# Patient Record
Sex: MALE | Race: WHITE | NOT HISPANIC OR LATINO | URBAN - METROPOLITAN AREA
[De-identification: names, ages, dates, MRNs, and addresses within clinical notes are randomized per-mention and may not be internally consistent; named-entity substitution may affect disease eponyms.]

---

## 2017-02-08 ENCOUNTER — OUTPATIENT (OUTPATIENT)
Dept: OUTPATIENT SERVICES | Facility: HOSPITAL | Age: 67
LOS: 1 days | Discharge: HOME | End: 2017-02-08
Payer: COMMERCIAL

## 2017-02-08 VITALS
DIASTOLIC BLOOD PRESSURE: 88 MMHG | OXYGEN SATURATION: 97 % | HEART RATE: 58 BPM | SYSTOLIC BLOOD PRESSURE: 140 MMHG | HEIGHT: 68 IN | TEMPERATURE: 97 F | RESPIRATION RATE: 18 BRPM | WEIGHT: 225.97 LBS

## 2017-02-08 DIAGNOSIS — Z98.890 OTHER SPECIFIED POSTPROCEDURAL STATES: Chronic | ICD-10-CM

## 2017-02-08 DIAGNOSIS — M17.12 UNILATERAL PRIMARY OSTEOARTHRITIS, LEFT KNEE: ICD-10-CM

## 2017-02-08 LAB
ABO RH CONFIRMATION: SIGNIFICANT CHANGE UP
ANION GAP SERPL CALC-SCNC: 6 MMOL/L — SIGNIFICANT CHANGE UP (ref 5–17)
APPEARANCE UR: CLEAR — SIGNIFICANT CHANGE UP
APTT BLD: 30.5 SEC — SIGNIFICANT CHANGE UP (ref 27.5–37.4)
BACTERIA # UR AUTO: (no result)
BASOPHILS # BLD AUTO: 0.2 K/UL — SIGNIFICANT CHANGE UP (ref 0–0.2)
BASOPHILS NFR BLD AUTO: 2.3 % — HIGH (ref 0–2)
BILIRUB UR-MCNC: NEGATIVE — SIGNIFICANT CHANGE UP
BLD GP AB SCN SERPL QL: SIGNIFICANT CHANGE UP
BUN SERPL-MCNC: 16 MG/DL — SIGNIFICANT CHANGE UP (ref 7–23)
CALCIUM SERPL-MCNC: 9.3 MG/DL — SIGNIFICANT CHANGE UP (ref 8.5–10.1)
CHLORIDE SERPL-SCNC: 105 MMOL/L — SIGNIFICANT CHANGE UP (ref 96–108)
CO2 SERPL-SCNC: 28 MMOL/L — SIGNIFICANT CHANGE UP (ref 22–31)
COLOR SPEC: YELLOW — SIGNIFICANT CHANGE UP
CREAT SERPL-MCNC: 0.9 MG/DL — SIGNIFICANT CHANGE UP (ref 0.5–1.3)
DIFF PNL FLD: NEGATIVE — SIGNIFICANT CHANGE UP
EOSINOPHIL # BLD AUTO: 0.1 K/UL — SIGNIFICANT CHANGE UP (ref 0–0.5)
EOSINOPHIL NFR BLD AUTO: 2 % — SIGNIFICANT CHANGE UP (ref 0–6)
EPI CELLS # UR: NEGATIVE — SIGNIFICANT CHANGE UP
GLUCOSE SERPL-MCNC: 87 MG/DL — SIGNIFICANT CHANGE UP (ref 70–99)
GLUCOSE UR QL: NEGATIVE MG/DL — SIGNIFICANT CHANGE UP
HCT VFR BLD CALC: 44.7 % — SIGNIFICANT CHANGE UP (ref 39–50)
HGB BLD-MCNC: 15 G/DL — SIGNIFICANT CHANGE UP (ref 13–17)
INR BLD: 1.04 RATIO — SIGNIFICANT CHANGE UP (ref 0.88–1.16)
KETONES UR-MCNC: NEGATIVE — SIGNIFICANT CHANGE UP
LEUKOCYTE ESTERASE UR-ACNC: (no result)
LYMPHOCYTES # BLD AUTO: 1.6 K/UL — SIGNIFICANT CHANGE UP (ref 1–3.3)
LYMPHOCYTES # BLD AUTO: 22.8 % — SIGNIFICANT CHANGE UP (ref 13–44)
MCHC RBC-ENTMCNC: 29.5 PG — SIGNIFICANT CHANGE UP (ref 27–34)
MCHC RBC-ENTMCNC: 33.7 GM/DL — SIGNIFICANT CHANGE UP (ref 32–36)
MCV RBC AUTO: 87.7 FL — SIGNIFICANT CHANGE UP (ref 80–100)
MONOCYTES # BLD AUTO: 0.7 K/UL — SIGNIFICANT CHANGE UP (ref 0–0.9)
MONOCYTES NFR BLD AUTO: 10.2 % — SIGNIFICANT CHANGE UP (ref 2–14)
MRSA PCR RESULT.: SIGNIFICANT CHANGE UP
NEUTROPHILS # BLD AUTO: 4.4 K/UL — SIGNIFICANT CHANGE UP (ref 1.8–7.4)
NEUTROPHILS NFR BLD AUTO: 62.7 % — SIGNIFICANT CHANGE UP (ref 43–77)
NITRITE UR-MCNC: NEGATIVE — SIGNIFICANT CHANGE UP
PH UR: 5 — SIGNIFICANT CHANGE UP (ref 4.8–8)
PLATELET # BLD AUTO: 186 K/UL — SIGNIFICANT CHANGE UP (ref 150–400)
POTASSIUM SERPL-MCNC: 4.6 MMOL/L — SIGNIFICANT CHANGE UP (ref 3.5–5.3)
POTASSIUM SERPL-SCNC: 4.6 MMOL/L — SIGNIFICANT CHANGE UP (ref 3.5–5.3)
PROT UR-MCNC: NEGATIVE MG/DL — SIGNIFICANT CHANGE UP
PROTHROM AB SERPL-ACNC: 11.5 SEC — SIGNIFICANT CHANGE UP (ref 10–13.1)
RBC # BLD: 5.09 M/UL — SIGNIFICANT CHANGE UP (ref 4.2–5.8)
RBC # FLD: 12.8 % — SIGNIFICANT CHANGE UP (ref 10.3–14.5)
RBC CASTS # UR COMP ASSIST: NEGATIVE /HPF — SIGNIFICANT CHANGE UP (ref 0–4)
S AUREUS DNA NOSE QL NAA+PROBE: DETECTED
SODIUM SERPL-SCNC: 139 MMOL/L — SIGNIFICANT CHANGE UP (ref 135–145)
SP GR SPEC: 1.01 — SIGNIFICANT CHANGE UP (ref 1.01–1.02)
TYPE + AB SCN PNL BLD: SIGNIFICANT CHANGE UP
UROBILINOGEN FLD QL: NEGATIVE MG/DL — SIGNIFICANT CHANGE UP
WBC # BLD: 7 K/UL — SIGNIFICANT CHANGE UP (ref 3.8–10.5)
WBC # FLD AUTO: 7 K/UL — SIGNIFICANT CHANGE UP (ref 3.8–10.5)
WBC UR QL: SIGNIFICANT CHANGE UP

## 2017-02-08 PROCEDURE — 71020: CPT | Mod: 26

## 2017-02-08 PROCEDURE — 73564 X-RAY EXAM KNEE 4 OR MORE: CPT | Mod: 26,LT

## 2017-02-08 PROCEDURE — 93010 ELECTROCARDIOGRAM REPORT: CPT

## 2017-02-08 RX ORDER — TAMSULOSIN HYDROCHLORIDE 0.4 MG/1
1 CAPSULE ORAL
Qty: 0 | Refills: 0 | COMMUNITY

## 2017-02-08 NOTE — H&P PST ADULT - HISTORY OF PRESENT ILLNESS
66 years old male with intermittent pain to left knee for "a couple years". Pain with weight bearing and stair climbing. Occasional buckling of left knee. Swelling to left knee. Diagnosed with osteoarthritis of left knee.  Patient with weakness to right side since spinal cord injury.

## 2017-02-08 NOTE — H&P PST ADULT - ASSESSMENT
66 years old male present to PST prior to left total knee replacement with Dr. Gopi Coronel.   Plan   1. NPO after midnight  2. Take the following medications with sips of water on the day of procedure:Cialis  3. Use E-Z sponge as directed  4. Use Mupirocin as directed.  5. Drink a quart of extra  fluids the day before your surgery.  6 Medical clearance with Dr. Wiggins  7. CBC, BMP, CMP, PT/PTT/INR, Urinalysis, Type and Screen, MRSA sent to lab  8. EKG and CXR done 66 years old male present to PST prior to left total knee replacement with Dr. Gopi Coronel.   Plan   1. NPO after midnight  2. Take the following medications with sips of water on the day of procedure: Cialis, Flomax and Levothyroxine  3. Use E-Z sponge as directed  4. Use Mupirocin as directed.  5. Drink a quart of extra  fluids the day before your surgery.  6 Medical clearance with Dr. Wiggins  7. CBC, BMP, CMP, PT/PTT/INR, Urinalysis, Type and Screen, MRSA sent to lab  8. EKG and CXR done

## 2017-02-08 NOTE — H&P PST ADULT - PMH
Benign prostatic hyperplasia without lower urinary tract symptoms, unspecified morphology    Diverticulosis of large intestine without hemorrhage    Graves disease    Hemorrhoids, unspecified hemorrhoid type    History of colon polyps    Hyperlipidemia, unspecified hyperlipidemia type    Hypothyroidism, unspecified type    Myelopathy    Osteoarthritis of knee, unilateral  left  Spastic    Spinal cord injury at C5-C7 level without injury of spinal bone, subsequent encounter    Spinal stenosis, unspecified spinal region    Weakness  to right side

## 2017-02-08 NOTE — H&P PST ADULT - FAMILY HISTORY
Mother  Still living? No  Family history of colon cancer in mother, Age at diagnosis: Age Unknown     Father  Still living? No  Family history of liver disease, Age at diagnosis: Age Unknown     Sibling  Still living? No  Family history of cancer, Age at diagnosis: Age Unknown

## 2017-02-08 NOTE — H&P PST ADULT - PSH
H/O arthroscopy of knee  Left x 2. Unsure of years  H/O colonoscopy with polypectomy  2014  H/O lumbar discectomy  with laminectomy  History of cervical discectomy    S/P laminectomy  Cervical

## 2017-02-14 ENCOUNTER — APPOINTMENT (OUTPATIENT)
Dept: FAMILY MEDICINE | Facility: CLINIC | Age: 67
End: 2017-02-14

## 2017-02-14 ENCOUNTER — RESULT REVIEW (OUTPATIENT)
Age: 67
End: 2017-02-14

## 2017-02-14 VITALS
HEIGHT: 69 IN | WEIGHT: 227 LBS | OXYGEN SATURATION: 96 % | BODY MASS INDEX: 33.62 KG/M2 | DIASTOLIC BLOOD PRESSURE: 78 MMHG | HEART RATE: 52 BPM | SYSTOLIC BLOOD PRESSURE: 132 MMHG

## 2017-02-14 DIAGNOSIS — Z01.818 ENCOUNTER FOR OTHER PREPROCEDURAL EXAMINATION: ICD-10-CM

## 2017-02-14 DIAGNOSIS — M19.90 UNSPECIFIED OSTEOARTHRITIS, UNSPECIFIED SITE: ICD-10-CM

## 2017-02-14 RX ORDER — OXYCODONE HYDROCHLORIDE 5 MG/1
20 TABLET ORAL ONCE
Qty: 0 | Refills: 0 | Status: DISCONTINUED | OUTPATIENT
Start: 2017-02-15 | End: 2017-02-15

## 2017-02-14 RX ORDER — CELECOXIB 200 MG/1
200 CAPSULE ORAL ONCE
Qty: 0 | Refills: 0 | Status: COMPLETED | OUTPATIENT
Start: 2017-02-15 | End: 2017-02-15

## 2017-02-14 RX ORDER — ACETAMINOPHEN 500 MG
650 TABLET ORAL ONCE
Qty: 0 | Refills: 0 | Status: COMPLETED | OUTPATIENT
Start: 2017-02-15 | End: 2017-02-15

## 2017-02-15 ENCOUNTER — INPATIENT (INPATIENT)
Facility: HOSPITAL | Age: 67
LOS: 2 days | Discharge: HOME HEALTH | End: 2017-02-18
Attending: ORTHOPAEDIC SURGERY | Admitting: ORTHOPAEDIC SURGERY
Payer: COMMERCIAL

## 2017-02-15 ENCOUNTER — TRANSCRIPTION ENCOUNTER (OUTPATIENT)
Age: 67
End: 2017-02-15

## 2017-02-15 VITALS
RESPIRATION RATE: 16 BRPM | DIASTOLIC BLOOD PRESSURE: 80 MMHG | TEMPERATURE: 97 F | SYSTOLIC BLOOD PRESSURE: 119 MMHG | OXYGEN SATURATION: 96 % | WEIGHT: 225.97 LBS | HEART RATE: 64 BPM

## 2017-02-15 DIAGNOSIS — Z98.890 OTHER SPECIFIED POSTPROCEDURAL STATES: Chronic | ICD-10-CM

## 2017-02-15 DIAGNOSIS — M17.9 OSTEOARTHRITIS OF KNEE, UNSPECIFIED: ICD-10-CM

## 2017-02-15 DIAGNOSIS — E03.9 HYPOTHYROIDISM, UNSPECIFIED: ICD-10-CM

## 2017-02-15 DIAGNOSIS — R25.2 CRAMP AND SPASM: ICD-10-CM

## 2017-02-15 LAB
ANION GAP SERPL CALC-SCNC: 7 MMOL/L — SIGNIFICANT CHANGE UP (ref 5–17)
BUN SERPL-MCNC: 15 MG/DL — SIGNIFICANT CHANGE UP (ref 7–23)
CALCIUM SERPL-MCNC: 8.7 MG/DL — SIGNIFICANT CHANGE UP (ref 8.5–10.1)
CHLORIDE SERPL-SCNC: 107 MMOL/L — SIGNIFICANT CHANGE UP (ref 96–108)
CO2 SERPL-SCNC: 28 MMOL/L — SIGNIFICANT CHANGE UP (ref 22–31)
CREAT SERPL-MCNC: 1.14 MG/DL — SIGNIFICANT CHANGE UP (ref 0.5–1.3)
GLUCOSE SERPL-MCNC: 86 MG/DL — SIGNIFICANT CHANGE UP (ref 70–99)
HCT VFR BLD CALC: 40.5 % — SIGNIFICANT CHANGE UP (ref 39–50)
HGB BLD-MCNC: 13.5 G/DL — SIGNIFICANT CHANGE UP (ref 13–17)
MCHC RBC-ENTMCNC: 29.5 PG — SIGNIFICANT CHANGE UP (ref 27–34)
MCHC RBC-ENTMCNC: 33.4 GM/DL — SIGNIFICANT CHANGE UP (ref 32–36)
MCV RBC AUTO: 88.2 FL — SIGNIFICANT CHANGE UP (ref 80–100)
PLATELET # BLD AUTO: 158 K/UL — SIGNIFICANT CHANGE UP (ref 150–400)
POTASSIUM SERPL-MCNC: 4.8 MMOL/L — SIGNIFICANT CHANGE UP (ref 3.5–5.3)
POTASSIUM SERPL-SCNC: 4.8 MMOL/L — SIGNIFICANT CHANGE UP (ref 3.5–5.3)
RBC # BLD: 4.59 M/UL — SIGNIFICANT CHANGE UP (ref 4.2–5.8)
RBC # FLD: 12.5 % — SIGNIFICANT CHANGE UP (ref 10.3–14.5)
SODIUM SERPL-SCNC: 142 MMOL/L — SIGNIFICANT CHANGE UP (ref 135–145)
WBC # BLD: 7.7 K/UL — SIGNIFICANT CHANGE UP (ref 3.8–10.5)
WBC # FLD AUTO: 7.7 K/UL — SIGNIFICANT CHANGE UP (ref 3.8–10.5)

## 2017-02-15 PROCEDURE — 88305 TISSUE EXAM BY PATHOLOGIST: CPT | Mod: 26

## 2017-02-15 PROCEDURE — 73560 X-RAY EXAM OF KNEE 1 OR 2: CPT | Mod: 26,LT

## 2017-02-15 RX ORDER — ACETAMINOPHEN 500 MG
650 TABLET ORAL EVERY 6 HOURS
Qty: 0 | Refills: 0 | Status: DISCONTINUED | OUTPATIENT
Start: 2017-02-15 | End: 2017-02-18

## 2017-02-15 RX ORDER — OXYCODONE HYDROCHLORIDE 5 MG/1
5 TABLET ORAL EVERY 4 HOURS
Qty: 0 | Refills: 0 | Status: DISCONTINUED | OUTPATIENT
Start: 2017-02-15 | End: 2017-02-18

## 2017-02-15 RX ORDER — DOCUSATE SODIUM 100 MG
100 CAPSULE ORAL THREE TIMES A DAY
Qty: 0 | Refills: 0 | Status: DISCONTINUED | OUTPATIENT
Start: 2017-02-15 | End: 2017-02-18

## 2017-02-15 RX ORDER — OXYCODONE HYDROCHLORIDE 5 MG/1
10 TABLET ORAL EVERY 4 HOURS
Qty: 0 | Refills: 0 | Status: DISCONTINUED | OUTPATIENT
Start: 2017-02-15 | End: 2017-02-18

## 2017-02-15 RX ORDER — LEVOTHYROXINE SODIUM 125 MCG
200 TABLET ORAL DAILY
Qty: 0 | Refills: 0 | Status: DISCONTINUED | OUTPATIENT
Start: 2017-02-15 | End: 2017-02-18

## 2017-02-15 RX ORDER — ATORVASTATIN CALCIUM 80 MG/1
20 TABLET, FILM COATED ORAL AT BEDTIME
Qty: 0 | Refills: 0 | Status: DISCONTINUED | OUTPATIENT
Start: 2017-02-15 | End: 2017-02-18

## 2017-02-15 RX ORDER — ONDANSETRON 8 MG/1
4 TABLET, FILM COATED ORAL EVERY 6 HOURS
Qty: 0 | Refills: 0 | Status: DISCONTINUED | OUTPATIENT
Start: 2017-02-15 | End: 2017-02-18

## 2017-02-15 RX ORDER — ROSUVASTATIN CALCIUM 5 MG/1
1 TABLET ORAL
Qty: 0 | Refills: 0 | COMMUNITY

## 2017-02-15 RX ORDER — FAMOTIDINE 10 MG/ML
20 INJECTION INTRAVENOUS EVERY 12 HOURS
Qty: 0 | Refills: 0 | Status: DISCONTINUED | OUTPATIENT
Start: 2017-02-15 | End: 2017-02-18

## 2017-02-15 RX ORDER — CHOLECALCIFEROL (VITAMIN D3) 125 MCG
1000 CAPSULE ORAL DAILY
Qty: 0 | Refills: 0 | Status: DISCONTINUED | OUTPATIENT
Start: 2017-02-15 | End: 2017-02-18

## 2017-02-15 RX ORDER — ASPIRIN/CALCIUM CARB/MAGNESIUM 324 MG
325 TABLET ORAL
Qty: 0 | Refills: 0 | Status: DISCONTINUED | OUTPATIENT
Start: 2017-02-15 | End: 2017-02-16

## 2017-02-15 RX ORDER — CYCLOBENZAPRINE HYDROCHLORIDE 10 MG/1
10 TABLET, FILM COATED ORAL THREE TIMES A DAY
Qty: 0 | Refills: 0 | Status: DISCONTINUED | OUTPATIENT
Start: 2017-02-15 | End: 2017-02-18

## 2017-02-15 RX ORDER — MAGNESIUM HYDROXIDE 400 MG/1
30 TABLET, CHEWABLE ORAL DAILY
Qty: 0 | Refills: 0 | Status: DISCONTINUED | OUTPATIENT
Start: 2017-02-15 | End: 2017-02-18

## 2017-02-15 RX ORDER — OXYCODONE HYDROCHLORIDE 5 MG/1
10 TABLET ORAL EVERY 12 HOURS
Qty: 0 | Refills: 0 | Status: DISCONTINUED | OUTPATIENT
Start: 2017-02-15 | End: 2017-02-18

## 2017-02-15 RX ORDER — ONDANSETRON 8 MG/1
4 TABLET, FILM COATED ORAL EVERY 4 HOURS
Qty: 0 | Refills: 0 | Status: DISCONTINUED | OUTPATIENT
Start: 2017-02-15 | End: 2017-02-15

## 2017-02-15 RX ORDER — FERROUS SULFATE 325(65) MG
325 TABLET ORAL
Qty: 0 | Refills: 0 | Status: DISCONTINUED | OUTPATIENT
Start: 2017-02-15 | End: 2017-02-18

## 2017-02-15 RX ORDER — DOCUSATE SODIUM 100 MG
100 CAPSULE ORAL EVERY 12 HOURS
Qty: 0 | Refills: 0 | Status: DISCONTINUED | OUTPATIENT
Start: 2017-02-15 | End: 2017-02-18

## 2017-02-15 RX ORDER — SODIUM CHLORIDE 9 MG/ML
1000 INJECTION, SOLUTION INTRAVENOUS
Qty: 0 | Refills: 0 | Status: DISCONTINUED | OUTPATIENT
Start: 2017-02-15 | End: 2017-02-15

## 2017-02-15 RX ORDER — CHOLECALCIFEROL (VITAMIN D3) 125 MCG
1 CAPSULE ORAL
Qty: 0 | Refills: 0 | COMMUNITY

## 2017-02-15 RX ORDER — CEFAZOLIN SODIUM 1 G
2000 VIAL (EA) INJECTION EVERY 6 HOURS
Qty: 0 | Refills: 0 | Status: COMPLETED | OUTPATIENT
Start: 2017-02-15 | End: 2017-02-16

## 2017-02-15 RX ORDER — OMEGA-3 ACID ETHYL ESTERS 1 G
0 CAPSULE ORAL
Qty: 0 | Refills: 0 | COMMUNITY

## 2017-02-15 RX ORDER — ASPIRIN/CALCIUM CARB/MAGNESIUM 324 MG
1 TABLET ORAL
Qty: 0 | Refills: 0 | COMMUNITY

## 2017-02-15 RX ORDER — SENNA PLUS 8.6 MG/1
2 TABLET ORAL AT BEDTIME
Qty: 0 | Refills: 0 | Status: DISCONTINUED | OUTPATIENT
Start: 2017-02-15 | End: 2017-02-18

## 2017-02-15 RX ORDER — TAMSULOSIN HYDROCHLORIDE 0.4 MG/1
1 CAPSULE ORAL
Qty: 0 | Refills: 0 | COMMUNITY

## 2017-02-15 RX ORDER — TRANEXAMIC ACID 100 MG/ML
680 INJECTION, SOLUTION INTRAVENOUS ONCE
Qty: 0 | Refills: 0 | Status: DISCONTINUED | OUTPATIENT
Start: 2017-02-15 | End: 2017-02-15

## 2017-02-15 RX ORDER — HYDROMORPHONE HYDROCHLORIDE 2 MG/ML
0.5 INJECTION INTRAMUSCULAR; INTRAVENOUS; SUBCUTANEOUS
Qty: 0 | Refills: 0 | Status: DISCONTINUED | OUTPATIENT
Start: 2017-02-15 | End: 2017-02-18

## 2017-02-15 RX ORDER — POLYETHYLENE GLYCOL 3350 17 G/17G
17 POWDER, FOR SOLUTION ORAL DAILY
Qty: 0 | Refills: 0 | Status: DISCONTINUED | OUTPATIENT
Start: 2017-02-15 | End: 2017-02-18

## 2017-02-15 RX ORDER — MULTIVIT-MIN/FERROUS GLUCONATE 9 MG/15 ML
1 LIQUID (ML) ORAL
Qty: 0 | Refills: 0 | COMMUNITY

## 2017-02-15 RX ORDER — PANTOPRAZOLE SODIUM 20 MG/1
40 TABLET, DELAYED RELEASE ORAL DAILY
Qty: 0 | Refills: 0 | Status: DISCONTINUED | OUTPATIENT
Start: 2017-02-15 | End: 2017-02-18

## 2017-02-15 RX ORDER — TRANEXAMIC ACID 100 MG/ML
1025 INJECTION, SOLUTION INTRAVENOUS ONCE
Qty: 0 | Refills: 0 | Status: DISCONTINUED | OUTPATIENT
Start: 2017-02-15 | End: 2017-02-18

## 2017-02-15 RX ORDER — FOLIC ACID 0.8 MG
1 TABLET ORAL DAILY
Qty: 0 | Refills: 0 | Status: DISCONTINUED | OUTPATIENT
Start: 2017-02-15 | End: 2017-02-18

## 2017-02-15 RX ORDER — TIZANIDINE 4 MG/1
2 TABLET ORAL
Qty: 0 | Refills: 0 | COMMUNITY

## 2017-02-15 RX ORDER — ZOLPIDEM TARTRATE 10 MG/1
5 TABLET ORAL AT BEDTIME
Qty: 0 | Refills: 0 | Status: DISCONTINUED | OUTPATIENT
Start: 2017-02-15 | End: 2017-02-18

## 2017-02-15 RX ORDER — HYDROMORPHONE HYDROCHLORIDE 2 MG/ML
0.5 INJECTION INTRAMUSCULAR; INTRAVENOUS; SUBCUTANEOUS
Qty: 0 | Refills: 0 | Status: DISCONTINUED | OUTPATIENT
Start: 2017-02-15 | End: 2017-02-15

## 2017-02-15 RX ORDER — TADALAFIL 10 MG/1
1 TABLET, FILM COATED ORAL
Qty: 0 | Refills: 0 | COMMUNITY

## 2017-02-15 RX ORDER — INFLUENZA VIRUS VACCINE 15; 15; 15; 15 UG/.5ML; UG/.5ML; UG/.5ML; UG/.5ML
0.5 SUSPENSION INTRAMUSCULAR ONCE
Qty: 0 | Refills: 0 | Status: COMPLETED | OUTPATIENT
Start: 2017-02-15 | End: 2017-02-18

## 2017-02-15 RX ORDER — CELECOXIB 200 MG/1
200 CAPSULE ORAL
Qty: 0 | Refills: 0 | Status: DISCONTINUED | OUTPATIENT
Start: 2017-02-15 | End: 2017-02-18

## 2017-02-15 RX ORDER — LEVOTHYROXINE SODIUM 125 MCG
1 TABLET ORAL
Qty: 0 | Refills: 0 | COMMUNITY

## 2017-02-15 RX ORDER — ASCORBIC ACID 60 MG
500 TABLET,CHEWABLE ORAL
Qty: 0 | Refills: 0 | Status: DISCONTINUED | OUTPATIENT
Start: 2017-02-15 | End: 2017-02-18

## 2017-02-15 RX ORDER — SODIUM CHLORIDE 9 MG/ML
1000 INJECTION, SOLUTION INTRAVENOUS
Qty: 0 | Refills: 0 | Status: DISCONTINUED | OUTPATIENT
Start: 2017-02-15 | End: 2017-02-18

## 2017-02-15 RX ORDER — TAMSULOSIN HYDROCHLORIDE 0.4 MG/1
0.4 CAPSULE ORAL
Qty: 0 | Refills: 0 | Status: DISCONTINUED | OUTPATIENT
Start: 2017-02-15 | End: 2017-02-18

## 2017-02-15 RX ADMIN — Medication 650 MILLIGRAM(S): at 22:08

## 2017-02-15 RX ADMIN — HYDROMORPHONE HYDROCHLORIDE 0.5 MILLIGRAM(S): 2 INJECTION INTRAMUSCULAR; INTRAVENOUS; SUBCUTANEOUS at 18:55

## 2017-02-15 RX ADMIN — Medication 100 MILLIGRAM(S): at 22:09

## 2017-02-15 RX ADMIN — ATORVASTATIN CALCIUM 20 MILLIGRAM(S): 80 TABLET, FILM COATED ORAL at 22:09

## 2017-02-15 RX ADMIN — SENNA PLUS 2 TABLET(S): 8.6 TABLET ORAL at 22:09

## 2017-02-15 RX ADMIN — OXYCODONE HYDROCHLORIDE 5 MILLIGRAM(S): 5 TABLET ORAL at 18:57

## 2017-02-15 RX ADMIN — CELECOXIB 200 MILLIGRAM(S): 200 CAPSULE ORAL at 13:58

## 2017-02-15 RX ADMIN — Medication 100 MILLIGRAM(S): at 22:08

## 2017-02-15 RX ADMIN — Medication 650 MILLIGRAM(S): at 13:57

## 2017-02-15 RX ADMIN — OXYCODONE HYDROCHLORIDE 20 MILLIGRAM(S): 5 TABLET ORAL at 13:57

## 2017-02-15 RX ADMIN — SODIUM CHLORIDE 75 MILLILITER(S): 9 INJECTION, SOLUTION INTRAVENOUS at 18:44

## 2017-02-15 RX ADMIN — Medication 1 TABLET(S): at 22:09

## 2017-02-15 NOTE — PRE-ANESTHESIA EVALUATION ADULT - NSANTHOSAYNRD_GEN_A_CORE
No. PAULA screening performed.  STOP BANG Legend: 0-2 = LOW Risk; 3-4 = INTERMEDIATE Risk; 5-8 = HIGH Risk

## 2017-02-15 NOTE — PATIENT PROFILE ADULT. - PMH
Alcoholic  recovering alcoholic x 40years  Benign prostatic hyperplasia without lower urinary tract symptoms, unspecified morphology    Diverticulosis of large intestine without hemorrhage    Graves disease    Hemorrhoids, unspecified hemorrhoid type    History of colon polyps    Hyperlipidemia, unspecified hyperlipidemia type    Hypothyroidism, unspecified type    Myelopathy    Osteoarthritis of knee, unilateral  left  Spastic    Spinal cord injury at C5-C7 level without injury of spinal bone, subsequent encounter    Spinal stenosis, unspecified spinal region    Weakness  to right side

## 2017-02-15 NOTE — PROGRESS NOTE ADULT - SUBJECTIVE AND OBJECTIVE BOX
Patient seen and examined. Pain controlled. Tolerated procedure well.    Allergies    No Known Allergies      Vital Signs Last 24 Hrs  T(C): 37, Max: 37 (02-15 @ 18:33)  T(F): 98.6, Max: 98.6 (02-15 @ 18:33)  HR: 55 (55 - 70)  BP: 107/64 (66/47 - 119/80)  BP(mean): --  RR: 14 (11 - 16)  SpO2: 97% (96% - 100%)    Physical Exam  Gen: NAD  LLE:   Dressing c/d/i  +ehl/fhl/ta/gs function  L3-S1 silt  Dp pulse intact  No calf ttp  Compartments soft    A/P: 66y Male sp L TKA POD 0  Pain control  DVT ppx  PT/WBAT/OOB  FU labs  Dispo planning  D/w attending

## 2017-02-15 NOTE — CONSULT NOTE ADULT - SUBJECTIVE AND OBJECTIVE BOX
Patient is a 66y old  Male who presents with a chief complaint of left tkr (15 Feb 2017 13:44)      HPI: Pt is a 67 y/o male with h/o obesity, recovering alcoholic, graves disease now hypothyroidism spinal stenosis s/p multiple surgeries and now with mild Rt sided weakness with spasticity myelopath who has been having increasing Lt knee pain.  He was admitted for elective Lt total knee replacement.  Post-medicine consult called for medical comanagement by Dr Nguyễn.  Pt c/o mild Lt knee soarness, no CP or SOB.      PAST MEDICAL & SURGICAL HISTORY:  Alcoholic: recovering alcoholic x 40years  Graves disease  Hypothyroidism, unspecified type  Spinal stenosis, unspecified spinal region  Myelopathy  Weakness: to right side  Spastic  Spinal cord injury at C5-C7 level without injury of spinal bone, subsequent encounter  Osteoarthritis of knee, unilateral: left  Benign prostatic hyperplasia without lower urinary tract symptoms, unspecified morphology  Hemorrhoids, unspecified hemorrhoid type  History of colon polyps  Diverticulosis of large intestine without hemorrhage  Hyperlipidemia, unspecified hyperlipidemia type  H/O colonoscopy with polypectomy: 2014  H/O arthroscopy of knee: Left x 2. Unsure of years  H/O lumbar discectomy: with laminectomy  S/P laminectomy: Cervical  History of cervical discectomy      Allergies    No Known Allergies    Intolerances        MEDICATIONS  (STANDING):  influenza   Vaccine 0.5milliLiter(s) IntraMuscular once  lactated ringers. 1000milliLiter(s) IV Continuous <Continuous>  acetaminophen   Tablet 650milliGRAM(s) Oral every 6 hours  oxyCODONE ER Tablet 10milliGRAM(s) Oral every 12 hours  tranexamic acid IVPB 1025milliGRAM(s) IV Intermittent once  celecoxib 200milliGRAM(s) Oral with breakfast  docusate sodium 100milliGRAM(s) Oral every 12 hours  senna 2Tablet(s) Oral at bedtime    MEDICATIONS  (PRN):  HYDROmorphone  Injectable 0.5milliGRAM(s) IV Push every 10 minutes PRN Moderate Pain  ondansetron Injectable 4milliGRAM(s) IV Push every 4 hours PRN Nausea and/or Vomiting  oxyCODONE IR 5milliGRAM(s) Oral every 4 hours PRN Mild Pain (1 - 3)  oxyCODONE IR 10milliGRAM(s) Oral every 4 hours PRN Moderate Pain (4 - 6)  HYDROmorphone  Injectable 0.5milliGRAM(s) IV Push every 3 hours PRN Severe Pain (7 - 10)      FAMILY HISTORY:  Family history of cancer (Sibling): SIster - bile duct  Family history of liver disease (Father): father  Family history of colon cancer in mother (Mother)      Social History: , recovering alcoholic, former smoker (quit 22 years ago)      Vital Signs Last 24 Hrs  T(C): 37, Max: 37 (02-15 @ 18:33)  T(F): 98.6, Max: 98.6 (02-15 @ 18:33)  HR: 55 (55 - 70)  BP: 107/64 (66/47 - 119/80)  BP(mean): --  RR: 14 (11 - 16)  SpO2: 97% (96% - 100%)    Daily     Daily     I&O's Summary    I & Os for current day (as of 15 Feb 2017 19:43)  =============================================  IN: 1300 ml / OUT: 0 ml / NET: 1300 ml        Data

## 2017-02-15 NOTE — PRE-ANESTHESIA EVALUATION ADULT - NSANTHPMHFT_GEN_ALL_CORE
no hx of cp, palp, sob  neg stress test as per pt 7/2016  no PAULA  + right-sided lower extremity weakness

## 2017-02-15 NOTE — PROCEDURE NOTE - ADDITIONAL PROCEDURE DETAILS
__Left__ Adductor Canal Block Note:  Time out performed, sterile prep with chlorhexidine and drape, ultrasound-guided, 21G 4" stimuplex needle, good visualization of needle and nerve at all times, 20cc of 0.5% Ropivacaine injected easily, no heme after aspirating every 5cc, no intraneural injection, no paresthesia.  Procedure well tolerated without complications.

## 2017-02-15 NOTE — CONSULT NOTE ADULT - PROBLEM SELECTOR RECOMMENDATION 9
s/p Lt total knee replacement, cont pain control, PT, encourage use of incentive spirometry, DVT proph, monitor post-op labs

## 2017-02-16 DIAGNOSIS — D50.0 IRON DEFICIENCY ANEMIA SECONDARY TO BLOOD LOSS (CHRONIC): ICD-10-CM

## 2017-02-16 DIAGNOSIS — D69.6 THROMBOCYTOPENIA, UNSPECIFIED: ICD-10-CM

## 2017-02-16 LAB
ANION GAP SERPL CALC-SCNC: 6 MMOL/L — SIGNIFICANT CHANGE UP (ref 5–17)
BUN SERPL-MCNC: 15 MG/DL — SIGNIFICANT CHANGE UP (ref 7–23)
CALCIUM SERPL-MCNC: 8.5 MG/DL — SIGNIFICANT CHANGE UP (ref 8.5–10.1)
CHLORIDE SERPL-SCNC: 104 MMOL/L — SIGNIFICANT CHANGE UP (ref 96–108)
CO2 SERPL-SCNC: 28 MMOL/L — SIGNIFICANT CHANGE UP (ref 22–31)
CREAT SERPL-MCNC: 1.15 MG/DL — SIGNIFICANT CHANGE UP (ref 0.5–1.3)
GLUCOSE SERPL-MCNC: 95 MG/DL — SIGNIFICANT CHANGE UP (ref 70–99)
HCT VFR BLD CALC: 37.2 % — LOW (ref 39–50)
HGB BLD-MCNC: 12.7 G/DL — LOW (ref 13–17)
INR BLD: 1.11 RATIO — SIGNIFICANT CHANGE UP (ref 0.88–1.16)
MCHC RBC-ENTMCNC: 30.2 PG — SIGNIFICANT CHANGE UP (ref 27–34)
MCHC RBC-ENTMCNC: 34.3 GM/DL — SIGNIFICANT CHANGE UP (ref 32–36)
MCV RBC AUTO: 88.1 FL — SIGNIFICANT CHANGE UP (ref 80–100)
PLATELET # BLD AUTO: 140 K/UL — LOW (ref 150–400)
POTASSIUM SERPL-MCNC: 4.4 MMOL/L — SIGNIFICANT CHANGE UP (ref 3.5–5.3)
POTASSIUM SERPL-SCNC: 4.4 MMOL/L — SIGNIFICANT CHANGE UP (ref 3.5–5.3)
PROTHROM AB SERPL-ACNC: 12.2 SEC — SIGNIFICANT CHANGE UP (ref 10–13.1)
RBC # BLD: 4.22 M/UL — SIGNIFICANT CHANGE UP (ref 4.2–5.8)
RBC # FLD: 12.3 % — SIGNIFICANT CHANGE UP (ref 10.3–14.5)
SODIUM SERPL-SCNC: 138 MMOL/L — SIGNIFICANT CHANGE UP (ref 135–145)
WBC # BLD: 9.5 K/UL — SIGNIFICANT CHANGE UP (ref 3.8–10.5)
WBC # FLD AUTO: 9.5 K/UL — SIGNIFICANT CHANGE UP (ref 3.8–10.5)

## 2017-02-16 RX ORDER — WARFARIN SODIUM 2.5 MG/1
5 TABLET ORAL ONCE
Qty: 0 | Refills: 0 | Status: DISCONTINUED | OUTPATIENT
Start: 2017-02-16 | End: 2017-02-16

## 2017-02-16 RX ORDER — HEPARIN SODIUM 5000 [USP'U]/ML
5000 INJECTION INTRAVENOUS; SUBCUTANEOUS EVERY 8 HOURS
Qty: 0 | Refills: 0 | Status: DISCONTINUED | OUTPATIENT
Start: 2017-02-16 | End: 2017-02-18

## 2017-02-16 RX ORDER — WARFARIN SODIUM 2.5 MG/1
5 TABLET ORAL ONCE
Qty: 0 | Refills: 0 | Status: COMPLETED | OUTPATIENT
Start: 2017-02-16 | End: 2017-02-16

## 2017-02-16 RX ORDER — WARFARIN SODIUM 2.5 MG/1
5 TABLET ORAL DAILY
Qty: 0 | Refills: 0 | Status: DISCONTINUED | OUTPATIENT
Start: 2017-02-17 | End: 2017-02-18

## 2017-02-16 RX ORDER — PANTOPRAZOLE SODIUM 20 MG/1
1 TABLET, DELAYED RELEASE ORAL
Qty: 14 | Refills: 0 | OUTPATIENT
Start: 2017-02-16 | End: 2017-03-02

## 2017-02-16 RX ORDER — DOCUSATE SODIUM 100 MG
1 CAPSULE ORAL
Qty: 60 | Refills: 0 | OUTPATIENT
Start: 2017-02-16 | End: 2017-03-18

## 2017-02-16 RX ORDER — WARFARIN SODIUM 2.5 MG/1
1 TABLET ORAL
Qty: 0 | Refills: 0 | COMMUNITY
Start: 2017-02-16

## 2017-02-16 RX ADMIN — ONDANSETRON 4 MILLIGRAM(S): 8 TABLET, FILM COATED ORAL at 10:02

## 2017-02-16 RX ADMIN — HEPARIN SODIUM 5000 UNIT(S): 5000 INJECTION INTRAVENOUS; SUBCUTANEOUS at 22:41

## 2017-02-16 RX ADMIN — ATORVASTATIN CALCIUM 20 MILLIGRAM(S): 80 TABLET, FILM COATED ORAL at 22:41

## 2017-02-16 RX ADMIN — TAMSULOSIN HYDROCHLORIDE 0.4 MILLIGRAM(S): 0.4 CAPSULE ORAL at 17:15

## 2017-02-16 RX ADMIN — Medication 500 MILLIGRAM(S): at 06:12

## 2017-02-16 RX ADMIN — Medication 650 MILLIGRAM(S): at 06:17

## 2017-02-16 RX ADMIN — PANTOPRAZOLE SODIUM 40 MILLIGRAM(S): 20 TABLET, DELAYED RELEASE ORAL at 11:11

## 2017-02-16 RX ADMIN — OXYCODONE HYDROCHLORIDE 10 MILLIGRAM(S): 5 TABLET ORAL at 22:46

## 2017-02-16 RX ADMIN — OXYCODONE HYDROCHLORIDE 10 MILLIGRAM(S): 5 TABLET ORAL at 17:15

## 2017-02-16 RX ADMIN — Medication 325 MILLIGRAM(S): at 06:14

## 2017-02-16 RX ADMIN — Medication 100 MILLIGRAM(S): at 06:12

## 2017-02-16 RX ADMIN — WARFARIN SODIUM 5 MILLIGRAM(S): 2.5 TABLET ORAL at 11:33

## 2017-02-16 RX ADMIN — FAMOTIDINE 20 MILLIGRAM(S): 10 INJECTION INTRAVENOUS at 06:16

## 2017-02-16 RX ADMIN — Medication 100 MILLIGRAM(S): at 22:41

## 2017-02-16 RX ADMIN — CELECOXIB 200 MILLIGRAM(S): 200 CAPSULE ORAL at 13:14

## 2017-02-16 RX ADMIN — Medication 1 TABLET(S): at 22:41

## 2017-02-16 RX ADMIN — Medication 500 MILLIGRAM(S): at 17:14

## 2017-02-16 RX ADMIN — Medication 650 MILLIGRAM(S): at 11:10

## 2017-02-16 RX ADMIN — Medication 100 MILLIGRAM(S): at 06:16

## 2017-02-16 RX ADMIN — Medication 200 MICROGRAM(S): at 06:13

## 2017-02-16 RX ADMIN — Medication 100 MILLIGRAM(S): at 13:10

## 2017-02-16 RX ADMIN — OXYCODONE HYDROCHLORIDE 10 MILLIGRAM(S): 5 TABLET ORAL at 06:16

## 2017-02-16 RX ADMIN — CELECOXIB 200 MILLIGRAM(S): 200 CAPSULE ORAL at 11:10

## 2017-02-16 RX ADMIN — HEPARIN SODIUM 5000 UNIT(S): 5000 INJECTION INTRAVENOUS; SUBCUTANEOUS at 13:11

## 2017-02-16 RX ADMIN — Medication 1 TABLET(S): at 13:11

## 2017-02-16 RX ADMIN — Medication 100 MILLIGRAM(S): at 17:14

## 2017-02-16 RX ADMIN — Medication 650 MILLIGRAM(S): at 17:14

## 2017-02-16 RX ADMIN — FAMOTIDINE 20 MILLIGRAM(S): 10 INJECTION INTRAVENOUS at 17:14

## 2017-02-16 RX ADMIN — Medication 1 TABLET(S): at 06:13

## 2017-02-16 RX ADMIN — TAMSULOSIN HYDROCHLORIDE 0.4 MILLIGRAM(S): 0.4 CAPSULE ORAL at 06:12

## 2017-02-16 NOTE — CONSULT NOTE ADULT - SUBJECTIVE AND OBJECTIVE BOX
HPI  HPI:      Patient is a 66y old  Male who presents with a chief complaint of left knee pain now s/p  left tkr (16 Feb 2017 00:56)      Consulted by Dr. Nguyễn for VTE prophylaxis, risk stratification, and anticoagulation management.    PAST MEDICAL & SURGICAL HISTORY:  Alcoholic: recovering alcoholic x 40years  Graves disease  Hypothyroidism, unspecified type  Spinal stenosis, unspecified spinal region  Myelopathy  Weakness: to right side  Spastic  Spinal cord injury at C5-C7 level without injury of spinal bone, subsequent encounter  Osteoarthritis of knee, unilateral: left  Benign prostatic hyperplasia without lower urinary tract symptoms, unspecified morphology  Hemorrhoids, unspecified hemorrhoid type  History of colon polyps  Diverticulosis of large intestine without hemorrhage  Hyperlipidemia, unspecified hyperlipidemia type  H/O colonoscopy with polypectomy: 2014  H/O arthroscopy of knee: Left x 2. Unsure of years  H/O lumbar discectomy: with laminectomy  S/P laminectomy: Cervical  History of cervical discectomy    216: Patient seen at bedside with wife- spoke at length regarding coumadin and necessity of anticoagulation post-op. Discussed diet/consistency/adherence. Patient and wife verbalized understanding.      BMI: 34.3    CrCl: 31.0    Caprini VTE Risk Score: 8 (high)     IMPROVE Bleeding Risk Score: 2.5 (low)    Falls Risk:   High ( x )  Mod (  )  Low (  )      FAMILY HISTORY:  Family history of cancer (Sibling): SIster - bile duct  Family history of liver disease (Father): father  Family history of colon cancer in mother (Mother)    Denies any personal or familial history of clotting or bleeding disorders.    Allergies    No Known Allergies    Intolerances        REVIEW OF SYSTEMS    (  )Fever	     (  )Constipation	(  )SOB				(  )Headache	(  )Dysuria  (  )Chills	     (  )Melena	(  )Dyspnea present on exertion	                    (  )Dizziness                    (  )Polyuria  (  )Nausea	     (  )Hematochezia	(  )Cough			                    (  )Syncope   	(  )Hematuria  (  )Vomiting    (  )Chest Pain	(  )Wheezing			(  )Weakness  (  )Diarrhea     (  )Palpitations	(  )Anorexia			(  )Myalgia    All other review of systems negative: Yes          PHYSICAL EXAM:    Constitutional: Appears Well    Neurological: A& O x 3    Skin: Warm    Respiratory and Thorax: normal effort; Breath sounds: normal; No rales/wheezing/rhonchi  	  Cardiovascular: S1, S2, regular, NMBR	    Gastrointestinal: BS + x 4Q, nontender	    Genitourinary:  Bladder nondistended, nontender    Musculoskeletal:     General Right:   no muscle/joint tenderness,   normal tone, no joint swelling,   ROM: full	    General Left:   no muscle/joint tenderness,   normal tone, no joint swelling,   ROM: limited    Knee:  Left: Incision: ; Dressing CDI      Lower extrems:   Right: no calf tenderness              negative desmond's sign               + pedal pulses    Left:   no calf tenderness              negative desmond's sign               + pedal pulses                          12.7   9.5   )-----------( 140      ( 16 Feb 2017 07:17 )             37.2       16 Feb 2017 07:17    138    |  104    |  15     ----------------------------<  95     4.4     |  28     |  1.15     Ca    8.5        16 Feb 2017 07:17        PT/INR - ( 16 Feb 2017 07:17 )   PT: 12.2 sec;   INR: 1.11 ratio         				    MEDICATIONS  (STANDING):  influenza   Vaccine 0.5milliLiter(s) IntraMuscular once  acetaminophen   Tablet 650milliGRAM(s) Oral every 6 hours  oxyCODONE ER Tablet 10milliGRAM(s) Oral every 12 hours  tranexamic acid IVPB 1025milliGRAM(s) IV Intermittent once  celecoxib 200milliGRAM(s) Oral with breakfast  docusate sodium 100milliGRAM(s) Oral every 12 hours  senna 2Tablet(s) Oral at bedtime  tamsulosin 0.4milliGRAM(s) Oral two times a day  atorvastatin 20milliGRAM(s) Oral at bedtime  levothyroxine 200MICROGram(s) Oral daily  cholecalciferol Oral Tab/Cap - Peds 1000Unit(s) Oral daily  lactated ringers. 1000milliLiter(s) IV Continuous <Continuous>  calcium carbonate 1250 mG + Vitamin D (OsCal 500 + D) 1Tablet(s) Oral three times a day  famotidine    Tablet 20milliGRAM(s) Oral every 12 hours  pantoprazole    Tablet 40milliGRAM(s) Oral daily  polyethylene glycol 3350 17Gram(s) Oral daily  docusate sodium 100milliGRAM(s) Oral three times a day  ferrous    sulfate 325milliGRAM(s) Oral three times a day with meals  folic acid 1milliGRAM(s) Oral daily  multivitamin 1Tablet(s) Oral daily  ascorbic acid 500milliGRAM(s) Oral two times a day  heparin  Injectable 5000Unit(s) SubCutaneous every 8 hours          DVT Prophylaxis:  LMWH                   (  )  Heparin SQ           ( x )  Coumadin             (x  )  Xarelto                  (  )  Eliquis                   (  )  Venodynes           (x  )  Ambulation          (x  )  UFH                       (  )  Contraindicated  (  )

## 2017-02-16 NOTE — DISCHARGE NOTE ADULT - CARE PLAN
Principal Discharge DX:	Osteoarthritis of knee, unilateral  Goal:	return to ADL  Instructions for follow-up, activity and diet:	1.	Pain Control  2.	Walking with full weight bearing as tolerated, with assistive devices (walker/Cane as Needed)  3.	DVT Prophylaxis per anticoagulation team recommendations  4.	PT as needed  5.	Follow up with Dr. Nguyễn as Outpatient in 10-14 Days after Discharge from the Hospital or Rehab. Call Office For Appointment.   6.	Remove Staples Post-Op Day 14, and Remove Dressing Post-Op Day 10, with Daily Dressing Changes as Need.  7.	Ice affected area as Needed  8.	Keep Dressing Clean and dry. Principal Discharge DX:	Osteoarthritis of knee, unilateral  Goal:	return to ADL  Instructions for follow-up, activity and diet:	Discharge Instructions for Total Knee Arthroplasty    Diet: Resume previous diet    Activity: WBAT, Rolling walker, Daily PT. Gentle ROM 0-full as tolerated.  For Dr. Mayberry, Wear immobilizer only while asleep x 3 weeks. Walk plenty.    Call with: fever over 101, wound redness, drainage or open area, calf pain/calf swelling    Wound Care: Remove old and place new Aquacel bandage to Knee every 7 days. Remove Sutures/Staples 2/26. OK to Shower with Aquacel.  Avoid direct water beating on bandage.  Continue ICE packs to knee.      DVT PE Prophylaxis:  Daily Coumadin per INR goal 2-3. Stop Lovenox or Heparin when INR>2  as per Anticoagulation Instructions.    Follow Up: Orthopedics, 10-14 days in office. Call to schedule. If going home, eRX sent to your pharmacy for .

## 2017-02-16 NOTE — DISCHARGE NOTE ADULT - CARE PROVIDER_API CALL
Homer Nguyễn (DO), Orthopaedic Surgery  [address] [place] [address]  Phone: [phone]  Fax: [phone]

## 2017-02-16 NOTE — DISCHARGE NOTE ADULT - PATIENT PORTAL LINK FT
“You can access the [facility] Patient Portal, offered by [facility], by registering with the following website: [email or web address]”

## 2017-02-16 NOTE — PROGRESS NOTE ADULT - SUBJECTIVE AND OBJECTIVE BOX
Patient seen and examined. Pain controlled. No acute events overnight    Allergies    No Known Allergies      Vital Signs Last 24 Hrs  T(C): 36.8, Max: 37 (02-15 @ 18:33)  T(F): 98.2, Max: 98.6 (02-15 @ 18:33)  HR: 61 (55 - 71)  BP: 122/62 (66/47 - 122/62)  BP(mean): --  RR: 16 (11 - 18)  SpO2: 97% (94% - 100%)    Physical Exam  Gen: NAD  LLE:   Dressing c/d/i  +ehl/fhl/ta/gs function  L3-S1 silt  Dp pulse intact  No calf ttp  Compartments soft    A/P: 66y Male sp L TKA POD 1  Pain control  DVT ppx per AC team  PT/WBAT/OOB  FU labs  Dispo planning  D/w attending

## 2017-02-16 NOTE — DISCHARGE NOTE ADULT - COMMUNITY RESOURCES
[facility]    [facility] [phone]

## 2017-02-16 NOTE — DISCHARGE NOTE ADULT - PLAN OF CARE
return to ADL 1.	Pain Control  2.	Walking with full weight bearing as tolerated, with assistive devices (walker/Cane as Needed)  3.	DVT Prophylaxis per anticoagulation team recommendations  4.	PT as needed  5.	Follow up with Dr. Nguyễn as Outpatient in 10-14 Days after Discharge from the Hospital or Rehab. Call Office For Appointment.   6.	Remove Staples Post-Op Day 14, and Remove Dressing Post-Op Day 10, with Daily Dressing Changes as Need.  7.	Ice affected area as Needed  8.	Keep Dressing Clean and dry. Discharge Instructions for Total Knee Arthroplasty    Diet: Resume previous diet    Activity: WBAT, Rolling walker, Daily PT. Gentle ROM 0-full as tolerated.  For Dr. Mayberry, Wear immobilizer only while asleep x 3 weeks. Walk plenty.    Call with: fever over 101, wound redness, drainage or open area, calf pain/calf swelling    Wound Care: Remove old and place new Aquacel bandage to Knee every 7 days. Remove Sutures/Staples 2/26. OK to Shower with Aquacel.  Avoid direct water beating on bandage.  Continue ICE packs to knee.      DVT PE Prophylaxis:  Daily Coumadin per INR goal 2-3. Stop Lovenox or Heparin when INR>2  as per Anticoagulation Instructions.    Follow Up: Orthopedics, 10-14 days in office. Call to schedule. If going home, eRX sent to your pharmacy for .

## 2017-02-16 NOTE — PROGRESS NOTE ADULT - SUBJECTIVE AND OBJECTIVE BOX
Pt c/o Lt knee pain. No CP, SOB or uneventful night.    Vital Signs Last 24 Hrs  T(C): 36.8, Max: 37 (02-15 @ 18:33)  T(F): 98.2, Max: 98.6 (02-15 @ 18:33)  HR: 61 (55 - 71)  BP: 109/59 (66/47 - 122/62)  BP(mean): --  RR: 16 (11 - 18)  SpO2: 97% (94% - 100%)    Daily     Daily     I&O's Detail  I & Os for 24h ending 16 Feb 2017 07:00  =============================================  IN:    Other: 1300 ml    Total IN: 1300 ml  ---------------------------------------------  OUT:    Voided: 500 ml    Total OUT: 500 ml  ---------------------------------------------  Total NET: 800 ml    I & Os for current day (as of 16 Feb 2017 16:06)  =============================================  IN:    Oral Fluid: 240 ml    Total IN: 240 ml  ---------------------------------------------  OUT:    Voided: 400 ml    Total OUT: 400 ml  ---------------------------------------------  Total NET: -160 ml      CAPILLARY BLOOD GLUCOSE                                      12.7   9.5   )-----------( 140      ( 16 Feb 2017 07:17 )             37.2       16 Feb 2017 07:17    138    |  104    |  15     ----------------------------<  95     4.4     |  28     |  1.15     Ca    8.5        16 Feb 2017 07:17        PT/INR - ( 16 Feb 2017 07:17 )   PT: 12.2 sec;   INR: 1.11 ratio                         MEDICATIONS  (STANDING):  influenza   Vaccine 0.5milliLiter(s) IntraMuscular once  acetaminophen   Tablet 650milliGRAM(s) Oral every 6 hours  oxyCODONE ER Tablet 10milliGRAM(s) Oral every 12 hours  tranexamic acid IVPB 1025milliGRAM(s) IV Intermittent once  celecoxib 200milliGRAM(s) Oral with breakfast  docusate sodium 100milliGRAM(s) Oral every 12 hours  senna 2Tablet(s) Oral at bedtime  tamsulosin 0.4milliGRAM(s) Oral two times a day  atorvastatin 20milliGRAM(s) Oral at bedtime  levothyroxine 200MICROGram(s) Oral daily  cholecalciferol Oral Tab/Cap - Peds 1000Unit(s) Oral daily  lactated ringers. 1000milliLiter(s) IV Continuous <Continuous>  calcium carbonate 1250 mG + Vitamin D (OsCal 500 + D) 1Tablet(s) Oral three times a day  famotidine    Tablet 20milliGRAM(s) Oral every 12 hours  pantoprazole    Tablet 40milliGRAM(s) Oral daily  polyethylene glycol 3350 17Gram(s) Oral daily  docusate sodium 100milliGRAM(s) Oral three times a day  ferrous    sulfate 325milliGRAM(s) Oral three times a day with meals  folic acid 1milliGRAM(s) Oral daily  multivitamin 1Tablet(s) Oral daily  ascorbic acid 500milliGRAM(s) Oral two times a day  heparin  Injectable 5000Unit(s) SubCutaneous every 8 hours    MEDICATIONS  (PRN):  oxyCODONE IR 5milliGRAM(s) Oral every 4 hours PRN Mild Pain (1 - 3)  oxyCODONE IR 10milliGRAM(s) Oral every 4 hours PRN Moderate Pain (4 - 6)  HYDROmorphone  Injectable 0.5milliGRAM(s) IV Push every 3 hours PRN Severe Pain (7 - 10)  cyclobenzaprine 10milliGRAM(s) Oral three times a day PRN Muscle Spasm  acetaminophen   Tablet 650milliGRAM(s) Oral every 6 hours PRN For Temp over 38.3 C (100.94 F)  oxyCODONE IR 5milliGRAM(s) Oral every 4 hours PRN Mild Pain  oxyCODONE IR 10milliGRAM(s) Oral every 4 hours PRN Moderate Pain  HYDROmorphone  Injectable 0.5milliGRAM(s) IV Push every 3 hours PRN Severe Pain  aluminum hydroxide/magnesium hydroxide/simethicone Suspension 30milliLiter(s) Oral four times a day PRN Indigestion  ondansetron Injectable 4milliGRAM(s) IV Push every 6 hours PRN Nausea and/or Vomiting  zolpidem 5milliGRAM(s) Oral at bedtime PRN Insomnia  magnesium hydroxide Suspension 30milliLiter(s) Oral daily PRN Constipation  senna 2Tablet(s) Oral at bedtime PRN Constipation

## 2017-02-16 NOTE — DISCHARGE NOTE ADULT - HOSPITAL COURSE
The patient is a 66y year old Male status post elective total knee Arthroplasty to the left knee after failing outpatient nonoperative conservative management.  Patient presented to the hospital after being medically cleared for an elective surgical procedure. The patient was taken to the operating room on date mentioned above. Prophylactic antibiotics were started before the procedure and continued for 24 hours.  There were no complications during the procedure and patient tolerated the procedure well.  The patient was transferred to recovery room in stable condition and subsequently to surgical floor.  Patient was placed on aspirin for anticoagulation.  All home medications were continued.  The patient received physical therapy daily and daily labs were followed.  The dressing was kept clean, dry, intact.  The rest of the hospital stay was unremarkable. The patient is a 66y year old Male status post elective total knee Arthroplasty to the left knee after failing outpatient nonoperative conservative management.  Patient presented to the hospital after being medically cleared for an elective surgical procedure. The patient was taken to the operating room on date mentioned above. Prophylactic antibiotics were started before the procedure and continued for 24 hours.  There were no complications during the procedure and patient tolerated the procedure well.  The patient was transferred to recovery room in stable condition and subsequently to surgical floor.  Patient was placed on heparin/Coumadin for anticoagulation.  All home medications were continued.  The patient received physical therapy daily and daily labs were followed.  The dressing was kept clean, dry, intact.  The rest of the hospital stay was unremarkable.

## 2017-02-16 NOTE — DISCHARGE NOTE ADULT - MEDICATION SUMMARY - MEDICATIONS TO TAKE
I will START or STAY ON the medications listed below when I get home from the hospital:    Cialis 5 mg oral tablet  -- 1 tab(s) by mouth once a day  -- Indication: For Home med    Aspirin Low Dose 81 mg oral delayed release tablet  -- 1 tab(s) by mouth once a day  -- Indication: For Home med    oxyCODONE-acetaminophen 5mg-325mg oral tablet  -- 1 tab(s) by mouth every 4 hours, As Needed -for moderate pain 2 tabs PO q6h prn severe pain MDD:6  -- Caution federal law prohibits the transfer of this drug to any person other  than the person for whom it was prescribed.  May cause drowsiness.  Alcohol may intensify this effect.  Use care when operating dangerous machinery.  This prescription cannot be refilled.  This product contains acetaminophen.  Do not use  with any other product containing acetaminophen to prevent possible liver damage.  Using more of this medication than prescribed may cause serious breathing problems.    -- Indication: For pain    tamsulosin 0.4 mg oral capsule  -- 1 cap(s) by mouth 2 times a day  -- Indication: For Home med    warfarin 5 mg oral tablet  -- 1 tab(s) by mouth once a day. Daily dose to goal INR 2-3; Check INR daily  -- Indication: For blood clot prevention    Crestor 5 mg oral tablet  -- 1 tab(s) by mouth 2 times a week  -- Indication: For Home med    Colace 100 mg oral capsule  -- 1 cap(s) by mouth 2 times a day -for constipation  -- Medication should be taken with plenty of water.    -- Indication: For Stool softener    tiZANidine 2 mg oral capsule  -- 2 cap(s) by mouth every 8 hours  -- Indication: For Home med    Fish Oil 1200 mg oral capsule  --  by mouth once a day  -- Indication: For Home med    pantoprazole 40 mg oral delayed release tablet  -- 1 tab(s) by mouth once a day, stomach protection  -- It is very important that you take or use this exactly as directed.  Do not skip doses or discontinue unless directed by your doctor.  Obtain medical advice before taking any non-prescription drugs as some may affect the action of this medication.  Swallow whole.  Do not crush.    -- Indication: For Stomach protection    levothyroxine 200 mcg (0.2 mg) oral tablet  -- 1 tab(s) by mouth once a day  -- Indication: For Home med    multivitamin with minerals  -- 1 tab(s) by mouth once a day  -- Indication: For Home med    Vitamin D3 2000 intl units oral capsule  -- 1 cap(s) by mouth once a day  -- Indication: For Home med

## 2017-02-16 NOTE — CONSULT NOTE ADULT - ASSESSMENT
This is a 66 year old man s/p left total knee replacement with high thrombosis risk due to age, immobility, BMI and low bleeding risk.    Discussed the necessity of VTE prophylaxis with coumadin. Educated patient on INR, value, meaning, and effects medications and foods may have on it. Will further reinforce coumadin education and follow up on outpatient basis.     Plan:  Coumadin 5 mg PO daily x 4 weeks total adjust dose per INR  Heparin 5,000 units SQ Q8hour  Daily PT/INR  Daily CBC/BMP  Enc ambulation  Venodynes    Thank you for this consult, will continue to follow.
Pt is a 65 y/o male with h/o obesity, recovering alcoholic, graves disease now hypothyroidism spinal stenosis s/p multiple surgeries and now with mild Rt sided weakness with spasticity myelopath who has been having increasing Lt knee pain.  He was admitted for elective Lt total knee replacement.  Post-medicine consult called for medical comanagement by Dr Nguyễn.

## 2017-02-16 NOTE — PHYSICAL THERAPY INITIAL EVALUATION ADULT - ADDITIONAL COMMENTS
The pt reports that he tends to trip over his right foot which sometimes lacks full DF and has been the case for many years now.

## 2017-02-16 NOTE — PHYSICAL THERAPY INITIAL EVALUATION ADULT - GENERAL OBSERVATIONS, REHAB EVAL
The pt was pleasant and cooperative, received in supine on 2N. The pt was eager to attempt getting OOB and ambulating.

## 2017-02-16 NOTE — PHYSICAL THERAPY INITIAL EVALUATION ADULT - MANUAL MUSCLE TESTING RESULTS, REHAB EVAL
left LE knee extension grossly 3-/5, and right ankle df 3-/5 which the pt describes as being a chronic issue.

## 2017-02-16 NOTE — DISCHARGE NOTE ADULT - NS AS DC VTE INSTRUCTION
Coumadin/Warfarin - Follow-up monitoring.../Coumadin/Warfarin - Compliance.../Coumadin/Warfarin - Dietary Advice.../Coumadin/Warfarin - Potential for adverse drug reactions and interactions

## 2017-02-17 DIAGNOSIS — R42 DIZZINESS AND GIDDINESS: ICD-10-CM

## 2017-02-17 LAB
ANION GAP SERPL CALC-SCNC: 5 MMOL/L — SIGNIFICANT CHANGE UP (ref 5–17)
BUN SERPL-MCNC: 14 MG/DL — SIGNIFICANT CHANGE UP (ref 7–23)
CALCIUM SERPL-MCNC: 8.8 MG/DL — SIGNIFICANT CHANGE UP (ref 8.5–10.1)
CHLORIDE SERPL-SCNC: 104 MMOL/L — SIGNIFICANT CHANGE UP (ref 96–108)
CO2 SERPL-SCNC: 32 MMOL/L — HIGH (ref 22–31)
CREAT SERPL-MCNC: 1.01 MG/DL — SIGNIFICANT CHANGE UP (ref 0.5–1.3)
GLUCOSE SERPL-MCNC: 113 MG/DL — HIGH (ref 70–99)
HCT VFR BLD CALC: 37.4 % — LOW (ref 39–50)
HGB BLD-MCNC: 12.4 G/DL — LOW (ref 13–17)
INR BLD: 1.28 RATIO — HIGH (ref 0.88–1.16)
MCHC RBC-ENTMCNC: 29.5 PG — SIGNIFICANT CHANGE UP (ref 27–34)
MCHC RBC-ENTMCNC: 33.2 GM/DL — SIGNIFICANT CHANGE UP (ref 32–36)
MCV RBC AUTO: 88.8 FL — SIGNIFICANT CHANGE UP (ref 80–100)
PLATELET # BLD AUTO: 148 K/UL — LOW (ref 150–400)
POTASSIUM SERPL-MCNC: 4.8 MMOL/L — SIGNIFICANT CHANGE UP (ref 3.5–5.3)
POTASSIUM SERPL-SCNC: 4.8 MMOL/L — SIGNIFICANT CHANGE UP (ref 3.5–5.3)
PROTHROM AB SERPL-ACNC: 14.1 SEC — HIGH (ref 10–13.1)
RBC # BLD: 4.22 M/UL — SIGNIFICANT CHANGE UP (ref 4.2–5.8)
RBC # FLD: 12.4 % — SIGNIFICANT CHANGE UP (ref 10.3–14.5)
SODIUM SERPL-SCNC: 141 MMOL/L — SIGNIFICANT CHANGE UP (ref 135–145)
WBC # BLD: 10.4 K/UL — SIGNIFICANT CHANGE UP (ref 3.8–10.5)
WBC # FLD AUTO: 10.4 K/UL — SIGNIFICANT CHANGE UP (ref 3.8–10.5)

## 2017-02-17 RX ORDER — TIZANIDINE 4 MG/1
4 TABLET ORAL EVERY 8 HOURS
Qty: 0 | Refills: 0 | Status: DISCONTINUED | OUTPATIENT
Start: 2017-02-17 | End: 2017-02-18

## 2017-02-17 RX ORDER — WARFARIN SODIUM 2.5 MG/1
1 TABLET ORAL
Qty: 30 | Refills: 0 | OUTPATIENT
Start: 2017-02-17 | End: 2017-03-19

## 2017-02-17 RX ORDER — SODIUM CHLORIDE 9 MG/ML
1000 INJECTION INTRAMUSCULAR; INTRAVENOUS; SUBCUTANEOUS ONCE
Qty: 0 | Refills: 0 | Status: COMPLETED | OUTPATIENT
Start: 2017-02-17 | End: 2017-02-17

## 2017-02-17 RX ORDER — ENOXAPARIN SODIUM 100 MG/ML
30 INJECTION SUBCUTANEOUS
Qty: 8 | Refills: 0 | OUTPATIENT
Start: 2017-02-17 | End: 2017-02-21

## 2017-02-17 RX ADMIN — Medication 200 MICROGRAM(S): at 05:31

## 2017-02-17 RX ADMIN — Medication 325 MILLIGRAM(S): at 10:07

## 2017-02-17 RX ADMIN — TAMSULOSIN HYDROCHLORIDE 0.4 MILLIGRAM(S): 0.4 CAPSULE ORAL at 05:31

## 2017-02-17 RX ADMIN — TIZANIDINE 4 MILLIGRAM(S): 4 TABLET ORAL at 23:07

## 2017-02-17 RX ADMIN — Medication 650 MILLIGRAM(S): at 23:06

## 2017-02-17 RX ADMIN — Medication 500 MILLIGRAM(S): at 05:30

## 2017-02-17 RX ADMIN — ATORVASTATIN CALCIUM 20 MILLIGRAM(S): 80 TABLET, FILM COATED ORAL at 23:07

## 2017-02-17 RX ADMIN — Medication 1 TABLET(S): at 05:30

## 2017-02-17 RX ADMIN — Medication 1 TABLET(S): at 13:27

## 2017-02-17 RX ADMIN — Medication 325 MILLIGRAM(S): at 17:48

## 2017-02-17 RX ADMIN — Medication 1 TABLET(S): at 11:45

## 2017-02-17 RX ADMIN — Medication 100 MILLIGRAM(S): at 17:47

## 2017-02-17 RX ADMIN — FAMOTIDINE 20 MILLIGRAM(S): 10 INJECTION INTRAVENOUS at 05:30

## 2017-02-17 RX ADMIN — TAMSULOSIN HYDROCHLORIDE 0.4 MILLIGRAM(S): 0.4 CAPSULE ORAL at 17:48

## 2017-02-17 RX ADMIN — Medication 650 MILLIGRAM(S): at 17:47

## 2017-02-17 RX ADMIN — FAMOTIDINE 20 MILLIGRAM(S): 10 INJECTION INTRAVENOUS at 17:48

## 2017-02-17 RX ADMIN — HEPARIN SODIUM 5000 UNIT(S): 5000 INJECTION INTRAVENOUS; SUBCUTANEOUS at 13:27

## 2017-02-17 RX ADMIN — TIZANIDINE 4 MILLIGRAM(S): 4 TABLET ORAL at 11:57

## 2017-02-17 RX ADMIN — Medication 325 MILLIGRAM(S): at 13:27

## 2017-02-17 RX ADMIN — Medication 500 MILLIGRAM(S): at 17:47

## 2017-02-17 RX ADMIN — HEPARIN SODIUM 5000 UNIT(S): 5000 INJECTION INTRAVENOUS; SUBCUTANEOUS at 23:05

## 2017-02-17 RX ADMIN — WARFARIN SODIUM 5 MILLIGRAM(S): 2.5 TABLET ORAL at 23:07

## 2017-02-17 RX ADMIN — Medication 1000 UNIT(S): at 11:44

## 2017-02-17 RX ADMIN — SENNA PLUS 2 TABLET(S): 8.6 TABLET ORAL at 23:15

## 2017-02-17 RX ADMIN — Medication 650 MILLIGRAM(S): at 11:57

## 2017-02-17 RX ADMIN — PANTOPRAZOLE SODIUM 40 MILLIGRAM(S): 20 TABLET, DELAYED RELEASE ORAL at 11:44

## 2017-02-17 RX ADMIN — Medication 100 MILLIGRAM(S): at 23:07

## 2017-02-17 RX ADMIN — Medication 100 MILLIGRAM(S): at 13:27

## 2017-02-17 RX ADMIN — POLYETHYLENE GLYCOL 3350 17 GRAM(S): 17 POWDER, FOR SOLUTION ORAL at 11:45

## 2017-02-17 RX ADMIN — CELECOXIB 200 MILLIGRAM(S): 200 CAPSULE ORAL at 11:30

## 2017-02-17 RX ADMIN — HEPARIN SODIUM 5000 UNIT(S): 5000 INJECTION INTRAVENOUS; SUBCUTANEOUS at 05:29

## 2017-02-17 RX ADMIN — Medication 650 MILLIGRAM(S): at 05:30

## 2017-02-17 RX ADMIN — CELECOXIB 200 MILLIGRAM(S): 200 CAPSULE ORAL at 10:07

## 2017-02-17 RX ADMIN — Medication 100 MILLIGRAM(S): at 05:30

## 2017-02-17 RX ADMIN — SODIUM CHLORIDE 2000 MILLILITER(S): 9 INJECTION INTRAMUSCULAR; INTRAVENOUS; SUBCUTANEOUS at 10:07

## 2017-02-17 RX ADMIN — Medication 1 MILLIGRAM(S): at 11:45

## 2017-02-17 RX ADMIN — Medication 1 TABLET(S): at 23:07

## 2017-02-17 NOTE — PROGRESS NOTE ADULT - SUBJECTIVE AND OBJECTIVE BOX
HPI  Patient is a 66y old  Male who presents with a chief complaint of left knee pain now s/p  left tkr (16 Feb 2017 00:56)    Consulted by Dr. Nguyễn for VTE prophylaxis, risk stratification, and anticoagulation management.    PAST MEDICAL & SURGICAL HISTORY:  Alcoholic: recovering alcoholic x 40years  Graves disease  Hypothyroidism, unspecified type  Spinal stenosis, unspecified spinal region  Myelopathy  Weakness: to right side  Spastic  Spinal cord injury at C5-C7 level without injury of spinal bone, subsequent encounter  Osteoarthritis of knee, unilateral: left  Benign prostatic hyperplasia without lower urinary tract symptoms, unspecified morphology  Hemorrhoids, unspecified hemorrhoid type  History of colon polyps  Diverticulosis of large intestine without hemorrhage  Hyperlipidemia, unspecified hyperlipidemia type  H/O colonoscopy with polypectomy: 2014  H/O arthroscopy of knee: Left x 2. Unsure of years  H/O lumbar discectomy: with laminectomy  S/P laminectomy: Cervical  History of cervical discectomy    216: Patient seen at bedside with wife- spoke at length regarding coumadin and necessity of anticoagulation post-op. Discussed diet/consistency/adherence. Patient and wife verbalized understanding.  2-17-17  Pt seen at bedside oob in chair.  Discussed his anticoagulation with coumadin over lapping with heparin.  then switching to lovenox if INR is not therapeutic on discharge.  Pt v/u  and is willing to learn self inj on discharge.    BMI: 34.3    CrCl: 31.0    Caprini VTE Risk Score: 8 (high)     IMPROVE Bleeding Risk Score: 2.5 (low)    Falls Risk:   High ( x )  Mod (  )  Low (  )      FAMILY HISTORY:  Family history of cancer (Sibling): SIster - bile duct  Family history of liver disease (Father): father  Family history of colon cancer in mother (Mother)    Denies any personal or familial history of clotting or bleeding disorders.    Allergies    No Known Allergies    Intolerances        REVIEW OF SYSTEMS    (  )Fever	     (  )Constipation	(  )SOB				(  )Headache	(  )Dysuria  (  )Chills	     (  )Melena	(  )Dyspnea present on exertion	                    (  )Dizziness                    (  )Polyuria  (  )Nausea	     (  )Hematochezia	(  )Cough			                    (  )Syncope   	(  )Hematuria  (  )Vomiting    (  )Chest Pain	(  )Wheezing			(  )Weakness  (  )Diarrhea     (  )Palpitations	(  )Anorexia			(  )Myalgia    All other review of systems negative: Yes    PHYSICAL EXAM:    Constitutional: Appears Well    Neurological: A& O x 3    Skin: Warm    Respiratory and Thorax: normal effort; Breath sounds: normal; No rales/wheezing/rhonchi  	  Cardiovascular: S1, S2, regular, NMBR	    Gastrointestinal: BS + x 4Q, nontender	    Genitourinary:  Bladder nondistended, nontender    Musculoskeletal:     General Right:   no muscle/joint tenderness,   normal tone, no joint swelling,   ROM: full	    General Left:   no muscle/joint tenderness,   normal tone, no joint swelling,   ROM: limited    Knee:  Left: Incision: ; Dressing CDI      Lower extrems:   Right: no calf tenderness              negative desmond's sign               + pedal pulses    Left:   no calf tenderness              negative desmond's sign               + pedal pulses                          12.4   10.4  )-----------( 148      ( 17 Feb 2017 06:46 )             37.4       17 Feb 2017 06:46    141    |  104    |  14     ----------------------------<  113    4.8     |  32     |  1.01     Ca    8.8        17 Feb 2017 06:46                                 12.7   9.5   )-----------( 140      ( 16 Feb 2017 07:17 )             37.2       16 Feb 2017 07:17    138    |  104    |  15     ----------------------------<  95     4.4     |  28     |  1.15     Ca    8.5        16 Feb 2017 07:17    PT/INR - ( 17 Feb 2017 06:46 )   PT: 14.1 sec;   INR: 1.28 ratio      PT/INR - ( 16 Feb 2017 07:17 )   PT: 12.2 sec;   INR: 1.11 ratio       MEDICATIONS  (STANDING):  influenza   Vaccine 0.5milliLiter(s) IntraMuscular once  acetaminophen   Tablet 650milliGRAM(s) Oral every 6 hours  oxyCODONE ER Tablet 10milliGRAM(s) Oral every 12 hours  tranexamic acid IVPB 1025milliGRAM(s) IV Intermittent once  celecoxib 200milliGRAM(s) Oral with breakfast  docusate sodium 100milliGRAM(s) Oral every 12 hours  senna 2Tablet(s) Oral at bedtime  tamsulosin 0.4milliGRAM(s) Oral two times a day  atorvastatin 20milliGRAM(s) Oral at bedtime  levothyroxine 200MICROGram(s) Oral daily  cholecalciferol Oral Tab/Cap - Peds 1000Unit(s) Oral daily  lactated ringers. 1000milliLiter(s) IV Continuous <Continuous>  calcium carbonate 1250 mG + Vitamin D (OsCal 500 + D) 1Tablet(s) Oral three times a day  famotidine    Tablet 20milliGRAM(s) Oral every 12 hours  pantoprazole    Tablet 40milliGRAM(s) Oral daily  polyethylene glycol 3350 17Gram(s) Oral daily  docusate sodium 100milliGRAM(s) Oral three times a day  ferrous    sulfate 325milliGRAM(s) Oral three times a day with meals  folic acid 1milliGRAM(s) Oral daily  multivitamin 1Tablet(s) Oral daily  ascorbic acid 500milliGRAM(s) Oral two times a day  heparin  Injectable 5000Unit(s) SubCutaneous every 8 hours  warfarin 5milliGRAM(s) Oral daily  tiZANidine 4milliGRAM(s) Oral every 8 hours  	      DVT Prophylaxis:  LMWH                   (  )  Heparin SQ           ( x )  Coumadin             (x  )  Xarelto                  (  )  Eliquis                   (  )  Venodynes           (x  )  Ambulation          (x  )  UFH                       (  )  Contraindicated  (  )

## 2017-02-17 NOTE — PROGRESS NOTE ADULT - SUBJECTIVE AND OBJECTIVE BOX
Patient seen and examined. Pain controlled. No acute events overnight    Allergies    No Known Allergies    Vital Signs Last 24 Hrs  T(C): 37.2, Max: 37.2 (02-16 @ 15:13)  T(F): 99, Max: 99 (02-16 @ 15:13)  HR: 69 (69 - 69)  BP: 115/58 (115/58 - 115/58)  BP(mean): --  RR: 16 (16 - 16)  SpO2: 96% (96% - 96%)    Physical Exam  Gen: NAD  LLE:   Dressing c/d/i  +ehl/fhl/ta/gs function  L3-S1 silt  Dp pulse intact  No calf ttp  Compartments soft    A/P: 66y Male sp L TKA POD 2  Pain control  DVT ppx per AC team  PT/WBAT/OOB  FU labs  Dispo planning  D/w attending

## 2017-02-17 NOTE — PROGRESS NOTE ADULT - SUBJECTIVE AND OBJECTIVE BOX
Pt c/o occasional dizziness which he is attributing to narcotics.  No CP, SOB, negative orthostatics.    Vital Signs Last 24 Hrs  T(C): 36.8, Max: 37.2 (02-16 @ 15:13)  T(F): 98.2, Max: 99 (02-16 @ 15:13)  HR: 70 (69 - 83)  BP: 127/66 (115/58 - 135/79)  BP(mean): --  RR: 17 (16 - 17)  SpO2: 96% (95% - 96%)    Daily     Daily     I&O's Detail    I & Os for current day (as of 17 Feb 2017 09:46)  =============================================  IN:    Oral Fluid: 240 ml    Total IN: 240 ml  ---------------------------------------------  OUT:    Voided: 400 ml    Total OUT: 400 ml  ---------------------------------------------  Total NET: -160 ml      CAPILLARY BLOOD GLUCOSE                                      12.4   10.4  )-----------( 148      ( 17 Feb 2017 06:46 )             37.4       17 Feb 2017 06:46    141    |  104    |  14     ----------------------------<  113    4.8     |  32     |  1.01     Ca    8.8        17 Feb 2017 06:46        PT/INR - ( 17 Feb 2017 06:46 )   PT: 14.1 sec;   INR: 1.28 ratio                         MEDICATIONS  (STANDING):  influenza   Vaccine 0.5milliLiter(s) IntraMuscular once  acetaminophen   Tablet 650milliGRAM(s) Oral every 6 hours  oxyCODONE ER Tablet 10milliGRAM(s) Oral every 12 hours  tranexamic acid IVPB 1025milliGRAM(s) IV Intermittent once  celecoxib 200milliGRAM(s) Oral with breakfast  docusate sodium 100milliGRAM(s) Oral every 12 hours  senna 2Tablet(s) Oral at bedtime  tamsulosin 0.4milliGRAM(s) Oral two times a day  atorvastatin 20milliGRAM(s) Oral at bedtime  levothyroxine 200MICROGram(s) Oral daily  cholecalciferol Oral Tab/Cap - Peds 1000Unit(s) Oral daily  lactated ringers. 1000milliLiter(s) IV Continuous <Continuous>  calcium carbonate 1250 mG + Vitamin D (OsCal 500 + D) 1Tablet(s) Oral three times a day  famotidine    Tablet 20milliGRAM(s) Oral every 12 hours  pantoprazole    Tablet 40milliGRAM(s) Oral daily  polyethylene glycol 3350 17Gram(s) Oral daily  docusate sodium 100milliGRAM(s) Oral three times a day  ferrous    sulfate 325milliGRAM(s) Oral three times a day with meals  folic acid 1milliGRAM(s) Oral daily  multivitamin 1Tablet(s) Oral daily  ascorbic acid 500milliGRAM(s) Oral two times a day  heparin  Injectable 5000Unit(s) SubCutaneous every 8 hours  warfarin 5milliGRAM(s) Oral daily    MEDICATIONS  (PRN):  oxyCODONE IR 5milliGRAM(s) Oral every 4 hours PRN Mild Pain (1 - 3)  oxyCODONE IR 10milliGRAM(s) Oral every 4 hours PRN Moderate Pain (4 - 6)  HYDROmorphone  Injectable 0.5milliGRAM(s) IV Push every 3 hours PRN Severe Pain (7 - 10)  cyclobenzaprine 10milliGRAM(s) Oral three times a day PRN Muscle Spasm  acetaminophen   Tablet 650milliGRAM(s) Oral every 6 hours PRN For Temp over 38.3 C (100.94 F)  oxyCODONE IR 5milliGRAM(s) Oral every 4 hours PRN Mild Pain  oxyCODONE IR 10milliGRAM(s) Oral every 4 hours PRN Moderate Pain  HYDROmorphone  Injectable 0.5milliGRAM(s) IV Push every 3 hours PRN Severe Pain  aluminum hydroxide/magnesium hydroxide/simethicone Suspension 30milliLiter(s) Oral four times a day PRN Indigestion  ondansetron Injectable 4milliGRAM(s) IV Push every 6 hours PRN Nausea and/or Vomiting  zolpidem 5milliGRAM(s) Oral at bedtime PRN Insomnia  magnesium hydroxide Suspension 30milliLiter(s) Oral daily PRN Constipation  bisacodyl Suppository 10milliGRAM(s) Rectal daily PRN If no bowel movement by postoperative day #2  senna 2Tablet(s) Oral at bedtime PRN Constipation

## 2017-02-18 VITALS
DIASTOLIC BLOOD PRESSURE: 62 MMHG | OXYGEN SATURATION: 97 % | TEMPERATURE: 98 F | RESPIRATION RATE: 18 BRPM | HEART RATE: 69 BPM | SYSTOLIC BLOOD PRESSURE: 108 MMHG

## 2017-02-18 LAB
ANION GAP SERPL CALC-SCNC: 6 MMOL/L — SIGNIFICANT CHANGE UP (ref 5–17)
BUN SERPL-MCNC: 10 MG/DL — SIGNIFICANT CHANGE UP (ref 7–23)
CALCIUM SERPL-MCNC: 8.8 MG/DL — SIGNIFICANT CHANGE UP (ref 8.5–10.1)
CHLORIDE SERPL-SCNC: 104 MMOL/L — SIGNIFICANT CHANGE UP (ref 96–108)
CO2 SERPL-SCNC: 30 MMOL/L — SIGNIFICANT CHANGE UP (ref 22–31)
CREAT SERPL-MCNC: 0.8 MG/DL — SIGNIFICANT CHANGE UP (ref 0.5–1.3)
GLUCOSE SERPL-MCNC: 100 MG/DL — HIGH (ref 70–99)
HCT VFR BLD CALC: 35.7 % — LOW (ref 39–50)
HGB BLD-MCNC: 12 G/DL — LOW (ref 13–17)
INR BLD: 1.2 RATIO — HIGH (ref 0.88–1.16)
MCHC RBC-ENTMCNC: 29.5 PG — SIGNIFICANT CHANGE UP (ref 27–34)
MCHC RBC-ENTMCNC: 33.6 GM/DL — SIGNIFICANT CHANGE UP (ref 32–36)
MCV RBC AUTO: 87.8 FL — SIGNIFICANT CHANGE UP (ref 80–100)
PLATELET # BLD AUTO: 147 K/UL — LOW (ref 150–400)
POTASSIUM SERPL-MCNC: 4.3 MMOL/L — SIGNIFICANT CHANGE UP (ref 3.5–5.3)
POTASSIUM SERPL-SCNC: 4.3 MMOL/L — SIGNIFICANT CHANGE UP (ref 3.5–5.3)
PROTHROM AB SERPL-ACNC: 13.2 SEC — HIGH (ref 10–13.1)
RBC # BLD: 4.07 M/UL — LOW (ref 4.2–5.8)
RBC # FLD: 12.5 % — SIGNIFICANT CHANGE UP (ref 10.3–14.5)
SODIUM SERPL-SCNC: 140 MMOL/L — SIGNIFICANT CHANGE UP (ref 135–145)
WBC # BLD: 10.5 K/UL — SIGNIFICANT CHANGE UP (ref 3.8–10.5)
WBC # FLD AUTO: 10.5 K/UL — SIGNIFICANT CHANGE UP (ref 3.8–10.5)

## 2017-02-18 RX ORDER — ENOXAPARIN SODIUM 100 MG/ML
30 INJECTION SUBCUTANEOUS EVERY 12 HOURS
Qty: 0 | Refills: 0 | Status: DISCONTINUED | OUTPATIENT
Start: 2017-02-18 | End: 2017-02-18

## 2017-02-18 RX ORDER — ENOXAPARIN SODIUM 100 MG/ML
30 INJECTION SUBCUTANEOUS
Qty: 0 | Refills: 0 | COMMUNITY
Start: 2017-02-18

## 2017-02-18 RX ADMIN — Medication 100 MILLIGRAM(S): at 06:39

## 2017-02-18 RX ADMIN — Medication 1000 UNIT(S): at 12:15

## 2017-02-18 RX ADMIN — FAMOTIDINE 20 MILLIGRAM(S): 10 INJECTION INTRAVENOUS at 06:38

## 2017-02-18 RX ADMIN — Medication 200 MICROGRAM(S): at 06:33

## 2017-02-18 RX ADMIN — Medication 1 MILLIGRAM(S): at 12:14

## 2017-02-18 RX ADMIN — CELECOXIB 200 MILLIGRAM(S): 200 CAPSULE ORAL at 09:16

## 2017-02-18 RX ADMIN — POLYETHYLENE GLYCOL 3350 17 GRAM(S): 17 POWDER, FOR SOLUTION ORAL at 12:09

## 2017-02-18 RX ADMIN — Medication 325 MILLIGRAM(S): at 12:13

## 2017-02-18 RX ADMIN — TIZANIDINE 4 MILLIGRAM(S): 4 TABLET ORAL at 06:38

## 2017-02-18 RX ADMIN — Medication 650 MILLIGRAM(S): at 12:13

## 2017-02-18 RX ADMIN — Medication 1 TABLET(S): at 12:11

## 2017-02-18 RX ADMIN — Medication 650 MILLIGRAM(S): at 06:35

## 2017-02-18 RX ADMIN — TAMSULOSIN HYDROCHLORIDE 0.4 MILLIGRAM(S): 0.4 CAPSULE ORAL at 06:34

## 2017-02-18 RX ADMIN — INFLUENZA VIRUS VACCINE 0.5 MILLILITER(S): 15; 15; 15; 15 SUSPENSION INTRAMUSCULAR at 13:03

## 2017-02-18 RX ADMIN — PANTOPRAZOLE SODIUM 40 MILLIGRAM(S): 20 TABLET, DELAYED RELEASE ORAL at 12:17

## 2017-02-18 RX ADMIN — HEPARIN SODIUM 5000 UNIT(S): 5000 INJECTION INTRAVENOUS; SUBCUTANEOUS at 06:33

## 2017-02-18 RX ADMIN — Medication 500 MILLIGRAM(S): at 06:34

## 2017-02-18 RX ADMIN — Medication 325 MILLIGRAM(S): at 09:16

## 2017-02-18 RX ADMIN — Medication 1 TABLET(S): at 06:34

## 2017-02-18 RX ADMIN — Medication 100 MILLIGRAM(S): at 06:34

## 2017-02-18 RX ADMIN — ENOXAPARIN SODIUM 30 MILLIGRAM(S): 100 INJECTION SUBCUTANEOUS at 12:20

## 2017-02-18 NOTE — PROGRESS NOTE ADULT - ASSESSMENT
This is a 66 year old man s/p left total knee replacement with high thrombosis risk due to age, immobility, BMI and low bleeding risk.    Discussed the necessity of VTE prophylaxis with coumadin. Educated patient on INR, value, meaning, and effects medications and foods may have on it. Will further reinforce coumadin education and follow up on outpatient basis.   2-17-17 rx sent electronically to pharmacy for lovenox and coumadin.  [facility] [phone]  Plan:  Coumadin 5 mg PO daily x 4 weeks total adjust dose per INR  Heparin 5,000 units SQ Q8hour  Daily PT/INR  Daily CBC/BMP  Enc ambulation  Venodynes    , will continue to follow.
This is a 66 year old man s/p left total knee replacement with high thrombosis risk due to age, immobility, BMI and low bleeding risk.    Discussed the necessity of VTE prophylaxis with coumadin. Educated patient on INR, value, meaning, and effects medications and foods may have on it. Will further reinforce coumadin education and follow up on outpatient basis.   2-17-17 rx sent electronically to pharmacy for lovenox and coumadin.  [facility] [phone]  Plan:  Coumadin 7.5 mg today and tomorrow 5mg on monday repeat inr as home draw on monday  dc Heparin   lovenox 30mg sq q12h til inr is 2.0 or greater.     Enc ambulation  Venodynes    , will continue to follow as out pt
Pt is a 65 y/o male with h/o obesity, recovering alcoholic, graves disease now hypothyroidism spinal stenosis s/p multiple surgeries and now with mild Rt sided weakness with spasticity myelopath who has been having increasing Lt knee pain.  He was admitted for elective Lt total knee replacement.  Post-medicine consult called for medical comanagement by Dr Nguyễn.
Pt is a 65 y/o male with h/o obesity, recovering alcoholic, graves disease now hypothyroidism spinal stenosis s/p multiple surgeries and now with mild Rt sided weakness with spasticity myelopath who has been having increasing Lt knee pain.  He was admitted for elective Lt total knee replacement.  Post-medicine consult called for medical comanagement by Dr Nguyễn.
Pt is a 67 y/o male with h/o obesity, recovering alcoholic, graves disease now hypothyroidism spinal stenosis s/p multiple surgeries and now with mild Rt sided weakness with spasticity myelopath who has been having increasing Lt knee pain.  He was admitted for elective Lt total knee replacement.  Post-medicine consult called for medical comanagement by Dr Nguyễn.

## 2017-02-18 NOTE — PROGRESS NOTE ADULT - PROBLEM SELECTOR PLAN 5
due to consumption, stable, monitor for now
due to consumption, stable, monitor for now
due to consumption, monitor for now

## 2017-02-18 NOTE — PROGRESS NOTE ADULT - PROBLEM SELECTOR PLAN 4
surgical loss, monitor for now as its stable after the initial drop
surgical loss, monitor for now as its stable after the initial drop
surgical loss, monitor for now

## 2017-02-18 NOTE — PROGRESS NOTE ADULT - SUBJECTIVE AND OBJECTIVE BOX
HPI  Patient is a 66y old  Male who presents with a chief complaint of left knee pain now s/p  left tkr (16 Feb 2017 00:56)    Consulted by Dr. Nguyễn for VTE prophylaxis, risk stratification, and anticoagulation management.    PAST MEDICAL & SURGICAL HISTORY:  Alcoholic: recovering alcoholic x 40years  Graves disease  Hypothyroidism, unspecified type  Spinal stenosis, unspecified spinal region  Myelopathy  Weakness: to right side  Spastic  Spinal cord injury at C5-C7 level without injury of spinal bone, subsequent encounter  Osteoarthritis of knee, unilateral: left  Benign prostatic hyperplasia without lower urinary tract symptoms, unspecified morphology  Hemorrhoids, unspecified hemorrhoid type  History of colon polyps  Diverticulosis of large intestine without hemorrhage  Hyperlipidemia, unspecified hyperlipidemia type  H/O colonoscopy with polypectomy: 2014  H/O arthroscopy of knee: Left x 2. Unsure of years  H/O lumbar discectomy: with laminectomy  S/P laminectomy: Cervical  History of cervical discectomy    216: Patient seen at bedside with wife- spoke at length regarding coumadin and necessity of anticoagulation post-op. Discussed diet/consistency/adherence. Patient and wife verbalized understanding.  2-17-17  Pt seen at bedside oob in chair.  Discussed his anticoagulation with coumadin over lapping with heparin.  then switching to lovenox if INR is not therapeutic on discharge.  Pt v/u  and is willing to learn self inj on discharge.  2-18-17 Pt seen at bedside on 2n states he is going home today.  Discussed his anticoagulation with Lovenox over lapping with coumadin.  Pt states he was able to self inject heparin this am.  will give next dose of Lovenox before he goes home today.  BMI: 34.3    CrCl: 31.0    Caprini VTE Risk Score: 8 (high)     IMPROVE Bleeding Risk Score: 2.5 (low)    Falls Risk:   High ( x )  Mod (  )  Low (  )      FAMILY HISTORY:  Family history of cancer (Sibling): SIster - bile duct  Family history of liver disease (Father): father  Family history of colon cancer in mother (Mother)    Denies any personal or familial history of clotting or bleeding disorders.    Allergies    No Known Allergies    Intolerances  Vital Signs Last 24 Hrs  T(C): 36.8, Max: 36.8 (02-18 @ 07:43)  T(F): 98.3, Max: 98.3 (02-18 @ 07:43)  HR: 69 (67 - 73)  BP: 108/62 (108/62 - 140/69)  BP(mean): --  RR: 18 (18 - 18)  SpO2: 97% (96% - 97%)      REVIEW OF SYSTEMS    (  )Fever	     (  )Constipation	(  )SOB				(  )Headache	(  )Dysuria  (  )Chills	     (  )Melena	(  )Dyspnea present on exertion	                    (  )Dizziness                    (  )Polyuria  (  )Nausea	     (  )Hematochezia	(  )Cough			                    (  )Syncope   	(  )Hematuria  (  )Vomiting    (  )Chest Pain	(  )Wheezing			(  )Weakness  (  )Diarrhea     (  )Palpitations	(  )Anorexia			(  )Myalgia    All other review of systems negative: Yes    PHYSICAL EXAM:    Constitutional: Appears Well    Neurological: A& O x 3    Skin: Warm    Respiratory and Thorax: normal effort; Breath sounds: normal; No rales/wheezing/rhonchi  	  Cardiovascular: S1, S2, regular, NMBR	    Gastrointestinal: BS + x 4Q, nontender	    Genitourinary:  Bladder nondistended, nontender    Musculoskeletal:     General Right:   no muscle/joint tenderness,   normal tone, no joint swelling,   ROM: full	    General Left:   no muscle/joint tenderness,   normal tone, no joint swelling,   ROM: limited    Knee:  Left: Incision: ; Dressing CDI      Lower extrems:   Right: no calf tenderness              negative desmond's sign               + pedal pulses    Left:   no calf tenderness              negative desmond's sign               + pedal pulses                        12.0   10.5  )-----------( 147      ( 18 Feb 2017 07:01 )             35.7       18 Feb 2017 07:01    140    |  104    |  10     ----------------------------<  100    4.3     |  30     |  0.80     Ca    8.8        18 Feb 2017 07:01                          12.4   10.4  )-----------( 148      ( 17 Feb 2017 06:46 )             37.4       17 Feb 2017 06:46    141    |  104    |  14     ----------------------------<  113    4.8     |  32     |  1.01     Ca    8.8        17 Feb 2017 06:46      PT/INR - ( 18 Feb 2017 07:01 )   PT: 13.2 sec;   INR: 1.20 ratio              PT/INR - ( 17 Feb 2017 06:46 )   PT: 14.1 sec;   INR: 1.28 ratio      PT/INR - ( 16 Feb 2017 07:17 )   PT: 12.2 sec;   INR: 1.11 ratio    	  MEDICATIONS  (STANDING):  influenza   Vaccine 0.5milliLiter(s) IntraMuscular once  acetaminophen   Tablet 650milliGRAM(s) Oral every 6 hours  oxyCODONE ER Tablet 10milliGRAM(s) Oral every 12 hours  tranexamic acid IVPB 1025milliGRAM(s) IV Intermittent once  celecoxib 200milliGRAM(s) Oral with breakfast  docusate sodium 100milliGRAM(s) Oral every 12 hours  senna 2Tablet(s) Oral at bedtime  tamsulosin 0.4milliGRAM(s) Oral two times a day  atorvastatin 20milliGRAM(s) Oral at bedtime  levothyroxine 200MICROGram(s) Oral daily  cholecalciferol Oral Tab/Cap - Peds 1000Unit(s) Oral daily  lactated ringers. 1000milliLiter(s) IV Continuous <Continuous>  calcium carbonate 1250 mG + Vitamin D (OsCal 500 + D) 1Tablet(s) Oral three times a day  famotidine    Tablet 20milliGRAM(s) Oral every 12 hours  pantoprazole    Tablet 40milliGRAM(s) Oral daily  polyethylene glycol 3350 17Gram(s) Oral daily  docusate sodium 100milliGRAM(s) Oral three times a day  ferrous    sulfate 325milliGRAM(s) Oral three times a day with meals  folic acid 1milliGRAM(s) Oral daily  multivitamin 1Tablet(s) Oral daily  ascorbic acid 500milliGRAM(s) Oral two times a day  warfarin 5milliGRAM(s) Oral daily  tiZANidine 4milliGRAM(s) Oral every 8 hours  enoxaparin Injectable 30milliGRAM(s) SubCutaneous every 12 hours      DVT Prophylaxis:  LMWH                   (  )  Heparin SQ           ( x )  Coumadin             (x  )  Xarelto                  (  )  Eliquis                   (  )  Venodynes           (x  )  Ambulation          (x  )  UFH                       (  )  Contraindicated  (  )

## 2017-02-18 NOTE — PROGRESS NOTE ADULT - SUBJECTIVE AND OBJECTIVE BOX
Patient seen and examined. Pain controlled. No acute events overnight    Allergies    No Known Allergies    Vital Signs Last 24 Hrs  T(C): 36.7, Max: 36.8 (02-17 @ 07:57)  T(F): 98, Max: 98.2 (02-17 @ 07:57)  HR: 73 (67 - 83)  BP: 140/69 (123/63 - 140/69)  BP(mean): --  RR: 18 (16 - 18)  SpO2: 96% (95% - 97%)    Physical Exam  Gen: NAD  LLE:   Dressing c/d/i  +ehl/fhl/ta/gs function  L3-S1 silt  Dp pulse intact  No calf ttp  Compartments soft    A/P: 66y Male sp L TKA POD 3  Pain control  DVT ppx  PT/WBAT/OOB  FU labs  Dispo planning- home today  D/w attending

## 2017-02-18 NOTE — PROGRESS NOTE ADULT - RS GEN PE MLT RESP DETAILS PC
diminished breath sounds, R/diminished breath sounds, L
diminished breath sounds, L/diminished breath sounds, R
diminished breath sounds, R/diminished breath sounds, L

## 2017-02-18 NOTE — PROGRESS NOTE ADULT - NEUROLOGICAL DETAILS
responds to pain/alert and oriented x 3
alert and oriented x 3
responds to pain/alert and oriented x 3

## 2017-02-18 NOTE — PROGRESS NOTE ADULT - PROBLEM SELECTOR PLAN 1
s/p Lt total knee replacement, cont pain control, PT, encourage use of incentive spirometry, DVT proph with heparin to coumadin, d/c planning per ortho, he will f/u with his pcp as outpt.
s/p Lt total knee replacement, cont pain control, PT, encourage use of incentive spirometry, DVT proph with heparin to coumadin.
s/p Lt total knee replacement, cont pain control, PT, encourage use of incentive spirometry, DVT proph with heparin to couamdin, monitor post-op labs.

## 2017-02-18 NOTE — PROGRESS NOTE ADULT - GASTROINTESTINAL DETAILS
bowel sounds normal/soft/no distention/nontender
soft/bowel sounds normal/no distention/nontender
soft/nontender/no distention/bowel sounds normal

## 2017-02-18 NOTE — PROGRESS NOTE ADULT - PROBLEM SELECTOR PLAN 6
? due to narcotic ? due to lack of Zanaflex, resolved.
? due to narcotic ? due to lack of Zanaflex which I will restart.  He is not orthostatic per RN, monitor for now..

## 2017-02-18 NOTE — PROGRESS NOTE ADULT - SUBJECTIVE AND OBJECTIVE BOX
Pt feels much better, no further dizziness, ambulating and wants to go home today.    Vital Signs Last 24 Hrs  T(C): 36.8, Max: 36.8 (02-18 @ 07:43)  T(F): 98.3, Max: 98.3 (02-18 @ 07:43)  HR: 69 (67 - 83)  BP: 108/62 (108/62 - 140/69)  BP(mean): --  RR: 18 (16 - 18)  SpO2: 97% (96% - 97%)    Daily     Daily     I&O's Detail    I & Os for current day (as of 18 Feb 2017 08:21)  =============================================  IN:    Sodium Chloride 0.9% IV Bolus: 1000 ml    Total IN: 1000 ml  ---------------------------------------------  OUT:    Total OUT: 0 ml  ---------------------------------------------  Total NET: 1000 ml      CAPILLARY BLOOD GLUCOSE                                      12.0   10.5  )-----------( 147      ( 18 Feb 2017 07:01 )             35.7       18 Feb 2017 07:01    140    |  104    |  10     ----------------------------<  100    4.3     |  30     |  0.80     Ca    8.8        18 Feb 2017 07:01        PT/INR - ( 18 Feb 2017 07:01 )   PT: 13.2 sec;   INR: 1.20 ratio                         MEDICATIONS  (STANDING):  influenza   Vaccine 0.5milliLiter(s) IntraMuscular once  acetaminophen   Tablet 650milliGRAM(s) Oral every 6 hours  oxyCODONE ER Tablet 10milliGRAM(s) Oral every 12 hours  tranexamic acid IVPB 1025milliGRAM(s) IV Intermittent once  celecoxib 200milliGRAM(s) Oral with breakfast  docusate sodium 100milliGRAM(s) Oral every 12 hours  senna 2Tablet(s) Oral at bedtime  tamsulosin 0.4milliGRAM(s) Oral two times a day  atorvastatin 20milliGRAM(s) Oral at bedtime  levothyroxine 200MICROGram(s) Oral daily  cholecalciferol Oral Tab/Cap - Peds 1000Unit(s) Oral daily  lactated ringers. 1000milliLiter(s) IV Continuous <Continuous>  calcium carbonate 1250 mG + Vitamin D (OsCal 500 + D) 1Tablet(s) Oral three times a day  famotidine    Tablet 20milliGRAM(s) Oral every 12 hours  pantoprazole    Tablet 40milliGRAM(s) Oral daily  polyethylene glycol 3350 17Gram(s) Oral daily  docusate sodium 100milliGRAM(s) Oral three times a day  ferrous    sulfate 325milliGRAM(s) Oral three times a day with meals  folic acid 1milliGRAM(s) Oral daily  multivitamin 1Tablet(s) Oral daily  ascorbic acid 500milliGRAM(s) Oral two times a day  heparin  Injectable 5000Unit(s) SubCutaneous every 8 hours  warfarin 5milliGRAM(s) Oral daily  tiZANidine 4milliGRAM(s) Oral every 8 hours    MEDICATIONS  (PRN):  oxyCODONE IR 5milliGRAM(s) Oral every 4 hours PRN Mild Pain (1 - 3)  oxyCODONE IR 10milliGRAM(s) Oral every 4 hours PRN Moderate Pain (4 - 6)  HYDROmorphone  Injectable 0.5milliGRAM(s) IV Push every 3 hours PRN Severe Pain (7 - 10)  cyclobenzaprine 10milliGRAM(s) Oral three times a day PRN Muscle Spasm  acetaminophen   Tablet 650milliGRAM(s) Oral every 6 hours PRN For Temp over 38.3 C (100.94 F)  oxyCODONE IR 5milliGRAM(s) Oral every 4 hours PRN Mild Pain  oxyCODONE IR 10milliGRAM(s) Oral every 4 hours PRN Moderate Pain  HYDROmorphone  Injectable 0.5milliGRAM(s) IV Push every 3 hours PRN Severe Pain  aluminum hydroxide/magnesium hydroxide/simethicone Suspension 30milliLiter(s) Oral four times a day PRN Indigestion  ondansetron Injectable 4milliGRAM(s) IV Push every 6 hours PRN Nausea and/or Vomiting  zolpidem 5milliGRAM(s) Oral at bedtime PRN Insomnia  magnesium hydroxide Suspension 30milliLiter(s) Oral daily PRN Constipation  bisacodyl Suppository 10milliGRAM(s) Rectal daily PRN If no bowel movement by postoperative day #2  senna 2Tablet(s) Oral at bedtime PRN Constipation

## 2017-02-21 LAB — SURGICAL PATHOLOGY FINAL REPORT - CH: SIGNIFICANT CHANGE UP

## 2017-02-22 DIAGNOSIS — E03.9 HYPOTHYROIDISM, UNSPECIFIED: ICD-10-CM

## 2017-02-22 DIAGNOSIS — D69.6 THROMBOCYTOPENIA, UNSPECIFIED: ICD-10-CM

## 2017-02-22 DIAGNOSIS — G95.9 DISEASE OF SPINAL CORD, UNSPECIFIED: ICD-10-CM

## 2017-02-22 DIAGNOSIS — E05.00 THYROTOXICOSIS WITH DIFFUSE GOITER WITHOUT THYROTOXIC CRISIS OR STORM: ICD-10-CM

## 2017-02-22 DIAGNOSIS — N40.0 BENIGN PROSTATIC HYPERPLASIA WITHOUT LOWER URINARY TRACT SYMPTOMS: ICD-10-CM

## 2017-02-22 DIAGNOSIS — D50.0 IRON DEFICIENCY ANEMIA SECONDARY TO BLOOD LOSS (CHRONIC): ICD-10-CM

## 2017-02-22 DIAGNOSIS — E66.9 OBESITY, UNSPECIFIED: ICD-10-CM

## 2017-02-22 DIAGNOSIS — E78.5 HYPERLIPIDEMIA, UNSPECIFIED: ICD-10-CM

## 2017-02-22 DIAGNOSIS — M17.12 UNILATERAL PRIMARY OSTEOARTHRITIS, LEFT KNEE: ICD-10-CM

## 2017-03-07 ENCOUNTER — APPOINTMENT (OUTPATIENT)
Dept: CARDIOLOGY | Facility: CLINIC | Age: 67
End: 2017-03-07

## 2017-03-09 ENCOUNTER — LABORATORY RESULT (OUTPATIENT)
Age: 67
End: 2017-03-09

## 2017-04-03 ENCOUNTER — RX RENEWAL (OUTPATIENT)
Age: 67
End: 2017-04-03

## 2017-06-28 ENCOUNTER — RX RENEWAL (OUTPATIENT)
Age: 67
End: 2017-06-28

## 2017-10-03 ENCOUNTER — RX RENEWAL (OUTPATIENT)
Age: 67
End: 2017-10-03

## 2017-10-15 ENCOUNTER — RX RENEWAL (OUTPATIENT)
Age: 67
End: 2017-10-15

## 2017-10-21 ENCOUNTER — RX RENEWAL (OUTPATIENT)
Age: 67
End: 2017-10-21

## 2017-12-29 ENCOUNTER — RX RENEWAL (OUTPATIENT)
Age: 67
End: 2017-12-29

## 2018-04-13 ENCOUNTER — RX RENEWAL (OUTPATIENT)
Age: 68
End: 2018-04-13

## 2018-04-17 ENCOUNTER — RX RENEWAL (OUTPATIENT)
Age: 68
End: 2018-04-17

## 2018-04-23 ENCOUNTER — OTHER (OUTPATIENT)
Age: 68
End: 2018-04-23

## 2018-04-30 ENCOUNTER — APPOINTMENT (OUTPATIENT)
Dept: ORTHOPEDIC SURGERY | Facility: CLINIC | Age: 68
End: 2018-04-30
Payer: MEDICARE

## 2018-04-30 VITALS
HEIGHT: 68 IN | DIASTOLIC BLOOD PRESSURE: 88 MMHG | BODY MASS INDEX: 32.58 KG/M2 | SYSTOLIC BLOOD PRESSURE: 157 MMHG | HEART RATE: 61 BPM | WEIGHT: 215 LBS

## 2018-04-30 DIAGNOSIS — Z87.39 PERSONAL HISTORY OF OTHER DISEASES OF THE MUSCULOSKELETAL SYSTEM AND CONNECTIVE TISSUE: ICD-10-CM

## 2018-04-30 DIAGNOSIS — Z96.652 PRESENCE OF LEFT ARTIFICIAL KNEE JOINT: ICD-10-CM

## 2018-04-30 DIAGNOSIS — Z80.9 FAMILY HISTORY OF MALIGNANT NEOPLASM, UNSPECIFIED: ICD-10-CM

## 2018-04-30 DIAGNOSIS — Z86.39 PERSONAL HISTORY OF OTHER ENDOCRINE, NUTRITIONAL AND METABOLIC DISEASE: ICD-10-CM

## 2018-04-30 DIAGNOSIS — N40.0 BENIGN PROSTATIC HYPERPLASIA WITHOUT LOWER URINARY TRACT SYMPMS: ICD-10-CM

## 2018-04-30 PROCEDURE — 73562 X-RAY EXAM OF KNEE 3: CPT | Mod: LT

## 2018-04-30 PROCEDURE — 99203 OFFICE O/P NEW LOW 30 MIN: CPT

## 2018-04-30 RX ORDER — BACLOFEN 10 MG/1
10 TABLET ORAL
Qty: 90 | Refills: 0 | Status: DISCONTINUED | COMMUNITY
Start: 2018-01-08 | End: 2018-04-30

## 2018-06-29 ENCOUNTER — LABORATORY RESULT (OUTPATIENT)
Age: 68
End: 2018-06-29

## 2018-06-29 ENCOUNTER — NON-APPOINTMENT (OUTPATIENT)
Age: 68
End: 2018-06-29

## 2018-06-29 ENCOUNTER — APPOINTMENT (OUTPATIENT)
Dept: FAMILY MEDICINE | Facility: CLINIC | Age: 68
End: 2018-06-29
Payer: MEDICARE

## 2018-06-29 VITALS
OXYGEN SATURATION: 96 % | HEIGHT: 68 IN | WEIGHT: 215 LBS | HEART RATE: 59 BPM | DIASTOLIC BLOOD PRESSURE: 84 MMHG | SYSTOLIC BLOOD PRESSURE: 142 MMHG | BODY MASS INDEX: 32.58 KG/M2

## 2018-06-29 DIAGNOSIS — G62.9 POLYNEUROPATHY, UNSPECIFIED: ICD-10-CM

## 2018-06-29 PROCEDURE — 93000 ELECTROCARDIOGRAM COMPLETE: CPT

## 2018-06-29 PROCEDURE — G0439: CPT

## 2018-06-29 PROCEDURE — 36415 COLL VENOUS BLD VENIPUNCTURE: CPT

## 2018-06-29 PROCEDURE — 90670 PCV13 VACCINE IM: CPT

## 2018-06-29 PROCEDURE — G0009: CPT

## 2018-07-11 ENCOUNTER — RX RENEWAL (OUTPATIENT)
Age: 68
End: 2018-07-11

## 2018-07-11 LAB
25(OH)D3 SERPL-MCNC: 44.5 NG/ML
ALBUMIN SERPL ELPH-MCNC: 4.6 G/DL
ALP BLD-CCNC: 67 U/L
ALT SERPL-CCNC: 15 U/L
ANION GAP SERPL CALC-SCNC: 18 MMOL/L
APPEARANCE: CLEAR
AST SERPL-CCNC: 20 U/L
BACTERIA: NEGATIVE
BASOPHILS # BLD AUTO: 0.06 K/UL
BASOPHILS NFR BLD AUTO: 1.1 %
BILIRUB SERPL-MCNC: 0.9 MG/DL
BILIRUBIN URINE: NEGATIVE
BLOOD URINE: NEGATIVE
BUN SERPL-MCNC: 14 MG/DL
CALCIUM SERPL-MCNC: 10 MG/DL
CHLORIDE SERPL-SCNC: 102 MMOL/L
CHOLEST SERPL-MCNC: 213 MG/DL
CHOLEST/HDLC SERPL: 4.1 RATIO
CO2 SERPL-SCNC: 21 MMOL/L
COLOR: YELLOW
CREAT SERPL-MCNC: 0.98 MG/DL
EOSINOPHIL # BLD AUTO: 0.1 K/UL
EOSINOPHIL NFR BLD AUTO: 1.8 %
GLUCOSE QUALITATIVE U: NEGATIVE MG/DL
GLUCOSE SERPL-MCNC: 99 MG/DL
HBA1C MFR BLD HPLC: 5.6 %
HCT VFR BLD CALC: 45.2 %
HCV AB SER QL: NONREACTIVE
HCV S/CO RATIO: 0.15 S/CO
HDLC SERPL-MCNC: 52 MG/DL
HGB BLD-MCNC: 14.3 G/DL
HYALINE CASTS: 1 /LPF
IMM GRANULOCYTES NFR BLD AUTO: 0.2 %
KETONES URINE: NEGATIVE
LDLC SERPL CALC-MCNC: 146 MG/DL
LEUKOCYTE ESTERASE URINE: NEGATIVE
LYMPHOCYTES # BLD AUTO: 1.52 K/UL
LYMPHOCYTES NFR BLD AUTO: 27.3 %
MAN DIFF?: NORMAL
MCHC RBC-ENTMCNC: 27.7 PG
MCHC RBC-ENTMCNC: 31.6 GM/DL
MCV RBC AUTO: 87.4 FL
MICROSCOPIC-UA: NORMAL
MONOCYTES # BLD AUTO: 0.51 K/UL
MONOCYTES NFR BLD AUTO: 9.2 %
NEUTROPHILS # BLD AUTO: 3.36 K/UL
NEUTROPHILS NFR BLD AUTO: 60.4 %
NITRITE URINE: NEGATIVE
PH URINE: 5
PLATELET # BLD AUTO: 197 K/UL
POTASSIUM SERPL-SCNC: 5.2 MMOL/L
PROT SERPL-MCNC: 7.3 G/DL
PROTEIN URINE: NEGATIVE MG/DL
PSA FREE FLD-MCNC: 31.2
PSA FREE SERPL-MCNC: 2.16 NG/ML
PSA SERPL-MCNC: 6.92 NG/ML
RBC # BLD: 5.17 M/UL
RBC # FLD: 14.2 %
RED BLOOD CELLS URINE: 1 /HPF
SODIUM SERPL-SCNC: 141 MMOL/L
SPECIFIC GRAVITY URINE: 1.02
SQUAMOUS EPITHELIAL CELLS: 0 /HPF
TRIGL SERPL-MCNC: 75 MG/DL
TSH SERPL-ACNC: 0.15 UIU/ML
URATE SERPL-MCNC: 5 MG/DL
UROBILINOGEN URINE: NEGATIVE MG/DL
WBC # FLD AUTO: 5.56 K/UL
WHITE BLOOD CELLS URINE: 1 /HPF

## 2018-07-13 ENCOUNTER — TRANSCRIPTION ENCOUNTER (OUTPATIENT)
Age: 68
End: 2018-07-13

## 2018-07-16 PROBLEM — F10.20 ALCOHOL DEPENDENCE, UNCOMPLICATED: Chronic | Status: ACTIVE | Noted: 2017-02-15

## 2018-08-26 ENCOUNTER — FORM ENCOUNTER (OUTPATIENT)
Age: 68
End: 2018-08-26

## 2018-08-26 NOTE — HISTORY OF PRESENT ILLNESS
[FreeTextEntry1] : Patient here for Annual Medicare Exam  [de-identified] : saw pain management last week\par has had MRI of back disc fragment pushing up against L4 nerve\par going for physical therapy\par

## 2018-08-27 ENCOUNTER — OUTPATIENT (OUTPATIENT)
Dept: OUTPATIENT SERVICES | Facility: HOSPITAL | Age: 68
LOS: 1 days | End: 2018-08-27
Payer: MEDICARE

## 2018-08-27 ENCOUNTER — NON-APPOINTMENT (OUTPATIENT)
Age: 68
End: 2018-08-27

## 2018-08-27 ENCOUNTER — APPOINTMENT (OUTPATIENT)
Dept: RADIOLOGY | Facility: CLINIC | Age: 68
End: 2018-08-27

## 2018-08-27 ENCOUNTER — APPOINTMENT (OUTPATIENT)
Dept: FAMILY MEDICINE | Facility: CLINIC | Age: 68
End: 2018-08-27
Payer: MEDICARE

## 2018-08-27 VITALS
SYSTOLIC BLOOD PRESSURE: 142 MMHG | DIASTOLIC BLOOD PRESSURE: 84 MMHG | HEIGHT: 68 IN | HEART RATE: 53 BPM | OXYGEN SATURATION: 95 % | TEMPERATURE: 97.8 F | BODY MASS INDEX: 34.25 KG/M2 | WEIGHT: 226 LBS

## 2018-08-27 DIAGNOSIS — Z98.890 OTHER SPECIFIED POSTPROCEDURAL STATES: Chronic | ICD-10-CM

## 2018-08-27 DIAGNOSIS — R06.02 SHORTNESS OF BREATH: ICD-10-CM

## 2018-08-27 DIAGNOSIS — M25.569 PAIN IN UNSPECIFIED KNEE: ICD-10-CM

## 2018-08-27 DIAGNOSIS — B35.1 TINEA UNGUIUM: ICD-10-CM

## 2018-08-27 DIAGNOSIS — Z87.09 PERSONAL HISTORY OF OTHER DISEASES OF THE RESPIRATORY SYSTEM: ICD-10-CM

## 2018-08-27 DIAGNOSIS — Z00.8 ENCOUNTER FOR OTHER GENERAL EXAMINATION: ICD-10-CM

## 2018-08-27 DIAGNOSIS — E03.2 HYPOTHYROIDISM DUE TO MEDICAMENTS AND OTHER EXOGENOUS SUBSTANCES: ICD-10-CM

## 2018-08-27 DIAGNOSIS — L02.429 FURUNCLE OF LIMB, UNSPECIFIED: ICD-10-CM

## 2018-08-27 PROBLEM — R25.2 CRAMP AND SPASM: Chronic | Status: ACTIVE | Noted: 2017-02-08

## 2018-08-27 PROBLEM — K57.30 DIVERTICULOSIS OF LARGE INTESTINE WITHOUT PERFORATION OR ABSCESS WITHOUT BLEEDING: Chronic | Status: ACTIVE | Noted: 2017-02-08

## 2018-08-27 PROBLEM — S14.109D: Chronic | Status: ACTIVE | Noted: 2017-02-08

## 2018-08-27 PROBLEM — Z86.010 PERSONAL HISTORY OF COLON POLYPS: Chronic | Status: ACTIVE | Noted: 2017-02-08

## 2018-08-27 PROBLEM — E78.5 HYPERLIPIDEMIA, UNSPECIFIED: Chronic | Status: ACTIVE | Noted: 2017-02-08

## 2018-08-27 PROBLEM — E05.00 THYROTOXICOSIS WITH DIFFUSE GOITER WITHOUT THYROTOXIC CRISIS OR STORM: Chronic | Status: ACTIVE | Noted: 2017-02-08

## 2018-08-27 PROBLEM — M48.00 SPINAL STENOSIS, SITE UNSPECIFIED: Chronic | Status: ACTIVE | Noted: 2017-02-08

## 2018-08-27 PROBLEM — M17.9 OSTEOARTHRITIS OF KNEE, UNSPECIFIED: Chronic | Status: ACTIVE | Noted: 2017-02-08

## 2018-08-27 PROBLEM — G95.9 DISEASE OF SPINAL CORD, UNSPECIFIED: Chronic | Status: ACTIVE | Noted: 2017-02-08

## 2018-08-27 PROBLEM — E03.9 HYPOTHYROIDISM, UNSPECIFIED: Chronic | Status: ACTIVE | Noted: 2017-02-08

## 2018-08-27 PROBLEM — N40.0 BENIGN PROSTATIC HYPERPLASIA WITHOUT LOWER URINARY TRACT SYMPTOMS: Chronic | Status: ACTIVE | Noted: 2017-02-08

## 2018-08-27 PROBLEM — R53.1 WEAKNESS: Chronic | Status: ACTIVE | Noted: 2017-02-08

## 2018-08-27 PROBLEM — K64.9 UNSPECIFIED HEMORRHOIDS: Chronic | Status: ACTIVE | Noted: 2017-02-08

## 2018-08-27 PROBLEM — Z86.010 PERSONAL HISTORY OF COLONIC POLYPS: Chronic | Status: ACTIVE | Noted: 2017-02-08

## 2018-08-27 LAB — S PYO AG SPEC QL IA: NEGATIVE

## 2018-08-27 PROCEDURE — 87880 STREP A ASSAY W/OPTIC: CPT | Mod: QW

## 2018-08-27 PROCEDURE — 71046 X-RAY EXAM CHEST 2 VIEWS: CPT | Mod: 26

## 2018-08-27 PROCEDURE — 70360 X-RAY EXAM OF NECK: CPT

## 2018-08-27 PROCEDURE — 71046 X-RAY EXAM CHEST 2 VIEWS: CPT

## 2018-08-27 PROCEDURE — 99214 OFFICE O/P EST MOD 30 MIN: CPT | Mod: 25

## 2018-08-27 PROCEDURE — 70360 X-RAY EXAM OF NECK: CPT | Mod: 26

## 2018-08-27 NOTE — ASSESSMENT
[FreeTextEntry1] : rapid strep negative \par stat neck xray to r/o epiglotittis\par 80 mg prednisone IM given\par patient advised to er if any trouble swallowing or respiratory compromise

## 2018-08-27 NOTE — HISTORY OF PRESENT ILLNESS
[FreeTextEntry8] : chest tightness/SOB x1wk \par \par patient here today with complaints of "not being able to clear his throat" and feeling like he sometimes can't take a deep breath in.\par He denies sore throat, no problem eating or drinking, no garbled speech, no drooling, no fever or chills.

## 2018-08-27 NOTE — PHYSICAL EXAM

## 2018-09-25 ENCOUNTER — MEDICATION RENEWAL (OUTPATIENT)
Age: 68
End: 2018-09-25

## 2018-10-12 ENCOUNTER — RX RENEWAL (OUTPATIENT)
Age: 68
End: 2018-10-12

## 2018-10-19 ENCOUNTER — RX RENEWAL (OUTPATIENT)
Age: 68
End: 2018-10-19

## 2018-10-19 ENCOUNTER — APPOINTMENT (OUTPATIENT)
Dept: FAMILY MEDICINE | Facility: CLINIC | Age: 68
End: 2018-10-19
Payer: MEDICARE

## 2018-10-19 ENCOUNTER — NON-APPOINTMENT (OUTPATIENT)
Age: 68
End: 2018-10-19

## 2018-10-19 VITALS
HEIGHT: 68 IN | OXYGEN SATURATION: 95 % | SYSTOLIC BLOOD PRESSURE: 146 MMHG | DIASTOLIC BLOOD PRESSURE: 80 MMHG | TEMPERATURE: 97.9 F | HEART RATE: 62 BPM | WEIGHT: 226 LBS | BODY MASS INDEX: 34.25 KG/M2

## 2018-10-19 DIAGNOSIS — R05 COUGH: ICD-10-CM

## 2018-10-19 PROCEDURE — 36415 COLL VENOUS BLD VENIPUNCTURE: CPT

## 2018-10-19 PROCEDURE — 93000 ELECTROCARDIOGRAM COMPLETE: CPT

## 2018-10-19 PROCEDURE — 99214 OFFICE O/P EST MOD 30 MIN: CPT | Mod: 25

## 2018-10-19 RX ORDER — METHYLPREDNISOLONE 4 MG/1
4 TABLET ORAL
Qty: 21 | Refills: 1 | Status: DISCONTINUED | COMMUNITY
Start: 2018-08-27 | End: 2018-10-19

## 2018-10-19 RX ORDER — AMOXICILLIN AND CLAVULANATE POTASSIUM 875; 125 MG/1; MG/1
875-125 TABLET, COATED ORAL
Qty: 20 | Refills: 0 | Status: DISCONTINUED | COMMUNITY
Start: 2018-08-27 | End: 2018-10-19

## 2018-10-28 LAB
ALBUMIN SERPL ELPH-MCNC: 4.6 G/DL
ALP BLD-CCNC: 68 U/L
ALT SERPL-CCNC: 16 U/L
ANION GAP SERPL CALC-SCNC: 13 MMOL/L
AST SERPL-CCNC: 20 U/L
BASOPHILS # BLD AUTO: 0.02 K/UL
BASOPHILS NFR BLD AUTO: 0.2 %
BILIRUB SERPL-MCNC: 0.7 MG/DL
BUN SERPL-MCNC: 12 MG/DL
CALCIUM SERPL-MCNC: 9.5 MG/DL
CHLORIDE SERPL-SCNC: 101 MMOL/L
CO2 SERPL-SCNC: 24 MMOL/L
CREAT SERPL-MCNC: 0.91 MG/DL
EOSINOPHIL # BLD AUTO: 0.14 K/UL
EOSINOPHIL NFR BLD AUTO: 1.4 %
GLUCOSE SERPL-MCNC: 109 MG/DL
HCT VFR BLD CALC: 43.7 %
HGB BLD-MCNC: 14.6 G/DL
IMM GRANULOCYTES NFR BLD AUTO: 0.2 %
LYMPHOCYTES # BLD AUTO: 1.2 K/UL
LYMPHOCYTES NFR BLD AUTO: 11.7 %
MAN DIFF?: NORMAL
MCHC RBC-ENTMCNC: 28.3 PG
MCHC RBC-ENTMCNC: 33.4 GM/DL
MCV RBC AUTO: 84.7 FL
MONOCYTES # BLD AUTO: 1.14 K/UL
MONOCYTES NFR BLD AUTO: 11.2 %
NEUTROPHILS # BLD AUTO: 7.7 K/UL
NEUTROPHILS NFR BLD AUTO: 75.3 %
PLATELET # BLD AUTO: 168 K/UL
POTASSIUM SERPL-SCNC: 4.9 MMOL/L
PROT SERPL-MCNC: 6.8 G/DL
RBC # BLD: 5.16 M/UL
RBC # FLD: 14.5 %
SODIUM SERPL-SCNC: 138 MMOL/L
WBC # FLD AUTO: 10.22 K/UL

## 2018-11-13 ENCOUNTER — APPOINTMENT (OUTPATIENT)
Dept: FAMILY MEDICINE | Facility: CLINIC | Age: 68
End: 2018-11-13
Payer: MEDICARE

## 2018-11-13 VITALS
OXYGEN SATURATION: 94 % | HEIGHT: 68 IN | HEART RATE: 61 BPM | BODY MASS INDEX: 34.25 KG/M2 | WEIGHT: 226 LBS | SYSTOLIC BLOOD PRESSURE: 144 MMHG | DIASTOLIC BLOOD PRESSURE: 80 MMHG

## 2018-11-13 DIAGNOSIS — Z87.09 PERSONAL HISTORY OF OTHER DISEASES OF THE RESPIRATORY SYSTEM: ICD-10-CM

## 2018-11-13 DIAGNOSIS — Z92.89 PERSONAL HISTORY OF OTHER MEDICAL TREATMENT: ICD-10-CM

## 2018-11-13 DIAGNOSIS — R42 DIZZINESS AND GIDDINESS: ICD-10-CM

## 2018-11-13 DIAGNOSIS — R13.12 DYSPHAGIA, OROPHARYNGEAL PHASE: ICD-10-CM

## 2018-11-13 PROCEDURE — 90662 IIV NO PRSV INCREASED AG IM: CPT

## 2018-11-13 PROCEDURE — G0008: CPT

## 2018-11-13 PROCEDURE — 36415 COLL VENOUS BLD VENIPUNCTURE: CPT

## 2018-11-13 PROCEDURE — 99214 OFFICE O/P EST MOD 30 MIN: CPT | Mod: 25

## 2018-11-13 RX ORDER — BENZONATATE 100 MG/1
100 CAPSULE ORAL 3 TIMES DAILY
Qty: 30 | Refills: 0 | Status: COMPLETED | COMMUNITY
Start: 2018-10-19 | End: 2018-11-13

## 2018-11-13 RX ORDER — METHYLPREDNISOLONE 4 MG/1
4 TABLET ORAL
Qty: 1 | Refills: 0 | Status: DISCONTINUED | COMMUNITY
Start: 2018-10-19 | End: 2018-11-13

## 2018-11-13 RX ORDER — AZITHROMYCIN 250 MG/1
250 TABLET, FILM COATED ORAL
Qty: 1 | Refills: 0 | Status: DISCONTINUED | COMMUNITY
Start: 2018-10-19 | End: 2018-11-13

## 2018-11-13 NOTE — HISTORY OF PRESENT ILLNESS
[FreeTextEntry1] : follow up on HLD/hypothyroidism/vit d def  [de-identified] : patient presents today for follow up on HLD/hypothyroidism/nessa d def\par last seem in office 10/18 for dry cough and nasal congestion, treated with medrol dose pack(completed), tessalon suraj as needed\par reports that symptoms have resolved\par still reports light headedness a few times a month during activity, lasts approximately 1 minute, denies chest pain, shortness of breath, palpitations.\par sees Dr Green cardiologist unsure of last appointment\par describes diet as moderate salt intake, high fat\par denies drinking and smoking\par exercises 2-3x a week bike, and weight lifting\par

## 2018-11-14 RX ORDER — LEVOTHYROXINE SODIUM 0.2 MG/1
200 TABLET ORAL
Qty: 90 | Refills: 0 | Status: DISCONTINUED | COMMUNITY
Start: 2018-10-19 | End: 2018-11-14

## 2018-11-21 LAB
ALBUMIN SERPL ELPH-MCNC: 4.2 G/DL
ALP BLD-CCNC: 61 U/L
ALT SERPL-CCNC: 23 U/L
ANION GAP SERPL CALC-SCNC: 13 MMOL/L
AST SERPL-CCNC: 22 U/L
BASOPHILS # BLD AUTO: 0.04 K/UL
BASOPHILS NFR BLD AUTO: 0.7 %
BILIRUB SERPL-MCNC: 1 MG/DL
BUN SERPL-MCNC: 13 MG/DL
CALCIUM SERPL-MCNC: 9.5 MG/DL
CHLORIDE SERPL-SCNC: 104 MMOL/L
CHOLEST SERPL-MCNC: 229 MG/DL
CHOLEST/HDLC SERPL: 4.4 RATIO
CO2 SERPL-SCNC: 22 MMOL/L
CREAT SERPL-MCNC: 0.9 MG/DL
EOSINOPHIL # BLD AUTO: 0.13 K/UL
EOSINOPHIL NFR BLD AUTO: 2.3 %
GLUCOSE SERPL-MCNC: 100 MG/DL
HCT VFR BLD CALC: 43.1 %
HDLC SERPL-MCNC: 52 MG/DL
HGB BLD-MCNC: 14.6 G/DL
IMM GRANULOCYTES NFR BLD AUTO: 0.2 %
LDLC SERPL CALC-MCNC: 163 MG/DL
LYMPHOCYTES # BLD AUTO: 1.47 K/UL
LYMPHOCYTES NFR BLD AUTO: 25.9 %
MAN DIFF?: NORMAL
MCHC RBC-ENTMCNC: 29.2 PG
MCHC RBC-ENTMCNC: 33.9 GM/DL
MCV RBC AUTO: 86.2 FL
MONOCYTES # BLD AUTO: 0.62 K/UL
MONOCYTES NFR BLD AUTO: 10.9 %
NEUTROPHILS # BLD AUTO: 3.4 K/UL
NEUTROPHILS NFR BLD AUTO: 60 %
PLATELET # BLD AUTO: 158 K/UL
POTASSIUM SERPL-SCNC: 4.9 MMOL/L
PROT SERPL-MCNC: 6.7 G/DL
PSA FREE FLD-MCNC: 27.5
PSA FREE SERPL-MCNC: 2.05 NG/ML
PSA SERPL-MCNC: 7.45 NG/ML
RBC # BLD: 5 M/UL
RBC # FLD: 15.1 %
SODIUM SERPL-SCNC: 139 MMOL/L
TRIGL SERPL-MCNC: 70 MG/DL
TSH SERPL-ACNC: 0.59 UIU/ML
WBC # FLD AUTO: 5.67 K/UL

## 2018-11-26 ENCOUNTER — RX RENEWAL (OUTPATIENT)
Age: 68
End: 2018-11-26

## 2018-11-28 ENCOUNTER — RX RENEWAL (OUTPATIENT)
Age: 68
End: 2018-11-28

## 2019-01-18 ENCOUNTER — RX RENEWAL (OUTPATIENT)
Age: 69
End: 2019-01-18

## 2019-02-14 ENCOUNTER — TRANSCRIPTION ENCOUNTER (OUTPATIENT)
Age: 69
End: 2019-02-14

## 2019-03-22 ENCOUNTER — TRANSCRIPTION ENCOUNTER (OUTPATIENT)
Age: 69
End: 2019-03-22

## 2019-03-22 ENCOUNTER — RX RENEWAL (OUTPATIENT)
Age: 69
End: 2019-03-22

## 2019-03-28 RX ORDER — TAMSULOSIN HYDROCHLORIDE 0.4 MG/1
0.4 CAPSULE ORAL
Qty: 180 | Refills: 0 | Status: COMPLETED | COMMUNITY
Start: 2017-01-21 | End: 2019-03-28

## 2019-03-29 ENCOUNTER — OTHER (OUTPATIENT)
Age: 69
End: 2019-03-29

## 2019-04-17 ENCOUNTER — RX RENEWAL (OUTPATIENT)
Age: 69
End: 2019-04-17

## 2019-05-28 ENCOUNTER — RX RENEWAL (OUTPATIENT)
Age: 69
End: 2019-05-28

## 2019-07-26 ENCOUNTER — RX RENEWAL (OUTPATIENT)
Age: 69
End: 2019-07-26

## 2019-07-28 ENCOUNTER — RX RENEWAL (OUTPATIENT)
Age: 69
End: 2019-07-28

## 2019-11-19 ENCOUNTER — RX RENEWAL (OUTPATIENT)
Age: 69
End: 2019-11-19

## 2019-12-02 ENCOUNTER — RX RENEWAL (OUTPATIENT)
Age: 69
End: 2019-12-02

## 2019-12-05 ENCOUNTER — RX RENEWAL (OUTPATIENT)
Age: 69
End: 2019-12-05

## 2020-01-20 ENCOUNTER — RX RENEWAL (OUTPATIENT)
Age: 70
End: 2020-01-20

## 2020-02-11 ENCOUNTER — APPOINTMENT (OUTPATIENT)
Dept: FAMILY MEDICINE | Facility: CLINIC | Age: 70
End: 2020-02-11

## 2020-03-20 ENCOUNTER — RX RENEWAL (OUTPATIENT)
Age: 70
End: 2020-03-20

## 2020-03-26 NOTE — PATIENT PROFILE ADULT. - LONGEST PERIOD TOBACCO-FREE
24 years Cyclophosphamide Pregnancy And Lactation Text: This medication is Pregnancy Category D and it isn't considered safe during pregnancy. This medication is excreted in breast milk.

## 2020-04-20 ENCOUNTER — RX RENEWAL (OUTPATIENT)
Age: 70
End: 2020-04-20

## 2020-06-22 ENCOUNTER — RX RENEWAL (OUTPATIENT)
Age: 70
End: 2020-06-22

## 2020-07-21 ENCOUNTER — RX RENEWAL (OUTPATIENT)
Age: 70
End: 2020-07-21

## 2020-09-28 ENCOUNTER — RX RENEWAL (OUTPATIENT)
Age: 70
End: 2020-09-28

## 2020-10-29 ENCOUNTER — NON-APPOINTMENT (OUTPATIENT)
Age: 70
End: 2020-10-29

## 2020-10-29 ENCOUNTER — APPOINTMENT (OUTPATIENT)
Dept: FAMILY MEDICINE | Facility: CLINIC | Age: 70
End: 2020-10-29
Payer: MEDICARE

## 2020-10-29 VITALS
DIASTOLIC BLOOD PRESSURE: 80 MMHG | SYSTOLIC BLOOD PRESSURE: 130 MMHG | HEART RATE: 61 BPM | BODY MASS INDEX: 34.25 KG/M2 | OXYGEN SATURATION: 97 % | TEMPERATURE: 98.1 F | WEIGHT: 226 LBS | HEIGHT: 68 IN

## 2020-10-29 VITALS — DIASTOLIC BLOOD PRESSURE: 80 MMHG | SYSTOLIC BLOOD PRESSURE: 136 MMHG

## 2020-10-29 DIAGNOSIS — Z23 ENCOUNTER FOR IMMUNIZATION: ICD-10-CM

## 2020-10-29 PROCEDURE — G0009: CPT

## 2020-10-29 PROCEDURE — G0444 DEPRESSION SCREEN ANNUAL: CPT | Mod: 59

## 2020-10-29 PROCEDURE — 93000 ELECTROCARDIOGRAM COMPLETE: CPT | Mod: 59

## 2020-10-29 PROCEDURE — G0439: CPT

## 2020-10-29 PROCEDURE — 90662 IIV NO PRSV INCREASED AG IM: CPT

## 2020-10-29 PROCEDURE — 90732 PPSV23 VACC 2 YRS+ SUBQ/IM: CPT

## 2020-10-29 PROCEDURE — G0008: CPT

## 2020-10-29 NOTE — HISTORY OF PRESENT ILLNESS
[FreeTextEntry1] : ANDRÉS  [de-identified] : CLAUDIA is a 70 year male here for AWV. Mood is good. Overall feels well. Follows up with dermatology annually. Pt has colonoscopy scheduled 12/12020 with Dr. Adhikari. Notes he never made appt with urologist. Last PSA 7.45. Reports he has fallen a few times with no injuries. Admits to never wearing brace which has contributed to his falls. \par

## 2020-10-29 NOTE — PLAN
[FreeTextEntry1] : \par - Advised urology follow up again for elevated PSA/ referral provided\par - Encouraged following up with cardiology by next Summer \par - Advised wearing brace \par - Can use immune support supplements \par - Pneumovax 23 today \par - flu shot today

## 2020-10-29 NOTE — ADDENDUM
[FreeTextEntry1] : I, Shari Cerda acting as a scribe for Dr. Yue Wiggins on Oct 29, 2020  at 2:54 PM\par

## 2020-10-29 NOTE — END OF VISIT
[FreeTextEntry3] : Medical record entries made by the scribe today today, were at my direction and personally dictated to them by me, Dr. Yue Wiggins on Oct 29, 2020. I have reviewed the chart and agree that the record accurately reflects my personal performance of the history, physical exam, assessment, and plan.\par

## 2020-10-29 NOTE — HEALTH RISK ASSESSMENT
[Patient reported colonoscopy was normal] : Patient reported colonoscopy was normal [Good] : ~his/her~  mood as  good [No] : No [0] : 2) Feeling down, depressed, or hopeless: Not at all (0) [None] : None [With Family] : lives with family [] :  [Feels Safe at Home] : Feels safe at home [Fully functional (bathing, dressing, toileting, transferring, walking, feeding)] : Fully functional (bathing, dressing, toileting, transferring, walking, feeding) [Fully functional (using the telephone, shopping, preparing meals, housekeeping, doing laundry, using] : Fully functional and needs no help or supervision to perform IADLs (using the telephone, shopping, preparing meals, housekeeping, doing laundry, using transportation, managing medications and managing finances) [Reports normal functional visual acuity (ie: able to read med bottle)] : Reports normal functional visual acuity [Two or more falls in past year] : Patient reported two or more falls in the past year [] : No [[id number]] : 0 [Reports changes in hearing] : Reports no changes in hearing [Reports changes in vision] : Reports no changes in vision [Reports changes in dental health] : Reports no changes in dental health [ColonoscopyDate] : 06/14

## 2020-10-30 LAB
25(OH)D3 SERPL-MCNC: 48.9 NG/ML
ALBUMIN SERPL ELPH-MCNC: 4.8 G/DL
ALP BLD-CCNC: 60 U/L
ALT SERPL-CCNC: 19 U/L
ANION GAP SERPL CALC-SCNC: 14 MMOL/L
APPEARANCE: CLEAR
AST SERPL-CCNC: 20 U/L
BACTERIA: NEGATIVE
BASOPHILS # BLD AUTO: 0.07 K/UL
BASOPHILS NFR BLD AUTO: 1 %
BILIRUB SERPL-MCNC: 1 MG/DL
BILIRUBIN URINE: NEGATIVE
BLOOD URINE: NEGATIVE
BUN SERPL-MCNC: 12 MG/DL
CALCIUM SERPL-MCNC: 9.9 MG/DL
CHLORIDE SERPL-SCNC: 103 MMOL/L
CHOLEST SERPL-MCNC: 231 MG/DL
CO2 SERPL-SCNC: 20 MMOL/L
COLOR: YELLOW
CREAT SERPL-MCNC: 0.9 MG/DL
EOSINOPHIL # BLD AUTO: 0.14 K/UL
EOSINOPHIL NFR BLD AUTO: 2.1 %
ESTIMATED AVERAGE GLUCOSE: 123 MG/DL
GLUCOSE QUALITATIVE U: NEGATIVE
GLUCOSE SERPL-MCNC: 88 MG/DL
HBA1C MFR BLD HPLC: 5.9 %
HCT VFR BLD CALC: 46.6 %
HDLC SERPL-MCNC: 51 MG/DL
HGB BLD-MCNC: 14.8 G/DL
HYALINE CASTS: 0 /LPF
IMM GRANULOCYTES NFR BLD AUTO: 0.4 %
KETONES URINE: NEGATIVE
LDLC SERPL CALC-MCNC: 165 MG/DL
LEUKOCYTE ESTERASE URINE: NEGATIVE
LYMPHOCYTES # BLD AUTO: 2.05 K/UL
LYMPHOCYTES NFR BLD AUTO: 30.1 %
MAN DIFF?: NORMAL
MCHC RBC-ENTMCNC: 27.9 PG
MCHC RBC-ENTMCNC: 31.8 GM/DL
MCV RBC AUTO: 87.8 FL
MICROSCOPIC-UA: NORMAL
MONOCYTES # BLD AUTO: 0.61 K/UL
MONOCYTES NFR BLD AUTO: 8.9 %
NEUTROPHILS # BLD AUTO: 3.92 K/UL
NEUTROPHILS NFR BLD AUTO: 57.5 %
NITRITE URINE: NEGATIVE
NONHDLC SERPL-MCNC: 180 MG/DL
PH URINE: 5.5
PLATELET # BLD AUTO: 196 K/UL
POTASSIUM SERPL-SCNC: 4.7 MMOL/L
PROT SERPL-MCNC: 7.1 G/DL
PROTEIN URINE: NORMAL
PSA FREE FLD-MCNC: 30 %
PSA FREE SERPL-MCNC: 2.52 NG/ML
PSA SERPL-MCNC: 8.51 NG/ML
RBC # BLD: 5.31 M/UL
RBC # FLD: 13.5 %
RED BLOOD CELLS URINE: 1 /HPF
SODIUM SERPL-SCNC: 137 MMOL/L
SPECIFIC GRAVITY URINE: 1.03
SQUAMOUS EPITHELIAL CELLS: 0 /HPF
TRIGL SERPL-MCNC: 70 MG/DL
TSH SERPL-ACNC: 0.83 UIU/ML
URATE SERPL-MCNC: 5.2 MG/DL
UROBILINOGEN URINE: NORMAL
WBC # FLD AUTO: 6.82 K/UL
WHITE BLOOD CELLS URINE: 1 /HPF

## 2020-11-02 LAB
SARS-COV-2 IGG SERPL IA-ACNC: <0.1 INDEX
SARS-COV-2 IGG SERPL QL IA: NEGATIVE

## 2020-11-09 ENCOUNTER — APPOINTMENT (OUTPATIENT)
Dept: UROLOGY | Facility: CLINIC | Age: 70
End: 2020-11-09
Payer: MEDICARE

## 2020-11-09 PROCEDURE — 99204 OFFICE O/P NEW MOD 45 MIN: CPT

## 2020-11-09 NOTE — ASSESSMENT
[FreeTextEntry1] : 70 year old Male with exam and history consistent with BPH with LUTS. Still with bother despite alpha bockers and tadalafil. Discussed treatment options include adding finasteride or possibly DAVIS reducing procedures. Pt interested in possible PUL, but would like to work up elevated PSA first.\par \par For elevated PSA, we discussed with patient that PSA is imprecise test. PSA can be elevated due to benign prostate enlargement, prostate stimulation, sexual activity, urinary tract infection, prostatitis, as well as prostate cancer. There is no value for PSA which is diagnostic for prostate cancer, nor is there value which conclusively excludes prostate cancer. PSA simply stratifies risk for prostate cancer and diagnosis of prostate cancer requires prostate biopsy. \par \par Pt had negative prostate MRI 5 years ago. Will order another. If not approved, will repeat PSA and add 4K score.

## 2020-11-09 NOTE — HISTORY OF PRESENT ILLNESS
[FreeTextEntry1] : 70 year old man seen 11/09/2020 with complaint of elevated PSA. See trend below. He reports LUTS, moderate frequency, urgency, and weak stream. Symptoms are bothersome. He is currently on tamsulosin 0.8 mg and tadalafil 5 mg daily for BPH. Still has bother. This began years ago.  Nothing makes the symptoms better, nothing makes sx worse. \par It is associated with nothing.\par No hematuria, no dysuria, no hesitancy, no straining. No incontinence. \par No fevers, no chills, no nausea, no vomiting, no flank pain. \par \par No family history contributory to BPH wth LUTS or elevated PSA. \par \par PSA (%FREE) TREND \par 8.51  (30%)   10/2020\par 7.45   (27.5)   11/2018\par 6.92   (31.2)   06/2018

## 2020-11-12 ENCOUNTER — RX RENEWAL (OUTPATIENT)
Age: 70
End: 2020-11-12

## 2020-12-08 NOTE — H&P PST ADULT - SOURCE OF INFORMATION, PROFILE
patient Detail Level: Detailed Depth Of Biopsy: dermis Was A Bandage Applied: Yes Size Of Lesion In Cm: 0.6 X Size Of Lesion In Cm: 0.7 Biopsy Type: H and E Biopsy Method: curette Anesthesia Type: 1% lidocaine with epinephrine Anesthesia Volume In Cc (Will Not Render If 0): 0.5 Additional Anesthesia Volume In Cc (Will Not Render If 0): 0 Hemostasis: Drysol Wound Care: Bacitracin Dressing: bandage Destruction After The Procedure: No Type Of Destruction Used: Curettage Curettage Text: The wound bed was treated with curettage after the biopsy was performed. Cryotherapy Text: The wound bed was treated with cryotherapy after the biopsy was performed. Electrodesiccation Text: The wound bed was treated with electrodesiccation after the biopsy was performed. Electrodesiccation And Curettage Text: The wound bed was treated with electrodesiccation and curettage after the biopsy was performed. Silver Nitrate Text: The wound bed was treated with silver nitrate after the biopsy was performed. Lab: [id number] Lab Facility: [id number] Consent: Written consent was obtained and risks were reviewed including but not limited to scarring, infection, bleeding, scabbing, incomplete removal, nerve damage and allergy to anesthesia. Post-Care Instructions: I reviewed with the patient in detail post-care instructions. Patient is to keep the biopsy site dry overnight, and then apply bacitracin twice daily until healed. Patient may apply hydrogen peroxide soaks to remove any crusting. Notification Instructions: Patient will be notified of biopsy results. However, patient instructed to call the office if not contacted within 2 weeks. Billing Type: Third-Party Bill Information: Selecting Yes will display possible errors in your note based on the variables you have selected. This validation is only offered as a suggestion for you. PLEASE NOTE THAT THE VALIDATION TEXT WILL BE REMOVED WHEN YOU FINALIZE YOUR NOTE. IF YOU WANT TO FAX A PRELIMINARY NOTE YOU WILL NEED TO TOGGLE THIS TO 'NO' IF YOU DO NOT WANT IT IN YOUR FAXED NOTE.

## 2020-12-14 ENCOUNTER — APPOINTMENT (OUTPATIENT)
Dept: UROLOGY | Facility: CLINIC | Age: 70
End: 2020-12-14
Payer: MEDICARE

## 2020-12-14 VITALS — TEMPERATURE: 97.4 F

## 2020-12-14 PROCEDURE — 99213 OFFICE O/P EST LOW 20 MIN: CPT

## 2020-12-14 NOTE — ASSESSMENT
[FreeTextEntry1] : 70 year old Male with exam and history consistent with BPH with LUTS. Still with bother despite alpha bockers and tadalafil. Discussed treatment options include adding finasteride or possibly DAVIS reducing procedures. Pt interested in possible PUL, but would like to work up elevated PSA first.\par \par For elevated PSA, we discussed with patient that PSA is imprecise test. PSA can be elevated due to benign prostate enlargement, prostate stimulation, sexual activity, urinary tract infection, prostatitis, as well as prostate cancer. There is no value for PSA which is diagnostic for prostate cancer, nor is there value which conclusively excludes prostate cancer. PSA simply stratifies risk for prostate cancer and diagnosis of prostate cancer requires prostate biopsy. \par \par Pt had negative prostate MRI 5 years ago. Awaiting report of Prostate MRI. Discussed that Fusion Biopsy is recommended if PIRADS 3-5.  If not, will repeat PSA and add 4K score in 6 months.

## 2020-12-14 NOTE — HISTORY OF PRESENT ILLNESS
[FreeTextEntry1] : 70 year old man seen 11/09/2020 with complaint of elevated PSA. See trend below. He reports LUTS, moderate frequency, urgency, and weak stream. Symptoms are bothersome. He is currently on tamsulosin 0.8 mg and tadalafil 5 mg daily for BPH. Still has bother. This began years ago.  Nothing makes the symptoms better, nothing makes sx worse. \par It is associated with nothing.\par No hematuria, no dysuria, no hesitancy, no straining. No incontinence. \par No fevers, no chills, no nausea, no vomiting, no flank pain. \par No family history contributory to BPH wth LUTS or elevated PSA. \par \par 12/14/2020: Patient presents for follow up. He denies new  symptoms and other medical  issues remain unchanged from above. His LUTS are not terribly bothersome, but sometimes they become acutely worse and that is bothersome. Dribbling, frequency, urgency. No hematuria, no dysuria,no hesitancy, no straining. No incontinence. No fevers, no chills, no nausea, no vomiting, no flank pain. He obtained prostate MRI, but no report is available on [facility] or on his disk. \par \par PSA (%FREE) TREND \par 8.51  (30%)   10/2020\par 7.45   (27.5)   11/2018\par 6.92   (31.2)   06/2018

## 2020-12-16 ENCOUNTER — NON-APPOINTMENT (OUTPATIENT)
Age: 70
End: 2020-12-16

## 2020-12-18 ENCOUNTER — NON-APPOINTMENT (OUTPATIENT)
Age: 70
End: 2020-12-18

## 2020-12-28 ENCOUNTER — APPOINTMENT (OUTPATIENT)
Dept: UROLOGY | Facility: CLINIC | Age: 70
End: 2020-12-28
Payer: MEDICARE

## 2020-12-28 VITALS — TEMPERATURE: 97.7 F

## 2020-12-28 PROCEDURE — 99213 OFFICE O/P EST LOW 20 MIN: CPT

## 2020-12-28 NOTE — HISTORY OF PRESENT ILLNESS
[FreeTextEntry1] : 70 year old man seen 11/09/2020 with complaint of elevated PSA. See trend below. He reports LUTS, moderate frequency, urgency, and weak stream. Symptoms are bothersome. He is currently on tamsulosin 0.8 mg and tadalafil 5 mg daily for BPH. Still has bother. This began years ago.  Nothing makes the symptoms better, nothing makes sx worse. \par It is associated with nothing.\par No hematuria, no dysuria, no hesitancy, no straining. No incontinence. \par No fevers, no chills, no nausea, no vomiting, no flank pain. \par No family history contributory to BPH wth LUTS or elevated PSA. \par \par 12/14/2020: Patient presents for follow up. He denies new  symptoms and other medical  issues remain unchanged from above. His LUTS are not terribly bothersome, but sometimes they become acutely worse and that is bothersome. Dribbling, frequency, urgency. No hematuria, no dysuria,no hesitancy, no straining. No incontinence. No fevers, no chills, no nausea, no vomiting, no flank pain. He obtained prostate MRI, but no report is available on [facility] or on his disk. \par \par PSA (%FREE) TREND \par 8.51  (30%)   10/2020\par 7.45   (27.5)   11/2018\par 6.92   (31.2)   06/2018\par \par 12/28/2020: Patient presents for follow up. He denies new  symptoms and other medical  issues remain unchanged from above. Dribbling, frequency, urgency. No hematuria, no dysuria,no hesitancy, no straining. No incontinence. No fevers, no chills, no nausea, no vomiting, no flank pain. MRI showed PIRADS 2. He also is interested in possible DAVIS reducing procedures, specifically the urolift.

## 2020-12-28 NOTE — ASSESSMENT
[FreeTextEntry1] : 70 year old Male with BPH with LUTS and elevated PSA. MRI showed no lesions, 96 cm3 prostate. We discussed transurethral prostatic urethral lift, aka UroLift procedure. A cystoscope is advanced through the urethra in to prostatic urethra. Multiple transprostatic implants are deployed to relieve bladder outlet obstruction. Advantages include reduced likelihood of need for catheterization, reduced risk of ED, and less invasive procedure. Data shows shorter durability, with some patients reporting recurrence of symptoms 5 years later. Discussed that given the large size of his prostate, he may have better results with TURP. Also discussed that cystoscopy would be indicated to rule out large median lobe or high riding bladder neck prior to PUL. Pt wishes to think about it. \par \par For elevated PSA, we discussed with patient that PSA is imprecise test. PSA can be elevated due to benign prostate enlargement, prostate stimulation, sexual activity, urinary tract infection, prostatitis, as well as prostate cancer. There is no value for PSA which is diagnostic for prostate cancer, nor is there value which conclusively excludes prostate cancer. PSA simply stratifies risk for prostate cancer and diagnosis of prostate cancer requires prostate biopsy. Given negative MRI and significant prostatomegaly, will continue to trend PSA for now will repeat PSA and add 4K score in 6 months.

## 2020-12-29 ENCOUNTER — RX RENEWAL (OUTPATIENT)
Age: 70
End: 2020-12-29

## 2021-02-09 ENCOUNTER — RX RENEWAL (OUTPATIENT)
Age: 71
End: 2021-02-09

## 2021-03-15 ENCOUNTER — TRANSCRIPTION ENCOUNTER (OUTPATIENT)
Age: 71
End: 2021-03-15

## 2021-03-26 ENCOUNTER — RX RENEWAL (OUTPATIENT)
Age: 71
End: 2021-03-26

## 2021-05-13 ENCOUNTER — RX RENEWAL (OUTPATIENT)
Age: 71
End: 2021-05-13

## 2021-06-28 ENCOUNTER — APPOINTMENT (OUTPATIENT)
Dept: UROLOGY | Facility: CLINIC | Age: 71
End: 2021-06-28
Payer: MEDICARE

## 2021-06-28 VITALS — HEART RATE: 61 BPM | SYSTOLIC BLOOD PRESSURE: 161 MMHG | DIASTOLIC BLOOD PRESSURE: 73 MMHG | TEMPERATURE: 98.1 F

## 2021-06-28 PROCEDURE — 99213 OFFICE O/P EST LOW 20 MIN: CPT

## 2021-06-28 NOTE — ASSESSMENT
[FreeTextEntry1] : 70 year old Male with BPH with LUTS and elevated PSA. MRI showed no lesions, 96 cm3 prostate. PSA density is 0.08, so reassuring. Will continue to treand PSA for now. \par \par Pt interested in DAVIS reducing procedures. WIll schedule cysto to visualize bladder outlet for possible urolift procedure planning.

## 2021-06-28 NOTE — HISTORY OF PRESENT ILLNESS
[FreeTextEntry1] : 70 year old man seen 11/09/2020 with complaint of elevated PSA. See trend below. He reports LUTS, moderate frequency, urgency, and weak stream. Symptoms are bothersome. He is currently on tamsulosin 0.8 mg and tadalafil 5 mg daily for BPH. Still has bother. This began years ago.  Nothing makes the symptoms better, nothing makes sx worse. \par It is associated with nothing.\par No hematuria, no dysuria, no hesitancy, no straining. No incontinence. \par No fevers, no chills, no nausea, no vomiting, no flank pain. \par No family history contributory to BPH wth LUTS or elevated PSA. \par \par 12/14/2020: Patient presents for follow up. He denies new  symptoms and other medical  issues remain unchanged from above. His LUTS are not terribly bothersome, but sometimes they become acutely worse and that is bothersome. Dribbling, frequency, urgency. No hematuria, no dysuria,no hesitancy, no straining. No incontinence. No fevers, no chills, no nausea, no vomiting, no flank pain. He obtained prostate MRI, but no report is available on [facility] or on his disk. \par \par 12/28/2020: Patient presents for follow up. He denies new  symptoms and other medical  issues remain unchanged from above. Dribbling, frequency, urgency. No hematuria, no dysuria,no hesitancy, no straining. No incontinence. No fevers, no chills, no nausea, no vomiting, no flank pain. MRI showed PIRADS 2. He also is interested in possible DAVIS reducing procedures, specifically the urolift. \par \par 06/28/2021: Patient presents for follow up. He reports  symptoms and other medical  issues remain unchanged from above. No hematuria, no dysuria, no frequency, no urgency, no hesitancy, no straining. No incontinence. No fevers, no chills, no nausea, no vomiting, no flank pain. \par \par 06/28/2021: Patient presents for follow up. He reports  symptoms continue. No new complaints. \par \par PSA (%FREE) TREND \par 8.51  (30%)   10/2020\par 7.45   (27.5)   11/2018\par 6.92   (31.2)   06/2018

## 2021-07-02 ENCOUNTER — RX RENEWAL (OUTPATIENT)
Age: 71
End: 2021-07-02

## 2021-07-05 ENCOUNTER — RX RENEWAL (OUTPATIENT)
Age: 71
End: 2021-07-05

## 2021-07-16 ENCOUNTER — APPOINTMENT (OUTPATIENT)
Dept: UROLOGY | Facility: CLINIC | Age: 71
End: 2021-07-16
Payer: MEDICARE

## 2021-07-16 VITALS — HEART RATE: 64 BPM | DIASTOLIC BLOOD PRESSURE: 79 MMHG | SYSTOLIC BLOOD PRESSURE: 164 MMHG | TEMPERATURE: 98.2 F

## 2021-07-16 PROCEDURE — 52000 CYSTOURETHROSCOPY: CPT

## 2021-08-18 ENCOUNTER — RX RENEWAL (OUTPATIENT)
Age: 71
End: 2021-08-18

## 2021-09-21 ENCOUNTER — TRANSCRIPTION ENCOUNTER (OUTPATIENT)
Age: 71
End: 2021-09-21

## 2021-10-05 ENCOUNTER — RX RENEWAL (OUTPATIENT)
Age: 71
End: 2021-10-05

## 2021-10-06 ENCOUNTER — RX RENEWAL (OUTPATIENT)
Age: 71
End: 2021-10-06

## 2021-11-15 ENCOUNTER — RX RENEWAL (OUTPATIENT)
Age: 71
End: 2021-11-15

## 2021-12-02 ENCOUNTER — RX RENEWAL (OUTPATIENT)
Age: 71
End: 2021-12-02

## 2021-12-06 ENCOUNTER — TRANSCRIPTION ENCOUNTER (OUTPATIENT)
Age: 71
End: 2021-12-06

## 2022-01-03 ENCOUNTER — RX RENEWAL (OUTPATIENT)
Age: 72
End: 2022-01-03

## 2022-01-31 ENCOUNTER — RX RENEWAL (OUTPATIENT)
Age: 72
End: 2022-01-31

## 2022-02-15 ENCOUNTER — RX RENEWAL (OUTPATIENT)
Age: 72
End: 2022-02-15

## 2022-03-03 ENCOUNTER — NON-APPOINTMENT (OUTPATIENT)
Age: 72
End: 2022-03-03

## 2022-03-03 ENCOUNTER — APPOINTMENT (OUTPATIENT)
Dept: FAMILY MEDICINE | Facility: CLINIC | Age: 72
End: 2022-03-03
Payer: MEDICARE

## 2022-03-03 VITALS
DIASTOLIC BLOOD PRESSURE: 70 MMHG | HEIGHT: 68 IN | OXYGEN SATURATION: 95 % | TEMPERATURE: 98.6 F | HEART RATE: 77 BPM | WEIGHT: 223 LBS | SYSTOLIC BLOOD PRESSURE: 140 MMHG | BODY MASS INDEX: 33.8 KG/M2

## 2022-03-03 DIAGNOSIS — E78.5 HYPERLIPIDEMIA, UNSPECIFIED: ICD-10-CM

## 2022-03-03 DIAGNOSIS — R53.83 OTHER FATIGUE: ICD-10-CM

## 2022-03-03 PROCEDURE — G0439: CPT

## 2022-03-03 PROCEDURE — G0442 ANNUAL ALCOHOL SCREEN 15 MIN: CPT | Mod: 59

## 2022-03-03 PROCEDURE — G0444 DEPRESSION SCREEN ANNUAL: CPT

## 2022-03-03 PROCEDURE — 93000 ELECTROCARDIOGRAM COMPLETE: CPT | Mod: 59

## 2022-03-03 NOTE — PHYSICAL EXAM
[No Acute Distress] : no acute distress [Well Nourished] : well nourished [Well Developed] : well developed [Well-Appearing] : well-appearing [Normal Sclera/Conjunctiva] : normal sclera/conjunctiva [PERRL] : pupils equal round and reactive to light [EOMI] : extraocular movements intact [Normal Outer Ear/Nose] : the outer ears and nose were normal in appearance [Normal Oropharynx] : the oropharynx was normal [No JVD] : no jugular venous distention [No Lymphadenopathy] : no lymphadenopathy [Supple] : supple [Thyroid Normal, No Nodules] : the thyroid was normal and there were no nodules present [No Respiratory Distress] : no respiratory distress  [No Accessory Muscle Use] : no accessory muscle use [Clear to Auscultation] : lungs were clear to auscultation bilaterally [Normal Rate] : normal rate  [Regular Rhythm] : with a regular rhythm [Normal S1, S2] : normal S1 and S2 [No Murmur] : no murmur heard [No Carotid Bruits] : no carotid bruits [No Abdominal Bruit] : a ~M bruit was not heard ~T in the abdomen [No Varicosities] : no varicosities [Pedal Pulses Present] : the pedal pulses are present [No Edema] : there was no peripheral edema [No Palpable Aorta] : no palpable aorta [No Extremity Clubbing/Cyanosis] : no extremity clubbing/cyanosis [Soft] : abdomen soft [Non Tender] : non-tender [Non-distended] : non-distended [No Masses] : no abdominal mass palpated [No HSM] : no HSM [Normal Bowel Sounds] : normal bowel sounds [Normal Posterior Cervical Nodes] : no posterior cervical lymphadenopathy [Normal Anterior Cervical Nodes] : no anterior cervical lymphadenopathy [No CVA Tenderness] : no CVA  tenderness [No Spinal Tenderness] : no spinal tenderness [No Joint Swelling] : no joint swelling [Grossly Normal Strength/Tone] : grossly normal strength/tone [No Rash] : no rash [Coordination Grossly Intact] : coordination grossly intact [No Focal Deficits] : no focal deficits [Normal Gait] : normal gait [Deep Tendon Reflexes (DTR)] : deep tendon reflexes were 2+ and symmetric [Normal Affect] : the affect was normal [Normal Insight/Judgement] : insight and judgment were intact [de-identified] : polyps in RT ear canal

## 2022-03-03 NOTE — HISTORY OF PRESENT ILLNESS
[FreeTextEntry1] : Annual Well Check  [de-identified] :  Rishabh is a 71 year male here presenting for FARZAD. Patient's mood is good and overall health seems to be well. Pt denies any acute complaints. Patient reported he has not been eating a well-balanced diet. Pt reported he saw hand ortho - arthritis in left thumb. Patient declined surgery and reported cortisone injection improved symptoms. Last cortisone injection was in January 2022. Pt reports saw ENT for water in LT ear. Pt underwent ear lovage with ENT and no longer feels clogged. Pt reports more muscle spasms. Pt stated he does not experience nocturia or frequency. However he reported he experiences mild incontinence. Pt reported he never had botox injections or baclofen pump placed. Pt declined wanting to be put on cholesterol medication. 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 Pt reported it was not due to balance but due to trouble lifting his RT foot up. Pt stated he trips over rugs frequently. Patient compliantly takes baby aspirin 81 MG, Tadalafil 5 MG, Levothyroxine Sodium 175 MCG, and Flomax 0.4 MG. Patient inquired about acupuncture treatment.\par \par Patient confirmed he had a colonoscopy in 2020. Patient was prediabetic. Patient last saw urologist Dr. Barajas in June 2021. Pt discussed UroLift procedure with urology and is still thinking about it. Pt does not f/u with cardio, having last seen in 2016. Patient does not f/u with dermatology regularly. \par \par Patient reported he has received the COVID-19 vaccination as well as the booster. Patient has not contracted COVID-19.

## 2022-03-03 NOTE — HEALTH RISK ASSESSMENT
[Patient reported colonoscopy was normal] : Patient reported colonoscopy was normal [Good] : ~his/her~  mood as  good [Never (0 pts)] : Never (0 points) [No] : In the past 12 months have you used drugs other than those required for medical reasons? No [Any fall with injury in past year] : Patient reported fall with injury in the past year [0] : 2) Feeling down, depressed, or hopeless: Not at all (0) [PHQ-2 Negative - No further assessment needed] : PHQ-2 Negative - No further assessment needed [With Family] : lives with family [] :  [Feels Safe at Home] : Feels safe at home [Fully functional (bathing, dressing, toileting, transferring, walking, feeding)] : Fully functional (bathing, dressing, toileting, transferring, walking, feeding) [Fully functional (using the telephone, shopping, preparing meals, housekeeping, doing laundry, using] : Fully functional and needs no help or supervision to perform IADLs (using the telephone, shopping, preparing meals, housekeeping, doing laundry, using transportation, managing medications and managing finances) [Audit-CScore] : 0 [de-identified] :  periorbital haematoma [[id number]] : 0 [ColonoscopyDate] : 12/20 [ColonoscopyComments] : Dr. Adhikari

## 2022-03-03 NOTE — PLAN
[FreeTextEntry1] : - Labs drawn in office today\par - Emphasis on maintaining balance \par - Adv seeing cardio for workup Summer 2022\par - Adv seeing dermatology for yearly skin screening \par - Adv f/u urology \par

## 2022-03-03 NOTE — END OF VISIT
[FreeTextEntry3] : Medical record entries made by the scribe today today, were at my direction and personally dictated to them by me, Dr. Yue Wiggins on March 3, 2022. I have reviewed the chart and agree that the record accurately reflects my personal performance of the history, physical exam, assessment, and plan.\par

## 2022-03-04 LAB
25(OH)D3 SERPL-MCNC: 39.6 NG/ML
ALBUMIN SERPL ELPH-MCNC: 4.5 G/DL
ALP BLD-CCNC: 71 U/L
ALT SERPL-CCNC: 18 U/L
ANION GAP SERPL CALC-SCNC: 14 MMOL/L
APPEARANCE: CLEAR
AST SERPL-CCNC: 18 U/L
BACTERIA: NEGATIVE
BASOPHILS # BLD AUTO: 0.09 K/UL
BASOPHILS NFR BLD AUTO: 1.1 %
BILIRUB SERPL-MCNC: 0.8 MG/DL
BILIRUBIN URINE: NEGATIVE
BLOOD URINE: NEGATIVE
BUN SERPL-MCNC: 13 MG/DL
CALCIUM SERPL-MCNC: 9.8 MG/DL
CHLORIDE SERPL-SCNC: 105 MMOL/L
CHOLEST SERPL-MCNC: 234 MG/DL
CO2 SERPL-SCNC: 22 MMOL/L
COLOR: YELLOW
CREAT SERPL-MCNC: 0.91 MG/DL
EGFR: 91 ML/MIN/1.73M2
EOSINOPHIL # BLD AUTO: 0.17 K/UL
EOSINOPHIL NFR BLD AUTO: 2.1 %
ESTIMATED AVERAGE GLUCOSE: 126 MG/DL
GLUCOSE QUALITATIVE U: NEGATIVE
GLUCOSE SERPL-MCNC: 85 MG/DL
HBA1C MFR BLD HPLC: 6 %
HCT VFR BLD CALC: 44.9 %
HDLC SERPL-MCNC: 51 MG/DL
HGB BLD-MCNC: 14.6 G/DL
HYALINE CASTS: 0 /LPF
IMM GRANULOCYTES NFR BLD AUTO: 0.3 %
KETONES URINE: NEGATIVE
LDLC SERPL CALC-MCNC: 167 MG/DL
LEUKOCYTE ESTERASE URINE: NEGATIVE
LYMPHOCYTES # BLD AUTO: 1.94 K/UL
LYMPHOCYTES NFR BLD AUTO: 24.3 %
MAN DIFF?: NORMAL
MCHC RBC-ENTMCNC: 27.8 PG
MCHC RBC-ENTMCNC: 32.5 GM/DL
MCV RBC AUTO: 85.5 FL
MICROSCOPIC-UA: NORMAL
MONOCYTES # BLD AUTO: 0.91 K/UL
MONOCYTES NFR BLD AUTO: 11.4 %
NEUTROPHILS # BLD AUTO: 4.86 K/UL
NEUTROPHILS NFR BLD AUTO: 60.8 %
NITRITE URINE: NEGATIVE
NONHDLC SERPL-MCNC: 182 MG/DL
PH URINE: 5.5
PLATELET # BLD AUTO: 223 K/UL
POTASSIUM SERPL-SCNC: 4.5 MMOL/L
PROT SERPL-MCNC: 7 G/DL
PROTEIN URINE: NEGATIVE
PSA SERPL-MCNC: 8.97 NG/ML
RBC # BLD: 5.25 M/UL
RBC # FLD: 13.9 %
RED BLOOD CELLS URINE: 1 /HPF
SODIUM SERPL-SCNC: 140 MMOL/L
SPECIFIC GRAVITY URINE: >=1.03
SQUAMOUS EPITHELIAL CELLS: 0 /HPF
TRIGL SERPL-MCNC: 79 MG/DL
TSH SERPL-ACNC: 0.32 UIU/ML
URATE SERPL-MCNC: 5 MG/DL
UROBILINOGEN URINE: NORMAL
WBC # FLD AUTO: 7.99 K/UL
WHITE BLOOD CELLS URINE: 3 /HPF

## 2022-03-22 ENCOUNTER — RX RENEWAL (OUTPATIENT)
Age: 72
End: 2022-03-22

## 2022-04-06 ENCOUNTER — RX RENEWAL (OUTPATIENT)
Age: 72
End: 2022-04-06

## 2022-05-16 ENCOUNTER — RX RENEWAL (OUTPATIENT)
Age: 72
End: 2022-05-16

## 2022-05-24 NOTE — H&P PST ADULT - CARDIOVASCULAR
Patient made aware of 24/7 emergency services. negative Regular rate & rhythm, normal S1, S2; no murmurs, gallops or rubs; no S3, S4

## 2022-05-31 ENCOUNTER — RX RENEWAL (OUTPATIENT)
Age: 72
End: 2022-05-31

## 2022-06-08 ENCOUNTER — APPOINTMENT (OUTPATIENT)
Dept: FAMILY MEDICINE | Facility: CLINIC | Age: 72
End: 2022-06-08

## 2022-06-28 ENCOUNTER — NON-APPOINTMENT (OUTPATIENT)
Age: 72
End: 2022-06-28

## 2022-06-29 ENCOUNTER — EMERGENCY (EMERGENCY)
Facility: HOSPITAL | Age: 72
LOS: 0 days | Discharge: HOME | End: 2022-06-29
Attending: EMERGENCY MEDICINE
Payer: MEDICARE

## 2022-06-29 VITALS
DIASTOLIC BLOOD PRESSURE: 73 MMHG | HEART RATE: 55 BPM | SYSTOLIC BLOOD PRESSURE: 148 MMHG | TEMPERATURE: 98 F | RESPIRATION RATE: 20 BRPM | OXYGEN SATURATION: 100 % | HEIGHT: 78 IN

## 2022-06-29 DIAGNOSIS — M17.12 UNILATERAL PRIMARY OSTEOARTHRITIS, LEFT KNEE: ICD-10-CM

## 2022-06-29 DIAGNOSIS — Z20.822 CONTACT WITH AND (SUSPECTED) EXPOSURE TO COVID-19: ICD-10-CM

## 2022-06-29 DIAGNOSIS — H53.8 OTHER VISUAL DISTURBANCES: ICD-10-CM

## 2022-06-29 DIAGNOSIS — E03.9 HYPOTHYROIDISM, UNSPECIFIED: ICD-10-CM

## 2022-06-29 DIAGNOSIS — E78.5 HYPERLIPIDEMIA, UNSPECIFIED: ICD-10-CM

## 2022-06-29 DIAGNOSIS — Z98.890 OTHER SPECIFIED POSTPROCEDURAL STATES: Chronic | ICD-10-CM

## 2022-06-29 DIAGNOSIS — Z79.82 LONG TERM (CURRENT) USE OF ASPIRIN: ICD-10-CM

## 2022-06-29 PROCEDURE — 99284 EMERGENCY DEPT VISIT MOD MDM: CPT

## 2022-06-29 PROCEDURE — U0005: CPT

## 2022-06-29 PROCEDURE — 93010 ELECTROCARDIOGRAM REPORT: CPT

## 2022-06-29 PROCEDURE — 93005 ELECTROCARDIOGRAM TRACING: CPT

## 2022-06-29 PROCEDURE — 99285 EMERGENCY DEPT VISIT HI MDM: CPT | Mod: 25

## 2022-06-29 PROCEDURE — 82962 GLUCOSE BLOOD TEST: CPT

## 2022-06-29 PROCEDURE — U0003: CPT

## 2022-06-29 PROCEDURE — 70450 CT HEAD/BRAIN W/O DYE: CPT | Mod: 26,MA

## 2022-06-29 PROCEDURE — 70450 CT HEAD/BRAIN W/O DYE: CPT | Mod: MA

## 2022-06-29 RX ORDER — FLUORESCEIN SODIUM 9 MG
1 STRIP OPHTHALMIC (EYE) ONCE
Refills: 0 | Status: DISCONTINUED | OUTPATIENT
Start: 2022-06-29 | End: 2022-06-29

## 2022-06-29 NOTE — ED PROVIDER NOTE - EYES, MLM
Clear bilaterally, pupils equal, round and reactive to light. Clear bilaterally, pupils equal, round and reactive to light. OD 20/50 uncorrected, 20/20 corrected; OS 20/50 uncorrected, 20/20 corrected. Full EOM bilaterally. IOP 16.3 OD, and 16.7 OS.  Visual fields full.  No fluorescein uptake.

## 2022-06-29 NOTE — ED PROVIDER NOTE - CLINICAL SUMMARY MEDICAL DECISION MAKING FREE TEXT BOX
CT head negative.  Fundoscopic exam limited, no signs of vitreous hemorrhage or retinal detachment.  No stain uptake.  IOP normal bilaterally.  No signs of trauma.  Vision corrected 20/20 bilaterally.  Okay for d/c home, f/u with eye doctor, return precautions given.

## 2022-06-29 NOTE — ED PROVIDER NOTE - NSICDXPASTSURGICALHX_GEN_ALL_CORE_FT
PAST SURGICAL HISTORY:  H/O arthroscopy of knee Left x 2. Unsure of years    H/O colonoscopy with polypectomy 2014    H/O lumbar discectomy with laminectomy    History of cervical discectomy     S/P laminectomy Cervical

## 2022-06-29 NOTE — ED PROVIDER NOTE - PATIENT PORTAL LINK FT
You can access the FollowMyHealth Patient Portal offered by [facility] by registering at the following website: [email or web address]. By joining [employer]’s FollowMyHealth portal, you will also be able to view your health information using other applications (apps) compatible with our system.

## 2022-06-29 NOTE — ED ADULT NURSE NOTE - NSIMPLEMENTINTERV_GEN_ALL_ED
Implemented All Universal Safety Interventions:  [place] to call system. Call bell, personal items and telephone within reach. Instruct patient to call for assistance. Room bathroom lighting operational. Non-slip footwear when patient is off stretcher. Physically safe environment: no spills, clutter or unnecessary equipment. Stretcher in lowest position, wheels locked, appropriate side rails in place.

## 2022-06-29 NOTE — ED PROVIDER NOTE - NSICDXFAMILYHX_GEN_ALL_CORE_FT
FAMILY HISTORY:  Father  Still living? No  Family history of liver disease, Age at diagnosis: Age Unknown    Mother  Still living? No  Family history of colon cancer in mother, Age at diagnosis: Age Unknown    Sibling  Still living? No  Family history of cancer, Age at diagnosis: Age Unknown

## 2022-06-29 NOTE — ED ADULT NURSE NOTE - OBJECTIVE STATEMENT
pt presents to ed via ems for evaluation of blurred vision from right eye while driving to work at 830 am- pt reports baseline weakness from spinal surgery years ago. no new weakness. no slurred speech, vitals stable, be fast negative, gcs 15, ekg completed

## 2022-06-29 NOTE — ED PROVIDER NOTE - NSFOLLOWUPINSTRUCTIONS_ED_ALL_ED_FT
Please follow up with your eye doctor tomorrow.     If you can't get into your personal eye doctor, you can follow up with this clinic.    [facility]  Address: [address] [place] [address]  Phone: [phone]    If any worsening before seeing your doctor, return to ED for further evaluation.

## 2022-06-29 NOTE — ED PROVIDER NOTE - NSICDXPASTMEDICALHX_GEN_ALL_CORE_FT
PAST MEDICAL HISTORY:  Alcoholic recovering alcoholic x 40years    Benign prostatic hyperplasia without lower urinary tract symptoms, unspecified morphology     Diverticulosis of large intestine without hemorrhage     Graves disease     Hemorrhoids, unspecified hemorrhoid type     History of colon polyps     Hyperlipidemia, unspecified hyperlipidemia type     Hypothyroidism, unspecified type     Myelopathy     Osteoarthritis of knee, unilateral left    Spastic     Spinal cord injury at C5-C7 level without injury of spinal bone, subsequent encounter     Spinal stenosis, unspecified spinal region     Weakness to right side

## 2022-06-29 NOTE — ED PROVIDER NOTE - OBJECTIVE STATEMENT
73 y/o male wPMHx of hypothyroidism, hyperlipidemia, spinal surgery presents to the ED BIBEMS from urgent care for evaluation of right eye blurry vision. Pt started driving to work at 8:30 this morning. Pt states weakness from surgery but at baseline. Pt denies new onset weakness, numbness, flashing lights, ha, trauma, double vision. Pt wears reading glasses.

## 2022-06-29 NOTE — ED ADULT NURSE NOTE - CHPI ED NUR SYMPTOMS NEG
no change in level of consciousness/no confusion/no dizziness/no fever/no loss of consciousness/no nausea/no numbness/no vomiting/no weakness

## 2022-06-29 NOTE — ED ADULT TRIAGE NOTE - CHIEF COMPLAINT QUOTE
pt BIBEMS from urgent care for evaluation of R eye blurry vision starting while driving to work at 0830 this morning. hx spinal surgery. states weakness from surgery but at baseline. denies new onset weakness, numbness. no facial droop or slurred speech. pt A&O x4. GCS 15. stroke eval by MD Leija, code stroke not activated.

## 2022-07-07 ENCOUNTER — RX RENEWAL (OUTPATIENT)
Age: 72
End: 2022-07-07

## 2022-08-02 ENCOUNTER — RX RENEWAL (OUTPATIENT)
Age: 72
End: 2022-08-02

## 2022-10-03 ENCOUNTER — RX RENEWAL (OUTPATIENT)
Age: 72
End: 2022-10-03

## 2022-12-05 ENCOUNTER — RX RENEWAL (OUTPATIENT)
Age: 72
End: 2022-12-05

## 2022-12-13 ENCOUNTER — NON-APPOINTMENT (OUTPATIENT)
Age: 72
End: 2022-12-13

## 2022-12-27 ENCOUNTER — RX RENEWAL (OUTPATIENT)
Age: 72
End: 2022-12-27

## 2023-01-29 ENCOUNTER — RX RENEWAL (OUTPATIENT)
Age: 73
End: 2023-01-29

## 2023-03-22 ENCOUNTER — RX RENEWAL (OUTPATIENT)
Age: 73
End: 2023-03-22

## 2023-04-04 ENCOUNTER — RX RENEWAL (OUTPATIENT)
Age: 73
End: 2023-04-04

## 2023-04-10 ENCOUNTER — RX RENEWAL (OUTPATIENT)
Age: 73
End: 2023-04-10

## 2023-06-26 ENCOUNTER — RX RENEWAL (OUTPATIENT)
Age: 73
End: 2023-06-26

## 2023-07-03 ENCOUNTER — RX RENEWAL (OUTPATIENT)
Age: 73
End: 2023-07-03

## 2023-08-10 ENCOUNTER — APPOINTMENT (OUTPATIENT)
Dept: FAMILY MEDICINE | Facility: CLINIC | Age: 73
End: 2023-08-10
Payer: MEDICARE

## 2023-08-10 VITALS
DIASTOLIC BLOOD PRESSURE: 90 MMHG | TEMPERATURE: 98.1 F | BODY MASS INDEX: 35.61 KG/M2 | HEIGHT: 68 IN | WEIGHT: 235 LBS | SYSTOLIC BLOOD PRESSURE: 162 MMHG | HEART RATE: 76 BPM | OXYGEN SATURATION: 98 %

## 2023-08-10 DIAGNOSIS — K63.5 POLYP OF COLON: ICD-10-CM

## 2023-08-10 DIAGNOSIS — Z00.00 ENCOUNTER FOR GENERAL ADULT MEDICAL EXAMINATION W/OUT ABNORMAL FINDINGS: ICD-10-CM

## 2023-08-10 DIAGNOSIS — E03.9 HYPOTHYROIDISM, UNSPECIFIED: ICD-10-CM

## 2023-08-10 PROCEDURE — 93000 ELECTROCARDIOGRAM COMPLETE: CPT

## 2023-08-10 PROCEDURE — G0439: CPT

## 2023-08-11 LAB
25(OH)D3 SERPL-MCNC: 50.5 NG/ML
ALBUMIN SERPL ELPH-MCNC: 4.4 G/DL
ALP BLD-CCNC: 64 U/L
ALT SERPL-CCNC: 16 U/L
ANION GAP SERPL CALC-SCNC: 13 MMOL/L
APPEARANCE: CLEAR
AST SERPL-CCNC: 17 U/L
BACTERIA: NEGATIVE /HPF
BILIRUB SERPL-MCNC: 0.9 MG/DL
BILIRUBIN URINE: NEGATIVE
BLOOD URINE: NEGATIVE
BUN SERPL-MCNC: 14 MG/DL
CALCIUM SERPL-MCNC: 9.6 MG/DL
CAST: 0 /LPF
CHLORIDE SERPL-SCNC: 103 MMOL/L
CHOLEST SERPL-MCNC: 225 MG/DL
CO2 SERPL-SCNC: 23 MMOL/L
COLOR: YELLOW
CREAT SERPL-MCNC: 0.99 MG/DL
EGFR: 81 ML/MIN/1.73M2
EPITHELIAL CELLS: 0 /HPF
ESTIMATED AVERAGE GLUCOSE: 126 MG/DL
GLUCOSE QUALITATIVE U: NEGATIVE MG/DL
GLUCOSE SERPL-MCNC: 83 MG/DL
HBA1C MFR BLD HPLC: 6 %
HDLC SERPL-MCNC: 45 MG/DL
KETONES URINE: NEGATIVE MG/DL
LDLC SERPL CALC-MCNC: 163 MG/DL
LEUKOCYTE ESTERASE URINE: ABNORMAL
MICROSCOPIC-UA: NORMAL
NITRITE URINE: NEGATIVE
NONHDLC SERPL-MCNC: 180 MG/DL
PH URINE: 6
POTASSIUM SERPL-SCNC: 4.7 MMOL/L
PROT SERPL-MCNC: 7.1 G/DL
PROTEIN URINE: NORMAL MG/DL
RED BLOOD CELLS URINE: 1 /HPF
SODIUM SERPL-SCNC: 139 MMOL/L
SPECIFIC GRAVITY URINE: 1.02
TRIGL SERPL-MCNC: 94 MG/DL
TSH SERPL-ACNC: 1.3 UIU/ML
URATE SERPL-MCNC: 5.8 MG/DL
UROBILINOGEN URINE: 0.2 MG/DL
WHITE BLOOD CELLS URINE: 1 /HPF

## 2023-08-11 NOTE — PLAN
[FreeTextEntry1] : - Previous labs discussed and reviewed. - Labs drawn in office today. - Referral: Urology. - Pt is due for a colonoscopy in December 2023.  - Adv seeing cardio for workup Fall 2023. - Advised intermittent fasting, a low-carb diet, and eating more protein. - Follow up for annual visit, sooner if needed.

## 2023-08-11 NOTE — END OF VISIT
[FreeTextEntry3] : Medical record entries made by the scribe today 08/10/2023, were at my direction and personally dictated to them by me, Dr. Yue Wiggins on 08/10/2023. I have reviewed the chart and agree that the record accurately reflects my personal performance of the history, physical exam, assessment, and plan.

## 2023-08-11 NOTE — HISTORY OF PRESENT ILLNESS
[FreeTextEntry1] : CLAUDIA is a 73 year old male here for an FARZAD Visit.  [de-identified] : CLAUDIA is a 73 year old male being seen for an annual wellness visit. Patient's mood is good and overall health seems to be well. Pt denies any acute complaints. Pt reported that she has not fallen Pt reported he has not been eating a well-balanced diet and not exercising daily. Pt denies s/b cardiologist, but he is willing to see him soon. Pt denies fhx of heart disease. He denies any chest pain or SOB. Pt confirms normal bowel movement and urination. Pt confirmed seeing a dermatologist with all normal results. Pt compliantly takes Aspirin 81 MG, Tadalafil 5 MG, Levothyroxine Sodium 175 MCG, and Flomax 0.4 MG. Pt reported gaining weight.

## 2023-08-16 ENCOUNTER — RX RENEWAL (OUTPATIENT)
Age: 73
End: 2023-08-16

## 2023-08-29 NOTE — H&P PST ADULT - HEIGHT IN CM
172.72 Imiquimod Counseling:  I discussed with the patient the risks of imiquimod including but not limited to erythema, scaling, itching, weeping, crusting, and pain.  Patient understands that the inflammatory response to imiquimod is variable from person to person and was educated regarded proper titration schedule.  If flu-like symptoms develop, patient knows to discontinue the medication and contact us.

## 2023-08-30 NOTE — ED ADULT TRIAGE NOTE - BP NONINVASIVE DIASTOLIC (MM HG)
73 [Feeling Fatigued] : not feeling fatigued [SOB] : no shortness of breath [Dyspnea on exertion] : not dyspnea during exertion [Chest Discomfort] : no chest discomfort [Lower Ext Edema] : no extremity edema [Palpitations] : no palpitations [Orthopnea] : no orthopnea [Syncope] : no syncope [Dizziness] : no dizziness

## 2023-09-11 ENCOUNTER — APPOINTMENT (OUTPATIENT)
Dept: UROLOGY | Facility: CLINIC | Age: 73
End: 2023-09-11
Payer: MEDICARE

## 2023-09-11 VITALS
BODY MASS INDEX: 34.56 KG/M2 | HEIGHT: 68 IN | HEART RATE: 62 BPM | SYSTOLIC BLOOD PRESSURE: 151 MMHG | DIASTOLIC BLOOD PRESSURE: 70 MMHG | WEIGHT: 228 LBS

## 2023-09-11 PROCEDURE — 99213 OFFICE O/P EST LOW 20 MIN: CPT

## 2023-09-11 PROCEDURE — 51798 US URINE CAPACITY MEASURE: CPT

## 2023-09-12 LAB
APPEARANCE: CLEAR
BACTERIA UR CULT: NORMAL
BACTERIA: NEGATIVE /HPF
BILIRUBIN URINE: NEGATIVE
BLOOD URINE: NEGATIVE
CAST: 0 /LPF
COLOR: YELLOW
EPITHELIAL CELLS: 0 /HPF
GLUCOSE QUALITATIVE U: NEGATIVE MG/DL
KETONES URINE: NEGATIVE MG/DL
LEUKOCYTE ESTERASE URINE: ABNORMAL
MICROSCOPIC-UA: NORMAL
NITRITE URINE: NEGATIVE
PH URINE: 5.5
PROTEIN URINE: NEGATIVE MG/DL
RED BLOOD CELLS URINE: 0 /HPF
SPECIFIC GRAVITY URINE: 1.01
UROBILINOGEN URINE: 0.2 MG/DL
WHITE BLOOD CELLS URINE: 0 /HPF

## 2023-10-02 ENCOUNTER — RX RENEWAL (OUTPATIENT)
Age: 73
End: 2023-10-02

## 2023-10-09 NOTE — REASON FOR VISIT
Pt assessed by Clinical Coordinator (CC) assess pt via phone. Pt was cooperative and engaged throughout assessment. Pt reported severe depression and suicidal ideation with plan to step in front of a car. Pt reported losing girlfriend as the catalyst as well as over issues with interpersonal relationships. Pt reported be homeless as well. Pt reported being unemployed. 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 Pt reported no access to firearms. Pt reported no medical issues. Pt reported no current mental health treatment provider. Pt denies legal issues. Pt reported substance use of cocaine ($40-$50 a day), THC (dime bad a night), and alcohol (6 pack a day). Pt reported half a pack of nicotine a day. Pt reported attempting suicide 1.5 years ago by jumping off a dez. Pt reported homicidal ideation with no intended target. PT reported command auditory hallucinations to harm others. Pt willing to sign 201. [Follow-up Visit ___] : a follow-up visit  for [unfilled]

## 2023-10-11 ENCOUNTER — APPOINTMENT (OUTPATIENT)
Dept: FAMILY MEDICINE | Facility: CLINIC | Age: 73
End: 2023-10-11
Payer: MEDICARE

## 2023-10-11 VITALS
TEMPERATURE: 98.4 F | OXYGEN SATURATION: 95 % | WEIGHT: 228 LBS | DIASTOLIC BLOOD PRESSURE: 74 MMHG | HEART RATE: 73 BPM | HEIGHT: 68 IN | SYSTOLIC BLOOD PRESSURE: 132 MMHG | BODY MASS INDEX: 34.56 KG/M2

## 2023-10-11 DIAGNOSIS — K11.9 DISEASE OF SALIVARY GLAND, UNSPECIFIED: ICD-10-CM

## 2023-10-11 DIAGNOSIS — R59.0 LOCALIZED ENLARGED LYMPH NODES: ICD-10-CM

## 2023-10-11 PROCEDURE — 99213 OFFICE O/P EST LOW 20 MIN: CPT

## 2023-10-16 ENCOUNTER — NON-APPOINTMENT (OUTPATIENT)
Age: 73
End: 2023-10-16

## 2023-10-19 ENCOUNTER — RX RENEWAL (OUTPATIENT)
Age: 73
End: 2023-10-19

## 2023-10-22 ENCOUNTER — NON-APPOINTMENT (OUTPATIENT)
Age: 73
End: 2023-10-22

## 2023-10-31 ENCOUNTER — APPOINTMENT (OUTPATIENT)
Dept: FAMILY MEDICINE | Facility: CLINIC | Age: 73
End: 2023-10-31
Payer: MEDICARE

## 2023-10-31 VITALS
OXYGEN SATURATION: 98 % | DIASTOLIC BLOOD PRESSURE: 80 MMHG | HEART RATE: 82 BPM | BODY MASS INDEX: 34.56 KG/M2 | HEIGHT: 68 IN | WEIGHT: 228 LBS | SYSTOLIC BLOOD PRESSURE: 142 MMHG

## 2023-10-31 PROCEDURE — 99215 OFFICE O/P EST HI 40 MIN: CPT

## 2023-11-01 LAB
ALBUMIN SERPL ELPH-MCNC: 4.3 G/DL
ALP BLD-CCNC: 66 U/L
ALT SERPL-CCNC: 18 U/L
AMYLASE/CREAT SERPL: 170 U/L
ANION GAP SERPL CALC-SCNC: 11 MMOL/L
AST SERPL-CCNC: 18 U/L
BASOPHILS # BLD AUTO: 0.14 K/UL
BASOPHILS NFR BLD AUTO: 1.8 %
BILIRUB SERPL-MCNC: 0.4 MG/DL
BUN SERPL-MCNC: 17 MG/DL
CALCIUM SERPL-MCNC: 9.6 MG/DL
CHLORIDE SERPL-SCNC: 104 MMOL/L
CHOLEST SERPL-MCNC: 217 MG/DL
CO2 SERPL-SCNC: 26 MMOL/L
CREAT SERPL-MCNC: 1.02 MG/DL
EGFR: 79 ML/MIN/1.73M2
EOSINOPHIL # BLD AUTO: 0.34 K/UL
EOSINOPHIL NFR BLD AUTO: 4.4 %
GLUCOSE SERPL-MCNC: 97 MG/DL
HCT VFR BLD CALC: 42.8 %
HDLC SERPL-MCNC: 41 MG/DL
HGB BLD-MCNC: 13.9 G/DL
IMM GRANULOCYTES NFR BLD AUTO: 0.4 %
LDLC SERPL CALC-MCNC: 158 MG/DL
LPL SERPL-CCNC: 268 U/L
LYMPHOCYTES # BLD AUTO: 2.32 K/UL
LYMPHOCYTES NFR BLD AUTO: 30.1 %
MAN DIFF?: NORMAL
MCHC RBC-ENTMCNC: 27.6 PG
MCHC RBC-ENTMCNC: 32.5 GM/DL
MCV RBC AUTO: 84.9 FL
MONOCYTES # BLD AUTO: 0.8 K/UL
MONOCYTES NFR BLD AUTO: 10.4 %
NEUTROPHILS # BLD AUTO: 4.08 K/UL
NEUTROPHILS NFR BLD AUTO: 52.9 %
NONHDLC SERPL-MCNC: 176 MG/DL
PLATELET # BLD AUTO: 299 K/UL
POTASSIUM SERPL-SCNC: 4.6 MMOL/L
PROT SERPL-MCNC: 7.4 G/DL
PSA FREE FLD-MCNC: 22 %
PSA FREE SERPL-MCNC: 3.64 NG/ML
PSA SERPL-MCNC: 16.9 NG/ML
RBC # BLD: 5.04 M/UL
RBC # FLD: 13.3 %
SODIUM SERPL-SCNC: 141 MMOL/L
TRIGL SERPL-MCNC: 98 MG/DL
WBC # FLD AUTO: 7.71 K/UL

## 2023-11-02 ENCOUNTER — APPOINTMENT (OUTPATIENT)
Dept: UROLOGY | Facility: CLINIC | Age: 73
End: 2023-11-02

## 2023-11-09 ENCOUNTER — APPOINTMENT (OUTPATIENT)
Dept: FAMILY MEDICINE | Facility: CLINIC | Age: 73
End: 2023-11-09
Payer: MEDICARE

## 2023-11-09 VITALS
HEIGHT: 68 IN | DIASTOLIC BLOOD PRESSURE: 84 MMHG | OXYGEN SATURATION: 95 % | WEIGHT: 226 LBS | HEART RATE: 68 BPM | SYSTOLIC BLOOD PRESSURE: 138 MMHG | BODY MASS INDEX: 34.25 KG/M2

## 2023-11-09 DIAGNOSIS — Z23 ENCOUNTER FOR IMMUNIZATION: ICD-10-CM

## 2023-11-09 DIAGNOSIS — K85.90 ACUTE PANCREATITIS WITHOUT NECROSIS OR INFECTION, UNSPECIFIED: ICD-10-CM

## 2023-11-09 PROCEDURE — 99214 OFFICE O/P EST MOD 30 MIN: CPT

## 2023-11-10 LAB
ALBUMIN SERPL ELPH-MCNC: 4.4 G/DL
ALP BLD-CCNC: 67 U/L
ALT SERPL-CCNC: 21 U/L
AMYLASE/CREAT SERPL: 120 U/L
ANION GAP SERPL CALC-SCNC: 13 MMOL/L
AST SERPL-CCNC: 20 U/L
BASOPHILS # BLD AUTO: 0.12 K/UL
BASOPHILS NFR BLD AUTO: 1.6 %
BILIRUB SERPL-MCNC: 0.8 MG/DL
BUN SERPL-MCNC: 14 MG/DL
CALCIUM SERPL-MCNC: 9.6 MG/DL
CHLORIDE SERPL-SCNC: 100 MMOL/L
CO2 SERPL-SCNC: 23 MMOL/L
CREAT SERPL-MCNC: 1 MG/DL
EGFR: 80 ML/MIN/1.73M2
EOSINOPHIL # BLD AUTO: 0.23 K/UL
EOSINOPHIL NFR BLD AUTO: 3.1 %
GLUCOSE SERPL-MCNC: 83 MG/DL
HCT VFR BLD CALC: 44.8 %
HGB BLD-MCNC: 14.3 G/DL
IMM GRANULOCYTES NFR BLD AUTO: 0.3 %
LPL SERPL-CCNC: 157 U/L
LYMPHOCYTES # BLD AUTO: 2.15 K/UL
LYMPHOCYTES NFR BLD AUTO: 29.3 %
MAN DIFF?: NORMAL
MCHC RBC-ENTMCNC: 27.4 PG
MCHC RBC-ENTMCNC: 31.9 GM/DL
MCV RBC AUTO: 86 FL
MONOCYTES # BLD AUTO: 0.71 K/UL
MONOCYTES NFR BLD AUTO: 9.7 %
NEUTROPHILS # BLD AUTO: 4.11 K/UL
NEUTROPHILS NFR BLD AUTO: 56 %
PLATELET # BLD AUTO: 244 K/UL
POTASSIUM SERPL-SCNC: 4.7 MMOL/L
PROT SERPL-MCNC: 7.4 G/DL
RBC # BLD: 5.21 M/UL
RBC # FLD: 13.7 %
SODIUM SERPL-SCNC: 136 MMOL/L
WBC # FLD AUTO: 7.34 K/UL

## 2023-12-19 ENCOUNTER — RX RENEWAL (OUTPATIENT)
Age: 73
End: 2023-12-19

## 2023-12-21 ENCOUNTER — APPOINTMENT (OUTPATIENT)
Dept: UROLOGY | Facility: CLINIC | Age: 73
End: 2023-12-21
Payer: MEDICARE

## 2023-12-21 PROCEDURE — 99213 OFFICE O/P EST LOW 20 MIN: CPT

## 2023-12-21 NOTE — ASSESSMENT
[FreeTextEntry1] : 74 yo M with prostatomegaly and elevated PSA. Will repeat. If PSAD remains above 0.15, will repeat MRI. If trends back down will repeat in few months.

## 2023-12-21 NOTE — PHYSICAL EXAM
[General Appearance - Well Developed] : well developed [General Appearance - Well Nourished] : well nourished [Normal Appearance] : normal appearance [Well Groomed] : well groomed [General Appearance - In No Acute Distress] : no acute distress [Abdomen Soft] : soft [Abdomen Tenderness] : non-tender [Costovertebral Angle Tenderness] : no ~M costovertebral angle tenderness [Urethral Meatus] : meatus normal [Penis Abnormality] : normal circumcised penis [Urinary Bladder Findings] : the bladder was normal on palpation [Scrotum] : the scrotum was normal [Testes Tenderness] : no tenderness of the testes [Testes Mass (___cm)] : there were no testicular masses [No Prostate Nodules] : no prostate nodules [Prostate Size ___ (0-4)] : prostate size [unfilled] (scale: 0-4) [Edema] : no peripheral edema [] : no respiratory distress [Respiration, Rhythm And Depth] : normal respiratory rhythm and effort [Exaggerated Use Of Accessory Muscles For Inspiration] : no accessory muscle use [Oriented To Time, Place, And Person] : oriented to person, place, and time [Affect] : the affect was normal [Mood] : the mood was normal [Not Anxious] : not anxious [Normal Station and Gait] : the gait and station were normal for the patient's age [No Focal Deficits] : no focal deficits [No Palpable Adenopathy] : no palpable adenopathy

## 2023-12-21 NOTE — HISTORY OF PRESENT ILLNESS
[FreeTextEntry1] : 70 year old man seen 11/09/2020 with complaint of elevated PSA. See trend below. He reports LUTS, moderate frequency, urgency, and weak stream. Symptoms are bothersome. He is currently on tamsulosin 0.8 mg and tadalafil 5 mg daily for BPH. Still has bother. This began years ago.  Nothing makes the symptoms better, nothing makes sx worse.  It is associated with nothing. No hematuria, no dysuria, no hesitancy, no straining. No incontinence.  No fevers, no chills, no nausea, no vomiting, no flank pain.  No family history contributory to BPH wth LUTS or elevated PSA.   12/14/2020: Patient presents for follow up. He denies new  symptoms and other medical  issues remain unchanged from above. His LUTS are not terribly bothersome, but sometimes they become acutely worse and that is bothersome. Dribbling, frequency, urgency. No hematuria, no dysuria,no hesitancy, no straining. No incontinence. No fevers, no chills, no nausea, no vomiting, no flank pain. He obtained prostate MRI, but no report is available on [facility] or on his disk.   12/28/2020: Patient presents for follow up. He denies new  symptoms and other medical  issues remain unchanged from above. Dribbling, frequency, urgency. No hematuria, no dysuria,no hesitancy, no straining. No incontinence. No fevers, no chills, no nausea, no vomiting, no flank pain. MRI showed PIRADS 2. He also is interested in possible DAVIS reducing procedures, specifically the urolift.   06/28/2021: Patient presents for follow up. He reports  symptoms and other medical  issues remain unchanged from above. No hematuria, no dysuria, no frequency, no urgency, no hesitancy, no straining. No incontinence. No fevers, no chills, no nausea, no vomiting, no flank pain.   06/28/2021: Patient presents for follow up. He reports  symptoms continue. No new complaints.   09/11/2023: Patient presents for follow up. He reports  symptoms continue, now c/o leakage. He reports post void dribbling but also dribbling at other times. It has become bothersome. He continues to take tadalafil and tamsulosin 0.4 mg. PVR 23 mL  12/21/2023: Patient presents for follow up. PSA tamar so he was referred back to [facility]. Still with dribbling as above.   PSA (%FREE) TREND  16.9   (22%)   11/2023 8.97   03/2023 8.51  (30%)   10/2020 7.45   (27.5)   11/2018 6.92   (31.2)   06/2018

## 2023-12-22 LAB
PSA FREE FLD-MCNC: 31 %
PSA FREE SERPL-MCNC: 3.22 NG/ML
PSA SERPL-MCNC: 10.5 NG/ML

## 2023-12-28 ENCOUNTER — RX RENEWAL (OUTPATIENT)
Age: 73
End: 2023-12-28

## 2024-01-10 ENCOUNTER — APPOINTMENT (OUTPATIENT)
Dept: UROLOGY | Facility: CLINIC | Age: 74
End: 2024-01-10
Payer: MEDICARE

## 2024-01-10 DIAGNOSIS — N40.0 BENIGN PROSTATIC HYPERPLASIA WITHOUT LOWER URINARY TRACT SYMPMS: ICD-10-CM

## 2024-01-10 DIAGNOSIS — N13.8 BENIGN PROSTATIC HYPERPLASIA WITH LOWER URINARY TRACT SYMPMS: ICD-10-CM

## 2024-01-10 DIAGNOSIS — N40.1 BENIGN PROSTATIC HYPERPLASIA WITH LOWER URINARY TRACT SYMPMS: ICD-10-CM

## 2024-01-10 PROCEDURE — 99214 OFFICE O/P EST MOD 30 MIN: CPT

## 2024-01-12 LAB
APPEARANCE: CLEAR
BACTERIA UR CULT: NORMAL
BACTERIA: NEGATIVE /HPF
BILIRUBIN URINE: NEGATIVE
BLOOD URINE: NEGATIVE
CAST: 0 /LPF
COLOR: YELLOW
EPITHELIAL CELLS: 0 /HPF
GLUCOSE QUALITATIVE U: NEGATIVE MG/DL
KETONES URINE: NEGATIVE MG/DL
LEUKOCYTE ESTERASE URINE: NEGATIVE
MICROSCOPIC-UA: NORMAL
NITRITE URINE: NEGATIVE
PH URINE: 5.5
PROTEIN URINE: NEGATIVE MG/DL
RED BLOOD CELLS URINE: 0 /HPF
SPECIFIC GRAVITY URINE: 1.03
UROBILINOGEN URINE: 0.2 MG/DL
WHITE BLOOD CELLS URINE: 1 /HPF

## 2024-01-16 PROBLEM — N40.1 BPH WITH URINARY OBSTRUCTION: Noted: 2020-11-09

## 2024-01-16 PROBLEM — N40.0 BPH (BENIGN PROSTATIC HYPERPLASIA): Noted: 2019-02-14

## 2024-01-16 NOTE — ASSESSMENT
[FreeTextEntry1] : Reviewed records. Discussed labs and imaging.   PSA: 8.51/30% (10/29/2020). MRI Prostate (12/10/2020): Prostate volume: 96 cc.  No focal findings concerning for clinically significant prostate cancer. PIRADS 2.   CT scan Abdomen and Pelvis (11/1/2023): Prostate volume: 109 ml.   Benign Prostatic Hyperplasia:  Discussed treatment options mainly: continued medical management with alpha blocker and or 5 alpha reductase inhibitor Vs Clean intermittent catheterization/Indwelling Ortega catheter Vs Surgery: Transurethral resection/vaporization/ablation including aquablation of Prostate and Simple prostatectomy: open Vs robotic after appropriate work up. Also discussed emerging minimally invasive surgical therapies: Prostatic urethral lift (Urolift) and Water vapor thermal therapy (Rezum).  Discussed risks and benefits of each.  Patient will consider his options.  Will get Urinalysis and Urine culture.  Elevated PSA: Will get MRI Prostate.   Return to clinic for next available Cystoscopy: will do uroflow and check post void residual.

## 2024-01-16 NOTE — HISTORY OF PRESENT ILLNESS
[FreeTextEntry1] : 73 -year-old male presents for follow up.  Taking Flomax and Tadalafil 5 mg.  Reports variable stream, urinates every 1 to 4 hours or so during the day and nocturia of 1-5 x.  Endorses hesitancy, straining, intermittency, urgency, incontinence, sense of incomplete emptying or post void dribbling.  Denies dysuria, hematuria, lower abdominal or flank pain, fever, chills or rigors. Rates erections 4/5. Has on and off difficulty to attain, maintain and penetrate.  Normal libido and on and off ejaculation.  No family history of Prostate cancer.  Past smoker.  .

## 2024-02-12 ENCOUNTER — RX RENEWAL (OUTPATIENT)
Age: 74
End: 2024-02-12

## 2024-02-21 ENCOUNTER — APPOINTMENT (OUTPATIENT)
Dept: UROLOGY | Facility: CLINIC | Age: 74
End: 2024-02-21
Payer: MEDICARE

## 2024-02-21 VITALS
OXYGEN SATURATION: 94 % | DIASTOLIC BLOOD PRESSURE: 80 MMHG | WEIGHT: 226 LBS | RESPIRATION RATE: 14 BRPM | SYSTOLIC BLOOD PRESSURE: 147 MMHG | HEIGHT: 68 IN | BODY MASS INDEX: 34.25 KG/M2 | HEART RATE: 74 BPM

## 2024-02-21 DIAGNOSIS — R32 UNSPECIFIED URINARY INCONTINENCE: ICD-10-CM

## 2024-02-21 DIAGNOSIS — R39.15 URGENCY OF URINATION: ICD-10-CM

## 2024-02-21 DIAGNOSIS — N13.8 BENIGN PROSTATIC HYPERPLASIA WITH LOWER URINARY TRACT SYMPMS: ICD-10-CM

## 2024-02-21 DIAGNOSIS — N40.1 BENIGN PROSTATIC HYPERPLASIA WITH LOWER URINARY TRACT SYMPMS: ICD-10-CM

## 2024-02-21 DIAGNOSIS — R97.20 ELEVATED PROSTATE, SPECIFIC ANTIGEN [PSA]: ICD-10-CM

## 2024-02-21 PROCEDURE — 51741 ELECTRO-UROFLOWMETRY FIRST: CPT | Mod: 59

## 2024-02-21 PROCEDURE — 99214 OFFICE O/P EST MOD 30 MIN: CPT | Mod: 25

## 2024-02-21 PROCEDURE — 52000 CYSTOURETHROSCOPY: CPT

## 2024-02-21 NOTE — PHYSICAL EXAM
[Normal Appearance] : normal appearance [General Appearance - In No Acute Distress] : no acute distress [] : no respiratory distress [Abdomen Soft] : soft [Abdomen Tenderness] : non-tender [Normal Station and Gait] : the gait and station were normal for the patient's age [Oriented To Time, Place, And Person] : oriented to person, place, and time [de-identified] : normal peripheral circulation

## 2024-02-21 NOTE — ASSESSMENT
[FreeTextEntry1] : Reviewed records. Discussed labs and imaging.   PSA: 10.5/31% (12/21/2023). MRI Prostate (2/14/2024): Prostate volume: 96 cc.  No focal findings concerning for clinically significant prostate cancer. PIRADS 2.   Benign Prostatic Hyperplasia:  See uroflow and post void residual.  Discussed cystoscopy findings: No bladder tumor visualized.  The bladder wall demonstrates a grade 3 trabeculation. diverticula were present in the bladder wall and were located on the Along floor of bladder and posterior bladder wall.   Bi lobar prostatic enlargement: severely enlarged lateral lobes, with coaptation. Again, discussed treatment options. Patient interested in Aquablation.  Would like to proceed in April 2024.  Elevated PSA: Will continue PSA monitoring.  Urinary urgency:  Urinary incontinence: Discussed treatment options. Gave samples of Gemtesa.  Explained possible side effects.  Asked to update us in a few weeks. If helps will do prescription.

## 2024-02-21 NOTE — HISTORY OF PRESENT ILLNESS
[FreeTextEntry1] : 73 -year-old male presents for cystoscopy. Had MRI prostate, had inform results. Mentions main bother with urination is urgency, urinary incontinence and sense of incomplete emptying. Would like to try higher dose of Tadalafil.  Seen on 1/10/2024 for history of Benign Prostatic Hyperplasia. On Flomax and Tadalafil 5 mg.  Reported variable stream, urinates every 1 to 4 hours or so during the day and nocturia of 1-5 x.  Endorsed hesitancy, straining, intermittency, urgency, incontinence, sense of incomplete emptying or post void dribbling.  Denied dysuria, hematuria, lower abdominal or flank pain, fever, chills or rigors. Rated erections 4/5. Has on and off difficulty to attain, maintain and penetrate.  Normal libido and on and off ejaculation.  No family history of Prostate cancer.  Past smoker.  .

## 2024-03-06 RX ORDER — AMOXICILLIN AND CLAVULANATE POTASSIUM 875; 125 MG/1; MG/1
875-125 TABLET, COATED ORAL
Qty: 14 | Refills: 0 | Status: COMPLETED | COMMUNITY
Start: 2023-10-11 | End: 2024-03-06

## 2024-03-06 RX ORDER — VIBEGRON 75 MG/1
75 TABLET, FILM COATED ORAL
Qty: 90 | Refills: 1 | Status: ACTIVE | COMMUNITY
Start: 2024-03-06 | End: 1900-01-01

## 2024-03-14 ENCOUNTER — NON-APPOINTMENT (OUTPATIENT)
Age: 74
End: 2024-03-14

## 2024-03-25 ENCOUNTER — RX RENEWAL (OUTPATIENT)
Age: 74
End: 2024-03-25

## 2024-03-28 ENCOUNTER — NON-APPOINTMENT (OUTPATIENT)
Age: 74
End: 2024-03-28

## 2024-03-31 ENCOUNTER — RX RENEWAL (OUTPATIENT)
Age: 74
End: 2024-03-31

## 2024-03-31 RX ORDER — TADALAFIL 5 MG/1
5 TABLET ORAL
Qty: 60 | Refills: 1 | Status: ACTIVE | COMMUNITY
Start: 2018-11-26 | End: 1900-01-01

## 2024-06-25 ENCOUNTER — RX RENEWAL (OUTPATIENT)
Age: 74
End: 2024-06-25

## 2024-06-25 RX ORDER — TAMSULOSIN HYDROCHLORIDE 0.4 MG/1
0.4 CAPSULE ORAL
Qty: 180 | Refills: 0 | Status: ACTIVE | COMMUNITY
Start: 2019-02-14 | End: 1900-01-01

## 2024-07-10 ENCOUNTER — NON-APPOINTMENT (OUTPATIENT)
Age: 74
End: 2024-07-10

## 2024-08-06 ENCOUNTER — RX RENEWAL (OUTPATIENT)
Age: 74
End: 2024-08-06

## 2024-08-12 ENCOUNTER — RX RENEWAL (OUTPATIENT)
Age: 74
End: 2024-08-12

## 2024-09-12 ENCOUNTER — RX RENEWAL (OUTPATIENT)
Age: 74
End: 2024-09-12

## 2024-09-23 ENCOUNTER — RX RENEWAL (OUTPATIENT)
Age: 74
End: 2024-09-23

## 2024-10-14 ENCOUNTER — RX RENEWAL (OUTPATIENT)
Age: 74
End: 2024-10-14

## 2024-10-30 ENCOUNTER — NON-APPOINTMENT (OUTPATIENT)
Age: 74
End: 2024-10-30

## 2024-12-20 ENCOUNTER — RX RENEWAL (OUTPATIENT)
Age: 74
End: 2024-12-20

## 2025-02-05 ENCOUNTER — EMERGENCY (EMERGENCY)
Facility: HOSPITAL | Age: 75
LOS: 0 days | Discharge: HOME | End: 2025-02-05
Attending: EMERGENCY MEDICINE
Payer: MEDICARE

## 2025-02-05 VITALS
OXYGEN SATURATION: 99 % | DIASTOLIC BLOOD PRESSURE: 80 MMHG | RESPIRATION RATE: 18 BRPM | HEART RATE: 70 BPM | SYSTOLIC BLOOD PRESSURE: 141 MMHG

## 2025-02-05 VITALS
RESPIRATION RATE: 18 BRPM | OXYGEN SATURATION: 95 % | WEIGHT: 222.67 LBS | SYSTOLIC BLOOD PRESSURE: 132 MMHG | DIASTOLIC BLOOD PRESSURE: 65 MMHG | HEART RATE: 68 BPM | TEMPERATURE: 98 F

## 2025-02-05 DIAGNOSIS — E78.5 HYPERLIPIDEMIA, UNSPECIFIED: ICD-10-CM

## 2025-02-05 DIAGNOSIS — Z98.890 OTHER SPECIFIED POSTPROCEDURAL STATES: Chronic | ICD-10-CM

## 2025-02-05 DIAGNOSIS — M19.90 UNSPECIFIED OSTEOARTHRITIS, UNSPECIFIED SITE: ICD-10-CM

## 2025-02-05 DIAGNOSIS — M48.00 SPINAL STENOSIS, SITE UNSPECIFIED: ICD-10-CM

## 2025-02-05 DIAGNOSIS — E03.9 HYPOTHYROIDISM, UNSPECIFIED: ICD-10-CM

## 2025-02-05 DIAGNOSIS — N40.0 BENIGN PROSTATIC HYPERPLASIA WITHOUT LOWER URINARY TRACT SYMPTOMS: ICD-10-CM

## 2025-02-05 DIAGNOSIS — K64.9 UNSPECIFIED HEMORRHOIDS: ICD-10-CM

## 2025-02-05 DIAGNOSIS — M25.551 PAIN IN RIGHT HIP: ICD-10-CM

## 2025-02-05 DIAGNOSIS — K57.90 DIVERTICULOSIS OF INTESTINE, PART UNSPECIFIED, WITHOUT PERFORATION OR ABSCESS WITHOUT BLEEDING: ICD-10-CM

## 2025-02-05 DIAGNOSIS — E05.00 THYROTOXICOSIS WITH DIFFUSE GOITER WITHOUT THYROTOXIC CRISIS OR STORM: ICD-10-CM

## 2025-02-05 PROCEDURE — 76376 3D RENDER W/INTRP POSTPROCES: CPT | Mod: 26

## 2025-02-05 PROCEDURE — 99284 EMERGENCY DEPT VISIT MOD MDM: CPT | Mod: 25

## 2025-02-05 PROCEDURE — 72192 CT PELVIS W/O DYE: CPT

## 2025-02-05 PROCEDURE — 96372 THER/PROPH/DIAG INJ SC/IM: CPT

## 2025-02-05 PROCEDURE — 72192 CT PELVIS W/O DYE: CPT | Mod: 26

## 2025-02-05 PROCEDURE — 99284 EMERGENCY DEPT VISIT MOD MDM: CPT | Mod: FS

## 2025-02-05 PROCEDURE — 76376 3D RENDER W/INTRP POSTPROCES: CPT

## 2025-02-05 RX ORDER — ACETAMINOPHEN 160 MG/5ML
1000 SUSPENSION ORAL ONCE
Refills: 0 | Status: COMPLETED | OUTPATIENT
Start: 2025-02-05 | End: 2025-02-05

## 2025-02-05 RX ORDER — KETOROLAC TROMETHAMINE 10 MG
30 TABLET ORAL ONCE
Refills: 0 | Status: DISCONTINUED | OUTPATIENT
Start: 2025-02-05 | End: 2025-02-05

## 2025-02-05 RX ADMIN — ACETAMINOPHEN 1000 MILLIGRAM(S): 160 SUSPENSION ORAL at 18:26

## 2025-02-05 RX ADMIN — Medication 30 MILLIGRAM(S): at 16:37

## 2025-02-05 NOTE — ED STATDOCS - CLINICAL SUMMARY MEDICAL DECISION MAKING FREE TEXT BOX
Pt with R hip pain x1 week. Now has difficulty walking. No signs of infection no trauma. Possible arthritis. Possible bursitis. Give pain meds and CT hip.

## 2025-02-05 NOTE — ED STATDOCS - ATTENDING APP SHARED VISIT CONTRIBUTION OF CARE
I,Avila Cardona MD,  performed the initial face to face bedside interview with this patient regarding history of present illness, review of symptoms and relevant past medical, social and family history.  I completed an independent physical examination.  I was the initial provider who evaluated this patient. I have signed out the follow up of any pending tests (i.e. labs, radiological studies) to the ACP.  I have communicated the patient’s plan of care and disposition with the ACP.  The history, relevant review of systems, past medical and surgical history, medical decision making, and physical examination was documented by the scribe in my presence and I attest to the accuracy of the documentation.

## 2025-02-05 NOTE — ED STATDOCS - OBJECTIVE STATEMENT
73 y/o M with PMHx of  BPH, diverticulosis, graves disease, hemorrhoids, HLD, hypothyroidism, OA, spinal stenosis, spinal cord injury 2004, spinal surgeries  presents to the ED c/o difficulty walking which is a change from baseline due to worsening R hip pain radiating down R thigh. no

## 2025-02-05 NOTE — ED ADULT TRIAGE NOTE - CHIEF COMPLAINT QUOTE
pt bib wheelchair to triage c/o R hip pain radiating down R thigh x1 week. -fall -trauma -allergies. pmh hypothyroid

## 2025-02-05 NOTE — ED ADULT NURSE NOTE - OBJECTIVE STATEMENT
pt presents to er c/o R. hip pain radiating down R. thigh x 1 week. denies recent falls/injury to area. no meds pta. pt states pain was worse today, was unable to bear weight on leg.

## 2025-02-05 NOTE — ED STATDOCS - PATIENT PORTAL LINK FT
You can access the FollowMyHealth Patient Portal offered by [facility] by registering at the following website: [email or web address]. By joining [employer]’s FollowMyHealth portal, you will also be able to view your health information using other applications (apps) compatible with our system.

## 2025-02-05 NOTE — ED STATDOCS - PHYSICAL EXAMINATION
Physical Exam:  Gen: NAD, non-toxic appearing  Head: NCAT  HEENT: EOMI, PEERLA, normal conjunctiva, tongue midline, oral mucosa moist  Lung: CTAB, no respiratory distress, no wheezes/rhonchi/rales B/L, speaking in full sentences  CV: RRR, no murmurs, rubs or gallops, distal pulses 2+ b/l  Abd: soft, nontender, no distention, no guarding, no rigidity, no rebound tenderness  MSK:  R hip no focal tenderness ,able to extend R leg, Able to plantarflex and dorsiflex, no lower spine tenderness ,pt able to ambulate with antalgic gait due to pain in R hip  Skin: Warm, well perfused, no rash  Psych: normal affect, calm

## 2025-02-05 NOTE — ED STATDOCS - NSFOLLOWUPINSTRUCTIONS_ED_ALL_ED_FT
Hip Pain  The hip is the joint between the upper legs and the lower pelvis. The bones, cartilage, tendons, and muscles of your hip joint support your body and allow you to move around.    Hip pain can range from a minor ache to severe pain in one or both of your hips. The pain may be felt on the inside of the hip joint near the groin, or on the outside near the buttocks and upper thigh. You may also have swelling or stiffness in your hip area.    Follow these instructions at home:  Managing pain, stiffness, and swelling    A bag of ice on a towel on the skin.  A heating pad for use on the affected area.  If told, put ice on the painful area.  Put ice in a plastic bag.  Place a towel between your skin and the bag.  Leave the ice on for 20 minutes, 2–3 times a day.  If told, apply heat to the affected area as often as told by your health care provider. Use the heat source that your provider recommends, such as a moist heat pack or a heating pad.  Place a towel between your skin and the heat source.  Leave the heat on for 20–30 minutes.  If your skin turns bright red, remove the ice or heat right away to prevent skin damage. The risk of damage is higher if you cannot feel pain, heat, or cold.  Activity    Do exercises as told by your provider.  Avoid activities that cause pain.  General instructions    A person writing in a journal.  Take over-the-counter and prescription medicines only as told by your provider.  Keep a journal of your symptoms. Write down:  How often you have hip pain.  The location of your pain.  What the pain feels like.  What makes the pain worse.  Sleep with a pillow between your legs on your most comfortable side.  Keep all follow-up visits. Your provider will monitor your pain and activity.  Contact a health care provider if:  You cannot put weight on your leg.  Your pain or swelling gets worse after a week.  It gets harder to walk.  You have a fever.  Get help right away if:  You fall.  You have a sudden increase in pain and swelling in your hip.  Your hip is red or swollen or very tender to touch.  This information is not intended to replace advice given to you by your health care provider. Make sure you discuss any questions you have with your health care provider.    Document Revised: 08/22/2023 Document Reviewed: 08/22/2023  Elsevier Patient Education © 2024 Elsevier Inc.  Elsevier logo  Terms and Conditions  Privacy Policy  Editorial Policy  All content on this site: Copyright © 2025 Elsevier, its licensors, and contributors. All rights are reserved, including those for text and data mining, AI training, and similar technologies. For all open access content, the Creative Commons licensing terms apply.  Cookies are used by this site. To decline or learn more, visit our Cookies page.

## 2025-02-05 NOTE — ED ADULT NURSE NOTE - NSFALLHARMRISKINTERV_ED_ALL_ED
Assistance OOB with selected safe patient handling equipment if applicable/Assistance with ambulation/Communicate risk of Fall with Harm to all staff, patient, and family/Monitor gait and stability/Provide visual cue: red socks, yellow wristband, yellow gown, etc/Reinforce activity limits and safety measures with patient and family/Bed in lowest position, wheels locked, appropriate side rails in place/Call bell, personal items and telephone in reach/Instruct patient to call for assistance before getting out of bed/chair/stretcher/Non-slip footwear applied when patient is off stretcher/[place] to call system/Physically safe environment - no spills, clutter or unnecessary equipment/Purposeful Proactive Rounding/Room/bathroom lighting operational, light cord in reach

## 2025-02-05 NOTE — ED STATDOCS - PROGRESS NOTE DETAILS
74-year-old male with a past medical history of BPH diverticulosis Graves' disease hemorrhoids hypothyroidism spinal stenosis and spinal cord injury presents to the ED complaining of right hip pain for 1 week.  Patient denies having any trauma or injury.  Patient reports today when getting out of his truck he had difficulty walking due to the pain in the right hip.  Pain does radiate into his right thigh.  No falls patient denies new back pain.  Patient denies any numbness or tingling in lower extremities.  Patient was given Toradol in the ED with improvement in pain CT imaging of hips and pelvis shows no acute fracture or dislocation there is evidence of arthritic changes.  Patient will be discharged home to continue NSAIDs and patient has appointment with Dr. Nguyễn from Ortho on Tuesday for further evaluation.  Lucy Anguiano PA-C On reeval patient reports improvement in pain status post Toradol in the ED.  Patient reports he was able to get up and stand while using the bathroom.  Patient will be given p.o. Tylenol for additional pain relief.  CT imaging of pelvis shows no obvious fracture or dislocation.  There is mild narrowing of hip spaces bilaterally with presence of mild productive changes.  On reexam patient with mild tenderness to palpation of lateral aspect of hip.  Full active range of motion of lower extremities bilaterally neurovascularly intact lower extremities bilaterally patient able to stand and bear weight on right leg.  Patient reports that he does have a walker at home he can use for weightbearing if needed.  Patient encouraged to continue ibuprofen every 6 hours with food patient informed he can add in p.o. Tylenol for additional pain relief.  Can apply ice and rest patient instructed to keep his follow-up appointment with Dr. Nguyễn on Tuesday.  Lucy Anguiano PA-C

## 2025-02-17 ENCOUNTER — INPATIENT (INPATIENT)
Facility: HOSPITAL | Age: 75
LOS: 7 days | Discharge: HOME | DRG: 871 | End: 2025-02-25
Attending: INTERNAL MEDICINE | Admitting: INTERNAL MEDICINE
Payer: MEDICARE

## 2025-02-17 VITALS — HEIGHT: 68 IN

## 2025-02-17 DIAGNOSIS — Z98.890 OTHER SPECIFIED POSTPROCEDURAL STATES: Chronic | ICD-10-CM

## 2025-02-17 LAB
ALBUMIN SERPL ELPH-MCNC: 3.6 G/DL — SIGNIFICANT CHANGE UP (ref 3.3–5)
ALP SERPL-CCNC: 64 U/L — SIGNIFICANT CHANGE UP (ref 40–120)
ALT FLD-CCNC: 30 U/L — SIGNIFICANT CHANGE UP (ref 12–78)
ANION GAP SERPL CALC-SCNC: 6 MMOL/L — SIGNIFICANT CHANGE UP (ref 5–17)
APTT BLD: 29.1 SEC — SIGNIFICANT CHANGE UP (ref 24.5–35.6)
AST SERPL-CCNC: 17 U/L — SIGNIFICANT CHANGE UP (ref 15–37)
BASOPHILS # BLD AUTO: 0.04 K/UL — SIGNIFICANT CHANGE UP (ref 0–0.2)
BASOPHILS # BLD MANUAL: 0 K/UL — SIGNIFICANT CHANGE UP (ref 0–0.2)
BASOPHILS NFR BLD AUTO: 0.3 % — SIGNIFICANT CHANGE UP (ref 0–2)
BASOPHILS NFR BLD MANUAL: 0 % — SIGNIFICANT CHANGE UP (ref 0–2)
BILIRUB SERPL-MCNC: 1.3 MG/DL — HIGH (ref 0.2–1.2)
BUN SERPL-MCNC: 22 MG/DL — SIGNIFICANT CHANGE UP (ref 7–23)
CALCIUM SERPL-MCNC: 9 MG/DL — SIGNIFICANT CHANGE UP (ref 8.5–10.1)
CHLORIDE SERPL-SCNC: 104 MMOL/L — SIGNIFICANT CHANGE UP (ref 96–108)
CO2 SERPL-SCNC: 25 MMOL/L — SIGNIFICANT CHANGE UP (ref 22–31)
CREAT SERPL-MCNC: 1.5 MG/DL — HIGH (ref 0.5–1.3)
EGFR: 49 ML/MIN/1.73M2 — LOW
EOSINOPHIL # BLD AUTO: 0.01 K/UL — SIGNIFICANT CHANGE UP (ref 0–0.5)
EOSINOPHIL # BLD MANUAL: 0 K/UL — SIGNIFICANT CHANGE UP (ref 0–0.5)
EOSINOPHIL NFR BLD AUTO: 0.1 % — SIGNIFICANT CHANGE UP (ref 0–6)
EOSINOPHIL NFR BLD MANUAL: 0 % — SIGNIFICANT CHANGE UP (ref 0–6)
FLUAV AG NPH QL: SIGNIFICANT CHANGE UP
FLUBV AG NPH QL: SIGNIFICANT CHANGE UP
GLUCOSE SERPL-MCNC: 162 MG/DL — HIGH (ref 70–99)
HCT VFR BLD CALC: 44.2 % — SIGNIFICANT CHANGE UP (ref 39–50)
HGB BLD-MCNC: 14.7 G/DL — SIGNIFICANT CHANGE UP (ref 13–17)
IMM GRANULOCYTES # BLD AUTO: 0.1 K/UL — HIGH (ref 0–0.07)
IMM GRANULOCYTES NFR BLD AUTO: 0.8 % — SIGNIFICANT CHANGE UP (ref 0–0.9)
INR BLD: 1.16 RATIO — SIGNIFICANT CHANGE UP (ref 0.85–1.16)
LACTATE SERPL-SCNC: 2.1 MMOL/L — HIGH (ref 0.7–2)
LYMPHOCYTES # BLD AUTO: 0.45 K/UL — LOW (ref 1–3.3)
LYMPHOCYTES # BLD MANUAL: 0.53 K/UL — LOW (ref 1–3.3)
LYMPHOCYTES NFR BLD AUTO: 3.4 % — LOW (ref 13–44)
LYMPHOCYTES NFR BLD MANUAL: 4 % — LOW (ref 13–44)
MANUAL NEUTROPHIL BANDS #: 1.06 K/UL — HIGH (ref 0–0.84)
MANUAL SMEAR VERIFICATION: SIGNIFICANT CHANGE UP
MCHC RBC-ENTMCNC: 28.4 PG — SIGNIFICANT CHANGE UP (ref 27–34)
MCHC RBC-ENTMCNC: 33.3 G/DL — SIGNIFICANT CHANGE UP (ref 32–36)
MCV RBC AUTO: 85.5 FL — SIGNIFICANT CHANGE UP (ref 80–100)
MONOCYTES # BLD AUTO: 0.31 K/UL — SIGNIFICANT CHANGE UP (ref 0–0.9)
MONOCYTES # BLD MANUAL: 0.26 K/UL — SIGNIFICANT CHANGE UP (ref 0–0.9)
MONOCYTES NFR BLD AUTO: 2.3 % — SIGNIFICANT CHANGE UP (ref 2–14)
MONOCYTES NFR BLD MANUAL: 2 % — SIGNIFICANT CHANGE UP (ref 2–14)
NEUTROPHILS # BLD AUTO: 12.32 K/UL — HIGH (ref 1.8–7.4)
NEUTROPHILS # BLD MANUAL: 11.38 K/UL — HIGH (ref 1.8–7.4)
NEUTROPHILS NFR BLD AUTO: 93.1 % — HIGH (ref 43–77)
NEUTROPHILS NFR BLD MANUAL: 86 % — HIGH (ref 43–77)
NEUTS BAND # BLD: 8 % — SIGNIFICANT CHANGE UP (ref 0–8)
NEUTS BAND NFR BLD: 8 % — SIGNIFICANT CHANGE UP (ref 0–8)
NRBC # BLD AUTO: 0 K/UL — SIGNIFICANT CHANGE UP (ref 0–0)
NRBC # FLD: 0 K/UL — SIGNIFICANT CHANGE UP (ref 0–0)
NRBC BLD AUTO-RTO: 0 /100 WBCS — SIGNIFICANT CHANGE UP (ref 0–0)
PLAT MORPH BLD: NORMAL — SIGNIFICANT CHANGE UP
PLATELET # BLD AUTO: 143 K/UL — LOW (ref 150–400)
PMV BLD: 10.2 FL — SIGNIFICANT CHANGE UP (ref 7–13)
POTASSIUM SERPL-MCNC: 4 MMOL/L — SIGNIFICANT CHANGE UP (ref 3.5–5.3)
POTASSIUM SERPL-SCNC: 4 MMOL/L — SIGNIFICANT CHANGE UP (ref 3.5–5.3)
PROT SERPL-MCNC: 7.1 GM/DL — SIGNIFICANT CHANGE UP (ref 6–8.3)
PROTHROM AB SERPL-ACNC: 13.3 SEC — SIGNIFICANT CHANGE UP (ref 9.9–13.4)
RBC # BLD: 5.17 M/UL — SIGNIFICANT CHANGE UP (ref 4.2–5.8)
RBC # FLD: 13.6 % — SIGNIFICANT CHANGE UP (ref 10.3–14.5)
RBC BLD AUTO: NORMAL — SIGNIFICANT CHANGE UP
RSV RNA NPH QL NAA+NON-PROBE: SIGNIFICANT CHANGE UP
SARS-COV-2 RNA SPEC QL NAA+PROBE: SIGNIFICANT CHANGE UP
SODIUM SERPL-SCNC: 135 MMOL/L — SIGNIFICANT CHANGE UP (ref 135–145)
TOXIC GRANULES BLD QL SMEAR: PRESENT
TROPONIN I, HIGH SENSITIVITY RESULT: 32.13 NG/L — SIGNIFICANT CHANGE UP
WBC # BLD: 13.23 K/UL — HIGH (ref 3.8–10.5)
WBC # FLD AUTO: 13.23 K/UL — HIGH (ref 3.8–10.5)
WBC MORPHOLOGY: ABNORMAL

## 2025-02-17 PROCEDURE — 93010 ELECTROCARDIOGRAM REPORT: CPT

## 2025-02-17 PROCEDURE — 71260 CT THORAX DX C+: CPT | Mod: 26

## 2025-02-17 PROCEDURE — 74177 CT ABD & PELVIS W/CONTRAST: CPT | Mod: 26

## 2025-02-17 PROCEDURE — 99291 CRITICAL CARE FIRST HOUR: CPT

## 2025-02-17 RX ORDER — HYDROMORPHONE/SOD CHLOR,ISO/PF 2 MG/10 ML
1 SYRINGE (ML) INJECTION ONCE
Refills: 0 | Status: DISCONTINUED | OUTPATIENT
Start: 2025-02-17 | End: 2025-02-17

## 2025-02-17 RX ORDER — VANCOMYCIN HCL IN 5 % DEXTROSE 1.5G/250ML
1500 PLASTIC BAG, INJECTION (ML) INTRAVENOUS ONCE
Refills: 0 | Status: COMPLETED | OUTPATIENT
Start: 2025-02-17 | End: 2025-02-17

## 2025-02-17 RX ORDER — VANCOMYCIN HCL IN 5 % DEXTROSE 1.5G/250ML
1500 PLASTIC BAG, INJECTION (ML) INTRAVENOUS EVERY 12 HOURS
Refills: 0 | Status: COMPLETED | OUTPATIENT
Start: 2025-02-18 | End: 2025-02-18

## 2025-02-17 RX ORDER — CEFEPIME 2 G/20ML
INJECTION, POWDER, FOR SOLUTION INTRAVENOUS
Refills: 0 | Status: DISCONTINUED | OUTPATIENT
Start: 2025-02-17 | End: 2025-02-17

## 2025-02-17 RX ORDER — CEFEPIME 2 G/20ML
2000 INJECTION, POWDER, FOR SOLUTION INTRAVENOUS ONCE
Refills: 0 | Status: COMPLETED | OUTPATIENT
Start: 2025-02-17 | End: 2025-02-17

## 2025-02-17 RX ORDER — ONDANSETRON HCL/PF 4 MG/2 ML
4 VIAL (ML) INJECTION ONCE
Refills: 0 | Status: COMPLETED | OUTPATIENT
Start: 2025-02-17 | End: 2025-02-17

## 2025-02-17 RX ORDER — CEFEPIME 2 G/20ML
2000 INJECTION, POWDER, FOR SOLUTION INTRAVENOUS EVERY 8 HOURS
Refills: 0 | Status: COMPLETED | OUTPATIENT
Start: 2025-02-18 | End: 2025-02-18

## 2025-02-17 RX ORDER — CEFEPIME 2 G/20ML
INJECTION, POWDER, FOR SOLUTION INTRAVENOUS
Refills: 0 | Status: COMPLETED | OUTPATIENT
Start: 2025-02-17 | End: 2025-02-18

## 2025-02-17 RX ORDER — ACETAMINOPHEN 500 MG/5ML
1000 LIQUID (ML) ORAL ONCE
Refills: 0 | Status: COMPLETED | OUTPATIENT
Start: 2025-02-17 | End: 2025-02-17

## 2025-02-17 RX ORDER — VANCOMYCIN HCL IN 5 % DEXTROSE 1.5G/250ML
PLASTIC BAG, INJECTION (ML) INTRAVENOUS
Refills: 0 | Status: COMPLETED | OUTPATIENT
Start: 2025-02-17 | End: 2025-02-18

## 2025-02-17 RX ADMIN — Medication 1 MILLIGRAM(S): at 22:33

## 2025-02-17 RX ADMIN — Medication 2100 MILLILITER(S): at 22:40

## 2025-02-17 RX ADMIN — Medication 250 MILLIGRAM(S): at 22:02

## 2025-02-17 RX ADMIN — Medication 4 MILLIGRAM(S): at 21:46

## 2025-02-17 RX ADMIN — Medication 4 MILLIGRAM(S): at 21:41

## 2025-02-17 RX ADMIN — Medication 1000 MILLIGRAM(S): at 22:00

## 2025-02-17 RX ADMIN — Medication 4 MILLIGRAM(S): at 22:11

## 2025-02-17 RX ADMIN — Medication 1 MILLIGRAM(S): at 23:03

## 2025-02-17 RX ADMIN — Medication 1000 MILLIGRAM(S): at 22:09

## 2025-02-17 RX ADMIN — Medication 2100 MILLILITER(S): at 21:40

## 2025-02-17 RX ADMIN — CEFEPIME 2000 MILLIGRAM(S): 2 INJECTION, POWDER, FOR SOLUTION INTRAVENOUS at 22:01

## 2025-02-17 RX ADMIN — Medication 400 MILLIGRAM(S): at 21:39

## 2025-02-17 NOTE — ED PROVIDER NOTE - CRITICAL CARE ATTENDING CONTRIBUTION TO CARE
direct patient care (not related to procedure), additional history taking, interpretation of diagnostic studies, documentation, consultation with other physicians, consult w/ pt's family directly relating to pts condition  B Valentino CUNHA

## 2025-02-17 NOTE — ED ADULT NURSE NOTE - NSFALLUNIVINTERV_ED_ALL_ED
Bed/Stretcher in lowest position, wheels locked, appropriate side rails in place/Call bell, personal items and telephone in reach/Instruct patient to call for assistance before getting out of bed/chair/stretcher/Non-slip footwear applied when patient is off stretcher/[place] to call system/Physically safe environment - no spills, clutter or unnecessary equipment/Purposeful proactive rounding/Room/bathroom lighting operational, light cord in reach

## 2025-02-17 NOTE — ED PROVIDER NOTE - PROGRESS NOTE DETAILS
case dw rad techs and teams chat to radiologist about mri of spine vs ct to r/o dissection vs epidural abscess. decision to get ct made as it can be done now. so to Dr. Paula ct, labs and disposition MERI Avelar DO

## 2025-02-17 NOTE — ED PROVIDER NOTE - PHYSICAL EXAMINATION
Gen:  Well appearing in NAD. afebrile.   Head:  NC/AT  HEENT: pupils perrl,no pharyngeal erythema, uvula midline  Cardiac: S1S2, RRR. rate of 133.   Abd: Soft, non tender  Resp: No distress, CTA   musculoskeletal:: no deformities, no swelling, strength +5/+5  Skin: warm and dry as visualized, no rashes  Neuro: RAMIREZ, aao x 4  Psych:alert, cooperative, appropriate mood and affect for situation Gen:  Well appearing in NAD. afebrile.   Head:  NC/AT  HEENT: pupils perrl,no pharyngeal erythema, uvula midline  Cardiac: S1S2, RRR. rate of 133.   Abd: Soft, non tender  Resp: No distress, CTA   musculoskeletal:: no deformities, no swelling, strength +5/+5. + T3-T4 tenderness.   Skin: warm and dry as visualized, no rashes  Neuro: RAMIREZ, aao x 4. sensation intact throughout arms, upper lower hands and fingers, and above nipple line. sensation intact throughout both legs. no saddle anesthesia. no bladder or bowel incontinence.   Psych:alert, cooperative, appropriate mood and affect for situation

## 2025-02-17 NOTE — ED ADULT NURSE NOTE - OBJECTIVE STATEMENT
74 year old male awake alert and oriented x4 presents to ED c/o shoulder pain x 3 days. Pt c/o numbness on bl legs and legs. + fever since yesterday. pt took motrin and tylenol with mild relief. denies cp,sob,nausea, vomiting or urinary sx. hx spine surgeries.

## 2025-02-17 NOTE — ED ADULT TRIAGE NOTE - CHIEF COMPLAINT QUOTE
Patient ambulatory to the ER c/o muscle pains since Saturday, fever starting Sunday, and b/l arm numbness starting at 6pm. Denies chest pain, SOB, numbness in legs or face. Patient has impair gate at baseline from a spinal cord injury. Patient states that the muscle pain is all over but primarily between his shoulder blades

## 2025-02-17 NOTE — ED PROVIDER NOTE - CLINICAL SUMMARY MEDICAL DECISION MAKING FREE TEXT BOX
if patient febrile could be spinal abscess vs epidural abscess. doubt dissection. will do viral swabs, get CT. Dissection protocol with recons of cervical thoracic and lumbar spine. if patient febrile could be spinal abscess vs epidural abscess. doubt dissection. will do viral swabs, get CT. Dissection protocol with recons of cervical thoracic and lumbar spine. Morphine for pain, UA to rule out UTI. if patient febrile could be spinal abscess vs epidural abscess. doubt dissection. will do viral swabs, get CT. Dissection protocol with recons of cervical thoracic and lumbar spine. Morphine for pain, UA to rule out UTI.    0101: Nisa DANIELSON: s/o received from Dr. Avelar, pending CT read. CT read noted. will admit for MRI spine for fever and back pain. s/o given to Hospitalist for admission, Dr. Smart.

## 2025-02-17 NOTE — ED PROVIDER NOTE - OBJECTIVE STATEMENT
74 year old male with PMHx of  BPH, diverticulosis, graves disease, hemorrhoids, HLD, hypothyroidism, OA, spinal stenosis, spinal cord injury 2004, spinal surgeries 74 year old male with PMHx of  BPH, diverticulosis, graves disease, hemorrhoids, HLD, hypothyroidism, OA, spinal stenosis, spinal cord injury 2004, spinal surgeries , 3x cervical spine and 1x lumbar presents to ED c/o shoulder pain onset on Saturday, followed by bilateral leg pain. He states since yesterday the pain is radiating down the bilateral arms, worse In the left than right. He notes that his  in his right hand is decreased. no saddle anesthesia. no bladder or bowel incontinence.

## 2025-02-18 ENCOUNTER — RESULT REVIEW (OUTPATIENT)
Age: 75
End: 2025-02-18

## 2025-02-18 DIAGNOSIS — A41.9 SEPSIS, UNSPECIFIED ORGANISM: ICD-10-CM

## 2025-02-18 DIAGNOSIS — Z29.9 ENCOUNTER FOR PROPHYLACTIC MEASURES, UNSPECIFIED: ICD-10-CM

## 2025-02-18 DIAGNOSIS — E03.9 HYPOTHYROIDISM, UNSPECIFIED: ICD-10-CM

## 2025-02-18 DIAGNOSIS — R29.898 OTHER SYMPTOMS AND SIGNS INVOLVING THE MUSCULOSKELETAL SYSTEM: ICD-10-CM

## 2025-02-18 DIAGNOSIS — R50.9 FEVER, UNSPECIFIED: ICD-10-CM

## 2025-02-18 LAB
-  STREPTOCOCCUS SP. (NOT GRP A, B OR S PNEUMONIAE): SIGNIFICANT CHANGE UP
ADD ON TEST-SPECIMEN IN LAB: SIGNIFICANT CHANGE UP
ADD ON TEST-SPECIMEN IN LAB: SIGNIFICANT CHANGE UP
ALBUMIN SERPL ELPH-MCNC: 3.2 G/DL — LOW (ref 3.3–5)
ALP SERPL-CCNC: 61 U/L — SIGNIFICANT CHANGE UP (ref 40–120)
ALT FLD-CCNC: 29 U/L — SIGNIFICANT CHANGE UP (ref 12–78)
ANION GAP SERPL CALC-SCNC: 5 MMOL/L — SIGNIFICANT CHANGE UP (ref 5–17)
APPEARANCE UR: CLEAR — SIGNIFICANT CHANGE UP
AST SERPL-CCNC: 25 U/L — SIGNIFICANT CHANGE UP (ref 15–37)
BACTERIA # UR AUTO: NEGATIVE /HPF — SIGNIFICANT CHANGE UP
BILIRUB SERPL-MCNC: 1.7 MG/DL — HIGH (ref 0.2–1.2)
BILIRUB UR-MCNC: NEGATIVE — SIGNIFICANT CHANGE UP
BUN SERPL-MCNC: 27 MG/DL — HIGH (ref 7–23)
CALCIUM SERPL-MCNC: 8.7 MG/DL — SIGNIFICANT CHANGE UP (ref 8.5–10.1)
CAST: 0 /LPF — SIGNIFICANT CHANGE UP (ref 0–4)
CHLORIDE SERPL-SCNC: 103 MMOL/L — SIGNIFICANT CHANGE UP (ref 96–108)
CK SERPL-CCNC: 295 U/L — SIGNIFICANT CHANGE UP (ref 26–308)
CO2 SERPL-SCNC: 23 MMOL/L — SIGNIFICANT CHANGE UP (ref 22–31)
COLOR SPEC: SIGNIFICANT CHANGE UP
CREAT SERPL-MCNC: 1.57 MG/DL — HIGH (ref 0.5–1.3)
CRP SERPL-MCNC: 169 MG/ML — HIGH (ref 0–5)
D DIMER BLD IA.RAPID-MCNC: 6541 NG/ML DDU — HIGH
D DIMER BLD IA.RAPID-MCNC: 6758 NG/ML DDU — HIGH
DIFF PNL FLD: NEGATIVE — SIGNIFICANT CHANGE UP
EGFR: 47 ML/MIN/1.73M2 — LOW
ERYTHROCYTE [SEDIMENTATION RATE] IN BLOOD: 14 MM/HR — SIGNIFICANT CHANGE UP (ref 0–20)
GLUCOSE SERPL-MCNC: 119 MG/DL — HIGH (ref 70–99)
GLUCOSE UR QL: NEGATIVE MG/DL — SIGNIFICANT CHANGE UP
GRAM STN FLD: ABNORMAL
HCT VFR BLD CALC: 40.5 % — SIGNIFICANT CHANGE UP (ref 39–50)
HGB BLD-MCNC: 13.1 G/DL — SIGNIFICANT CHANGE UP (ref 13–17)
KETONES UR-MCNC: ABNORMAL MG/DL
LACTATE SERPL-SCNC: 1.5 MMOL/L — SIGNIFICANT CHANGE UP (ref 0.7–2)
LEUKOCYTE ESTERASE UR-ACNC: NEGATIVE — SIGNIFICANT CHANGE UP
MAGNESIUM SERPL-MCNC: 1.5 MG/DL — LOW (ref 1.6–2.6)
MCHC RBC-ENTMCNC: 28.4 PG — SIGNIFICANT CHANGE UP (ref 27–34)
MCHC RBC-ENTMCNC: 32.3 G/DL — SIGNIFICANT CHANGE UP (ref 32–36)
MCV RBC AUTO: 87.9 FL — SIGNIFICANT CHANGE UP (ref 80–100)
METHOD TYPE: SIGNIFICANT CHANGE UP
NITRITE UR-MCNC: NEGATIVE — SIGNIFICANT CHANGE UP
NRBC # BLD AUTO: 0 K/UL — SIGNIFICANT CHANGE UP (ref 0–0)
NRBC # FLD: 0 K/UL — SIGNIFICANT CHANGE UP (ref 0–0)
NRBC BLD AUTO-RTO: 0 /100 WBCS — SIGNIFICANT CHANGE UP (ref 0–0)
PH UR: 5.5 — SIGNIFICANT CHANGE UP (ref 5–8)
PHOSPHATE SERPL-MCNC: 2.8 MG/DL — SIGNIFICANT CHANGE UP (ref 2.5–4.5)
PLATELET # BLD AUTO: 129 K/UL — LOW (ref 150–400)
PMV BLD: 10.3 FL — SIGNIFICANT CHANGE UP (ref 7–13)
POTASSIUM SERPL-MCNC: 4.4 MMOL/L — SIGNIFICANT CHANGE UP (ref 3.5–5.3)
POTASSIUM SERPL-SCNC: 4.4 MMOL/L — SIGNIFICANT CHANGE UP (ref 3.5–5.3)
PROT SERPL-MCNC: 6.5 GM/DL — SIGNIFICANT CHANGE UP (ref 6–8.3)
PROT UR-MCNC: 30 MG/DL
RAPID RVP RESULT: SIGNIFICANT CHANGE UP
RBC # BLD: 4.61 M/UL — SIGNIFICANT CHANGE UP (ref 4.2–5.8)
RBC # FLD: 14 % — SIGNIFICANT CHANGE UP (ref 10.3–14.5)
RBC CASTS # UR COMP ASSIST: 1 /HPF — SIGNIFICANT CHANGE UP (ref 0–4)
SARS-COV-2 RNA SPEC QL NAA+PROBE: SIGNIFICANT CHANGE UP
SODIUM SERPL-SCNC: 131 MMOL/L — LOW (ref 135–145)
SP GR SPEC: >1.03 — HIGH (ref 1–1.03)
SPECIMEN SOURCE: SIGNIFICANT CHANGE UP
SPECIMEN SOURCE: SIGNIFICANT CHANGE UP
SQUAMOUS # UR AUTO: 1 /HPF — SIGNIFICANT CHANGE UP (ref 0–5)
T4 FREE SERPL-MCNC: 1.39 NG/DL — SIGNIFICANT CHANGE UP (ref 0.93–1.7)
TROPONIN I, HIGH SENSITIVITY RESULT: 70.14 NG/L — SIGNIFICANT CHANGE UP
TROPONIN I, HIGH SENSITIVITY RESULT: 79.44 NG/L — HIGH
TSH SERPL-MCNC: 0.31 UU/ML — LOW (ref 0.34–4.82)
TSH SERPL-MCNC: 0.41 UU/ML — SIGNIFICANT CHANGE UP (ref 0.34–4.82)
UROBILINOGEN FLD QL: 0.2 MG/DL — SIGNIFICANT CHANGE UP (ref 0.2–1)
WBC # BLD: 20.35 K/UL — HIGH (ref 3.8–10.5)
WBC # FLD AUTO: 20.35 K/UL — HIGH (ref 3.8–10.5)
WBC UR QL: 9 /HPF — HIGH (ref 0–5)

## 2025-02-18 PROCEDURE — 85025 COMPLETE CBC W/AUTO DIFF WBC: CPT

## 2025-02-18 PROCEDURE — 93306 TTE W/DOPPLER COMPLETE: CPT | Mod: 26

## 2025-02-18 PROCEDURE — 80048 BASIC METABOLIC PNL TOTAL CA: CPT

## 2025-02-18 PROCEDURE — 83735 ASSAY OF MAGNESIUM: CPT

## 2025-02-18 PROCEDURE — 84484 ASSAY OF TROPONIN QUANT: CPT

## 2025-02-18 PROCEDURE — 74018 RADEX ABDOMEN 1 VIEW: CPT

## 2025-02-18 PROCEDURE — 97163 PT EVAL HIGH COMPLEX 45 MIN: CPT | Mod: GP

## 2025-02-18 PROCEDURE — 82550 ASSAY OF CK (CPK): CPT

## 2025-02-18 PROCEDURE — 87640 STAPH A DNA AMP PROBE: CPT

## 2025-02-18 PROCEDURE — 85652 RBC SED RATE AUTOMATED: CPT

## 2025-02-18 PROCEDURE — 36569 INSJ PICC 5 YR+ W/O IMAGING: CPT

## 2025-02-18 PROCEDURE — 97530 THERAPEUTIC ACTIVITIES: CPT | Mod: GP

## 2025-02-18 PROCEDURE — 97116 GAIT TRAINING THERAPY: CPT | Mod: GP

## 2025-02-18 PROCEDURE — 72131 CT LUMBAR SPINE W/O DYE: CPT | Mod: MC

## 2025-02-18 PROCEDURE — 93970 EXTREMITY STUDY: CPT

## 2025-02-18 PROCEDURE — 72158 MRI LUMBAR SPINE W/O & W/DYE: CPT | Mod: MC

## 2025-02-18 PROCEDURE — 72125 CT NECK SPINE W/O DYE: CPT | Mod: MC

## 2025-02-18 PROCEDURE — 72158 MRI LUMBAR SPINE W/O & W/DYE: CPT | Mod: 26

## 2025-02-18 PROCEDURE — 87040 BLOOD CULTURE FOR BACTERIA: CPT

## 2025-02-18 PROCEDURE — 80053 COMPREHEN METABOLIC PANEL: CPT

## 2025-02-18 PROCEDURE — 84100 ASSAY OF PHOSPHORUS: CPT

## 2025-02-18 PROCEDURE — 99222 1ST HOSP IP/OBS MODERATE 55: CPT

## 2025-02-18 PROCEDURE — 84443 ASSAY THYROID STIM HORMONE: CPT

## 2025-02-18 PROCEDURE — C1751: CPT

## 2025-02-18 PROCEDURE — 72156 MRI NECK SPINE W/O & W/DYE: CPT | Mod: 26

## 2025-02-18 PROCEDURE — 99221 1ST HOSP IP/OBS SF/LOW 40: CPT | Mod: FS

## 2025-02-18 PROCEDURE — 84439 ASSAY OF FREE THYROXINE: CPT

## 2025-02-18 PROCEDURE — 81001 URINALYSIS AUTO W/SCOPE: CPT

## 2025-02-18 PROCEDURE — 93970 EXTREMITY STUDY: CPT | Mod: 26

## 2025-02-18 PROCEDURE — 72157 MRI CHEST SPINE W/O & W/DYE: CPT | Mod: MC

## 2025-02-18 PROCEDURE — 71045 X-RAY EXAM CHEST 1 VIEW: CPT

## 2025-02-18 PROCEDURE — 36415 COLL VENOUS BLD VENIPUNCTURE: CPT

## 2025-02-18 PROCEDURE — 85027 COMPLETE CBC AUTOMATED: CPT

## 2025-02-18 PROCEDURE — 74177 CT ABD & PELVIS W/CONTRAST: CPT | Mod: MC

## 2025-02-18 PROCEDURE — 72157 MRI CHEST SPINE W/O & W/DYE: CPT | Mod: 26

## 2025-02-18 PROCEDURE — 71045 X-RAY EXAM CHEST 1 VIEW: CPT | Mod: 26

## 2025-02-18 PROCEDURE — 72125 CT NECK SPINE W/O DYE: CPT | Mod: 26

## 2025-02-18 PROCEDURE — 86140 C-REACTIVE PROTEIN: CPT

## 2025-02-18 PROCEDURE — A9579: CPT

## 2025-02-18 PROCEDURE — 73221 MRI JOINT UPR EXTREM W/O DYE: CPT | Mod: MC,RT

## 2025-02-18 PROCEDURE — 72131 CT LUMBAR SPINE W/O DYE: CPT | Mod: 26

## 2025-02-18 PROCEDURE — 85379 FIBRIN DEGRADATION QUANT: CPT

## 2025-02-18 PROCEDURE — 70553 MRI BRAIN STEM W/O & W/DYE: CPT | Mod: MC

## 2025-02-18 PROCEDURE — 72156 MRI NECK SPINE W/O & W/DYE: CPT | Mod: MC

## 2025-02-18 PROCEDURE — 87641 MR-STAPH DNA AMP PROBE: CPT

## 2025-02-18 RX ORDER — CEFTRIAXONE 500 MG/1
2000 INJECTION, POWDER, FOR SOLUTION INTRAMUSCULAR; INTRAVENOUS EVERY 24 HOURS
Refills: 0 | Status: DISCONTINUED | OUTPATIENT
Start: 2025-02-18 | End: 2025-02-18

## 2025-02-18 RX ORDER — HYDROMORPHONE/SOD CHLOR,ISO/PF 2 MG/10 ML
1 SYRINGE (ML) INJECTION ONCE
Refills: 0 | Status: DISCONTINUED | OUTPATIENT
Start: 2025-02-18 | End: 2025-02-18

## 2025-02-18 RX ORDER — ACETAMINOPHEN 500 MG/5ML
1000 LIQUID (ML) ORAL ONCE
Refills: 0 | Status: COMPLETED | OUTPATIENT
Start: 2025-02-18 | End: 2025-02-18

## 2025-02-18 RX ORDER — ROSUVASTATIN CALCIUM 20 MG/1
5 TABLET, FILM COATED ORAL AT BEDTIME
Refills: 0 | Status: DISCONTINUED | OUTPATIENT
Start: 2025-02-18 | End: 2025-02-25

## 2025-02-18 RX ORDER — SODIUM CHLORIDE 9 G/1000ML
1000 INJECTION, SOLUTION INTRAVENOUS
Refills: 0 | Status: DISCONTINUED | OUTPATIENT
Start: 2025-02-18 | End: 2025-02-20

## 2025-02-18 RX ORDER — ASPIRIN 325 MG
81 TABLET ORAL DAILY
Refills: 0 | Status: DISCONTINUED | OUTPATIENT
Start: 2025-02-18 | End: 2025-02-20

## 2025-02-18 RX ORDER — LEVOTHYROXINE SODIUM 300 MCG
150 TABLET ORAL DAILY
Refills: 0 | Status: DISCONTINUED | OUTPATIENT
Start: 2025-02-18 | End: 2025-02-25

## 2025-02-18 RX ORDER — ALBUTEROL SULFATE 2.5 MG/3ML
2.5 VIAL, NEBULIZER (ML) INHALATION ONCE
Refills: 0 | Status: DISCONTINUED | OUTPATIENT
Start: 2025-02-18 | End: 2025-02-19

## 2025-02-18 RX ORDER — HYDROMORPHONE/SOD CHLOR,ISO/PF 2 MG/10 ML
1 SYRINGE (ML) INJECTION EVERY 6 HOURS
Refills: 0 | Status: DISCONTINUED | OUTPATIENT
Start: 2025-02-18 | End: 2025-02-21

## 2025-02-18 RX ORDER — MAGNESIUM, ALUMINUM HYDROXIDE 200-200 MG
30 TABLET,CHEWABLE ORAL EVERY 4 HOURS
Refills: 0 | Status: DISCONTINUED | OUTPATIENT
Start: 2025-02-18 | End: 2025-02-25

## 2025-02-18 RX ORDER — METRONIDAZOLE 250 MG
500 TABLET ORAL EVERY 12 HOURS
Refills: 0 | Status: DISCONTINUED | OUTPATIENT
Start: 2025-02-18 | End: 2025-02-18

## 2025-02-18 RX ORDER — MAGNESIUM SULFATE 500 MG/ML
2 SYRINGE (ML) INJECTION ONCE
Refills: 0 | Status: COMPLETED | OUTPATIENT
Start: 2025-02-18 | End: 2025-02-18

## 2025-02-18 RX ORDER — MELATONIN 5 MG
3 TABLET ORAL AT BEDTIME
Refills: 0 | Status: DISCONTINUED | OUTPATIENT
Start: 2025-02-18 | End: 2025-02-25

## 2025-02-18 RX ORDER — CYST/ALA/Q10/PHOS.SER/DHA/BROC 100-20-50
1 POWDER (GRAM) ORAL DAILY
Refills: 0 | Status: DISCONTINUED | OUTPATIENT
Start: 2025-02-18 | End: 2025-02-25

## 2025-02-18 RX ORDER — CEFEPIME 2 G/20ML
2000 INJECTION, POWDER, FOR SOLUTION INTRAVENOUS EVERY 12 HOURS
Refills: 0 | Status: DISCONTINUED | OUTPATIENT
Start: 2025-02-18 | End: 2025-02-18

## 2025-02-18 RX ORDER — METRONIDAZOLE 250 MG
TABLET ORAL
Refills: 0 | Status: DISCONTINUED | OUTPATIENT
Start: 2025-02-18 | End: 2025-02-18

## 2025-02-18 RX ORDER — TIZANIDINE 4 MG/1
2 TABLET ORAL
Refills: 0 | Status: DISCONTINUED | OUTPATIENT
Start: 2025-02-18 | End: 2025-02-25

## 2025-02-18 RX ORDER — TAMSULOSIN HYDROCHLORIDE 0.4 MG/1
0.4 CAPSULE ORAL AT BEDTIME
Refills: 0 | Status: DISCONTINUED | OUTPATIENT
Start: 2025-02-18 | End: 2025-02-19

## 2025-02-18 RX ORDER — METRONIDAZOLE 250 MG
500 TABLET ORAL ONCE
Refills: 0 | Status: COMPLETED | OUTPATIENT
Start: 2025-02-18 | End: 2025-02-18

## 2025-02-18 RX ORDER — CEFTRIAXONE 500 MG/1
2000 INJECTION, POWDER, FOR SOLUTION INTRAMUSCULAR; INTRAVENOUS EVERY 24 HOURS
Refills: 0 | Status: COMPLETED | OUTPATIENT
Start: 2025-02-18 | End: 2025-02-25

## 2025-02-18 RX ORDER — HEPARIN SODIUM 1000 [USP'U]/ML
5000 INJECTION INTRAVENOUS; SUBCUTANEOUS EVERY 8 HOURS
Refills: 0 | Status: DISCONTINUED | OUTPATIENT
Start: 2025-02-18 | End: 2025-02-23

## 2025-02-18 RX ORDER — ACETAMINOPHEN 500 MG/5ML
650 LIQUID (ML) ORAL EVERY 6 HOURS
Refills: 0 | Status: DISCONTINUED | OUTPATIENT
Start: 2025-02-18 | End: 2025-02-25

## 2025-02-18 RX ORDER — VANCOMYCIN HCL IN 5 % DEXTROSE 1.5G/250ML
1500 PLASTIC BAG, INJECTION (ML) INTRAVENOUS EVERY 24 HOURS
Refills: 0 | Status: DISCONTINUED | OUTPATIENT
Start: 2025-02-18 | End: 2025-02-18

## 2025-02-18 RX ORDER — ONDANSETRON HCL/PF 4 MG/2 ML
4 VIAL (ML) INJECTION EVERY 8 HOURS
Refills: 0 | Status: DISCONTINUED | OUTPATIENT
Start: 2025-02-18 | End: 2025-02-25

## 2025-02-18 RX ADMIN — Medication 650 MILLIGRAM(S): at 23:14

## 2025-02-18 RX ADMIN — Medication 1 TABLET(S): at 09:52

## 2025-02-18 RX ADMIN — HEPARIN SODIUM 5000 UNIT(S): 1000 INJECTION INTRAVENOUS; SUBCUTANEOUS at 14:50

## 2025-02-18 RX ADMIN — Medication 1 MILLIGRAM(S): at 10:20

## 2025-02-18 RX ADMIN — Medication 250 MILLIGRAM(S): at 06:53

## 2025-02-18 RX ADMIN — HEPARIN SODIUM 5000 UNIT(S): 1000 INJECTION INTRAVENOUS; SUBCUTANEOUS at 23:14

## 2025-02-18 RX ADMIN — Medication 1 MILLIGRAM(S): at 13:01

## 2025-02-18 RX ADMIN — Medication 400 MILLIGRAM(S): at 09:52

## 2025-02-18 RX ADMIN — Medication 1000 MILLILITER(S): at 14:50

## 2025-02-18 RX ADMIN — CEFTRIAXONE 2000 MILLIGRAM(S): 500 INJECTION, POWDER, FOR SOLUTION INTRAMUSCULAR; INTRAVENOUS at 14:50

## 2025-02-18 RX ADMIN — HEPARIN SODIUM 5000 UNIT(S): 1000 INJECTION INTRAVENOUS; SUBCUTANEOUS at 06:54

## 2025-02-18 RX ADMIN — Medication 150 MICROGRAM(S): at 09:52

## 2025-02-18 RX ADMIN — Medication 1 MILLIGRAM(S): at 15:01

## 2025-02-18 RX ADMIN — Medication 650 MILLIGRAM(S): at 23:45

## 2025-02-18 RX ADMIN — Medication 1 MILLIGRAM(S): at 09:52

## 2025-02-18 RX ADMIN — Medication 25 GRAM(S): at 15:18

## 2025-02-18 RX ADMIN — Medication 1500 MILLIGRAM(S): at 00:02

## 2025-02-18 RX ADMIN — ROSUVASTATIN CALCIUM 5 MILLIGRAM(S): 20 TABLET, FILM COATED ORAL at 23:13

## 2025-02-18 RX ADMIN — TAMSULOSIN HYDROCHLORIDE 0.4 MILLIGRAM(S): 0.4 CAPSULE ORAL at 23:14

## 2025-02-18 RX ADMIN — Medication 1 MILLIGRAM(S): at 01:10

## 2025-02-18 RX ADMIN — Medication 100 MILLIGRAM(S): at 03:28

## 2025-02-18 RX ADMIN — CEFEPIME 2000 MILLIGRAM(S): 2 INJECTION, POWDER, FOR SOLUTION INTRAVENOUS at 06:54

## 2025-02-18 RX ADMIN — SODIUM CHLORIDE 100 MILLILITER(S): 9 INJECTION, SOLUTION INTRAVENOUS at 05:57

## 2025-02-18 RX ADMIN — Medication 1 MILLIGRAM(S): at 04:39

## 2025-02-18 RX ADMIN — Medication 1000 MILLIGRAM(S): at 10:19

## 2025-02-18 RX ADMIN — Medication 1 MILLIGRAM(S): at 18:03

## 2025-02-18 RX ADMIN — Medication 81 MILLIGRAM(S): at 09:52

## 2025-02-18 NOTE — CONSULT NOTE ADULT - SUBJECTIVE AND OBJECTIVE BOX
ICU Progress Note    HPI:    S:    Pt seen and examined  HD #  74y  Male  Pt here for       Allergies    No Known Allergies    Intolerances        MEDICATIONS  (STANDING):  aspirin enteric coated 81 milliGRAM(s) Oral daily  cefTRIAXone Injectable. 2000 milliGRAM(s) IV Push every 24 hours  heparin   Injectable 5000 Unit(s) SubCutaneous every 8 hours  lactated ringers. 1000 milliLiter(s) (100 mL/Hr) IV Continuous <Continuous>  levothyroxine 150 MICROGram(s) Oral daily  multivitamin/minerals 1 Tablet(s) Oral daily  rosuvastatin 5 milliGRAM(s) Oral at bedtime  tamsulosin 0.4 milliGRAM(s) Oral at bedtime    MEDICATIONS  (PRN):  acetaminophen     Tablet .. 650 milliGRAM(s) Oral every 6 hours PRN Temp greater or equal to 38C (100.4F), Mild Pain (1 - 3)  aluminum hydroxide/magnesium hydroxide/simethicone Suspension 30 milliLiter(s) Oral every 4 hours PRN Dyspepsia  HYDROmorphone  Injectable 1 milliGRAM(s) IV Push every 6 hours PRN Severe Pain (7 - 10)  melatonin 3 milliGRAM(s) Oral at bedtime PRN Insomnia  ondansetron Injectable 4 milliGRAM(s) IV Push every 8 hours PRN Nausea and/or Vomiting  tiZANidine 2 milliGRAM(s) Oral four times a day PRN Muscle Spasm      Drug Dosing Weight  Height (cm): 172.7 (17 Feb 2025 20:26)  Weight (kg): 105.7 (17 Feb 2025 20:32)  BMI (kg/m2): 35.4 (17 Feb 2025 20:32)  BSA (m2): 2.18 (17 Feb 2025 20:32)    PAST MEDICAL & SURGICAL HISTORY:  Hyperlipidemia, unspecified hyperlipidemia type      Diverticulosis of large intestine without hemorrhage      History of colon polyps      Hemorrhoids, unspecified hemorrhoid type      Benign prostatic hyperplasia without lower urinary tract symptoms, unspecified morphology      Osteoarthritis of knee, unilateral  left      Spinal cord injury at C5-C7 level without injury of spinal bone, subsequent encounter      Spastic      Weakness  to right side      Myelopathy      Spinal stenosis, unspecified spinal region      Hypothyroidism, unspecified type      Graves disease      Alcoholic  recovering alcoholic x 40years      History of cervical discectomy      S/P laminectomy  Cervical      H/O lumbar discectomy  with laminectomy      H/O arthroscopy of knee  Left x 2. Unsure of years      H/O colonoscopy with polypectomy  2014          FAMILY HISTORY:  Family history of colon cancer in mother (Mother)    Family history of liver disease (Father)  father    Family history of cancer (Sibling)  SIster - bile duct        UNLESS OTHERWISE NOTED IN HPI above:    Constitutional:  No Weight Change, No Fever, No Chills, No Night Sweats, No Fatigue, No Malaise  ENT/Mouth:  No Hearing Changes, No Ear Pain, No Nasal Congestion, No  Sinus Pain, No Hoarseness, No sore throat, No Rhinorrhea, No Swallowing  Difficulty  Eyes:  No Eye Pain, No Swelling, No Redness, No Foreign Body, No Discharge, No Vision Changes  Cardiovascular:  No Chest Pain, No SOB, No PND, No Dyspnea on Exertion,  No Orthopnea, No Claudication, No Edema, No Palpitations  Respiratory:  No Cough, No Sputum, No Wheezing, No Smoke Exposure, No Dyspnea  Gastrointestinal:  No Nausea, No Vomiting, No Diarrhea, No  Constipation, No Pain, No Heartburn, No Anorexia, No Dysphagia, No  Hematochezia, No Melena, No Flatulence, No Jaundice  Genitourinary:  No Dysmenorrhea, No DUB, No Dyspareunia, No Dysuria, No  Urinary Frequency, No Hematuria, No Urinary Incontinence, No Urgency,  No Flank Pain, No Urinary Flow Changes, No Hesitancy  Musculoskeletal:  No Arthralgias, No Myalgias, No Joint Swelling, No  Joint Stiffness, No Back Pain, No Neck Pain, No Injury History  Skin:  No Skin Lesions, No Pruritis, No Hair Changes, No Breast/Skin Changes, No Nipple Discharge  Neuro:  No Weakness, No Numbness, No Paresthesias, No Loss of  Consciousness, No Syncope, No Dizziness, No Headache, No Coordination  Changes, No Recent Falls  Psych:  No Anxiety/Panic, No Depression, No Insomnia, No Personality  Changes, No Delusions, No Rumination, No SI/HI/AH/VH, No Social Issues,  No Memory Changes, No Violence/Abuse Hx., No Eating Concerns  Heme/Lymph:  No Bruising, No Bleeding, No Transfusions History, No Lymphadenopathy  Endocrine:  No Polyuria, No Polydipsia, No Temperature Intolerance    O:    ICU Vital Signs Last 24 Hrs  T(C): 38.1 (18 Feb 2025 17:46), Max: 40.6 (17 Feb 2025 20:50)  T(F): 100.6 (18 Feb 2025 17:46), Max: 105 (17 Feb 2025 20:50)  HR: 102 (18 Feb 2025 17:46) (102 - 140)  BP: 109/82 (18 Feb 2025 17:46) (98/69 - 175/75)  BP(mean): 91 (18 Feb 2025 17:46) (70 - 106)  ABP: --  ABP(mean): --  RR: 21 (18 Feb 2025 17:46) (16 - 21)  SpO2: 96% (18 Feb 2025 17:46) (92% - 96%)    O2 Parameters below as of 18 Feb 2025 17:46  Patient On (Oxygen Delivery Method): room air                I&O's Detail          PE:    Adult lying in bed  No JVD trachea midline  Normocephalic, atraumatic  S1S2+  CTA B/L  Abd soft NTND  No leg swelling/edema noted  Awake and alert  Skin pink, warm    LABS:    CBC Full  -  ( 18 Feb 2025 07:10 )  WBC Count : 20.35 K/uL  RBC Count : 4.61 M/uL  Hemoglobin : 13.1 g/dL  Hematocrit : 40.5 %  Platelet Count - Automated : 129 K/uL  Mean Cell Volume : 87.9 fl  Mean Cell Hemoglobin : 28.4 pg  Mean Cell Hemoglobin Concentration : 32.3 g/dL  Auto Neutrophil # : x  Auto Lymphocyte # : x  Auto Monocyte # : x  Auto Eosinophil # : x  Auto Basophil # : x  Auto Neutrophil % : x  Auto Lymphocyte % : x  Auto Monocyte % : x  Auto Eosinophil % : x  Auto Basophil % : x    02-18    131[L]  |  103  |  27[H]  ----------------------------<  119[H]  4.4   |  23  |  1.57[H]    Ca    8.7      18 Feb 2025 07:10  Phos  2.8     02-18  Mg     1.5     02-18    TPro  6.5  /  Alb  3.2[L]  /  TBili  1.7[H]  /  DBili  x   /  AST  25  /  ALT  29  /  AlkPhos  61  02-18    PT/INR - ( 17 Feb 2025 21:11 )   PT: 13.3 sec;   INR: 1.16 ratio         PTT - ( 17 Feb 2025 21:11 )  PTT:29.1 sec  Urinalysis Basic - ( 18 Feb 2025 07:10 )    Color: x / Appearance: x / SG: x / pH: x  Gluc: 119 mg/dL / Ketone: x  / Bili: x / Urobili: x   Blood: x / Protein: x / Nitrite: x   Leuk Esterase: x / RBC: x / WBC x   Sq Epi: x / Non Sq Epi: x / Bacteria: x      CAPILLARY BLOOD GLUCOSE            LIVER FUNCTIONS - ( 18 Feb 2025 07:10 )  Alb: 3.2 g/dL / Pro: 6.5 gm/dL / ALK PHOS: 61 U/L / ALT: 29 U/L / AST: 25 U/L / GGT: x                  ICU Progress Note    HPI:    S:    Pt seen and examined  74-year-old male with a past medical history of benign prostatic hyperplasia, diverticulosis, Graves' disease, hemorrhoids, hyperlipidemia, hypothyroidism, osteoarthritis, spinal stenosis, spinal cord injury (2004), and multiple spinal surgeries (three cervical and one lumbar). He presents to the emergency department with complaints of shoulder pain radiating down the arms associated with fever and LE weakness. Patient reports symptoms started as mild on saturday, he had mild shoulder pain, but it progressively got worse. Currently reports pain 8/10, sharp, burning, starts at shoulder blades and radiates down to hands, associated with feeling of numbness in arms. He has a similar sensation in his thighs, and yesterday reports Tmax at home of 102. He also notes a decreased  strength in his right hand. He denies experiencing any saddle anesthesia, or bladder or bowel incontinence. He denies headaches, dizziness, sore throat, rhinitis, SOB, Chest pain, diarrhea, dysuria.     2/18: XF to SDU for neuro checks, boarding in CCU. Change neurologically today from yesterday, no subjective change since this AM. Neuro exam now is same as documented previously.     ROS: + weakness, + Lt thigh numbness  Remainder of systems reviewed, negative        Allergies    No Known Allergies    Intolerances        MEDICATIONS  (STANDING):  aspirin enteric coated 81 milliGRAM(s) Oral daily  cefTRIAXone Injectable. 2000 milliGRAM(s) IV Push every 24 hours  heparin   Injectable 5000 Unit(s) SubCutaneous every 8 hours  lactated ringers. 1000 milliLiter(s) (100 mL/Hr) IV Continuous <Continuous>  levothyroxine 150 MICROGram(s) Oral daily  multivitamin/minerals 1 Tablet(s) Oral daily  rosuvastatin 5 milliGRAM(s) Oral at bedtime  tamsulosin 0.4 milliGRAM(s) Oral at bedtime    MEDICATIONS  (PRN):  acetaminophen     Tablet .. 650 milliGRAM(s) Oral every 6 hours PRN Temp greater or equal to 38C (100.4F), Mild Pain (1 - 3)  aluminum hydroxide/magnesium hydroxide/simethicone Suspension 30 milliLiter(s) Oral every 4 hours PRN Dyspepsia  HYDROmorphone  Injectable 1 milliGRAM(s) IV Push every 6 hours PRN Severe Pain (7 - 10)  melatonin 3 milliGRAM(s) Oral at bedtime PRN Insomnia  ondansetron Injectable 4 milliGRAM(s) IV Push every 8 hours PRN Nausea and/or Vomiting  tiZANidine 2 milliGRAM(s) Oral four times a day PRN Muscle Spasm      Drug Dosing Weight  Height (cm): 172.7 (17 Feb 2025 20:26)  Weight (kg): 105.7 (17 Feb 2025 20:32)  BMI (kg/m2): 35.4 (17 Feb 2025 20:32)  BSA (m2): 2.18 (17 Feb 2025 20:32)    PAST MEDICAL & SURGICAL HISTORY:  Hyperlipidemia, unspecified hyperlipidemia type      Diverticulosis of large intestine without hemorrhage      History of colon polyps      Hemorrhoids, unspecified hemorrhoid type      Benign prostatic hyperplasia without lower urinary tract symptoms, unspecified morphology      Osteoarthritis of knee, unilateral  left      Spinal cord injury at C5-C7 level without injury of spinal bone, subsequent encounter      Spastic      Weakness  to right side      Myelopathy      Spinal stenosis, unspecified spinal region      Hypothyroidism, unspecified type      Graves disease      Alcoholic  recovering alcoholic x 40years      History of cervical discectomy      S/P laminectomy  Cervical      H/O lumbar discectomy  with laminectomy      H/O arthroscopy of knee  Left x 2. Unsure of years      H/O colonoscopy with polypectomy  2014          FAMILY HISTORY:  Family history of colon cancer in mother (Mother)    Family history of liver disease (Father)  father    Family history of cancer (Sibling)  SIster - bile duct        UNLESS OTHERWISE NOTED IN HPI above:    Constitutional:  No Weight Change, No Fever, No Chills, No Night Sweats, No Fatigue, No Malaise  ENT/Mouth:  No Hearing Changes, No Ear Pain, No Nasal Congestion, No  Sinus Pain, No Hoarseness, No sore throat, No Rhinorrhea, No Swallowing  Difficulty  Eyes:  No Eye Pain, No Swelling, No Redness, No Foreign Body, No Discharge, No Vision Changes  Cardiovascular:  No Chest Pain, No SOB, No PND, No Dyspnea on Exertion,  No Orthopnea, No Claudication, No Edema, No Palpitations  Respiratory:  No Cough, No Sputum, No Wheezing, No Smoke Exposure, No Dyspnea  Gastrointestinal:  No Nausea, No Vomiting, No Diarrhea, No  Constipation, No Pain, No Heartburn, No Anorexia, No Dysphagia, No  Hematochezia, No Melena, No Flatulence, No Jaundice  Genitourinary:  No Dysmenorrhea, No DUB, No Dyspareunia, No Dysuria, No  Urinary Frequency, No Hematuria, No Urinary Incontinence, No Urgency,  No Flank Pain, No Urinary Flow Changes, No Hesitancy  Musculoskeletal:  No Arthralgias, No Myalgias, No Joint Swelling, No  Joint Stiffness, No Back Pain, No Neck Pain, No Injury History  Skin:  No Skin Lesions, No Pruritis, No Hair Changes, No Breast/Skin Changes, No Nipple Discharge  Neuro:  No Weakness, No Numbness, No Paresthesias, No Loss of  Consciousness, No Syncope, No Dizziness, No Headache, No Coordination  Changes, No Recent Falls  Psych:  No Anxiety/Panic, No Depression, No Insomnia, No Personality  Changes, No Delusions, No Rumination, No SI/HI/AH/VH, No Social Issues,  No Memory Changes, No Violence/Abuse Hx., No Eating Concerns  Heme/Lymph:  No Bruising, No Bleeding, No Transfusions History, No Lymphadenopathy  Endocrine:  No Polyuria, No Polydipsia, No Temperature Intolerance    O:    ICU Vital Signs Last 24 Hrs  T(C): 38.1 (18 Feb 2025 17:46), Max: 40.6 (17 Feb 2025 20:50)  T(F): 100.6 (18 Feb 2025 17:46), Max: 105 (17 Feb 2025 20:50)  HR: 102 (18 Feb 2025 17:46) (102 - 140)  BP: 109/82 (18 Feb 2025 17:46) (98/69 - 175/75)  BP(mean): 91 (18 Feb 2025 17:46) (70 - 106)  ABP: --  ABP(mean): --  RR: 21 (18 Feb 2025 17:46) (16 - 21)  SpO2: 96% (18 Feb 2025 17:46) (92% - 96%)    O2 Parameters below as of 18 Feb 2025 17:46  Patient On (Oxygen Delivery Method): room air                I&O's Detail          PE:    Adult lying in bed  No JVD trachea midline  Normocephalic, atraumatic  S1S2+  CTA B/L  Abd soft NTND  No leg swelling/edema noted  Awake and alert; + weakness LUE 2/5, RUE 4/5; + numbness B/L thighs  Skin pink, warm    LABS:    CBC Full  -  ( 18 Feb 2025 07:10 )  WBC Count : 20.35 K/uL  RBC Count : 4.61 M/uL  Hemoglobin : 13.1 g/dL  Hematocrit : 40.5 %  Platelet Count - Automated : 129 K/uL  Mean Cell Volume : 87.9 fl  Mean Cell Hemoglobin : 28.4 pg  Mean Cell Hemoglobin Concentration : 32.3 g/dL  Auto Neutrophil # : x  Auto Lymphocyte # : x  Auto Monocyte # : x  Auto Eosinophil # : x  Auto Basophil # : x  Auto Neutrophil % : x  Auto Lymphocyte % : x  Auto Monocyte % : x  Auto Eosinophil % : x  Auto Basophil % : x    02-18    131[L]  |  103  |  27[H]  ----------------------------<  119[H]  4.4   |  23  |  1.57[H]    Ca    8.7      18 Feb 2025 07:10  Phos  2.8     02-18  Mg     1.5     02-18    TPro  6.5  /  Alb  3.2[L]  /  TBili  1.7[H]  /  DBili  x   /  AST  25  /  ALT  29  /  AlkPhos  61  02-18    PT/INR - ( 17 Feb 2025 21:11 )   PT: 13.3 sec;   INR: 1.16 ratio         PTT - ( 17 Feb 2025 21:11 )  PTT:29.1 sec  Urinalysis Basic - ( 18 Feb 2025 07:10 )    Color: x / Appearance: x / SG: x / pH: x  Gluc: 119 mg/dL / Ketone: x  / Bili: x / Urobili: x   Blood: x / Protein: x / Nitrite: x   Leuk Esterase: x / RBC: x / WBC x   Sq Epi: x / Non Sq Epi: x / Bacteria: x      CAPILLARY BLOOD GLUCOSE            LIVER FUNCTIONS - ( 18 Feb 2025 07:10 )  Alb: 3.2 g/dL / Pro: 6.5 gm/dL / ALK PHOS: 61 U/L / ALT: 29 U/L / AST: 25 U/L / GGT: x

## 2025-02-18 NOTE — CONSULT NOTE ADULT - ASSESSMENT
A:    74yMale  HD #    Here for:    1.    This patient requires a higher level of care due to the complexity and severity of their condition which increases the amount and complexity of data to be reviewed and analyzed in addition to the risk of complications, morbidity and mortality of patient management    P:    Neuro: GCS 15. Monitor for delirium.  Continue to optimize pain control. Serial Neurologic assessments.    HEENT: No issues.    CV: Continue hemodynamic monitoring    Pulm: Pulmonary toilet.  Continue incentive spirometer.  Chest PT.  Encourage OOB to chair and ambulation. Nebs. f/u ABG, CXR.    GI/Nutrition: Cont diet, bowel regimen.    /Renal: Monitor UOP. Monitor BMP.  Replete Lytes as needed.    HEME- Chemical and mechanical DVT ppx. f/u CBC. f/u coags as needed.    ID:  Cont abx. f/u Cx's.    Lines/Tubes:     Endo: Maintain euglycemia.    Skin:  Cont skin care, pressure ulcer prevention.    Dispo: Cont care.    Time spent on this patient encounter: 75+ minutes    Date of entry of this note is equal to the date of services rendered.    This includes assessment of the presenting problems with associated risks, reviewing the medical record to prepare for the encounter and meeting face to face with the patient to obtain additional history and conduct a focused exmaination. I have also performed an appropiate physical exam, made interventions listed and ordered and interpreted appropiate diagnostic studies as documented. This also included communication and coordination of care with the multidisciplinary team including the bedside nurse, appropriate attending of record and consultants as needed.         A:    74M    Here for:    1. Diskitis with neurologic weakness  2. Sepsis 2/2 above  3. 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 Cont care, abx.     Time spent on this patient encounter: 75+ minutes    Date of entry of this note is equal to the date of services rendered.    This includes assessment of the presenting problems with associated risks, reviewing the medical record to prepare for the encounter and meeting face to face with the patient to obtain additional history and conduct a focused exmaination. I have also performed an appropiate physical exam, made interventions listed and ordered and interpreted appropiate diagnostic studies as documented. This also included communication and coordination of care with the multidisciplinary team including the bedside nurse, appropriate attending of record and consultants as needed.

## 2025-02-18 NOTE — ED ADULT NURSE REASSESSMENT NOTE - NS ED NURSE REASSESS COMMENT FT1
Pt noted to be tachycardic since taking over the patients care at 2:30 AM. Denies chest pain. MD Sosa made aware. Pt to be upgraded with telemetry.

## 2025-02-18 NOTE — H&P ADULT - ASSESSMENT
74M admitted for severe sepsis.  74M admitted for severe sepsis.     Reviewed home meds with patient.

## 2025-02-18 NOTE — H&P ADULT - NSHPPHYSICALEXAM_GEN_ALL_CORE
T(C): 37.5 (02-17-25 @ 23:00), Max: 40.6 (02-17-25 @ 20:50)  HR: 115 (02-18-25 @ 01:00) (111 - 140)  BP: 115/72 (02-18-25 @ 01:00) (98/69 - 175/75)  RR: 16 (02-18-25 @ 01:00) (16 - 19)  SpO2: 95% (02-18-25 @ 01:00) (95% - 95%)    General: Chronically ill appearing.   HEENT: non-traumatic, perrla.   Cardio: s1s2, fast HR  Lungs: comfortable breathing, clear to auscultation  Abdomen: Soft, non-tender, non-distended  Neuro: AOx4, sensation normal, has 3/5 strength in b/l LE, 4/5 strength in b/l upper extremities. Spine non-tender.   Ext: Pulses +2

## 2025-02-18 NOTE — H&P ADULT - PROBLEM SELECTOR PLAN 1
C/w broad spectrum abx - vanc/cefepime/flagyl  CT chest/abd/pelvis noted above.  Obtain MRI C/T/L-spine wwo con to eval for source.   F/u sepsis work up  UA pending.   ID consult.

## 2025-02-18 NOTE — CONSULT NOTE ADULT - ASSESSMENT
74-year-old male with a past medical history of benign prostatic hyperplasia, diverticulosis, Graves' disease, hemorrhoids, hyperlipidemia, hypothyroidism, osteoarthritis, spinal stenosis, spinal cord injury (2004), and multiple spinal surgeries (three cervical and one lumbar). He presents to the emergency department with complaints of shoulder pain radiating down the arms associated with fever and LE weakness.  Pending MRI C/T/L Spine +/- 74-year-old male with a past medical history of benign prostatic hyperplasia, diverticulosis, Graves' disease, hemorrhoids, hyperlipidemia, hypothyroidism, osteoarthritis, spinal stenosis, spinal cord injury (2004), and multiple spinal surgeries (three cervical and one lumbar). He presents to the emergency department with complaints of shoulder pain radiating down the arms associated with fever and LE weakness.    Plan:  - No acute neurosurgical intervention  - Continue IV antibiotics per ID 74-year-old male with a past medical history of benign prostatic hyperplasia, diverticulosis, Graves' disease, hemorrhoids, hyperlipidemia, hypothyroidism, osteoarthritis, spinal stenosis, spinal cord injury (2004), and multiple spinal surgeries (three cervical and one lumbar). He presents to the emergency department with complaints of shoulder pain radiating down the arms associated with fever and LE weakness.    Plan:  - No acute neurosurgical intervention  - Continue IV antibiotics per ID    Will d/w Dr. Hackett accordingly 74-year-old male with a past medical history of benign prostatic hyperplasia, diverticulosis, Graves' disease, hemorrhoids, hyperlipidemia, hypothyroidism, osteoarthritis, spinal stenosis, spinal cord injury (2004), and multiple spinal surgeries (three cervical and one lumbar). He presents to the emergency department with complaints of shoulder pain radiating down the arms associated with fever and LE weakness.    Plan:  - No acute neurosurgical intervention  - In setting of acute neurologic deficit, would recommend upgrade to SICU for neuro checks Q2hrs  - CT C/L Spine assess hardware  - Bladder scan Q4hrs  - Continue IV antibiotics per ID  - Will follow    D/w Dr. Hackett 74-year-old male with a past medical history of benign prostatic hyperplasia, diverticulosis, Graves' disease, hemorrhoids, hyperlipidemia, hypothyroidism, osteoarthritis, spinal stenosis, spinal cord injury (2004), and multiple spinal surgeries (three cervical and one lumbar). He presents to the emergency department with complaints of shoulder pain radiating down the arms associated with fever and LE weakness.    Plan:  - No acute neurosurgical intervention  - In setting of acute neurologic deficit, would recommend upgrade to SICU for neuro checks Q2hrs  - CT C/L Spine assess hardware  - Bladder scan Q4hrs  - Continue IV antibiotics per ID  - D/w JASBIR Ramos  - Will follow    D/w Dr. Hackett

## 2025-02-18 NOTE — H&P ADULT - PROBLEM SELECTOR PLAN 3
DVT ppx- hepsubq  Fall and aspiration precautions. TSH 0.31  Repeat levels in am with free t4  C/w with his levothyroxine.

## 2025-02-18 NOTE — CONSULT NOTE ADULT - ASSESSMENT
74-year-old male with a past medical history of benign prostatic hyperplasia, diverticulosis, Graves' disease, hemorrhoids, hyperlipidemia, hypothyroidism, osteoarthritis, spinal stenosis, spinal cord injury (2004), and multiple spinal surgeries (three cervical and one lumbar). He presents to the emergency department with complaints of shoulder pain radiating down the arms associated with fever and LE weakness.    #neck and shoulder pain with associated LE weakness in the setting of multiple spinal fusions, sepsis, strep bacteremia            Recommendations  -F/U MRI C/T/L spine w/wo 74-year-old male with a past medical history of benign prostatic hyperplasia, diverticulosis, Graves' disease, hemorrhoids, hyperlipidemia, hypothyroidism, osteoarthritis, spinal stenosis, spinal cord injury (2004), and multiple spinal surgeries (three cervical and one lumbar). He presents to the emergency department with complaints of shoulder pain radiating down the arms associated with fever and LE weakness.    #neck and shoulder pain with associated LE weakness in the setting of multiple spinal fusions, sepsis, strep bacteremia            Recommendations  -F/U Neurosurgery recommendations  -MRI brain to r/o septic emboli 74-year-old male with a past medical history of benign prostatic hyperplasia, diverticulosis, Graves' disease, hemorrhoids, hyperlipidemia, hypothyroidism, osteoarthritis, spinal stenosis, spinal cord injury (2004), and multiple spinal surgeries (three cervical and one lumbar). He presents to the emergency department with complaints of shoulder pain radiating down the arms associated with fever and LE weakness.    # new L hand weakness, multiple subjective areas of paresthesias in the setting of sepsis, strep bacteremia-will need to r/o stroke 2/2 septic emboli    #myelomalacia at the C3-C4 levels    #T1-T2 and T2-T3 intervertebral discs possible discitis   -Additionally, possible phlegmon from   the C7-T3 levels, as well as epidural enhancement at C7-T4 inclusive,   effacing the subarachnoid space          Recommendations  -Monitor on tele  -Neurochecks/VS q 4  -continue ASA, statin  -Check A1c, LDL  -Check TTE, may need LOU  -DVT prophylaxis per neurosurgery  -F/U Neurosurgery recommendations  -F/U ID recommendations for Abx's, f/u cultures  -MRI brain w/wo to r/o septic emboli  -PT/OT eval  -Neurology to follow.  Please page or call for any questions    D/W Dr. Webb, patient   74-year-old male with a past medical history of benign prostatic hyperplasia, diverticulosis, Graves' disease, hemorrhoids, hyperlipidemia, hypothyroidism, osteoarthritis, spinal stenosis, spinal cord injury (2004), and multiple spinal surgeries (three cervical and one lumbar). He presents to the emergency department with complaints of shoulder pain radiating down the arms associated with fever and LE weakness.    # new L hand weakness, multiple subjective areas of paresthesias in the setting of sepsis, strep bacteremia-will need to r/o stroke 2/2 septic emboli    #myelomalacia at the C3-C4 levels    #T1-T2 and T2-T3 intervertebral discs possible discitis   -Additionally, possible phlegmon from   the C7-T3 levels, as well as epidural enhancement at C7-T4 inclusive,   effacing the subarachnoid space          Recommendations  -Monitor on tele  -Neurochecks/VS q 4  -continue ASA, statin  -Check A1c, LDL  -Check TTE, may need OLU  -DVT prophylaxis per neurosurgery  -F/U Neurosurgery recommendations  -F/U ID recommendations for Abx's, f/u cultures  -MRI brain w/wo to r/o septic emboli  -PT/OT eval  -Neurology to follow.  Please page or call for any questions    D/W Dr. Webb, Neurosurgery, patient.  Neurosurgery to update the patient on MRI findings, awaiting Neurosurgery attending recs.

## 2025-02-18 NOTE — H&P ADULT - NSHPLABSRESULTS_GEN_ALL_CORE
LABS:  cret                        14.7   13.23 )-----------( 143      ( 17 Feb 2025 21:11 )             44.2     02-17    135  |  104  |  22  ----------------------------<  162[H]  4.0   |  25  |  1.50[H]    Ca    9.0      17 Feb 2025 21:11    TPro  7.1  /  Alb  3.6  /  TBili  1.3[H]  /  DBili  x   /  AST  17  /  ALT  30  /  AlkPhos  64  02-17    PT/INR - ( 17 Feb 2025 21:11 )   PT: 13.3 sec;   INR: 1.16 ratio         PTT - ( 17 Feb 2025 21:11 )  PTT:29.1 sec    < from: CT Chest w/ IV Cont (02.17.25 @ 22:58) >    IMPRESSION:  No acute findings in the chest, abdomen or pelvis.    Partially visualized sequela from laminectomy, ACDF and posterior spinal   fusion hardware in the lower cervical spine. Streak artifact limits   evaluation of the adjacent structures/spinal canal.    1.3 cm left adrenal nodule. Consider follow-up adrenal CT.    Prostatomegaly, correlate with PSA values.    --- End of Report ---    < end of copied text >

## 2025-02-18 NOTE — H&P ADULT - HISTORY OF PRESENT ILLNESS
74-year-old male with a past medical history of benign prostatic hyperplasia, diverticulosis, Graves' disease, hemorrhoids, hyperlipidemia, hypothyroidism, osteoarthritis, spinal stenosis, spinal cord injury (2004), and multiple spinal surgeries (three cervical and one lumbar). He presents to the emergency department with complaints of shoulder pain radiating down the arms associated with fever and LE weakness. Patient reports symptoms started as mild on saturday, he had mild shoulder pain, but it progressively got worse. Currently reports pain 8/10, sharp, burning, starts at shoulder blades and radiates down to hands, associated with feeling of numbness in arms. He has a similar sensation in his thighs, and yesterday reports Tmax at home of 102. He also notes a decreased  strength in his right hand. He denies experiencing any saddle anesthesia, or bladder or bowel incontinence. He denies headaches, dizziness, sore throat, rhinitis, SOB, Chest pain, diarrhea, dysuria.     In ED pt febrile to 105, HR 110s, Hypotensive initially, SaO2 95 on 2L NC  CBC: WBC 13, H/H 14/44, Plt 143  CMP: BUN/Cr 22/1.5, Glu 162  Lactate: 2.1 > 1.5  TSH: 0.31  Flu/COVID: negative  CT chest/abdomen/pelvis: IMPRESSION: "No acute findings in the chest, abdomen or pelvis. Partially visualized sequela from laminectomy, ACDF and posterior spinal fusion hardware in the lower cervical spine. Streak artifact limits evaluation of the adjacent structures/spinal canal. 1.3 cm left adrenal nodule. Consider follow-up adrenal CT. Prostatomegaly, correlate with PSA values."    Sepsis work up initiated, pt recieved vanc/cefepime in ED and admitted to  for further care.

## 2025-02-18 NOTE — H&P ADULT - PROBLEM SELECTOR PLAN 2
TSH 0.31  Repeat levels in am with free t4  C/w with his levothyroxine. Pt with h/o multiple level spine radiculopathy s/p multiple surgeries  p/w worsening pain in arms and legs. also reports numbness in left hand 1st digit.  Strength equal b/l. Sensation normal in upper and lower extremities.   Obtain MRI c/t/l spine wwo con  Neurology consult.

## 2025-02-18 NOTE — CONSULT NOTE ADULT - SUBJECTIVE AND OBJECTIVE BOX
HPI:  74-year-old male with a past medical history of benign prostatic hyperplasia, diverticulosis, Graves' disease, hemorrhoids, hyperlipidemia, hypothyroidism, osteoarthritis, spinal stenosis, spinal cord injury (2004), and multiple spinal surgeries (three cervical and one lumbar). He presents to the emergency department with complaints of shoulder pain radiating down the arms associated with fever and LE weakness. Patient reports symptoms started as mild on saturday, he had mild shoulder pain, but it progressively got worse. Currently reports pain 8/10, sharp, burning, starts at shoulder blades and radiates down to hands, associated with feeling of numbness in arms. He has a similar sensation in his thighs, and yesterday reports Tmax at home of 102. He also notes a decreased  strength in his right hand. He denies experiencing any saddle anesthesia, or bladder or bowel incontinence. He denies headaches, dizziness, sore throat, rhinitis, SOB, Chest pain, diarrhea, dysuria.     Neurosurgery consulted for neck pain the setting of multiple spinal fusions, +fever, + strep bacteremia.       PAST MEDICAL & SURGICAL HISTORY:  Hyperlipidemia, unspecified hyperlipidemia type      Diverticulosis of large intestine without hemorrhage      History of colon polyps      Hemorrhoids, unspecified hemorrhoid type      Benign prostatic hyperplasia without lower urinary tract symptoms, unspecified morphology      Osteoarthritis of knee, unilateral  left      Spinal cord injury at C5-C7 level without injury of spinal bone, subsequent encounter      Spastic      Weakness  to right side      Myelopathy      Spinal stenosis, unspecified spinal region      Hypothyroidism, unspecified type      Graves disease      Alcoholic  recovering alcoholic x 40years      History of cervical discectomy      S/P laminectomy  Cervical      H/O lumbar discectomy  with laminectomy      H/O arthroscopy of knee  Left x 2. Unsure of years      H/O colonoscopy with polypectomy  2014          FAMILY HISTORY:  Family history of colon cancer in mother (Mother)    Family history of liver disease (Father)  father    Family history of cancer (Sibling)  SIster - bile duct          Social Hx:  Nonsmoker, no drug or alcohol use    MEDICATIONS  (STANDING):  aspirin enteric coated 81 milliGRAM(s) Oral daily  cefTRIAXone Injectable. 2000 milliGRAM(s) IV Push every 24 hours  heparin   Injectable 5000 Unit(s) SubCutaneous every 8 hours  HYDROmorphone  Injectable 1 milliGRAM(s) IV Push once  lactated ringers. 1000 milliLiter(s) (100 mL/Hr) IV Continuous <Continuous>  levothyroxine 150 MICROGram(s) Oral daily  multivitamin/minerals 1 Tablet(s) Oral daily  rosuvastatin 5 milliGRAM(s) Oral at bedtime  tamsulosin 0.4 milliGRAM(s) Oral at bedtime       Allergies  No Known Allergies  Intolerances        ROS: Pertinent positives in HPI, all other ROS were reviewed and are negative.      Vital Signs Last 24 Hrs  T(C): 37.7 (18 Feb 2025 08:16), Max: 40.6 (17 Feb 2025 20:50)  T(F): 99.8 (18 Feb 2025 08:16), Max: 105 (17 Feb 2025 20:50)  HR: 112 (18 Feb 2025 10:13) (111 - 140)  BP: 101/64 (18 Feb 2025 10:13) (98/69 - 175/75)  BP(mean): 85 (18 Feb 2025 05:02) (70 - 106)  RR: 17 (18 Feb 2025 10:13) (16 - 20)  SpO2: 92% (18 Feb 2025 10:13) (92% - 96%)    Parameters below as of 18 Feb 2025 10:13  Patient On (Oxygen Delivery Method): nasal cannula            Physical Exam:  Constitutional: Awake / alert  HEENT: PERRLA, EOMI  Neck: Supple  Respiratory: Breath sounds are clear bilaterally  Cardiovascular: S1 and S2, regular rhythm  Gastrointestinal: Soft, NT/ND  Extremities:  no edema  Musculoskeletal: no abnormal movements  Skin: No rashes    Neurological Exam:  HF: A x O x 3, follows commands, normal affect, speech fluent  CN: PERRL, EOMI, no NLFD, tongue midline  Motor: Strength 5/5 in all 4 ext, normal bulk and tone  Sens: Intact to light touch  Reflexes: Symmetric and normal, no clonus, no Ayers's   Coord:  No FNFA, dysmetria, ALLAN intact   Gait/Balance: Cannot test          Labs:                        13.1   20.35 )-----------( 129      ( 18 Feb 2025 07:10 )             40.5     02-18    131[L]  |  103  |  27[H]  ----------------------------<  119[H]  4.4   |  23  |  1.57[H]    Ca    8.7      18 Feb 2025 07:10  Phos  2.8     02-18  Mg     1.5     02-18    TPro  6.5  /  Alb  3.2[L]  /  TBili  1.7[H]  /  DBili  x   /  AST  25  /  ALT  29  /  AlkPhos  61  02-18        PT/INR - ( 17 Feb 2025 21:11 )   PT: 13.3 sec;   INR: 1.16 ratio         PTT - ( 17 Feb 2025 21:11 )  PTT:29.1 sec    Radiology report:   HPI:  74-year-old male with a past medical history of benign prostatic hyperplasia, diverticulosis, Graves' disease, hemorrhoids, hyperlipidemia, hypothyroidism, osteoarthritis, spinal stenosis, spinal cord injury (2004), and multiple spinal surgeries (three cervical and one lumbar). He presents to the emergency department with complaints of shoulder pain radiating down the arms associated with fever and LE weakness. Patient reports symptoms started as mild on saturday, he had mild shoulder pain, but it progressively got worse. Currently reports pain 8/10, sharp, burning, starts at shoulder blades and radiates down to hands, associated with feeling of numbness in arms. He has a similar sensation in his thighs, and yesterday reports Tmax at home of 102. He also notes a decreased  strength in his right hand. He denies experiencing any saddle anesthesia, or bladder or bowel incontinence. He denies headaches, dizziness, sore throat, rhinitis, SOB, Chest pain, diarrhea, dysuria.     Neurosurgery consulted for neck pain the setting of multiple spinal fusions, +fever, + strep bacteremia. Patient seen and examined in ER. Awake/alert, sitting up in stretcher. Complains of upper back pain and fever since Saturday, thought it was the flu. He then developed left arm and bl thigh numbness/tinging on Sunday. He denies urinary/bowel incontinence, saddle parathesias. He denies recent procedure, sick contacts or travel.      PAST MEDICAL & SURGICAL HISTORY:  Hyperlipidemia, unspecified hyperlipidemia type      Diverticulosis of large intestine without hemorrhage      History of colon polyps      Hemorrhoids, unspecified hemorrhoid type      Benign prostatic hyperplasia without lower urinary tract symptoms, unspecified morphology      Osteoarthritis of knee, unilateral  left      Spinal cord injury at C5-C7 level without injury of spinal bone, subsequent encounter      Spastic      Weakness  to right side      Myelopathy      Spinal stenosis, unspecified spinal region      Hypothyroidism, unspecified type      Graves disease      Alcoholic  recovering alcoholic x 40years      History of cervical discectomy      S/P laminectomy  Cervical      H/O lumbar discectomy  with laminectomy      H/O arthroscopy of knee  Left x 2. Unsure of years      H/O colonoscopy with polypectomy  2014          FAMILY HISTORY:  Family history of colon cancer in mother (Mother)    Family history of liver disease (Father)  father    Family history of cancer (Sibling)  SIster - bile duct          Social Hx:  Nonsmoker, no drug or alcohol use    MEDICATIONS  (STANDING):  aspirin enteric coated 81 milliGRAM(s) Oral daily  cefTRIAXone Injectable. 2000 milliGRAM(s) IV Push every 24 hours  heparin   Injectable 5000 Unit(s) SubCutaneous every 8 hours  HYDROmorphone  Injectable 1 milliGRAM(s) IV Push once  lactated ringers. 1000 milliLiter(s) (100 mL/Hr) IV Continuous <Continuous>  levothyroxine 150 MICROGram(s) Oral daily  multivitamin/minerals 1 Tablet(s) Oral daily  rosuvastatin 5 milliGRAM(s) Oral at bedtime  tamsulosin 0.4 milliGRAM(s) Oral at bedtime       Allergies  No Known Allergies  Intolerances        ROS: Pertinent positives in HPI, all other ROS were reviewed and are negative.      Vital Signs Last 24 Hrs  T(C): 37.7 (18 Feb 2025 08:16), Max: 40.6 (17 Feb 2025 20:50)  T(F): 99.8 (18 Feb 2025 08:16), Max: 105 (17 Feb 2025 20:50)  HR: 112 (18 Feb 2025 10:13) (111 - 140)  BP: 101/64 (18 Feb 2025 10:13) (98/69 - 175/75)  BP(mean): 85 (18 Feb 2025 05:02) (70 - 106)  RR: 17 (18 Feb 2025 10:13) (16 - 20)  SpO2: 92% (18 Feb 2025 10:13) (92% - 96%)    Parameters below as of 18 Feb 2025 10:13  Patient On (Oxygen Delivery Method): nasal cannula            Physical Exam:  Constitutional: Awake / alert  HEENT: PERRLA, EOMI  Neck: Supple  Respiratory: Breath sounds are clear bilaterally  Cardiovascular: S1 and S2, regular rhythm  Gastrointestinal: Soft, NT/ND  Extremities:  no edema  Musculoskeletal: no abnormal movements  Skin: No rashes    Neurological Exam:  HF: A x O x 3, follows commands, normal affect, speech fluent  CN: PERRL, EOMI, no NLFD, tongue midline  Motor: Strength 5/5 except R  4-/5, L  2/5  Sens: Decreased sensation b/l forearm, b/l thigh, L calf to LT  Reflexes: no clonus, no Ayers's   Coord:  No FNFA, dysmetria, ALLAN intact   Gait/Balance: Cannot test          Labs:                        13.1   20.35 )-----------( 129      ( 18 Feb 2025 07:10 )             40.5     02-18    131[L]  |  103  |  27[H]  ----------------------------<  119[H]  4.4   |  23  |  1.57[H]    Ca    8.7      18 Feb 2025 07:10  Phos  2.8     02-18  Mg     1.5     02-18    TPro  6.5  /  Alb  3.2[L]  /  TBili  1.7[H]  /  DBili  x   /  AST  25  /  ALT  29  /  AlkPhos  61  02-18        PT/INR - ( 17 Feb 2025 21:11 )   PT: 13.3 sec;   INR: 1.16 ratio         PTT - ( 17 Feb 2025 21:11 )  PTT:29.1 sec    Radiology report:  MR Cervical/Thoracic/Lumbar Spine w/wo IV Cont (02.18.25 @ 12:19)  IMPRESSION:  1. Postsurgical changes cervical and lumbar spine, as described in detail   above.  2. Diffuse, extensive degenerative changes throughout the spine,   contributing to multilevel central canal and neural foramen stenosis, as   described in detail above. Findings concordant with myelomalacia at the   C3-C4 levels.  3. Findings involving the T1-T2 and T2-T3 intervertebral discs which,   while not pathognomonic, may reflect discitis in the appropriate clinical   setting. Additionally, there is prevertebral enhancement extending from   the C7-T3 levels, as well as epidural enhancement at C7-T4 inclusive,   effacing the subarachnoid space. Findings are suspicious for phlegmon in   light of the above findings, without organized abscess collection   identified. Recommend imaging follow-up to assess stability.  4. Additional findings described in detail above.   HPI:  74-year-old male with a past medical history of benign prostatic hyperplasia, diverticulosis, Graves' disease, hemorrhoids, hyperlipidemia, hypothyroidism, osteoarthritis, spinal stenosis, spinal cord injury (2004), and multiple spinal surgeries (three cervical and one lumbar). He presents to the emergency department with complaints of shoulder pain radiating down the arms associated with fever and LE weakness. Patient reports symptoms started as mild on Saturday he had mild shoulder pain, but it progressively got worse. Currently reports pain 8/10, sharp, burning, starts at shoulder blades and radiates down to hands, associated with feeling of numbness in arms. He has a similar sensation in his thighs, and yesterday reports Tmax at home of 102. He also notes a decreased  strength in his right hand. He denies experiencing any saddle anesthesia, or bladder or bowel incontinence. He denies headaches, dizziness, sore throat, rhinitis, SOB, Chest pain, diarrhea, dysuria.     Neurosurgery consulted for neck pain the setting of multiple spinal fusions, +fever, + strep bacteremia. Hx ACDF for spinal cord injuryPatient seen and examined in ER. Awake/alert, sitting up in stretcher. Complains of upper back pain and fever since Saturday, thought it was the flu. He then developed left arm and bl thigh numbness/tinging on Sunday. He denies urinary/bowel incontinence, saddle parathesias. He denies recent procedure, sick contacts or travel.      PAST MEDICAL & SURGICAL HISTORY:  Hyperlipidemia, unspecified hyperlipidemia type      Diverticulosis of large intestine without hemorrhage      History of colon polyps      Hemorrhoids, unspecified hemorrhoid type      Benign prostatic hyperplasia without lower urinary tract symptoms, unspecified morphology      Osteoarthritis of knee, unilateral  left      Spinal cord injury at C5-C7 level without injury of spinal bone, subsequent encounter      Spastic      Weakness  to right side      Myelopathy      Spinal stenosis, unspecified spinal region      Hypothyroidism, unspecified type      Graves disease      Alcoholic  recovering alcoholic x 40years      History of cervical discectomy      S/P laminectomy  Cervical      H/O lumbar discectomy  with laminectomy      H/O arthroscopy of knee  Left x 2. Unsure of years      H/O colonoscopy with polypectomy  2014          FAMILY HISTORY:  Family history of colon cancer in mother (Mother)    Family history of liver disease (Father)  father    Family history of cancer (Sibling)  SIster - bile duct          Social Hx:  Nonsmoker, no drug or alcohol use    MEDICATIONS  (STANDING):  aspirin enteric coated 81 milliGRAM(s) Oral daily  cefTRIAXone Injectable. 2000 milliGRAM(s) IV Push every 24 hours  heparin   Injectable 5000 Unit(s) SubCutaneous every 8 hours  HYDROmorphone  Injectable 1 milliGRAM(s) IV Push once  lactated ringers. 1000 milliLiter(s) (100 mL/Hr) IV Continuous <Continuous>  levothyroxine 150 MICROGram(s) Oral daily  multivitamin/minerals 1 Tablet(s) Oral daily  rosuvastatin 5 milliGRAM(s) Oral at bedtime  tamsulosin 0.4 milliGRAM(s) Oral at bedtime       Allergies  No Known Allergies  Intolerances        ROS: Pertinent positives in HPI, all other ROS were reviewed and are negative.      Vital Signs Last 24 Hrs  T(C): 37.7 (18 Feb 2025 08:16), Max: 40.6 (17 Feb 2025 20:50)  T(F): 99.8 (18 Feb 2025 08:16), Max: 105 (17 Feb 2025 20:50)  HR: 112 (18 Feb 2025 10:13) (111 - 140)  BP: 101/64 (18 Feb 2025 10:13) (98/69 - 175/75)  BP(mean): 85 (18 Feb 2025 05:02) (70 - 106)  RR: 17 (18 Feb 2025 10:13) (16 - 20)  SpO2: 92% (18 Feb 2025 10:13) (92% - 96%)    Parameters below as of 18 Feb 2025 10:13  Patient On (Oxygen Delivery Method): nasal cannula            Physical Exam:  Constitutional: Awake / alert  HEENT: PERRLA, EOMI  Neck: Supple  Respiratory: Breath sounds are clear bilaterally  Cardiovascular: S1 and S2, regular rhythm  Gastrointestinal: Soft, NT/ND  Extremities:  no edema  Musculoskeletal: no abnormal movements  Skin: No rashes    Neurological Exam:  HF: A x O x 3, follows commands, normal affect, speech fluent  CN: PERRL, EOMI, no NLFD, tongue midline  Motor: Strength 5/5 except R  4-/5, L  2/5  Sens: Decreased sensation b/l forearm, b/l thigh, L calf to LT  Reflexes: no clonus, no Ayers's   Coord:  No FNFA, dysmetria, ALLAN intact   Gait/Balance: Cannot test          Labs:                        13.1   20.35 )-----------( 129      ( 18 Feb 2025 07:10 )             40.5     02-18    131[L]  |  103  |  27[H]  ----------------------------<  119[H]  4.4   |  23  |  1.57[H]    Ca    8.7      18 Feb 2025 07:10  Phos  2.8     02-18  Mg     1.5     02-18    TPro  6.5  /  Alb  3.2[L]  /  TBili  1.7[H]  /  DBili  x   /  AST  25  /  ALT  29  /  AlkPhos  61  02-18        PT/INR - ( 17 Feb 2025 21:11 )   PT: 13.3 sec;   INR: 1.16 ratio         PTT - ( 17 Feb 2025 21:11 )  PTT:29.1 sec    Radiology report:  MR Cervical/Thoracic/Lumbar Spine w/wo IV Cont (02.18.25 @ 12:19)  IMPRESSION:  1. Postsurgical changes cervical and lumbar spine, as described in detail   above.  2. Diffuse, extensive degenerative changes throughout the spine,   contributing to multilevel central canal and neural foramen stenosis, as   described in detail above. Findings concordant with myelomalacia at the   C3-C4 levels.  3. Findings involving the T1-T2 and T2-T3 intervertebral discs which,   while not pathognomonic, may reflect discitis in the appropriate clinical   setting. Additionally, there is prevertebral enhancement extending from   the C7-T3 levels, as well as epidural enhancement at C7-T4 inclusive,   effacing the subarachnoid space. Findings are suspicious for phlegmon in   light of the above findings, without organized abscess collection   identified. Recommend imaging follow-up to assess stability.  4. Additional findings described in detail above.   HPI:  74-year-old male with a past medical history of benign prostatic hyperplasia, diverticulosis, Graves' disease, hemorrhoids, hyperlipidemia, hypothyroidism, osteoarthritis, spinal stenosis, spinal cord injury (2004), and multiple spinal surgeries (three cervical and one lumbar). He presents to the emergency department with complaints of shoulder pain radiating down the arms associated with fever and LE weakness. Patient reports symptoms started as mild on Saturday he had mild shoulder pain, but it progressively got worse. Currently reports pain 8/10, sharp, burning, starts at shoulder blades and radiates down to hands, associated with feeling of numbness in arms. He has a similar sensation in his thighs, and yesterday reports Tmax at home of 102. He also notes a decreased  strength in his right hand. He denies experiencing any saddle anesthesia, or bladder or bowel incontinence. He denies headaches, dizziness, sore throat, rhinitis, SOB, Chest pain, diarrhea, dysuria.     Neurosurgery consulted for neck pain the setting of multiple spinal fusions, +fever, + strep bacteremia. Hx ACDF for spinal cord injury. Patient seen and examined in ER. Awake/alert, sitting up in stretcher. Complains of upper back pain and fever since Saturday, thought it was the flu. He then developed left arm and bl thigh numbness/tinging on Sunday. He denies urinary/bowel incontinence, saddle parathesias. He denies recent procedure, sick contacts or travel.      PAST MEDICAL & SURGICAL HISTORY:  Hyperlipidemia, unspecified hyperlipidemia type      Diverticulosis of large intestine without hemorrhage      History of colon polyps      Hemorrhoids, unspecified hemorrhoid type      Benign prostatic hyperplasia without lower urinary tract symptoms, unspecified morphology      Osteoarthritis of knee, unilateral  left      Spinal cord injury at C5-C7 level without injury of spinal bone, subsequent encounter      Spastic      Weakness  to right side      Myelopathy      Spinal stenosis, unspecified spinal region      Hypothyroidism, unspecified type      Graves disease      Alcoholic  recovering alcoholic x 40years      History of cervical discectomy      S/P laminectomy  Cervical      H/O lumbar discectomy  with laminectomy      H/O arthroscopy of knee  Left x 2. Unsure of years      H/O colonoscopy with polypectomy  2014          FAMILY HISTORY:  Family history of colon cancer in mother (Mother)    Family history of liver disease (Father)  father    Family history of cancer (Sibling)  SIster - bile duct          Social Hx:  Nonsmoker, no drug or alcohol use    MEDICATIONS  (STANDING):  aspirin enteric coated 81 milliGRAM(s) Oral daily  cefTRIAXone Injectable. 2000 milliGRAM(s) IV Push every 24 hours  heparin   Injectable 5000 Unit(s) SubCutaneous every 8 hours  HYDROmorphone  Injectable 1 milliGRAM(s) IV Push once  lactated ringers. 1000 milliLiter(s) (100 mL/Hr) IV Continuous <Continuous>  levothyroxine 150 MICROGram(s) Oral daily  multivitamin/minerals 1 Tablet(s) Oral daily  rosuvastatin 5 milliGRAM(s) Oral at bedtime  tamsulosin 0.4 milliGRAM(s) Oral at bedtime       Allergies  No Known Allergies  Intolerances        ROS: Pertinent positives in HPI, all other ROS were reviewed and are negative.      Vital Signs Last 24 Hrs  T(C): 37.7 (18 Feb 2025 08:16), Max: 40.6 (17 Feb 2025 20:50)  T(F): 99.8 (18 Feb 2025 08:16), Max: 105 (17 Feb 2025 20:50)  HR: 112 (18 Feb 2025 10:13) (111 - 140)  BP: 101/64 (18 Feb 2025 10:13) (98/69 - 175/75)  BP(mean): 85 (18 Feb 2025 05:02) (70 - 106)  RR: 17 (18 Feb 2025 10:13) (16 - 20)  SpO2: 92% (18 Feb 2025 10:13) (92% - 96%)    Parameters below as of 18 Feb 2025 10:13  Patient On (Oxygen Delivery Method): nasal cannula            Physical Exam:  Constitutional: Awake / alert  HEENT: PERRLA, EOMI  Neck: Supple  Respiratory: Breath sounds are clear bilaterally  Cardiovascular: S1 and S2, regular rhythm  Gastrointestinal: Soft, NT/ND  Extremities:  no edema  Musculoskeletal: no abnormal movements  Skin: No rashes    Neurological Exam:  HF: A x O x 3, follows commands, normal affect, speech fluent  CN: PERRL, EOMI, no NLFD, tongue midline  Motor: Strength 5/5 except R  4-/5, L  2/5  Sens: Decreased sensation b/l forearm, b/l thigh, L calf to LT  Reflexes: no clonus, no Ayers's   Coord:  No FNFA, dysmetria, ALLAN intact   Gait/Balance: Cannot test          Labs:                        13.1   20.35 )-----------( 129      ( 18 Feb 2025 07:10 )             40.5     02-18    131[L]  |  103  |  27[H]  ----------------------------<  119[H]  4.4   |  23  |  1.57[H]    Ca    8.7      18 Feb 2025 07:10  Phos  2.8     02-18  Mg     1.5     02-18    TPro  6.5  /  Alb  3.2[L]  /  TBili  1.7[H]  /  DBili  x   /  AST  25  /  ALT  29  /  AlkPhos  61  02-18        PT/INR - ( 17 Feb 2025 21:11 )   PT: 13.3 sec;   INR: 1.16 ratio         PTT - ( 17 Feb 2025 21:11 )  PTT:29.1 sec    Radiology report:  MR Cervical/Thoracic/Lumbar Spine w/wo IV Cont (02.18.25 @ 12:19)  IMPRESSION:  1. Postsurgical changes cervical and lumbar spine, as described in detail   above.  2. Diffuse, extensive degenerative changes throughout the spine,   contributing to multilevel central canal and neural foramen stenosis, as   described in detail above. Findings concordant with myelomalacia at the   C3-C4 levels.  3. Findings involving the T1-T2 and T2-T3 intervertebral discs which,   while not pathognomonic, may reflect discitis in the appropriate clinical   setting. Additionally, there is prevertebral enhancement extending from   the C7-T3 levels, as well as epidural enhancement at C7-T4 inclusive,   effacing the subarachnoid space. Findings are suspicious for phlegmon in   light of the above findings, without organized abscess collection   identified. Recommend imaging follow-up to assess stability.  4. Additional findings described in detail above.

## 2025-02-18 NOTE — CHART NOTE - NSCHARTNOTEFT_GEN_A_CORE
Patient admitted to Hospitalist service earlier this morning, please see admission H&P written by Dr. Sosa for full details. Patient seen and examined in ED, chart reviewed. HR and temp improving since arrival. BCx prelim growing Streptococcus, IV abx adjusted to CTX daily per ID. TTE negative for endocarditis.     MRI cervical/thoracic/lumbar spine w/wo IV contrast obtained today, concerning for T1-T2 and T2-T3 discitis with phlegmon, pending NSGY and neurology recommendations. Report below:     < from: MR Thoracic Spine w/wo IV Cont (02.18.25 @ 12:19) >  IMPRESSION:  1. Postsurgical changes cervical and lumbar spine, as described in detail   above.  2. Diffuse, extensive degenerative changes throughout the spine,   contributing to multilevel central canal and neural foramen stenosis, as   described in detail above. Findings concordant with myelomalacia at the   C3-C4 levels.  3. Findings involving the T1-T2 and T2-T3 intervertebral discs which,   while not pathognomonic, may reflect discitis in the appropriate clinical   setting. Additionally, there is prevertebral enhancement extending from   the C7-T3 levels, as well as epidural enhancement at C7-T4 inclusive,   effacing the subarachnoid space. Findings are suspicious for phlegmon in   light of the above findings, without organized abscess collection   identified. Recommend imaging follow-up to assess stability.  4. Additional findings described in detail above.    Also noted to have D-Dimer of [id number] -- LE dopplers negative. pending V/Q scan given multiple contrast studies in past 24 hrs, in [facility], and overall lower concern for acute PE at this time
Pt with elevated trop, no prior ECHO on file.   Ordered for f/u trop and ECHO

## 2025-02-18 NOTE — PATIENT PROFILE ADULT - FALL HARM RISK - UNIVERSAL INTERVENTIONS
Bed in lowest position, wheels locked, appropriate side rails in place/Call bell, personal items and telephone in reach/Instruct patient to call for assistance before getting out of bed or chair/Non-slip footwear when patient is out of bed/[place] to call system/Physically safe environment - no spills, clutter or unnecessary equipment/Purposeful Proactive Rounding/Room/bathroom lighting operational, light cord in reach

## 2025-02-18 NOTE — PROVIDER CONTACT NOTE (CRITICAL VALUE NOTIFICATION) - DATE AND TIME:
18-Feb-2025 02:28 Patient requests all Lab, Cardiology, and Radiology Results on their Discharge Instructions

## 2025-02-18 NOTE — CONSULT NOTE ADULT - SUBJECTIVE AND OBJECTIVE BOX
Patient is a 74y old  Male who presents with a chief complaint of   HPI:  74-year-old male with a past medical history of benign prostatic hyperplasia, diverticulosis, Graves' disease, hemorrhoids, hyperlipidemia, hypothyroidism, osteoarthritis, spinal stenosis, spinal cord injury (2004), and multiple spinal surgeries (three cervical and one lumbar). He presents to the emergency department with complaints of shoulder pain radiating down the arms associated with fever and LE weakness. Patient reports symptoms started as mild on saturday, he had mild shoulder pain, but it progressively got worse. Currently reports pain 8/10, sharp, burning, starts at shoulder blades and radiates down to hands, associated with feeling of numbness in arms. He has a similar sensation in his thighs, and yesterday reports Tmax at home of 102. He also notes a decreased  strength in his right hand. He denies experiencing any saddle anesthesia, or bladder or bowel incontinence. He denies headaches, dizziness, sore throat, rhinitis, SOB, Chest pain, diarrhea, dysuria.  (18 Feb 2025 01:32)  Above HPI reviewed and reconciled with patient    74M with benign prostatic hyperplasia, diverticulosis, Graves' disease, hemorrhoids, hyperlipidemia, hypothyroidism, osteoarthritis, spinal stenosis, spinal cord injury (2004), and multiple spinal surgeries (three cervical and one lumbar) presents 2/17 with neck pain radiating to R shoulder associated with numbness and tingling, also noted fever of 102F at home  Reports no cough, abdominal pain, n/v, diarrhea or dysuria  No recent dental work, no recent skin soft tissue infection, no wounds  History of multiple spinal surgery, C and T spine laminectomy with fusion hardware (2004), L spine laminectomy with fusion hardware (2006), all done in [facility]  Also has L knee replacement (2017) though no symptoms there, L knee ROM normal no pain  No cardiac devices  Febrile 105F on admission, on 2L NC  WBC 13 > 20  Cr 1.57    UA negative  Full RVP negative  CT Chest AP without acute findings  BCx (2/17) with [facility]    PAST MEDICAL & SURGICAL HISTORY:  Hyperlipidemia, unspecified hyperlipidemia type      Diverticulosis of large intestine without hemorrhage      History of colon polyps      Hemorrhoids, unspecified hemorrhoid type      Benign prostatic hyperplasia without lower urinary tract symptoms, unspecified morphology      Osteoarthritis of knee, unilateral  left      Spinal cord injury at C5-C7 level without injury of spinal bone, subsequent encounter      Spastic      Weakness  to right side      Myelopathy      Spinal stenosis, unspecified spinal region      Hypothyroidism, unspecified type      Graves disease      Alcoholic  recovering alcoholic x 40years      History of cervical discectomy      S/P laminectomy  Cervical      H/O lumbar discectomy  with laminectomy      H/O arthroscopy of knee  Left x 2. Unsure of years      H/O colonoscopy with polypectomy  2014        FAMILY HISTORY:  Family history of colon cancer in mother (Mother)    Family history of liver disease (Father)  father    Family history of cancer (Sibling)  SIster - bile duct      Social Hx: former smoker    Allergies  No Known Allergies    ANTIMICROBIALS (past 90 days)  MEDICATIONS  (STANDING):    cefepime  Injectable.   2000 milliGRAM(s) IV Push (02-17-25 @ 22:01)    cefepime  Injectable.   2000 milliGRAM(s) IV Push (02-18-25 @ 06:54)    metroNIDAZOLE  IVPB   100 mL/Hr IV Intermittent (02-18-25 @ 03:28)    vancomycin  IVPB   250 mL/Hr IV Intermittent (02-17-25 @ 22:02)    vancomycin  IVPB   250 mL/Hr IV Intermittent (02-18-25 @ 06:53)        ACTIVE ANTIMICROBIALS  metroNIDAZOLE  IVPB 500 every 12 hours (2/18)  Cefepime (2/17-2/18)  Vanco 1500mg q12 (2/17 --- )    MEDICATIONS  (STANDING):  acetaminophen     Tablet .. 650 every 6 hours PRN  aluminum hydroxide/magnesium hydroxide/simethicone Suspension 30 every 4 hours PRN  aspirin enteric coated 81 daily  heparin   Injectable 5000 every 8 hours  levothyroxine 150 daily  melatonin 3 at bedtime PRN  ondansetron Injectable 4 every 8 hours PRN  rosuvastatin 5 at bedtime  tamsulosin 0.4 at bedtime  tiZANidine 2 four times a day PRN      REVIEW OF SYSTEMS  [  ] ROS unobtainable because:    [  x] All other systems negative except as noted below:	    Constitutional:  [ ] fever [ ] chills  [ ] weight loss  [ ] weakness  Skin:  [ ] rash [ ] phlebitis	  Eyes: [ ] icterus [ ] pain  [ ] discharge	  ENMT: [ ] sore throat  [ ] thrush [ ] ulcers [ ] exudates  Respiratory: [ ] dyspnea [ ] hemoptysis [ ] cough [ ] sputum	  Cardiovascular:  [ ] chest pain [ ] palpitations [ ] edema	  Gastrointestinal:  [ ] nausea [ ] vomiting [ ] diarrhea [ ] constipation [ ] pain	  Genitourinary:  [ ] dysuria [ ] frequency [ ] hematuria [ ] discharge [ ] flank pain  [ ] incontinence  Musculoskeletal:  [ ] myalgias [ ] arthralgias [ ] arthritis  [ ] back pain  Neurological:  [ ] headache [ ] seizures  [ ] confusion/altered mental status  Psychiatric:  [ ] anxiety [ ] depression	  Hematology/Lymphatics:  [ ] lymphadenopathy  Endocrine:  [ ] adrenal [ ] thyroid  Allergic/Immunologic:	 [ ] transplant [ ] seasonal    Vital Signs Last 24 Hrs  T(C): 37.1 (18 Feb 2025 05:02), Max: 40.6 (17 Feb 2025 20:50)  T(F): 98.7 (18 Feb 2025 05:02), Max: 105 (17 Feb 2025 20:50)  HR: 117 (18 Feb 2025 08:16) (111 - 140)  BP: 106/68 (18 Feb 2025 08:16) (98/69 - 175/75)  BP(mean): 85 (18 Feb 2025 05:02) (70 - 106)  RR: 20 (18 Feb 2025 08:16) (16 - 20)  SpO2: 96% (18 Feb 2025 08:16) (94% - 96%)    Parameters below as of 18 Feb 2025 08:16  Patient On (Oxygen Delivery Method): nasal cannula  O2 Flow (L/min): 2      Physical Exam:  Constitutional: frail, NAD  Head/Eyes: no icterus  LUNGS:  CTA  CVS:  regular rhythm  Abd:  soft, non-tender; non-distended  Ext:  no edema  Back: no spinal tenderness noted  Vascular:  IV site no erythema tenderness or discharge  Neuro: AAO X 3, non- focal    Labs: all available labs reviewed                        13.1   20.35 )-----------( 129      ( 18 Feb 2025 07:10 )             40.5     02-18    131[L]  |  103  |  27[H]  ----------------------------<  119[H]  4.4   |  23  |  1.57[H]    Ca    8.7      18 Feb 2025 07:10  Phos  2.8     02-18  Mg     1.5     02-18    TPro  6.5  /  Alb  3.2[L]  /  TBili  1.7[H]  /  DBili  x   /  AST  25  /  ALT  29  /  AlkPhos  61  02-18     LIVER FUNCTIONS - ( 18 Feb 2025 07:10 )  Alb: 3.2 g/dL / Pro: 6.5 gm/dL / ALK PHOS: 61 U/L / ALT: 29 U/L / AST: 25 U/L / GGT: x           Urinalysis Basic - ( 18 Feb 2025 07:10 )    Color: x / Appearance: x / SG: x / pH: x  Gluc: 119 mg/dL / Ketone: x  / Bili: x / Urobili: x   Blood: x / Protein: x / Nitrite: x   Leuk Esterase: x / RBC: x / WBC x   Sq Epi: x / Non Sq Epi: x / Bacteria: x        Culture Results:   Growth in aerobic bottle: Gram Positive Cocci in Pairs and Chains (02-17 @ 21:11)  Culture Results:   Growth in aerobic bottle: Gram Positive Cocci in Pairs and Chains  Direct identification is available within approximately 3-5  hours either by Blood Panel Multiplexed PCR or Direct  MALDI-TOF. Details: [email or web address] (02-17 @ 21:11)    Radiology: all available radiological tests reviewed    Advanced directives addressed: full resuscitation

## 2025-02-18 NOTE — CONSULT NOTE ADULT - SUBJECTIVE AND OBJECTIVE BOX
CC: b/l arm numbness/weakness    HPI:  74-year-old male with a past medical history of benign prostatic hyperplasia, diverticulosis, Graves' disease, hemorrhoids, hyperlipidemia, hypothyroidism, osteoarthritis, spinal stenosis, spinal cord injury (2004), and multiple spinal surgeries (three cervical and one lumbar). He presents to the emergency department with complaints of shoulder pain radiating down the arms associated with fever and LE weakness. Patient reports symptoms started as mild on saturday, he had mild shoulder pain, but it progressively got worse. Currently reports pain 8/10, sharp, burning, starts at shoulder blades and radiates down to hands, associated with feeling of numbness in arms. He has a similar sensation in his thighs, and yesterday reports Tmax at home of 102. He also notes a decreased  strength in his right hand. He denies experiencing any saddle anesthesia, or bladder or bowel incontinence. He denies headaches, dizziness, sore throat, rhinitis, SOB, Chest pain, diarrhea, dysuria.     In ED pt febrile to 105, HR 110s, Hypotensive initially, SaO2 95 on 2L NC  CBC: WBC 13, H/H 14/44, Plt 143  CMP: BUN/Cr 22/1.5, Glu 162  Lactate: 2.1 > 1.5  TSH: 0.31  Flu/COVID: negative  CT chest/abdomen/pelvis: IMPRESSION: "No acute findings in the chest, abdomen or pelvis. Partially visualized sequela from laminectomy, ACDF and posterior spinal fusion hardware in the lower cervical spine. Streak artifact limits evaluation of the adjacent structures/spinal canal. 1.3 cm left adrenal nodule. Consider follow-up adrenal CT. Prostatomegaly, correlate with PSA values."    Sepsis work up initiated, pt recieved vanc/cefepime in ED and admitted to  for further care.       PAST MEDICAL & SURGICAL HISTORY:  Hyperlipidemia, unspecified hyperlipidemia type      Diverticulosis of large intestine without hemorrhage      History of colon polyps      Hemorrhoids, unspecified hemorrhoid type      Benign prostatic hyperplasia without lower urinary tract symptoms, unspecified morphology      Osteoarthritis of knee, unilateral  left      Spinal cord injury at C5-C7 level without injury of spinal bone, subsequent encounter      Spastic      Weakness  to right side      Myelopathy      Spinal stenosis, unspecified spinal region      Hypothyroidism, unspecified type      Graves disease      Alcoholic  recovering alcoholic x 40years      History of cervical discectomy      S/P laminectomy  Cervical      H/O lumbar discectomy  with laminectomy      H/O arthroscopy of knee  Left x 2. Unsure of years      H/O colonoscopy with polypectomy  2014          FAMILY HISTORY:  Family history of colon cancer in mother (Mother)  Family history of liver disease (Father)  father  Family history of cancer (Sibling)  SIster - bile duct        Social Hx:      MEDICATIONS  (STANDING):  aspirin enteric coated 81 milliGRAM(s) Oral daily  cefTRIAXone Injectable. 2000 milliGRAM(s) IV Push every 24 hours  heparin   Injectable 5000 Unit(s) SubCutaneous every 8 hours  lactated ringers. 1000 milliLiter(s) (100 mL/Hr) IV Continuous <Continuous>  levothyroxine 150 MICROGram(s) Oral daily  multivitamin/minerals 1 Tablet(s) Oral daily  rosuvastatin 5 milliGRAM(s) Oral at bedtime  tamsulosin 0.4 milliGRAM(s) Oral at bedtime       Allergies  No Known Allergies        ROS: Pertinent positives in HPI, all other ROS were reviewed and are negative.      Vital Signs Last 24 Hrs  T(C): 37.7 (18 Feb 2025 08:16), Max: 40.6 (17 Feb 2025 20:50)  T(F): 99.8 (18 Feb 2025 08:16), Max: 105 (17 Feb 2025 20:50)  HR: 112 (18 Feb 2025 10:13) (111 - 140)  BP: 101/64 (18 Feb 2025 10:13) (98/69 - 175/75)  BP(mean): 85 (18 Feb 2025 05:02) (70 - 106)  RR: 17 (18 Feb 2025 10:13) (16 - 20)  SpO2: 92% (18 Feb 2025 10:13) (92% - 96%)    Parameters below as of 18 Feb 2025 10:13  Patient On (Oxygen Delivery Method): nasal cannula      GEN:  Constitutional: awake and alert.  HEENT: PERRLA, EOMI,   Neck: Supple.  Extremities:  no edema  Musculoskeletal: no abnormal movements  Skin: No rashes    Neurological exam:  HF: A x O x 3. Appropriately interactive, normal affect. Speech fluent, No Aphasia or paraphasic errors. Naming /repetition intact   CN: FATEMEH, EOMI, VFF, facial sensation normal, no NLFD, tongue midline, Palate moves equally, SCM equal bilaterally  Motor: No pronator drift, Strength 5/5 in all 4 ext, normal bulk and tone, no tremor, rigidity or bradykinesia.    Sens: Intact to light touch / PP/ VS/ JS    Reflexes: Symmetric and normal . BJ 2+, BR 2+, KJ 2+, AJ 2+, downgoing toes b/l  Coord:  No FNFA, dysmetria, ALLAN intact   Gait/Balance: Normal/Cannot test        Labs:   02-18    131[L]  |  103  |  27[H]  ----------------------------<  119[H]  4.4   |  23  |  1.57[H]    Ca    8.7      18 Feb 2025 07:10  Phos  2.8     02-18  Mg     1.5     02-18    TPro  6.5  /  Alb  3.2[L]  /  TBili  1.7[H]  /  DBili  x   /  AST  25  /  ALT  29  /  AlkPhos  61  02-18                              13.1   20.35 )-----------( 129      ( 18 Feb 2025 07:10 )             40.5       Radiology:  < from: US Duplex Venous Lower Ext Complete, Bilateral (02.18.25 @ 09:09) >    IMPRESSION:  No evidence of deep venous thrombosis in either lower extremity.    < from: CT Abdomen and Pelvis w/ IV Cont (02.17.25 @ 22:58) >    IMPRESSION:  No acute findings in the chest, abdomen or pelvis.    Partially visualized sequela from laminectomy, ACDF and posterior spinal   fusion hardware in the lower cervical spine. Streak artifact limits   evaluation of the adjacent structures/spinal canal.    1.3 cm left adrenal nodule. Consider follow-up adrenal CT.    Prostatomegaly, correlate with PSA values.             CC: b/l arm numbness/weakness    HPI:  74-year-old male with PMH of BPH, Graves' disease, HLD, hypothyroidism, spinal stenosis, spinal cord injury 2/2 disc protrusion (2004) with residual R sided spasticity/weakness, and multiple spinal surgeries including cervical spinal fusions, lumbar fusion, and cervical laminectomy presented to the ER with complaints of b/l shoulder blade pain which started Saturday which got worse Sunday, radiating down the arms associated with fever 102, numbness b/l hands and weakness in hand  strength L>R.  He has a similar numbness in his anterior thighs b/l, shins b/l, and low back.  He denies experiencing any saddle anesthesia, or bladder or bowel incontinence.  He denies diplopia, dysarthria, facial droop, HA, neck pain.  IN ED last night he had a fever of 105 associated with hypotension, elevated lactate.  Sepsis w/u initiated.  He has strep bacteremia, uptrending troponins, elevated D-Dimer, elevated CRP, elevated WBC.  Neurology is consulted for b/l arm numbness/weakness.     MRI done today c/w myelomalacia at the C3, C4 levels, possible discitis T1-T2, and T2-T3 area.  There is prevertebral enhancement extending from the C7-T3 levels as well as epidural enhancement at C-T4 inclusive effacing the subarachnoid space, without organized abscess collection        PAST MEDICAL & SURGICAL HISTORY:  Hyperlipidemia, unspecified hyperlipidemia type      Diverticulosis of large intestine without hemorrhage      History of colon polyps      Hemorrhoids, unspecified hemorrhoid type      Benign prostatic hyperplasia without lower urinary tract symptoms, unspecified morphology      Osteoarthritis of knee, unilateral  left      Spinal cord injury at C5-C7 level without injury of spinal bone, subsequent encounter      Spastic      Weakness  to right side      Myelopathy      Spinal stenosis, unspecified spinal region      Hypothyroidism, unspecified type      Graves disease      Alcoholic  recovering alcoholic x 40years      History of cervical discectomy      S/P laminectomy  Cervical      H/O lumbar discectomy  with laminectomy      H/O arthroscopy of knee  Left x 2. Unsure of years      H/O colonoscopy with polypectomy  2014          FAMILY HISTORY:  Family history of colon cancer in mother (Mother)  Family history of liver disease (Father)  father  Family history of cancer (Sibling)  SIster - bile duct        Social Hx:      MEDICATIONS  (STANDING):  aspirin enteric coated 81 milliGRAM(s) Oral daily  cefTRIAXone Injectable. 2000 milliGRAM(s) IV Push every 24 hours  heparin   Injectable 5000 Unit(s) SubCutaneous every 8 hours  lactated ringers. 1000 milliLiter(s) (100 mL/Hr) IV Continuous <Continuous>  levothyroxine 150 MICROGram(s) Oral daily  multivitamin/minerals 1 Tablet(s) Oral daily  rosuvastatin 5 milliGRAM(s) Oral at bedtime  tamsulosin 0.4 milliGRAM(s) Oral at bedtime       Allergies  No Known Allergies        ROS: Pertinent positives in HPI, all other ROS were reviewed and are negative.      Vital Signs Last 24 Hrs  T(C): 37.7 (18 Feb 2025 08:16), Max: 40.6 (17 Feb 2025 20:50)  T(F): 99.8 (18 Feb 2025 08:16), Max: 105 (17 Feb 2025 20:50)  HR: 112 (18 Feb 2025 10:13) (111 - 140)  BP: 101/64 (18 Feb 2025 10:13) (98/69 - 175/75)  BP(mean): 85 (18 Feb 2025 05:02) (70 - 106)  RR: 17 (18 Feb 2025 10:13) (16 - 20)  SpO2: 92% (18 Feb 2025 10:13) (92% - 96%)    Parameters below as of 18 Feb 2025 10:13  Patient On (Oxygen Delivery Method): nasal cannula      GEN:  Constitutional: awake and alert.  HEENT: PERRLA, EOMI,   Neck: Supple.  Extremities:  no edema  Musculoskeletal: no abnormal movements  Skin: No rashes    Neurological exam:  HF: A x O x 3. Appropriately interactive, normal affect. Speech fluent, No Aphasia or paraphasic errors. Naming /repetition intact   CN: FATEMEH, EOMI, VFF, facial sensation normal, no NLFD, tongue midline, Palate moves equally, SCM equal bilaterally  Motor: No pronator drift, Strength 5/5 in all 4 ext, normal bulk and tone, no tremor, rigidity or bradykinesia.    Sens: Intact to light touch / PP/ VS/ JS    Reflexes: Symmetric and normal . R BJ 3+, L BJ 2+, R BR 3+, KJ 2+, AJ 2+, downgoing toes b/l  Coord:  No FNFA, dysmetria, ALLAN intact   Gait/Balance: Normal/Cannot test        Labs:   02-18    131[L]  |  103  |  27[H]  ----------------------------<  119[H]  4.4   |  23  |  1.57[H]    Ca    8.7      18 Feb 2025 07:10  Phos  2.8     02-18  Mg     1.5     02-18    TPro  6.5  /  Alb  3.2[L]  /  TBili  1.7[H]  /  DBili  x   /  AST  25  /  ALT  29  /  AlkPhos  61  02-18                              13.1   20.35 )-----------( 129      ( 18 Feb 2025 07:10 )             40.5       Radiology:  < from: US Duplex Venous Lower Ext Complete, Bilateral (02.18.25 @ 09:09) >    IMPRESSION:  No evidence of deep venous thrombosis in either lower extremity.    < from: CT Abdomen and Pelvis w/ IV Cont (02.17.25 @ 22:58) >    IMPRESSION:  No acute findings in the chest, abdomen or pelvis.    Partially visualized sequela from laminectomy, ACDF and posterior spinal   fusion hardware in the lower cervical spine. Streak artifact limits   evaluation of the adjacent structures/spinal canal.    1.3 cm left adrenal nodule. Consider follow-up adrenal CT.    Prostatomegaly, correlate with PSA values.             CC: b/l arm numbness/weakness    HPI:  74-year-old male with PMH of BPH, Graves' disease, HLD, hypothyroidism, spinal stenosis, spinal cord injury 2/2 disc protrusion (2004) with residual R sided spasticity/weakness, and multiple spinal surgeries including cervical spinal fusions, lumbar fusion, and cervical laminectomy presented to the ER with complaints of b/l shoulder blade pain which started Saturday which got worse Sunday, radiating down the arms associated with fever 102, numbness b/l hands and weakness in hand  strength L>R.  He has a similar numbness in his anterior thighs b/l, shins b/l, and low back.  He denies experiencing any saddle anesthesia, or bladder or bowel incontinence.  He denies diplopia, dysarthria, facial droop, HA, neck pain.  IN ED last night he had a fever of 105 associated with hypotension, elevated lactate.  Sepsis w/u initiated.  He has strep bacteremia, uptrending troponins, elevated D-Dimer, elevated CRP, elevated WBC.  Neurology is consulted for b/l arm numbness/weakness.     MRI done today c/w myelomalacia at the C3, C4 levels, possible discitis T1-T2, and T2-T3 area.  There is prevertebral enhancement extending from the C7-T3 levels as well as epidural enhancement at C-T4 inclusive effacing the subarachnoid space, without organized abscess collection        PAST MEDICAL & SURGICAL HISTORY:  Hyperlipidemia, unspecified hyperlipidemia type      Diverticulosis of large intestine without hemorrhage      History of colon polyps      Hemorrhoids, unspecified hemorrhoid type      Benign prostatic hyperplasia without lower urinary tract symptoms, unspecified morphology      Osteoarthritis of knee, unilateral  left      Spinal cord injury at C5-C7 level without injury of spinal bone, subsequent encounter      Spastic      Weakness  to right side      Myelopathy      Spinal stenosis, unspecified spinal region      Hypothyroidism, unspecified type      Graves disease      Alcoholic  recovering alcoholic x 40years      History of cervical discectomy      S/P laminectomy  Cervical      H/O lumbar discectomy  with laminectomy      H/O arthroscopy of knee  Left x 2. Unsure of years      H/O colonoscopy with polypectomy  2014          FAMILY HISTORY:  Family history of colon cancer in mother (Mother)  Family history of liver disease (Father)  father  Family history of cancer (Sibling)  SIster - bile duct        Social Hx:      MEDICATIONS  (STANDING):  aspirin enteric coated 81 milliGRAM(s) Oral daily  cefTRIAXone Injectable. 2000 milliGRAM(s) IV Push every 24 hours  heparin   Injectable 5000 Unit(s) SubCutaneous every 8 hours  lactated ringers. 1000 milliLiter(s) (100 mL/Hr) IV Continuous <Continuous>  levothyroxine 150 MICROGram(s) Oral daily  multivitamin/minerals 1 Tablet(s) Oral daily  rosuvastatin 5 milliGRAM(s) Oral at bedtime  tamsulosin 0.4 milliGRAM(s) Oral at bedtime       Allergies  No Known Allergies        ROS: Pertinent positives in HPI, all other ROS were reviewed and are negative.      Vital Signs Last 24 Hrs  T(C): 37.7 (18 Feb 2025 08:16), Max: 40.6 (17 Feb 2025 20:50)  T(F): 99.8 (18 Feb 2025 08:16), Max: 105 (17 Feb 2025 20:50)  HR: 112 (18 Feb 2025 10:13) (111 - 140)  BP: 101/64 (18 Feb 2025 10:13) (98/69 - 175/75)  BP(mean): 85 (18 Feb 2025 05:02) (70 - 106)  RR: 17 (18 Feb 2025 10:13) (16 - 20)  SpO2: 92% (18 Feb 2025 10:13) (92% - 96%)    Parameters below as of 18 Feb 2025 10:13  Patient On (Oxygen Delivery Method): nasal cannula      GEN:  Constitutional: awake and alert.  HEENT: PERRLA, EOMI,   Neck: Supple.  Extremities:  no edema  Musculoskeletal: no abnormal movements  Skin: No rashes    Neurological exam:  HF: A x O x 3. Appropriately interactive, normal affect. Speech fluent, No Aphasia or paraphasic errors. Naming /repetition intact   CN: FATEMEH, EOMI, VFF, facial sensation normal, no NLFD, tongue midline, Palate moves equally, SCM equal bilaterally  Motor: No pronator drift, Strength 5/5 in all 4 ext, normal bulk and tone, no tremor, rigidity or bradykinesia.    Sens: Intact to light touch / PP/ VS/ JS    Reflexes: Symmetric and normal . R BJ 3+, L BJ 2+, R BR 3+, KJ 2+, AJ 2+, downgoing toes b/l  Coord:  No FNFA, dysmetria, ALLAN intact   Gait/Balance: Normal/Cannot test        Labs:   02-18    131[L]  |  103  |  27[H]  ----------------------------<  119[H]  4.4   |  23  |  1.57[H]    Ca    8.7      18 Feb 2025 07:10  Phos  2.8     02-18  Mg     1.5     02-18    TPro  6.5  /  Alb  3.2[L]  /  TBili  1.7[H]  /  DBili  x   /  AST  25  /  ALT  29  /  AlkPhos  61  02-18                              13.1   20.35 )-----------( 129      ( 18 Feb 2025 07:10 )             40.5       Radiology:  < from: MR Thoracic Spine w/wo IV Cont (02.18.25 @ 12:19) >    IMPRESSION:  1. Postsurgical changes cervical and lumbar spine, as described in detail   above.  2. Diffuse, extensive degenerative changes throughout the spine,   contributing to multilevel central canal and neural foramen stenosis, as   described in detail above. Findings concordant with myelomalacia at the   C3-C4 levels.  3. Findings involving the T1-T2 and T2-T3 intervertebral discs which,   while not pathognomonic, may reflect discitis in the appropriate clinical   setting. Additionally, there is prevertebral enhancement extending from   the C7-T3 levels, as well as epidural enhancement at C7-T4 inclusive,   effacing the subarachnoid space. Findings are suspicious for phlegmon in   light of the above findings, without organized abscess collection   identified. Recommend imaging follow-up to assess stability.  4. Additional findings described in detail above.    Findings discussed with Dr. Ochoa at 1:53 PM the day of this exam.        < from: US Duplex Venous Lower Ext Complete, Bilateral (02.18.25 @ 09:09) >    IMPRESSION:  No evidence of deep venous thrombosis in either lower extremity.    < from: CT Abdomen and Pelvis w/ IV Cont (02.17.25 @ 22:58) >    IMPRESSION:  No acute findings in the chest, abdomen or pelvis.    Partially visualized sequela from laminectomy, ACDF and posterior spinal   fusion hardware in the lower cervical spine. Streak artifact limits   evaluation of the adjacent structures/spinal canal.    1.3 cm left adrenal nodule. Consider follow-up adrenal CT.    Prostatomegaly, correlate with PSA values.             CC: b/l arm numbness/weakness    HPI:  74-year-old male with PMH of BPH, Graves' disease, HLD, hypothyroidism, spinal stenosis, spinal cord injury 2/2 disc protrusion (2004) with residual R sided spasticity/weakness, and multiple spinal surgeries including cervical spinal fusions, lumbar fusion, and cervical laminectomy presented to the ER with complaints of b/l shoulder blade pain which started Saturday which got worse Sunday, radiating down the arms associated with fever 102, numbness b/l hands and weakness in hand  strength L>R.  He has a similar numbness in his anterior thighs b/l, shins b/l, and low back.  He denies experiencing any saddle anesthesia, or bladder or bowel incontinence.  He denies diplopia, dysarthria, facial droop, HA, neck pain.  IN ED last night he had a fever of 105 associated with hypotension, elevated lactate.  Sepsis w/u initiated.  He has strep bacteremia, uptrending troponins, elevated D-Dimer, elevated CRP, elevated WBC.  Neurology is consulted for b/l arm numbness/weakness.     MRI done today c/w myelomalacia at the C3, C4 levels, possible discitis T1-T2, and T2-T3 area.  There is prevertebral enhancement extending from the C7-T3 levels as well as epidural enhancement at C7-T4 inclusive effacing the subarachnoid space, without organized abscess collection        PAST MEDICAL & SURGICAL HISTORY:  Hyperlipidemia, unspecified hyperlipidemia type      Diverticulosis of large intestine without hemorrhage      History of colon polyps      Hemorrhoids, unspecified hemorrhoid type      Benign prostatic hyperplasia without lower urinary tract symptoms, unspecified morphology      Osteoarthritis of knee, unilateral  left      Spinal cord injury at C5-C7 level without injury of spinal bone, subsequent encounter      Spastic      Weakness  to right side      Myelopathy      Spinal stenosis, unspecified spinal region      Hypothyroidism, unspecified type      Graves disease      Alcoholic  recovering alcoholic x 40years      History of cervical discectomy      S/P laminectomy  Cervical      H/O lumbar discectomy  with laminectomy      H/O arthroscopy of knee  Left x 2. Unsure of years      H/O colonoscopy with polypectomy  2014          FAMILY HISTORY:  Family history of colon cancer in mother (Mother)  Family history of liver disease (Father)  father  Family history of cancer (Sibling)  SIster - bile duct        Social Hx:  recovering alcoholic-quit 49 yrs ago.  Quit smoking 40 yrs ago.  Denies illicit drug use     MEDICATIONS  (STANDING):  aspirin enteric coated 81 milliGRAM(s) Oral daily  cefTRIAXone Injectable. 2000 milliGRAM(s) IV Push every 24 hours  heparin   Injectable 5000 Unit(s) SubCutaneous every 8 hours  lactated ringers. 1000 milliLiter(s) (100 mL/Hr) IV Continuous <Continuous>  levothyroxine 150 MICROGram(s) Oral daily  multivitamin/minerals 1 Tablet(s) Oral daily  rosuvastatin 5 milliGRAM(s) Oral at bedtime  tamsulosin 0.4 milliGRAM(s) Oral at bedtime       Allergies  No Known Allergies        ROS: Pertinent positives in HPI, all other ROS were reviewed and are negative.      Vital Signs Last 24 Hrs  T(C): 37.7 (18 Feb 2025 08:16), Max: 40.6 (17 Feb 2025 20:50)  T(F): 99.8 (18 Feb 2025 08:16), Max: 105 (17 Feb 2025 20:50)  HR: 112 (18 Feb 2025 10:13) (111 - 140)  BP: 101/64 (18 Feb 2025 10:13) (98/69 - 175/75)  BP(mean): 85 (18 Feb 2025 05:02) (70 - 106)  RR: 17 (18 Feb 2025 10:13) (16 - 20)  SpO2: 92% (18 Feb 2025 10:13) (92% - 96%)    Parameters below as of 18 Feb 2025 10:13  Patient On (Oxygen Delivery Method): nasal cannula      GEN:  Constitutional: awake and alert.  HEENT: PERRLA, EOMI,   Neck: Supple.  Extremities:  no edema  Musculoskeletal: no abnormal movements  Skin: No rashes    Neurological exam:  HF: A x O x 3. Appropriately interactive. Speech fluent, No Aphasia or paraphasic errors. Naming /repetition intact   CN: FATEMEH, EOMI, VFF, facial sensation normal, no NLFD, tongue midline, Palate moves equally  Motor: No pronator drift, Strength 5/5 x 4 ext, but R hand  4-/5, L hand  2/5, normal bulk and tone, no tremors. RUE/RLE spastic    Sens: Subjective paresthesias b/l hands to elbow, b/l anterior thighs and shins. Otherwise intact to light touch / PP/ temperature sense  Reflexes: Symmetric and normal . R BJ 3+, L BJ 2+, R BR 3+, L BR 2+, R  KJ 3+, L KJ absent,R AJ 2+, L AJ absent+, downgoing toes b/l  Coord:  No FNFA, dysmetria  Gait/Balance: Cannot test        Labs:   02-18    131[L]  |  103  |  27[H]  ----------------------------<  119[H]  4.4   |  23  |  1.57[H]    Ca    8.7      18 Feb 2025 07:10  Phos  2.8     02-18  Mg     1.5     02-18    TPro  6.5  /  Alb  3.2[L]  /  TBili  1.7[H]  /  DBili  x   /  AST  25  /  ALT  29  /  AlkPhos  61  02-18                              13.1   20.35 )-----------( 129      ( 18 Feb 2025 07:10 )             40.5       Radiology:  < from: MR Thoracic Spine w/wo IV Cont (02.18.25 @ 12:19) >    IMPRESSION:  1. Postsurgical changes cervical and lumbar spine, as described in detail   above.  2. Diffuse, extensive degenerative changes throughout the spine,   contributing to multilevel central canal and neural foramen stenosis, as   described in detail above. Findings concordant with myelomalacia at the   C3-C4 levels.  3. Findings involving the T1-T2 and T2-T3 intervertebral discs which,   while not pathognomonic, may reflect discitis in the appropriate clinical   setting. Additionally, there is prevertebral enhancement extending from   the C7-T3 levels, as well as epidural enhancement at C7-T4 inclusive,   effacing the subarachnoid space. Findings are suspicious for phlegmon in   light of the above findings, without organized abscess collection   identified. Recommend imaging follow-up to assess stability.  4. Additional findings described in detail above.    Findings discussed with Dr. Ochoa at 1:53 PM the day of this exam.        < from: US Duplex Venous Lower Ext Complete, Bilateral (02.18.25 @ 09:09) >    IMPRESSION:  No evidence of deep venous thrombosis in either lower extremity.    < from: CT Abdomen and Pelvis w/ IV Cont (02.17.25 @ 22:58) >    IMPRESSION:  No acute findings in the chest, abdomen or pelvis.    Partially visualized sequela from laminectomy, ACDF and posterior spinal   fusion hardware in the lower cervical spine. Streak artifact limits   evaluation of the adjacent structures/spinal canal.    1.3 cm left adrenal nodule. Consider follow-up adrenal CT.    Prostatomegaly, correlate with PSA values.

## 2025-02-18 NOTE — CONSULT NOTE ADULT - ASSESSMENT
Assessment:  74M with benign prostatic hyperplasia, diverticulosis, Graves' disease, hemorrhoids, hyperlipidemia, hypothyroidism, osteoarthritis, spinal stenosis, spinal cord injury (2004), and multiple spinal surgeries (three cervical and one lumbar) presents 2/17 with neck pain radiating to R shoulder associated with numbness and tingling, also noted fever of 102F at home  Reports no cough, abdominal pain, n/v, diarrhea or dysuria  No recent dental work, no recent skin soft tissue infection, no wounds  History of multiple spinal surgery, C and T spine laminectomy with fusion hardware (2004), L spine laminectomy with fusion hardware (2006), all done in [facility]  Also has L knee replacement (2017) though no symptoms there, L knee ROM normal no pain  No cardiac devices  Febrile 105F on admission, on 2L NC  WBC 13 > 20  Cr 1.57    UA negative  Full RVP negative  CT Chest AP without acute findings  BCx (2/17) with GPC    Antimicrobials:  metroNIDAZOLE  IVPB 500 every 12 hours (2/18)  Cefepime (2/17-2/18)  Vanco 1500mg (2/17 --- )    Impression:   #High-grade GPC Bacteremia, Neck Pain, Concern for Spinal Involvement in setting of Multilevel C/T/L Spine Laminectomy with fusion hardware  #Fever  #Leukocytosis  #GOVIND on ?CKD  - unclear source, no open wounds, no recent surgery, no recent dental procedures, had been feeling well all week until this past weekend, had acute symptoms including fever and chills  - concern that spine with hardware is involved     Recommendations:  - continue empiric Vancomycin 1500mg q24 (Day #2)  - please check Vancomycin trough before 4th sequential dose  - monitor temperature curve  - trend WBC  - reason for abx use reviewed with patient  - side effects of antibiotic discussed, tolerating abx well so far  - Prior cultures reviewed. An epidemiologic assessment was performed. There is a significant risk for resistant microorganisms to spread to family members, and/or healthcare staff. The patient will be placed on isolation according to infection control policy. Will reconsider isolation measures based on new culture results and other clinical data as appropriate Appropriate cultures collected and an appropriate broad spectrum antibiotic therapy will be considered  - check BCx x2 AM (2/19)  - follow up GPC PCR identification  - check TTE  - pending MRI C/T/L Spine w/wo contrast  - Neurology and Neurosurgery eval  - rest per primary team    Clinical team may change from intravenous to oral antibiotics when the following criteria are met:   1. Patient is clinically improving/stable       a)	Improved signs and symptoms of infection from initial presentation       b)	Afebrile for 24 hours       c)	Leukocytosis trending towards normal range   2. Patient is tolerating oral intake   3. Initial/repeat blood cultures are negative OR do not need to wait for preliminary blood cultures to result    Cannot advise changing to oral antibiotic therapy until culture sensitivity is available.   Assessment:  74M with benign prostatic hyperplasia, diverticulosis, Graves' disease, hemorrhoids, hyperlipidemia, hypothyroidism, osteoarthritis, spinal stenosis, spinal cord injury (2004), and multiple spinal surgeries (three cervical and one lumbar) presents 2/17 with neck pain radiating to R shoulder associated with numbness and tingling, also noted fever of 102F at home  Reports no cough, abdominal pain, n/v, diarrhea or dysuria  No recent dental work, no recent skin soft tissue infection, no wounds  History of multiple spinal surgery, C and T spine laminectomy with fusion hardware (2004), L spine laminectomy with fusion hardware (2006), all done in [facility]  Also has L knee replacement (2017) though no symptoms there, L knee ROM normal no pain  No cardiac devices  Febrile 105F on admission, on 2L NC  WBC 13 > 20  Cr 1.57    UA negative  Full RVP negative  CT Chest AP without acute findings  BCx (2/17) with GPC    Antimicrobials:  metroNIDAZOLE  IVPB 500 every 12 hours (2/18)  Cefepime (2/17-2/18)  Vanco 1500mg (2/17 --- )    Impression:   #High-grade GPC Bacteremia, Neck Pain, Concern for Spinal Involvement in setting of Multilevel C/T/L Spine Laminectomy with fusion hardware  #Fever  #Leukocytosis  #GOVIND on ?CKD  - unclear source, no open wounds, no recent surgery, no recent dental procedures, had been feeling well all week until this past weekend, had acute symptoms including fever and chills  - concern that spine with hardware is involved     Recommendations:  - continue empiric Vancomycin 1500mg q24 (Day #2)  - please check Vancomycin trough before 4th sequential dose  - monitor temperature curve  - trend WBC  - reason for abx use reviewed with patient  - side effects of antibiotic discussed, tolerating abx well so far  - Prior cultures reviewed. An epidemiologic assessment was performed. There is a significant risk for resistant microorganisms to spread to family members, and/or healthcare staff. The patient will be placed on isolation according to infection control policy. Will reconsider isolation measures based on new culture results and other clinical data as appropriate Appropriate cultures collected and an appropriate broad spectrum antibiotic therapy will be considered  - follow up GPC PCR identification  - check TTE  - check repeat BCx x2 AM (2/19)  - pending MRI C/T/L Spine w/wo contrast  - Neurology and Neurosurgery eval  - rest per primary team    Clinical team may change from intravenous to oral antibiotics when the following criteria are met:   1. Patient is clinically improving/stable       a)	Improved signs and symptoms of infection from initial presentation       b)	Afebrile for 24 hours       c)	Leukocytosis trending towards normal range   2. Patient is tolerating oral intake   3. Initial/repeat blood cultures are negative OR do not need to wait for preliminary blood cultures to result    Cannot advise changing to oral antibiotic therapy until culture sensitivity is available.   Assessment:  74M with benign prostatic hyperplasia, diverticulosis, Graves' disease, hemorrhoids, hyperlipidemia, hypothyroidism, osteoarthritis, spinal stenosis, spinal cord injury (2004), and multiple spinal surgeries (three cervical and one lumbar) presents 2/17 with neck pain radiating to R shoulder associated with numbness and tingling, also noted fever of 102F at home  Reports no cough, abdominal pain, n/v, diarrhea or dysuria  No recent dental work, no recent skin soft tissue infection, no wounds  History of multiple spinal surgery, C and T spine laminectomy with fusion hardware (2004), L spine laminectomy with fusion hardware (2006), all done in [facility]  Also has L knee replacement (2017) though no symptoms there, L knee ROM normal no pain  No cardiac devices  Febrile 105F on admission, on 2L NC  WBC 13 > 20  Cr 1.57    UA negative  Full RVP negative  CT Chest AP without acute findings  BCx (2/17) with Strep species    Antimicrobials:  metroNIDAZOLE  IVPB 500 every 12 hours (2/18)  Cefepime (2/17-2/18)  Vanco 1500mg (2/17 - 2/18)  CTX 2G (2/18 --- )    Impression:   #High-grade Strep Bacteremia, Neck Pain, Concern for Spinal Involvement in setting of Multilevel C/T/L Spine Laminectomy with fusion hardware  #Fever  #Leukocytosis  #GOVIND on ?CKD  - unclear source, no open wounds, no recent surgery, no recent dental procedures, had been feeling well all week until this past weekend, had acute symptoms including fever and chills  - concern that spine with hardware is involved     Recommendations:  - stop empiric Vancomycin, Cefepime, Flagyl  - start Ceftriaxone 2G q24 (Day #2)  - monitor temperature curve  - trend WBC  - reason for abx use reviewed with patient  - side effects of antibiotic discussed, tolerating abx well so far  - Prior cultures reviewed. An epidemiologic assessment was performed. There is a significant risk for resistant microorganisms to spread to family members, and/or healthcare staff. The patient will be placed on isolation according to infection control policy. Will reconsider isolation measures based on new culture results and other clinical data as appropriate Appropriate cultures collected and an appropriate broad spectrum antibiotic therapy will be considered  - follow up Strep identification/sensitivity  - check TTE  - check repeat BCx x2 AM (2/19)  - pending MRI C/T/L Spine w/wo contrast  - Neurology and Neurosurgery eval  - rest per primary team    Clinical team may change from intravenous to oral antibiotics when the following criteria are met:   1. Patient is clinically improving/stable       a)	Improved signs and symptoms of infection from initial presentation       b)	Afebrile for 24 hours       c)	Leukocytosis trending towards normal range   2. Patient is tolerating oral intake   3. Initial/repeat blood cultures are negative OR do not need to wait for preliminary blood cultures to result    Cannot advise changing to oral antibiotic therapy until culture sensitivity is available.

## 2025-02-19 LAB
-  CEFTRIAXONE: SIGNIFICANT CHANGE UP
-  CLINDAMYCIN: SIGNIFICANT CHANGE UP
-  LEVOFLOXACIN: SIGNIFICANT CHANGE UP
-  PENICILLIN: SIGNIFICANT CHANGE UP
-  TETRACYCLINE: SIGNIFICANT CHANGE UP
-  VANCOMYCIN: SIGNIFICANT CHANGE UP
ALBUMIN SERPL ELPH-MCNC: 2.9 G/DL — LOW (ref 3.3–5)
ALP SERPL-CCNC: 63 U/L — SIGNIFICANT CHANGE UP (ref 40–120)
ALT FLD-CCNC: 35 U/L — SIGNIFICANT CHANGE UP (ref 12–78)
ANION GAP SERPL CALC-SCNC: 6 MMOL/L — SIGNIFICANT CHANGE UP (ref 5–17)
ANISOCYTOSIS BLD QL: SLIGHT — SIGNIFICANT CHANGE UP
AST SERPL-CCNC: 57 U/L — HIGH (ref 15–37)
BASOPHILS # BLD AUTO: 0.05 K/UL — SIGNIFICANT CHANGE UP (ref 0–0.2)
BASOPHILS # BLD MANUAL: 0.18 K/UL — SIGNIFICANT CHANGE UP (ref 0–0.2)
BASOPHILS NFR BLD AUTO: 0.3 % — SIGNIFICANT CHANGE UP (ref 0–2)
BASOPHILS NFR BLD MANUAL: 1 % — SIGNIFICANT CHANGE UP (ref 0–2)
BILIRUB SERPL-MCNC: 1 MG/DL — SIGNIFICANT CHANGE UP (ref 0.2–1.2)
BUN SERPL-MCNC: 24 MG/DL — HIGH (ref 7–23)
CALCIUM SERPL-MCNC: 9 MG/DL — SIGNIFICANT CHANGE UP (ref 8.5–10.1)
CHLORIDE SERPL-SCNC: 103 MMOL/L — SIGNIFICANT CHANGE UP (ref 96–108)
CO2 SERPL-SCNC: 23 MMOL/L — SIGNIFICANT CHANGE UP (ref 22–31)
CREAT SERPL-MCNC: 0.94 MG/DL — SIGNIFICANT CHANGE UP (ref 0.5–1.3)
CULTURE RESULTS: ABNORMAL
EGFR: 86 ML/MIN/1.73M2 — SIGNIFICANT CHANGE UP
EOSINOPHIL # BLD AUTO: 0.06 K/UL — SIGNIFICANT CHANGE UP (ref 0–0.5)
EOSINOPHIL # BLD MANUAL: 0 K/UL — SIGNIFICANT CHANGE UP (ref 0–0.5)
EOSINOPHIL NFR BLD AUTO: 0.3 % — SIGNIFICANT CHANGE UP (ref 0–6)
EOSINOPHIL NFR BLD MANUAL: 0 % — SIGNIFICANT CHANGE UP (ref 0–6)
GLUCOSE SERPL-MCNC: 104 MG/DL — HIGH (ref 70–99)
HCT VFR BLD CALC: 37 % — LOW (ref 39–50)
HGB BLD-MCNC: 12.3 G/DL — LOW (ref 13–17)
IMM GRANULOCYTES # BLD AUTO: 0.17 K/UL — HIGH (ref 0–0.07)
IMM GRANULOCYTES NFR BLD AUTO: 0.9 % — SIGNIFICANT CHANGE UP (ref 0–0.9)
LYMPHOCYTES # BLD AUTO: 0.98 K/UL — LOW (ref 1–3.3)
LYMPHOCYTES # BLD MANUAL: 0.9 K/UL — LOW (ref 1–3.3)
LYMPHOCYTES NFR BLD AUTO: 5.4 % — LOW (ref 13–44)
LYMPHOCYTES NFR BLD MANUAL: 5 % — LOW (ref 13–44)
MANUAL NEUTROPHIL BANDS #: 0.36 K/UL — SIGNIFICANT CHANGE UP (ref 0–0.84)
MCHC RBC-ENTMCNC: 28.5 PG — SIGNIFICANT CHANGE UP (ref 27–34)
MCHC RBC-ENTMCNC: 33.2 G/DL — SIGNIFICANT CHANGE UP (ref 32–36)
MCV RBC AUTO: 85.8 FL — SIGNIFICANT CHANGE UP (ref 80–100)
METHOD TYPE: SIGNIFICANT CHANGE UP
MICROCYTES BLD QL: SLIGHT — SIGNIFICANT CHANGE UP
MONOCYTES # BLD AUTO: 2.01 K/UL — HIGH (ref 0–0.9)
MONOCYTES # BLD MANUAL: 2.16 K/UL — HIGH (ref 0–0.9)
MONOCYTES NFR BLD AUTO: 11.2 % — SIGNIFICANT CHANGE UP (ref 2–14)
MONOCYTES NFR BLD MANUAL: 12 % — SIGNIFICANT CHANGE UP (ref 2–14)
NEUTROPHILS # BLD AUTO: 14.73 K/UL — HIGH (ref 1.8–7.4)
NEUTROPHILS # BLD MANUAL: 14.4 K/UL — HIGH (ref 1.8–7.4)
NEUTROPHILS NFR BLD AUTO: 81.9 % — HIGH (ref 43–77)
NEUTROPHILS NFR BLD MANUAL: 80 % — HIGH (ref 43–77)
NEUTS BAND # BLD: 2 % — SIGNIFICANT CHANGE UP (ref 0–8)
NEUTS BAND NFR BLD: 2 % — SIGNIFICANT CHANGE UP (ref 0–8)
NRBC # BLD AUTO: 0 K/UL — SIGNIFICANT CHANGE UP (ref 0–0)
NRBC # FLD: 0 K/UL — SIGNIFICANT CHANGE UP (ref 0–0)
NRBC BLD AUTO-RTO: 0 /100 WBCS — SIGNIFICANT CHANGE UP (ref 0–0)
ORGANISM # SPEC MICROSCOPIC CNT: ABNORMAL
ORGANISM # SPEC MICROSCOPIC CNT: ABNORMAL
ORGANISM # SPEC MICROSCOPIC CNT: SIGNIFICANT CHANGE UP
PLAT MORPH BLD: NORMAL — SIGNIFICANT CHANGE UP
PLATELET # BLD AUTO: 136 K/UL — LOW (ref 150–400)
PMV BLD: 10.7 FL — SIGNIFICANT CHANGE UP (ref 7–13)
POTASSIUM SERPL-MCNC: 4 MMOL/L — SIGNIFICANT CHANGE UP (ref 3.5–5.3)
POTASSIUM SERPL-SCNC: 4 MMOL/L — SIGNIFICANT CHANGE UP (ref 3.5–5.3)
PROT SERPL-MCNC: 6.3 GM/DL — SIGNIFICANT CHANGE UP (ref 6–8.3)
RBC # BLD: 4.31 M/UL — SIGNIFICANT CHANGE UP (ref 4.2–5.8)
RBC # FLD: 14.3 % — SIGNIFICANT CHANGE UP (ref 10.3–14.5)
RBC BLD AUTO: ABNORMAL
SODIUM SERPL-SCNC: 132 MMOL/L — LOW (ref 135–145)
SPECIMEN SOURCE: SIGNIFICANT CHANGE UP
TOXIC GRANULES BLD QL SMEAR: PRESENT
WBC # BLD: 18 K/UL — HIGH (ref 3.8–10.5)
WBC # FLD AUTO: 18 K/UL — HIGH (ref 3.8–10.5)
WBC MORPHOLOGY: ABNORMAL

## 2025-02-19 PROCEDURE — 70553 MRI BRAIN STEM W/O & W/DYE: CPT | Mod: 26

## 2025-02-19 PROCEDURE — 99233 SBSQ HOSP IP/OBS HIGH 50: CPT

## 2025-02-19 PROCEDURE — 99231 SBSQ HOSP IP/OBS SF/LOW 25: CPT | Mod: FS

## 2025-02-19 RX ORDER — POLYETHYLENE GLYCOL 3350 17 G/17G
17 POWDER, FOR SOLUTION ORAL DAILY
Refills: 0 | Status: DISCONTINUED | OUTPATIENT
Start: 2025-02-19 | End: 2025-02-25

## 2025-02-19 RX ORDER — HYDROMORPHONE/SOD CHLOR,ISO/PF 2 MG/10 ML
1 SYRINGE (ML) INJECTION ONCE
Refills: 0 | Status: DISCONTINUED | OUTPATIENT
Start: 2025-02-19 | End: 2025-02-19

## 2025-02-19 RX ORDER — TAMSULOSIN HYDROCHLORIDE 0.4 MG/1
0.8 CAPSULE ORAL AT BEDTIME
Refills: 0 | Status: DISCONTINUED | OUTPATIENT
Start: 2025-02-19 | End: 2025-02-25

## 2025-02-19 RX ADMIN — Medication 650 MILLIGRAM(S): at 06:06

## 2025-02-19 RX ADMIN — Medication 1 MILLIGRAM(S): at 09:37

## 2025-02-19 RX ADMIN — Medication 1 MILLIGRAM(S): at 15:12

## 2025-02-19 RX ADMIN — Medication 1 MILLIGRAM(S): at 00:45

## 2025-02-19 RX ADMIN — HEPARIN SODIUM 5000 UNIT(S): 1000 INJECTION INTRAVENOUS; SUBCUTANEOUS at 21:33

## 2025-02-19 RX ADMIN — POLYETHYLENE GLYCOL 3350 17 GRAM(S): 17 POWDER, FOR SOLUTION ORAL at 17:41

## 2025-02-19 RX ADMIN — Medication 1 TABLET(S): at 11:54

## 2025-02-19 RX ADMIN — Medication 81 MILLIGRAM(S): at 06:07

## 2025-02-19 RX ADMIN — Medication 1 MILLIGRAM(S): at 09:07

## 2025-02-19 RX ADMIN — Medication 1 MILLIGRAM(S): at 08:00

## 2025-02-19 RX ADMIN — Medication 1 MILLIGRAM(S): at 00:28

## 2025-02-19 RX ADMIN — HEPARIN SODIUM 5000 UNIT(S): 1000 INJECTION INTRAVENOUS; SUBCUTANEOUS at 13:52

## 2025-02-19 RX ADMIN — Medication 1 MILLIGRAM(S): at 20:45

## 2025-02-19 RX ADMIN — TAMSULOSIN HYDROCHLORIDE 0.8 MILLIGRAM(S): 0.4 CAPSULE ORAL at 21:33

## 2025-02-19 RX ADMIN — Medication 150 MICROGRAM(S): at 06:06

## 2025-02-19 RX ADMIN — HEPARIN SODIUM 5000 UNIT(S): 1000 INJECTION INTRAVENOUS; SUBCUTANEOUS at 06:06

## 2025-02-19 RX ADMIN — Medication 1 MILLIGRAM(S): at 14:42

## 2025-02-19 RX ADMIN — ROSUVASTATIN CALCIUM 5 MILLIGRAM(S): 20 TABLET, FILM COATED ORAL at 21:32

## 2025-02-19 RX ADMIN — Medication 1 MILLIGRAM(S): at 07:36

## 2025-02-19 RX ADMIN — Medication 650 MILLIGRAM(S): at 19:20

## 2025-02-19 RX ADMIN — CEFTRIAXONE 2000 MILLIGRAM(S): 500 INJECTION, POWDER, FOR SOLUTION INTRAMUSCULAR; INTRAVENOUS at 13:53

## 2025-02-19 RX ADMIN — Medication 650 MILLIGRAM(S): at 08:00

## 2025-02-19 NOTE — PROGRESS NOTE ADULT - NS ATTEND AMEND GEN_ALL_CORE FT
Patient discussed with ANTON Gandara and examined.     Patient reporting stable weakness in the L hand.  L leg weakness slightly improved compared to yesterday.      ON exam:   GEN: NAD  cV: rRR, s1, S2  PULM: CTAB  NEURO:   Awake, alert, oriented to month, date, year, normal naming, repetition, fluency  Pupils 3-2mm, symmetric, full Vf's, normal EOm, no facial weakness, no dysarthria, tongue midline, palate symmetric.   MOTOR: normal bulk and tone.  R arm is 4+/5 deltoids, biceps, triceps, 4/5 .  L arm is 4+/5 deltoids, biceps, triceps, and 3/5 .  Hip flexors and knee extensors 4+/5.   SENSORY: diminished light touch sensation bilateral thighs.      MRI C/T/L spine imagines reviewed: discitis and phlegmon.    MRI brain images reviewed: no diffusion restriction.      AP: 74 year old man with remote spinal cord injury, s/p ACDF and cervical laminectomy, Graves, HLD, diverticulosis, presenting with fever, and L arm weakness, found to be bacteremic with G+_ cocci.  On exam, has L  weakness, with good proximal strength, bilateral LE sensory loss, and baseline R arm weakness.  MRI showing cervical multilevel discitis.  No findings on MRI.    Most concerning for cervical myelopathy in setting of infection with seeding of prior surgical instrumentation.  No evidence of stroke.      Continue antibiotics per ID.  Recommendations appreciated  Neurosurgery recommendations appreciated.  No role for neurosurgical intervention at this point.   PT/OT  No further inpatient neurology recommendations anticipated at this point.  Please page or call with any acute neuro changes, or other issues we can help address.

## 2025-02-19 NOTE — PROGRESS NOTE ADULT - SUBJECTIVE AND OBJECTIVE BOX
HPI:  74-year-old male with a past medical history of benign prostatic hyperplasia, diverticulosis, Graves' disease, hemorrhoids, hyperlipidemia, hypothyroidism, osteoarthritis, spinal stenosis, spinal cord injury (2004), and multiple spinal surgeries (three cervical and one lumbar). He presents to the emergency department with complaints of shoulder pain radiating down the arms associated with fever and LE weakness. Patient reports symptoms started as mild on Saturday he had mild shoulder pain, but it progressively got worse. Currently reports pain 8/10, sharp, burning, starts at shoulder blades and radiates down to hands, associated with feeling of numbness in arms. He has a similar sensation in his thighs, and yesterday reports Tmax at home of 102. He also notes a decreased  strength in his right hand. He denies experiencing any saddle anesthesia, or bladder or bowel incontinence. He denies headaches, dizziness, sore throat, rhinitis, SOB, Chest pain, diarrhea, dysuria.     Neurosurgery consulted for neck pain the setting of multiple spinal fusions, +fever, + strep bacteremia. Hx ACDF for spinal cord injury. Patient seen and examined in ER. Awake/alert, sitting up in stretcher. Complains of upper back pain and fever since Saturday, thought it was the flu. He then developed left arm and bl thigh numbness/tinging on Sunday. He denies urinary/bowel incontinence, saddle parathesias. He denies recent procedure, sick contacts or travel.    2/19- Pt seen and examined in the CCU, no events overnight, tolerating antibiotics     Vital Signs Last 24 Hrs  T(C): 35.7 (19 Feb 2025 08:00), Max: 38.1 (18 Feb 2025 17:46)  T(F): 96.2 (19 Feb 2025 08:00), Max: 100.6 (18 Feb 2025 17:46)  HR: 93 (19 Feb 2025 13:00) (93 - 116)  BP: 137/81 (19 Feb 2025 13:00) (109/74 - 146/78)  BP(mean): 99 (19 Feb 2025 13:00) (84 - 110)  RR: 15 (19 Feb 2025 13:00) (12 - 30)  SpO2: 93% (19 Feb 2025 13:00) (89% - 96%)    Parameters below as of 19 Feb 2025 04:00  Patient On (Oxygen Delivery Method): room air    MEDICATIONS  (STANDING):  albuterol    0.083%. 2.5 milliGRAM(s) Nebulizer once  aspirin enteric coated 81 milliGRAM(s) Oral daily  cefTRIAXone Injectable. 2000 milliGRAM(s) IV Push every 24 hours  heparin   Injectable 5000 Unit(s) SubCutaneous every 8 hours  lactated ringers. 1000 milliLiter(s) (100 mL/Hr) IV Continuous <Continuous>  levothyroxine 150 MICROGram(s) Oral daily  multivitamin/minerals 1 Tablet(s) Oral daily  rosuvastatin 5 milliGRAM(s) Oral at bedtime  tamsulosin 0.4 milliGRAM(s) Oral at bedtime    MEDICATIONS  (PRN):  acetaminophen     Tablet .. 650 milliGRAM(s) Oral every 6 hours PRN Temp greater or equal to 38C (100.4F), Mild Pain (1 - 3)  aluminum hydroxide/magnesium hydroxide/simethicone Suspension 30 milliLiter(s) Oral every 4 hours PRN Dyspepsia  HYDROmorphone  Injectable 1 milliGRAM(s) IV Push every 6 hours PRN Severe Pain (7 - 10)  melatonin 3 milliGRAM(s) Oral at bedtime PRN Insomnia  ondansetron Injectable 4 milliGRAM(s) IV Push every 8 hours PRN Nausea and/or Vomiting  tiZANidine 2 milliGRAM(s) Oral four times a day PRN Muscle Spasm    ROS: pertinent positives in HPI, all other ROS were reviewed and are negative     PHYSICAL EXAM:  Constitutional: Awake / alert, NAD, resting in bed   HEENT: PERRLA, EOMI, hearing intact   Neck: Supple  Respiratory: Breath sounds are clear bilaterally  Cardiovascular: S1 and S2, regular rhythm  Gastrointestinal: Soft, NT/ND  Extremities:  no edema  Musculoskeletal: no abnormal movements  Skin: No rashes    Neurological Exam:  HF: A x O x 3, follows commands, normal affect, speech fluent  CN: PERRL, EOMI, no NLFD, tongue midline  Motor: Strength 5/5 except R  4-/5, L  2/5  Sens: Decreased sensation b/l forearm, b/l thigh, L calf to LT  Reflexes: no clonus, no Ayers's   Coord:  No FNFA, dysmetria, ALLAN intact   Gait/Balance: Not tested         LABS:                         12.3   18.00 )-----------( 136      ( 19 Feb 2025 08:17 )             37.0     02-19    132[L]  |  103  |  24[H]  ----------------------------<  104[H]  4.0   |  23  |  0.94    Ca    9.0      19 Feb 2025 08:17  Phos  2.8     02-18  Mg     1.5     02-18    TPro  6.3  /  Alb  2.9[L]  /  TBili  1.0  /  DBili  x   /  AST  57[H]  /  ALT  35  /  AlkPhos  63  02-19    LIVER FUNCTIONS - ( 19 Feb 2025 08:17 )  Alb: 2.9 g/dL / Pro: 6.3 gm/dL / ALK PHOS: 63 U/L / ALT: 35 U/L / AST: 57 U/L / GGT: x           RADIOLOGY:  < from: CT Lumbar Spine No Cont (02.18.25 @ 17:24) >  CT CERVICAL SPINE:  Postsurgical changes.  Hardware is well-placed. No evidence for hardware fracture or loosening.  Vertebral body height and facet alignment maintained.    Evaluation of the spinal canal contents is markedly limited by CT   modality and streak artifact from spinal hardware.    CT LUMBAR SPINE:  Postsurgical changes.  Hardware is well-placed. No evidence for hardware fracture or loosening.  Vertebral body height and facet alignment maintained.    Evaluation of the spinal canal contents is markedly limited by CT   modality and streak artifact from spinal hardware.   HPI:  74-year-old male with a past medical history of benign prostatic hyperplasia, diverticulosis, Graves' disease, hemorrhoids, hyperlipidemia, hypothyroidism, osteoarthritis, spinal stenosis, spinal cord injury (2004), and multiple spinal surgeries (three cervical and one lumbar). He presents to the emergency department with complaints of shoulder pain radiating down the arms associated with fever and LE weakness. Patient reports symptoms started as mild on Saturday he had mild shoulder pain, but it progressively got worse. Currently reports pain 8/10, sharp, burning, starts at shoulder blades and radiates down to hands, associated with feeling of numbness in arms. He has a similar sensation in his thighs, and yesterday reports Tmax at home of 102. He also notes a decreased  strength in his right hand. He denies experiencing any saddle anesthesia, or bladder or bowel incontinence. He denies headaches, dizziness, sore throat, rhinitis, SOB, Chest pain, diarrhea, dysuria.     Neurosurgery consulted for neck pain the setting of multiple spinal fusions, +fever, + strep bacteremia. Hx ACDF for spinal cord injury. Patient seen and examined in ER. Awake/alert, sitting up in stretcher. Complains of upper back pain and fever since Saturday, thought it was the flu. He then developed left arm and bl thigh numbness/tinging on Sunday. He denies urinary/bowel incontinence, saddle parathesias. He denies recent procedure, sick contacts or travel.    2/19- Pt seen and examined in the CCU, no events overnight, tolerating antibiotics, continued left hand weakness and spasticity in right lower leg.    CTs reviewed no signs of hardware loosening     Vital Signs Last 24 Hrs  T(C): 35.7 (19 Feb 2025 08:00), Max: 38.1 (18 Feb 2025 17:46)  T(F): 96.2 (19 Feb 2025 08:00), Max: 100.6 (18 Feb 2025 17:46)  HR: 93 (19 Feb 2025 13:00) (93 - 116)  BP: 137/81 (19 Feb 2025 13:00) (109/74 - 146/78)  BP(mean): 99 (19 Feb 2025 13:00) (84 - 110)  RR: 15 (19 Feb 2025 13:00) (12 - 30)  SpO2: 93% (19 Feb 2025 13:00) (89% - 96%)    Parameters below as of 19 Feb 2025 04:00  Patient On (Oxygen Delivery Method): room air    MEDICATIONS  (STANDING):  albuterol    0.083%. 2.5 milliGRAM(s) Nebulizer once  aspirin enteric coated 81 milliGRAM(s) Oral daily  cefTRIAXone Injectable. 2000 milliGRAM(s) IV Push every 24 hours  heparin   Injectable 5000 Unit(s) SubCutaneous every 8 hours  lactated ringers. 1000 milliLiter(s) (100 mL/Hr) IV Continuous   levothyroxine 150 MICROGram(s) Oral daily  multivitamin/minerals 1 Tablet(s) Oral daily  rosuvastatin 5 milliGRAM(s) Oral at bedtime  tamsulosin 0.4 milliGRAM(s) Oral at bedtime    MEDICATIONS  (PRN):  acetaminophen     Tablet .. 650 milliGRAM(s) Oral every 6 hours PRN Temp greater or equal to 38C (100.4F), Mild Pain (1 - 3)  aluminum hydroxide/magnesium hydroxide/simethicone Suspension 30 milliLiter(s) Oral every 4 hours PRN Dyspepsia  HYDROmorphone  Injectable 1 milliGRAM(s) IV Push every 6 hours PRN Severe Pain (7 - 10)  melatonin 3 milliGRAM(s) Oral at bedtime PRN Insomnia  ondansetron Injectable 4 milliGRAM(s) IV Push every 8 hours PRN Nausea and/or Vomiting  tiZANidine 2 milliGRAM(s) Oral four times a day PRN Muscle Spasm    ROS: pertinent positives in HPI, all other ROS were reviewed and are negative     PHYSICAL EXAM:  Constitutional: Awake / alert, NAD, resting in bed   HEENT: PERRLA, EOMI, hearing intact   Neck: Supple  Respiratory: Breath sounds are clear bilaterally  Cardiovascular: S1 and S2, regular rhythm  Gastrointestinal: Soft, NT/ND  Extremities:  no edema  Musculoskeletal: increased tone RLE   Skin: No rashes    Neurological Exam:  HF: A x O x 3, follows commands, normal affect, speech fluent  CN: PERRL, EOMI, no NLFD, tongue midline  Motor: RUE 5/5, LUE 5/5 prox, 4/5 in  strength, able to lift left leg off the bed, Right leg increased spasticity strength 2/5  Sens: Decreased sensation b/l forearm, b/l thigh  Reflexes: no clonus, no Ayers's   Coord:  No FNFA, dysmetria, ALLAN intact  Gait/Balance: Not tested     LABS:                         12.3   18.00 )-----------( 136      ( 19 Feb 2025 08:17 )             37.0     02-19    132[L]  |  103  |  24[H]  ----------------------------<  104[H]  4.0   |  23  |  0.94    Ca    9.0      19 Feb 2025 08:17  Phos  2.8     02-18  Mg     1.5     02-18    TPro  6.3  /  Alb  2.9[L]  /  TBili  1.0  /  DBili  x   /  AST  57[H]  /  ALT  35  /  AlkPhos  63  02-19    LIVER FUNCTIONS - ( 19 Feb 2025 08:17 )  Alb: 2.9 g/dL / Pro: 6.3 gm/dL / ALK PHOS: 63 U/L / ALT: 35 U/L / AST: 57 U/L / GGT: x           RADIOLOGY:  < from: CT Lumbar Spine No Cont (02.18.25 @ 17:24) >  CT CERVICAL SPINE:  Postsurgical changes.  Hardware is well-placed. No evidence for hardware fracture or loosening.  Vertebral body height and facet alignment maintained.    Evaluation of the spinal canal contents is markedly limited by CT   modality and streak artifact from spinal hardware.    CT LUMBAR SPINE:  Postsurgical changes.  Hardware is well-placed. No evidence for hardware fracture or loosening.  Vertebral body height and facet alignment maintained.    Evaluation of the spinal canal contents is markedly limited by CT   modality and streak artifact from spinal hardware.

## 2025-02-19 NOTE — PROGRESS NOTE ADULT - SUBJECTIVE AND OBJECTIVE BOX
CC:    HPI:  74-year-old male with a past medical history of benign prostatic hyperplasia, diverticulosis, Graves' disease, hemorrhoids, hyperlipidemia, hypothyroidism, osteoarthritis, spinal stenosis, spinal cord injury (2004), and multiple spinal surgeries (three cervical and one lumbar). He presents to the emergency department with complaints of shoulder pain radiating down the arms associated with fever and LE weakness. Patient reports symptoms started as mild on saturday, he had mild shoulder pain, but it progressively got worse. Currently reports pain 8/10, sharp, burning, starts at shoulder blades and radiates down to hands, associated with feeling of numbness in arms. He has a similar sensation in his thighs, and yesterday reports Tmax at home of 102. He also notes a decreased  strength in his right hand. He denies experiencing any saddle anesthesia, or bladder or bowel incontinence. He denies headaches, dizziness, sore throat, rhinitis, SOB, Chest pain, diarrhea, dysuria.     2/18: Patient seen, no change in neuro exam           PAST MEDICAL & SURGICAL HISTORY:  Hyperlipidemia, unspecified hyperlipidemia type      Diverticulosis of large intestine without hemorrhage      History of colon polyps      Hemorrhoids, unspecified hemorrhoid type      Benign prostatic hyperplasia without lower urinary tract symptoms, unspecified morphology      Osteoarthritis of knee, unilateral  left      Spinal cord injury at C5-C7 level without injury of spinal bone, subsequent encounter      Spastic      Weakness  to right side      Myelopathy      Spinal stenosis, unspecified spinal region      Hypothyroidism, unspecified type      Graves disease      Alcoholic  recovering alcoholic x 40years      History of cervical discectomy      S/P laminectomy  Cervical      H/O lumbar discectomy  with laminectomy      H/O arthroscopy of knee  Left x 2. Unsure of years      H/O colonoscopy with polypectomy  2014          FAMILY HISTORY:  Family history of colon cancer in mother (Mother)    Family history of liver disease (Father)  father    Family history of cancer (Sibling)  SIster - bile duct        Social Hx:    Allergies    No Known Allergies    Intolerances            ICU Vital Signs Last 24 Hrs  T(C): 35.7 (19 Feb 2025 08:00), Max: 38.1 (18 Feb 2025 17:46)  T(F): 96.2 (19 Feb 2025 08:00), Max: 100.6 (18 Feb 2025 17:46)  HR: 96 (19 Feb 2025 15:00) (93 - 116)  BP: 135/92 (19 Feb 2025 15:00) (109/82 - 146/78)  BP(mean): 105 (19 Feb 2025 15:00) (84 - 110)  ABP: --  ABP(mean): --  RR: 17 (19 Feb 2025 15:00) (12 - 30)  SpO2: 94% (19 Feb 2025 15:00) (89% - 96%)    O2 Parameters below as of 19 Feb 2025 04:00  Patient On (Oxygen Delivery Method): room air                I&O's Summary    18 Feb 2025 07:01  -  19 Feb 2025 07:00  --------------------------------------------------------  IN: 1750 mL / OUT: 1450 mL / NET: 300 mL                              12.3   18.00 )-----------( 136      ( 19 Feb 2025 08:17 )             37.0       02-19    132[L]  |  103  |  24[H]  ----------------------------<  104[H]  4.0   |  23  |  0.94    Ca    9.0      19 Feb 2025 08:17  Phos  2.8     02-18  Mg     1.5     02-18    TPro  6.3  /  Alb  2.9[L]  /  TBili  1.0  /  DBili  x   /  AST  57[H]  /  ALT  35  /  AlkPhos  63  02-19                Urinalysis Basic - ( 19 Feb 2025 08:17 )    Color: x / Appearance: x / SG: x / pH: x  Gluc: 104 mg/dL / Ketone: x  / Bili: x / Urobili: x   Blood: x / Protein: x / Nitrite: x   Leuk Esterase: x / RBC: x / WBC x   Sq Epi: x / Non Sq Epi: x / Bacteria: x        MEDICATIONS  (STANDING):  aspirin enteric coated 81 milliGRAM(s) Oral daily  cefTRIAXone Injectable. 2000 milliGRAM(s) IV Push every 24 hours  heparin   Injectable 5000 Unit(s) SubCutaneous every 8 hours  lactated ringers. 1000 milliLiter(s) (100 mL/Hr) IV Continuous <Continuous>  levothyroxine 150 MICROGram(s) Oral daily  multivitamin/minerals 1 Tablet(s) Oral daily  polyethylene glycol 3350 17 Gram(s) Oral daily  rosuvastatin 5 milliGRAM(s) Oral at bedtime  tamsulosin 0.8 milliGRAM(s) Oral at bedtime  tamsulosin 0.4 milliGRAM(s) Oral every 12 hours  vibegron 75 milliGRAM(s) 75 milliGRAM(s) Oral at bedtime    MEDICATIONS  (PRN):  acetaminophen     Tablet .. 650 milliGRAM(s) Oral every 6 hours PRN Temp greater or equal to 38C (100.4F), Mild Pain (1 - 3)  aluminum hydroxide/magnesium hydroxide/simethicone Suspension 30 milliLiter(s) Oral every 4 hours PRN Dyspepsia  HYDROmorphone  Injectable 1 milliGRAM(s) IV Push every 6 hours PRN Severe Pain (7 - 10)  melatonin 3 milliGRAM(s) Oral at bedtime PRN Insomnia  ondansetron Injectable 4 milliGRAM(s) IV Push every 8 hours PRN Nausea and/or Vomiting  tiZANidine 2 milliGRAM(s) Oral four times a day PRN Muscle Spasm      DVT Prophylaxis: [facility]    Advanced Directives:  Discussed with:    Visit Information:    ** Time is exclusive of billed procedures and/or teaching and/or routine family updates.

## 2025-02-19 NOTE — PROGRESS NOTE ADULT - ASSESSMENT
Assessment:  74M with benign prostatic hyperplasia, diverticulosis, Graves' disease, hemorrhoids, hyperlipidemia, hypothyroidism, osteoarthritis, spinal stenosis, spinal cord injury (2004), and multiple spinal surgeries (three cervical and one lumbar) presents 2/17 with neck pain radiating to R shoulder associated with numbness and tingling, also noted fever of 102F at home  Reports no cough, abdominal pain, n/v, diarrhea or dysuria  No recent dental work, no recent skin soft tissue infection, no wounds  History of multiple spinal surgery, C and T spine laminectomy with fusion hardware (2004), L spine laminectomy with fusion hardware (2006), all done in [facility]  Also has L knee replacement (2017) though no symptoms there, L knee ROM normal no pain  No cardiac devices  Febrile 105F on admission, on 2L NC  WBC 13 > 20  Cr 1.57    UA negative  Full RVP negative  CT Chest AP without acute findings  BCx (2/17) with Strep species  MRI 2/18 with concern for discitis T1-T3, possible phlegmon C7-T3, no abscess  TTE without obvious vegetation    Antimicrobials:  metroNIDAZOLE  IVPB 500 every 12 hours (2/18)  Cefepime (2/17-2/18)  Vanco 1500mg (2/17 - 2/18)  CTX 2G (2/18 --- )    Impression:   #High-grade Strep Bacteremia, Neck Pain, Concern for Spinal Involvement in setting of Multilevel C/T/L Spine Laminectomy with fusion hardware  #Fever  #Leukocytosis  #GOVIND on ?CKD  - unclear source, no open wounds, no recent surgery, no recent dental procedures, had been feeling well all week until this past weekend, had acute symptoms including fever and chills  - concern that spine with hardware is involved     Recommendations:  - continue Ceftriaxone 2G q24 (Day #3)  - monitor temperature curve  - trend WBC  - reason for abx use reviewed with patient  - side effects of antibiotic discussed, tolerating abx well so far  - Prior cultures reviewed. An epidemiologic assessment was performed. There is a significant risk for resistant microorganisms to spread to family members, and/or healthcare staff. The patient will be placed on isolation according to infection control policy. Will reconsider isolation measures based on new culture results and other clinical data as appropriate Appropriate cultures collected and an appropriate broad spectrum antibiotic therapy will be considered  - follow up Strep identification/sensitivity  - monitor worsening neurologic deficit  - follow up repeat BCx x2 (2/19)  - follow up Neurology  - follow up Neurosurgery   - rest per primary team    [facility] IV to PO Token not applicable; Patient to continue with IV Therapy

## 2025-02-19 NOTE — PROGRESS NOTE ADULT - SUBJECTIVE AND OBJECTIVE BOX
Date of Service:02-19-25 @ 10:15  Interval History/ROS: Low grade fever 100.6F, on 2L NC, reports no fever or chills, still has numbness, worried about MRI findings    REVIEW OF SYSTEMS  [  ] ROS unobtainable because:    [ x ] All other systems negative except as noted below    Constitutional:  [ ] fever [ ] chills  [ ] weight loss  [ ]night sweat  [ ]poor appetite/PO intake [ ]fatigue   Skin:  [ ] rash [ ] phlebitis	  Eyes: [ ] icterus [ ] pain  [ ] discharge	  ENMT: [ ] sore throat  [ ] thrush [ ] ulcers [ ] exudates [ ]anosmia  Respiratory: [ ] dyspnea [ ] hemoptysis [ ] cough [ ] sputum	  Cardiovascular:  [ ] chest pain [ ] palpitations [ ] edema	  Gastrointestinal:  [ ] nausea [ ] vomiting [ ] diarrhea [ ] constipation [ ] pain	  Genitourinary:  [ ] dysuria [ ] frequency [ ] hematuria [ ] discharge [ ] flank pain  [ ] incontinence  Musculoskeletal:  [ ] myalgias [ ] arthralgias [ ] arthritis  [ ] back pain  Neurological:  [ ] headache [ ] weakness [ ] seizures  [ ] confusion/altered mental status    Allergies  No Known Allergies        ANTIMICROBIALS:    cefTRIAXone Injectable. 2000 every 24 hours        OTHER MEDS: MEDICATIONS  (STANDING):  acetaminophen     Tablet .. 650 every 6 hours PRN  albuterol    0.083%. 2.5 once  aluminum hydroxide/magnesium hydroxide/simethicone Suspension 30 every 4 hours PRN  aspirin enteric coated 81 daily  heparin   Injectable 5000 every 8 hours  HYDROmorphone  Injectable 1 every 6 hours PRN  HYDROmorphone  Injectable 1 once  levothyroxine 150 daily  melatonin 3 at bedtime PRN  ondansetron Injectable 4 every 8 hours PRN  rosuvastatin 5 at bedtime  tamsulosin 0.4 at bedtime  tiZANidine 2 four times a day PRN      Vital Signs Last 24 Hrs  T(F): 96.2 (02-19-25 @ 08:00), Max: 105 (02-17-25 @ 20:50)    Vital Signs Last 24 Hrs  HR: 100 (02-19-25 @ 08:00) (93 - 116)  BP: 136/80 (02-19-25 @ 08:00) (109/74 - 146/78)  RR: 16 (02-19-25 @ 08:00)  SpO2: 92% (02-19-25 @ 08:00) (89% - 96%)  Wt(kg): --    EXAM:    Constitutional: frail, NAD  Head/Eyes: no icterus  LUNGS:  CTA  CVS:  regular rhythm  Abd:  soft, non-tender; non-distended  Ext:  no edema  Back: no spinal tenderness noted  Vascular:  IV site no erythema tenderness or discharge  Neuro: AAO X 3    Labs:                        12.3   18.00 )-----------( 136      ( 19 Feb 2025 08:17 )             37.0     02-19    132[L]  |  103  |  24[H]  ----------------------------<  104[H]  4.0   |  23  |  0.94    Ca    9.0      19 Feb 2025 08:17  Phos  2.8     02-18  Mg     1.5     02-18    TPro  6.3  /  Alb  2.9[L]  /  TBili  1.0  /  DBili  x   /  AST  57[H]  /  ALT  35  /  AlkPhos  63  02-19      WBC Trend:  WBC Count: 18.00 (02-19-25 @ 08:17)  WBC Count: 20.35 (02-18-25 @ 07:10)  WBC Count: 13.23 (02-17-25 @ 21:11)      Creatine Trend:  Creatinine: 0.94 (02-19)  Creatinine: 1.57 (02-18)  Creatinine: 1.50 (02-17)      Liver Biochemical Testing Trend:  Alanine Aminotransferase (ALT/SGPT): 35 (02-19)  Alanine Aminotransferase (ALT/SGPT): 29 (02-18)  Alanine Aminotransferase (ALT/SGPT): 30 (02-17)  Aspartate Aminotransferase (AST/SGOT): 57 (02-19-25 @ 08:17)  Aspartate Aminotransferase (AST/SGOT): 25 (02-18-25 @ 07:10)  Aspartate Aminotransferase (AST/SGOT): 17 (02-17-25 @ 21:11)  Bilirubin Total: 1.0 (02-19)  Bilirubin Total: 1.7 (02-18)  Bilirubin Total: 1.3 (02-17)      Trend LDH      Urinalysis Basic - ( 19 Feb 2025 08:17 )    Color: x / Appearance: x / SG: x / pH: x  Gluc: 104 mg/dL / Ketone: x  / Bili: x / Urobili: x   Blood: x / Protein: x / Nitrite: x   Leuk Esterase: x / RBC: x / WBC x   Sq Epi: x / Non Sq Epi: x / Bacteria: x        MICROBIOLOGY:        Urinalysis with Rflx Culture (collected 18 Feb 2025 01:54)    Culture - Blood (collected 17 Feb 2025 21:11)  Source: .Blood BLOOD  Preliminary Report:    Growth in aerobic and anaerobic bottles: Streptococcus dysgalactiae    (Group C/G)    Direct identification is available within approximately 3-5    hours either by Blood Panel Multiplexed PCR or Direct    MALDI-TOF. Details: [email or web address]  Organism: Blood Culture PCR  Organism: Blood Culture PCR    Sensitivities:      Method Type: PCR      -  Streptococcus sp. (Not Grp A, B or S pneumoniae): Detec    Culture - Blood (collected 17 Feb 2025 21:11)  Source: .Blood BLOOD  Preliminary Report:    Growth in aerobic and anaerobic bottles: Streptococcus dysgalactiae    (Group C/G)    See previous culture [id number]      Rapid RVP Result: NotDetec (02-17 @ 21:11)          C-Reactive Protein: 169.0 (02-18)      D-Dimer Assay, Quantitative: 6541 (02-18)  D-Dimer Assay, Quantitative: 6758 (02-18)          Lactate, Blood: 1.5 (02-18 @ 00:41)  Lactate, Blood: 2.1 (02-17 @ 21:11)    Sedimentation Rate, Erythrocyte: 14 mm/hr (02-18-25 @ 07:10)      RADIOLOGY:  imaging below personally reviewed    CT Chest w/ IV Cont:   ACC: [id number] EXAM:  CT ABDOMEN AND PELVIS IC   ORDERED BY: SEMAJ MORALES     ACC: [id number] EXAM:  CT CHEST IC   ORDERED BY: SEMAJ MORALES     PROCEDURE DATE:  02/17/2025          INTERPRETATION:  CLINICAL INFORMATION: Fever, left arm pain and weakness,   evaluate for epidural abscess versus dissection.    COMPARISON: None.    CONTRAST/COMPLICATIONS:  IV Contrast: Omnipaque 350 (accession [id number]), IV contrast documented   in unlinked concurrent exam (accession [id number])  90 cc administered   0   cc discarded  Oral Contrast: NONE    PROCEDURE:  CT of the Chest, Abdomen and Pelvis was performed.  Sagittal and coronal reformats were performed.    FINDINGS:  CHEST:  LUNGS AND LARGE AIRWAYS: Patent central airways. No pulmonary nodules or   parenchymal consolidation.  PLEURA: No pleural effusion.  VESSELS: Aortic calcifications. Coronary artery calcifications. No aortic   dissection.  HEART: Heart size is normal. No pericardial effusion.  MEDIASTINUM ANDHILA: No lymphadenopathy.  CHEST WALL AND LOWER NECK: Bilateral gynecomastia  BONES: Partially visualized sequela from laminectomy, ACDF and posterior   spinal fusion hardware in the lower cervical spine. Streak artifact   limits evaluation of the adjacent structures/spinal canal. DISH and   degenerative changes of the thoracic spine.    ABDOMEN AND PELVIS:  LIVER: Multiple scattered simple hepatic cysts.  BILE DUCTS: Normal caliber.  GALLBLADDER: Within normal limits.  SPLEEN: Within normal limits.  PANCREAS: Within normal limits.  ADRENALS: 1.3 cm left adrenal nodule. Right adrenal gland unremarkable.  KIDNEYS/URETERS: No renal stones or hydronephrosis. 1.3 cm Bosniak II   cyst on the left. Subcentimeter simple cyst on the right.    BLADDER: Within normal limits.  REPRODUCTIVE ORGANS: Prostate is enlarged.    BOWEL: No bowel obstruction. Appendix is normal.  PERITONEUM/RETROPERITONEUM: Within normal limits.  VESSELS: Atherosclerotic changes.  LYMPH NODES: No lymphadenopathy.  ABDOMINAL WALL: Within normal limits.  BONES: Degenerative changes. Sequela of L4-5 laminectomy and L4-5   posterior spinal fusion hardware.    IMPRESSION:  No acute findings in the chest, abdomen or pelvis.    Partially visualized sequela from laminectomy, ACDF and posterior spinal   fusion hardware in the lower cervical spine. Streak artifact limits   evaluation of the adjacent structures/spinal canal.    1.3 cm left adrenal nodule. Consider follow-up adrenal CT.    Prostatomegaly, correlate with PSA values.    --- End of Report ---      ULYSSES DENSON MD; Attending Radiologist  This document has been electronically signed. Feb 18 2025 12:23AM (02-17-25 @ 22:58)  CT Abdomen and Pelvis w/ IV Cont:   ACC: [id number] EXAM:  CT ABDOMEN AND PELVIS IC   ORDERED BY: SEMAJ MORALES     ACC: [id number] EXAM:  CT CHEST IC   ORDERED BY: SEMAJ MORALES     PROCEDURE DATE:  02/17/2025          INTERPRETATION:  CLINICAL INFORMATION: Fever, left arm pain and weakness,   evaluate for epidural abscess versus dissection.    COMPARISON: None.    CONTRAST/COMPLICATIONS:  IV Contrast: Omnipaque 350 (accession [id number]), IV contrast documented   in unlinked concurrent exam (accession [id number])  90 cc administered   0   cc discarded  Oral Contrast: NONE    PROCEDURE:  CT of the Chest, Abdomen and Pelvis was performed.  Sagittal and coronal reformats were performed.    FINDINGS:  CHEST:  LUNGS AND LARGE AIRWAYS: Patent central airways. No pulmonary nodules or   parenchymal consolidation.  PLEURA: No pleural effusion.  VESSELS: Aortic calcifications. Coronary artery calcifications. No aortic   dissection.  HEART: Heart size is normal. No pericardial effusion.  MEDIASTINUM ANDHILA: No lymphadenopathy.  CHEST WALL AND LOWER NECK: Bilateral gynecomastia  BONES: Partially visualized sequela from laminectomy, ACDF and posterior   spinal fusion hardware in the lower cervical spine. Streak artifact   limits evaluation of the adjacent structures/spinal canal. DISH and   degenerative changes of the thoracic spine.    ABDOMEN AND PELVIS:  LIVER: Multiple scattered simple hepatic cysts.  BILE DUCTS: Normal caliber.  GALLBLADDER: Within normal limits.  SPLEEN: Within normal limits.  PANCREAS: Within normal limits.  ADRENALS: 1.3 cm left adrenal nodule. Right adrenal gland unremarkable.  KIDNEYS/URETERS: No renal stones or hydronephrosis. 1.3 cm Bosniak II   cyst on the left. Subcentimeter simple cyst on the right.    BLADDER: Within normal limits.  REPRODUCTIVE ORGANS: Prostate is enlarged.    BOWEL: No bowel obstruction. Appendix is normal.  PERITONEUM/RETROPERITONEUM: Within normal limits.  VESSELS: Atherosclerotic changes.  LYMPH NODES: No lymphadenopathy.  ABDOMINAL WALL: Within normal limits.  BONES: Degenerative changes. Sequela of L4-5 laminectomy and L4-5   posterior spinal fusion hardware.    IMPRESSION:  No acute findings in the chest, abdomen or pelvis.    Partially visualized sequela from laminectomy, ACDF and posterior spinal   fusion hardware in the lower cervical spine. Streak artifact limits   evaluation of the adjacent structures/spinal canal.    1.3 cm left adrenal nodule. Consider follow-up adrenal CT.    Prostatomegaly, correlate with PSA values.    --- End of Report ---    ULYSSES DENSON MD; Attending Radiologist  This document has been electronically signed. Feb 18 2025 12:23AM (02-17-25 @ 22:58)     Date of Service:02-19-25 @ 10:15  Interval History/ROS: Low grade fever 100.6F, on 2L NC, reports no fever or chills, still has numbness, worried about MRI findings    REVIEW OF SYSTEMS  [  ] ROS unobtainable because:    [ x ] All other systems negative except as noted below    Constitutional:  [ ] fever [ ] chills  [ ] weight loss  [ ]night sweat  [ ]poor appetite/PO intake [ ]fatigue   Skin:  [ ] rash [ ] phlebitis	  Eyes: [ ] icterus [ ] pain  [ ] discharge	  ENMT: [ ] sore throat  [ ] thrush [ ] ulcers [ ] exudates [ ]anosmia  Respiratory: [ ] dyspnea [ ] hemoptysis [ ] cough [ ] sputum	  Cardiovascular:  [ ] chest pain [ ] palpitations [ ] edema	  Gastrointestinal:  [ ] nausea [ ] vomiting [ ] diarrhea [ ] constipation [ ] pain	  Genitourinary:  [ ] dysuria [ ] frequency [ ] hematuria [ ] discharge [ ] flank pain  [ ] incontinence  Musculoskeletal:  [ ] myalgias [ ] arthralgias [ ] arthritis  [ ] back pain  Neurological:  [ ] headache [ ] weakness [ ] seizures  [ ] confusion/altered mental status    Allergies  No Known Allergies        ANTIMICROBIALS:    cefTRIAXone Injectable. 2000 every 24 hours        OTHER MEDS: MEDICATIONS  (STANDING):  acetaminophen     Tablet .. 650 every 6 hours PRN  albuterol    0.083%. 2.5 once  aluminum hydroxide/magnesium hydroxide/simethicone Suspension 30 every 4 hours PRN  aspirin enteric coated 81 daily  heparin   Injectable 5000 every 8 hours  HYDROmorphone  Injectable 1 every 6 hours PRN  HYDROmorphone  Injectable 1 once  levothyroxine 150 daily  melatonin 3 at bedtime PRN  ondansetron Injectable 4 every 8 hours PRN  rosuvastatin 5 at bedtime  tamsulosin 0.4 at bedtime  tiZANidine 2 four times a day PRN      Vital Signs Last 24 Hrs  T(F): 96.2 (02-19-25 @ 08:00), Max: 105 (02-17-25 @ 20:50)    Vital Signs Last 24 Hrs  HR: 100 (02-19-25 @ 08:00) (93 - 116)  BP: 136/80 (02-19-25 @ 08:00) (109/74 - 146/78)  RR: 16 (02-19-25 @ 08:00)  SpO2: 92% (02-19-25 @ 08:00) (89% - 96%)  Wt(kg): --    EXAM:    Constitutional: frail, NAD  Head/Eyes: no icterus  LUNGS:  CTA  CVS:  regular rhythm  Abd:  soft, non-tender; non-distended  Ext:  no edema  Back: no spinal tenderness noted  Vascular:  IV site no erythema tenderness or discharge  Neuro: AAO X 3, weak R hand     Labs:                        12.3   18.00 )-----------( 136      ( 19 Feb 2025 08:17 )             37.0     02-19    132[L]  |  103  |  24[H]  ----------------------------<  104[H]  4.0   |  23  |  0.94    Ca    9.0      19 Feb 2025 08:17  Phos  2.8     02-18  Mg     1.5     02-18    TPro  6.3  /  Alb  2.9[L]  /  TBili  1.0  /  DBili  x   /  AST  57[H]  /  ALT  35  /  AlkPhos  63  02-19      WBC Trend:  WBC Count: 18.00 (02-19-25 @ 08:17)  WBC Count: 20.35 (02-18-25 @ 07:10)  WBC Count: 13.23 (02-17-25 @ 21:11)      Creatine Trend:  Creatinine: 0.94 (02-19)  Creatinine: 1.57 (02-18)  Creatinine: 1.50 (02-17)      Liver Biochemical Testing Trend:  Alanine Aminotransferase (ALT/SGPT): 35 (02-19)  Alanine Aminotransferase (ALT/SGPT): 29 (02-18)  Alanine Aminotransferase (ALT/SGPT): 30 (02-17)  Aspartate Aminotransferase (AST/SGOT): 57 (02-19-25 @ 08:17)  Aspartate Aminotransferase (AST/SGOT): 25 (02-18-25 @ 07:10)  Aspartate Aminotransferase (AST/SGOT): 17 (02-17-25 @ 21:11)  Bilirubin Total: 1.0 (02-19)  Bilirubin Total: 1.7 (02-18)  Bilirubin Total: 1.3 (02-17)      Trend LDH      Urinalysis Basic - ( 19 Feb 2025 08:17 )    Color: x / Appearance: x / SG: x / pH: x  Gluc: 104 mg/dL / Ketone: x  / Bili: x / Urobili: x   Blood: x / Protein: x / Nitrite: x   Leuk Esterase: x / RBC: x / WBC x   Sq Epi: x / Non Sq Epi: x / Bacteria: x        MICROBIOLOGY:        Urinalysis with Rflx Culture (collected 18 Feb 2025 01:54)    Culture - Blood (collected 17 Feb 2025 21:11)  Source: .Blood BLOOD  Preliminary Report:    Growth in aerobic and anaerobic bottles: Streptococcus dysgalactiae    (Group C/G)    Direct identification is available within approximately 3-5    hours either by Blood Panel Multiplexed PCR or Direct    MALDI-TOF. Details: [email or web address]  Organism: Blood Culture PCR  Organism: Blood Culture PCR    Sensitivities:      Method Type: PCR      -  Streptococcus sp. (Not Grp A, B or S pneumoniae): Detec    Culture - Blood (collected 17 Feb 2025 21:11)  Source: .Blood BLOOD  Preliminary Report:    Growth in aerobic and anaerobic bottles: Streptococcus dysgalactiae    (Group C/G)    See previous culture [id number]      Rapid RVP Result: NotDetec (02-17 @ 21:11)          C-Reactive Protein: 169.0 (02-18)      D-Dimer Assay, Quantitative: 6541 (02-18)  D-Dimer Assay, Quantitative: 6758 (02-18)          Lactate, Blood: 1.5 (02-18 @ 00:41)  Lactate, Blood: 2.1 (02-17 @ 21:11)    Sedimentation Rate, Erythrocyte: 14 mm/hr (02-18-25 @ 07:10)      RADIOLOGY:  imaging below personally reviewed    CT Chest w/ IV Cont:   ACC: [id number] EXAM:  CT ABDOMEN AND PELVIS IC   ORDERED BY: SEMAJ MORALES     ACC: [id number] EXAM:  CT CHEST IC   ORDERED BY: SEMAJ MORALES     PROCEDURE DATE:  02/17/2025          INTERPRETATION:  CLINICAL INFORMATION: Fever, left arm pain and weakness,   evaluate for epidural abscess versus dissection.    COMPARISON: None.    CONTRAST/COMPLICATIONS:  IV Contrast: Omnipaque 350 (accession [id number]), IV contrast documented   in unlinked concurrent exam (accession [id number])  90 cc administered   0   cc discarded  Oral Contrast: NONE    PROCEDURE:  CT of the Chest, Abdomen and Pelvis was performed.  Sagittal and coronal reformats were performed.    FINDINGS:  CHEST:  LUNGS AND LARGE AIRWAYS: Patent central airways. No pulmonary nodules or   parenchymal consolidation.  PLEURA: No pleural effusion.  VESSELS: Aortic calcifications. Coronary artery calcifications. No aortic   dissection.  HEART: Heart size is normal. No pericardial effusion.  MEDIASTINUM ANDHILA: No lymphadenopathy.  CHEST WALL AND LOWER NECK: Bilateral gynecomastia  BONES: Partially visualized sequela from laminectomy, ACDF and posterior   spinal fusion hardware in the lower cervical spine. Streak artifact   limits evaluation of the adjacent structures/spinal canal. DISH and   degenerative changes of the thoracic spine.    ABDOMEN AND PELVIS:  LIVER: Multiple scattered simple hepatic cysts.  BILE DUCTS: Normal caliber.  GALLBLADDER: Within normal limits.  SPLEEN: Within normal limits.  PANCREAS: Within normal limits.  ADRENALS: 1.3 cm left adrenal nodule. Right adrenal gland unremarkable.  KIDNEYS/URETERS: No renal stones or hydronephrosis. 1.3 cm Bosniak II   cyst on the left. Subcentimeter simple cyst on the right.    BLADDER: Within normal limits.  REPRODUCTIVE ORGANS: Prostate is enlarged.    BOWEL: No bowel obstruction. Appendix is normal.  PERITONEUM/RETROPERITONEUM: Within normal limits.  VESSELS: Atherosclerotic changes.  LYMPH NODES: No lymphadenopathy.  ABDOMINAL WALL: Within normal limits.  BONES: Degenerative changes. Sequela of L4-5 laminectomy and L4-5   posterior spinal fusion hardware.    IMPRESSION:  No acute findings in the chest, abdomen or pelvis.    Partially visualized sequela from laminectomy, ACDF and posterior spinal   fusion hardware in the lower cervical spine. Streak artifact limits   evaluation of the adjacent structures/spinal canal.    1.3 cm left adrenal nodule. Consider follow-up adrenal CT.    Prostatomegaly, correlate with PSA values.    --- End of Report ---      ULYSSES DENSON MD; Attending Radiologist  This document has been electronically signed. Feb 18 2025 12:23AM (02-17-25 @ 22:58)  CT Abdomen and Pelvis w/ IV Cont:   ACC: [id number] EXAM:  CT ABDOMEN AND PELVIS IC   ORDERED BY: SEMAJ MORALES     ACC: [id number] EXAM:  CT CHEST IC   ORDERED BY: SEMAJ MORALES     PROCEDURE DATE:  02/17/2025          INTERPRETATION:  CLINICAL INFORMATION: Fever, left arm pain and weakness,   evaluate for epidural abscess versus dissection.    COMPARISON: None.    CONTRAST/COMPLICATIONS:  IV Contrast: Omnipaque 350 (accession [id number]), IV contrast documented   in unlinked concurrent exam (accession [id number])  90 cc administered   0   cc discarded  Oral Contrast: NONE    PROCEDURE:  CT of the Chest, Abdomen and Pelvis was performed.  Sagittal and coronal reformats were performed.    FINDINGS:  CHEST:  LUNGS AND LARGE AIRWAYS: Patent central airways. No pulmonary nodules or   parenchymal consolidation.  PLEURA: No pleural effusion.  VESSELS: Aortic calcifications. Coronary artery calcifications. No aortic   dissection.  HEART: Heart size is normal. No pericardial effusion.  MEDIASTINUM ANDHILA: No lymphadenopathy.  CHEST WALL AND LOWER NECK: Bilateral gynecomastia  BONES: Partially visualized sequela from laminectomy, ACDF and posterior   spinal fusion hardware in the lower cervical spine. Streak artifact   limits evaluation of the adjacent structures/spinal canal. DISH and   degenerative changes of the thoracic spine.    ABDOMEN AND PELVIS:  LIVER: Multiple scattered simple hepatic cysts.  BILE DUCTS: Normal caliber.  GALLBLADDER: Within normal limits.  SPLEEN: Within normal limits.  PANCREAS: Within normal limits.  ADRENALS: 1.3 cm left adrenal nodule. Right adrenal gland unremarkable.  KIDNEYS/URETERS: No renal stones or hydronephrosis. 1.3 cm Bosniak II   cyst on the left. Subcentimeter simple cyst on the right.    BLADDER: Within normal limits.  REPRODUCTIVE ORGANS: Prostate is enlarged.    BOWEL: No bowel obstruction. Appendix is normal.  PERITONEUM/RETROPERITONEUM: Within normal limits.  VESSELS: Atherosclerotic changes.  LYMPH NODES: No lymphadenopathy.  ABDOMINAL WALL: Within normal limits.  BONES: Degenerative changes. Sequela of L4-5 laminectomy and L4-5   posterior spinal fusion hardware.    IMPRESSION:  No acute findings in the chest, abdomen or pelvis.    Partially visualized sequela from laminectomy, ACDF and posterior spinal   fusion hardware in the lower cervical spine. Streak artifact limits   evaluation of the adjacent structures/spinal canal.    1.3 cm left adrenal nodule. Consider follow-up adrenal CT.    Prostatomegaly, correlate with PSA values.    --- End of Report ---    ULYSSES DENSON MD; Attending Radiologist  This document has been electronically signed. Feb 18 2025 12:23AM (02-17-25 @ 22:58)

## 2025-02-19 NOTE — PROGRESS NOTE ADULT - ASSESSMENT
74-year-old male with a past medical history of benign prostatic hyperplasia, diverticulosis, Graves' disease, hemorrhoids, hyperlipidemia, hypothyroidism, osteoarthritis, spinal stenosis, spinal cord injury (2004), and multiple spinal surgeries (three cervical and one lumbar). He presents to the emergency department with complaints of shoulder pain radiating down the arms associated with fever and LE weakness.    Plan:  - No acute neurosurgical intervention  - With positive blood cultures no need for biopsy   - CT reviewed, no signs of hardware loosening  - Continue IV antibiotics per ID  - Will follow    D/w Dr. Hackett

## 2025-02-19 NOTE — PROGRESS NOTE ADULT - ASSESSMENT
74-year-old male with a past medical history of benign prostatic hyperplasia, diverticulosis, Graves' disease, hemorrhoids, hyperlipidemia, hypothyroidism, osteoarthritis, spinal stenosis, spinal cord injury (2004), and multiple spinal surgeries (three cervical and one lumbar). He presents to the emergency department with complaints of shoulder pain radiating down the arms associated with fever and LE weakness.    #new L hand weakness, multiple subjective areas of paresthesias in the setting of sepsis, strep bacteremia-MRI brain w/wo is neg for acute stroke    #discitis and phlegmon        Recommendations  -Neurosurgery following-no acute surgical intervention  -ID following-will continue IV Abx's  -PT/OT eval  -Neurology to sign off.  Please page or call with any questions    D/W Dr. Webb, patient

## 2025-02-19 NOTE — PROGRESS NOTE ADULT - ASSESSMENT
A/P: 74 male with Strep Discitis  HLD  Hypothyroidism      Plan  CCU  Neuro Checks q2h  Pain Control  Continue Rocephin.  Will require a prolonged course  While his D-Dimers is elevated there is no indication to do a V/Q SCAN or CTA.  they are Elevated from the infection  Will D/C V/Q scan  Po Diet  Mirlax  Change Flomax to 0.8 at QHS  SQH DVT Prophylaxis

## 2025-02-19 NOTE — PROGRESS NOTE ADULT - SUBJECTIVE AND OBJECTIVE BOX
No acute events overnight, no new complaints, states can move LLE better today.  MRI neg for acute stroke.  On IV antibiotics for bacteremia.    ROS: As stated above otherwise neg    MEDICATIONS  (STANDING):  aspirin enteric coated 81 milliGRAM(s) Oral daily  cefTRIAXone Injectable. 2000 milliGRAM(s) IV Push every 24 hours  heparin   Injectable 5000 Unit(s) SubCutaneous every 8 hours  lactated ringers. 1000 milliLiter(s) (100 mL/Hr) IV Continuous <Continuous>  levothyroxine 150 MICROGram(s) Oral daily  multivitamin/minerals 1 Tablet(s) Oral daily  rosuvastatin 5 milliGRAM(s) Oral at bedtime  tamsulosin 0.4 milliGRAM(s) Oral every 12 hours  vibegron 75 milliGRAM(s) 75 milliGRAM(s) Oral at bedtime      Vital Signs Last 24 Hrs  T(C): 35.7 (19 Feb 2025 08:00), Max: 38.1 (18 Feb 2025 17:46)  T(F): 96.2 (19 Feb 2025 08:00), Max: 100.6 (18 Feb 2025 17:46)  HR: 93 (19 Feb 2025 13:00) (93 - 116)  BP: 137/81 (19 Feb 2025 13:00) (109/82 - 146/78)  BP(mean): 99 (19 Feb 2025 13:00) (84 - 110)  RR: 15 (19 Feb 2025 13:00) (12 - 30)  SpO2: 93% (19 Feb 2025 13:00) (89% - 96%)    Parameters below as of 19 Feb 2025 04:00  Patient On (Oxygen Delivery Method): room air      Neurological exam:  HF: A x O x 3. Appropriately interactive. Speech fluent, No Aphasia or paraphasic errors. Naming /repetition intact   CN: FATEMEH, EOMI, VFF, facial sensation normal, no NLFD, tongue midline, Palate moves equally  Motor: No pronator drift, Strength 5/5 upper ext, but R hand  4-/5, L hand  2/5, RLE spastic, LLE limited 2/2 pain-can lift antigravity  Sens: Subjective paresthesias b/l hands to elbow, b/l anterior thighs and shins. Otherwise intact to light touch / PP/ temperature sense  Reflexes: Symmetric and normal . R BJ 3+, L BJ 2+, R BR 3+, L BR 2+, R  KJ 3+, L KJ absent,R AJ 2+, L AJ absent+, downgoing toes b/l  Coord:  No FNFA, dysmetria  Gait/Balance: Cannot test                          12.3   18.00 )-----------( 136      ( 19 Feb 2025 08:17 )             37.0     02-19    132[L]  |  103  |  24[H]  ----------------------------<  104[H]  4.0   |  23  |  0.94    Ca    9.0      19 Feb 2025 08:17  Phos  2.8     02-18  Mg     1.5     02-18    TPro  6.3  /  Alb  2.9[L]  /  TBili  1.0  /  DBili  x   /  AST  57[H]  /  ALT  35  /  AlkPhos  63  02-19          Radiology report:    < from: MR Head w/wo IV Cont (02.19.25 @ 09:15) >  IMPRESSION:  Ischemic white matter disease and atrophy typical for age.   No evidence of infarction. No abnormal enhancement    < end of copied text >  MRI C/T/L spine imagines reviewed: discitis and phlegmon.

## 2025-02-20 LAB
ANION GAP SERPL CALC-SCNC: 6 MMOL/L — SIGNIFICANT CHANGE UP (ref 5–17)
ANION GAP SERPL CALC-SCNC: 8 MMOL/L — SIGNIFICANT CHANGE UP (ref 5–17)
BUN SERPL-MCNC: 28 MG/DL — HIGH (ref 7–23)
BUN SERPL-MCNC: 29 MG/DL — HIGH (ref 7–23)
CALCIUM SERPL-MCNC: 9.3 MG/DL — SIGNIFICANT CHANGE UP (ref 8.5–10.1)
CALCIUM SERPL-MCNC: 9.6 MG/DL — SIGNIFICANT CHANGE UP (ref 8.5–10.1)
CHLORIDE SERPL-SCNC: 103 MMOL/L — SIGNIFICANT CHANGE UP (ref 96–108)
CHLORIDE SERPL-SCNC: 104 MMOL/L — SIGNIFICANT CHANGE UP (ref 96–108)
CO2 SERPL-SCNC: 22 MMOL/L — SIGNIFICANT CHANGE UP (ref 22–31)
CO2 SERPL-SCNC: 24 MMOL/L — SIGNIFICANT CHANGE UP (ref 22–31)
CREAT SERPL-MCNC: 1.08 MG/DL — SIGNIFICANT CHANGE UP (ref 0.5–1.3)
CREAT SERPL-MCNC: 1.35 MG/DL — HIGH (ref 0.5–1.3)
CULTURE RESULTS: ABNORMAL
EGFR: 56 ML/MIN/1.73M2 — LOW
EGFR: 73 ML/MIN/1.73M2 — SIGNIFICANT CHANGE UP
GLUCOSE SERPL-MCNC: 130 MG/DL — HIGH (ref 70–99)
GLUCOSE SERPL-MCNC: 135 MG/DL — HIGH (ref 70–99)
HCT VFR BLD CALC: 41.5 % — SIGNIFICANT CHANGE UP (ref 39–50)
HGB BLD-MCNC: 13.9 G/DL — SIGNIFICANT CHANGE UP (ref 13–17)
MAGNESIUM SERPL-MCNC: 2.5 MG/DL — SIGNIFICANT CHANGE UP (ref 1.6–2.6)
MCHC RBC-ENTMCNC: 28.4 PG — SIGNIFICANT CHANGE UP (ref 27–34)
MCHC RBC-ENTMCNC: 33.5 G/DL — SIGNIFICANT CHANGE UP (ref 32–36)
MCV RBC AUTO: 84.9 FL — SIGNIFICANT CHANGE UP (ref 80–100)
MRSA PCR RESULT.: SIGNIFICANT CHANGE UP
NRBC # BLD AUTO: 0 K/UL — SIGNIFICANT CHANGE UP (ref 0–0)
NRBC # FLD: 0 K/UL — SIGNIFICANT CHANGE UP (ref 0–0)
NRBC BLD AUTO-RTO: 0 /100 WBCS — SIGNIFICANT CHANGE UP (ref 0–0)
PHOSPHATE SERPL-MCNC: 3.3 MG/DL — SIGNIFICANT CHANGE UP (ref 2.5–4.5)
PLATELET # BLD AUTO: 174 K/UL — SIGNIFICANT CHANGE UP (ref 150–400)
PMV BLD: 10.5 FL — SIGNIFICANT CHANGE UP (ref 7–13)
POTASSIUM SERPL-MCNC: 3.9 MMOL/L — SIGNIFICANT CHANGE UP (ref 3.5–5.3)
POTASSIUM SERPL-MCNC: 4.3 MMOL/L — SIGNIFICANT CHANGE UP (ref 3.5–5.3)
POTASSIUM SERPL-SCNC: 3.9 MMOL/L — SIGNIFICANT CHANGE UP (ref 3.5–5.3)
POTASSIUM SERPL-SCNC: 4.3 MMOL/L — SIGNIFICANT CHANGE UP (ref 3.5–5.3)
RBC # BLD: 4.89 M/UL — SIGNIFICANT CHANGE UP (ref 4.2–5.8)
RBC # FLD: 14.4 % — SIGNIFICANT CHANGE UP (ref 10.3–14.5)
S AUREUS DNA NOSE QL NAA+PROBE: SIGNIFICANT CHANGE UP
SODIUM SERPL-SCNC: 133 MMOL/L — LOW (ref 135–145)
SODIUM SERPL-SCNC: 134 MMOL/L — LOW (ref 135–145)
SPECIMEN SOURCE: SIGNIFICANT CHANGE UP
WBC # BLD: 17.65 K/UL — HIGH (ref 3.8–10.5)
WBC # FLD AUTO: 17.65 K/UL — HIGH (ref 3.8–10.5)

## 2025-02-20 PROCEDURE — 72156 MRI NECK SPINE W/O & W/DYE: CPT | Mod: 26

## 2025-02-20 PROCEDURE — 74018 RADEX ABDOMEN 1 VIEW: CPT | Mod: 26

## 2025-02-20 PROCEDURE — 72157 MRI CHEST SPINE W/O & W/DYE: CPT | Mod: 26

## 2025-02-20 PROCEDURE — 99233 SBSQ HOSP IP/OBS HIGH 50: CPT

## 2025-02-20 PROCEDURE — 99232 SBSQ HOSP IP/OBS MODERATE 35: CPT | Mod: FS

## 2025-02-20 PROCEDURE — 73221 MRI JOINT UPR EXTREM W/O DYE: CPT | Mod: 26,RT

## 2025-02-20 RX ORDER — SODIUM CHLORIDE 9 G/1000ML
1000 INJECTION, SOLUTION INTRAVENOUS
Refills: 0 | Status: DISCONTINUED | OUTPATIENT
Start: 2025-02-20 | End: 2025-02-22

## 2025-02-20 RX ORDER — HYDROMORPHONE/SOD CHLOR,ISO/PF 2 MG/10 ML
0.5 SYRINGE (ML) INJECTION ONCE
Refills: 0 | Status: DISCONTINUED | OUTPATIENT
Start: 2025-02-20 | End: 2025-02-20

## 2025-02-20 RX ORDER — METOPROLOL SUCCINATE 50 MG/1
12.5 TABLET, EXTENDED RELEASE ORAL EVERY 12 HOURS
Refills: 0 | Status: DISCONTINUED | OUTPATIENT
Start: 2025-02-20 | End: 2025-02-25

## 2025-02-20 RX ORDER — MAGNESIUM CITRATE
296 SOLUTION, ORAL ORAL ONCE
Refills: 0 | Status: COMPLETED | OUTPATIENT
Start: 2025-02-20 | End: 2025-02-20

## 2025-02-20 RX ORDER — MAGNESIUM HYDROXIDE 400 MG/5ML
30 SUSPENSION ORAL DAILY
Refills: 0 | Status: DISCONTINUED | OUTPATIENT
Start: 2025-02-20 | End: 2025-02-25

## 2025-02-20 RX ORDER — BISACODYL 5 MG
10 TABLET, DELAYED RELEASE (ENTERIC COATED) ORAL DAILY
Refills: 0 | Status: DISCONTINUED | OUTPATIENT
Start: 2025-02-20 | End: 2025-02-25

## 2025-02-20 RX ADMIN — Medication 150 MICROGRAM(S): at 06:13

## 2025-02-20 RX ADMIN — TIZANIDINE 2 MILLIGRAM(S): 4 TABLET ORAL at 21:49

## 2025-02-20 RX ADMIN — ROSUVASTATIN CALCIUM 5 MILLIGRAM(S): 20 TABLET, FILM COATED ORAL at 21:49

## 2025-02-20 RX ADMIN — TAMSULOSIN HYDROCHLORIDE 0.8 MILLIGRAM(S): 0.4 CAPSULE ORAL at 21:49

## 2025-02-20 RX ADMIN — Medication 81 MILLIGRAM(S): at 06:13

## 2025-02-20 RX ADMIN — HEPARIN SODIUM 5000 UNIT(S): 1000 INJECTION INTRAVENOUS; SUBCUTANEOUS at 21:50

## 2025-02-20 RX ADMIN — METOPROLOL SUCCINATE 12.5 MILLIGRAM(S): 50 TABLET, EXTENDED RELEASE ORAL at 06:15

## 2025-02-20 RX ADMIN — Medication 1 TABLET(S): at 09:01

## 2025-02-20 RX ADMIN — SODIUM CHLORIDE 100 MILLILITER(S): 9 INJECTION, SOLUTION INTRAVENOUS at 22:08

## 2025-02-20 RX ADMIN — HEPARIN SODIUM 5000 UNIT(S): 1000 INJECTION INTRAVENOUS; SUBCUTANEOUS at 15:48

## 2025-02-20 RX ADMIN — Medication 1 MILLIGRAM(S): at 19:10

## 2025-02-20 RX ADMIN — Medication 0.5 MILLIGRAM(S): at 01:14

## 2025-02-20 RX ADMIN — MAGNESIUM HYDROXIDE 30 MILLILITER(S): 400 SUSPENSION ORAL at 16:22

## 2025-02-20 RX ADMIN — Medication 1 MILLIGRAM(S): at 03:24

## 2025-02-20 RX ADMIN — Medication 1 MILLIGRAM(S): at 18:53

## 2025-02-20 RX ADMIN — HEPARIN SODIUM 5000 UNIT(S): 1000 INJECTION INTRAVENOUS; SUBCUTANEOUS at 06:13

## 2025-02-20 RX ADMIN — CEFTRIAXONE 2000 MILLIGRAM(S): 500 INJECTION, POWDER, FOR SOLUTION INTRAMUSCULAR; INTRAVENOUS at 15:47

## 2025-02-20 RX ADMIN — METOPROLOL SUCCINATE 12.5 MILLIGRAM(S): 50 TABLET, EXTENDED RELEASE ORAL at 09:01

## 2025-02-20 RX ADMIN — POLYETHYLENE GLYCOL 3350 17 GRAM(S): 17 POWDER, FOR SOLUTION ORAL at 09:01

## 2025-02-20 RX ADMIN — TIZANIDINE 2 MILLIGRAM(S): 4 TABLET ORAL at 01:07

## 2025-02-20 RX ADMIN — Medication 30 MILLILITER(S): at 01:32

## 2025-02-20 RX ADMIN — Medication 296 MILLILITER(S): at 15:49

## 2025-02-20 RX ADMIN — METOPROLOL SUCCINATE 12.5 MILLIGRAM(S): 50 TABLET, EXTENDED RELEASE ORAL at 21:49

## 2025-02-20 RX ADMIN — Medication 10 MILLIGRAM(S): at 15:49

## 2025-02-20 RX ADMIN — SODIUM CHLORIDE 100 MILLILITER(S): 9 INJECTION, SOLUTION INTRAVENOUS at 09:01

## 2025-02-20 NOTE — PROGRESS NOTE ADULT - SUBJECTIVE AND OBJECTIVE BOX
Date of Service:02-20-25 @ 11:51  Interval History/ROS: Afebrile overnight, comfortable on RA, Sy S Strep on culture, reports no fever or chills, able to move both UE and LE grossly      REVIEW OF SYSTEMS  [  ] ROS unobtainable because:    [ x ] All other systems negative except as noted below    Constitutional:  [ ] fever [ ] chills  [ ] weight loss  [ ]night sweat  [ ]poor appetite/PO intake [ ]fatigue   Skin:  [ ] rash [ ] phlebitis	  Eyes: [ ] icterus [ ] pain  [ ] discharge	  ENMT: [ ] sore throat  [ ] thrush [ ] ulcers [ ] exudates [ ]anosmia  Respiratory: [ ] dyspnea [ ] hemoptysis [ ] cough [ ] sputum	  Cardiovascular:  [ ] chest pain [ ] palpitations [ ] edema	  Gastrointestinal:  [ ] nausea [ ] vomiting [ ] diarrhea [ ] constipation [ ] pain	  Genitourinary:  [ ] dysuria [ ] frequency [ ] hematuria [ ] discharge [ ] flank pain  [ ] incontinence  Musculoskeletal:  [ ] myalgias [ ] arthralgias [ ] arthritis  [ ] back pain  Neurological:  [ ] headache [ ] weakness [ ] seizures  [ ] confusion/altered mental status    Allergies  No Known Allergies        ANTIMICROBIALS:    cefTRIAXone Injectable. 2000 every 24 hours        OTHER MEDS: MEDICATIONS  (STANDING):  acetaminophen     Tablet .. 650 every 6 hours PRN  aluminum hydroxide/magnesium hydroxide/simethicone Suspension 30 every 4 hours PRN  bisacodyl Suppository 10 daily  heparin   Injectable 5000 every 8 hours  HYDROmorphone  Injectable 1 every 6 hours PRN  levothyroxine 150 daily  magnesium citrate Oral Solution 296 once  magnesium hydroxide Suspension 30 daily  melatonin 3 at bedtime PRN  metoprolol tartrate 12.5 every 12 hours  ondansetron Injectable 4 every 8 hours PRN  polyethylene glycol 3350 17 daily  rosuvastatin 5 at bedtime  tamsulosin 0.8 at bedtime  tiZANidine 2 four times a day PRN      Vital Signs Last 24 Hrs  T(F): 98.1 (02-20-25 @ 06:42), Max: 105 (02-17-25 @ 20:50)    Vital Signs Last 24 Hrs  HR: 94 (02-20-25 @ 11:17) (87 - 107)  BP: 152/92 (02-20-25 @ 11:17) (125/97 - 172/107)  RR: 18 (02-20-25 @ 11:17)  SpO2: 94% (02-20-25 @ 11:17) (93% - 100%)  Wt(kg): --    EXAM:    Constitutional: frail, NAD  Head/Eyes: no icterus  LUNGS:  CTA  CVS:  regular rhythm  Abd:  soft, non-tender; non-distended  Ext:  no edema  Back: no spinal tenderness noted  Vascular:  IV site no erythema tenderness or discharge  Neuro: AAO X 3, weak R hand     Labs:                        13.9   17.65 )-----------( 174      ( 20 Feb 2025 05:10 )             41.5     02-20    133[L]  |  103  |  29[H]  ----------------------------<  130[H]  4.3   |  22  |  1.35[H]    Ca    9.6      20 Feb 2025 05:10  Phos  3.3     02-20  Mg     2.5     02-20    TPro  6.3  /  Alb  2.9[L]  /  TBili  1.0  /  DBili  x   /  AST  57[H]  /  ALT  35  /  AlkPhos  63  02-19      WBC Trend:  WBC Count: 17.65 (02-20-25 @ 05:10)  WBC Count: 18.00 (02-19-25 @ 08:17)  WBC Count: 20.35 (02-18-25 @ 07:10)  WBC Count: 13.23 (02-17-25 @ 21:11)      Creatine Trend:  Creatinine: 1.35 (02-20)  Creatinine: 0.94 (02-19)  Creatinine: 1.57 (02-18)  Creatinine: 1.50 (02-17)      Liver Biochemical Testing Trend:  Alanine Aminotransferase (ALT/SGPT): 35 (02-19)  Alanine Aminotransferase (ALT/SGPT): 29 (02-18)  Alanine Aminotransferase (ALT/SGPT): 30 (02-17)  Aspartate Aminotransferase (AST/SGOT): 57 (02-19-25 @ 08:17)  Aspartate Aminotransferase (AST/SGOT): 25 (02-18-25 @ 07:10)  Aspartate Aminotransferase (AST/SGOT): 17 (02-17-25 @ 21:11)  Bilirubin Total: 1.0 (02-19)  Bilirubin Total: 1.7 (02-18)  Bilirubin Total: 1.3 (02-17)      Trend LDH      Urinalysis Basic - ( 20 Feb 2025 05:10 )    Color: x / Appearance: x / SG: x / pH: x  Gluc: 130 mg/dL / Ketone: x  / Bili: x / Urobili: x   Blood: x / Protein: x / Nitrite: x   Leuk Esterase: x / RBC: x / WBC x   Sq Epi: x / Non Sq Epi: x / Bacteria: x        MICROBIOLOGY:    MRSA PCR Result.: Mikec (02-19-25 @ 18:15)      Urinalysis with Rflx Culture (collected 18 Feb 2025 01:54)    Culture - Blood (collected 17 Feb 2025 21:11)  Source: .Blood BLOOD  Final Report:    Growth in aerobic and anaerobic bottles: Streptococcus dysgalactiae    (Group C/G)    Direct identification is available within approximately 3-5    hours either by Blood Panel Multiplexed PCR or Direct    MALDI-TOF. Details: [email or web address]  Organism: Blood Culture PCR  Streptococcus dysgalactiae  Organism: Streptococcus dysgalactiae    Sensitivities:      Method Type: EDEL      -  Ceftriaxone: S <=0.25      -  Clindamycin: S <=0.06      -  Levofloxacin: S 0.5      -  Penicillin: S <=0.03 Predicts results for ampicillin, amoxicillin, amoxicillin/clavulanate, ampicillin/sulbactam, 1st, 2nd and 3rd generation cephalosporins and carbapenems.      -  Tetracycline: S <=0.5      -  Vancomycin: S 0.5  Organism: Blood Culture PCR    Sensitivities:      Method Type: PCR      -  Streptococcus sp. (Not Grp A, B or S pneumoniae): Detec    Culture - Blood (collected 17 Feb 2025 21:11)  Source: .Blood BLOOD  Preliminary Report:    Growth in aerobic and anaerobic bottles: Streptococcus dysgalactiae    (Group C/G)    See previous culture [id number]      Rapid RVP Result: Tracytec (02-17 @ 21:11)          C-Reactive Protein: 169.0 (02-18)      D-Dimer Assay, Quantitative: 6541 (02-18)  D-Dimer Assay, Quantitative: 6758 (02-18)          Lactate, Blood: 1.5 (02-18 @ 00:41)  Lactate, Blood: 2.1 (02-17 @ 21:11)    Sedimentation Rate, Erythrocyte: 14 mm/hr (02-18-25 @ 07:10)      RADIOLOGY:  imaging below personally reviewed    Xray Chest 1 View- PORTABLE-Urgent:  (18 Feb 2025 14:35)              CT Chest w/ IV Cont:   ACC: [id number] EXAM:  CT ABDOMEN AND PELVIS IC   ORDERED BY: SEMAJ MORALES     ACC: [id number] EXAM:  CT CHEST IC   ORDERED BY: SEMAJ MORALES     PROCEDURE DATE:  02/17/2025          INTERPRETATION:  CLINICAL INFORMATION: Fever, left arm pain and weakness,   evaluate for epidural abscess versus dissection.    COMPARISON: None.    CONTRAST/COMPLICATIONS:  IV Contrast: Omnipaque 350 (accession [id number]), IV contrast documented   in unlinked concurrent exam (accession [id number])  90 cc administered   0   cc discarded  Oral Contrast: NONE    PROCEDURE:  CT of the Chest, Abdomen and Pelvis was performed.  Sagittal and coronal reformats were performed.    FINDINGS:  CHEST:  LUNGS AND LARGE AIRWAYS: Patent central airways. No pulmonary nodules or   parenchymal consolidation.  PLEURA: No pleural effusion.  VESSELS: Aortic calcifications. Coronary artery calcifications. No aortic   dissection.  HEART: Heart size is normal. No pericardial effusion.  MEDIASTINUM ANDHILA: No lymphadenopathy.  CHEST WALL AND LOWER NECK: Bilateral gynecomastia  BONES: Partially visualized sequela from laminectomy, ACDF and posterior   spinal fusion hardware in the lower cervical spine. Streak artifact   limits evaluation of the adjacent structures/spinal canal. DISH and   degenerative changes of the thoracic spine.    ABDOMEN AND PELVIS:  LIVER: Multiple scattered simple hepatic cysts.  BILE DUCTS: Normal caliber.  GALLBLADDER: Within normal limits.  SPLEEN: Within normal limits.  PANCREAS: Within normal limits.  ADRENALS: 1.3 cm left adrenal nodule. Right adrenal gland unremarkable.  KIDNEYS/URETERS: No renal stones or hydronephrosis. 1.3 cm Bosniak II   cyst on the left. Subcentimeter simple cyst on the right.    BLADDER: Within normal limits.  REPRODUCTIVE ORGANS: Prostate is enlarged.    BOWEL: No bowel obstruction. Appendix is normal.  PERITONEUM/RETROPERITONEUM: Within normal limits.  VESSELS: Atherosclerotic changes.  LYMPH NODES: No lymphadenopathy.  ABDOMINAL WALL: Within normal limits.  BONES: Degenerative changes. Sequela of L4-5 laminectomy and L4-5   posterior spinal fusion hardware.    IMPRESSION:  No acute findings in the chest, abdomen or pelvis.    Partially visualized sequela from laminectomy, ACDF and posterior spinal   fusion hardware in the lower cervical spine. Streak artifact limits   evaluation of the adjacent structures/spinal canal.    1.3 cm left adrenal nodule. Consider follow-up adrenal CT.    Prostatomegaly, correlate with PSA values.    --- End of Report ---    ULYSSES DENSON MD; Attending Radiologist  This document has been electronically signed. Feb 18 2025 12:23AM (02-17-25 @ 22:58)  CT Abdomen and Pelvis w/ IV Cont:   ACC: [id number] EXAM:  CT ABDOMEN AND PELVIS IC   ORDERED BY: SEMAJ MORALES     ACC: [id number] EXAM:  CT CHEST IC   ORDERED BY: SEMAJ MORALES     PROCEDURE DATE:  02/17/2025          INTERPRETATION:  CLINICAL INFORMATION: Fever, left arm pain and weakness,   evaluate for epidural abscess versus dissection.    COMPARISON: None.    CONTRAST/COMPLICATIONS:  IV Contrast: Omnipaque 350 (accession [id number]), IV contrast documented   in unlinked concurrent exam (accession [id number])  90 cc administered   0   cc discarded  Oral Contrast: NONE    PROCEDURE:  CT of the Chest, Abdomen and Pelvis was performed.  Sagittal and coronal reformats were performed.    FINDINGS:  CHEST:  LUNGS AND LARGE AIRWAYS: Patent central airways. No pulmonary nodules or   parenchymal consolidation.  PLEURA: No pleural effusion.  VESSELS: Aortic calcifications. Coronary artery calcifications. No aortic   dissection.  HEART: Heart size is normal. No pericardial effusion.  MEDIASTINUM ANDHILA: No lymphadenopathy.  CHEST WALL AND LOWER NECK: Bilateral gynecomastia  BONES: Partially visualized sequela from laminectomy, ACDF and posterior   spinal fusion hardware in the lower cervical spine. Streak artifact   limits evaluation of the adjacent structures/spinal canal. DISH and   degenerative changes of the thoracic spine.    ABDOMEN AND PELVIS:  LIVER: Multiple scattered simple hepatic cysts.  BILE DUCTS: Normal caliber.  GALLBLADDER: Within normal limits.  SPLEEN: Within normal limits.  PANCREAS: Within normal limits.  ADRENALS: 1.3 cm left adrenal nodule. Right adrenal gland unremarkable.  KIDNEYS/URETERS: No renal stones or hydronephrosis. 1.3 cm Bosniak II   cyst on the left. Subcentimeter simple cyst on the right.    BLADDER: Within normal limits.  REPRODUCTIVE ORGANS: Prostate is enlarged.    BOWEL: No bowel obstruction. Appendix is normal.  PERITONEUM/RETROPERITONEUM: Within normal limits.  VESSELS: Atherosclerotic changes.  LYMPH NODES: No lymphadenopathy.  ABDOMINAL WALL: Within normal limits.  BONES: Degenerative changes. Sequela of L4-5 laminectomy and L4-5   posterior spinal fusion hardware.    IMPRESSION:  No acute findings in the chest, abdomen or pelvis.    Partially visualized sequela from laminectomy, ACDF and posterior spinal   fusion hardware in the lower cervical spine. Streak artifact limits   evaluation of the adjacent structures/spinal canal.    1.3 cm left adrenal nodule. Consider follow-up adrenal CT.    Prostatomegaly, correlate with PSA values.    --- End of Report ---    ULYSSES DENSON MD; Attending Radiologist  This document has been electronically signed. Feb 18 2025 12:23AM (02-17-25 @ 22:58)

## 2025-02-20 NOTE — PROGRESS NOTE ADULT - ASSESSMENT
A/P: 74 male with Strep Discitis  HLD  Hypothyroidism      Plan  CCU  Neuro Checks q2h  Pain Control  Continue Rocephin.  Will require a prolonged course-8 Weeks  While his D-Dimers is elevated there is no indication to do a V/Q SCAN or CTA.  They are Elevated from the infection  Will D/C V/Q scan  B/L LE Dopplers Negative  Developed GOVIND likely from ATN, start NS at 50, Repeat BMP at 16:00  Po Diet  Mirlax  Added MOM, Ducolax AL, and a bottle of Mag-Citrate   Flomax to 0.8 at QHS  SQH DVT Prophylaxis

## 2025-02-20 NOTE — PROGRESS NOTE ADULT - SUBJECTIVE AND OBJECTIVE BOX
HPI:  74-year-old male with a past medical history of benign prostatic hyperplasia, diverticulosis, Graves' disease, hemorrhoids, hyperlipidemia, hypothyroidism, osteoarthritis, spinal stenosis, spinal cord injury (2004), and multiple spinal surgeries (three cervical and one lumbar). He presents to the emergency department with complaints of shoulder pain radiating down the arms associated with fever and LE weakness. Patient reports symptoms started as mild on saturday, he had mild shoulder pain, but it progressively got worse. Currently reports pain 8/10, sharp, burning, starts at shoulder blades and radiates down to hands, associated with feeling of numbness in arms. He has a similar sensation in his thighs, and yesterday reports Tmax at home of 102. He also notes a decreased  strength in his right hand. He denies experiencing any saddle anesthesia, or bladder or bowel incontinence. He denies headaches, dizziness, sore throat, rhinitis, SOB, Chest pain, diarrhea, dysuria.     2/18: Patient seen, no change in neuro exam  2/19: Patient with worsening Numbness of LLE worsening R Shoulder pain.  Constipated       PAST MEDICAL & SURGICAL HISTORY:  Hyperlipidemia, unspecified hyperlipidemia type      Diverticulosis of large intestine without hemorrhage      History of colon polyps      Hemorrhoids, unspecified hemorrhoid type      Benign prostatic hyperplasia without lower urinary tract symptoms, unspecified morphology      Osteoarthritis of knee, unilateral  left      Spinal cord injury at C5-C7 level without injury of spinal bone, subsequent encounter      Spastic      Weakness  to right side      Myelopathy      Spinal stenosis, unspecified spinal region      Hypothyroidism, unspecified type      Graves disease      Alcoholic  recovering alcoholic x 40years      History of cervical discectomy      S/P laminectomy  Cervical      H/O lumbar discectomy  with laminectomy      H/O arthroscopy of knee  Left x 2. Unsure of years      H/O colonoscopy with polypectomy  2014          FAMILY HISTORY:  Family history of colon cancer in mother (Mother)    Family history of liver disease (Father)  father    Family history of cancer (Sibling)  SIster - bile duct        Social Hx:    Allergies    No Known Allergies    Intolerances            ICU Vital Signs Last 24 Hrs  T(C): 36.7 (20 Feb 2025 06:42), Max: 36.8 (20 Feb 2025 00:59)  T(F): 98.1 (20 Feb 2025 06:42), Max: 98.2 (20 Feb 2025 00:59)  HR: 105 (20 Feb 2025 09:00) (87 - 108)  BP: 147/106 (20 Feb 2025 09:00) (120/79 - 172/107)  BP(mean): 118 (20 Feb 2025 09:00) (91 - 126)  ABP: --  ABP(mean): --  RR: 21 (20 Feb 2025 09:00) (12 - 27)  SpO2: 94% (20 Feb 2025 09:00) (93% - 94%)    O2 Parameters below as of 20 Feb 2025 06:00  Patient On (Oxygen Delivery Method): room air                I&O's Summary    19 Feb 2025 07:01  -  20 Feb 2025 07:00  --------------------------------------------------------  IN: 1400 mL / OUT: 975 mL / NET: 425 mL                              13.9   17.65 )-----------( 174      ( 20 Feb 2025 05:10 )             41.5       02-20    133[L]  |  103  |  29[H]  ----------------------------<  130[H]  4.3   |  22  |  1.35[H]    Ca    9.6      20 Feb 2025 05:10  Phos  3.3     02-20  Mg     2.5     02-20    TPro  6.3  /  Alb  2.9[L]  /  TBili  1.0  /  DBili  x   /  AST  57[H]  /  ALT  35  /  AlkPhos  63  02-19                Urinalysis Basic - ( 20 Feb 2025 05:10 )    Color: x / Appearance: x / SG: x / pH: x  Gluc: 130 mg/dL / Ketone: x  / Bili: x / Urobili: x   Blood: x / Protein: x / Nitrite: x   Leuk Esterase: x / RBC: x / WBC x   Sq Epi: x / Non Sq Epi: x / Bacteria: x        MEDICATIONS  (STANDING):  bisacodyl Suppository 10 milliGRAM(s) Rectal daily  cefTRIAXone Injectable. 2000 milliGRAM(s) IV Push every 24 hours  heparin   Injectable 5000 Unit(s) SubCutaneous every 8 hours  lactated ringers. 1000 milliLiter(s) (100 mL/Hr) IV Continuous <Continuous>  levothyroxine 150 MICROGram(s) Oral daily  magnesium citrate Oral Solution 296 milliLiter(s) Oral once  magnesium hydroxide Suspension 30 milliLiter(s) Oral daily  metoprolol tartrate 12.5 milliGRAM(s) Oral every 12 hours  multivitamin/minerals 1 Tablet(s) Oral daily  polyethylene glycol 3350 17 Gram(s) Oral daily  rosuvastatin 5 milliGRAM(s) Oral at bedtime  tamsulosin 0.8 milliGRAM(s) Oral at bedtime  vibegron 75 milliGRAM(s) 75 milliGRAM(s) Oral at bedtime    MEDICATIONS  (PRN):  acetaminophen     Tablet .. 650 milliGRAM(s) Oral every 6 hours PRN Temp greater or equal to 38C (100.4F), Mild Pain (1 - 3)  aluminum hydroxide/magnesium hydroxide/simethicone Suspension 30 milliLiter(s) Oral every 4 hours PRN Dyspepsia  HYDROmorphone  Injectable 1 milliGRAM(s) IV Push every 6 hours PRN Severe Pain (7 - 10)  melatonin 3 milliGRAM(s) Oral at bedtime PRN Insomnia  ondansetron Injectable 4 milliGRAM(s) IV Push every 8 hours PRN Nausea and/or Vomiting  tiZANidine 2 milliGRAM(s) Oral four times a day PRN Muscle Spasm      DVT Prophylaxis: [facility]    Advanced Directives:  Discussed with:    Visit Information:    ** Time is exclusive of billed procedures and/or teaching and/or routine family updates.

## 2025-02-20 NOTE — PROGRESS NOTE ADULT - SUBJECTIVE AND OBJECTIVE BOX
HPI:  74-year-old male with a past medical history of benign prostatic hyperplasia, diverticulosis, Graves' disease, hemorrhoids, hyperlipidemia, hypothyroidism, osteoarthritis, spinal stenosis, spinal cord injury (2004), and multiple spinal surgeries (three cervical and one lumbar). He presents to the emergency department with complaints of shoulder pain radiating down the arms associated with fever and LE weakness. Patient reports symptoms started as mild on Saturday he had mild shoulder pain, but it progressively got worse. Currently reports pain 8/10, sharp, burning, starts at shoulder blades and radiates down to hands, associated with feeling of numbness in arms. He has a similar sensation in his thighs, and yesterday reports Tmax at home of 102. He also notes a decreased  strength in his right hand. He denies experiencing any saddle anesthesia, or bladder or bowel incontinence. He denies headaches, dizziness, sore throat, rhinitis, SOB, Chest pain, diarrhea, dysuria.     Neurosurgery consulted for neck pain the setting of multiple spinal fusions, +fever, + strep bacteremia. Hx ACDF for spinal cord injury. Patient seen and examined in ER. Awake/alert, sitting up in stretcher. Complains of upper back pain and fever since Saturday, thought it was the flu. He then developed left arm and bl thigh numbness/tinging on Sunday. He denies urinary/bowel incontinence, saddle parathesias. He denies recent procedure, sick contacts or travel.    2/19- Pt seen and examined in the CCU, no events overnight, tolerating antibiotics, continued left hand weakness and spasticity in right lower leg.    CTs reviewed no signs of hardware loosening     2/20- Pt seen and examined with Dr. Hackett in CCU this AM. Nursing called this AM stating patient reports new numbness across his abdomen. Patient awake/alert, reports severe pain behind R shoulder and with movement, + numbness b/l thighs and an inch below the nipple line. Persistent left hand weakness unchanged from admission.    Vital Signs Last 24 Hrs  T(C): 36.7 (20 Feb 2025 06:42), Max: 36.8 (20 Feb 2025 00:59)  T(F): 98.1 (20 Feb 2025 06:42), Max: 98.2 (20 Feb 2025 00:59)  HR: 101 (20 Feb 2025 08:00) (87 - 108)  BP: 154/110 (20 Feb 2025 08:00) (120/79 - 172/107)  BP(mean): 121 (20 Feb 2025 08:00) (91 - 126)  RR: 21 (20 Feb 2025 08:00) (12 - 27)  SpO2: 94% (20 Feb 2025 06:00) (93% - 94%)    Parameters below as of 20 Feb 2025 06:00  Patient On (Oxygen Delivery Method): room air        MEDICATIONS  (STANDING):  aspirin enteric coated 81 milliGRAM(s) Oral daily  bisacodyl Suppository 10 milliGRAM(s) Rectal daily  cefTRIAXone Injectable. 2000 milliGRAM(s) IV Push every 24 hours  heparin   Injectable 5000 Unit(s) SubCutaneous every 8 hours  lactated ringers. 1000 milliLiter(s) (100 mL/Hr) IV Continuous <Continuous>  levothyroxine 150 MICROGram(s) Oral daily  magnesium citrate Oral Solution 296 milliLiter(s) Oral once  magnesium hydroxide Suspension 30 milliLiter(s) Oral daily  metoprolol tartrate 12.5 milliGRAM(s) Oral every 12 hours  multivitamin/minerals 1 Tablet(s) Oral daily  polyethylene glycol 3350 17 Gram(s) Oral daily  rosuvastatin 5 milliGRAM(s) Oral at bedtime  tamsulosin 0.8 milliGRAM(s) Oral at bedtime  vibegron 75 milliGRAM(s) 75 milliGRAM(s) Oral at bedtime    MEDICATIONS  (PRN):  acetaminophen     Tablet .. 650 milliGRAM(s) Oral every 6 hours PRN Temp greater or equal to 38C (100.4F), Mild Pain (1 - 3)  aluminum hydroxide/magnesium hydroxide/simethicone Suspension 30 milliLiter(s) Oral every 4 hours PRN Dyspepsia  HYDROmorphone  Injectable 1 milliGRAM(s) IV Push every 6 hours PRN Severe Pain (7 - 10)  melatonin 3 milliGRAM(s) Oral at bedtime PRN Insomnia  ondansetron Injectable 4 milliGRAM(s) IV Push every 8 hours PRN Nausea and/or Vomiting  tiZANidine 2 milliGRAM(s) Oral four times a day PRN Muscle Spasm      PHYSICAL EXAM:  Constitutional: Awake / alert, NAD, resting in bed   HEENT: PERRLA, EOMI, hearing intact   Neck: Supple  Respiratory: Breath sounds are clear bilaterally  Cardiovascular: S1 and S2, regular rhythm  Gastrointestinal: Soft, NT/ND  Extremities:  no edema  Musculoskeletal: increased tone RLE   Skin: No rashes    Neurological Exam:  HF: A x O x 3, follows commands, normal affect, speech fluent  CN: PERRL, EOMI, no NLFD, tongue midline  Motor: RUE 5/5, LUE 5/5 prox, 3/5 in  strength, able to lift left leg off the bed, Right leg increased spasticity strength 2/5  Sens: Decreased sensation b/l forearm, b/l thigh. Decreased sensation to pinprick an inch below nipple line b/l, b/l LE>b/l UE  Reflexes: no clonus, no Ayers's   Coord:  No FNFA, dysmetria, ALLAN intact  Gait/Balance: Not tested           LABS:                         13.9   17.65 )-----------( 174      ( 20 Feb 2025 05:10 )             41.5     02-20    133[L]  |  103  |  29[H]  ----------------------------<  130[H]  4.3   |  22  |  1.35[H]    Ca    9.6      20 Feb 2025 05:10  Phos  3.3     02-20  Mg     2.5     02-20    TPro  6.3  /  Alb  2.9[L]  /  TBili  1.0  /  DBili  x   /  AST  57[H]  /  ALT  35  /  AlkPhos  63  02-19    LIVER FUNCTIONS - ( 19 Feb 2025 08:17 )  Alb: 2.9 g/dL / Pro: 6.3 gm/dL / ALK PHOS: 63 U/L / ALT: 35 U/L / AST: 57 U/L / GGT: x                HPI:  74-year-old male with a past medical history of benign prostatic hyperplasia, diverticulosis, Graves' disease, hemorrhoids, hyperlipidemia, hypothyroidism, osteoarthritis, spinal stenosis, spinal cord injury (2004), and multiple spinal surgeries (three cervical and one lumbar). He presents to the emergency department with complaints of shoulder pain radiating down the arms associated with fever and LE weakness. Patient reports symptoms started as mild on Saturday he had mild shoulder pain, but it progressively got worse. Currently reports pain 8/10, sharp, burning, starts at shoulder blades and radiates down to hands, associated with feeling of numbness in arms. He has a similar sensation in his thighs, and yesterday reports Tmax at home of 102. He also notes a decreased  strength in his right hand. He denies experiencing any saddle anesthesia, or bladder or bowel incontinence. He denies headaches, dizziness, sore throat, rhinitis, SOB, Chest pain, diarrhea, dysuria.     Neurosurgery consulted for neck pain the setting of multiple spinal fusions, +fever, + strep bacteremia. Hx ACDF for spinal cord injury. Patient seen and examined in ER. Awake/alert, sitting up in stretcher. Complains of upper back pain and fever since Saturday, thought it was the flu. He then developed left arm and bl thigh numbness/tinging on Sunday. He denies urinary/bowel incontinence, saddle parathesias. He denies recent procedure, sick contacts or travel.    2/19- Pt seen and examined in the CCU, no events overnight, tolerating antibiotics, continued left hand weakness and spasticity in right lower leg.    CTs reviewed no signs of hardware loosening     2/20- Pt seen and examined with Dr. Hackett in CCU this AM. Nursing called this AM stating patient reports new numbness across his abdomen. Patient awake/alert, reports severe pain behind R shoulder and with movement, + numbness b/l thighs and an inch below the nipple line. Persistent left hand weakness unchanged from admission.    Vital Signs Last 24 Hrs  T(C): 36.7 (20 Feb 2025 06:42), Max: 36.8 (20 Feb 2025 00:59)  T(F): 98.1 (20 Feb 2025 06:42), Max: 98.2 (20 Feb 2025 00:59)  HR: 101 (20 Feb 2025 08:00) (87 - 108)  BP: 154/110 (20 Feb 2025 08:00) (120/79 - 172/107)  BP(mean): 121 (20 Feb 2025 08:00) (91 - 126)  RR: 21 (20 Feb 2025 08:00) (12 - 27)  SpO2: 94% (20 Feb 2025 06:00) (93% - 94%)    Parameters below as of 20 Feb 2025 06:00  Patient On (Oxygen Delivery Method): room air        MEDICATIONS  (STANDING):  aspirin enteric coated 81 milliGRAM(s) Oral daily  bisacodyl Suppository 10 milliGRAM(s) Rectal daily  cefTRIAXone Injectable. 2000 milliGRAM(s) IV Push every 24 hours  heparin   Injectable 5000 Unit(s) SubCutaneous every 8 hours  lactated ringers. 1000 milliLiter(s) (100 mL/Hr) IV Continuous <Continuous>  levothyroxine 150 MICROGram(s) Oral daily  magnesium citrate Oral Solution 296 milliLiter(s) Oral once  magnesium hydroxide Suspension 30 milliLiter(s) Oral daily  metoprolol tartrate 12.5 milliGRAM(s) Oral every 12 hours  multivitamin/minerals 1 Tablet(s) Oral daily  polyethylene glycol 3350 17 Gram(s) Oral daily  rosuvastatin 5 milliGRAM(s) Oral at bedtime  tamsulosin 0.8 milliGRAM(s) Oral at bedtime  vibegron 75 milliGRAM(s) 75 milliGRAM(s) Oral at bedtime    MEDICATIONS  (PRN):  acetaminophen     Tablet .. 650 milliGRAM(s) Oral every 6 hours PRN Temp greater or equal to 38C (100.4F), Mild Pain (1 - 3)  aluminum hydroxide/magnesium hydroxide/simethicone Suspension 30 milliLiter(s) Oral every 4 hours PRN Dyspepsia  HYDROmorphone  Injectable 1 milliGRAM(s) IV Push every 6 hours PRN Severe Pain (7 - 10)  melatonin 3 milliGRAM(s) Oral at bedtime PRN Insomnia  ondansetron Injectable 4 milliGRAM(s) IV Push every 8 hours PRN Nausea and/or Vomiting  tiZANidine 2 milliGRAM(s) Oral four times a day PRN Muscle Spasm      PHYSICAL EXAM:  Constitutional: Awake / alert, NAD, resting in bed   HEENT: PERRLA, EOMI, hearing intact   Neck: Supple  Respiratory: Breath sounds are clear bilaterally  Cardiovascular: S1 and S2, regular rhythm  Gastrointestinal: Soft, NT/ND  Extremities:  no edema  Musculoskeletal: increased tone RLE   Skin: No rashes    Neurological Exam:  HF: A x O x 3, follows commands, normal affect, speech fluent  CN: PERRL, EOMI, no NLFD, tongue midline  Motor: RUE 5/5, LUE 5/5 prox, 3/5 in  strength, able to lift left leg briefly off the bed, Right leg increased spasticity strength 2/5 distally 5/5 b/l  Sens: Decreased sensation b/l forearm, b/l thigh. Decreased sensation to pinprick an inch below nipple line b/l, b/l LE>b/l UE  Reflexes: no clonus, no Ayers's   Coord:  No FNFA, dysmetria, ALLAN intact  Gait/Balance: Not tested           LABS:                         13.9   17.65 )-----------( 174      ( 20 Feb 2025 05:10 )             41.5     02-20    133[L]  |  103  |  29[H]  ----------------------------<  130[H]  4.3   |  22  |  1.35[H]    Ca    9.6      20 Feb 2025 05:10  Phos  3.3     02-20  Mg     2.5     02-20    TPro  6.3  /  Alb  2.9[L]  /  TBili  1.0  /  DBili  x   /  AST  57[H]  /  ALT  35  /  AlkPhos  63  02-19    LIVER FUNCTIONS - ( 19 Feb 2025 08:17 )  Alb: 2.9 g/dL / Pro: 6.3 gm/dL / ALK PHOS: 63 U/L / ALT: 35 U/L / AST: 57 U/L / GGT: x             RADIOLOGY:  MR Cervical/Thoracic Spine w/wo IV Cont (02.20.25 @ 15:37)  IMPRESSION:  cervical spine: ACDF at C3 6 and laminectomies at C3-C6 with posterior   fusion utilizing pedicle screws and fusion rods.   Moderate disc   degeneration at C2-3 and C6/7 and C7-T1 with loss of disc height and   associated degenerative endplate changes. There is narrowing of the   BILATERAL C3-4 and BILATERAL C6-7 and C7-T1 neural foramina due to   uncovertebral spurring and facet osteophytic hypertrophy. Posterior   osteophytic ridge/disc complexes at T6/7 and C7-T1 abut the ventral cord.   Myelomalacia is noted at C3-4.There is enhancement of the ventral and   posterior spinalcanal at the C7-T4 levels suggesting tiny epidural   abscess. Prevertebral enhancement extends from the C7 level into the   thoracic spine.    thoracic spine:  Marrow signal demonstrates edema at the T1-2 and T2-3   endplate with minimal enhancement at T2-3 and T3-4 intravertebral discs   which may reflect early discitis. Prevertebral enhancement extends from   the C7 level to the T4 level consistent with phlegmon. Enhancement at the   anterior and posterior aspects of the spinal canal extending from the   cervical canal to the CT for level which may reflect a small epidural   abscesses. No significant underlying edema is noted.

## 2025-02-20 NOTE — PROGRESS NOTE ADULT - ASSESSMENT
Assessment:  74M with benign prostatic hyperplasia, diverticulosis, Graves' disease, hemorrhoids, hyperlipidemia, hypothyroidism, osteoarthritis, spinal stenosis, spinal cord injury (2004), and multiple spinal surgeries (three cervical and one lumbar) presents 2/17 with neck pain radiating to R shoulder associated with numbness and tingling, also noted fever of 102F at home  Reports no cough, abdominal pain, n/v, diarrhea or dysuria  No recent dental work, no recent skin soft tissue infection, no wounds  History of multiple spinal surgery, C and T spine laminectomy with fusion hardware (2004), L spine laminectomy with fusion hardware (2006), all done in [facility]  Also has L knee replacement (2017) though no symptoms there, L knee ROM normal no pain  No cardiac devices  Febrile 105F on admission, on 2L NC  WBC 13 > 20  Cr 1.57    UA negative  Full RVP negative  CT Chest AP without acute findings  BCx (2/17) with Strep dysgalacteae, Sy S  MRI 2/18 with concern for discitis T1-T3, possible phlegmon C7-T3, no abscess  TTE without obvious vegetation    Antimicrobials:  metroNIDAZOLE  IVPB 500 every 12 hours (2/18)  Cefepime (2/17-2/18)  Vanco 1500mg (2/17 - 2/18)  CTX 2G (2/18 --- )    Impression:   #High-grade Strep Bacteremia, Neck Pain, Concern for Spinal Involvement in setting of Multilevel C/T/L Spine Laminectomy with fusion hardware  #Fever  #Leukocytosis  #GOVIND on ?CKD  - unclear source, no open wounds, no recent surgery, no recent dental procedures, had been feeling well all week until this past weekend, had acute symptoms including fever and chills  - concern that spine with hardware is involved     Recommendations:  - continue Ceftriaxone 2G q24 (Day #4)  - monitor temperature curve  - trend WBC  - reason for abx use reviewed with patient  - side effects of antibiotic discussed, tolerating abx well so far  - Prior cultures reviewed. An epidemiologic assessment was performed. There is a significant risk for resistant microorganisms to spread to family members, and/or healthcare staff. The patient will be placed on isolation according to infection control policy. Will reconsider isolation measures based on new culture results and other clinical data as appropriate Appropriate cultures collected and an appropriate broad spectrum antibiotic therapy will be considered  - monitor worsening neurologic deficit  - follow up repeat BCx x2 (2/19)  - follow up Neurology  - follow up Neurosurgery   - rest per primary team    [facility] IV to PO Token not applicable; Patient to continue with IV Therapy

## 2025-02-20 NOTE — PROGRESS NOTE ADULT - ASSESSMENT
74-year-old male with a past medical history of benign prostatic hyperplasia, diverticulosis, Graves' disease, hemorrhoids, hyperlipidemia, hypothyroidism, osteoarthritis, spinal stenosis, spinal cord injury (2004), and multiple spinal surgeries (three cervical and one lumbar). He presents to the emergency department with complaints of shoulder pain radiating down the arms associated with fever and LE weakness.    Plan:  - No acute neurosurgical intervention  - With positive blood cultures +strep bactermemia  - In setting of new neurologic deficits would recommend repeat MRI C/T Spine +/-, R shoulder +/-. Dr. Hackett d/w Dr. Ibarra will perform under anesthesia  - Keep NPO for now  - Unless cradiac contraindication would hold baby aspirin for now last dose 2/20 AM  - CT reviewed, no signs of hardware loosening  - Continue IV antibiotics per ID  - Will follow    D/w Dr. Hackett   74-year-old male with a past medical history of benign prostatic hyperplasia, diverticulosis, Graves' disease, hemorrhoids, hyperlipidemia, hypothyroidism, osteoarthritis, spinal stenosis, spinal cord injury (2004), and multiple spinal surgeries (three cervical and one lumbar). He presents to the emergency department with complaints of shoulder pain radiating down the arms associated with fever and LE weakness.    Plan:  - No acute neurosurgical intervention  - With positive blood cultures +strep bacteremia  - Repeat MRI C/T Spine reviewed, unchanged tiny abscess C7-T4 no new areas enhancement/cord compression  - Ok to continue baby aspirin  - CT reviewed, no signs of hardware loosening  - Continue IV antibiotic treatment per ID  - Will sign off for now, please reconsult prn    D/w Dr. Hackett

## 2025-02-21 LAB
ANION GAP SERPL CALC-SCNC: 7 MMOL/L — SIGNIFICANT CHANGE UP (ref 5–17)
BUN SERPL-MCNC: 30 MG/DL — HIGH (ref 7–23)
CALCIUM SERPL-MCNC: 9.1 MG/DL — SIGNIFICANT CHANGE UP (ref 8.5–10.1)
CHLORIDE SERPL-SCNC: 102 MMOL/L — SIGNIFICANT CHANGE UP (ref 96–108)
CO2 SERPL-SCNC: 26 MMOL/L — SIGNIFICANT CHANGE UP (ref 22–31)
CREAT SERPL-MCNC: 0.95 MG/DL — SIGNIFICANT CHANGE UP (ref 0.5–1.3)
EGFR: 85 ML/MIN/1.73M2 — SIGNIFICANT CHANGE UP
GLUCOSE SERPL-MCNC: 130 MG/DL — HIGH (ref 70–99)
HCT VFR BLD CALC: 40.6 % — SIGNIFICANT CHANGE UP (ref 39–50)
HGB BLD-MCNC: 13.6 G/DL — SIGNIFICANT CHANGE UP (ref 13–17)
MAGNESIUM SERPL-MCNC: 2.4 MG/DL — SIGNIFICANT CHANGE UP (ref 1.6–2.6)
MCHC RBC-ENTMCNC: 28.2 PG — SIGNIFICANT CHANGE UP (ref 27–34)
MCHC RBC-ENTMCNC: 33.5 G/DL — SIGNIFICANT CHANGE UP (ref 32–36)
MCV RBC AUTO: 84.1 FL — SIGNIFICANT CHANGE UP (ref 80–100)
NRBC # BLD AUTO: 0 K/UL — SIGNIFICANT CHANGE UP (ref 0–0)
NRBC # FLD: 0 K/UL — SIGNIFICANT CHANGE UP (ref 0–0)
NRBC BLD AUTO-RTO: 0 /100 WBCS — SIGNIFICANT CHANGE UP (ref 0–0)
PHOSPHATE SERPL-MCNC: 3.3 MG/DL — SIGNIFICANT CHANGE UP (ref 2.5–4.5)
PLATELET # BLD AUTO: 200 K/UL — SIGNIFICANT CHANGE UP (ref 150–400)
PMV BLD: 10.3 FL — SIGNIFICANT CHANGE UP (ref 7–13)
POTASSIUM SERPL-MCNC: 4.3 MMOL/L — SIGNIFICANT CHANGE UP (ref 3.5–5.3)
POTASSIUM SERPL-SCNC: 4.3 MMOL/L — SIGNIFICANT CHANGE UP (ref 3.5–5.3)
RBC # BLD: 4.83 M/UL — SIGNIFICANT CHANGE UP (ref 4.2–5.8)
RBC # FLD: 14.3 % — SIGNIFICANT CHANGE UP (ref 10.3–14.5)
SODIUM SERPL-SCNC: 135 MMOL/L — SIGNIFICANT CHANGE UP (ref 135–145)
WBC # BLD: 16.49 K/UL — HIGH (ref 3.8–10.5)
WBC # FLD AUTO: 16.49 K/UL — HIGH (ref 3.8–10.5)

## 2025-02-21 PROCEDURE — 71045 X-RAY EXAM CHEST 1 VIEW: CPT | Mod: 26

## 2025-02-21 PROCEDURE — 99233 SBSQ HOSP IP/OBS HIGH 50: CPT

## 2025-02-21 PROCEDURE — 74177 CT ABD & PELVIS W/CONTRAST: CPT | Mod: 26

## 2025-02-21 RX ORDER — ACETAMINOPHEN 500 MG/5ML
1000 LIQUID (ML) ORAL ONCE
Refills: 0 | Status: COMPLETED | OUTPATIENT
Start: 2025-02-21 | End: 2025-02-21

## 2025-02-21 RX ORDER — SALINE 7; 19 G/118ML; G/118ML
1 ENEMA RECTAL ONCE
Refills: 0 | Status: DISCONTINUED | OUTPATIENT
Start: 2025-02-21 | End: 2025-02-21

## 2025-02-21 RX ORDER — MINERAL OIL
133 OIL (ML) MISCELLANEOUS ONCE
Refills: 0 | Status: COMPLETED | OUTPATIENT
Start: 2025-02-21 | End: 2025-02-21

## 2025-02-21 RX ORDER — METOCLOPRAMIDE HCL 10 MG
5 TABLET ORAL EVERY 8 HOURS
Refills: 0 | Status: DISCONTINUED | OUTPATIENT
Start: 2025-02-21 | End: 2025-02-25

## 2025-02-21 RX ORDER — LACTULOSE 10 G/15ML
20 SOLUTION ORAL ONCE
Refills: 0 | Status: COMPLETED | OUTPATIENT
Start: 2025-02-21 | End: 2025-02-21

## 2025-02-21 RX ORDER — METOCLOPRAMIDE HCL 10 MG
10 TABLET ORAL ONCE
Refills: 0 | Status: COMPLETED | OUTPATIENT
Start: 2025-02-21 | End: 2025-02-21

## 2025-02-21 RX ORDER — MAGNESIUM CITRATE
296 SOLUTION, ORAL ORAL ONCE
Refills: 0 | Status: COMPLETED | OUTPATIENT
Start: 2025-02-21 | End: 2025-02-21

## 2025-02-21 RX ADMIN — LACTULOSE 20 GRAM(S): 10 SOLUTION ORAL at 10:09

## 2025-02-21 RX ADMIN — Medication 400 MILLIGRAM(S): at 21:32

## 2025-02-21 RX ADMIN — METOPROLOL SUCCINATE 12.5 MILLIGRAM(S): 50 TABLET, EXTENDED RELEASE ORAL at 21:32

## 2025-02-21 RX ADMIN — METOPROLOL SUCCINATE 12.5 MILLIGRAM(S): 50 TABLET, EXTENDED RELEASE ORAL at 11:38

## 2025-02-21 RX ADMIN — Medication 10 MILLIGRAM(S): at 03:47

## 2025-02-21 RX ADMIN — Medication 150 MICROGRAM(S): at 05:22

## 2025-02-21 RX ADMIN — Medication 4 MILLIGRAM(S): at 03:46

## 2025-02-21 RX ADMIN — Medication 1000 MILLIGRAM(S): at 22:15

## 2025-02-21 RX ADMIN — ROSUVASTATIN CALCIUM 5 MILLIGRAM(S): 20 TABLET, FILM COATED ORAL at 21:33

## 2025-02-21 RX ADMIN — HEPARIN SODIUM 5000 UNIT(S): 1000 INJECTION INTRAVENOUS; SUBCUTANEOUS at 21:34

## 2025-02-21 RX ADMIN — Medication 1 MILLIGRAM(S): at 06:01

## 2025-02-21 RX ADMIN — Medication 296 MILLILITER(S): at 10:09

## 2025-02-21 RX ADMIN — Medication 1 TABLET(S): at 11:38

## 2025-02-21 RX ADMIN — Medication 1 MILLIGRAM(S): at 15:15

## 2025-02-21 RX ADMIN — Medication 133 MILLILITER(S): at 05:25

## 2025-02-21 RX ADMIN — HEPARIN SODIUM 5000 UNIT(S): 1000 INJECTION INTRAVENOUS; SUBCUTANEOUS at 05:22

## 2025-02-21 RX ADMIN — Medication 10 MILLIGRAM(S): at 14:30

## 2025-02-21 RX ADMIN — TAMSULOSIN HYDROCHLORIDE 0.8 MILLIGRAM(S): 0.4 CAPSULE ORAL at 21:33

## 2025-02-21 RX ADMIN — HEPARIN SODIUM 5000 UNIT(S): 1000 INJECTION INTRAVENOUS; SUBCUTANEOUS at 15:15

## 2025-02-21 RX ADMIN — CEFTRIAXONE 2000 MILLIGRAM(S): 500 INJECTION, POWDER, FOR SOLUTION INTRAMUSCULAR; INTRAVENOUS at 15:16

## 2025-02-21 RX ADMIN — Medication 1 MILLIGRAM(S): at 15:45

## 2025-02-21 NOTE — PROGRESS NOTE ADULT - SUBJECTIVE AND OBJECTIVE BOX
Patient is a 74y old  Male who presents with a chief complaint of sepsis, new numbness/weakness (20 Feb 2025 09:12)      BRIEF HOSPITAL COURSE:   75 yo m pmhx BPH, diverticulosis, Graves Disease, HLD, hypothyroidism, OA, spinal stenosis, spinal cord injury (2004), multiple spinal surgeries admitted with strep discitis.     Events last 24 hours:   Patient vomiting this evening, endorsing abdominal discomfort/nausea.  Ordered for zofran, abdomen firm/distended, ngt placed for decompression ~350 cc dark content drained immediately.  KUB from earlier not yet read.  Reglan x1 ordered as was an enema.  No recorded BM with exception of liquid stool after mag citrate given yesterday afternoon      PAST MEDICAL & SURGICAL HISTORY:  Hyperlipidemia, unspecified hyperlipidemia type  Diverticulosis of large intestine without hemorrhage  History of colon polyps  Hemorrhoids, unspecified hemorrhoid type  Benign prostatic hyperplasia without lower urinary tract symptoms, unspecified morphology  Osteoarthritis of knee, unilateral  left  Spinal cord injury at C5-C7 level without injury of spinal bone, subsequent encounter  Spastic  Weakness  to right side  Myelopathy  Spinal stenosis, unspecified spinal region  Hypothyroidism, unspecified type  Graves disease  Alcoholic  recovering alcoholic x 40years  History of cervical discectomy  S/P laminectomy  Cervical  H/O lumbar discectomy  with laminectomy  H/O arthroscopy of knee  Left x 2. Unsure of years  H/O colonoscopy with polypectomy  2014      Allergies  No Known Allergies      FAMILY HISTORY:  Family history of colon cancer in mother (Mother)    Family history of liver disease (Father)  father    Family history of cancer (Sibling)  SIster - bile duct      Social History:   from home      Review of Systems:  +nause, abdominal pain/distensin      Physical Examination:    General: Pleasant elderly male, sitting in bed, nad    HEENT: nc/at, ngt now in place    PULM: diminished b/l but grossly clear    CVS: rrr    ABD: +firm, distended, diminished bs    EXT: No edema, nontender    SKIN: Warm and well perfused    NEURO: Alert, oriented, interactive      Medications:  cefTRIAXone Injectable. 2000 milliGRAM(s) IV Push every 24 hours  metoprolol tartrate 12.5 milliGRAM(s) Oral every 12 hours  acetaminophen     Tablet .. 650 milliGRAM(s) Oral every 6 hours PRN  HYDROmorphone  Injectable 1 milliGRAM(s) IV Push every 6 hours PRN  melatonin 3 milliGRAM(s) Oral at bedtime PRN  metoclopramide Injectable 10 milliGRAM(s) IV Push once  ondansetron Injectable 4 milliGRAM(s) IV Push every 8 hours PRN  tiZANidine 2 milliGRAM(s) Oral four times a day PRN  heparin   Injectable 5000 Unit(s) SubCutaneous every 8 hours  aluminum hydroxide/magnesium hydroxide/simethicone Suspension 30 milliLiter(s) Oral every 4 hours PRN  bisacodyl Suppository 10 milliGRAM(s) Rectal daily  magnesium hydroxide Suspension 30 milliLiter(s) Oral daily  polyethylene glycol 3350 17 Gram(s) Oral daily  saline laxative (FLEET) Rectal Enema 1 Enema Rectal once  tamsulosin 0.8 milliGRAM(s) Oral at bedtime  levothyroxine 150 MICROGram(s) Oral daily  rosuvastatin 5 milliGRAM(s) Oral at bedtime  lactated ringers. 1000 milliLiter(s) IV Continuous <Continuous>  multivitamin/minerals 1 Tablet(s) Oral daily  vibegron 75 milliGRAM(s) 75 milliGRAM(s) Oral at bedtime      ICU Vital Signs Last 24 Hrs  T(C): 36.8 (21 Feb 2025 00:38), Max: 38.1 (20 Feb 2025 15:33)  T(F): 98.3 (21 Feb 2025 00:38), Max: 100.6 (20 Feb 2025 15:33)  HR: 85 (21 Feb 2025 02:00) (84 - 116)  BP: 164/102 (21 Feb 2025 02:00) (118/93 - 172/107)  BP(mean): 121 (21 Feb 2025 02:00) (96 - 126)  ABP: --  ABP(mean): --  RR: 20 (21 Feb 2025 02:00) (13 - 25)  SpO2: 94% (21 Feb 2025 02:00) (90% - 100%)    O2 Parameters below as of 20 Feb 2025 18:00  Patient On (Oxygen Delivery Method): room air      Vital Signs Last 24 Hrs  T(C): 36.8 (21 Feb 2025 00:38), Max: 38.1 (20 Feb 2025 15:33)  T(F): 98.3 (21 Feb 2025 00:38), Max: 100.6 (20 Feb 2025 15:33)  HR: 85 (21 Feb 2025 02:00) (84 - 116)  BP: 164/102 (21 Feb 2025 02:00) (118/93 - 172/107)  BP(mean): 121 (21 Feb 2025 02:00) (96 - 126)  RR: 20 (21 Feb 2025 02:00) (13 - 25)  SpO2: 94% (21 Feb 2025 02:00) (90% - 100%)    Parameters below as of 20 Feb 2025 18:00  Patient On (Oxygen Delivery Method): room air      I&O's Detail    19 Feb 2025 07:01  -  20 Feb 2025 07:00  --------------------------------------------------------  IN:    Lactated Ringers: 800 mL    Oral Fluid: 600 mL  Total IN: 1400 mL    OUT:    Incontinent per Condom Catheter (mL): 975 mL  Total OUT: 975 mL  Total NET: 425 mL      20 Feb 2025 07:01  -  21 Feb 2025 03:40  --------------------------------------------------------  IN:    Lactated Ringers: 596 mL  Total IN: 596 mL    OUT:    Indwelling Catheter - Urethral (mL): 1700 mL  Total OUT: 1700 mL  Total NET: -1104 mL      LABS:                        13.9   17.65 )-----------( 174      ( 20 Feb 2025 05:10 )             41.5     02-20    134[L]  |  104  |  28[H]  ----------------------------<  135[H]  3.9   |  24  |  1.08    Ca    9.3      20 Feb 2025 17:19  Phos  3.3     02-20  Mg     2.5     02-20    TPro  6.3  /  Alb  2.9[L]  /  TBili  1.0  /  DBili  x   /  AST  57[H]  /  ALT  35  /  AlkPhos  63  02-19      Urinalysis Basic - ( 20 Feb 2025 17:19 )  Color: x / Appearance: x / SG: x / pH: x  Gluc: 135 mg/dL / Ketone: x  / Bili: x / Urobili: x   Blood: x / Protein: x / Nitrite: x   Leuk Esterase: x / RBC: x / WBC x   Sq Epi: x / Non Sq Epi: x / Bacteria: x      CULTURES:  Culture Results:   No growth at 24 hours (02-19 @ 08:17)  Culture Results:   No growth at 24 hours (02-19 @ 08:10)  Culture Results:   Growth in aerobic and anaerobic bottles: Streptococcus dysgalactiae  (Group C/G)  See previous culture [id number] (02-17 @ 21:11)  Culture Results:   Growth in aerobic and anaerobic bottles: Streptococcus dysgalactiae  (Group C/G)  Direct identification is available within approximately 3-5  hours either by Blood Panel Multiplexed PCR or Direct  MALDI-TOF. Details: [email or web address] (02-17 @ 21:11)      RADIOLOGY:   < from: MR Thoracic Spine w/wo IV Cont (02.20.25 @ 15:37) >    ACC: [id number] EXAM:  MR SPINE THORACIC WAW IC   ORDERED BY: MIKAYLA WASHINGTON     ACC: [id number] EXAM:  MR SPINE CERVICAL WAW IC   ORDERED BY: MIKAYLA WASHINGTON     PROCEDURE DATE:  02/20/2025      INTERPRETATION:  MR cervical spine with and without gadolinium    CLINICAL INFORMATION:  Discitis,  Progressing LEFT upper extremity and   lower extremity weakness    TECHNIQUE:   Sagittal T1-weighted images, sagittal STIR images, sagittal   T2-weighted images and axial T1 and T2-weighted gradient-echo images of   the cervical spine were obtained.   Following the intravenous   administration of 10 ml Gadavist fat saturated sagittal and axial   T1-weighted images were obtained.    FINDINGS:   CT scan dated 02/18/2025 is available for review.    Cervical vertebral alignment is for straightening with ACDF at C3 6 and   laminectomies at C3-C6 with posterior fusion utilizing pedicle screws and   fusion rods.  Cervical vertebral body heights are maintained.  Marrow   signal intensity within cervical vertebral bodies and posterior elements   is unremarkable.  Prevertebral enhancement extends from C7 level into the   thoracic spine.    Cervical intervertebral discs show moderate disc degeneration at C2-3 and   C6/7 and C7-T1 with loss of disc height and associated degenerative   endplate changes. There is narrowing of the BILATERAL C3-4 and BILATERAL   C6-7 and C7-T1 neural foramina due to uncovertebral spurring and facet   osteophytic hypertrophy. Posterior osteophytic ridge/disc complexes at   T6/7 and C7-T1 abut the ventral cord.    The cervical cord demonstrates focal myelomalacia at the C3-4 level.   There is no abnormal cord enhancement. There is enhancement of the   ventral and posterior spinal canal at the C7-T4 levels suggesting tiny   epidural abscess. The cervical medullary junction remains intact.    CT thoracic spine  with IV contrast    CLINICAL INFORMATION: Discitis, infection, progressed in LEFT arm and   BILATERAL lower extremity weakness.    TECHNIQUE:  Contiguous axial 2 mm sections were obtained through the   thoracic spine using a single helical acquisition.  Images were acquired   following the intravenous administration of 90 cc of Omnipaque 350/ 10 cc   discarded.  Additional 2 mm sagittal and coronal reconstructions of the   spine were obtained. These additional reformatted images were used to   evaluate the spine for alignment, vertebral fractures and the integrity   of the the posterior elements.   This scan was performed using automatic   exposure control (radiation dose reduction software) to obtain a   diagnostic image quality scan with patient dose as low as reasonably   achievable.    FINDINGS:   MR dated 02/18/2025 available for review    Thoracic vertebral body heights are maintained. No vertebral fracture is   seen. No destructive bone lesion is found.  Alignment is preserved.   Marrow signal demonstrates edema at the T1-2 and T2-3 endplate levels   with minimal enhancement at T2-3 and T3-4 intervertebral discs which may   reflect early discitis. Prevertebral enhancement extends from the C7   level to the T4 level consistent with phlegmon.    Thoracic intervertebral disc spaces show mild to moderate degenerative   disc disease and spondylosis with disc bulge at T1-2 that narrows the   BILATERAL neural foramina abuts the ventral cord. Tiny T2-3 disc   herniation central disc herniation, tiny T7-8 central disc herniation   disc bulge at T10-11 and T11-12 and T12-L-1 flatten the ventral thecal   sac and narrow the BILATERAL neural foramina.    Spinal canal demonstrates enhancement at the anterior and posterior   aspects of the spinal canal extending from the cervical canal to the CT   for level which may reflect a small epidural abscesses. No significant   underlying edema is noted.    No paraspinal mass is recognized.  Paraspinal soft tissues appear intact.    Small BILATERAL pleural effusions are noted.    IMPRESSION:    CT cervical spine: ACDF at C3 6 and laminectomies at C3-C6 with posterior   fusion utilizing pedicle screws and fusion rods.   Moderate disc   degeneration at C2-3 and C6/7 and C7-T1 with loss of disc height and   associated degenerative endplate changes. There is narrowing of the   BILATERAL C3-4 and BILATERAL C6-7 and C7-T1 neural foramina due to   uncovertebral spurring and facet osteophytic hypertrophy. Posterior   osteophytic ridge/disc complexes at T6/7 and C7-T1 abut the ventral cord.   Myelomalacia is noted at C3-4.There is enhancement of the ventral and   posterior spinalcanal at the C7-T4 levels suggesting tiny epidural   abscess. Prevertebral enhancement extends from the C7 level into the   thoracic spine.    CT thoracic spine:  Marrow signal demonstrates edema at the T1-2 and T2-3   endplate with minimal enhancement at T2-3 and T3-4 intravertebral discs   which may reflect early discitis. Prevertebral enhancement extends from   the C7 level to the T4 level consistent with phlegmon. Enhancement at the   anterior and posterior aspects of the spinal canal extending from the   cervical canal to the CT for level which may reflect a small epidural   abscesses. No significant underlying edema is noted.    --- End of Report ---    HAYLIE COULTER MD; Attending Radiologist  This document has been electronically signed. Feb 20 2025  5:00PM    < end of copied text >

## 2025-02-21 NOTE — PROGRESS NOTE ADULT - ASSESSMENT
75 yo m pmhx BPH, diverticulosis, Graves Disease, HLD, hypothyroidism, OA, spinal stenosis, spinal cord injury (2004), multiple spinal surgeries admitted with strep discitis now with constipation, likely ileus vs possible obstruction.     NEURO: pain regimen as needed  CV: Metoprolol added for bp control. Uptitrate as needed.   RESP: encourage Incentive spirometry   RENAL: monitor urine output, bun/cr and electrolytes, replace lytes as needed  GI: NPO, ngt to liws, bowel regimen.  reglan x1 to promote bowel function, fleet enema to help with constipation  ENDO: no active issues  ID: rocephin for coverage  HEME: Heparin for vte ppx   DISPO: Full code     DATE OF ENTRY OF THIS NOTE IS EQUAL TO THE DATE OF SERVICES RENDERED

## 2025-02-21 NOTE — PROGRESS NOTE ADULT - ASSESSMENT
A/P: 74 male with Strep Discitis  HLD  Hypothyroidism      Plan  CCU  Neuro Checks q2h  Neuro Surg Following  Pain Control  Continue Rocephin.  Will require a prolonged course-8 Weeks  While his D-Dimers is elevated there is no indication to do a V/Q SCAN or CTA.  They are Elevated from the infection  B/L LE Dopplers Negative  GOVIND Improved  Po Diet  Miralax, MOM, Lactulose, Dulcolax GA, Mag Citrate again, then clamp NGT.  May D/C today in minimal residual   Flomax to 0.8 at QHS  SQH DVT Prophylaxis

## 2025-02-21 NOTE — PROGRESS NOTE ADULT - SUBJECTIVE AND OBJECTIVE BOX
· Subjective and Objective:   HPI:  74-year-old male with a past medical history of benign prostatic hyperplasia, diverticulosis, Graves' disease, hemorrhoids, hyperlipidemia, hypothyroidism, osteoarthritis, spinal stenosis, spinal cord injury (2004), and multiple spinal surgeries (three cervical and one lumbar). He presents to the emergency department with complaints of shoulder pain radiating down the arms associated with fever and LE weakness. Patient reports symptoms started as mild on saturday, he had mild shoulder pain, but it progressively got worse. Currently reports pain 8/10, sharp, burning, starts at shoulder blades and radiates down to hands, associated with feeling of numbness in arms. He has a similar sensation in his thighs, and yesterday reports Tmax at home of 102. He also notes a decreased  strength in his right hand. He denies experiencing any saddle anesthesia, or bladder or bowel incontinence. He denies headaches, dizziness, sore throat, rhinitis, SOB, Chest pain, diarrhea, dysuria.     2/18: Patient seen, no change in neuro exam  2/19: Patient with worsening Numbness of LLE worsening R Shoulder pain.  Constipated  2/21: Cultures cleared, had a BM but still distended, 700 from NGT over night, thinks he is feeling stronger       PAST MEDICAL & SURGICAL HISTORY:  Hyperlipidemia, unspecified hyperlipidemia type      Diverticulosis of large intestine without hemorrhage      History of colon polyps      Hemorrhoids, unspecified hemorrhoid type      Benign prostatic hyperplasia without lower urinary tract symptoms, unspecified morphology      Osteoarthritis of knee, unilateral  left      Spinal cord injury at C5-C7 level without injury of spinal bone, subsequent encounter      Spastic      Weakness  to right side      Myelopathy      Spinal stenosis, unspecified spinal region      Hypothyroidism, unspecified type      Graves disease      Alcoholic  recovering alcoholic x 40years      History of cervical discectomy      S/P laminectomy  Cervical      H/O lumbar discectomy  with laminectomy      H/O arthroscopy of knee  Left x 2. Unsure of years      H/O colonoscopy with polypectomy  2014          FAMILY HISTORY:  Family history of colon cancer in mother (Mother)    Family history of liver disease (Father)  father    Family history of cancer (Sibling)  SIster - bile duct        Social Hx:    Allergies    No Known Allergies    Intolerances            ICU Vital Signs Last 24 Hrs  T(C): 36.7 (21 Feb 2025 06:20), Max: 38.1 (20 Feb 2025 15:33)  T(F): 98 (21 Feb 2025 06:20), Max: 100.6 (20 Feb 2025 15:33)  HR: 85 (21 Feb 2025 09:00) (78 - 116)  BP: 140/96 (21 Feb 2025 09:00) (118/93 - 164/102)  BP(mean): 112 (21 Feb 2025 09:00) (96 - 123)  ABP: --  ABP(mean): --  RR: 14 (21 Feb 2025 09:00) (12 - 25)  SpO2: 93% (21 Feb 2025 09:00) (90% - 95%)    O2 Parameters below as of 21 Feb 2025 09:00  Patient On (Oxygen Delivery Method): room air                I&O's Summary    20 Feb 2025 07:01  -  21 Feb 2025 07:00  --------------------------------------------------------  IN: 1723 mL / OUT: 2700 mL / NET: -977 mL                              13.6   16.49 )-----------( 200      ( 21 Feb 2025 05:11 )             40.6       02-21    135  |  102  |  30[H]  ----------------------------<  130[H]  4.3   |  26  |  0.95    Ca    9.1      21 Feb 2025 05:11  Phos  3.3     02-21  Mg     2.4     02-21                  Urinalysis Basic - ( 21 Feb 2025 05:11 )    Color: x / Appearance: x / SG: x / pH: x  Gluc: 130 mg/dL / Ketone: x  / Bili: x / Urobili: x   Blood: x / Protein: x / Nitrite: x   Leuk Esterase: x / RBC: x / WBC x   Sq Epi: x / Non Sq Epi: x / Bacteria: x        MEDICATIONS  (STANDING):  bisacodyl Suppository 10 milliGRAM(s) Rectal daily  cefTRIAXone Injectable. 2000 milliGRAM(s) IV Push every 24 hours  heparin   Injectable 5000 Unit(s) SubCutaneous every 8 hours  lactated ringers. 1000 milliLiter(s) (100 mL/Hr) IV Continuous <Continuous>  levothyroxine 150 MICROGram(s) Oral daily  magnesium hydroxide Suspension 30 milliLiter(s) Oral daily  metoprolol tartrate 12.5 milliGRAM(s) Oral every 12 hours  multivitamin/minerals 1 Tablet(s) Oral daily  polyethylene glycol 3350 17 Gram(s) Oral daily  rosuvastatin 5 milliGRAM(s) Oral at bedtime  tamsulosin 0.8 milliGRAM(s) Oral at bedtime  vibegron 75 milliGRAM(s) 75 milliGRAM(s) Oral at bedtime    MEDICATIONS  (PRN):  acetaminophen     Tablet .. 650 milliGRAM(s) Oral every 6 hours PRN Temp greater or equal to 38C (100.4F), Mild Pain (1 - 3)  aluminum hydroxide/magnesium hydroxide/simethicone Suspension 30 milliLiter(s) Oral every 4 hours PRN Dyspepsia  HYDROmorphone  Injectable 1 milliGRAM(s) IV Push every 6 hours PRN Severe Pain (7 - 10)  melatonin 3 milliGRAM(s) Oral at bedtime PRN Insomnia  ondansetron Injectable 4 milliGRAM(s) IV Push every 8 hours PRN Nausea and/or Vomiting  tiZANidine 2 milliGRAM(s) Oral four times a day PRN Muscle Spasm      DVT Prophylaxis: [facility]    Advanced Directives:  Discussed with:    Visit Information:    ** Time is exclusive of billed procedures and/or teaching and/or routine family updates.

## 2025-02-22 LAB
ANION GAP SERPL CALC-SCNC: 3 MMOL/L — LOW (ref 5–17)
BUN SERPL-MCNC: 34 MG/DL — HIGH (ref 7–23)
CALCIUM SERPL-MCNC: 9.1 MG/DL — SIGNIFICANT CHANGE UP (ref 8.5–10.1)
CHLORIDE SERPL-SCNC: 104 MMOL/L — SIGNIFICANT CHANGE UP (ref 96–108)
CO2 SERPL-SCNC: 29 MMOL/L — SIGNIFICANT CHANGE UP (ref 22–31)
CREAT SERPL-MCNC: 0.89 MG/DL — SIGNIFICANT CHANGE UP (ref 0.5–1.3)
EGFR: 91 ML/MIN/1.73M2 — SIGNIFICANT CHANGE UP
GLUCOSE SERPL-MCNC: 111 MG/DL — HIGH (ref 70–99)
HCT VFR BLD CALC: 39.6 % — SIGNIFICANT CHANGE UP (ref 39–50)
HGB BLD-MCNC: 13 G/DL — SIGNIFICANT CHANGE UP (ref 13–17)
MAGNESIUM SERPL-MCNC: 2.7 MG/DL — HIGH (ref 1.6–2.6)
MCHC RBC-ENTMCNC: 28.1 PG — SIGNIFICANT CHANGE UP (ref 27–34)
MCHC RBC-ENTMCNC: 32.8 G/DL — SIGNIFICANT CHANGE UP (ref 32–36)
MCV RBC AUTO: 85.5 FL — SIGNIFICANT CHANGE UP (ref 80–100)
NRBC # BLD AUTO: 0 K/UL — SIGNIFICANT CHANGE UP (ref 0–0)
NRBC # FLD: 0 K/UL — SIGNIFICANT CHANGE UP (ref 0–0)
NRBC BLD AUTO-RTO: 0 /100 WBCS — SIGNIFICANT CHANGE UP (ref 0–0)
PHOSPHATE SERPL-MCNC: 3.5 MG/DL — SIGNIFICANT CHANGE UP (ref 2.5–4.5)
PLATELET # BLD AUTO: 204 K/UL — SIGNIFICANT CHANGE UP (ref 150–400)
PMV BLD: 9.8 FL — SIGNIFICANT CHANGE UP (ref 7–13)
POTASSIUM SERPL-MCNC: 4.2 MMOL/L — SIGNIFICANT CHANGE UP (ref 3.5–5.3)
POTASSIUM SERPL-SCNC: 4.2 MMOL/L — SIGNIFICANT CHANGE UP (ref 3.5–5.3)
RBC # BLD: 4.63 M/UL — SIGNIFICANT CHANGE UP (ref 4.2–5.8)
RBC # FLD: 14.3 % — SIGNIFICANT CHANGE UP (ref 10.3–14.5)
SODIUM SERPL-SCNC: 136 MMOL/L — SIGNIFICANT CHANGE UP (ref 135–145)
WBC # BLD: 13.29 K/UL — HIGH (ref 3.8–10.5)
WBC # FLD AUTO: 13.29 K/UL — HIGH (ref 3.8–10.5)

## 2025-02-22 PROCEDURE — 99222 1ST HOSP IP/OBS MODERATE 55: CPT

## 2025-02-22 PROCEDURE — 99233 SBSQ HOSP IP/OBS HIGH 50: CPT

## 2025-02-22 RX ORDER — METHYLNALTREXONE BROMIDE 12 MG/.6ML
12 INJECTION, SOLUTION SUBCUTANEOUS ONCE
Refills: 0 | Status: COMPLETED | OUTPATIENT
Start: 2025-02-22 | End: 2025-02-22

## 2025-02-22 RX ADMIN — HEPARIN SODIUM 5000 UNIT(S): 1000 INJECTION INTRAVENOUS; SUBCUTANEOUS at 21:47

## 2025-02-22 RX ADMIN — METOPROLOL SUCCINATE 12.5 MILLIGRAM(S): 50 TABLET, EXTENDED RELEASE ORAL at 21:46

## 2025-02-22 RX ADMIN — METHYLNALTREXONE BROMIDE 12 MILLIGRAM(S): 12 INJECTION, SOLUTION SUBCUTANEOUS at 10:59

## 2025-02-22 RX ADMIN — HEPARIN SODIUM 5000 UNIT(S): 1000 INJECTION INTRAVENOUS; SUBCUTANEOUS at 14:37

## 2025-02-22 RX ADMIN — CEFTRIAXONE 2000 MILLIGRAM(S): 500 INJECTION, POWDER, FOR SOLUTION INTRAMUSCULAR; INTRAVENOUS at 14:38

## 2025-02-22 RX ADMIN — Medication 1 TABLET(S): at 10:43

## 2025-02-22 RX ADMIN — Medication 10 MILLIGRAM(S): at 18:49

## 2025-02-22 RX ADMIN — HEPARIN SODIUM 5000 UNIT(S): 1000 INJECTION INTRAVENOUS; SUBCUTANEOUS at 05:06

## 2025-02-22 RX ADMIN — Medication 650 MILLIGRAM(S): at 18:16

## 2025-02-22 RX ADMIN — POLYETHYLENE GLYCOL 3350 17 GRAM(S): 17 POWDER, FOR SOLUTION ORAL at 10:42

## 2025-02-22 RX ADMIN — ROSUVASTATIN CALCIUM 5 MILLIGRAM(S): 20 TABLET, FILM COATED ORAL at 21:47

## 2025-02-22 RX ADMIN — Medication 650 MILLIGRAM(S): at 05:06

## 2025-02-22 RX ADMIN — Medication 150 MICROGRAM(S): at 05:07

## 2025-02-22 RX ADMIN — TAMSULOSIN HYDROCHLORIDE 0.8 MILLIGRAM(S): 0.4 CAPSULE ORAL at 21:46

## 2025-02-22 RX ADMIN — METOPROLOL SUCCINATE 12.5 MILLIGRAM(S): 50 TABLET, EXTENDED RELEASE ORAL at 10:43

## 2025-02-22 RX ADMIN — MAGNESIUM HYDROXIDE 30 MILLILITER(S): 400 SUSPENSION ORAL at 10:43

## 2025-02-22 NOTE — CONSULT NOTE ADULT - SUBJECTIVE AND OBJECTIVE BOX
Patient is a 74y old  Male who presents with a chief complaint of sepsis, new numbness/weakness (20 Feb 2025 09:12)      HPI:  Patient is a 74-year-old male with a history of BPH, diverticulosis, Graves' disease, hyperlipidemia, hypothyroidism, spinal stenosis, spinal injury back in 2004 status post multiple spinal surgeries who has been admitted at [facility] since 2/18/2025 with strep discitis with hospitalist for recently complicated by development of ileus.  Patient did have CT scan revealing distention of small and large bowel without any evidence of a transition point.  It appears that patient has been having liquid stools while being on Dulcolax and MiraLAX while in the hospital.  An NG tube was placed overnight with about 500 cc of bilious output, however he had been eating multiple ice chips which would skew the overall output numbers.  While admitted patient has been on antibiotics and has received multiple opioid medications for pain which have since been discontinued. Patient was seen at bedside this morning without any reports of nausea or vomiting and hoping to have NG tube removed.     (18 Feb 2025 01:32)      PAST MEDICAL & SURGICAL HISTORY:  Hyperlipidemia, unspecified hyperlipidemia type      Diverticulosis of large intestine without hemorrhage      History of colon polyps      Hemorrhoids, unspecified hemorrhoid type      Benign prostatic hyperplasia without lower urinary tract symptoms, unspecified morphology      Osteoarthritis of knee, unilateral  left      Spinal cord injury at C5-C7 level without injury of spinal bone, subsequent encounter      Spastic      Weakness  to right side      Myelopathy      Spinal stenosis, unspecified spinal region      Hypothyroidism, unspecified type      Graves disease      Alcoholic  recovering alcoholic x 40years      History of cervical discectomy      S/P laminectomy  Cervical      H/O lumbar discectomy  with laminectomy      H/O arthroscopy of knee  Left x 2. Unsure of years      H/O colonoscopy with polypectomy  2014          MEDICATIONS  (STANDING):  bisacodyl Suppository 10 milliGRAM(s) Rectal daily  cefTRIAXone Injectable. 2000 milliGRAM(s) IV Push every 24 hours  heparin   Injectable 5000 Unit(s) SubCutaneous every 8 hours  levothyroxine 150 MICROGram(s) Oral daily  magnesium hydroxide Suspension 30 milliLiter(s) Oral daily  metoprolol tartrate 12.5 milliGRAM(s) Oral every 12 hours  multivitamin/minerals 1 Tablet(s) Oral daily  polyethylene glycol 3350 17 Gram(s) Oral daily  rosuvastatin 5 milliGRAM(s) Oral at bedtime  tamsulosin 0.8 milliGRAM(s) Oral at bedtime  vibegron 75 milliGRAM(s) 75 milliGRAM(s) Oral at bedtime    MEDICATIONS  (PRN):  acetaminophen     Tablet .. 650 milliGRAM(s) Oral every 6 hours PRN Temp greater or equal to 38C (100.4F), Mild Pain (1 - 3)  aluminum hydroxide/magnesium hydroxide/simethicone Suspension 30 milliLiter(s) Oral every 4 hours PRN Dyspepsia  melatonin 3 milliGRAM(s) Oral at bedtime PRN Insomnia  metoclopramide Injectable 5 milliGRAM(s) IV Push every 8 hours PRN Nausea  ondansetron Injectable 4 milliGRAM(s) IV Push every 8 hours PRN Nausea and/or Vomiting  tiZANidine 2 milliGRAM(s) Oral four times a day PRN Muscle Spasm      Allergies    No Known Allergies    Intolerances        SOCIAL HISTORY:    FAMILY HISTORY:  Family history of colon cancer in mother (Mother)    Family history of liver disease (Father)  father    Family history of cancer (Sibling)  SIster - bile duct        REVIEW OF SYSTEMS:    CONSTITUTIONAL: No weakness, fevers or chills  EYES/ENT: No visual changes;  No vertigo or throat pain   NECK: No pain or stiffness  RESPIRATORY: No cough, wheezing, hemoptysis; No shortness of breath  CARDIOVASCULAR: No chest pain or palpitations  GASTROINTESTINAL: +abdominal discomfort, no epigastric pain. No nausea, vomiting, or hematemesis; No diarrhea or constipation. No melena or hematochezia.  GENITOURINARY: No dysuria, frequency or hematuria  NEUROLOGICAL: No numbness or weakness  SKIN: No itching, burning, rashes, or lesions   PSYCH: Normal mood and affect  All other review of systems is negative unless indicated above.    Vital Signs Last 24 Hrs  T(C): 35.8 (22 Feb 2025 07:55), Max: 36.8 (21 Feb 2025 17:04)  T(F): 96.5 (22 Feb 2025 07:55), Max: 98.3 (21 Feb 2025 17:04)  HR: 83 (22 Feb 2025 15:00) (64 - 91)  BP: 150/88 (22 Feb 2025 15:00) (129/66 - 169/100)  BP(mean): 105 (22 Feb 2025 15:00) (86 - 140)  RR: 17 (22 Feb 2025 15:00) (12 - 21)  SpO2: 92% (22 Feb 2025 15:00) (92% - 95%)    Parameters below as of 22 Feb 2025 05:05  Patient On (Oxygen Delivery Method): room air        PHYSICAL EXAM:    Constitutional: No acute distress, well-developed, non-toxic appearing  HEENT: NG tube in place  Respiratory: clear to ascultation bilaterally, no wheezing  Cardiovascular: S1 and S2, regular rate and rhythm  Gastrointestinal: soft, non-tender, distended, tympanic to percussion, +hypoactive bowel sounds, no rebound or guarding   Vascular: 2+ peripheral pulses  Neurological: A/O x 3, no focal deficits  Psychiatric: Normal mood, normal affect  Skin: No rashes, not jaundiced    LABS:                        13.0   13.29 )-----------( 204      ( 22 Feb 2025 06:07 )             39.6     02-22    136  |  104  |  34[H]  ----------------------------<  111[H]  4.2   |  29  |  0.89    Ca    9.1      22 Feb 2025 06:07  Phos  3.5     02-22  Mg     2.7     02-22            RADIOLOGY & ADDITIONAL STUDIES:

## 2025-02-22 NOTE — PHYSICAL THERAPY INITIAL EVALUATION ADULT - PLANNED THERAPY INTERVENTIONS, PT EVAL
Stair Training when appropriate/balance training/bed mobility training/gait training/strengthening/transfer training

## 2025-02-22 NOTE — PROGRESS NOTE ADULT - SUBJECTIVE AND OBJECTIVE BOX
Patient is a 74y old  Male who presents with a chief complaint of sepsis, new numbness/weakness (20 Feb 2025 09:12)      BRIEF HOSPITAL COURSE:   74-year-old male with a past medical history of benign prostatic hyperplasia, diverticulosis, Graves' disease, hemorrhoids, hyperlipidemia, hypothyroidism, osteoarthritis, spinal stenosis, spinal cord injury (2004), and multiple spinal surgeries (three cervical and one lumbar). He presents to the emergency department with complaints of shoulder pain radiating down the arms associated with fever and LE weakness. Patient reports symptoms started as mild on saturday, he had mild shoulder pain, but it progressively got worse. Currently reports pain 8/10, sharp, burning, starts at shoulder blades and radiates down to hands, associated with feeling of numbness in arms. He has a similar sensation in his thighs, and yesterday reports Tmax at home of 102. He also notes a decreased  strength in his right hand. He denies experiencing any saddle anesthesia, or bladder or bowel incontinence. He denies headaches, dizziness, sore throat, rhinitis, SOB, Chest pain, diarrhea, dysuria.     Events last 24 hours:   Distended abdomen, earlier today had 700ml output from NGT, has been passing gas and moving bowels, CT abdomen shows ileus, patient reports continued back pain state numbness has improved throughout all extremities. Denies N/V abdominal pain, and incontinence.at time of evaluation.            PAST MEDICAL & SURGICAL HISTORY:  Hyperlipidemia, unspecified hyperlipidemia type      Diverticulosis of large intestine without hemorrhage      History of colon polyps      Hemorrhoids, unspecified hemorrhoid type      Benign prostatic hyperplasia without lower urinary tract symptoms, unspecified morphology      Osteoarthritis of knee, unilateral  left      Spinal cord injury at C5-C7 level without injury of spinal bone, subsequent encounter      Spastic      Weakness  to right side      Myelopathy      Spinal stenosis, unspecified spinal region      Hypothyroidism, unspecified type      Graves disease      Alcoholic  recovering alcoholic x 40years      History of cervical discectomy      S/P laminectomy  Cervical      H/O lumbar discectomy  with laminectomy      H/O arthroscopy of knee  Left x 2. Unsure of years      H/O colonoscopy with polypectomy  2014          Review of Systems:  [X] A ten-point review of systems was otherwise negative except as noted.  [ ] Due to altered mental status/intubation, subjective information were not able to be obtained from the patient. History was obtained, to the extent possible, from review of the chart and collateral sources of information.    Medications:  cefTRIAXone Injectable. 2000 milliGRAM(s) IV Push every 24 hours    metoprolol tartrate 12.5 milliGRAM(s) Oral every 12 hours      acetaminophen     Tablet .. 650 milliGRAM(s) Oral every 6 hours PRN  melatonin 3 milliGRAM(s) Oral at bedtime PRN  metoclopramide Injectable 5 milliGRAM(s) IV Push every 8 hours PRN  ondansetron Injectable 4 milliGRAM(s) IV Push every 8 hours PRN  tiZANidine 2 milliGRAM(s) Oral four times a day PRN      heparin   Injectable 5000 Unit(s) SubCutaneous every 8 hours    aluminum hydroxide/magnesium hydroxide/simethicone Suspension 30 milliLiter(s) Oral every 4 hours PRN  bisacodyl Suppository 10 milliGRAM(s) Rectal daily  magnesium hydroxide Suspension 30 milliLiter(s) Oral daily  polyethylene glycol 3350 17 Gram(s) Oral daily    tamsulosin 0.8 milliGRAM(s) Oral at bedtime    levothyroxine 150 MICROGram(s) Oral daily  rosuvastatin 5 milliGRAM(s) Oral at bedtime    lactated ringers. 1000 milliLiter(s) IV Continuous <Continuous>  multivitamin/minerals 1 Tablet(s) Oral daily        vibegron 75 milliGRAM(s) 75 milliGRAM(s) Oral at bedtime          ICU Vital Signs Last 24 Hrs  T(C): 36.7 (21 Feb 2025 19:12), Max: 36.8 (21 Feb 2025 00:38)  T(F): 98.1 (21 Feb 2025 19:12), Max: 98.3 (21 Feb 2025 00:38)  HR: 81 (21 Feb 2025 23:00) (74 - 97)  BP: 150/95 (21 Feb 2025 23:00) (121/101 - 171/99)  BP(mean): 112 (21 Feb 2025 23:00) (103 - 123)  ABP: --  ABP(mean): --  RR: 17 (21 Feb 2025 23:00) (12 - 25)  SpO2: 94% (21 Feb 2025 23:00) (91% - 95%)    O2 Parameters below as of 21 Feb 2025 09:00  Patient On (Oxygen Delivery Method): room air                I&O's Detail    20 Feb 2025 07:01  -  21 Feb 2025 07:00  --------------------------------------------------------  IN:    Lactated Ringers: 1723 mL  Total IN: 1723 mL    OUT:    Indwelling Catheter - Urethral (mL): 2200 mL    Nasogastric/Oral tube (mL): 500 mL  Total OUT: 2700 mL    Total NET: -977 mL      21 Feb 2025 07:01  -  22 Feb 2025 00:33  --------------------------------------------------------  IN:  Total IN: 0 mL    OUT:    Indwelling Catheter - Urethral (mL): 450 mL    Nasogastric/Oral tube (mL): 900 mL  Total OUT: 1350 mL    Total NET: -1350 mL            LABS:                        13.6   16.49 )-----------( 200      ( 21 Feb 2025 05:11 )             40.6     02-21    135  |  102  |  30[H]  ----------------------------<  130[H]  4.3   |  26  |  0.95    Ca    9.1      21 Feb 2025 05:11  Phos  3.3     02-21  Mg     2.4     02-21            CAPILLARY BLOOD GLUCOSE          Urinalysis Basic - ( 21 Feb 2025 05:11 )    Color: x / Appearance: x / SG: x / pH: x  Gluc: 130 mg/dL / Ketone: x  / Bili: x / Urobili: x   Blood: x / Protein: x / Nitrite: x   Leuk Esterase: x / RBC: x / WBC x   Sq Epi: x / Non Sq Epi: x / Bacteria: x      CULTURES:  Culture Results:   No growth at 48 Hours (02-19-25 @ 08:17)  Culture Results:   No growth at 48 Hours (02-19-25 @ 08:10)  Culture Results:   Growth in aerobic and anaerobic bottles: Streptococcus dysgalactiae  (Group C/G)  See previous culture [id number] (02-17-25 @ 21:11)  Culture Results:   Growth in aerobic and anaerobic bottles: Streptococcus dysgalactiae  (Group C/G)  Direct identification is available within approximately 3-5  hours either by Blood Panel Multiplexed PCR or Direct  MALDI-TOF. Details: [email or web address] (02-17-25 @ 21:11)  Rapid RVP Result: NotDetec (02-17-25 @ 21:11)        Physical Examination:    General: No acute distress.  Alert, oriented, interactive, nonfocal    HEENT: Pupils equal, reactive to light.  Symmetric.    PULM: Clear to auscultation bilaterally, no significant sputum production    CVS: Regular rate and rhythm, no murmurs, rubs, or gallops    ABD: distended, nontender, normoactive bowel sounds, no masses    EXT: No edema, nontender    SKIN: Warm and well perfused, no rashes noted.    NEURO: A&Ox3, moving extremities independently, R>L  strength, sensation intact throughout, cranial nerves grossly intact, no focal deficits        RADIOLOGY:    < from: CT Abdomen and Pelvis w/ IV Cont (02.21.25 @ 18:42) >  IMPRESSION:  Probable ileus.    Nonspecific periureteral stranding surrounding the left proximal ureter.    Indeterminate left upper pole renal lesion, measuring 1.6cm. Further   evaluation with contrast-enhanced renal mass protocol CT or MRI abdomen   is recommended.    --- End of Report ---    < end of copied text >

## 2025-02-22 NOTE — PROGRESS NOTE ADULT - ASSESSMENT
75 yo m pmhx BPH, diverticulosis, Graves Disease, HLD, hypothyroidism, OA, spinal stenosis, spinal cord injury (2004), multiple spinal surgeries admitted with strep discitis now with constipation, likely ileus vs possible obstruction.  #Strep Discitis   #Ileus     NEURO: Avoid opioids in setting abdominal distention, IV Tylenol for pain   CV: Hemodynamically stable continue lopressor for HTN   RESP: No signs of respiratory distress on RA no active issues   RENAL: Initial GOVIND resolved continue IV hydration   GI: Ileus likely due to opioid use is having bowel movements continue bowel regime, low intermittent suction for abdominal distention, will switch to clear liquid diet continue medications. GI consult placed.   ENDO: Maintain euglycemic control goal 120-180  ID: Strep discitis continue ceftriaxone   HEME: Heparin for DVT ppx  DISPO: CCU    NON-CRITICAL CARE TIME SPENT:  48 minutes of  time spent providing medical care for patient's acute illness/conditions that impairs at least one vital organ system and/or poses a high risk of imminent or life threatening deterioration in the patient's condition. It includes time spent evaluating and treating the patient's acute illness as well as time spent reviewing labs, radiology, discussing goals of care with patient and/or patient's family, and discussing the case with a multidisciplinary team, including the eICU, in an effort to prevent further life threatening deterioration or end organ damage. This time is independent of any procedures performed.

## 2025-02-22 NOTE — PROGRESS NOTE ADULT - SUBJECTIVE AND OBJECTIVE BOX
Patient is a 74y old  Male who presents with a chief complaint of sepsis, new numbness/weakness (20 Feb 2025 09:12)    Allergies    No Known Allergies    Intolerances      REVIEW OF SYSTEMS: SEE BELOW       ICU Vital Signs Last 24 Hrs  T(C): 35.8 (22 Feb 2025 07:55), Max: 36.8 (21 Feb 2025 17:04)  T(F): 96.5 (22 Feb 2025 07:55), Max: 98.3 (21 Feb 2025 17:04)  HR: 68 (22 Feb 2025 11:00) (64 - 91)  BP: 145/99 (22 Feb 2025 11:00) (129/66 - 169/100)  BP(mean): 112 (22 Feb 2025 11:00) (86 - 140)  ABP: --  ABP(mean): --  RR: 13 (22 Feb 2025 11:00) (12 - 21)  SpO2: 94% (22 Feb 2025 10:00) (91% - 95%)    O2 Parameters below as of 22 Feb 2025 05:05  Patient On (Oxygen Delivery Method): room air            CAPILLARY BLOOD GLUCOSE          I&O's Summary    21 Feb 2025 07:01  -  22 Feb 2025 07:00  --------------------------------------------------------  IN: 0 mL / OUT: 3100 mL / NET: -3100 mL    22 Feb 2025 07:01  -  22 Feb 2025 11:34  --------------------------------------------------------  IN: 0 mL / OUT: 250 mL / NET: -250 mL            MEDICATIONS  (STANDING):  bisacodyl Suppository 10 milliGRAM(s) Rectal daily  cefTRIAXone Injectable. 2000 milliGRAM(s) IV Push every 24 hours  heparin   Injectable 5000 Unit(s) SubCutaneous every 8 hours  lactated ringers. 1000 milliLiter(s) (100 mL/Hr) IV Continuous <Continuous>  levothyroxine 150 MICROGram(s) Oral daily  magnesium hydroxide Suspension 30 milliLiter(s) Oral daily  metoprolol tartrate 12.5 milliGRAM(s) Oral every 12 hours  multivitamin/minerals 1 Tablet(s) Oral daily  polyethylene glycol 3350 17 Gram(s) Oral daily  rosuvastatin 5 milliGRAM(s) Oral at bedtime  tamsulosin 0.8 milliGRAM(s) Oral at bedtime  vibegron 75 milliGRAM(s) 75 milliGRAM(s) Oral at bedtime      MEDICATIONS  (PRN):  acetaminophen     Tablet .. 650 milliGRAM(s) Oral every 6 hours PRN Temp greater or equal to 38C (100.4F), Mild Pain (1 - 3)  aluminum hydroxide/magnesium hydroxide/simethicone Suspension 30 milliLiter(s) Oral every 4 hours PRN Dyspepsia  melatonin 3 milliGRAM(s) Oral at bedtime PRN Insomnia  metoclopramide Injectable 5 milliGRAM(s) IV Push every 8 hours PRN Nausea  ondansetron Injectable 4 milliGRAM(s) IV Push every 8 hours PRN Nausea and/or Vomiting  tiZANidine 2 milliGRAM(s) Oral four times a day PRN Muscle Spasm      PHYSICAL EXAM: SEE BELOW                          13.0   13.29 )-----------( 204      ( 22 Feb 2025 06:07 )             39.6       02-22    136  |  104  |  34[H]  ----------------------------<  111[H]  4.2   |  29  |  0.89    Ca    9.1      22 Feb 2025 06:07  Phos  3.5     02-22  Mg     2.7     02-22              Urinalysis Basic - ( 22 Feb 2025 06:07 )    Color: x / Appearance: x / SG: x / pH: x  Gluc: 111 mg/dL / Ketone: x  / Bili: x / Urobili: x   Blood: x / Protein: x / Nitrite: x   Leuk Esterase: x / RBC: x / WBC x   Sq Epi: x / Non Sq Epi: x / Bacteria: x      .Blood None   No growth at 48 Hours -- 02-19 @ 08:17  .Blood None   No growth at 48 Hours -- 02-19 @ 08:10  .Blood BLOOD   Growth in aerobic and anaerobic bottles: Streptococcus dysgalactiae  (Group C/G)  See previous culture [id number]   Growth in aerobic bottle: Gram Positive Cocci in Pairs and Chains  Growth in anaerobic bottle: Gram Positive Cocci in Pairs and Chains 02-17 @ 21:11      Rapid RVP Result: NotDetec (02-17 @ 21:11)

## 2025-02-22 NOTE — PHYSICAL THERAPY INITIAL EVALUATION ADULT - GAIT DEVIATIONS NOTED, PT EVAL
BUE AND BLE SENSORY, MOTOR CONTROL AND COORDINATION DEFICITS ALL PRESENT. R > L SIDE WEAKNESS PRESENT. ABSENT BLE HEEL STRIKE AND POOR COORDINATION OF ALL BUE AND BLE MOVEMENTS. HIGH FALL RISK/decreased cheryle/decreased step length/decreased stride length

## 2025-02-22 NOTE — PHYSICAL THERAPY INITIAL EVALUATION ADULT - BALANCE DISTURBANCE, IDENTIFIED IMPAIRMENT CONTRIBUTE, REHAB EVAL
impaired coordination/impaired motor control/abnormal muscle tone/impaired postural control/decreased ROM/decreased sensation/decreased strength

## 2025-02-22 NOTE — PROGRESS NOTE ADULT - ASSESSMENT
75 y/o male PMH BPH, diverticulosis, hypothyroid, HLD, spinal stenosis, spinal cord injury (2004), multiple spinal surgeries (three cervical and one lumbar). He presents to the emergency department with complaints of shoulder pain radiating down the arms associated with fever and LE weakness. Patient reports symptoms started as mild on saturday, he had mild shoulder pain, but it progressively got worse. Currently reports pain 8/10, sharp, burning, starts at shoulder blades and radiates down to hands, associated with feeling of numbness in arms. He has a similar sensation in his thighs, and yesterday reports Tmax at home of 102. He also notes a decreased  strength in his right hand. He denies experiencing any saddle anesthesia, or bladder or bowel incontinence. He denies headaches, dizziness, sore throat, rhinitis, SOB, Chest pain, diarrhea, dysuria.     Admitted for severe sepsis (POA) due to Strep bacteremia   MRI spine revealed tiny epidural abscesses and discitis involving lower cervical and upper thoracic area     Course complicated by ileus    Transferred to CCU 2/18       Plan:  Improving strength, continue PT/OT, out of bed to chair  Denies pain today, continue prn tizanidine, not on standing opioids at this time   Seen by neurosurgery, no surgical intervention for abscess/phlegmon/discitis  Continue ceftriaxone, repeat blood cultures from 2/19 negative so far, WBC improving, no fever  BP and HR stable, continue metoprolol. Continue statin  Respiratory status stable on room air, add IS  N.p.o. today as still with significant NG output, CT shows ileus, will give 1 dose of Relistor, starting to have BMs and he feels his distention is improving, may consider neostigmine, keep K>4  Renal function stable  DVT prophylaxis with SC heparin    Keep in CCU today    Sepsis criteria met? - NO 73 y/o male PMH BPH, diverticulosis, hypothyroid, HLD, spinal stenosis, spinal cord injury (2004), multiple spinal surgeries (three cervical and one lumbar). He presents to the emergency department with complaints of shoulder pain radiating down the arms associated with fever and LE weakness. Patient reports symptoms started as mild on saturday, he had mild shoulder pain, but it progressively got worse. Currently reports pain 8/10, sharp, burning, starts at shoulder blades and radiates down to hands, associated with feeling of numbness in arms. He has a similar sensation in his thighs, and yesterday reports Tmax at home of 102. He also notes a decreased  strength in his right hand. He denies experiencing any saddle anesthesia, or bladder or bowel incontinence. He denies headaches, dizziness, sore throat, rhinitis, SOB, Chest pain, diarrhea, dysuria.     Admitted for severe sepsis (POA) due to Strep bacteremia   MRI spine revealed tiny epidural abscesses and discitis involving lower cervical and upper thoracic area     Course complicated by ileus    Transferred to CCU 2/18       Plan:  Improving strength, continue PT/OT, out of bed to chair  Denies pain today, continue prn tizanidine, not on standing opioids at this time   Seen by neurosurgery, no surgical intervention for abscess/phlegmon/discitis  Continue ceftriaxone, repeat blood cultures from 2/19 negative so far, WBC improving, no fever  BP and HR stable, continue metoprolol. Continue statin  Respiratory status stable on room air, add IS  N.p.o. today as still with significant NG output, CT shows ileus, will give 1 dose of Relistor, starting to have BMs and he feels his distention is improving, may consider neostigmine, keep K>4  Renal function stable. D/c Ortega   DVT prophylaxis with SC heparin    Keep in CCU today    Sepsis criteria met? - NO

## 2025-02-22 NOTE — PHYSICAL THERAPY INITIAL EVALUATION ADULT - IMPAIRMENTS CONTRIBUTING TO GAIT DEVIATIONS, PT EVAL
impaired balance/impaired coordination/decreased flexibility/impaired motor control/abnormal muscle tone/impaired postural control/decreased ROM/decreased sensation/decreased strength

## 2025-02-22 NOTE — PHYSICAL THERAPY INITIAL EVALUATION ADULT - MODALITIES TREATMENT COMMENTS
Pt left OOB in chair in NAD with Tele HM, BP Cuff, Pulse Oxym, NGT and Ortega all intact. CBIR. Pt instructed to not get up alone. CCU RN Dot aware

## 2025-02-22 NOTE — PHYSICAL THERAPY INITIAL EVALUATION ADULT - GENERAL OBSERVATIONS, REHAB EVAL
Pt rec'd supine in bed in NAD with Tele HM, BP Cuff, Pulse Oxym, Ortega and NGT all intact. Pt denied any pain or discomfort just weakness

## 2025-02-22 NOTE — PHYSICAL THERAPY INITIAL EVALUATION ADULT - ADDITIONAL COMMENTS
Patient has chronic degenerative SCI C5-C7 in 2004 followed by 3 C/S Spine Surgeries and 1 L/S Spine Surgery. Prior to admission, patient was ambulating Independently with no AD and was working/driving daily. Patient had chronic R Side Weakness but now the weakness is worse.

## 2025-02-22 NOTE — CONSULT NOTE ADULT - ASSESSMENT
Patient is a 74-year-old male with a history of BPH, diverticulosis, Graves' disease, hyperlipidemia, hypothyroidism, spinal stenosis, spinal injury back in 2004 status post multiple spinal surgeries who has been admitted at [facility] since 2/18/2025 with strep discitis with hospitalist for recently complicated by development of ileus on imaging - though with ongoing bowel movements at this time. Likely multifactorial in the setting of sepsis, limited mobility, acute illness, and opoid use.      #Abnormal CT imaging with distended small & large bowel - concerning for ileus  #Strep discitis  - Avoid Ice chips to ensure accurate I/Os with NG in place  - Given symptomatic improvement can do clamping trial for NG and remove if no recurrent symptoms or high output  - Noted plans for relistor dose today  - Would avoid lactulose use as this would worsen distention  - Avoid all opoids  - Patient is alble to get from bed to chair, educated about moving from side to side when laying in bed  - Continue to monitor bowel movements  - Check lytes daily and replete phos, mag, and K  - Continue treatment for Strep discitis

## 2025-02-23 LAB
ALBUMIN SERPL ELPH-MCNC: 2.6 G/DL — LOW (ref 3.3–5)
ALP SERPL-CCNC: 59 U/L — SIGNIFICANT CHANGE UP (ref 40–120)
ALT FLD-CCNC: 50 U/L — SIGNIFICANT CHANGE UP (ref 12–78)
ANION GAP SERPL CALC-SCNC: 5 MMOL/L — SIGNIFICANT CHANGE UP (ref 5–17)
AST SERPL-CCNC: 37 U/L — SIGNIFICANT CHANGE UP (ref 15–37)
BILIRUB SERPL-MCNC: 0.8 MG/DL — SIGNIFICANT CHANGE UP (ref 0.2–1.2)
BUN SERPL-MCNC: 36 MG/DL — HIGH (ref 7–23)
CALCIUM SERPL-MCNC: 9.2 MG/DL — SIGNIFICANT CHANGE UP (ref 8.5–10.1)
CHLORIDE SERPL-SCNC: 105 MMOL/L — SIGNIFICANT CHANGE UP (ref 96–108)
CO2 SERPL-SCNC: 29 MMOL/L — SIGNIFICANT CHANGE UP (ref 22–31)
CREAT SERPL-MCNC: 0.88 MG/DL — SIGNIFICANT CHANGE UP (ref 0.5–1.3)
EGFR: 91 ML/MIN/1.73M2 — SIGNIFICANT CHANGE UP
GLUCOSE SERPL-MCNC: 99 MG/DL — SIGNIFICANT CHANGE UP (ref 70–99)
HCT VFR BLD CALC: 38.1 % — LOW (ref 39–50)
HGB BLD-MCNC: 12.5 G/DL — LOW (ref 13–17)
MAGNESIUM SERPL-MCNC: 2.6 MG/DL — SIGNIFICANT CHANGE UP (ref 1.6–2.6)
MCHC RBC-ENTMCNC: 28.1 PG — SIGNIFICANT CHANGE UP (ref 27–34)
MCHC RBC-ENTMCNC: 32.8 G/DL — SIGNIFICANT CHANGE UP (ref 32–36)
MCV RBC AUTO: 85.6 FL — SIGNIFICANT CHANGE UP (ref 80–100)
NRBC # BLD AUTO: 0 K/UL — SIGNIFICANT CHANGE UP (ref 0–0)
NRBC # FLD: 0 K/UL — SIGNIFICANT CHANGE UP (ref 0–0)
NRBC BLD AUTO-RTO: 0 /100 WBCS — SIGNIFICANT CHANGE UP (ref 0–0)
PHOSPHATE SERPL-MCNC: 4 MG/DL — SIGNIFICANT CHANGE UP (ref 2.5–4.5)
PLATELET # BLD AUTO: 232 K/UL — SIGNIFICANT CHANGE UP (ref 150–400)
PMV BLD: 9.7 FL — SIGNIFICANT CHANGE UP (ref 7–13)
POTASSIUM SERPL-MCNC: 3.8 MMOL/L — SIGNIFICANT CHANGE UP (ref 3.5–5.3)
POTASSIUM SERPL-SCNC: 3.8 MMOL/L — SIGNIFICANT CHANGE UP (ref 3.5–5.3)
PROT SERPL-MCNC: 6.1 GM/DL — SIGNIFICANT CHANGE UP (ref 6–8.3)
RBC # BLD: 4.45 M/UL — SIGNIFICANT CHANGE UP (ref 4.2–5.8)
RBC # FLD: 14.1 % — SIGNIFICANT CHANGE UP (ref 10.3–14.5)
SODIUM SERPL-SCNC: 139 MMOL/L — SIGNIFICANT CHANGE UP (ref 135–145)
WBC # BLD: 12.68 K/UL — HIGH (ref 3.8–10.5)
WBC # FLD AUTO: 12.68 K/UL — HIGH (ref 3.8–10.5)

## 2025-02-23 PROCEDURE — 99233 SBSQ HOSP IP/OBS HIGH 50: CPT

## 2025-02-23 RX ORDER — ENOXAPARIN SODIUM 100 MG/ML
40 INJECTION SUBCUTANEOUS EVERY 24 HOURS
Refills: 0 | Status: DISCONTINUED | OUTPATIENT
Start: 2025-02-23 | End: 2025-02-25

## 2025-02-23 RX ORDER — HYPROMELLOSE 0.4 %
1 DROPS OPHTHALMIC (EYE)
Refills: 0 | Status: DISCONTINUED | OUTPATIENT
Start: 2025-02-23 | End: 2025-02-25

## 2025-02-23 RX ADMIN — HEPARIN SODIUM 5000 UNIT(S): 1000 INJECTION INTRAVENOUS; SUBCUTANEOUS at 13:31

## 2025-02-23 RX ADMIN — Medication 150 MICROGRAM(S): at 06:21

## 2025-02-23 RX ADMIN — Medication 1 TABLET(S): at 10:31

## 2025-02-23 RX ADMIN — HEPARIN SODIUM 5000 UNIT(S): 1000 INJECTION INTRAVENOUS; SUBCUTANEOUS at 06:21

## 2025-02-23 RX ADMIN — Medication 650 MILLIGRAM(S): at 23:16

## 2025-02-23 RX ADMIN — TAMSULOSIN HYDROCHLORIDE 0.8 MILLIGRAM(S): 0.4 CAPSULE ORAL at 23:03

## 2025-02-23 RX ADMIN — Medication 3 MILLIGRAM(S): at 23:16

## 2025-02-23 RX ADMIN — ENOXAPARIN SODIUM 40 MILLIGRAM(S): 100 INJECTION SUBCUTANEOUS at 23:02

## 2025-02-23 RX ADMIN — POLYETHYLENE GLYCOL 3350 17 GRAM(S): 17 POWDER, FOR SOLUTION ORAL at 10:31

## 2025-02-23 RX ADMIN — Medication 3 MILLIGRAM(S): at 02:29

## 2025-02-23 RX ADMIN — CEFTRIAXONE 2000 MILLIGRAM(S): 500 INJECTION, POWDER, FOR SOLUTION INTRAMUSCULAR; INTRAVENOUS at 14:17

## 2025-02-23 RX ADMIN — Medication 650 MILLIGRAM(S): at 02:29

## 2025-02-23 RX ADMIN — METOPROLOL SUCCINATE 12.5 MILLIGRAM(S): 50 TABLET, EXTENDED RELEASE ORAL at 10:32

## 2025-02-23 RX ADMIN — METOPROLOL SUCCINATE 12.5 MILLIGRAM(S): 50 TABLET, EXTENDED RELEASE ORAL at 23:03

## 2025-02-23 RX ADMIN — MAGNESIUM HYDROXIDE 30 MILLILITER(S): 400 SUSPENSION ORAL at 10:31

## 2025-02-23 RX ADMIN — Medication 650 MILLIGRAM(S): at 03:00

## 2025-02-23 RX ADMIN — Medication 10 MILLIGRAM(S): at 10:31

## 2025-02-23 NOTE — PROGRESS NOTE ADULT - ASSESSMENT
Imp:  Ileus, presumably narcotic induced, but much better    Rec:  Avoid narcotics  If narcotics needed, would add Movantik

## 2025-02-23 NOTE — PROGRESS NOTE ADULT - SUBJECTIVE AND OBJECTIVE BOX
Patient is a 74y old  Male who presents with a chief complaint of sepsis, new numbness/weakness (20 Feb 2025 09:12)      Subective:  Passing flatus, NG tube out    PAST MEDICAL & SURGICAL HISTORY:  Hyperlipidemia, unspecified hyperlipidemia type      Diverticulosis of large intestine without hemorrhage      History of colon polyps      Hemorrhoids, unspecified hemorrhoid type      Benign prostatic hyperplasia without lower urinary tract symptoms, unspecified morphology      Osteoarthritis of knee, unilateral  left      Spinal cord injury at C5-C7 level without injury of spinal bone, subsequent encounter      Spastic      Weakness  to right side      Myelopathy      Spinal stenosis, unspecified spinal region      Hypothyroidism, unspecified type      Graves disease      Alcoholic  recovering alcoholic x 40years      History of cervical discectomy      S/P laminectomy  Cervical      H/O lumbar discectomy  with laminectomy      H/O arthroscopy of knee  Left x 2. Unsure of years      H/O colonoscopy with polypectomy  2014          MEDICATIONS  (STANDING):  bisacodyl Suppository 10 milliGRAM(s) Rectal daily  cefTRIAXone Injectable. 2000 milliGRAM(s) IV Push every 24 hours  heparin   Injectable 5000 Unit(s) SubCutaneous every 8 hours  levothyroxine 150 MICROGram(s) Oral daily  magnesium hydroxide Suspension 30 milliLiter(s) Oral daily  metoprolol tartrate 12.5 milliGRAM(s) Oral every 12 hours  multivitamin/minerals 1 Tablet(s) Oral daily  polyethylene glycol 3350 17 Gram(s) Oral daily  rosuvastatin 5 milliGRAM(s) Oral at bedtime  tamsulosin 0.8 milliGRAM(s) Oral at bedtime  vibegron 75 milliGRAM(s) 75 milliGRAM(s) Oral at bedtime    MEDICATIONS  (PRN):  acetaminophen     Tablet .. 650 milliGRAM(s) Oral every 6 hours PRN Temp greater or equal to 38C (100.4F), Mild Pain (1 - 3)  aluminum hydroxide/magnesium hydroxide/simethicone Suspension 30 milliLiter(s) Oral every 4 hours PRN Dyspepsia  artificial tears (preservative free) Ophthalmic Solution 1 Drop(s) Both EYES two times a day PRN Dry Eyes  melatonin 3 milliGRAM(s) Oral at bedtime PRN Insomnia  metoclopramide Injectable 5 milliGRAM(s) IV Push every 8 hours PRN Nausea  ondansetron Injectable 4 milliGRAM(s) IV Push every 8 hours PRN Nausea and/or Vomiting  tiZANidine 2 milliGRAM(s) Oral four times a day PRN Muscle Spasm      REVIEW OF SYSTEMS:    RESPIRATORY: No shortness of breath  CARDIOVASCULAR: No chest pain  All other review of systems is negative unless indicated above.    Vital Signs Last 24 Hrs  T(C): 36.7 (23 Feb 2025 04:00), Max: 36.9 (22 Feb 2025 19:38)  T(F): 98 (23 Feb 2025 04:00), Max: 98.5 (22 Feb 2025 19:38)  HR: 62 (23 Feb 2025 09:00) (52 - 83)  BP: 153/84 (23 Feb 2025 09:00) (123/71 - 183/107)  BP(mean): 105 (23 Feb 2025 09:00) (87 - 126)  RR: 14 (23 Feb 2025 09:00) (11 - 20)  SpO2: 93% (23 Feb 2025 09:00) (91% - 96%)    Parameters below as of 23 Feb 2025 08:00  Patient On (Oxygen Delivery Method): room air        PHYSICAL EXAM:    Constitutional: NAD, well-developed  Respiratory: CTAB  Cardiovascular: S1 and S2, RRR  Gastrointestinal: BS+, soft, NT/ND  Extremities: No peripheral edema  Psychiatric: Normal mood, normal affect    LABS:                        12.5   12.68 )-----------( 232      ( 23 Feb 2025 05:27 )             38.1     02-23    139  |  105  |  36[H]  ----------------------------<  99  3.8   |  29  |  0.88    Ca    9.2      23 Feb 2025 05:27  Phos  4.0     02-23  Mg     2.6     02-23    TPro  6.1  /  Alb  2.6[L]  /  TBili  0.8  /  DBili  x   /  AST  37  /  ALT  50  /  AlkPhos  59  02-23      LIVER FUNCTIONS - ( 23 Feb 2025 05:27 )  Alb: 2.6 g/dL / Pro: 6.1 gm/dL / ALK PHOS: 59 U/L / ALT: 50 U/L / AST: 37 U/L / GGT: x             RADIOLOGY & ADDITIONAL STUDIES:

## 2025-02-23 NOTE — PROGRESS NOTE ADULT - SUBJECTIVE AND OBJECTIVE BOX
Patient is a 74y old  Male who presents with a chief complaint of sepsis, new numbness/weakness (20 Feb 2025 09:12)    Allergies    No Known Allergies    Intolerances      REVIEW OF SYSTEMS: SEE BELOW       ICU Vital Signs Last 24 Hrs  T(C): 36.7 (23 Feb 2025 04:00), Max: 36.9 (22 Feb 2025 19:38)  T(F): 98 (23 Feb 2025 04:00), Max: 98.5 (22 Feb 2025 19:38)  HR: 62 (23 Feb 2025 09:00) (52 - 83)  BP: 153/84 (23 Feb 2025 09:00) (123/71 - 183/107)  BP(mean): 105 (23 Feb 2025 09:00) (87 - 126)  ABP: --  ABP(mean): --  RR: 14 (23 Feb 2025 09:00) (11 - 20)  SpO2: 93% (23 Feb 2025 09:00) (91% - 96%)    O2 Parameters below as of 23 Feb 2025 08:00  Patient On (Oxygen Delivery Method): room air            CAPILLARY BLOOD GLUCOSE          I&O's Summary    22 Feb 2025 07:01  -  23 Feb 2025 07:00  --------------------------------------------------------  IN: 0 mL / OUT: 2900 mL / NET: -2900 mL            MEDICATIONS  (STANDING):  bisacodyl Suppository 10 milliGRAM(s) Rectal daily  cefTRIAXone Injectable. 2000 milliGRAM(s) IV Push every 24 hours  heparin   Injectable 5000 Unit(s) SubCutaneous every 8 hours  levothyroxine 150 MICROGram(s) Oral daily  magnesium hydroxide Suspension 30 milliLiter(s) Oral daily  metoprolol tartrate 12.5 milliGRAM(s) Oral every 12 hours  multivitamin/minerals 1 Tablet(s) Oral daily  polyethylene glycol 3350 17 Gram(s) Oral daily  rosuvastatin 5 milliGRAM(s) Oral at bedtime  tamsulosin 0.8 milliGRAM(s) Oral at bedtime  vibegron 75 milliGRAM(s) 75 milliGRAM(s) Oral at bedtime      MEDICATIONS  (PRN):  acetaminophen     Tablet .. 650 milliGRAM(s) Oral every 6 hours PRN Temp greater or equal to 38C (100.4F), Mild Pain (1 - 3)  aluminum hydroxide/magnesium hydroxide/simethicone Suspension 30 milliLiter(s) Oral every 4 hours PRN Dyspepsia  artificial tears (preservative free) Ophthalmic Solution 1 Drop(s) Both EYES two times a day PRN Dry Eyes  melatonin 3 milliGRAM(s) Oral at bedtime PRN Insomnia  metoclopramide Injectable 5 milliGRAM(s) IV Push every 8 hours PRN Nausea  ondansetron Injectable 4 milliGRAM(s) IV Push every 8 hours PRN Nausea and/or Vomiting  tiZANidine 2 milliGRAM(s) Oral four times a day PRN Muscle Spasm      PHYSICAL EXAM: SEE BELOW                          12.5   12.68 )-----------( 232      ( 23 Feb 2025 05:27 )             38.1       02-23    139  |  105  |  36[H]  ----------------------------<  99  3.8   |  29  |  0.88    Ca    9.2      23 Feb 2025 05:27  Phos  4.0     02-23  Mg     2.6     02-23    TPro  6.1  /  Alb  2.6[L]  /  TBili  0.8  /  DBili  x   /  AST  37  /  ALT  50  /  AlkPhos  59  02-23            Urinalysis Basic - ( 23 Feb 2025 05:27 )    Color: x / Appearance: x / SG: x / pH: x  Gluc: 99 mg/dL / Ketone: x  / Bili: x / Urobili: x   Blood: x / Protein: x / Nitrite: x   Leuk Esterase: x / RBC: x / WBC x   Sq Epi: x / Non Sq Epi: x / Bacteria: x      .Blood None   No growth at 72 Hours -- 02-19 @ 08:17  .Blood None   No growth at 72 Hours -- 02-19 @ 08:10

## 2025-02-23 NOTE — PROGRESS NOTE ADULT - RESPIRATORY
clear to auscultation bilaterally/no wheezes/no rales/no rhonchi
clear to auscultation bilaterally/no wheezes/no respiratory distress
clear to auscultation bilaterally/no wheezes/no respiratory distress
clear to auscultation bilaterally/no wheezes/no rales/no rhonchi
clear to auscultation bilaterally/no wheezes/no rales/no rhonchi

## 2025-02-23 NOTE — PROGRESS NOTE ADULT - ASSESSMENT
73 y/o male PMH BPH, diverticulosis, hypothyroid, HLD, spinal stenosis, spinal cord injury (2004), multiple spinal surgeries (three cervical and one lumbar). He presents to the emergency department with complaints of shoulder pain radiating down the arms associated with fever and LE weakness. Patient reports symptoms started as mild on saturday, he had mild shoulder pain, but it progressively got worse. Currently reports pain 8/10, sharp, burning, starts at shoulder blades and radiates down to hands, associated with feeling of numbness in arms. He has a similar sensation in his thighs, and yesterday reports Tmax at home of 102. He also notes a decreased  strength in his right hand. He denies experiencing any saddle anesthesia, or bladder or bowel incontinence. He denies headaches, dizziness, sore throat, rhinitis, SOB, Chest pain, diarrhea, dysuria.     Admitted for severe sepsis (POA) due to Strep bacteremia   MRI spine revealed tiny epidural abscesses and discitis involving lower cervical and upper thoracic area     Course complicated by ileus    Transferred to CCU 2/18 for frequent neurochecks       Plan:  Improving strength, continue PT/OT, out of bed to chair  Denies pain today, continue prn tizanidine, not on standing opioids at this time   Seen by neurosurgery, no surgical intervention for abscess/phlegmon/discitis, neurochecks q4  Continue ceftriaxone, repeat blood cultures from 2/19 negative, WBC improving, no fever  BP and HR stable, continue metoprolol. Continue statin  Respiratory status stable on room air  Abd exam improving, will go NGT clamp trial and remove and start clears if tolerates  Renal function stable. No Ortega   DVT prophylaxis with SC heparin    Stable for floor    Sepsis criteria met? - NO 75 y/o male PMH BPH, diverticulosis, hypothyroid, HLD, spinal stenosis, spinal cord injury (2004), multiple spinal surgeries (three cervical and one lumbar). He presents to the emergency department with complaints of shoulder pain radiating down the arms associated with fever and LE weakness. Patient reports symptoms started as mild on saturday, he had mild shoulder pain, but it progressively got worse. Currently reports pain 8/10, sharp, burning, starts at shoulder blades and radiates down to hands, associated with feeling of numbness in arms. He has a similar sensation in his thighs, and yesterday reports Tmax at home of 102. He also notes a decreased  strength in his right hand. He denies experiencing any saddle anesthesia, or bladder or bowel incontinence. He denies headaches, dizziness, sore throat, rhinitis, SOB, Chest pain, diarrhea, dysuria.     Admitted for severe sepsis (POA) due to Strep bacteremia   MRI spine revealed tiny epidural abscesses and discitis involving lower cervical and upper thoracic area     Course complicated by ileus    Transferred to CCU 2/18 for frequent neurochecks       Plan:  Improving strength, continue PT/OT, out of bed to chair  Denies pain today, continue prn tizanidine, not on standing opioids at this time   Seen by neurosurgery, no surgical intervention for abscess/phlegmon/discitis, neurochecks q4  Continue ceftriaxone, repeat blood cultures from 2/19 negative, WBC improving, no fever  BP and HR stable, continue metoprolol. Continue statin  Respiratory status stable on room air  Abd exam improving, will go NGT clamp trial and remove and start clears if tolerates  Renal function stable. No Ortega   DVT prophylaxis with SC heparin    Stable for floor, sign out given to Dr Pacheco    Sepsis criteria met? - NO

## 2025-02-23 NOTE — PROGRESS NOTE ADULT - ASSESSMENT
73 y/o male PMH BPH, diverticulosis, hypothyroid, HLD, spinal stenosis, spinal cord injury (2004), multiple spinal surgeries (three cervical and one lumbar). He presents to the emergency department with complaints of shoulder pain radiating down the arms associated with fever and LE weakness. Patient reports symptoms started as mild on saturday, he had mild shoulder pain, but it progressively got worse. Currently reports pain 8/10, sharp, burning, starts at shoulder blades and radiates down to hands, associated with feeling of numbness in arms. He has a similar sensation in his thighs, and yesterday reports Tmax at home of 102. He also notes a decreased  strength in his right hand. He denies experiencing any saddle anesthesia, or bladder or bowel incontinence. He denies headaches, dizziness, sore throat, rhinitis, SOB, Chest pain, diarrhea, dysuria.     Admitted for severe sepsis (POA) due to Strep bacteremia   MRI spine revealed tiny epidural abscesses and discitis involving lower cervical and upper thoracic area     Course complicated by ileus    Transferred to CCU 2/18 for frequent neurochecks       Plan:  Improving strength, continue PT/OT, out of bed to chair  Denies pain today, continue prn tizanidine, not on standing opioids at this time   Seen by neurosurgery, no surgical intervention for abscess/phlegmon/discitis, neurochecks q4  Continue ceftriaxone, repeat blood cultures from 2/19 negative, WBC improving, no fever  BP and HR stable, continue metoprolol. Continue statin  Respiratory status stable on room air  Abd exam improving, will go NGT clamp trial and remove and start clears if tolerates, monitor BM, NGT out   Renal function stable. No Ortega   DVT prophylaxis with Lovenox

## 2025-02-23 NOTE — PROGRESS NOTE ADULT - SUBJECTIVE AND OBJECTIVE BOX
HOSPITALIST ATTENDING PROGRESS NOTE     Chart and meds reviewed. Patient seen and examined     Interval Hx/Events: Pt seen and evaluated. Seen and evaluated in [facility]. Downgraded to Hospital Medicine. Still reports of paresthesias. NGT removed. Had BM today. No other acute complaints      All other systems and founds to be negative with exception of what has been described above.     PHYSICAL EXAM:  Vital Signs Last 24 Hrs  T(C): 36.5 (2025 18:58), Max: 36.9 (2025 19:38)  T(F): 97.7 (2025 18:58), Max: 98.5 (2025 19:38)  HR: 72 (2025 18:58) (52 - 92)  BP: 153/83 (2025 18:58) (123/71 - 183/107)  BP(mean): 90 (2025 13:00) (87 - 126)  RR: 18 (2025 18:58) (14 - 20)  SpO2: 94% (2025 18:58) (91% - 95%)    Parameters below as of 2025 18:58  Patient On (Oxygen Delivery Method): room air      Daily     Daily Weight in k (2025 14:59)    GEN: NAD   HEENT: EOMI,  moist mucous membranes  NECK : Soft and supple, no JVD  LUNG: CTABL, No wheezing, rales or rhonchi  CVS: S1S2+, RRR, no M/G/R  GI: BS+, soft, NT/ND, no guarding, no rebound  EXTREMITIES: No peripheral edema  VASCULAR: 2+ peripheral pulses  NEURO: AAOx3, grossly non-focal   SKIN: No rashes    HOME MEDICATIONS:  Home Medications:  Aspirin Low Dose 81 mg oral delayed release tablet: 1 tab(s) orally once a day (2025 06:33)  Cialis 5 mg oral tablet: 1 tab(s) orally once a day (2025 06:33)  Crestor 5 mg oral tablet: 1 tab(s) orally 2 times a week (2025 06:33)  Gemtesa 75 mg oral tablet: 1 tab(s) orally once a day (at bedtime) (2025 01:18)  levothyroxine 150 mcg (0.15 mg) oral tablet: 1 tab(s) orally once a day (2025 01:17)  multivitamin with minerals: 1 tab(s) orally once a day (2025 06:33)  tamsulosin 0.4 mg oral capsule: 1 cap(s) orally 2 times a day (2025 06:33)  tiZANidine 2 mg oral capsule: 2 cap(s) orally every 8 hours (2025 06:33)  Vitamin D3 2000 intl units oral capsule: 1 cap(s) orally once a day (2025 06:33)      MEDICATIONS  MEDICATIONS  (STANDING):  bisacodyl Suppository 10 milliGRAM(s) Rectal daily  cefTRIAXone Injectable. 2000 milliGRAM(s) IV Push every 24 hours  enoxaparin Injectable 40 milliGRAM(s) SubCutaneous every 24 hours  levothyroxine 150 MICROGram(s) Oral daily  magnesium hydroxide Suspension 30 milliLiter(s) Oral daily  metoprolol tartrate 12.5 milliGRAM(s) Oral every 12 hours  multivitamin/minerals 1 Tablet(s) Oral daily  polyethylene glycol 3350 17 Gram(s) Oral daily  rosuvastatin 5 milliGRAM(s) Oral at bedtime  tamsulosin 0.8 milliGRAM(s) Oral at bedtime  vibegron 75 milliGRAM(s) 75 milliGRAM(s) Oral at bedtime      LABS: All Labs Reviewed:                        12.5   12.68 )-----------( 232      ( 2025 05:27 )             38.1         139  |  105  |  36[H]  ----------------------------<  99  3.8   |  29  |  0.88    Ca    9.2      2025 05:27  Phos  4.0       Mg     2.6         TPro  6.1  /  Alb  2.6[L]  /  TBili  0.8  /  DBili  x   /  AST  37  /  ALT  50  /  AlkPhos  59          Urinalysis Basic - ( 2025 05:27 )    Color: x / Appearance: x / SG: x / pH: x  Gluc: 99 mg/dL / Ketone: x  / Bili: x / Urobili: x   Blood: x / Protein: x / Nitrite: x   Leuk Esterase: x / RBC: x / WBC x   Sq Epi: x / Non Sq Epi: x / Bacteria: x        Blood Culture:  @ 08:17  Organism --  Gram Stain Blood -- Gram Stain --  Specimen Source .Blood None  Culture-Blood --     @ 08:10  Organism --  Gram Stain Blood -- Gram Stain --  Specimen Source .Blood None  Culture-Blood --      I&O's Detail    2025 07:  -  2025 07:00  --------------------------------------------------------  IN:  Total IN: 0 mL    OUT:    Indwelling Catheter - Urethral (mL): 250 mL    Intermittent Catheterization - Urethral (mL): 600 mL    Nasogastric/Oral tube (mL): 2050 mL  Total OUT: 2900 mL    Total NET: -2900 mL      2025 07:01  -  2025 19:16  --------------------------------------------------------  IN:    Oral Fluid: 600 mL  Total IN: 600 mL    OUT:    Nasogastric/Oral tube (mL): 50 mL  Total OUT: 50 mL    Total NET: 550 mL        CAPILLARY BLOOD GLUCOSE            CARDIOLOGY TESTING         EKG: reviewed     ECHO: reviewed       RADIOLOGY: reviewed

## 2025-02-24 LAB
ANION GAP SERPL CALC-SCNC: 4 MMOL/L — LOW (ref 5–17)
BUN SERPL-MCNC: 25 MG/DL — HIGH (ref 7–23)
CALCIUM SERPL-MCNC: 9 MG/DL — SIGNIFICANT CHANGE UP (ref 8.5–10.1)
CHLORIDE SERPL-SCNC: 105 MMOL/L — SIGNIFICANT CHANGE UP (ref 96–108)
CO2 SERPL-SCNC: 30 MMOL/L — SIGNIFICANT CHANGE UP (ref 22–31)
CREAT SERPL-MCNC: 0.88 MG/DL — SIGNIFICANT CHANGE UP (ref 0.5–1.3)
CULTURE RESULTS: SIGNIFICANT CHANGE UP
CULTURE RESULTS: SIGNIFICANT CHANGE UP
EGFR: 91 ML/MIN/1.73M2 — SIGNIFICANT CHANGE UP
GLUCOSE SERPL-MCNC: 113 MG/DL — HIGH (ref 70–99)
HCT VFR BLD CALC: 38.8 % — LOW (ref 39–50)
HGB BLD-MCNC: 12.6 G/DL — LOW (ref 13–17)
MAGNESIUM SERPL-MCNC: 2.3 MG/DL — SIGNIFICANT CHANGE UP (ref 1.6–2.6)
MCHC RBC-ENTMCNC: 28 PG — SIGNIFICANT CHANGE UP (ref 27–34)
MCHC RBC-ENTMCNC: 32.5 G/DL — SIGNIFICANT CHANGE UP (ref 32–36)
MCV RBC AUTO: 86.2 FL — SIGNIFICANT CHANGE UP (ref 80–100)
NRBC # BLD AUTO: 0 K/UL — SIGNIFICANT CHANGE UP (ref 0–0)
NRBC # FLD: 0 K/UL — SIGNIFICANT CHANGE UP (ref 0–0)
NRBC BLD AUTO-RTO: 0 /100 WBCS — SIGNIFICANT CHANGE UP (ref 0–0)
PHOSPHATE SERPL-MCNC: 2.6 MG/DL — SIGNIFICANT CHANGE UP (ref 2.5–4.5)
PLATELET # BLD AUTO: 240 K/UL — SIGNIFICANT CHANGE UP (ref 150–400)
PMV BLD: 9.5 FL — SIGNIFICANT CHANGE UP (ref 7–13)
POTASSIUM SERPL-MCNC: 3.7 MMOL/L — SIGNIFICANT CHANGE UP (ref 3.5–5.3)
POTASSIUM SERPL-SCNC: 3.7 MMOL/L — SIGNIFICANT CHANGE UP (ref 3.5–5.3)
RBC # BLD: 4.5 M/UL — SIGNIFICANT CHANGE UP (ref 4.2–5.8)
RBC # FLD: 14 % — SIGNIFICANT CHANGE UP (ref 10.3–14.5)
SODIUM SERPL-SCNC: 139 MMOL/L — SIGNIFICANT CHANGE UP (ref 135–145)
SPECIMEN SOURCE: SIGNIFICANT CHANGE UP
SPECIMEN SOURCE: SIGNIFICANT CHANGE UP
WBC # BLD: 12.68 K/UL — HIGH (ref 3.8–10.5)
WBC # FLD AUTO: 12.68 K/UL — HIGH (ref 3.8–10.5)

## 2025-02-24 PROCEDURE — 99233 SBSQ HOSP IP/OBS HIGH 50: CPT

## 2025-02-24 PROCEDURE — 99222 1ST HOSP IP/OBS MODERATE 55: CPT

## 2025-02-24 RX ADMIN — POLYETHYLENE GLYCOL 3350 17 GRAM(S): 17 POWDER, FOR SOLUTION ORAL at 10:28

## 2025-02-24 RX ADMIN — Medication 1 TABLET(S): at 10:29

## 2025-02-24 RX ADMIN — ROSUVASTATIN CALCIUM 5 MILLIGRAM(S): 20 TABLET, FILM COATED ORAL at 00:29

## 2025-02-24 RX ADMIN — METOPROLOL SUCCINATE 12.5 MILLIGRAM(S): 50 TABLET, EXTENDED RELEASE ORAL at 10:28

## 2025-02-24 RX ADMIN — TIZANIDINE 2 MILLIGRAM(S): 4 TABLET ORAL at 16:34

## 2025-02-24 RX ADMIN — MAGNESIUM HYDROXIDE 30 MILLILITER(S): 400 SUSPENSION ORAL at 10:28

## 2025-02-24 RX ADMIN — ENOXAPARIN SODIUM 40 MILLIGRAM(S): 100 INJECTION SUBCUTANEOUS at 22:32

## 2025-02-24 RX ADMIN — TIZANIDINE 2 MILLIGRAM(S): 4 TABLET ORAL at 02:32

## 2025-02-24 RX ADMIN — METOPROLOL SUCCINATE 12.5 MILLIGRAM(S): 50 TABLET, EXTENDED RELEASE ORAL at 22:32

## 2025-02-24 RX ADMIN — Medication 150 MICROGRAM(S): at 06:44

## 2025-02-24 RX ADMIN — ROSUVASTATIN CALCIUM 5 MILLIGRAM(S): 20 TABLET, FILM COATED ORAL at 22:32

## 2025-02-24 RX ADMIN — CEFTRIAXONE 2000 MILLIGRAM(S): 500 INJECTION, POWDER, FOR SOLUTION INTRAMUSCULAR; INTRAVENOUS at 14:59

## 2025-02-24 RX ADMIN — TAMSULOSIN HYDROCHLORIDE 0.8 MILLIGRAM(S): 0.4 CAPSULE ORAL at 22:32

## 2025-02-24 NOTE — PROGRESS NOTE ADULT - SUBJECTIVE AND OBJECTIVE BOX
Patient is seen and examined, Chart is reviewed. Abdominal discomfort is improved. he is passing gas now and also had a small Bowel movement this am. Still very weak to get and walk. Weakness in extremities.     Gen: No fever, chills, ++weakness  ENT: No visual changes or throat pain  Neck: No pain or stiffness  Respiratory: No cough or wheezing  Cardiovascular: No chest pain or palpitations  Gastrointestinal: No abdominal pain, nausea, vomiting, constipation, or diarrhea  Hematologic: No easy bleeding or bruising  Neurologic: No numbness or focal weakness  Psych: No depression or insomnia  Skin: No rash or itching        MEDICATIONS  (STANDING):  bisacodyl Suppository 10 milliGRAM(s) Rectal daily  cefTRIAXone Injectable. 2000 milliGRAM(s) IV Push every 24 hours  enoxaparin Injectable 40 milliGRAM(s) SubCutaneous every 24 hours  levothyroxine 150 MICROGram(s) Oral daily  magnesium hydroxide Suspension 30 milliLiter(s) Oral daily  metoprolol tartrate 12.5 milliGRAM(s) Oral every 12 hours  multivitamin/minerals 1 Tablet(s) Oral daily  polyethylene glycol 3350 17 Gram(s) Oral daily  rosuvastatin 5 milliGRAM(s) Oral at bedtime  tamsulosin 0.8 milliGRAM(s) Oral at bedtime  vibegron 75 milliGRAM(s) 75 milliGRAM(s) Oral at bedtime    MEDICATIONS  (PRN):  acetaminophen     Tablet .. 650 milliGRAM(s) Oral every 6 hours PRN Temp greater or equal to 38C (100.4F), Mild Pain (1 - 3)  aluminum hydroxide/magnesium hydroxide/simethicone Suspension 30 milliLiter(s) Oral every 4 hours PRN Dyspepsia  artificial tears (preservative free) Ophthalmic Solution 1 Drop(s) Both EYES two times a day PRN Dry Eyes  melatonin 3 milliGRAM(s) Oral at bedtime PRN Insomnia  metoclopramide Injectable 5 milliGRAM(s) IV Push every 8 hours PRN Nausea  ondansetron Injectable 4 milliGRAM(s) IV Push every 8 hours PRN Nausea and/or Vomiting  tiZANidine 2 milliGRAM(s) Oral four times a day PRN Muscle Spasm      Vital Signs Last 24 Hrs  T(C): 36.5 (24 Feb 2025 15:00), Max: 36.6 (23 Feb 2025 22:21)  T(F): 97.7 (24 Feb 2025 15:00), Max: 97.9 (23 Feb 2025 22:21)  HR: 69 (24 Feb 2025 15:00) (69 - 78)  BP: 127/88 (24 Feb 2025 15:00) (127/88 - 153/83)  BP(mean): --  RR: 18 (24 Feb 2025 15:00) (18 - 19)  SpO2: 96% (24 Feb 2025 15:00) (94% - 96%)    Parameters below as of 24 Feb 2025 15:00  Patient On (Oxygen Delivery Method): room air      GEN: NAD   HEENT: EOMI,  moist mucous membranes  NECK : Soft and supple, no JVD  LUNG: CTABL, No wheezing, rales or rhonchi  CVS: S1S2+, RRR, no M/G/R  GI: BS+, soft, NT/ND, no guarding, no rebound  EXTREMITIES: No peripheral edema  VASCULAR: 2+ peripheral pulses  NEURO: AAOx3, grossly non-focal   SKIN: No rashes      LABS:                          12.6   12.68 )-----------( 240      ( 24 Feb 2025 07:00 )             38.8     24 Feb 2025 07:00    139    |  105    |  25     ----------------------------<  113    3.7     |  30     |  0.88     Ca    9.0        24 Feb 2025 07:00  Phos  2.6       24 Feb 2025 07:00  Mg     2.3       24 Feb 2025 07:00    TPro  6.1    /  Alb  2.6    /  TBili  0.8    /  DBili  x      /  AST  37     /  ALT  50     /  AlkPhos  59     23 Feb 2025 05:27    LIVER FUNCTIONS - ( 23 Feb 2025 05:27 )  Alb: 2.6 g/dL / Pro: 6.1 gm/dL / ALK PHOS: 59 U/L / ALT: 50 U/L / AST: 37 U/L / GGT: x             CAPILLARY BLOOD GLUCOSE            Urinalysis Basic - ( 24 Feb 2025 07:00 )    Color: x / Appearance: x / SG: x / pH: x  Gluc: 113 mg/dL / Ketone: x  / Bili: x / Urobili: x   Blood: x / Protein: x / Nitrite: x   Leuk Esterase: x / RBC: x / WBC x   Sq Epi: x / Non Sq Epi: x / Bacteria: x        RADIOLOGY:

## 2025-02-24 NOTE — PROGRESS NOTE ADULT - TIME BILLING
- Reviewing, and interpreting labs and testing.  - Independently obtaining a review of systems and performing a physical exam  - Reviewing consultant documentation/recommendations in addition to discussing plan of care with consultants.  - Counselling and educating patient and family regarding interpretation of aforementioned items and plan of care.
.

## 2025-02-24 NOTE — CONSULT NOTE ADULT - CONSULT REASON
neck pain Hx cervical fusion, +strep bacteremia
Bacteremia
Evaluate for Acute Inpatient Rehab
Weakness, neuro checks
b/l arm numbness/weakness
Ileus

## 2025-02-24 NOTE — PROGRESS NOTE ADULT - PROVIDER SPECIALTY LIST ADULT
Gastroenterology
Infectious Disease
Neurosurgery
Critical Care
Critical Care
Infectious Disease
Neurosurgery
Critical Care
Critical Care
Hospitalist
Infectious Disease
Neurology
Hospitalist
Critical Care
CCU
Critical Care

## 2025-02-24 NOTE — CONSULT NOTE ADULT - ASSESSMENT
ASSESSMENT/PLAN  74yMale with functional deficits after  Pain - Tylenol  DVT PPX - SCDs  Rehab -    Recommend ACUTE inpatient rehabilitation for the functional deficits consisting of 3 hours of therapy/day & 24 hour RN/daily PMR physician for comorbid medical management. Patient will be able to tolerate 3 hours a day.   Recommend [facility], patient DOES NOT meet acute inpatient rehabilitation criteria. Patient needs a more prolonged stay to achieve transition to community.    Expect patient to achieve functional goals for DC HOME with OUTPATIENT   Expect patient to achieve functional goals for DC HOME with HOME CARE   Follow up with CONCUSSION PROGRAM - Call [phone] for an appointment    Will continue to follow. Functional progress will determine ongoing rehab dispo recommendations, which may change.    Continue bedside therapy as well as OOB throughout the day with mobilization by staff to maintain cardiopulmonary function and prevention of secondary complications related to debility.      ASSESSMENT/PLAN  74yMale with functional deficits after abscess/phlegmon/discitis,   Pain - Tylenol  DVT PPX - SCDs  Rehab -     Recommend [facility], patient DOES NOT meet acute inpatient rehabilitation criteria. Patient needs a more prolonged stay to achieve transition to community.     Will continue to follow. Functional progress will determine ongoing rehab dispo recommendations, which may change.    Continue bedside therapy as well as OOB throughout the day with mobilization by staff to maintain cardiopulmonary function and prevention of secondary complications related to debility.

## 2025-02-24 NOTE — PROGRESS NOTE ADULT - ASSESSMENT
A/P:    Severe sepsis (POA) due to Strep bacteremia   MRI spine revealed tiny epidural abscesses and discitis involving lower cervical and upper thoracic area   -Improving strength, continue PT/OT, out of bed to chair-will need long term Rehab, seen by Physiatry- no acute rehab   -Denies pain today, continue prn tizanidine, not on standing opioids at this time due to Ileus   -Seen by neurosurgery, no surgical intervention for abscess/phlegmon/discitis, neurochecks q4  -Continue Ceftriaxone 2G q24, repeat blood cultures from 2/19 negative, WBC improving, no fever  -as per ID : will plan for prolong IV therapy; IV CTX 2G q24 via PICC line for 8-week course (enddate: 4/16/2025). Weekly CBC, CMP, ESR, CRP    Ileus  -Abd exam improved  -Advanced diet to regular diet       DVT prophylaxis with Lovenox    Disposition: To Rehab for Long term Abx and Physical rehab for weakness due to discitis, pending clinical improvement and tolerating diet

## 2025-02-24 NOTE — CONSULT NOTE ADULT - CONSULT REQUESTED DATE/TIME
24-Feb-2025 00:23
18-Feb-2025 12:57
18-Feb-2025 08:47
18-Feb-2025 18:10
18-Feb-2025 13:24
22-Feb-2025 10:53

## 2025-02-24 NOTE — CONSULT NOTE ADULT - SUBJECTIVE AND OBJECTIVE BOX
74yM was admitted on 02-18    Patient is a 74y old  Male who presents with a chief complaint of sepsis, new numbness/weakness (20 Feb 2025 09:12)    HPI:  74-year-old male with a past medical history of benign prostatic hyperplasia, diverticulosis, Graves' disease, hemorrhoids, hyperlipidemia, hypothyroidism, osteoarthritis, spinal stenosis, spinal cord injury (2004), and multiple spinal surgeries (three cervical and one lumbar). He presents to the emergency department with complaints of shoulder pain radiating down the arms associated with fever and LE weakness. Patient reports symptoms started as mild on saturday, he had mild shoulder pain, but it progressively got worse. Currently reports pain 8/10, sharp, burning, starts at shoulder blades and radiates down to hands, associated with feeling of numbness in arms. He has a similar sensation in his thighs, and yesterday reports Tmax at home of 102. He also notes a decreased  strength in his right hand. He denies experiencing any saddle anesthesia, or bladder or bowel incontinence. He denies headaches, dizziness, sore throat, rhinitis, SOB, Chest pain, diarrhea, dysuria.     In ED pt febrile to 105, HR 110s, Hypotensive initially, SaO2 95 on 2L NC  CBC: WBC 13, H/H 14/44, Plt 143  CMP: BUN/Cr 22/1.5, Glu 162  Lactate: 2.1 > 1.5  TSH: 0.31  Flu/COVID: negative  CT chest/abdomen/pelvis: IMPRESSION: "No acute findings in the chest, abdomen or pelvis. Partially visualized sequela from laminectomy, ACDF and posterior spinal fusion hardware in the lower cervical spine. Streak artifact limits evaluation of the adjacent structures/spinal canal. 1.3 cm left adrenal nodule. Consider follow-up adrenal CT. Prostatomegaly, correlate with PSA values."    Sepsis work up initiated, pt recieved vanc/cefepime in ED and admitted to  for further care.      (18 Feb 2025 01:32)    Acute medical issues  Improving strength, continue PT/OT, out of bed to chair  Denies pain today, continue prn tizanidine, not on standing opioids at this time   Seen by neurosurgery, no surgical intervention for abscess/phlegmon/discitis, neurochecks q4  Continue ceftriaxone, repeat blood cultures from 2/19 negative, WBC improving, no fever  BP and HR stable, continue metoprolol. Continue statin  Respiratory status stable on room air  Abd exam improving, will go NGT clamp trial and remove and start clears if tolerates, monitor BM, NGT out   Renal function stable. No Ortega   DVT prophylaxis with Lovenox    Imaging performed:      REVIEW OF SYSTEMS  Constitutional - No fever, No weight loss, No fatigue  HEENT - No eye pain, No visual disturbances, No difficulty hearing, No tinnitus, No vertigo, No neck pain  Respiratory - No cough, No wheezing, No shortness of breath  Cardiovascular - No chest pain, No palpitations  Gastrointestinal - No abdominal pain, No nausea, No vomiting, No diarrhea, No constipation  Genitourinary - No dysuria, No frequency, No hematuria, No incontinence  Neurological - No headaches, No memory loss, No loss of strength, No numbness, No tremors  Skin - No itching, No rashes, No lesions   Endocrine - No temperature intolerance  Musculoskeletal - No joint pain, No joint swelling, No muscle pain  Psychiatric - No depression, No anxiety    VITALS  T(C): 36.5 (02-24-25 @ 07:33), Max: 36.6 (02-23-25 @ 12:00)  HR: 73 (02-24-25 @ 07:33) (60 - 92)  BP: 151/88 (02-24-25 @ 07:33) (138/73 - 164/87)  RR: 19 (02-24-25 @ 07:33) (15 - 19)  SpO2: 94% (02-24-25 @ 07:33) (92% - 95%)  Wt(kg): --    PAST MEDICAL & SURGICAL HISTORY  Hyperlipidemia, unspecified hyperlipidemia type    Diverticulosis of large intestine without hemorrhage    History of colon polyps    Hemorrhoids, unspecified hemorrhoid type    Benign prostatic hyperplasia without lower urinary tract symptoms, unspecified morphology    Osteoarthritis of knee, unilateral    Spinal cord injury at C5-C7 level without injury of spinal bone, subsequent encounter    Spastic    Weakness    Myelopathy    Spinal stenosis, unspecified spinal region    Hypothyroidism, unspecified type    Graves disease    Alcoholic    History of cervical discectomy    S/P laminectomy    H/O lumbar discectomy    H/O arthroscopy of knee    H/O colonoscopy with polypectomy        SOCIAL HISTORY - as per documentation/history  Smoking - None  EtOH - None  Drugs - None    FUNCTIONAL HISTORY  Lives   Independent    CURRENT FUNCTIONAL STATUS      FAMILY HISTORY   Family history of colon cancer in mother (Mother)    Family history of liver disease (Father)    Family history of cancer (Sibling)        RECENT LABS - Reviewed  CBC Full  -  ( 24 Feb 2025 07:00 )  WBC Count : 12.68 K/uL  RBC Count : 4.50 M/uL  Hemoglobin : 12.6 g/dL  Hematocrit : 38.8 %  Platelet Count - Automated : 240 K/uL  Mean Cell Volume : 86.2 fl  Mean Cell Hemoglobin : 28.0 pg  Mean Cell Hemoglobin Concentration : 32.5 g/dL  Auto Neutrophil # : x  Auto Lymphocyte # : x  Auto Monocyte # : x  Auto Eosinophil # : x  Auto Basophil # : x  Auto Neutrophil % : x  Auto Lymphocyte % : x  Auto Monocyte % : x  Auto Eosinophil % : x  Auto Basophil % : x    02-24    139  |  105  |  25[H]  ----------------------------<  113[H]  3.7   |  30  |  0.88    Ca    9.0      24 Feb 2025 07:00  Phos  2.6     02-24  Mg     2.3     02-24    TPro  6.1  /  Alb  2.6[L]  /  TBili  0.8  /  DBili  x   /  AST  37  /  ALT  50  /  AlkPhos  59  02-23    Urinalysis Basic - ( 24 Feb 2025 07:00 )    Color: x / Appearance: x / SG: x / pH: x  Gluc: 113 mg/dL / Ketone: x  / Bili: x / Urobili: x   Blood: x / Protein: x / Nitrite: x   Leuk Esterase: x / RBC: x / WBC x   Sq Epi: x / Non Sq Epi: x / Bacteria: x        ALLERGIES  No Known Allergies      MEDICATIONS   acetaminophen     Tablet .. 650 milliGRAM(s) Oral every 6 hours PRN  aluminum hydroxide/magnesium hydroxide/simethicone Suspension 30 milliLiter(s) Oral every 4 hours PRN  artificial tears (preservative free) Ophthalmic Solution 1 Drop(s) Both EYES two times a day PRN  bisacodyl Suppository 10 milliGRAM(s) Rectal daily  cefTRIAXone Injectable. 2000 milliGRAM(s) IV Push every 24 hours  enoxaparin Injectable 40 milliGRAM(s) SubCutaneous every 24 hours  levothyroxine 150 MICROGram(s) Oral daily  magnesium hydroxide Suspension 30 milliLiter(s) Oral daily  melatonin 3 milliGRAM(s) Oral at bedtime PRN  metoclopramide Injectable 5 milliGRAM(s) IV Push every 8 hours PRN  metoprolol tartrate 12.5 milliGRAM(s) Oral every 12 hours  multivitamin/minerals 1 Tablet(s) Oral daily  ondansetron Injectable 4 milliGRAM(s) IV Push every 8 hours PRN  polyethylene glycol 3350 17 Gram(s) Oral daily  rosuvastatin 5 milliGRAM(s) Oral at bedtime  tamsulosin 0.8 milliGRAM(s) Oral at bedtime  tiZANidine 2 milliGRAM(s) Oral four times a day PRN  vibegron 75 milliGRAM(s) 75 milliGRAM(s) Oral at bedtime      ----------------------------------------------------------------------------------------  PHYSICAL EXAM  Constitutional - NAD, Comfortable  HEENT - NCAT, EOMI  Neck - Supple, No limited ROM  Chest - Breathing comfortably, No wheezing  Cardiovascular - S1S2   Abdomen - Soft   Extremities - No C/C/E, No calf tenderness   Neurologic Exam -                    Cognitive - AAO to self, place, date, year, situation     Communication - Fluent, No dysarthria     Cranial Nerves - CN 2-12 intact     Motor - No focal deficits                    LEFT    UE - ShAB 5/5, EF 5/5, EE 5/5, WE 5/5,  5/5                    RIGHT UE - ShAB 5/5, EF 5/5, EE 5/5, WE 5/5,  5/5                    LEFT    LE - HF 5/5, KE 5/5, DF 5/5, PF 5/5                    RIGHT LE - HF 5/5, KE 5/5, DF 5/5, PF 5/5        Sensory - Intact to LT     Reflexes - DTR Intact, No primitive reflexive     Coordination - FTN intact     OculoVestibular - No saccades, No nystagmus, VOR         Balance - WNL Static  Psychiatric - Mood stable, Affect WNL  ----------------------------------------------------------------------------------------

## 2025-02-24 NOTE — PROGRESS NOTE ADULT - ASSESSMENT
Assessment:  74M with benign prostatic hyperplasia, diverticulosis, Graves' disease, hemorrhoids, hyperlipidemia, hypothyroidism, osteoarthritis, spinal stenosis, spinal cord injury (2004), and multiple spinal surgeries (three cervical and one lumbar) presents 2/17 with neck pain radiating to R shoulder associated with numbness and tingling, also noted fever of 102F at home  Reports no cough, abdominal pain, n/v, diarrhea or dysuria  No recent dental work, no recent skin soft tissue infection, no wounds  History of multiple spinal surgery, C and T spine laminectomy with fusion hardware (2004), L spine laminectomy with fusion hardware (2006), all done in [facility]  Also has L knee replacement (2017) though no symptoms there, L knee ROM normal no pain  No cardiac devices  Febrile 105F on admission, on 2L NC  WBC 13 > 20  Cr 1.57    UA negative  Full RVP negative  CT Chest AP without acute findings  BCx (2/17) with Strep dysgalacteae, Sy S  MRI 2/18 with concern for discitis T1-T3, possible phlegmon C7-T3, no abscess  TTE without obvious vegetation  BCx 2/19 negative    Antimicrobials:  metroNIDAZOLE  IVPB 500 every 12 hours (2/18)  Cefepime (2/17-2/18)  Vanco 1500mg (2/17 - 2/18)  CTX 2G (2/18 --- )    Impression:   #High-grade Strep Bacteremia, Neck Pain, Concern for Spinal Involvement in setting of Multilevel C/T/L Spine Laminectomy with fusion hardware  #Fever  #Leukocytosis  #GOVIND on ?CKD  - unclear source, no open wounds, no recent surgery, no recent dental procedures, had been feeling well all week until this past weekend, had acute symptoms including fever and chills  - concern that spine with hardware is involved     Recommendations:  - continue Ceftriaxone 2G q24 (Day #8)  - monitor temperature curve  - trend WBC  - reason for abx use reviewed with patient  - side effects of antibiotic discussed, tolerating abx well so far  - Prior cultures reviewed. An epidemiologic assessment was performed. There is a significant risk for resistant microorganisms to spread to family members, and/or healthcare staff. The patient will be placed on isolation according to infection control policy. Will reconsider isolation measures based on new culture results and other clinical data as appropriate Appropriate cultures collected and an appropriate broad spectrum antibiotic therapy will be considered  - monitor worsening neurologic deficit  - follow up Neurology  - follow up Neurosurgery, so far no surgical intervention planned  - will plan for prolong IV therapy; IV CTX 2G q24 via PICC line for 8-week course (enddate: 4/16/2025). Weekly CBC, CMP, ESR, CRP  - rest per primary team    [facility] IV to PO Token not applicable; Patient to continue with IV Therapy

## 2025-02-24 NOTE — PROGRESS NOTE ADULT - SUBJECTIVE AND OBJECTIVE BOX
Date of Service:02-24-25 @ 10:54  Interval History/ROS: Afebrile overnight, comfortable on RA, BCx negative, worked with PT this morning, still having R hand  weakness, denies any fever or chills      REVIEW OF SYSTEMS  [  ] ROS unobtainable because:    [ x ] All other systems negative except as noted below    Constitutional:  [ ] fever [ ] chills  [ ] weight loss  [ ]night sweat  [ ]poor appetite/PO intake [ ]fatigue   Skin:  [ ] rash [ ] phlebitis	  Eyes: [ ] icterus [ ] pain  [ ] discharge	  ENMT: [ ] sore throat  [ ] thrush [ ] ulcers [ ] exudates [ ]anosmia  Respiratory: [ ] dyspnea [ ] hemoptysis [ ] cough [ ] sputum	  Cardiovascular:  [ ] chest pain [ ] palpitations [ ] edema	  Gastrointestinal:  [ ] nausea [ ] vomiting [ ] diarrhea [ ] constipation [ ] pain	  Genitourinary:  [ ] dysuria [ ] frequency [ ] hematuria [ ] discharge [ ] flank pain  [ ] incontinence  Musculoskeletal:  [ ] myalgias [ ] arthralgias [ ] arthritis  [ ] back pain  Neurological:  [ ] headache [ ] weakness [ ] seizures  [ ] confusion/altered mental status    Allergies  No Known Allergies        ANTIMICROBIALS:    cefTRIAXone Injectable. 2000 every 24 hours        OTHER MEDS: MEDICATIONS  (STANDING):  acetaminophen     Tablet .. 650 every 6 hours PRN  aluminum hydroxide/magnesium hydroxide/simethicone Suspension 30 every 4 hours PRN  bisacodyl Suppository 10 daily  enoxaparin Injectable 40 every 24 hours  levothyroxine 150 daily  magnesium hydroxide Suspension 30 daily  melatonin 3 at bedtime PRN  metoclopramide Injectable 5 every 8 hours PRN  metoprolol tartrate 12.5 every 12 hours  ondansetron Injectable 4 every 8 hours PRN  polyethylene glycol 3350 17 daily  rosuvastatin 5 at bedtime  tamsulosin 0.8 at bedtime  tiZANidine 2 four times a day PRN      Vital Signs Last 24 Hrs  T(F): 97.7 (02-24-25 @ 07:33), Max: 105 (02-17-25 @ 20:50)    Vital Signs Last 24 Hrs  HR: 73 (02-24-25 @ 07:33) (60 - 92)  BP: 151/88 (02-24-25 @ 07:33) (138/73 - 164/87)  RR: 19 (02-24-25 @ 07:33)  SpO2: 94% (02-24-25 @ 07:33) (92% - 95%)  Wt(kg): --    EXAM:    Constitutional: frail, NAD  Head/Eyes: no icterus  LUNGS:  CTA  CVS:  regular rhythm  Abd:  soft, non-tender; non-distended  Ext:  no edema  Back: no spinal tenderness noted  Vascular:  IV site no erythema tenderness or discharge  Neuro: AAO X 3, weak R hand     Labs:                        12.6   12.68 )-----------( 240      ( 24 Feb 2025 07:00 )             38.8     02-24    139  |  105  |  25[H]  ----------------------------<  113[H]  3.7   |  30  |  0.88    Ca    9.0      24 Feb 2025 07:00  Phos  2.6     02-24  Mg     2.3     02-24    TPro  6.1  /  Alb  2.6[L]  /  TBili  0.8  /  DBili  x   /  AST  37  /  ALT  50  /  AlkPhos  59  02-23      WBC Trend:  WBC Count: 12.68 (02-24-25 @ 07:00)  WBC Count: 12.68 (02-23-25 @ 05:27)  WBC Count: 13.29 (02-22-25 @ 06:07)  WBC Count: 16.49 (02-21-25 @ 05:11)      Creatine Trend:  Creatinine: 0.88 (02-24)  Creatinine: 0.88 (02-23)  Creatinine: 0.89 (02-22)  Creatinine: 0.95 (02-21)      Liver Biochemical Testing Trend:  Alanine Aminotransferase (ALT/SGPT): 50 (02-23)  Alanine Aminotransferase (ALT/SGPT): 35 (02-19)  Alanine Aminotransferase (ALT/SGPT): 29 (02-18)  Alanine Aminotransferase (ALT/SGPT): 30 (02-17)  Aspartate Aminotransferase (AST/SGOT): 37 (02-23-25 @ 05:27)  Aspartate Aminotransferase (AST/SGOT): 57 (02-19-25 @ 08:17)  Aspartate Aminotransferase (AST/SGOT): 25 (02-18-25 @ 07:10)  Aspartate Aminotransferase (AST/SGOT): 17 (02-17-25 @ 21:11)  Bilirubin Total: 0.8 (02-23)  Bilirubin Total: 1.0 (02-19)  Bilirubin Total: 1.7 (02-18)  Bilirubin Total: 1.3 (02-17)      Trend LDH      Urinalysis Basic - ( 24 Feb 2025 07:00 )    Color: x / Appearance: x / SG: x / pH: x  Gluc: 113 mg/dL / Ketone: x  / Bili: x / Urobili: x   Blood: x / Protein: x / Nitrite: x   Leuk Esterase: x / RBC: x / WBC x   Sq Epi: x / Non Sq Epi: x / Bacteria: x        MICROBIOLOGY:    MRSA PCR Result.: NotDetec (02-19-25 @ 18:15)      Culture - Blood (collected 19 Feb 2025 08:17)  Source: .Blood None  Preliminary Report:    No growth at 4 days    Culture - Blood (collected 19 Feb 2025 08:10)  Source: .Blood None  Preliminary Report:    No growth at 4 days    Urinalysis with Rflx Culture (collected 18 Feb 2025 01:54)    Culture - Blood (collected 17 Feb 2025 21:11)  Source: .Blood BLOOD  Final Report:    Growth in aerobic and anaerobic bottles: Streptococcus dysgalactiae    (Group C/G)    Direct identification is available within approximately 3-5    hours either by Blood Panel Multiplexed PCR or Direct    MALDI-TOF. Details: [email or web address]  Organism: Blood Culture PCR  Streptococcus dysgalactiae  Organism: Streptococcus dysgalactiae    Sensitivities:      Method Type: EDEL      -  Ceftriaxone: S <=0.25      -  Clindamycin: S <=0.06      -  Levofloxacin: S 0.5      -  Penicillin: S <=0.03 Predicts results for ampicillin, amoxicillin, amoxicillin/clavulanate, ampicillin/sulbactam, 1st, 2nd and 3rd generation cephalosporins and carbapenems.      -  Tetracycline: S <=0.5      -  Vancomycin: S 0.5  Organism: Blood Culture PCR    Sensitivities:      Method Type: PCR      -  Streptococcus sp. (Not Grp A, B or S pneumoniae): Detec    Culture - Blood (collected 17 Feb 2025 21:11)  Source: .Blood BLOOD  Final Report:    Growth in aerobic and anaerobic bottles: Streptococcus dysgalactiae    (Group C/G)    See previous culture [id number]    Rapid RVP Result: NotDetec (02-17 @ 21:11)      C-Reactive Protein: 169.0 (02-18)      D-Dimer Assay, Quantitative: 6541 (02-18)  D-Dimer Assay, Quantitative: 6758 (02-18)    Sedimentation Rate, Erythrocyte: 14 mm/hr (02-18-25 @ 07:10)      RADIOLOGY:  imaging below personally reviewed    CT Abdomen and Pelvis w/ IV Cont:   ACC: [id number] EXAM:  CT ABDOMEN AND PELVIS IC   ORDERED BY: NANCY MCDANIEL     PROCEDURE DATE:  02/21/2025          INTERPRETATION:  CLINICAL INFORMATION: Severe distention/constipation.    COMPARISON: February 17, 2025.    CONTRAST/COMPLICATIONS:  IV Contrast: Omnipaque 350  90 cc administered   0 cc discarded  Oral Contrast: NONE    PROCEDURE:  CT of the Abdomen and Pelvis was performed.  Sagittal and coronal reformats were performed.    FINDINGS:  LOWER CHEST: Trace bilateral pleural effusions with partial compressive   atelectasis of both lower lobes.    LIVER: Hepatic cysts and subcentimeter hypodense lesions, too small to   characterize.  BILE DUCTS: Normal caliber.  GALLBLADDER: Within normal limits.  SPLEEN: Within normal limits.  PANCREAS: Within normal limits.  ADRENALS: Indeterminate left adrenal nodule, measuring 1.5 cm. Right   adrenal gland is within normal limits.  KIDNEYS/URETERS: No hydronephrosis. Mild periureteral stranding   surrounding the left proximal ureter. Indeterminate left upper pole renal   lesion, measuring 1.6 cm.    BLADDER: Collapsed around a Ortega catheter balloon.  REPRODUCTIVE ORGANS: Prostate is enlarged.    BOWEL: Enteric tube with tip terminating in the duodenum. Diffuse   dilatation of the large and small bowel without discrete transition   point. Appendix is normal. Colonic diverticulosis.  PERITONEUM/RETROPERITONEUM: Trace free fluid in the right hemipelvis.  VESSELS: Atherosclerotic changes.  LYMPH NODES: No lymphadenopathy.  ABDOMINAL WALL: Subcutaneous edema.  BONES: Degenerative changes. Lumbar spinal fixation.    IMPRESSION:  Probable ileus.    Nonspecific periureteral stranding surrounding the left proximal ureter.    Indeterminate left upper pole renal lesion, measuring 1.6cm. Further   evaluation with contrast-enhanced renal mass protocol CT or MRI abdomen   is recommended.    --- End of Report ---    AMINTA BANEGAS MD; Attending Radiologist  This document has been electronically signed. Feb 21 2025  9:04PM (02-21-25 @ 18:42)

## 2025-02-25 ENCOUNTER — TRANSCRIPTION ENCOUNTER (OUTPATIENT)
Age: 75
End: 2025-02-25

## 2025-02-25 VITALS
SYSTOLIC BLOOD PRESSURE: 140 MMHG | HEART RATE: 85 BPM | DIASTOLIC BLOOD PRESSURE: 82 MMHG | OXYGEN SATURATION: 96 % | RESPIRATION RATE: 18 BRPM | TEMPERATURE: 98 F

## 2025-02-25 LAB
ANION GAP SERPL CALC-SCNC: 4 MMOL/L — LOW (ref 5–17)
BASOPHILS # BLD AUTO: 0.09 K/UL — SIGNIFICANT CHANGE UP (ref 0–0.2)
BASOPHILS NFR BLD AUTO: 0.7 % — SIGNIFICANT CHANGE UP (ref 0–2)
BUN SERPL-MCNC: 20 MG/DL — SIGNIFICANT CHANGE UP (ref 7–23)
CALCIUM SERPL-MCNC: 8.8 MG/DL — SIGNIFICANT CHANGE UP (ref 8.5–10.1)
CHLORIDE SERPL-SCNC: 104 MMOL/L — SIGNIFICANT CHANGE UP (ref 96–108)
CO2 SERPL-SCNC: 28 MMOL/L — SIGNIFICANT CHANGE UP (ref 22–31)
CREAT SERPL-MCNC: 0.75 MG/DL — SIGNIFICANT CHANGE UP (ref 0.5–1.3)
EGFR: 96 ML/MIN/1.73M2 — SIGNIFICANT CHANGE UP
EOSINOPHIL # BLD AUTO: 0.28 K/UL — SIGNIFICANT CHANGE UP (ref 0–0.5)
EOSINOPHIL NFR BLD AUTO: 2.1 % — SIGNIFICANT CHANGE UP (ref 0–6)
GLUCOSE SERPL-MCNC: 101 MG/DL — HIGH (ref 70–99)
HCT VFR BLD CALC: 38.3 % — LOW (ref 39–50)
HGB BLD-MCNC: 12.5 G/DL — LOW (ref 13–17)
IMM GRANULOCYTES # BLD AUTO: 0.69 K/UL — HIGH (ref 0–0.07)
IMM GRANULOCYTES NFR BLD AUTO: 5.1 % — HIGH (ref 0–0.9)
LYMPHOCYTES # BLD AUTO: 1.67 K/UL — SIGNIFICANT CHANGE UP (ref 1–3.3)
LYMPHOCYTES NFR BLD AUTO: 12.4 % — LOW (ref 13–44)
MANUAL SMEAR VERIFICATION: SIGNIFICANT CHANGE UP
MCHC RBC-ENTMCNC: 28 PG — SIGNIFICANT CHANGE UP (ref 27–34)
MCHC RBC-ENTMCNC: 32.6 G/DL — SIGNIFICANT CHANGE UP (ref 32–36)
MCV RBC AUTO: 85.9 FL — SIGNIFICANT CHANGE UP (ref 80–100)
MONOCYTES # BLD AUTO: 1.46 K/UL — HIGH (ref 0–0.9)
MONOCYTES NFR BLD AUTO: 10.8 % — SIGNIFICANT CHANGE UP (ref 2–14)
NEUTROPHILS # BLD AUTO: 9.3 K/UL — HIGH (ref 1.8–7.4)
NEUTROPHILS NFR BLD AUTO: 68.9 % — SIGNIFICANT CHANGE UP (ref 43–77)
NRBC # BLD AUTO: 0 K/UL — SIGNIFICANT CHANGE UP (ref 0–0)
NRBC # FLD: 0 K/UL — SIGNIFICANT CHANGE UP (ref 0–0)
NRBC BLD AUTO-RTO: 0 /100 WBCS — SIGNIFICANT CHANGE UP (ref 0–0)
PLAT MORPH BLD: NORMAL — SIGNIFICANT CHANGE UP
PLATELET # BLD AUTO: 237 K/UL — SIGNIFICANT CHANGE UP (ref 150–400)
PMV BLD: 9.2 FL — SIGNIFICANT CHANGE UP (ref 7–13)
POTASSIUM SERPL-MCNC: 3.8 MMOL/L — SIGNIFICANT CHANGE UP (ref 3.5–5.3)
POTASSIUM SERPL-SCNC: 3.8 MMOL/L — SIGNIFICANT CHANGE UP (ref 3.5–5.3)
RBC # BLD: 4.46 M/UL — SIGNIFICANT CHANGE UP (ref 4.2–5.8)
RBC # FLD: 13.9 % — SIGNIFICANT CHANGE UP (ref 10.3–14.5)
RBC BLD AUTO: NORMAL — SIGNIFICANT CHANGE UP
SODIUM SERPL-SCNC: 136 MMOL/L — SIGNIFICANT CHANGE UP (ref 135–145)
WBC # BLD: 13.49 K/UL — HIGH (ref 3.8–10.5)
WBC # FLD AUTO: 13.49 K/UL — HIGH (ref 3.8–10.5)
WBC MORPHOLOGY: NORMAL — SIGNIFICANT CHANGE UP

## 2025-02-25 PROCEDURE — 71045 X-RAY EXAM CHEST 1 VIEW: CPT | Mod: 26

## 2025-02-25 PROCEDURE — 99239 HOSP IP/OBS DSCHRG MGMT >30: CPT

## 2025-02-25 RX ORDER — MELATONIN 5 MG
1 TABLET ORAL
Qty: 0 | Refills: 0 | DISCHARGE
Start: 2025-02-25

## 2025-02-25 RX ORDER — HYPROMELLOSE 0.4 %
1 DROPS OPHTHALMIC (EYE)
Qty: 0 | Refills: 0 | DISCHARGE
Start: 2025-02-25

## 2025-02-25 RX ORDER — ACETAMINOPHEN 500 MG/5ML
2 LIQUID (ML) ORAL
Qty: 0 | Refills: 0 | DISCHARGE
Start: 2025-02-25

## 2025-02-25 RX ORDER — BISACODYL 5 MG
1 TABLET, DELAYED RELEASE (ENTERIC COATED) ORAL
Qty: 0 | Refills: 0 | DISCHARGE
Start: 2025-02-25

## 2025-02-25 RX ORDER — METOPROLOL SUCCINATE 50 MG/1
0.5 TABLET, EXTENDED RELEASE ORAL
Qty: 30 | Refills: 0
Start: 2025-02-25 | End: 2025-03-26

## 2025-02-25 RX ORDER — POLYETHYLENE GLYCOL 3350 17 G/17G
17 POWDER, FOR SOLUTION ORAL
Qty: 0 | Refills: 0 | DISCHARGE
Start: 2025-02-25

## 2025-02-25 RX ADMIN — Medication 650 MILLIGRAM(S): at 01:05

## 2025-02-25 RX ADMIN — Medication 10 MILLIGRAM(S): at 10:34

## 2025-02-25 RX ADMIN — Medication 1 TABLET(S): at 11:14

## 2025-02-25 RX ADMIN — POLYETHYLENE GLYCOL 3350 17 GRAM(S): 17 POWDER, FOR SOLUTION ORAL at 11:14

## 2025-02-25 RX ADMIN — CEFTRIAXONE 2000 MILLIGRAM(S): 500 INJECTION, POWDER, FOR SOLUTION INTRAMUSCULAR; INTRAVENOUS at 16:47

## 2025-02-25 RX ADMIN — METOPROLOL SUCCINATE 12.5 MILLIGRAM(S): 50 TABLET, EXTENDED RELEASE ORAL at 11:14

## 2025-02-25 RX ADMIN — Medication 650 MILLIGRAM(S): at 20:34

## 2025-02-25 RX ADMIN — MAGNESIUM HYDROXIDE 30 MILLILITER(S): 400 SUSPENSION ORAL at 11:14

## 2025-02-25 RX ADMIN — Medication 650 MILLIGRAM(S): at 02:05

## 2025-02-25 RX ADMIN — Medication 150 MICROGRAM(S): at 04:57

## 2025-02-25 NOTE — DISCHARGE NOTE PROVIDER - NSDCMRMEDTOKEN_GEN_ALL_CORE_FT
acetaminophen 325 mg oral tablet: 2 tab(s) orally every 6 hours As needed Temp greater or equal to 38C (100.4F), Mild Pain (1 - 3)  Aspirin Low Dose 81 mg oral delayed release tablet: 1 tab(s) orally once a day  bisacodyl 10 mg rectal suppository: 1 suppository(ies) rectal once a day  Cialis 5 mg oral tablet: 1 tab(s) orally once a day  Crestor 5 mg oral tablet: 1 tab(s) orally 2 times a week  Gemtesa 75 mg oral tablet: 1 tab(s) orally once a day (at bedtime)  levothyroxine 150 mcg (0.15 mg) oral tablet: 1 tab(s) orally once a day  melatonin 3 mg oral tablet: 1 tab(s) orally once a day (at bedtime) As needed Insomnia  metoprolol tartrate 25 mg oral tablet: 0.5 tab(s) orally 2 times a day  multivitamin with minerals: 1 tab(s) orally once a day  ocular lubricant preservative-free ophthalmic solution: 1 drop(s) to each affected eye 2 times a day As needed Dry Eyes  polyethylene glycol 3350 oral powder for reconstitution: 17 gram(s) orally once a day  tamsulosin 0.4 mg oral capsule: 1 cap(s) orally 2 times a day  tiZANidine 2 mg oral capsule: 2 cap(s) orally every 8 hours  Vitamin D3 2000 intl units oral capsule: 1 cap(s) orally once a day

## 2025-02-25 NOTE — DISCHARGE NOTE NURSING/CASE MANAGEMENT/SOCIAL WORK - NSDCVIVACCINE_GEN_ALL_CORE_FT
influenza, injectable, quadrivalent, preservative free; 18-Feb-2017 13:03; Sophia Livingston (ESTEFANIA); [employer]; [id number]; IntraMuscular; Deltoid Left.; 0.5 milliLiter(s); VIS (VIS Published: 07-Aug-2015, VIS Presented: 18-Feb-2017);

## 2025-02-25 NOTE — DISCHARGE NOTE PROVIDER - CARE PROVIDERS DIRECT ADDRESSES
[email or web address]

## 2025-02-25 NOTE — DISCHARGE NOTE PROVIDER - HOSPITAL COURSE
74M with benign prostatic hyperplasia, diverticulosis, Graves' disease, hemorrhoids, hyperlipidemia, hypothyroidism, osteoarthritis, spinal stenosis, spinal cord injury (2004), and multiple spinal surgeries (three cervical and one lumbar) presents 2/17 with neck pain radiating to R shoulder associated with numbness and tingling, also noted fever of 102F at home    Severe sepsis (POA) due to Strep bacteremia   MRI spine revealed tiny epidural abscesses and discitis involving lower cervical and upper thoracic area. Patient with a history of multiple spinal surgery, C and T spine laminectomy with fusion hardware (2004), L spine laminectomy with fusion hardware (2006), all done in [facility]. BCx (2/17) with Strep dysgalacteae, Sy S. TTE without obvious vegetation. BCx 2/19 negative. Improving strength, continue PT/OT, out of bed to chair-will need long term Rehab, seen by Physiatry- no acute rehab   -Seen by neurosurgery, no surgical intervention for abscess/phlegmon/discitis  -As per ID: will plan for prolong IV therapy; IV CTX 2G q24 via PICC line for 8-week course (end date: 4/16/2025). Weekly CBC, CMP, ESR, CRP    Ileus (resolved)  -Abd exam improved, patient's last BM noted to be on 2/23.  -Advanced diet to regular diet     Acute Kidney Injury (resolved)  -Monitor BMP as outpt    Incidental Finding  CT A/P on 2/21 incidentally notable for indeterminate left upper pole renal lesion, measuring 1.6 cm.  - Patient counseled on the importance of outpatient follow up with contrast-enhanced renal mass protocol CT or MRI abdomen     DVT prophylaxis with Lovenox    Communication  Wife, Arti updated about hospitalization, treatment, and follow up plan on the day of discharge.

## 2025-02-25 NOTE — DISCHARGE NOTE PROVIDER - CARE PROVIDER_API CALL
Yue Wiggins  [facility]  [address], [address]  [place] [address]  Phone: [phone]  Fax: [phone]  Established Patient  Follow Up Time: 1 week    Regan Hackett  Neurosurgery  [address] [place] [address]  Phone: [phone]  Fax: [phone]  Follow Up Time: 1 month

## 2025-02-25 NOTE — DISCHARGE NOTE PROVIDER - NSDCCAREPROVSEEN_GEN_ALL_CORE_FT
Jose, Rishabh Edge, Lola Ni, Maricruz Clifton, Carlos Eduardo Webb, Luis Daniel Russ, Cortez León, Cosme Son, Regan Hernandez, Avila Rolle, Yamile Lambert, Tee Dias, Brenton Smart, Claudia Walker, Nando Ledezma, Arnoldo T

## 2025-02-25 NOTE — DISCHARGE NOTE PROVIDER - ATTENDING DISCHARGE PHYSICAL EXAMINATION:
VITALS:  T(F): 97.4 (02-25-25 @ 07:52), Max: 97.9 (02-24-25 @ 23:57)  HR: 82 (02-25-25 @ 07:52) (69 - 82)  BP: 161/97 (02-25-25 @ 07:52) (127/88 - 161/97)  RR: 19 (02-25-25 @ 07:52) (18 - 19)  SpO2: 95% (02-25-25 @ 07:52) (95% - 96%)  Wt(kg): --    I&O's Summary    24 Feb 2025 07:01  -  25 Feb 2025 07:00  --------------------------------------------------------  IN: 0 mL / OUT: 800 mL / NET: -800 mL        CAPILLARY BLOOD GLUCOSE          PHYSICAL EXAM:  Gen: No acute distress  HEENT:  Normocephalic/Atraumatic  CV:  +S1, +S2, regular, no murmurs or rubs  RESP:   lungs clear to auscultation bilaterally, no wheezing, rales, rhonchi  GI:  abdomen soft, non-tender, non-distended  EXT:  no clubbing, no cyanosis, no edema  NEURO: RUE 5/5, LUE 5/5 proximally, 3/5 in hand  strength bilaterally, 5/5 strength in lower ext bilaterally. Decreased sensation in thighs b/l

## 2025-02-25 NOTE — DISCHARGE NOTE PROVIDER - NSDCCPCAREPLAN_GEN_ALL_CORE_FT
PRINCIPAL DISCHARGE DIAGNOSIS  Diagnosis: Streptococcal bacteremia  Assessment and Plan of Treatment:

## 2025-02-25 NOTE — DISCHARGE NOTE PROVIDER - PROVIDER TOKENS
PROVIDER:[TOKEN:[[id number]],FOLLOWUP:[1 week],ESTABLISHEDPATIENT:[T]],PROVIDER:[TOKEN:[[id number]],FOLLOWUP:[1 month]]

## 2025-02-25 NOTE — DISCHARGE NOTE NURSING/CASE MANAGEMENT/SOCIAL WORK - PATIENT PORTAL LINK FT
You can access the FollowMyHealth Patient Portal offered by [facility] by registering at the following website: [email or web address]. By joining [facility]’s FollowMyHealth portal, you will also be able to view your health information using other applications (apps) compatible with our system.

## 2025-02-25 NOTE — DISCHARGE NOTE NURSING/CASE MANAGEMENT/SOCIAL WORK - FINANCIAL ASSISTANCE
[facility] provides services at a reduced cost to those who are determined to be eligible through [facility]’s financial assistance program. Information regarding [facility]’s financial assistance program can be found by going to [email or web address] or by calling [phone].

## 2025-02-26 RX ORDER — TRAMADOL HYDROCHLORIDE 50 MG/1
1 TABLET, FILM COATED ORAL
Refills: 0 | DISCHARGE
Start: 2025-02-26 | End: 2025-03-12

## 2025-02-26 RX ORDER — CEFTRIAXONE 500 MG/1
2 INJECTION, POWDER, FOR SOLUTION INTRAMUSCULAR; INTRAVENOUS
Qty: 0 | Refills: 0 | DISCHARGE
Start: 2025-02-26 | End: 2025-04-16

## 2025-02-28 DIAGNOSIS — Z79.01 LONG TERM (CURRENT) USE OF ANTICOAGULANTS: ICD-10-CM

## 2025-02-28 DIAGNOSIS — A40.8 OTHER STREPTOCOCCAL SEPSIS: ICD-10-CM

## 2025-02-28 DIAGNOSIS — Z79.890 HORMONE REPLACEMENT THERAPY: ICD-10-CM

## 2025-02-28 DIAGNOSIS — E05.00 THYROTOXICOSIS WITH DIFFUSE GOITER WITHOUT THYROTOXIC CRISIS OR STORM: ICD-10-CM

## 2025-02-28 DIAGNOSIS — N17.9 ACUTE KIDNEY FAILURE, UNSPECIFIED: ICD-10-CM

## 2025-02-28 DIAGNOSIS — K56.7 ILEUS, UNSPECIFIED: ICD-10-CM

## 2025-02-28 DIAGNOSIS — M46.49 DISCITIS, UNSPECIFIED, MULTIPLE SITES IN SPINE: ICD-10-CM

## 2025-02-28 DIAGNOSIS — Z79.82 LONG TERM (CURRENT) USE OF ASPIRIN: ICD-10-CM

## 2025-02-28 DIAGNOSIS — K57.90 DIVERTICULOSIS OF INTESTINE, PART UNSPECIFIED, WITHOUT PERFORATION OR ABSCESS WITHOUT BLEEDING: ICD-10-CM

## 2025-02-28 DIAGNOSIS — G06.1 INTRASPINAL ABSCESS AND GRANULOMA: ICD-10-CM

## 2025-02-28 DIAGNOSIS — N40.0 BENIGN PROSTATIC HYPERPLASIA WITHOUT LOWER URINARY TRACT SYMPTOMS: ICD-10-CM

## 2025-02-28 DIAGNOSIS — R65.20 SEVERE SEPSIS WITHOUT SEPTIC SHOCK: ICD-10-CM

## 2025-03-03 RX ORDER — TORSEMIDE 10 MG
1 TABLET ORAL
Refills: 0 | DISCHARGE
Start: 2025-03-03 | End: 2025-03-08

## 2025-03-04 ENCOUNTER — INPATIENT (INPATIENT)
Facility: HOSPITAL | Age: 75
LOS: 20 days | Discharge: NURSING, REHAB OR LONG-TERM CARE | DRG: 684 | End: 2025-03-25
Attending: HOSPITALIST | Admitting: STUDENT IN AN ORGANIZED HEALTH CARE EDUCATION/TRAINING PROGRAM
Payer: MEDICARE

## 2025-03-04 VITALS
HEIGHT: 68 IN | DIASTOLIC BLOOD PRESSURE: 74 MMHG | RESPIRATION RATE: 16 BRPM | TEMPERATURE: 98 F | WEIGHT: 229.94 LBS | HEART RATE: 68 BPM | SYSTOLIC BLOOD PRESSURE: 173 MMHG

## 2025-03-04 DIAGNOSIS — I82.409 ACUTE EMBOLISM AND THROMBOSIS OF UNSPECIFIED DEEP VEINS OF UNSPECIFIED LOWER EXTREMITY: ICD-10-CM

## 2025-03-04 DIAGNOSIS — Z98.890 OTHER SPECIFIED POSTPROCEDURAL STATES: Chronic | ICD-10-CM

## 2025-03-04 DIAGNOSIS — E03.9 HYPOTHYROIDISM, UNSPECIFIED: ICD-10-CM

## 2025-03-04 DIAGNOSIS — E78.5 HYPERLIPIDEMIA, UNSPECIFIED: ICD-10-CM

## 2025-03-04 DIAGNOSIS — G06.1 INTRASPINAL ABSCESS AND GRANULOMA: ICD-10-CM

## 2025-03-04 DIAGNOSIS — Z29.9 ENCOUNTER FOR PROPHYLACTIC MEASURES, UNSPECIFIED: ICD-10-CM

## 2025-03-04 DIAGNOSIS — N17.9 ACUTE KIDNEY FAILURE, UNSPECIFIED: ICD-10-CM

## 2025-03-04 DIAGNOSIS — R09.89 OTHER SPECIFIED SYMPTOMS AND SIGNS INVOLVING THE CIRCULATORY AND RESPIRATORY SYSTEMS: ICD-10-CM

## 2025-03-04 DIAGNOSIS — I10 ESSENTIAL (PRIMARY) HYPERTENSION: ICD-10-CM

## 2025-03-04 DIAGNOSIS — I25.10 ATHEROSCLEROTIC HEART DISEASE OF NATIVE CORONARY ARTERY WITHOUT ANGINA PECTORIS: ICD-10-CM

## 2025-03-04 DIAGNOSIS — M62.830 MUSCLE SPASM OF BACK: ICD-10-CM

## 2025-03-04 DIAGNOSIS — N40.0 BENIGN PROSTATIC HYPERPLASIA WITHOUT LOWER URINARY TRACT SYMPTOMS: ICD-10-CM

## 2025-03-04 LAB
ALBUMIN SERPL ELPH-MCNC: 2.8 G/DL — LOW (ref 3.3–5)
ALP SERPL-CCNC: 65 U/L — SIGNIFICANT CHANGE UP (ref 40–120)
ALT FLD-CCNC: 40 U/L — SIGNIFICANT CHANGE UP (ref 12–78)
ANION GAP SERPL CALC-SCNC: 4 MMOL/L — LOW (ref 5–17)
APPEARANCE UR: ABNORMAL
APTT BLD: 31.2 SEC — SIGNIFICANT CHANGE UP (ref 24.5–35.6)
AST SERPL-CCNC: 29 U/L — SIGNIFICANT CHANGE UP (ref 15–37)
BACTERIA # UR AUTO: NEGATIVE /HPF — SIGNIFICANT CHANGE UP
BASOPHILS # BLD AUTO: 0.12 K/UL — SIGNIFICANT CHANGE UP (ref 0–0.2)
BASOPHILS NFR BLD AUTO: 1.1 % — SIGNIFICANT CHANGE UP (ref 0–2)
BILIRUB SERPL-MCNC: 0.4 MG/DL — SIGNIFICANT CHANGE UP (ref 0.2–1.2)
BILIRUB UR-MCNC: ABNORMAL
BUN SERPL-MCNC: 86 MG/DL — HIGH (ref 7–23)
CALCIUM SERPL-MCNC: 8.9 MG/DL — SIGNIFICANT CHANGE UP (ref 8.5–10.1)
CAST: 0 /LPF — SIGNIFICANT CHANGE UP (ref 0–4)
CHLORIDE SERPL-SCNC: 102 MMOL/L — SIGNIFICANT CHANGE UP (ref 96–108)
CO2 SERPL-SCNC: 27 MMOL/L — SIGNIFICANT CHANGE UP (ref 22–31)
COLOR SPEC: ABNORMAL
CREAT SERPL-MCNC: 2.77 MG/DL — HIGH (ref 0.5–1.3)
DIFF PNL FLD: ABNORMAL
EGFR: 24 ML/MIN/1.73M2 — LOW
EGFR: 24 ML/MIN/1.73M2 — LOW
EOSINOPHIL # BLD AUTO: 0.18 K/UL — SIGNIFICANT CHANGE UP (ref 0–0.5)
EOSINOPHIL NFR BLD AUTO: 1.6 % — SIGNIFICANT CHANGE UP (ref 0–6)
GLUCOSE SERPL-MCNC: 145 MG/DL — HIGH (ref 70–99)
GLUCOSE UR QL: NEGATIVE MG/DL — SIGNIFICANT CHANGE UP
HCT VFR BLD CALC: 36 % — LOW (ref 39–50)
HGB BLD-MCNC: 12.1 G/DL — LOW (ref 13–17)
IMM GRANULOCYTES # BLD AUTO: 0.05 K/UL — SIGNIFICANT CHANGE UP (ref 0–0.07)
IMM GRANULOCYTES NFR BLD AUTO: 0.5 % — SIGNIFICANT CHANGE UP (ref 0–0.9)
INR BLD: 0.92 RATIO — SIGNIFICANT CHANGE UP (ref 0.85–1.16)
KETONES UR-MCNC: NEGATIVE MG/DL — SIGNIFICANT CHANGE UP
LEUKOCYTE ESTERASE UR-ACNC: ABNORMAL
LYMPHOCYTES # BLD AUTO: 1.05 K/UL — SIGNIFICANT CHANGE UP (ref 1–3.3)
LYMPHOCYTES NFR BLD AUTO: 9.6 % — LOW (ref 13–44)
MAGNESIUM SERPL-MCNC: 2.7 MG/DL — HIGH (ref 1.6–2.6)
MCHC RBC-ENTMCNC: 28.5 PG — SIGNIFICANT CHANGE UP (ref 27–34)
MCHC RBC-ENTMCNC: 33.6 G/DL — SIGNIFICANT CHANGE UP (ref 32–36)
MCV RBC AUTO: 84.9 FL — SIGNIFICANT CHANGE UP (ref 80–100)
MONOCYTES # BLD AUTO: 1.16 K/UL — HIGH (ref 0–0.9)
MONOCYTES NFR BLD AUTO: 10.6 % — SIGNIFICANT CHANGE UP (ref 2–14)
NEUTROPHILS # BLD AUTO: 8.36 K/UL — HIGH (ref 1.8–7.4)
NEUTROPHILS NFR BLD AUTO: 76.6 % — SIGNIFICANT CHANGE UP (ref 43–77)
NITRITE UR-MCNC: NEGATIVE — SIGNIFICANT CHANGE UP
NRBC # BLD AUTO: 0 K/UL — SIGNIFICANT CHANGE UP (ref 0–0)
NRBC # FLD: 0 K/UL — SIGNIFICANT CHANGE UP (ref 0–0)
NRBC BLD AUTO-RTO: 0 /100 WBCS — SIGNIFICANT CHANGE UP (ref 0–0)
PH UR: 5 — SIGNIFICANT CHANGE UP (ref 5–8)
PHOSPHATE SERPL-MCNC: 5.3 MG/DL — HIGH (ref 2.5–4.5)
PLATELET # BLD AUTO: 235 K/UL — SIGNIFICANT CHANGE UP (ref 150–400)
PMV BLD: 9.9 FL — SIGNIFICANT CHANGE UP (ref 7–13)
POTASSIUM SERPL-MCNC: 5.3 MMOL/L — SIGNIFICANT CHANGE UP (ref 3.5–5.3)
POTASSIUM SERPL-SCNC: 5.3 MMOL/L — SIGNIFICANT CHANGE UP (ref 3.5–5.3)
PROT SERPL-MCNC: 6.6 GM/DL — SIGNIFICANT CHANGE UP (ref 6–8.3)
PROT UR-MCNC: 300 MG/DL
PROTHROM AB SERPL-ACNC: 10.9 SEC — SIGNIFICANT CHANGE UP (ref 9.9–13.4)
RBC # BLD: 4.24 M/UL — SIGNIFICANT CHANGE UP (ref 4.2–5.8)
RBC # FLD: 14 % — SIGNIFICANT CHANGE UP (ref 10.3–14.5)
RBC CASTS # UR COMP ASSIST: >1900 /HPF — HIGH (ref 0–4)
SODIUM SERPL-SCNC: 133 MMOL/L — LOW (ref 135–145)
SP GR SPEC: 1.01 — SIGNIFICANT CHANGE UP (ref 1–1.03)
SQUAMOUS # UR AUTO: 1 /HPF — SIGNIFICANT CHANGE UP (ref 0–5)
UROBILINOGEN FLD QL: 0.2 MG/DL — SIGNIFICANT CHANGE UP (ref 0.2–1)
WBC # BLD: 10.92 K/UL — HIGH (ref 3.8–10.5)
WBC # FLD AUTO: 10.92 K/UL — HIGH (ref 3.8–10.5)
WBC UR QL: 47 /HPF — HIGH (ref 0–5)

## 2025-03-04 PROCEDURE — 97535 SELF CARE MNGMENT TRAINING: CPT | Mod: GO

## 2025-03-04 PROCEDURE — 83735 ASSAY OF MAGNESIUM: CPT

## 2025-03-04 PROCEDURE — P9047: CPT | Mod: JZ

## 2025-03-04 PROCEDURE — C1894: CPT

## 2025-03-04 PROCEDURE — 82746 ASSAY OF FOLIC ACID SERUM: CPT

## 2025-03-04 PROCEDURE — 84100 ASSAY OF PHOSPHORUS: CPT

## 2025-03-04 PROCEDURE — 72157 MRI CHEST SPINE W/O & W/DYE: CPT | Mod: MC

## 2025-03-04 PROCEDURE — 86901 BLOOD TYPING SEROLOGIC RH(D): CPT

## 2025-03-04 PROCEDURE — 93308 TTE F-UP OR LMTD: CPT

## 2025-03-04 PROCEDURE — 97166 OT EVAL MOD COMPLEX 45 MIN: CPT | Mod: GO

## 2025-03-04 PROCEDURE — 36600 WITHDRAWAL OF ARTERIAL BLOOD: CPT

## 2025-03-04 PROCEDURE — 93321 DOPPLER ECHO F-UP/LMTD STD: CPT

## 2025-03-04 PROCEDURE — 85018 HEMOGLOBIN: CPT

## 2025-03-04 PROCEDURE — 80074 ACUTE HEPATITIS PANEL: CPT

## 2025-03-04 PROCEDURE — 87070 CULTURE OTHR SPECIMN AEROBIC: CPT

## 2025-03-04 PROCEDURE — 87641 MR-STAPH DNA AMP PROBE: CPT

## 2025-03-04 PROCEDURE — 85610 PROTHROMBIN TIME: CPT

## 2025-03-04 PROCEDURE — 80053 COMPREHEN METABOLIC PANEL: CPT

## 2025-03-04 PROCEDURE — 86140 C-REACTIVE PROTEIN: CPT

## 2025-03-04 PROCEDURE — 99285 EMERGENCY DEPT VISIT HI MDM: CPT

## 2025-03-04 PROCEDURE — 84560 ASSAY OF URINE/URIC ACID: CPT

## 2025-03-04 PROCEDURE — 84155 ASSAY OF PROTEIN SERUM: CPT

## 2025-03-04 PROCEDURE — 93925 LOWER EXTREMITY STUDY: CPT

## 2025-03-04 PROCEDURE — 87640 STAPH A DNA AMP PROBE: CPT

## 2025-03-04 PROCEDURE — 97110 THERAPEUTIC EXERCISES: CPT | Mod: GO

## 2025-03-04 PROCEDURE — P9016: CPT

## 2025-03-04 PROCEDURE — 97164 PT RE-EVAL EST PLAN CARE: CPT | Mod: GP

## 2025-03-04 PROCEDURE — 97116 GAIT TRAINING THERAPY: CPT | Mod: GP

## 2025-03-04 PROCEDURE — 72156 MRI NECK SPINE W/O & W/DYE: CPT | Mod: MC

## 2025-03-04 PROCEDURE — 83540 ASSAY OF IRON: CPT

## 2025-03-04 PROCEDURE — 82962 GLUCOSE BLOOD TEST: CPT

## 2025-03-04 PROCEDURE — 85025 COMPLETE CBC W/AUTO DIFF WBC: CPT

## 2025-03-04 PROCEDURE — 87077 CULTURE AEROBIC IDENTIFY: CPT

## 2025-03-04 PROCEDURE — 82550 ASSAY OF CK (CPK): CPT

## 2025-03-04 PROCEDURE — 82570 ASSAY OF URINE CREATININE: CPT

## 2025-03-04 PROCEDURE — 83615 LACTATE (LD) (LDH) ENZYME: CPT

## 2025-03-04 PROCEDURE — A9579: CPT

## 2025-03-04 PROCEDURE — 83880 ASSAY OF NATRIURETIC PEPTIDE: CPT

## 2025-03-04 PROCEDURE — 93971 EXTREMITY STUDY: CPT | Mod: LT

## 2025-03-04 PROCEDURE — 97163 PT EVAL HIGH COMPLEX 45 MIN: CPT | Mod: GP

## 2025-03-04 PROCEDURE — C1880: CPT

## 2025-03-04 PROCEDURE — 37191 INS ENDOVAS VENA CAVA FILTR: CPT

## 2025-03-04 PROCEDURE — C1889: CPT

## 2025-03-04 PROCEDURE — 72158 MRI LUMBAR SPINE W/O & W/DYE: CPT | Mod: MC

## 2025-03-04 PROCEDURE — 87102 FUNGUS ISOLATION CULTURE: CPT

## 2025-03-04 PROCEDURE — 86160 COMPLEMENT ANTIGEN: CPT

## 2025-03-04 PROCEDURE — 84165 PROTEIN E-PHORESIS SERUM: CPT

## 2025-03-04 PROCEDURE — 97168 OT RE-EVAL EST PLAN CARE: CPT | Mod: GO

## 2025-03-04 PROCEDURE — 93005 ELECTROCARDIOGRAM TRACING: CPT

## 2025-03-04 PROCEDURE — 93325 DOPPLER ECHO COLOR FLOW MAPG: CPT

## 2025-03-04 PROCEDURE — C1713: CPT

## 2025-03-04 PROCEDURE — 84133 ASSAY OF URINE POTASSIUM: CPT

## 2025-03-04 PROCEDURE — 72070 X-RAY EXAM THORAC SPINE 2VWS: CPT

## 2025-03-04 PROCEDURE — 82803 BLOOD GASES ANY COMBINATION: CPT

## 2025-03-04 PROCEDURE — 72040 X-RAY EXAM NECK SPINE 2-3 VW: CPT

## 2025-03-04 PROCEDURE — 86036 ANCA SCREEN EACH ANTIBODY: CPT

## 2025-03-04 PROCEDURE — 76705 ECHO EXAM OF ABDOMEN: CPT

## 2025-03-04 PROCEDURE — 85014 HEMATOCRIT: CPT

## 2025-03-04 PROCEDURE — 93970 EXTREMITY STUDY: CPT

## 2025-03-04 PROCEDURE — 82436 ASSAY OF URINE CHLORIDE: CPT

## 2025-03-04 PROCEDURE — 86850 RBC ANTIBODY SCREEN: CPT

## 2025-03-04 PROCEDURE — 71045 X-RAY EXAM CHEST 1 VIEW: CPT

## 2025-03-04 PROCEDURE — 83521 IG LIGHT CHAINS FREE EACH: CPT

## 2025-03-04 PROCEDURE — C1769: CPT

## 2025-03-04 PROCEDURE — 84300 ASSAY OF URINE SODIUM: CPT

## 2025-03-04 PROCEDURE — 85730 THROMBOPLASTIN TIME PARTIAL: CPT

## 2025-03-04 PROCEDURE — 76000 FLUOROSCOPY <1 HR PHYS/QHP: CPT

## 2025-03-04 PROCEDURE — 97530 THERAPEUTIC ACTIVITIES: CPT | Mod: GP

## 2025-03-04 PROCEDURE — 86900 BLOOD TYPING SEROLOGIC ABO: CPT

## 2025-03-04 PROCEDURE — 80076 HEPATIC FUNCTION PANEL: CPT

## 2025-03-04 PROCEDURE — 83516 IMMUNOASSAY NONANTIBODY: CPT

## 2025-03-04 PROCEDURE — 99222 1ST HOSP IP/OBS MODERATE 55: CPT

## 2025-03-04 PROCEDURE — 82607 VITAMIN B-12: CPT

## 2025-03-04 PROCEDURE — 71275 CT ANGIOGRAPHY CHEST: CPT | Mod: MC

## 2025-03-04 PROCEDURE — 86334 IMMUNOFIX E-PHORESIS SERUM: CPT

## 2025-03-04 PROCEDURE — 84156 ASSAY OF PROTEIN URINE: CPT

## 2025-03-04 PROCEDURE — 85045 AUTOMATED RETICULOCYTE COUNT: CPT

## 2025-03-04 PROCEDURE — 85652 RBC SED RATE AUTOMATED: CPT

## 2025-03-04 PROCEDURE — 71045 X-RAY EXAM CHEST 1 VIEW: CPT | Mod: 26

## 2025-03-04 PROCEDURE — 87116 MYCOBACTERIA CULTURE: CPT

## 2025-03-04 PROCEDURE — 87075 CULTR BACTERIA EXCEPT BLOOD: CPT

## 2025-03-04 PROCEDURE — 87186 SC STD MICRODIL/AGAR DIL: CPT

## 2025-03-04 PROCEDURE — 85027 COMPLETE CBC AUTOMATED: CPT

## 2025-03-04 PROCEDURE — 74176 CT ABD & PELVIS W/O CONTRAST: CPT | Mod: 26

## 2025-03-04 PROCEDURE — 36430 TRANSFUSION BLD/BLD COMPNT: CPT

## 2025-03-04 PROCEDURE — 93010 ELECTROCARDIOGRAM REPORT: CPT

## 2025-03-04 PROCEDURE — 83010 ASSAY OF HAPTOGLOBIN QUANT: CPT

## 2025-03-04 PROCEDURE — 82728 ASSAY OF FERRITIN: CPT

## 2025-03-04 PROCEDURE — 81001 URINALYSIS AUTO W/SCOPE: CPT

## 2025-03-04 PROCEDURE — 86038 ANTINUCLEAR ANTIBODIES: CPT

## 2025-03-04 PROCEDURE — 80048 BASIC METABOLIC PNL TOTAL CA: CPT

## 2025-03-04 PROCEDURE — 36415 COLL VENOUS BLD VENIPUNCTURE: CPT

## 2025-03-04 PROCEDURE — 93970 EXTREMITY STUDY: CPT | Mod: 26

## 2025-03-04 PROCEDURE — 83550 IRON BINDING TEST: CPT

## 2025-03-04 PROCEDURE — 86923 COMPATIBILITY TEST ELECTRIC: CPT

## 2025-03-04 RX ORDER — HEPARIN SODIUM 1000 [USP'U]/ML
4000 INJECTION INTRAVENOUS; SUBCUTANEOUS EVERY 6 HOURS
Refills: 0 | Status: DISCONTINUED | OUTPATIENT
Start: 2025-03-04 | End: 2025-03-04

## 2025-03-04 RX ORDER — HEPARIN SODIUM 1000 [USP'U]/ML
INJECTION INTRAVENOUS; SUBCUTANEOUS
Qty: 25000 | Refills: 0 | Status: DISCONTINUED | OUTPATIENT
Start: 2025-03-04 | End: 2025-03-04

## 2025-03-04 RX ORDER — MELATONIN 5 MG
3 TABLET ORAL AT BEDTIME
Refills: 0 | Status: DISCONTINUED | OUTPATIENT
Start: 2025-03-04 | End: 2025-03-25

## 2025-03-04 RX ORDER — ONDANSETRON HCL/PF 4 MG/2 ML
4 VIAL (ML) INJECTION EVERY 6 HOURS
Refills: 0 | Status: DISCONTINUED | OUTPATIENT
Start: 2025-03-04 | End: 2025-03-25

## 2025-03-04 RX ORDER — HEPARIN SODIUM 1000 [USP'U]/ML
4000 INJECTION INTRAVENOUS; SUBCUTANEOUS EVERY 6 HOURS
Refills: 0 | Status: DISCONTINUED | OUTPATIENT
Start: 2025-03-04 | End: 2025-03-06

## 2025-03-04 RX ORDER — LACTULOSE 10 G/15ML
20 SOLUTION ORAL THREE TIMES A DAY
Refills: 0 | Status: DISCONTINUED | OUTPATIENT
Start: 2025-03-04 | End: 2025-03-25

## 2025-03-04 RX ORDER — LEVOTHYROXINE SODIUM 300 MCG
1 TABLET ORAL
Refills: 0 | DISCHARGE

## 2025-03-04 RX ORDER — ACETAMINOPHEN 500 MG/5ML
650 LIQUID (ML) ORAL EVERY 6 HOURS
Refills: 0 | Status: DISCONTINUED | OUTPATIENT
Start: 2025-03-04 | End: 2025-03-19

## 2025-03-04 RX ORDER — CEFTRIAXONE 500 MG/1
2000 INJECTION, POWDER, FOR SOLUTION INTRAMUSCULAR; INTRAVENOUS EVERY 24 HOURS
Refills: 0 | Status: DISCONTINUED | OUTPATIENT
Start: 2025-03-04 | End: 2025-03-04

## 2025-03-04 RX ORDER — VIBEGRON 75 MG/1
1 TABLET, FILM COATED ORAL
Refills: 0 | DISCHARGE

## 2025-03-04 RX ORDER — CEFTRIAXONE 500 MG/1
2000 INJECTION, POWDER, FOR SOLUTION INTRAMUSCULAR; INTRAVENOUS EVERY 24 HOURS
Refills: 0 | Status: DISCONTINUED | OUTPATIENT
Start: 2025-03-04 | End: 2025-03-05

## 2025-03-04 RX ORDER — DOCUSATE SODIUM 100 MG
2 CAPSULE ORAL
Refills: 0 | DISCHARGE

## 2025-03-04 RX ORDER — ROSUVASTATIN CALCIUM 5 MG/1
5 TABLET, FILM COATED ORAL AT BEDTIME
Refills: 0 | Status: DISCONTINUED | OUTPATIENT
Start: 2025-03-04 | End: 2025-03-12

## 2025-03-04 RX ORDER — HEPARIN SODIUM 1000 [USP'U]/ML
8500 INJECTION INTRAVENOUS; SUBCUTANEOUS ONCE
Refills: 0 | Status: DISCONTINUED | OUTPATIENT
Start: 2025-03-04 | End: 2025-03-04

## 2025-03-04 RX ORDER — MELATONIN 5 MG
2 TABLET ORAL
Refills: 0 | DISCHARGE

## 2025-03-04 RX ORDER — HEPARIN SODIUM 1000 [USP'U]/ML
INJECTION INTRAVENOUS; SUBCUTANEOUS
Qty: 25000 | Refills: 0 | Status: DISCONTINUED | OUTPATIENT
Start: 2025-03-04 | End: 2025-03-06

## 2025-03-04 RX ORDER — SODIUM CHLORIDE 9 G/1000ML
1000 INJECTION, SOLUTION INTRAVENOUS
Refills: 0 | Status: DISCONTINUED | OUTPATIENT
Start: 2025-03-04 | End: 2025-03-05

## 2025-03-04 RX ORDER — METOPROLOL SUCCINATE 50 MG/1
25 TABLET, EXTENDED RELEASE ORAL DAILY
Refills: 0 | Status: DISCONTINUED | OUTPATIENT
Start: 2025-03-04 | End: 2025-03-25

## 2025-03-04 RX ORDER — HEPARIN SODIUM 1000 [USP'U]/ML
9000 INJECTION INTRAVENOUS; SUBCUTANEOUS ONCE
Refills: 0 | Status: COMPLETED | OUTPATIENT
Start: 2025-03-04 | End: 2025-03-04

## 2025-03-04 RX ORDER — MAGNESIUM, ALUMINUM HYDROXIDE 200-200 MG
30 TABLET,CHEWABLE ORAL EVERY 4 HOURS
Refills: 0 | Status: DISCONTINUED | OUTPATIENT
Start: 2025-03-04 | End: 2025-03-25

## 2025-03-04 RX ORDER — HEPARIN SODIUM 1000 [USP'U]/ML
9000 INJECTION INTRAVENOUS; SUBCUTANEOUS EVERY 6 HOURS
Refills: 0 | Status: DISCONTINUED | OUTPATIENT
Start: 2025-03-04 | End: 2025-03-06

## 2025-03-04 RX ORDER — HEPARIN SODIUM 1000 [USP'U]/ML
8500 INJECTION INTRAVENOUS; SUBCUTANEOUS EVERY 6 HOURS
Refills: 0 | Status: DISCONTINUED | OUTPATIENT
Start: 2025-03-04 | End: 2025-03-04

## 2025-03-04 RX ORDER — LEVOTHYROXINE SODIUM 300 MCG
150 TABLET ORAL DAILY
Refills: 0 | Status: DISCONTINUED | OUTPATIENT
Start: 2025-03-04 | End: 2025-03-25

## 2025-03-04 RX ADMIN — METOPROLOL SUCCINATE 25 MILLIGRAM(S): 50 TABLET, EXTENDED RELEASE ORAL at 20:07

## 2025-03-04 RX ADMIN — HEPARIN SODIUM 1900 UNIT(S)/HR: 1000 INJECTION INTRAVENOUS; SUBCUTANEOUS at 20:23

## 2025-03-04 RX ADMIN — HEPARIN SODIUM 1900 UNIT(S)/HR: 1000 INJECTION INTRAVENOUS; SUBCUTANEOUS at 19:09

## 2025-03-04 RX ADMIN — ROSUVASTATIN CALCIUM 5 MILLIGRAM(S): 5 TABLET, FILM COATED ORAL at 22:31

## 2025-03-04 RX ADMIN — Medication 3 MILLIGRAM(S): at 22:31

## 2025-03-04 RX ADMIN — HEPARIN SODIUM 1900 UNIT(S)/HR: 1000 INJECTION INTRAVENOUS; SUBCUTANEOUS at 18:48

## 2025-03-04 RX ADMIN — CEFTRIAXONE 2000 MILLIGRAM(S): 500 INJECTION, POWDER, FOR SOLUTION INTRAMUSCULAR; INTRAVENOUS at 20:07

## 2025-03-04 RX ADMIN — HEPARIN SODIUM 9000 UNIT(S): 1000 INJECTION INTRAVENOUS; SUBCUTANEOUS at 18:47

## 2025-03-04 NOTE — H&P ADULT - PROBLEM/PLAN-3
Medical Week 2 Survey      Responses   Facility patient discharged from?  [place]   Does the patient have one of the following disease processes/diagnoses(primary or secondary)?  Other   Week 2 attempt successful?  Yes   Call start time  1442   Discharge diagnosis  Bacteremia D/T Klebsiella Pneumoniae, elevated bilirubin, dizziness, abdominal pain, essential HTN, AFib., elevated LFTs, calculus of bile duct with Acute cholecystitis w/o obstruction, bladder cancer, chest pain, s/p ANJALI placement, GERD, mood disorder, HLD, CAD   Call end time  1444   Is the patient taking all medications as directed (includes completed medication regime)?  Yes   Has the patient kept scheduled appointments due by today?  Yes   What is the patient's perception of their health status since discharge?  Improving   Additional teach back comments  Pt says he is doing well, no questions or concerns at this time.   Week 2 Call Completed?  Yes          Sherri Anderson RN  
DISPLAY PLAN FREE TEXT

## 2025-03-04 NOTE — H&P ADULT - HISTORY OF PRESENT ILLNESS
74-year-old male with a past medical history of benign prostatic hyperplasia, diverticulosis, Graves' disease, hemorrhoids, hyperlipidemia, hypothyroidism, osteoarthritis, spinal stenosis, spinal cord injury (2004), and multiple spinal surgeries (three cervical and one lumbar). He presents with complaints of lower extremity swelling and abnormal BUN/creatinine levels. The patient was recently admitted to [facility] from 02/18/25 to 02/25/25 for sepsis secondary to epidural abscess. He has a peripherally inserted central catheter line in the right arm and is on nightly Ceftriaxone treatment until 04/16/25. Following his discharge, he was transferred to [facility]. Documentation from the rehabilitation facility notes a decline in his functional status and dysfunction with activities of daily living. At the rehabilitation facility, he was noted to have worsening LE swelling and was started on torsemide. However, his Cr levels subsequently became elevated, leading to the discontinuation of torsemide. He was sent to the [facility] Emergency Department for further evaluation of his LE swelling and elevated Cr levels. He denies any chest pain, dyspnea, orthopnea, PND.     In ED VSS  CBC: WBC 10, H/H 12/36, Plt 235  CMP: Na 133, K 5.3, BUN/Cr 86/2.77, Glu 145, Albumin 2.8  Trops: wnl, Pro-BNP 1132  UA: Turbid, red, large blood, 47 wbcs, > 1900 rbcs.   US dopplers b/l LE: Acute deep venous thrombosis: below the knee.  DVT within the left soleal vein.  CT abdomen: No hydronephrosis or radiodense calculus identified bilaterally. Enlarged prostate, correlate with PSA level.  ECHO (2/18/25): Technically difficult image quality. Left ventricular systolic function is normal with an ejection fraction of 66 % by Meyers's method of disks. Trace tricuspid regurgitation. Estimated pulmonary artery systolic pressure is 35 mmHg, consistent with mild pulmonary hypertension. Interatrial septum is aneurysmal. The peak transaortic velocity is 2.29 m/s, peak transaortic gradient is 21.0 mmHg and mean transaortic gradient is 12.0 mmHg with an LVOT/aortic valve VTI ratio of 0.42. The effective orifice area is estimated at 1.61 cm² by the continuity equation. Trileaflet aortic valve with reduced systolic excursion. There is moderate calcification of the aortic valve leaflets. Mild aortic stenosis.    ED intervention: patient started on hep gtt for DVT

## 2025-03-04 NOTE — ED ADULT NURSE NOTE - CHIEF COMPLAINT
The patient is a 74y Male complaining of abnormal lab result. Detail Level: Detailed Body Location Override (Optional - Billing Will Still Be Based On Selected Body Map Location If Applicable): left thigh Size Of Lesion In Cm (Optional): 0 Introduction Text (Please End With A Colon): The following procedure was deferred: Body Location Override (Optional - Billing Will Still Be Based On Selected Body Map Location If Applicable): right anterior thigh Body Location Override (Optional - Billing Will Still Be Based On Selected Body Map Location If Applicable): right anterior lower leg

## 2025-03-04 NOTE — ED ADULT NURSE NOTE - OBJECTIVE STATEMENT
Patient sent in from rehab for elevated BUN/Cr levels. Was discharged recently for spinal sepsis requiring ICU admission. Patient with gupta placed during admission.

## 2025-03-04 NOTE — H&P ADULT - NSHPLABSRESULTS_GEN_ALL_CORE
LABS:  cret                        12.1   10.92 )-----------( 235      ( 04 Mar 2025 15:12 )             36.0     03-04    133[L]  |  102  |  86[H]  ----------------------------<  145[H]  5.3   |  27  |  2.77[H]    Ca    8.9      04 Mar 2025 15:12  Phos  5.3     03-04  Mg     2.7     03-04    TPro  6.6  /  Alb  2.8[L]  /  TBili  0.4  /  DBili  x   /  AST  29  /  ALT  40  /  AlkPhos  65  03-04    < from: US Duplex Venous Lower Ext Complete, Bilateral (03.04.25 @ 15:47) >    IMPRESSION:  Acute deep venous thrombosis: below the knee.  DVT within the left soleal vein.          --- End of Report ---    < end of copied text >    < from: CT Abdomen and Pelvis No Cont (03.04.25 @ 15:20) >    IMPRESSION:  No hydronephrosis or radiodense calculus identified bilaterally.  Enlarged prostate, correlate with PSA level.        --- End of Report ---    < end of copied text >    < from: Xray Chest 1 View- PORTABLE-Urgent (03.04.25 @ 14:41) >    IMPRESSION: Bandlike atelectasis off right hilum has cleared.    Slight increase in small density off lower left heart border.    --- End of Report ---    < end of copied text >        PT/INR - ( 04 Mar 2025 15:12 )   PT: 10.9 sec;   INR: 0.92 ratio         PTT - ( 04 Mar 2025 15:12 )  PTT:31.2 sec      Urinalysis with Rflx Culture (collected 03-04-25 @ 15:12)

## 2025-03-04 NOTE — ED PROVIDER NOTE - PHYSICAL EXAMINATION
Constitutional: NAD AOx3  Eyes: PERRL EOMI  Head: Normocephalic atraumatic  Mouth: MMM  Cardiac: regular rate and rhythm  Resp: Lungs CTAB  GI: Abd s/+distended//nt  Neuro: CN2-12 grossly intact, RAMIREZ x 4  Skin: No visible rashes   extrem: b/l lower extrem pitting edema ot mid thigh

## 2025-03-04 NOTE — ED PROVIDER NOTE - CLINICAL SUMMARY MEDICAL DECISION MAKING FREE TEXT BOX
73 yo M with leg swelling and abd distention s/p admit for sepsis from disictis, tx with diuretics, now with GOVIND. not on OACs. plan for dopplers, labs, ct non con to eval for obstruction. will likely need admit to hospital.

## 2025-03-04 NOTE — ED ADULT NURSE REASSESSMENT NOTE - NS ED NURSE REASSESS COMMENT FT1
Received report from ESTEFANIA Tejeda. Pt resting comfortably in stretcher, breathing equally and unlabored. Pt denies any complaints at this time. Heparin drip verified doing at 19mL/hr. POC explained, pt to be admitted to telemetry.

## 2025-03-04 NOTE — ED PROVIDER NOTE - OBJECTIVE STATEMENT
74M with benign prostatic hyperplasia, diverticulosis, Graves' disease, hemorrhoids, hyperlipidemia, hypothyroidism, osteoarthritis, spinal stenosis, spinal cord injury (2004), and multiple spinal surgeries (three cervical and one lumbar) admitted to  2/17/25 for spine infection. CA'ed 2/25/25  to [facility] with PICC line for discitis and small epidural abscess causing bacteremia and sepsis. Pt sent from [facility] today for elevated BUN/Cr. Pt on diuretics ( torsemide ) for fluid overload. 3/3/25 BUN /Cr 85/3.08 Pt has indwelling gupta, no dec in urine output. C/o swelling in legs and abd, no change recently. No sob/cp or fever.

## 2025-03-04 NOTE — ED ADULT NURSE REASSESSMENT NOTE - NS ED NURSE REASSESS COMMENT FT1
Pt found to be hypertensive. MD Sosa at [facility]. Pt to be medicated with metoprolol and safe to be admitted to the floor as per MD Sosa

## 2025-03-04 NOTE — H&P ADULT - NSHPPHYSICALEXAM_GEN_ALL_CORE
T(C): 36.7 (03-04-25 @ 22:16), Max: 36.9 (03-04-25 @ 19:55)  HR: 68 (03-04-25 @ 22:16) (68 - 74)  BP: 184/96 (03-04-25 @ 22:16) (168/92 - 184/96)  RR: 16 (03-04-25 @ 22:16) (16 - 18)  SpO2: 93% (03-04-25 @ 22:16) (93% - 98%)    General: chronically ill appearing.   HEENT: non-traumatic, perrla, eomi  Cardio: s1s2 regular rate and rhythm  Lungs: comfortable breathing, decreased lung sounds. No wheezing or rales.   Abdomen: Soft, non-tender, non-distended  Neuro: AOx4  Ext: +2 pitting edema of b/l LE.

## 2025-03-04 NOTE — ED PROVIDER NOTE - CARE PLAN
1 Principal Discharge DX:	GOVIND (acute kidney injury)  Secondary Diagnosis:	Acute CHF  Secondary Diagnosis:	Deep vein thrombosis (DVT)

## 2025-03-04 NOTE — PHARMACOTHERAPY INTERVENTION NOTE - COMMENTS
Medication reconciliation completed.  Reviewed Medication list and confirmed med allergies with list from Care Facility "[facility]"; confirmed with Dr. First MedHx.

## 2025-03-04 NOTE — H&P ADULT - PROBLEM SELECTOR PLAN 1
#GOVIND  #LE pitting edema   #Hypoalbuminemia   CMP: Na 133, K 5.3, BUN/Cr 86/2.77, Glu 145, Albumin 2.8  Baseline Cr - 0.75  BUN/Cr ratio suggests pre-renal etiology, likely 2/2 to torsemide.  Recent ECHO noted for preserved EF of 65% with mild tricuspid regurgitation and mild pulmonary HTN.  -F/u Urine electrolytes, Urine uric acid  -C/w mild hydration and trend Cr.   -Hold Torsemide.   -Elevate b/l LE   -Nephrology consult.

## 2025-03-04 NOTE — ED ADULT NURSE NOTE - NSFALLHARMRISKINTERV_ED_ALL_ED

## 2025-03-05 LAB
ALBUMIN SERPL ELPH-MCNC: 2.9 G/DL — LOW (ref 3.3–5)
ALP SERPL-CCNC: 64 U/L — SIGNIFICANT CHANGE UP (ref 40–120)
ALT FLD-CCNC: 45 U/L — SIGNIFICANT CHANGE UP (ref 12–78)
ANION GAP SERPL CALC-SCNC: 7 MMOL/L — SIGNIFICANT CHANGE UP (ref 5–17)
APTT BLD: 84 SEC — HIGH (ref 24.5–35.6)
APTT BLD: 97.7 SEC — HIGH (ref 24.5–35.6)
APTT BLD: > 200 SEC (ref 24.5–35.6)
AST SERPL-CCNC: 34 U/L — SIGNIFICANT CHANGE UP (ref 15–37)
BILIRUB SERPL-MCNC: 0.8 MG/DL — SIGNIFICANT CHANGE UP (ref 0.2–1.2)
BUN SERPL-MCNC: 74 MG/DL — HIGH (ref 7–23)
CALCIUM SERPL-MCNC: 9.1 MG/DL — SIGNIFICANT CHANGE UP (ref 8.5–10.1)
CHLORIDE SERPL-SCNC: 104 MMOL/L — SIGNIFICANT CHANGE UP (ref 96–108)
CHLORIDE UR-SCNC: 29 MMOL/L — SIGNIFICANT CHANGE UP
CO2 SERPL-SCNC: 24 MMOL/L — SIGNIFICANT CHANGE UP (ref 22–31)
CREAT ?TM UR-MCNC: 64 MG/DL — SIGNIFICANT CHANGE UP
CREAT SERPL-MCNC: 2.29 MG/DL — HIGH (ref 0.5–1.3)
EGFR: 30 ML/MIN/1.73M2 — LOW
EGFR: 30 ML/MIN/1.73M2 — LOW
GLUCOSE SERPL-MCNC: 112 MG/DL — HIGH (ref 70–99)
HCT VFR BLD CALC: 35.9 % — LOW (ref 39–50)
HCT VFR BLD CALC: 37.1 % — LOW (ref 39–50)
HGB BLD-MCNC: 12 G/DL — LOW (ref 13–17)
HGB BLD-MCNC: 12.4 G/DL — LOW (ref 13–17)
MAGNESIUM SERPL-MCNC: 2.4 MG/DL — SIGNIFICANT CHANGE UP (ref 1.6–2.6)
MCHC RBC-ENTMCNC: 28.3 PG — SIGNIFICANT CHANGE UP (ref 27–34)
MCHC RBC-ENTMCNC: 28.5 PG — SIGNIFICANT CHANGE UP (ref 27–34)
MCHC RBC-ENTMCNC: 33.4 G/DL — SIGNIFICANT CHANGE UP (ref 32–36)
MCHC RBC-ENTMCNC: 33.4 G/DL — SIGNIFICANT CHANGE UP (ref 32–36)
MCV RBC AUTO: 84.7 FL — SIGNIFICANT CHANGE UP (ref 80–100)
MCV RBC AUTO: 85.3 FL — SIGNIFICANT CHANGE UP (ref 80–100)
NRBC # BLD AUTO: 0 K/UL — SIGNIFICANT CHANGE UP (ref 0–0)
NRBC # BLD AUTO: 0 K/UL — SIGNIFICANT CHANGE UP (ref 0–0)
NRBC # FLD: 0 K/UL — SIGNIFICANT CHANGE UP (ref 0–0)
NRBC # FLD: 0 K/UL — SIGNIFICANT CHANGE UP (ref 0–0)
NRBC BLD AUTO-RTO: 0 /100 WBCS — SIGNIFICANT CHANGE UP (ref 0–0)
NRBC BLD AUTO-RTO: 0 /100 WBCS — SIGNIFICANT CHANGE UP (ref 0–0)
PHOSPHATE SERPL-MCNC: 4.4 MG/DL — SIGNIFICANT CHANGE UP (ref 2.5–4.5)
PLATELET # BLD AUTO: 229 K/UL — SIGNIFICANT CHANGE UP (ref 150–400)
PLATELET # BLD AUTO: 249 K/UL — SIGNIFICANT CHANGE UP (ref 150–400)
PMV BLD: 9.7 FL — SIGNIFICANT CHANGE UP (ref 7–13)
PMV BLD: 9.8 FL — SIGNIFICANT CHANGE UP (ref 7–13)
POTASSIUM SERPL-MCNC: 5.2 MMOL/L — SIGNIFICANT CHANGE UP (ref 3.5–5.3)
POTASSIUM SERPL-SCNC: 5.2 MMOL/L — SIGNIFICANT CHANGE UP (ref 3.5–5.3)
POTASSIUM UR-SCNC: 23.5 MMOL/L — SIGNIFICANT CHANGE UP
PROT ?TM UR-MCNC: 220 MG/DL — HIGH (ref 0–12)
PROT SERPL-MCNC: 6.7 GM/DL — SIGNIFICANT CHANGE UP (ref 6–8.3)
PROT/CREAT UR-RTO: 3.4 RATIO — HIGH (ref 0–0.2)
RBC # BLD: 4.21 M/UL — SIGNIFICANT CHANGE UP (ref 4.2–5.8)
RBC # BLD: 4.38 M/UL — SIGNIFICANT CHANGE UP (ref 4.2–5.8)
RBC # FLD: 13.9 % — SIGNIFICANT CHANGE UP (ref 10.3–14.5)
RBC # FLD: 14 % — SIGNIFICANT CHANGE UP (ref 10.3–14.5)
SODIUM SERPL-SCNC: 135 MMOL/L — SIGNIFICANT CHANGE UP (ref 135–145)
SODIUM UR-SCNC: 29 MMOL/L — SIGNIFICANT CHANGE UP
URATE UR-MCNC: 17.9 MG/DL — SIGNIFICANT CHANGE UP
WBC # BLD: 12.7 K/UL — HIGH (ref 3.8–10.5)
WBC # BLD: 12.72 K/UL — HIGH (ref 3.8–10.5)
WBC # FLD AUTO: 12.7 K/UL — HIGH (ref 3.8–10.5)
WBC # FLD AUTO: 12.72 K/UL — HIGH (ref 3.8–10.5)

## 2025-03-05 PROCEDURE — 93010 ELECTROCARDIOGRAM REPORT: CPT

## 2025-03-05 PROCEDURE — 99233 SBSQ HOSP IP/OBS HIGH 50: CPT

## 2025-03-05 RX ORDER — TIZANIDINE 4 MG/1
2 TABLET ORAL EVERY 8 HOURS
Refills: 0 | Status: DISCONTINUED | OUTPATIENT
Start: 2025-03-05 | End: 2025-03-11

## 2025-03-05 RX ORDER — MELATONIN 5 MG
5 TABLET ORAL AT BEDTIME
Refills: 0 | Status: COMPLETED | OUTPATIENT
Start: 2025-03-05 | End: 2025-03-05

## 2025-03-05 RX ORDER — CEFTRIAXONE 500 MG/1
2000 INJECTION, POWDER, FOR SOLUTION INTRAMUSCULAR; INTRAVENOUS EVERY 24 HOURS
Refills: 0 | Status: DISCONTINUED | OUTPATIENT
Start: 2025-03-05 | End: 2025-03-25

## 2025-03-05 RX ORDER — TAMSULOSIN HYDROCHLORIDE 0.4 MG/1
0.8 CAPSULE ORAL AT BEDTIME
Refills: 0 | Status: DISCONTINUED | OUTPATIENT
Start: 2025-03-05 | End: 2025-03-25

## 2025-03-05 RX ADMIN — SODIUM CHLORIDE 100 MILLILITER(S): 9 INJECTION, SOLUTION INTRAVENOUS at 00:50

## 2025-03-05 RX ADMIN — Medication 5 MILLIGRAM(S): at 22:01

## 2025-03-05 RX ADMIN — ROSUVASTATIN CALCIUM 5 MILLIGRAM(S): 5 TABLET, FILM COATED ORAL at 21:28

## 2025-03-05 RX ADMIN — HEPARIN SODIUM 1600 UNIT(S)/HR: 1000 INJECTION INTRAVENOUS; SUBCUTANEOUS at 10:11

## 2025-03-05 RX ADMIN — HEPARIN SODIUM 1600 UNIT(S)/HR: 1000 INJECTION INTRAVENOUS; SUBCUTANEOUS at 02:39

## 2025-03-05 RX ADMIN — HEPARIN SODIUM 0 UNIT(S)/HR: 1000 INJECTION INTRAVENOUS; SUBCUTANEOUS at 01:39

## 2025-03-05 RX ADMIN — CEFTRIAXONE 2000 MILLIGRAM(S): 500 INJECTION, POWDER, FOR SOLUTION INTRAMUSCULAR; INTRAVENOUS at 21:28

## 2025-03-05 RX ADMIN — HEPARIN SODIUM 1600 UNIT(S)/HR: 1000 INJECTION INTRAVENOUS; SUBCUTANEOUS at 07:31

## 2025-03-05 RX ADMIN — HEPARIN SODIUM 1600 UNIT(S)/HR: 1000 INJECTION INTRAVENOUS; SUBCUTANEOUS at 19:31

## 2025-03-05 RX ADMIN — Medication 150 MICROGRAM(S): at 07:44

## 2025-03-05 RX ADMIN — TAMSULOSIN HYDROCHLORIDE 0.8 MILLIGRAM(S): 0.4 CAPSULE ORAL at 21:28

## 2025-03-05 RX ADMIN — TIZANIDINE 2 MILLIGRAM(S): 4 TABLET ORAL at 21:28

## 2025-03-05 RX ADMIN — METOPROLOL SUCCINATE 25 MILLIGRAM(S): 50 TABLET, EXTENDED RELEASE ORAL at 10:22

## 2025-03-05 RX ADMIN — HEPARIN SODIUM 1600 UNIT(S)/HR: 1000 INJECTION INTRAVENOUS; SUBCUTANEOUS at 16:49

## 2025-03-05 NOTE — DIETITIAN INITIAL EVALUATION ADULT - PERTINENT MEDS FT
MEDICATIONS  (STANDING):  cefTRIAXone Injectable. 2000 milliGRAM(s) IV Push every 24 hours  heparin  Infusion.  Unit(s)/Hr (19 mL/Hr) IV Continuous <Continuous>  lactated ringers. 1000 milliLiter(s) (100 mL/Hr) IV Continuous <Continuous>  levothyroxine 150 MICROGram(s) Oral daily  metoprolol succinate ER 25 milliGRAM(s) Oral daily  rosuvastatin 5 milliGRAM(s) Oral at bedtime    MEDICATIONS  (PRN):  acetaminophen     Tablet .. 650 milliGRAM(s) Oral every 6 hours PRN Mild Pain (1 - 3)  aluminum hydroxide/magnesium hydroxide/simethicone Suspension 30 milliLiter(s) Oral every 4 hours PRN Dyspepsia  heparin   Injectable 9000 Unit(s) IV Push every 6 hours PRN For aPTT less than 40  heparin   Injectable 4000 Unit(s) IV Push every 6 hours PRN For aPTT between 40 - 57  lactulose Syrup 20 Gram(s) Oral three times a day PRN for constipation  melatonin 3 milliGRAM(s) Oral at bedtime PRN Insomnia  ondansetron Injectable 4 milliGRAM(s) IV Push every 6 hours PRN Nausea and/or Vomiting

## 2025-03-05 NOTE — PROGRESS NOTE ADULT - SUBJECTIVE AND OBJECTIVE BOX
CHIEF COMPLAINT:    SUBJECTIVE/SIGNIFICANT INTERVAL EVENTS/OVERNIGHT EVENTS:    Review of Systems: 14 Point review of systems reviewed and reported as negative unless otherwise stated in HPI    FROM H&P:  74-year-old male with a past medical history of benign prostatic hyperplasia, diverticulosis, Graves' disease, hemorrhoids, hyperlipidemia, hypothyroidism, osteoarthritis, spinal stenosis, spinal cord injury (2004), and multiple spinal surgeries (three cervical and one lumbar). He presents with complaints of lower extremity swelling and abnormal BUN/creatinine levels. The patient was recently admitted to [facility] from 02/18/25 to 02/25/25 for sepsis secondary to epidural abscess. He has a peripherally inserted central catheter line in the right arm and is on nightly Ceftriaxone treatment until 04/16/25. Following his discharge, he was transferred to [facility] Documentation from the rehabilitation facility notes a decline in his functional status and dysfunction with activities of daily living. At the rehabilitation facility, he was noted to have worsening LE swelling and was started on torsemide. However, his Cr levels subsequently became elevated, leading to the discontinuation of torsemide. He was sent to the [facility] Emergency Department for further evaluation of his LE swelling and elevated Cr levels. He denies any chest pain, dyspnea, orthopnea, PND.     3/5: pt feels well. c/o LE numbness @ thighs and L hand numbness. denies saddle anesthesia. Cr improving     PHYSICAL EXAM:    T(C): 36.8 (03-05-25 @ 08:30), Max: 36.9 (03-04-25 @ 19:55)  HR: 92 (03-05-25 @ 08:30) (68 - 92)  BP: 140/92 (03-05-25 @ 08:30) (140/92 - 184/96)  RR: 18 (03-05-25 @ 08:30) (16 - 18)  SpO2: 94% (03-05-25 @ 08:30) (93% - 98%)    General: AAOx3; NAD  Head: AT/NC  Neck: Non-tender; No JVD  CVS: RRR, S1&S2, No murmur, No edema  Respiratory: Lungs CTA B/L; Normal Respiratory Effort  Abdomen/GI: Soft, non-tender, non-distended  : No bladder distention, No Ortega  Extremities: No cyanosis, No clubbing, No edema  MSK: No CVA tenderness, Normal ROM, No injury  Neuro: AAOx3, numbness to upper b/l thighs, 3/5 L hand weakness, ,no saddle anesthesia   Psych: Appropriate, Cooperative, No depression, No anxiety  Skin: Clean, Dry and Intact      LABS:                          12.4   12.72 )-----------( 249      ( 05 Mar 2025 08:31 )             37.1     03-05    135  |  104  |  74[H]  ----------------------------<  112[H]  5.2   |  24  |  2.29[H]    Ca    9.1      05 Mar 2025 08:31  Phos  4.4     03-05  Mg     2.4     03-05    TPro  6.7  /  Alb  2.9[L]  /  TBili  0.8  /  DBili  x   /  AST  34  /  ALT  45  /  AlkPhos  64  03-05    SARS-CoV-2: NotDetec (17 Feb 2025 21:11)    CAPILLARY BLOOD GLUCOSE          Urinalysis with Rflx Culture (collected 04 Mar 2025 15:12)          RADIOLOGY:      EKG:      ECHO:      PROCEDURES:        I personally reviewed labs, imaging, ekg, orders and vitals.    Discussed case with:  []RN  []CM/SW  []Patient  []Family  []Specialist:        MEDICATIONS  (STANDING):  cefTRIAXone Injectable. 2000 milliGRAM(s) IV Push every 24 hours  heparin  Infusion.  Unit(s)/Hr (19 mL/Hr) IV Continuous <Continuous>  lactated ringers. 1000 milliLiter(s) (100 mL/Hr) IV Continuous <Continuous>  levothyroxine 150 MICROGram(s) Oral daily  metoprolol succinate ER 25 milliGRAM(s) Oral daily  rosuvastatin 5 milliGRAM(s) Oral at bedtime    MEDICATIONS  (PRN):  acetaminophen     Tablet .. 650 milliGRAM(s) Oral every 6 hours PRN Mild Pain (1 - 3)  aluminum hydroxide/magnesium hydroxide/simethicone Suspension 30 milliLiter(s) Oral every 4 hours PRN Dyspepsia  heparin   Injectable 9000 Unit(s) IV Push every 6 hours PRN For aPTT less than 40  heparin   Injectable 4000 Unit(s) IV Push every 6 hours PRN For aPTT between 40 - 57  lactulose Syrup 20 Gram(s) Oral three times a day PRN for constipation  melatonin 3 milliGRAM(s) Oral at bedtime PRN Insomnia  ondansetron Injectable 4 milliGRAM(s) IV Push every 6 hours PRN Nausea and/or Vomiting

## 2025-03-05 NOTE — DIETITIAN INITIAL EVALUATION ADULT - OTHER INFO
74-year-old male with a past medical history of benign prostatic hyperplasia, diverticulosis, Graves' disease, hemorrhoids, hyperlipidemia, hypothyroidism, osteoarthritis, spinal stenosis, spinal cord injury (2004), and multiple spinal surgeries (three cervical and one lumbar). He presents with complaints of lower extremity swelling and abnormal BUN/creatinine levels. The patient was recently admitted to [facility] from 02/18/25 to 02/25/25 for sepsis secondary to epidural abscess. He has a peripherally inserted central catheter line in the right arm and is on nightly Ceftriaxone treatment until 04/16/25. Following his discharge, he was transferred to [facility]. Documentation from the rehabilitation facility notes a decline in his functional status and dysfunction with activities of daily living. At the rehabilitation facility, he was noted to have worsening LE swelling and was started on torsemide.   Admit for GOVIND    Endorses good appetite since admit (x 1 day). Reports UBW of ~222# x 2 weeks ago; unable to obtain bed scale wt 2/2 bed scale not working. Standing daily wt as per EMR of 243# on 3/5/25 - appears accurate. Unintentional wt gain of 21# x 2 weeks likely due to fluid retention. NFPE reveals no muscle/fat wasting - appears overweight/obese; edema could be masking losses, skewing appearance (+3 L/R foot edema). C/w DASH/TLC diet as tolerated, denies ONS at this time. Encourage protein-rich foods, maximize food preferences. See below for other recommendations.

## 2025-03-05 NOTE — DIETITIAN INITIAL EVALUATION ADULT - PERTINENT LABORATORY DATA
03-05    135  |  104  |  74[H]  ----------------------------<  112[H]  5.2   |  24  |  2.29[H]    Ca    9.1      05 Mar 2025 08:31  Phos  4.4     03-05  Mg     2.4     03-05    TPro  6.7  /  Alb  2.9[L]  /  TBili  0.8  /  DBili  x   /  AST  34  /  ALT  45  /  AlkPhos  64  03-05

## 2025-03-05 NOTE — CONSULT NOTE ADULT - ASSESSMENT
74-year-old male with a past medical history of benign prostatic hyperplasia, diverticulosis, Graves' disease, hemorrhoids, hyperlipidemia, hypothyroidism, osteoarthritis, spinal stenosis, spinal cord injury (2004), and multiple spinal surgeries (three cervical and one lumbar). He presents with complaints of lower extremity swelling and abnormal BUN/creatinine levels. The patient was recently admitted to [facility] from 02/18/25 to 02/25/25 for sepsis secondary to epidural abscess. At the rehabilitation facility, he was noted to have worsening LE swelling and was noted w rising creatinine. Treated w diuretics at rehab.     GOVIND  -Likely pre renal state setting of diuresis, decreased intake  -IVF for now  -No nsaids/contrast  -Trend daily labs/lytes    Edema  -LIkely in post op state, poor intake and depressed albumin  -Hold diuretics  -Optimize intake/protein stores    d/c with staff, team

## 2025-03-05 NOTE — DIETITIAN INITIAL EVALUATION ADULT - PROBLEM SELECTOR PROBLEM 3
Spinal epidural abscess Complex Repair And Split-Thickness Skin Graft Text: The defect edges were debeveled with a #15 scalpel blade.  The primary defect was closed partially with a complex linear closure.  Given the location of the defect, shape of the defect and the proximity to free margins a split thickness skin graft was deemed most appropriate to repair the remaining defect.  The graft was trimmed to fit the size of the remaining defect.  The graft was then placed in the primary defect, oriented appropriately, and sutured into place.

## 2025-03-05 NOTE — PROGRESS NOTE ADULT - ASSESSMENT
74M admitted for GOVIND, and DVT.         #GOVIND  #LE edema  #hypoalbuminemia  - Cr improved from 2.8 > 2.1 after IVF and holding torsemide   - f/u urine lytes  - continue to hold torsemide  - nephrology consult    #DVT  - appears provoked from decrease ambulation from recent spinal abscess  - c/w heparin while Cr improves  - expect Cr to improve and Dc with DOAC    #Spinal epidural abscess  - Chart reviewed extensively  - PICC w CTX for 8 week course to finish 4/16/25  - Reviewed neurosurgery notes and current neuro exam unchanged and no worse from prior admission, imaging reviewed  - currently since neuro exam unchanged will not pursue more imaging, low threshold to re-scan if neuro exam worsens    #hypothyroidism  - c/w synthroid    #CAD  - c/w metoprolol, statin    #HLD  - c/w statin    #BPH  - chronic gupta replaced in ED    #dvt ppx - heparin drip    #dispo - pending Cr improvement and initiation of DOAC , dw pt  74M admitted for GOVIND, and DVT.         #GOVIND  #LE edema  #hypoalbuminemia  - Cr improved from 2.8 > 2.1 after IVF and holding torsemide   - f/u urine lytes  - continue to hold torsemide  - nephrology consult    #DVT  - appears provoked from decrease ambulation from recent spinal abscess  - c/w heparin while Cr improves  - expect Cr to improve and Dc with DOAC    #Spinal epidural abscess  - Chart reviewed extensively  - PICC w CTX for 8 week course to finish 4/16/25  - Reviewed neurosurgery notes and current neuro exam unchanged and no worse from prior admission, imaging reviewed  - currently since neuro exam unchanged will not pursue more imaging, low threshold to re-scan if neuro exam worsens    #leukocytosis  - likely rx to DVT  - no other signs of sepsis , afebrile     #hypothyroidism  - c/w synthroid    #CAD  - c/w metoprolol, statin    #HLD  - c/w statin    #BPH  - chronic gupta replaced in ED    #dvt ppx - heparin drip    #dispo - pending Cr improvement and initiation of DOAC , dw pt  74M admitted for GOVIND, and DVT.         #GOVIND  #LE edema  #hypoalbuminemia  - Cr improved from 2.8 > 2.1 after IVF and holding torsemide   - f/u urine lytes  - continue to hold torsemide  - nephrology consult    #DVT  - appears provoked from decrease ambulation from recent spinal abscess  - c/w heparin while Cr improves  - expect Cr to improve and Dc with DOAC    #Spinal epidural abscess  - Chart reviewed extensively  - PICC w CTX for 8 week course to finish 4/16/25  - Reviewed neurosurgery notes and current neuro exam unchanged and no worse from prior admission, imaging reviewed  - currently since neuro exam unchanged will not pursue more imaging, low threshold to re-scan if neuro exam worsens    #leukocytosis  - likely rx to DVT  - no other signs of sepsis , afebrile     #hypothyroidism  - c/w synthroid    #CAD  - c/w metoprolol, statin    #HTN  - c/w metoprolol  - BP elevated 140-160s, if remains elevated will adjust meds     #HLD  - c/w statin    #BPH  - chronic gupta replaced in ED    #dvt ppx - heparin drip    #dispo - pending Cr improvement and initiation of DOAC , dw pt

## 2025-03-05 NOTE — DIETITIAN INITIAL EVALUATION ADULT - ORAL INTAKE PTA/DIET HISTORY
Lives at home w/ wife; both cook/grocery shop w/o difficulty. S/p rehab at [facility] x 1 week - did NOT like the food. Normally w/ good appetite, consumes 3 meals/day, likely meeting ~75% of ENN x > 1 mo. Does NOT follow specific diet or consume ONS at home. As per shadow paper work, on regular diet w/ no modified consistency at rehab.

## 2025-03-05 NOTE — DIETITIAN INITIAL EVALUATION ADULT - ADD RECOMMEND
1) C/w DASH/TLC diet as tolerated  2) Encourage protein-rich foods, maximize food preferences; denies ALL ONS at this time  3) Monitor bowel movements, if no BM for >3 days, consider implementing bowel regimen.  4) MVI w/ minerals daily to ensure 100% RDA met  5) Consider adding thiamine 100 mg daily 2/2 poor PO intake/ malnutrition  6) Monitor lytes/ min and replete prn  7) C/w daily wts   8) Confirm goals of care regarding nutrition support  RD will continue to monitor PO intake, labs, hydration, and wt prn.

## 2025-03-05 NOTE — CONSULT NOTE ADULT - SUBJECTIVE AND OBJECTIVE BOX
74-year-old male with a past medical history of benign prostatic hyperplasia, diverticulosis, Graves' disease, hemorrhoids, hyperlipidemia, hypothyroidism, osteoarthritis, spinal stenosis, spinal cord injury (2004), and multiple spinal surgeries (three cervical and one lumbar). He presents with complaints of lower extremity swelling and abnormal BUN/creatinine levels. The patient was recently admitted to [facility] from 02/18/25 to 02/25/25 for sepsis secondary to epidural abscess. At the rehabilitation facility, he was noted to have worsening LE swelling and was noted w rising creatinine. Treated w diuretics at rehab.       PAST MEDICAL & SURGICAL HISTORY:  Hyperlipidemia, unspecified hyperlipidemia type      Diverticulosis of large intestine without hemorrhage      History of colon polyps      Hemorrhoids, unspecified hemorrhoid type      Benign prostatic hyperplasia without lower urinary tract symptoms, unspecified morphology      Osteoarthritis of knee, unilateral  left      Spinal cord injury at C5-C7 level without injury of spinal bone, subsequent encounter      Spastic      Weakness  to right side      Myelopathy      Spinal stenosis, unspecified spinal region      Hypothyroidism, unspecified type      Graves disease      Alcoholic  recovering alcoholic x 40years      History of cervical discectomy      S/P laminectomy  Cervical      H/O lumbar discectomy  with laminectomy      H/O arthroscopy of knee  Left x 2. Unsure of years      H/O colonoscopy with polypectomy  2014          MEDICATIONS  (STANDING):  cefTRIAXone Injectable. 2000 milliGRAM(s) IV Push every 24 hours  heparin  Infusion.  Unit(s)/Hr (19 mL/Hr) IV Continuous <Continuous>  levothyroxine 150 MICROGram(s) Oral daily  metoprolol succinate ER 25 milliGRAM(s) Oral daily  rosuvastatin 5 milliGRAM(s) Oral at bedtime  tamsulosin 0.8 milliGRAM(s) Oral at bedtime  tiZANidine 2 milliGRAM(s) Oral every 8 hours    MEDICATIONS  (PRN):  acetaminophen     Tablet .. 650 milliGRAM(s) Oral every 6 hours PRN Mild Pain (1 - 3)  aluminum hydroxide/magnesium hydroxide/simethicone Suspension 30 milliLiter(s) Oral every 4 hours PRN Dyspepsia  heparin   Injectable 9000 Unit(s) IV Push every 6 hours PRN For aPTT less than 40  heparin   Injectable 4000 Unit(s) IV Push every 6 hours PRN For aPTT between 40 - 57  lactulose Syrup 20 Gram(s) Oral three times a day PRN for constipation  melatonin 3 milliGRAM(s) Oral at bedtime PRN Insomnia  ondansetron Injectable 4 milliGRAM(s) IV Push every 6 hours PRN Nausea and/or Vomiting      Allergies    No Known Allergies    Intolerances        SOCIAL HISTORY:    FAMILY HISTORY:  Family history of colon cancer in mother (Mother)    Family history of liver disease (Father)  father    Family history of cancer (Sibling)  SIster - bile duct          T(C): , Max: 37.2 (03-05-25 @ 21:18)  T(F): , Max: 98.9 (03-05-25 @ 21:18)  HR: 86 (03-05-25 @ 21:18)  BP: 152/92 (03-05-25 @ 21:43)  BP(mean): --  RR: 18 (03-05-25 @ 21:18)  SpO2: 96% (03-05-25 @ 21:18)  Wt(kg): --    03-05 @ 07:01  -  03-05 @ 22:48  --------------------------------------------------------  IN: 0 mL / OUT: 700 mL / NET: -700 mL          PHYSICAL EXAM:    Constitutional: MM  Neck: No LAD, No JVD  Respiratory: dist  dist abd  Cardiovascular: S1 and S2   Extremities: + peripheral edema  Neurological: A/O x 3           LABS:                        12.4   12.72 )-----------( 249      ( 05 Mar 2025 08:31 )             37.1     05 Mar 2025 08:31    135    |  104    |  74     ----------------------------<  112    5.2     |  24     |  2.29   04 Mar 2025 15:12    133    |  102    |  86     ----------------------------<  145    5.3     |  27     |  2.77     Ca    9.1        05 Mar 2025 08:31  Ca    8.9        04 Mar 2025 15:12  Phos  4.4       05 Mar 2025 08:31  Phos  5.3       04 Mar 2025 15:12  Mg     2.4       05 Mar 2025 08:31  Mg     2.7       04 Mar 2025 15:12    TPro  6.7    /  Alb  2.9    /  TBili  0.8    /  DBili  x      /  AST  34     /  ALT  45     /  AlkPhos  64     05 Mar 2025 08:31  TPro  6.6    /  Alb  2.8    /  TBili  0.4    /  DBili  x      /  AST  29     /  ALT  40     /  AlkPhos  65     04 Mar 2025 15:12          Urine Studies:  Urinalysis Basic - ( 05 Mar 2025 08:31 )    Color: x / Appearance: x / SG: x / pH: x  Gluc: 112 mg/dL / Ketone: x  / Bili: x / Urobili: x   Blood: x / Protein: x / Nitrite: x   Leuk Esterase: x / RBC: x / WBC x   Sq Epi: x / Non Sq Epi: x / Bacteria: x      Potassium, Random Urine: 23.5 mmol/L (03-05 @ 04:43)  Sodium, Random Urine: 29 mmol/L (03-05 @ 04:43)  Chloride, Random Urine: 29 mmol/L (03-05 @ 04:43)  Creatinine, Random Urine: 64 mg/dL (03-05 @ 04:43)  Protein/Creatinine Ratio Calculation: 3.4 Ratio (03-05 @ 04:43)        RADIOLOGY & ADDITIONAL STUDIES:

## 2025-03-05 NOTE — PROVIDER CONTACT NOTE (CRITICAL VALUE NOTIFICATION) - PERSON GIVING RESULT:
Chief complaint:   Chief Complaint   Patient presents with   • Laceration       Vitals:  Visit Vitals  /68   Pulse 60   Temp 98.4 °F (36.9 °C) (Oral)   Resp 16   Ht 6' 1\" (1.854 m)   Wt 70.3 kg (155 lb)   SpO2 97%   BMI 20.45 kg/m²       HISTORY OF PRESENT ILLNESS     HPI Thien Downing is a 48 year old male presenting with complaints of laceration to 2nd digit on right hand. Patient states he was washing dishes when one broke cutting his finger. Patient doesn't believe any glass to be retained in finger. Patient was able to control bleeding with a pressure however once he removed the pressure, he began to bleed again. Patient is on brillinta and ASA daily. Patient denies any associated or modifying factors.    I have reviewed the patient's medical record in detail.    Other significant problems:  Patient Active Problem List    Diagnosis Date Noted   • Cardiac arrest with ventricular fibrillation (CMS/Formerly [facility]) 02/03/2021     Priority: Low   • Suspected COVID-19 virus infection 02/03/2021     Priority: Low   • Bilateral pulmonary infiltrates on chest x-ray 02/03/2021     Priority: Low   • Lactic acidemia 02/03/2021     Priority: Low   • Transaminitis 02/03/2021     Priority: Low   • Metabolic acidosis with respiratory acidosis 02/03/2021     Priority: Low   • On mechanically assisted ventilation (CMS/[facility]) 02/03/2021     Priority: Low   • Hypothermia, induced 02/03/2021     Priority: Low   • Hyperglycemia 02/03/2021     Priority: Low   • Other postprocedural status(V45.89) 01/29/2013     Priority: Low   • Pain in joint, lower leg 01/29/2013     Priority: Low       PAST MEDICAL, FAMILY AND SOCIAL HISTORY     Medications:  Current Outpatient Medications   Medication   • Brilinta 90 MG Tab   • atorvastatin (LIPITOR) 40 MG tablet   • carvedilol (COREG) 25 MG tablet   • losartan (COZAAR) 50 MG tablet   • aspirin 81 MG chewable tablet   • clopidogrel (Plavix) 75 MG tablet   • furosemide (LASIX) 20 MG tablet     No current  [facility]/ Lab facility-administered medications for this visit.       Allergies:  ALLERGIES:   Allergen Reactions   • Cephalexin RASH     Body rash - tolerated ceftriaxone 2021 admit   • Sulfamethoxazole W/Trimethoprim RASH     Body rash       Past Medical  History/Surgeries:  No past medical history on file.    Past Surgical History:   Procedure Laterality Date   • Anterior cruciate ligament repair Left 2012    reconstruction   • Appendectomy     • Wrist fracture surgery Right        Family History:  Family History   Problem Relation Age of Onset   • Dementia/Alzheimers Mother    • Heart disease Mother    • Stroke Father        Social History:  Social History     Tobacco Use   • Smoking status: Former Smoker     Packs/day: 1.00     Types: Cigarettes     Quit date: 2/3/2021     Years since quittin.8   • Smokeless tobacco: Former User   Substance Use Topics   • Alcohol use: Yes     Comment: mixed drinks 4-5 drinks a week       REVIEW OF SYSTEMS     Review of Systems   Constitutional: Negative.    HENT: Negative.    Eyes: Negative.    Respiratory: Negative.    Cardiovascular: Negative.    Gastrointestinal: Negative.    Endocrine: Negative.    Genitourinary: Negative.    Musculoskeletal: Negative.    Skin: Negative.    Allergic/Immunologic: Negative.    Neurological: Negative.    Hematological: Negative.    Psychiatric/Behavioral: Negative.    All other systems reviewed and are negative.      PHYSICAL EXAM     Physical Exam  Vitals and nursing note reviewed.   Constitutional:       Appearance: Normal appearance. He is normal weight.   Cardiovascular:      Pulses:           Radial pulses are 2+ on the right side and 2+ on the left side.   Skin:     General: Skin is warm.      Capillary Refill: Capillary refill takes less than 2 seconds.      Findings: Laceration present.   Neurological:      Mental Status: He is alert and oriented to person, place, and time.   Psychiatric:         Mood and Affect: Mood normal.          Behavior: Behavior normal.         ASSESSMENT/PLAN     Patient presents with 2cm laceration to 2nd digit on right hand. Laceration extends ajacent over PIP joint. Most of the laceration is superficial however area extending beyond PIP is partial thickness laceration; no subcutaneous fat noted. Area measures about 1cm. +CMS    Laceration Repair Procedure Note:  Indication: Laceration    Procedure: The patient was appropriately positioned.  Anesthesia of the laceration was obtained by infiltration using 1% Lidocaine without epinephrine. The wound and surrounding area were cleansed with Shur-Clens and draped in a sterile fashion. The laceration was closed with 5-0 Ethilon using interrupted sutures. There were no additional lacerations requiring repair. The wound was dressed with bacitracin, a sterile dressing and a bandage.      Total repaired wound length: 2 cm.    Other Items: Suture count: 2    The patient tolerated the procedure well, without difficulty.    Complications: None.    1. The local anesthetic will last for approximately one hour.  After this, you may want to take aspirin or Tylenol if you have discomfort.    2. Keep the the wound and dressing dry for the first 48 hours.    3. After this, gently remove the dressing, wash area gently with soap and water, and dry completely.  Apply a clean dressing, either a Band-Aid or gauze and tape.     4. Wash the area and apply a clean dressing, as described above, every day until sutures are removed (the scalp is an exception; no dressing is applied to the scalp).    5. If the area begins to swell, apply an ice bag or ice in a baggie over the dressing.  If possible, elevate the area while resting.     6. If bleeding develops, apply direct pressure to the site of blood loss for 15 minutes constantly. If continued bleeding occurs, call the Clinic for advice. If profuse blood loss is noted, come immediately to the Clinic, [facility], or the nearest emergency  room if after regular Clinic hours.    7. Be aware that infection can occur as a complication.  Contact the Clinic immediately if you notice:         A.  Increased swelling after 24 hours.        B.  Excessive redness (if sutures remain in longer than 10 days, redness may develop around                           them).        C.  Pain        D.  Heat, either locally or systemically, as reflected by a fever.        E.  Excessive drainage from the wound.      Preventative measures, supportive cares, and return precautions for the above diagnoses were discussed with the patient.      If symptoms persist, worsen, new symptoms emerge, patient does not improve, or patient has any other concerns they were advised to please follow up in urgent care, emergency room or with PCP.  Discussed treatment plan with patient, who understands and agrees with plan. The risks, benefits, possible side effects, and drug interactions of medications ordered were reviewed with the patient.

## 2025-03-06 LAB
ANION GAP SERPL CALC-SCNC: 6 MMOL/L — SIGNIFICANT CHANGE UP (ref 5–17)
APTT BLD: 82.4 SEC — HIGH (ref 24.5–35.6)
BUN SERPL-MCNC: 71 MG/DL — HIGH (ref 7–23)
CALCIUM SERPL-MCNC: 8.5 MG/DL — SIGNIFICANT CHANGE UP (ref 8.5–10.1)
CHLORIDE SERPL-SCNC: 104 MMOL/L — SIGNIFICANT CHANGE UP (ref 96–108)
CO2 SERPL-SCNC: 24 MMOL/L — SIGNIFICANT CHANGE UP (ref 22–31)
CREAT SERPL-MCNC: 1.92 MG/DL — HIGH (ref 0.5–1.3)
CULTURE RESULTS: SIGNIFICANT CHANGE UP
EGFR: 37 ML/MIN/1.73M2 — LOW
EGFR: 37 ML/MIN/1.73M2 — LOW
GLUCOSE SERPL-MCNC: 120 MG/DL — HIGH (ref 70–99)
HCT VFR BLD CALC: 32.1 % — LOW (ref 39–50)
HGB BLD-MCNC: 10.7 G/DL — LOW (ref 13–17)
MAGNESIUM SERPL-MCNC: 2.4 MG/DL — SIGNIFICANT CHANGE UP (ref 1.6–2.6)
MCHC RBC-ENTMCNC: 28.5 PG — SIGNIFICANT CHANGE UP (ref 27–34)
MCHC RBC-ENTMCNC: 33.3 G/DL — SIGNIFICANT CHANGE UP (ref 32–36)
MCV RBC AUTO: 85.6 FL — SIGNIFICANT CHANGE UP (ref 80–100)
NRBC # BLD AUTO: 0 K/UL — SIGNIFICANT CHANGE UP (ref 0–0)
NRBC # FLD: 0 K/UL — SIGNIFICANT CHANGE UP (ref 0–0)
NRBC BLD AUTO-RTO: 0 /100 WBCS — SIGNIFICANT CHANGE UP (ref 0–0)
PHOSPHATE SERPL-MCNC: 3.8 MG/DL — SIGNIFICANT CHANGE UP (ref 2.5–4.5)
PLATELET # BLD AUTO: 214 K/UL — SIGNIFICANT CHANGE UP (ref 150–400)
PMV BLD: 9.8 FL — SIGNIFICANT CHANGE UP (ref 7–13)
POTASSIUM SERPL-MCNC: 5.2 MMOL/L — SIGNIFICANT CHANGE UP (ref 3.5–5.3)
POTASSIUM SERPL-SCNC: 5.2 MMOL/L — SIGNIFICANT CHANGE UP (ref 3.5–5.3)
RBC # BLD: 3.75 M/UL — LOW (ref 4.2–5.8)
RBC # FLD: 14.4 % — SIGNIFICANT CHANGE UP (ref 10.3–14.5)
SODIUM SERPL-SCNC: 134 MMOL/L — LOW (ref 135–145)
SPECIMEN SOURCE: SIGNIFICANT CHANGE UP
WBC # BLD: 11.91 K/UL — HIGH (ref 3.8–10.5)
WBC # FLD AUTO: 11.91 K/UL — HIGH (ref 3.8–10.5)

## 2025-03-06 PROCEDURE — 99222 1ST HOSP IP/OBS MODERATE 55: CPT

## 2025-03-06 PROCEDURE — 99223 1ST HOSP IP/OBS HIGH 75: CPT

## 2025-03-06 PROCEDURE — 99233 SBSQ HOSP IP/OBS HIGH 50: CPT

## 2025-03-06 PROCEDURE — 93971 EXTREMITY STUDY: CPT | Mod: 26,LT

## 2025-03-06 RX ORDER — APIXABAN 2.5 MG/1
10 TABLET, FILM COATED ORAL EVERY 12 HOURS
Refills: 0 | Status: DISCONTINUED | OUTPATIENT
Start: 2025-03-06 | End: 2025-03-12

## 2025-03-06 RX ORDER — TRAZODONE HCL 100 MG
25 TABLET ORAL AT BEDTIME
Refills: 0 | Status: COMPLETED | OUTPATIENT
Start: 2025-03-06 | End: 2025-03-06

## 2025-03-06 RX ORDER — OXYCODONE HYDROCHLORIDE 30 MG/1
5 TABLET ORAL EVERY 6 HOURS
Refills: 0 | Status: DISCONTINUED | OUTPATIENT
Start: 2025-03-06 | End: 2025-03-13

## 2025-03-06 RX ORDER — SODIUM CHLORIDE 9 G/1000ML
1000 INJECTION, SOLUTION INTRAVENOUS
Refills: 0 | Status: DISCONTINUED | OUTPATIENT
Start: 2025-03-06 | End: 2025-03-06

## 2025-03-06 RX ORDER — APIXABAN 2.5 MG/1
5 TABLET, FILM COATED ORAL EVERY 12 HOURS
Refills: 0 | Status: DISCONTINUED | OUTPATIENT
Start: 2025-03-06 | End: 2025-03-06

## 2025-03-06 RX ADMIN — TAMSULOSIN HYDROCHLORIDE 0.8 MILLIGRAM(S): 0.4 CAPSULE ORAL at 21:30

## 2025-03-06 RX ADMIN — SODIUM CHLORIDE 75 MILLILITER(S): 9 INJECTION, SOLUTION INTRAVENOUS at 13:25

## 2025-03-06 RX ADMIN — ROSUVASTATIN CALCIUM 5 MILLIGRAM(S): 5 TABLET, FILM COATED ORAL at 21:30

## 2025-03-06 RX ADMIN — APIXABAN 10 MILLIGRAM(S): 2.5 TABLET, FILM COATED ORAL at 13:24

## 2025-03-06 RX ADMIN — METOPROLOL SUCCINATE 25 MILLIGRAM(S): 50 TABLET, EXTENDED RELEASE ORAL at 10:50

## 2025-03-06 RX ADMIN — Medication 650 MILLIGRAM(S): at 16:51

## 2025-03-06 RX ADMIN — TIZANIDINE 2 MILLIGRAM(S): 4 TABLET ORAL at 14:08

## 2025-03-06 RX ADMIN — TIZANIDINE 2 MILLIGRAM(S): 4 TABLET ORAL at 21:31

## 2025-03-06 RX ADMIN — APIXABAN 10 MILLIGRAM(S): 2.5 TABLET, FILM COATED ORAL at 23:36

## 2025-03-06 RX ADMIN — CEFTRIAXONE 2000 MILLIGRAM(S): 500 INJECTION, POWDER, FOR SOLUTION INTRAMUSCULAR; INTRAVENOUS at 21:29

## 2025-03-06 RX ADMIN — HEPARIN SODIUM 1600 UNIT(S)/HR: 1000 INJECTION INTRAVENOUS; SUBCUTANEOUS at 07:03

## 2025-03-06 RX ADMIN — OXYCODONE HYDROCHLORIDE 5 MILLIGRAM(S): 30 TABLET ORAL at 21:38

## 2025-03-06 RX ADMIN — HEPARIN SODIUM 1600 UNIT(S)/HR: 1000 INJECTION INTRAVENOUS; SUBCUTANEOUS at 09:22

## 2025-03-06 RX ADMIN — Medication 650 MILLIGRAM(S): at 17:38

## 2025-03-06 RX ADMIN — Medication 25 MILLIGRAM(S): at 21:29

## 2025-03-06 RX ADMIN — Medication 150 MICROGRAM(S): at 05:35

## 2025-03-06 RX ADMIN — TIZANIDINE 2 MILLIGRAM(S): 4 TABLET ORAL at 05:35

## 2025-03-06 NOTE — PHYSICAL THERAPY INITIAL EVALUATION ADULT - LEVEL OF INDEPENDENCE: GAIT, REHAB EVAL
Pt unable to use RW even with Max assx2 thus used [employer] Flex-Mechanical/powered STS device for transfers/unable to perform

## 2025-03-06 NOTE — PROGRESS NOTE ADULT - SUBJECTIVE AND OBJECTIVE BOX
CHIEF COMPLAINT:    SUBJECTIVE/SIGNIFICANT INTERVAL EVENTS/OVERNIGHT EVENTS:    Review of Systems: 14 Point review of systems reviewed and reported as negative unless otherwise stated in HPI    FROM H&P:  74-year-old male with a past medical history of benign prostatic hyperplasia, diverticulosis, Graves' disease, hemorrhoids, hyperlipidemia, hypothyroidism, osteoarthritis, spinal stenosis, spinal cord injury (2004), and multiple spinal surgeries (three cervical and one lumbar). He presents with complaints of lower extremity swelling and abnormal BUN/creatinine levels. The patient was recently admitted to [facility] from 02/18/25 to 02/25/25 for sepsis secondary to epidural abscess. He has a peripherally inserted central catheter line in the right arm and is on nightly Ceftriaxone treatment until 04/16/25. Following his discharge, he was transferred to [facility]. Documentation from the rehabilitation facility notes a decline in his functional status and dysfunction with activities of daily living. At the rehabilitation facility, he was noted to have worsening LE swelling and was started on torsemide. However, his Cr levels subsequently became elevated, leading to the discontinuation of torsemide. He was sent to the [facility] Emergency Department for further evaluation of his LE swelling and elevated Cr levels. He denies any chest pain, dyspnea, orthopnea, PND.     3/5: pt feels well. c/o LE numbness @ thighs and L hand numbness. denies saddle anesthesia. Cr improving   3/6: Cr continues to improve, c/o generalized weakness noble while ambulating but no new neuro deficits     PHYSICAL EXAM:    T(C): 36.8 (03-05-25 @ 08:30), Max: 36.9 (03-04-25 @ 19:55)  HR: 92 (03-05-25 @ 08:30) (68 - 92)  BP: 140/92 (03-05-25 @ 08:30) (140/92 - 184/96)  RR: 18 (03-05-25 @ 08:30) (16 - 18)  SpO2: 94% (03-05-25 @ 08:30) (93% - 98%)    General: AAOx3; NAD  Head: AT/NC  Neck: Non-tender; No JVD  CVS: RRR, S1&S2, No murmur, No edema  Respiratory: Lungs CTA B/L; Normal Respiratory Effort  Abdomen/GI: Soft, non-tender, non-distended  : No bladder distention, No Ortega  Extremities: No cyanosis, No clubbing, No edema  MSK: No CVA tenderness, Normal ROM, No injury  Neuro: AAOx3, numbness to upper b/l thighs, 3/5 L hand weakness, ,no saddle anesthesia   Psych: Appropriate, Cooperative, No depression, No anxiety  Skin: Clean, Dry and Intact      LABS:                          12.4   12.72 )-----------( 249      ( 05 Mar 2025 08:31 )             37.1     03-05    135  |  104  |  74[H]  ----------------------------<  112[H]  5.2   |  24  |  2.29[H]    Ca    9.1      05 Mar 2025 08:31  Phos  4.4     03-05  Mg     2.4     03-05    TPro  6.7  /  Alb  2.9[L]  /  TBili  0.8  /  DBili  x   /  AST  34  /  ALT  45  /  AlkPhos  64  03-05    SARS-CoV-2: NotDetec (17 Feb 2025 21:11)    CAPILLARY BLOOD GLUCOSE          Urinalysis with Rflx Culture (collected 04 Mar 2025 15:12)          RADIOLOGY:      EKG:      ECHO:      PROCEDURES:        I personally reviewed labs, imaging, ekg, orders and vitals.    Discussed case with:  []RN  []CM/SW  []Patient  []Family  []Specialist:        MEDICATIONS  (STANDING):  cefTRIAXone Injectable. 2000 milliGRAM(s) IV Push every 24 hours  heparin  Infusion.  Unit(s)/Hr (19 mL/Hr) IV Continuous <Continuous>  lactated ringers. 1000 milliLiter(s) (100 mL/Hr) IV Continuous <Continuous>  levothyroxine 150 MICROGram(s) Oral daily  metoprolol succinate ER 25 milliGRAM(s) Oral daily  rosuvastatin 5 milliGRAM(s) Oral at bedtime    MEDICATIONS  (PRN):  acetaminophen     Tablet .. 650 milliGRAM(s) Oral every 6 hours PRN Mild Pain (1 - 3)  aluminum hydroxide/magnesium hydroxide/simethicone Suspension 30 milliLiter(s) Oral every 4 hours PRN Dyspepsia  heparin   Injectable 9000 Unit(s) IV Push every 6 hours PRN For aPTT less than 40  heparin   Injectable 4000 Unit(s) IV Push every 6 hours PRN For aPTT between 40 - 57  lactulose Syrup 20 Gram(s) Oral three times a day PRN for constipation  melatonin 3 milliGRAM(s) Oral at bedtime PRN Insomnia  ondansetron Injectable 4 milliGRAM(s) IV Push every 6 hours PRN Nausea and/or Vomiting

## 2025-03-06 NOTE — CONSULT NOTE ADULT - ASSESSMENT
74 year old Male with noted history of spinal surgeries recent admission in February with noted streptococcus galactiae bacteremia and cervical spinal epidural abscess now admitted this time for ARF with lower extreme edema.     Recommendation.  74 year old Male with noted history of spinal surgeries recent admission in February with noted streptococcus galactiae bacteremia and cervical spinal epidural abscess now admitted this time for ARF with lower extreme edema.     Recommendation:  No acute neurosurgical intervention  Continue present management as per hospitalist team  MRI Lumbar Spine with and without to evaluate for any compressive lesions   If there are no compressive lesions on MRI findings above, disposition as per primary team  Case d/w Dr. Cantu

## 2025-03-06 NOTE — PROGRESS NOTE ADULT - ASSESSMENT
74M admitted for GOVIND, and DVT.         #GOVIND  #LE edema  #hypoalbuminemia  - Cr improved from 2.8 > 1.9 today  - continue to hold torsemide  - gentle hydration today  - DC gupta with TOV today  - nephrology consult    #DVT  - appears provoked from decrease ambulation from recent spinal abscess  - could not tolerate VQ scan for r/o PE given pain/hx spinal surgeries  - switch to eliquis today, will be new med on discharge     #Spinal epidural abscess  - Chart reviewed extensively  - PICC w CTX for 8 week course to finish 4/16/25  - Reviewed neurosurgery notes and current neuro exam unchanged and no worse from prior admission, imaging reviewed  - currently since neuro exam unchanged will not pursue more imaging, low threshold to re-scan if neuro exam worsens    #leukocytosis  - likely rx to DVT  - no other signs of sepsis , afebrile   - downtrending 3/6    #hypothyroidism  - c/w synthroid    #CAD  - c/w metoprolol, statin    #HTN  - c/w metoprolol  - BP elevated 140-160s, if remains elevated will adjust meds     #HLD  - c/w statin    #anemia   - 12>10 likely dilutional   - no signs of bleeding   trend     #BPH  - gupta placed during prior admission  - TOV today     #dvt ppx - eliquis     #dispo - pending Cr improvement , TOV, likely DC back to rehab tomorrow  74M admitted for GOVIND, and DVT.         #GOVIND  #LE edema  #hypoalbuminemia  - Cr improved from 2.8 > 1.9 today  - continue to hold torsemide  - gentle hydration today  - DC gupta with TOV today  - nephrology consult    #DVT  - appears provoked from decrease ambulation from recent spinal abscess  - could not tolerate VQ scan for r/o PE given pain/hx spinal surgeries  - switch to eliquis today, will be new med on discharge     #Spinal epidural abscess  - Chart reviewed extensively  - PICC w CTX for 8 week course to finish 4/16/25  - Reviewed neurosurgery notes and current neuro exam unchanged and no worse from prior admission, imaging reviewed  - currently since neuro exam unchanged will not pursue more imaging, low threshold to re-scan if neuro exam worsens    #leukocytosis  - likely rx to DVT  - no other signs of sepsis , afebrile   - downtrending 3/6    #hypothyroidism  - c/w synthroid    #CAD  - c/w metoprolol, statin    #HTN  - c/w metoprolol  - BP elevated 140-160s, if remains elevated will adjust meds > BP down to 130s today no adjustment of meds     #HLD  - c/w statin    #anemia   - 12>10 likely dilutional   - no signs of bleeding   trend     #BPH  - gupta placed during prior admission  - TOV today     #dvt ppx - eliquis     #dispo - pending Cr improvement , TOV, likely DC back to rehab tomorrow

## 2025-03-06 NOTE — CONSULT NOTE ADULT - SUBJECTIVE AND OBJECTIVE BOX
Patient is a 74y old  Male who presents with a chief complaint of Acute renal failure     (05 Mar 2025 10:19)      HPI: Rishabh Shaver is a 74-year-old male with a past medical history of benign prostatic hyperplasia, diverticulosis, Graves' disease, hemorrhoids, hyperlipidemia, hypothyroidism, osteoarthritis, spinal stenosis, spinal cord injury (2004), and multiple spinal surgeries (three cervical and one lumbar). He presents with complaints of lower extremity swelling and abnormal BUN/creatinine levels. The patient was recently admitted to [facility] from 02/18/25 to 02/25/25 for sepsis secondary to epidural abscess. He has a peripherally inserted central catheter line in the right arm and is on nightly Ceftriaxone treatment until 04/16/25. Following his discharge, he was transferred to [facility]. Documentation from the rehabilitation facility notes a decline in his functional status and dysfunction with activities of daily living. At the rehabilitation facility, he was noted to have worsening LE swelling and was started on torsemide. However, his Cr levels subsequently became elevated, leading to the discontinuation of torsemide. He was sent to the [facility] Emergency Department for further evaluation of his LE swelling and elevated Cr levels. He denies any chest pain, dyspnea, orthopnea, PND.     Patient had been seen in the prior admission in February with diagnosis of cervical epidural abscess in the setting of streptococcus dysgalactiae bacteremia. Patient is to continue Ceftriaxone via PICC  line until 4/16. Patient was planned for discharge to rehabilitation tomorrow, however, he started complaining of left lower extremity pain and swelling for which neurosurgery was consulted.       MEDICATIONS  (STANDING):  apixaban 10 milliGRAM(s) Oral every 12 hours  cefTRIAXone Injectable. 2000 milliGRAM(s) IV Push every 24 hours  lactated ringers. 1000 milliLiter(s) (75 mL/Hr) IV Continuous <Continuous>  levothyroxine 150 MICROGram(s) Oral daily  metoprolol succinate ER 25 milliGRAM(s) Oral daily  rosuvastatin 5 milliGRAM(s) Oral at bedtime  tamsulosin 0.8 milliGRAM(s) Oral at bedtime  tiZANidine 2 milliGRAM(s) Oral every 8 hours       ROS: Pertinent positives in HPI, all other ROS were reviewed and are negative.      Vital Signs Last 24 Hrs  T(C): 36.7 (06 Mar 2025 15:48), Max: 37.3 (06 Mar 2025 08:32)  T(F): 98 (06 Mar 2025 15:48), Max: 99.1 (06 Mar 2025 08:32)  HR: 74 (06 Mar 2025 15:48) (74 - 86)  BP: 155/73 (06 Mar 2025 15:48) (136/76 - 167/83)  BP(mean): --  RR: 18 (06 Mar 2025 15:48) (18 - 18)  SpO2: 93% (06 Mar 2025 15:48) (93% - 96%)    Parameters below as of 06 Mar 2025 15:48  Patient On (Oxygen Delivery Method): room air            Labs:                        10.7   11.91 )-----------( 214      ( 06 Mar 2025 08:11 )             32.1     03-06    134[L]  |  104  |  71[H]  ----------------------------<  120[H]  5.2   |  24  |  1.92[H]    Ca    8.5      06 Mar 2025 08:11  Phos  3.8     03-06  Mg     2.4     03-06    TPro  6.7  /  Alb  2.9[L]  /  TBili  0.8  /  DBili  x   /  AST  34  /  ALT  45  /  AlkPhos  64  03-05        PTT - ( 06 Mar 2025 08:11 )  PTT:82.4 sec    Radiology:          A/P:      Time spent:   minutes in examination of patient, review of labs and imaging, and coordination with contributing physicians   Patient is a 74y old  Male who presents with a chief complaint of Acute renal failure     (05 Mar 2025 10:19)      HPI: Rishabh Shaver is a 74-year-old male with a past medical history of benign prostatic hyperplasia, diverticulosis, Graves' disease, hemorrhoids, hyperlipidemia, hypothyroidism, osteoarthritis, spinal stenosis, spinal cord injury (2004), and multiple spinal surgeries (three cervical and one lumbar). He presents with complaints of lower extremity swelling and abnormal BUN/creatinine levels. The patient was recently admitted to [facility] from 02/18/25 to 02/25/25 for sepsis secondary to epidural abscess. He has a peripherally inserted central catheter line in the right arm and is on nightly Ceftriaxone treatment until 04/16/25. Following his discharge, he was transferred to [facility] (formerly [facility] Documentation from the rehabilitation facility notes a decline in his functional status and dysfunction with activities of daily living. At the rehabilitation facility, he was noted to have worsening LE swelling and was started on torsemide. However, his Cr levels subsequently became elevated, leading to the discontinuation of torsemide. He was sent to the [facility] Emergency Department for further evaluation of his LE swelling and elevated Cr levels. He denies any chest pain, dyspnea, orthopnea, PND.     Patient had been seen in the prior admission in February with diagnosis of cervical epidural abscess in the setting of streptococcus dysgalactiae bacteremia. Patient is to continue Ceftriaxone via PICC  line until 4/16. Patient was planned for discharge to rehabilitation tomorrow, however, he started complaining of left lower extremity pain and swelling for which neurosurgery was consulted.       MEDICATIONS  (STANDING):  apixaban 10 milliGRAM(s) Oral every 12 hours  cefTRIAXone Injectable. 2000 milliGRAM(s) IV Push every 24 hours  lactated ringers. 1000 milliLiter(s) (75 mL/Hr) IV Continuous <Continuous>  levothyroxine 150 MICROGram(s) Oral daily  metoprolol succinate ER 25 milliGRAM(s) Oral daily  rosuvastatin 5 milliGRAM(s) Oral at bedtime  tamsulosin 0.8 milliGRAM(s) Oral at bedtime  tiZANidine 2 milliGRAM(s) Oral every 8 hours     Physical Exam:  Constitutional: Awake / alert  HEENT: PERRLA, EOMI  Neck: Supple  Respiratory: No respiratory distress noted  Gastrointestinal: Soft, NT/ND  Extremities:  edema in bilateral lower extremities  Musculoskeletal: no abnormal movements  Skin: No rashes    Neurological Exam:  HF: A x O x 3, appropriately interactive, normal affect, speech fluent, no aphasia or paraphasic errors. Naming /repetition intact   CN: PERRL, EOMI, VFF, facial sensation normal, no NLFD, tongue midline  Motor: left hand 3/5, finger abduction 3/5, otherwise 5/5 throughout, right upper extremity 5/5 throughout, left lower extremity quads limited secondary to pain, hamstrings 5/5, EHL, DF, PF 5/5, right lower extremity 5/5 throughout. Noted to have diffuse swelling throughout the legs  Sens: Intact to light touch  Reflexes: Symmetric and normal, downgoing toes b/l  Coord:  No FNFA, dysmetria, ALLAN intact   Gait/Balance: Cannot test    ROS: Pertinent positives in HPI, all other ROS were reviewed and are negative.      Vital Signs Last 24 Hrs  T(C): 36.7 (06 Mar 2025 15:48), Max: 37.3 (06 Mar 2025 08:32)  T(F): 98 (06 Mar 2025 15:48), Max: 99.1 (06 Mar 2025 08:32)  HR: 74 (06 Mar 2025 15:48) (74 - 86)  BP: 155/73 (06 Mar 2025 15:48) (136/76 - 167/83)  BP(mean): --  RR: 18 (06 Mar 2025 15:48) (18 - 18)  SpO2: 93% (06 Mar 2025 15:48) (93% - 96%)    Parameters below as of 06 Mar 2025 15:48  Patient On (Oxygen Delivery Method): room air            Labs:                        10.7   11.91 )-----------( 214      ( 06 Mar 2025 08:11 )             32.1     03-06    134[L]  |  104  |  71[H]  ----------------------------<  120[H]  5.2   |  24  |  1.92[H]    Ca    8.5      06 Mar 2025 08:11  Phos  3.8     03-06  Mg     2.4     03-06    TPro  6.7  /  Alb  2.9[L]  /  TBili  0.8  /  DBili  x   /  AST  34  /  ALT  45  /  AlkPhos  64  03-05        PTT - ( 06 Mar 2025 08:11 )  PTT:82.4 sec    Radiology:          A/P:      Time spent:   minutes in examination of patient, review of labs and imaging, and coordination with contributing physicians

## 2025-03-06 NOTE — PROGRESS NOTE ADULT - ASSESSMENT
74-year-old male with a past medical history of benign prostatic hyperplasia, diverticulosis, Graves' disease, hemorrhoids, hyperlipidemia, hypothyroidism, osteoarthritis, spinal stenosis, spinal cord injury (2004), and multiple spinal surgeries (three cervical and one lumbar). He presents with complaints of lower extremity swelling and abnormal BUN/creatinine levels. The patient was recently admitted to [facility] from 02/18/25 to 02/25/25 for sepsis secondary to epidural abscess. At the rehabilitation facility, he was noted to have worsening LE swelling and was noted w rising creatinine. Treated w diuretics at rehab.     GOVIND - improving   -Likely pre renal state setting of diuresis, decreased intake -   -No nsaids/contrast  -Trend daily labs/lytes    Edema  -LIkely in post op state, poor intake and depressed albumin  -Hold diuretics  -Optimize intake/protein stores    * seen earlier, note now     74-year-old male with a past medical history of benign prostatic hyperplasia, diverticulosis, Graves' disease, hemorrhoids, hyperlipidemia, hypothyroidism, osteoarthritis, spinal stenosis, spinal cord injury (2004), and multiple spinal surgeries (three cervical and one lumbar). He presents with complaints of lower extremity swelling and abnormal BUN/creatinine levels. The patient was recently admitted to [facility] from 02/18/25 to 02/25/25 for sepsis secondary to epidural abscess. At the rehabilitation facility, he was noted to have worsening LE swelling and was noted w rising creatinine. Treated w diuretics at rehab.     GOVIND - improving   -Likely pre renal state setting of diuresis, decreased intake -   -No nsaids/contrast  -Trend daily labs/lytes    Edema  -LIkely in post op state, poor intake and depressed albumin  -Hold diuretics  -Optimize intake/protein stores  - proteinuria 3.4 grams ( gupta specimen) - check serologies for completeness     * seen earlier, note now

## 2025-03-06 NOTE — PHYSICAL THERAPY INITIAL EVALUATION ADULT - ADDITIONAL COMMENTS
Patient lives home with wife. He comes to us from [facility], where he went after discharge from  on 2/25/25. He has a PICC line for IV Ceftriaxone until 4/16 (spinal abscess).

## 2025-03-06 NOTE — PROGRESS NOTE ADULT - SUBJECTIVE AND OBJECTIVE BOX
Patient is a 74y Male with  who reports   no complaints overnight.      Allergies    No Known Allergies    Intolerances        MEDICATIONS  (STANDING):  apixaban 10 milliGRAM(s) Oral every 12 hours  cefTRIAXone Injectable. 2000 milliGRAM(s) IV Push every 24 hours  levothyroxine 150 MICROGram(s) Oral daily  metoprolol succinate ER 25 milliGRAM(s) Oral daily  rosuvastatin 5 milliGRAM(s) Oral at bedtime  tamsulosin 0.8 milliGRAM(s) Oral at bedtime  tiZANidine 2 milliGRAM(s) Oral every 8 hours      Vitals:  T(F): 98.4 (03-06-25), Max: 99.1 (03-06-25)  HR: 77 (03-06-25) (74 - 83)  BP: 155/80 (03-06-25) (136/76 - 155/80)  RR: 18 (03-06-25)  SpO2: 94% (03-06-25)    I and O's:    03-05 @ 07:01  -  03-06 @ 07:00  --------------------------------------------------------  IN: 0 mL / OUT: 700 mL / NET: -700 mL    03-06 @ 07:01  -  03-06 @ 23:35  --------------------------------------------------------  IN: 0 mL / OUT: 1000 mL / NET: -1000 mL            PHYSICAL EXAM:    Constitutional: NAD,   HEENT:   MM  Respiratory: CTAB  Cardiovascular: S1 and S2  Gastrointestinal: BS+, soft, NT/ND  Extremities: No peripheral edema  Neurological: A/O  : No Ortega  Dialysis Access: Not applicable    LABS:                        10.7   11.91 )-----------( 214      ( 06 Mar 2025 08:11 )             32.1                         12.4   12.72 )-----------( 249      ( 05 Mar 2025 08:31 )             37.1       134    |  104    |  71     ----------------------------<  120       06 Mar 2025 08:11  5.2     |  24     |  1.92     135    |  104    |  74     ----------------------------<  112       05 Mar 2025 08:31  5.2     |  24     |  2.29     133    |  102    |  86     ----------------------------<  145       04 Mar 2025 15:12  5.3     |  27     |  2.77     Ca    8.5        06 Mar 2025 08:11  Ca    9.1        05 Mar 2025 08:31    Phos  3.8       06 Mar 2025 08:11  Phos  4.4       05 Mar 2025 08:31    Mg     2.4       06 Mar 2025 08:11  Mg     2.4       05 Mar 2025 08:31    TPro  6.7    /  Alb  2.9    /  TBili  0.8    /        05 Mar 2025 08:31  DBili  x      /  AST  34     /  ALT  45     /  AlkPhos  64       TPro  6.6    /  Alb  2.8    /  TBili  0.4    /        04 Mar 2025 15:12  DBili  x      /  AST  29     /  ALT  40     /  AlkPhos  65           Serum Osmo:   Serum Uric Acid:     Urine Studies:  Urinalysis Basic - ( 06 Mar 2025 08:11 )    Color: x / Appearance: x / SG: x / pH: x  Gluc: 120 mg/dL / Ketone: x  / Bili: x / Urobili: x   Blood: x / Protein: x / Nitrite: x   Leuk Esterase: x / RBC: x / WBC x   Sq Epi: x / Non Sq Epi: x / Bacteria: x        Urine chemistry:   Urine Na: Sodium, Random Urine: 29 mmol/L (03-05 @ 04:43)    Urine Creatinine: Creatinine, Random Urine: 64 mg/dL (03-05 @ 04:43)    Urine Protein/Cr ratio:  Urine K: Potassium, Random Urine: 23.5 mmol/L (03-05 @ 04:43)    Urine Osm:       RADIOLOGY & ADDITIONAL STUDIES:

## 2025-03-07 LAB
ANION GAP SERPL CALC-SCNC: 6 MMOL/L — SIGNIFICANT CHANGE UP (ref 5–17)
APTT BLD: 39.2 SEC — HIGH (ref 24.5–35.6)
BUN SERPL-MCNC: 56 MG/DL — HIGH (ref 7–23)
CALCIUM SERPL-MCNC: 8.9 MG/DL — SIGNIFICANT CHANGE UP (ref 8.5–10.1)
CHLORIDE SERPL-SCNC: 102 MMOL/L — SIGNIFICANT CHANGE UP (ref 96–108)
CO2 SERPL-SCNC: 25 MMOL/L — SIGNIFICANT CHANGE UP (ref 22–31)
CREAT SERPL-MCNC: 1.71 MG/DL — HIGH (ref 0.5–1.3)
EGFR: 42 ML/MIN/1.73M2 — LOW
EGFR: 42 ML/MIN/1.73M2 — LOW
GLUCOSE SERPL-MCNC: 115 MG/DL — HIGH (ref 70–99)
HCT VFR BLD CALC: 33.5 % — LOW (ref 39–50)
HGB BLD-MCNC: 11.1 G/DL — LOW (ref 13–17)
MCHC RBC-ENTMCNC: 28.6 PG — SIGNIFICANT CHANGE UP (ref 27–34)
MCHC RBC-ENTMCNC: 33.1 G/DL — SIGNIFICANT CHANGE UP (ref 32–36)
MCV RBC AUTO: 86.3 FL — SIGNIFICANT CHANGE UP (ref 80–100)
NRBC # BLD AUTO: 0 K/UL — SIGNIFICANT CHANGE UP (ref 0–0)
NRBC # FLD: 0 K/UL — SIGNIFICANT CHANGE UP (ref 0–0)
NRBC BLD AUTO-RTO: 0 /100 WBCS — SIGNIFICANT CHANGE UP (ref 0–0)
PLATELET # BLD AUTO: 224 K/UL — SIGNIFICANT CHANGE UP (ref 150–400)
PMV BLD: 9.8 FL — SIGNIFICANT CHANGE UP (ref 7–13)
POTASSIUM SERPL-MCNC: 5.4 MMOL/L — HIGH (ref 3.5–5.3)
POTASSIUM SERPL-SCNC: 5.4 MMOL/L — HIGH (ref 3.5–5.3)
RBC # BLD: 3.88 M/UL — LOW (ref 4.2–5.8)
RBC # FLD: 14.5 % — SIGNIFICANT CHANGE UP (ref 10.3–14.5)
SODIUM SERPL-SCNC: 133 MMOL/L — LOW (ref 135–145)
WBC # BLD: 14.48 K/UL — HIGH (ref 3.8–10.5)
WBC # FLD AUTO: 14.48 K/UL — HIGH (ref 3.8–10.5)

## 2025-03-07 PROCEDURE — 72158 MRI LUMBAR SPINE W/O & W/DYE: CPT | Mod: 26

## 2025-03-07 PROCEDURE — 99231 SBSQ HOSP IP/OBS SF/LOW 25: CPT | Mod: FS

## 2025-03-07 PROCEDURE — 99231 SBSQ HOSP IP/OBS SF/LOW 25: CPT

## 2025-03-07 PROCEDURE — 99233 SBSQ HOSP IP/OBS HIGH 50: CPT

## 2025-03-07 PROCEDURE — 93925 LOWER EXTREMITY STUDY: CPT | Mod: 26

## 2025-03-07 RX ORDER — HYDROMORPHONE/SOD CHLOR,ISO/PF 2 MG/10 ML
0.5 SYRINGE (ML) INJECTION ONCE
Refills: 0 | Status: DISCONTINUED | OUTPATIENT
Start: 2025-03-07 | End: 2025-03-07

## 2025-03-07 RX ORDER — SODIUM ZIRCONIUM CYCLOSILICATE 5 G/5G
5 POWDER, FOR SUSPENSION ORAL ONCE
Refills: 0 | Status: COMPLETED | OUTPATIENT
Start: 2025-03-07 | End: 2025-03-07

## 2025-03-07 RX ADMIN — Medication 150 MICROGRAM(S): at 05:04

## 2025-03-07 RX ADMIN — Medication 3 MILLIGRAM(S): at 21:56

## 2025-03-07 RX ADMIN — TAMSULOSIN HYDROCHLORIDE 0.8 MILLIGRAM(S): 0.4 CAPSULE ORAL at 21:54

## 2025-03-07 RX ADMIN — Medication 0.5 MILLIGRAM(S): at 13:41

## 2025-03-07 RX ADMIN — CEFTRIAXONE 2000 MILLIGRAM(S): 500 INJECTION, POWDER, FOR SOLUTION INTRAMUSCULAR; INTRAVENOUS at 21:53

## 2025-03-07 RX ADMIN — APIXABAN 10 MILLIGRAM(S): 2.5 TABLET, FILM COATED ORAL at 21:53

## 2025-03-07 RX ADMIN — TIZANIDINE 2 MILLIGRAM(S): 4 TABLET ORAL at 05:04

## 2025-03-07 RX ADMIN — OXYCODONE HYDROCHLORIDE 5 MILLIGRAM(S): 30 TABLET ORAL at 21:54

## 2025-03-07 RX ADMIN — ROSUVASTATIN CALCIUM 5 MILLIGRAM(S): 5 TABLET, FILM COATED ORAL at 21:54

## 2025-03-07 RX ADMIN — METOPROLOL SUCCINATE 25 MILLIGRAM(S): 50 TABLET, EXTENDED RELEASE ORAL at 09:35

## 2025-03-07 RX ADMIN — SODIUM ZIRCONIUM CYCLOSILICATE 5 GRAM(S): 5 POWDER, FOR SUSPENSION ORAL at 20:44

## 2025-03-07 RX ADMIN — APIXABAN 10 MILLIGRAM(S): 2.5 TABLET, FILM COATED ORAL at 09:35

## 2025-03-07 RX ADMIN — TIZANIDINE 2 MILLIGRAM(S): 4 TABLET ORAL at 13:41

## 2025-03-07 RX ADMIN — OXYCODONE HYDROCHLORIDE 5 MILLIGRAM(S): 30 TABLET ORAL at 04:30

## 2025-03-07 RX ADMIN — TIZANIDINE 2 MILLIGRAM(S): 4 TABLET ORAL at 21:54

## 2025-03-07 NOTE — OCCUPATIONAL THERAPY INITIAL EVALUATION ADULT - ADDITIONAL COMMENTS
Pt on this admission came from [facility]- last month February 9th, pt was totally (I) with all ADL/IADL tasks, walked without AD. Pt was at home in a PH with his wife in a split level house with 7 steps to each floor. Pt has a tub/shower combo +curtain, standard toilet, RHD. Since then has been in hospital and [facility].

## 2025-03-07 NOTE — OCCUPATIONAL THERAPY INITIAL EVALUATION ADULT - RANGE OF MOTION EXAMINATION, UPPER EXTREMITY
limited shoulder AROM on R side (reports old spinal injury), WFL distally. LUE: WFL at shoulder/elbow, unable to make full composite fist

## 2025-03-07 NOTE — PROGRESS NOTE ADULT - ASSESSMENT
MEDICATIONS  (STANDING):  apixaban 10 milliGRAM(s) Oral every 12 hours  cefTRIAXone Injectable. 2000 milliGRAM(s) IV Push every 24 hours  levothyroxine 150 MICROGram(s) Oral daily  metoprolol succinate ER 25 milliGRAM(s) Oral daily  rosuvastatin 5 milliGRAM(s) Oral at bedtime  sodium zirconium cyclosilicate 5 Gram(s) Oral once  tamsulosin 0.8 milliGRAM(s) Oral at bedtime  tiZANidine 2 milliGRAM(s) Oral every 8 hours    MEDICATIONS  (PRN):  acetaminophen     Tablet .. 650 milliGRAM(s) Oral every 6 hours PRN Mild Pain (1 - 3)  aluminum hydroxide/magnesium hydroxide/simethicone Suspension 30 milliLiter(s) Oral every 4 hours PRN Dyspepsia  lactulose Syrup 20 Gram(s) Oral three times a day PRN for constipation  melatonin 3 milliGRAM(s) Oral at bedtime PRN Insomnia  ondansetron Injectable 4 milliGRAM(s) IV Push every 6 hours PRN Nausea and/or Vomiting  oxyCODONE    IR 5 milliGRAM(s) Oral every 6 hours PRN Moderate Pain (4 - 6)      74M admitted for GOVIND, and DVT.         #GOVIND  #LE edema  #hypoalbuminemia  - Cr improved from 2.8 > 1.9 today  - continue to hold torsemide  - gentle hydration today  - DC gupta with TOV today  - nephrology consult    #DVT  - appears provoked from decrease ambulation from recent spinal abscess  - could not tolerate VQ scan for r/o PE given pain/hx spinal surgeries  - switch to eliquis today, will be new med on discharge     #Spinal epidural abscess  - Chart reviewed extensively  - PICC w CTX for 8 week course to finish 4/16/25  - Reviewed neurosurgery notes and current neuro exam unchanged and no worse from prior admission, imaging reviewed  - currently since neuro exam unchanged will not pursue more imaging, low threshold to re-scan if neuro exam worsens    #leukocytosis  - likely rx to DVT  - no other signs of sepsis , afebrile   - downtrending 3/6    #hypothyroidism  - c/w synthroid    #CAD  - c/w metoprolol, statin    #HTN  - c/w metoprolol  - BP elevated 140-160s, if remains elevated will adjust meds > BP down to 130s today no adjustment of meds     #HLD  - c/w statin    #anemia   - 12>10 likely dilutional   - no signs of bleeding   trend     #BPH  - gupta placed during prior admission  - TOV today     #dvt ppx - eliquis     #dispo - pending Cr improvement , TOV, likely DC back to rehab tomorrow  MEDICATIONS  (STANDING):  apixaban 10 milliGRAM(s) Oral every 12 hours  cefTRIAXone Injectable. 2000 milliGRAM(s) IV Push every 24 hours  levothyroxine 150 MICROGram(s) Oral daily  metoprolol succinate ER 25 milliGRAM(s) Oral daily  rosuvastatin 5 milliGRAM(s) Oral at bedtime  sodium zirconium cyclosilicate 5 Gram(s) Oral once  tamsulosin 0.8 milliGRAM(s) Oral at bedtime  tiZANidine 2 milliGRAM(s) Oral every 8 hours    MEDICATIONS  (PRN):  acetaminophen     Tablet .. 650 milliGRAM(s) Oral every 6 hours PRN Mild Pain (1 - 3)  aluminum hydroxide/magnesium hydroxide/simethicone Suspension 30 milliLiter(s) Oral every 4 hours PRN Dyspepsia  lactulose Syrup 20 Gram(s) Oral three times a day PRN for constipation  melatonin 3 milliGRAM(s) Oral at bedtime PRN Insomnia  ondansetron Injectable 4 milliGRAM(s) IV Push every 6 hours PRN Nausea and/or Vomiting  oxyCODONE    IR 5 milliGRAM(s) Oral every 6 hours PRN Moderate Pain (4 - 6)      74M admitted for GOVIND, and DVT.         #GOVIND on CKD  #LE edema  #hypoalbuminemia  - Cr improved from 2.8 > 1.9 today  - continue to hold torsemide  - IVF as needed with fluid balance  -Significant debilitating ansarca. Trial of lasix/albumin? WIll discuss with nephro  - nephrology consult/recs    #DVT  - appears provoked from decrease ambulation from recent spinal abscess  - could not tolerate VQ scan for r/o PE given pain/hx spinal surgeries  - switch to eliquis today, will be new med on discharge     #Spinal epidural abscess POA and already being treated with IV antibiotics  - Chart reviewed extensively  - PICC w CTX for 8 week course to finish 4/16/25  - Reviewed neurosurgery notes and current neuro exam unchanged and no worse from prior admission, imaging reviewed  - currently since neuro exam unchanged will not pursue more imaging, low threshold to re-scan if neuro exam worsens    #leukocytosis  - likely rx to DVT  - no other signs of sepsis , afebrile       #hypothyroidism  - c/w synthroid    #CAD  - c/w metoprolol, statin    #HTN  - c/w metoprolol  - BP elevated 140-160s, if remains elevated will adjust meds > BP down to 130s today no adjustment of meds     #HLD  - c/w statin    #anemia   - 12>10 likely dilutional   - no signs of bleeding   trend     #BPH  #Urinary retention likely augmented by degree of anasarca  - gupta placed during prior admission  - TOV today ->failed. Gupta re-inserted 3/6->fell out without knowing with associated hematuria->recurrent retention 3/7->Gupta re-inserted (3/7)    #dvt ppx - eliquis     3/7:   Gupta out with hematuria. Patient had no sense of it coming out->recurrent retention->gupta re-inserted. MRI lumbar negative.   Discussed with neurosurgery. CLeared from their standpoint. Advised of upper extremity symptoms. Order MRI C and T Spine for further evaluation and if anything significant will re-call neurosurgery  Signficaant ansarca. Cr improving. Arterial doppler ordered for further evaluation. ON AC for DVT. TTE reviewed and grossly unremarkable. Will discuss with nephro  Rising leukocytosis. Unclear source. Afebrile and vitals stable. Continue to monitor

## 2025-03-07 NOTE — OCCUPATIONAL THERAPY INITIAL EVALUATION ADULT - MD ORDER
"OT Evaluate and Treat"- MD orders received. Chart reviewed, contents noted, conferred with RN- cleared for OT IE. Please refer to Therapeutic Interventions under Adult A & I for future documentation and treatment notes. "OT Evaluate and Treat"- MD orders received. Chart reviewed, contents noted, conferred with RN, pt with LLE DVT- on Eliquis, cleared for OT IE. Please refer to Therapeutic Interventions under Adult A & I for future documentation and treatment notes.

## 2025-03-07 NOTE — OCCUPATIONAL THERAPY INITIAL EVALUATION ADULT - MANUAL MUSCLE TESTING RESULTS, REHAB EVAL
RUE weaker at shoulder/elbow compared to LUE 2* old injury per pt, however- B/l hand and fingers: 3-, fine and gross motor ms weakness, pt needing assist to feed, BLE: 3+, gross generalized weakness/grossly assessed due to YANAE weaker at shoulder/elbow compared to CHASE 2* old injury per pt, however- B/l hand and fingers: 3-, fine and gross motor ms weakness/grossly assessed due to

## 2025-03-07 NOTE — OCCUPATIONAL THERAPY INITIAL EVALUATION ADULT - ADL RETRAINING, OT EVAL
Pt will improve LB dressing, toileting, and UB dressing by 1 grade in 1 week. Pt will improve self-feeding, LB dressing, toileting, and UB dressing by 1 grade in 1 week.

## 2025-03-07 NOTE — PROGRESS NOTE ADULT - SUBJECTIVE AND OBJECTIVE BOX
Patient is a 74y Male with  who reports   c/o bloating         Allergies    No Known Allergies    Intolerances        MEDICATIONS  (STANDING):  apixaban 10 milliGRAM(s) Oral every 12 hours  cefTRIAXone Injectable. 2000 milliGRAM(s) IV Push every 24 hours  levothyroxine 150 MICROGram(s) Oral daily  metoprolol succinate ER 25 milliGRAM(s) Oral daily  rosuvastatin 5 milliGRAM(s) Oral at bedtime  tamsulosin 0.8 milliGRAM(s) Oral at bedtime  tiZANidine 2 milliGRAM(s) Oral every 8 hours      Vitals:  T(F): 98.3 (03-07-25), Max: 98.3 (03-07-25)  HR: 89 (03-07-25) (82 - 90)  BP: 168/76 (03-07-25) (139/78 - 168/76)  RR: 18 (03-07-25)  SpO2: 94% (03-07-25)    I and O's:    03-06 @ 07:01  -  03-07 @ 07:00  --------------------------------------------------------  IN: 0 mL / OUT: 1000 mL / NET: -1000 mL            PHYSICAL EXAM:    Constitutional: NAD,   HEENT:   MM  Respiratory: CTAB  Cardiovascular: S1 and S2  Gastrointestinal: BS+, soft, NT/ND  Extremities: No peripheral edema  Neurological: A/O  : No Ortega  Dialysis Access: Not applicable    LABS:                        11.1   14.48 )-----------( 224      ( 07 Mar 2025 08:27 )             33.5                         10.7   11.91 )-----------( 214      ( 06 Mar 2025 08:11 )             32.1       133    |  102    |  56     ----------------------------<  115       07 Mar 2025 08:27  5.4     |  25     |  1.71     134    |  104    |  71     ----------------------------<  120       06 Mar 2025 08:11  5.2     |  24     |  1.92     135    |  104    |  74     ----------------------------<  112       05 Mar 2025 08:31  5.2     |  24     |  2.29     Ca    8.9        07 Mar 2025 08:27  Ca    8.5        06 Mar 2025 08:11    Phos  3.8       06 Mar 2025 08:11  Phos  4.4       05 Mar 2025 08:31    Mg     2.4       06 Mar 2025 08:11  Mg     2.4       05 Mar 2025 08:31    TPro  6.7    /  Alb  2.9    /  TBili  0.8    /        05 Mar 2025 08:31  DBili  x      /  AST  34     /  ALT  45     /  AlkPhos  64       TPro  6.6    /  Alb  2.8    /  TBili  0.4    /        04 Mar 2025 15:12  DBili  x      /  AST  29     /  ALT  40     /  AlkPhos  65           Serum Osmo:   Serum Uric Acid:     Urine Studies:  Urinalysis Basic - ( 07 Mar 2025 08:27 )    Color: x / Appearance: x / SG: x / pH: x  Gluc: 115 mg/dL / Ketone: x  / Bili: x / Urobili: x   Blood: x / Protein: x / Nitrite: x   Leuk Esterase: x / RBC: x / WBC x   Sq Epi: x / Non Sq Epi: x / Bacteria: x    u  Urine Osm:   Protein/Creatinine Ratio Calculation: 3.4 Ratio (03.05.25 @ 04:43)       RADIOLOGY & ADDITIONAL STUDIES:               Patient is a 74y Male with  who reports   c/o bloating         Allergies    No Known Allergies    Intolerances        MEDICATIONS  (STANDING):  apixaban 10 milliGRAM(s) Oral every 12 hours  cefTRIAXone Injectable. 2000 milliGRAM(s) IV Push every 24 hours  levothyroxine 150 MICROGram(s) Oral daily  metoprolol succinate ER 25 milliGRAM(s) Oral daily  rosuvastatin 5 milliGRAM(s) Oral at bedtime  tamsulosin 0.8 milliGRAM(s) Oral at bedtime  tiZANidine 2 milliGRAM(s) Oral every 8 hours      Vitals:  T(F): 98.3 (03-07-25), Max: 98.3 (03-07-25)  HR: 89 (03-07-25) (82 - 90)  BP: 168/76 (03-07-25) (139/78 - 168/76)  RR: 18 (03-07-25)  SpO2: 94% (03-07-25)    I and O's:    03-06 @ 07:01  -  03-07 @ 07:00  --------------------------------------------------------  IN: 0 mL / OUT: 1000 mL / NET: -1000 mL            PHYSICAL EXAM:    Constitutional: NAD,   HEENT:   MM  Respiratory: CTAB  Cardiovascular: S1 and S2  Gastrointestinal: BS+, soft, NT/ND  Extremities:++peripheral edema  Neurological: A/O  : No Ortega  Dialysis Access: Not applicable    LABS:                        11.1   14.48 )-----------( 224      ( 07 Mar 2025 08:27 )             33.5                         10.7   11.91 )-----------( 214      ( 06 Mar 2025 08:11 )             32.1       133    |  102    |  56     ----------------------------<  115       07 Mar 2025 08:27  5.4     |  25     |  1.71     134    |  104    |  71     ----------------------------<  120       06 Mar 2025 08:11  5.2     |  24     |  1.92     135    |  104    |  74     ----------------------------<  112       05 Mar 2025 08:31  5.2     |  24     |  2.29     Ca    8.9        07 Mar 2025 08:27  Ca    8.5        06 Mar 2025 08:11    Phos  3.8       06 Mar 2025 08:11  Phos  4.4       05 Mar 2025 08:31    Mg     2.4       06 Mar 2025 08:11  Mg     2.4       05 Mar 2025 08:31    TPro  6.7    /  Alb  2.9    /  TBili  0.8    /        05 Mar 2025 08:31  DBili  x      /  AST  34     /  ALT  45     /  AlkPhos  64       TPro  6.6    /  Alb  2.8    /  TBili  0.4    /        04 Mar 2025 15:12  DBili  x      /  AST  29     /  ALT  40     /  AlkPhos  65           Serum Osmo:   Serum Uric Acid:     Urine Studies:  Urinalysis Basic - ( 07 Mar 2025 08:27 )    Color: x / Appearance: x / SG: x / pH: x  Gluc: 115 mg/dL / Ketone: x  / Bili: x / Urobili: x   Blood: x / Protein: x / Nitrite: x   Leuk Esterase: x / RBC: x / WBC x   Sq Epi: x / Non Sq Epi: x / Bacteria: x    u  Urine Osm:   Protein/Creatinine Ratio Calculation: 3.4 Ratio (03.05.25 @ 04:43)       RADIOLOGY & ADDITIONAL STUDIES:

## 2025-03-07 NOTE — PROGRESS NOTE ADULT - ASSESSMENT
74-year-old male with a past medical history of benign prostatic hyperplasia, diverticulosis, Graves' disease, hemorrhoids, hyperlipidemia, hypothyroidism, osteoarthritis, spinal stenosis, spinal cord injury (2004), and multiple spinal surgeries (three cervical and one lumbar). He presents with complaints of lower extremity swelling and abnormal BUN/creatinine levels. The patient was recently admitted to [facility] from 02/18/25 to 02/25/25 for sepsis secondary to epidural abscess. At the rehabilitation facility, he was noted to have worsening LE swelling and was noted w rising creatinine. Treated w diuretics at rehab.     GOVIND - improving   -Likely pre renal state setting of diuresis, decreased intake -   -No nsaids/contrast  -Trend daily labs/lytes    Edema  -LIkely in post op state, poor intake and depressed albumin  -Hold diuretics  -Optimize intake/protein stores  - proteinuria 3.4 grams ( gupta specimen) - check serologies for completeness         74-year-old male with a past medical history of benign prostatic hyperplasia, diverticulosis, Graves' disease, hemorrhoids, hyperlipidemia, hypothyroidism, osteoarthritis, spinal stenosis, spinal cord injury (2004), and multiple spinal surgeries (three cervical and one lumbar). He presents with complaints of lower extremity swelling and abnormal BUN/creatinine levels. The patient was recently admitted to [facility] from 02/18/25 to 02/25/25 for sepsis secondary to epidural abscess. At the rehabilitation facility, he was noted to have worsening LE swelling and was noted w rising creatinine. Treated w diuretics at rehab.     GOVIND - improving   -Likely pre renal state setting of diuresis, decreased intake -   -No nsaids/contrast  -Trend daily labs/lytes    Edema/Ansarca  out of proportion to hypoalbuminemia / proteinuria    r/o other etiology - cardiac/hepatic ?  -LIkely in post op state, poor intake and depressed albumin   -Hold diuretics  -Optimize intake/protein stores  - Nephrotic range proteinuria 3.4 grams ( gupta specimen) - check serologies for completeness   - if Cr continues to improve can consider Lasix + SPA trial - trend labs

## 2025-03-07 NOTE — OCCUPATIONAL THERAPY INITIAL EVALUATION ADULT - LEVEL OF INDEPENDENCE: SIT/STAND, REHAB EVAL
attempted to perform STS from bedside chair with 2 person assist, pt unable Attempted to perform STS from bedside chair with 2 person assist, pt unable

## 2025-03-07 NOTE — OCCUPATIONAL THERAPY INITIAL EVALUATION ADULT - GENERAL OBSERVATIONS, REHAB EVAL
Addended by: CHARLIE MARTE on: 7/11/2023 12:09 PM     Modules accepted: Orders    
Pt rec'vd/left seated in bedside recliner chair, in NAD, +HM, +IVs, items left in reach, needs met.

## 2025-03-07 NOTE — PROGRESS NOTE ADULT - SUBJECTIVE AND OBJECTIVE BOX
HPI:   Rishabh Shaver is a 74-year-old male with a past medical history of benign prostatic hyperplasia, diverticulosis, Graves' disease, hemorrhoids, hyperlipidemia, hypothyroidism, osteoarthritis, spinal stenosis, spinal cord injury (2004), and multiple spinal surgeries (three cervical and one lumbar). He presents with complaints of lower extremity swelling and abnormal BUN/creatinine levels. The patient was recently admitted to [facility] from 02/18/25 to 02/25/25 for sepsis secondary to epidural abscess. He has a peripherally inserted central catheter line in the right arm and is on nightly Ceftriaxone treatment until 04/16/25. Following his discharge, he was transferred to [facility]. Documentation from the rehabilitation facility notes a decline in his functional status and dysfunction with activities of daily living. At the rehabilitation facility, he was noted to have worsening LE swelling and was started on torsemide. However, his Cr levels subsequently became elevated, leading to the discontinuation of torsemide. He was sent to the [facility] Emergency Department for further evaluation of his LE swelling and elevated Cr levels. He denies any chest pain, dyspnea, orthopnea, PND.     Patient had been seen in the prior admission in February with diagnosis of cervical epidural abscess in the setting of streptococcus dysgalactiae bacteremia. Patient is to continue Ceftriaxone via PICC  line until 4/16. Patient was planned for discharge to rehabilitation tomorrow, however, he started complaining of left lower extremity pain and swelling for which neurosurgery was consulted.     3/7- Pt seen and examined on 3N, difficulty lifting b/l lower ext off the bed, states they feel "swollen", which they are, +left leg numbness up to the thigh, lower ext doppler positive for left gastrocnemius and soleal DVT, needed gupta replaced for urinary retention, MRI ordered for today    Vital Signs Last 24 Hrs  T(C): 36.6 (07 Mar 2025 08:42), Max: 36.9 (06 Mar 2025 20:43)  T(F): 97.9 (07 Mar 2025 08:42), Max: 98.4 (06 Mar 2025 20:43)  HR: 90 (07 Mar 2025 08:42) (74 - 90)  BP: 168/64 (07 Mar 2025 08:42) (155/73 - 168/64)  RR: 18 (07 Mar 2025 08:42) (18 - 19)  SpO2: 94% (07 Mar 2025 08:42) (93% - 94%)    Parameters below as of 07 Mar 2025 08:42  Patient On (Oxygen Delivery Method): room air    MEDICATIONS  (STANDING):  apixaban 10 milliGRAM(s) Oral every 12 hours  cefTRIAXone Injectable. 2000 milliGRAM(s) IV Push every 24 hours  levothyroxine 150 MICROGram(s) Oral daily  metoprolol succinate ER 25 milliGRAM(s) Oral daily  rosuvastatin 5 milliGRAM(s) Oral at bedtime  tamsulosin 0.8 milliGRAM(s) Oral at bedtime  tiZANidine 2 milliGRAM(s) Oral every 8 hours    MEDICATIONS  (PRN):  acetaminophen     Tablet .. 650 milliGRAM(s) Oral every 6 hours PRN Mild Pain (1 - 3)  aluminum hydroxide/magnesium hydroxide/simethicone Suspension 30 milliLiter(s) Oral every 4 hours PRN Dyspepsia  lactulose Syrup 20 Gram(s) Oral three times a day PRN for constipation  melatonin 3 milliGRAM(s) Oral at bedtime PRN Insomnia  ondansetron Injectable 4 milliGRAM(s) IV Push every 6 hours PRN Nausea and/or Vomiting  oxyCODONE  IR 5 milliGRAM(s) Oral every 6 hours PRN Moderate Pain (4 - 6)    ROS: pertinent positives in HPI, all other ROS were reviewed and are negative     PHYSICAL EXAM:  Constitutional: Awake / alert, NAD  HEENT: PERRLA, EOMI, hearing   Neck: Supple  Respiratory: No respiratory distress noted  Gastrointestinal: Soft, NT/ND  Extremities:  edema in bilateral lower extremities  Musculoskeletal: no abnormal movements  Skin: No rashes    Neurological Exam:  HF: A x O x 3, appropriately interactive, normal affect, speech fluent, no aphasia or paraphasic errors. Naming /repetition intact   CN: PERRL, EOMI, VFF, facial sensation normal, no NLFD, tongue midline  Motor: left hand 3/5, finger abduction 3/5, otherwise 5/5 throughout, right upper extremity 5/5 throughout, left lower extremity quads limited secondary to pain, hamstrings 5/5, EHL, DF, PF 5/5, right lower extremity 5/5 throughout. Noted to have diffuse swelling throughout the legs  Sens: decreased to touch in left lower ext calf and thigh   Reflexes: Symmetric and normal, downgoing toes b/l  Coord:  No FNFA, dysmetria, ALLAN intact   Gait/Balance: Cannot test      LABS:                         11.1   14.48 )-----------( 224      ( 07 Mar 2025 08:27 )             33.5     03-06    134[L]  |  104  |  71[H]  ----------------------------<  120[H]  5.2   |  24  |  1.92[H]    Ca    8.5      06 Mar 2025 08:11  Phos  3.8     03-06  Mg     2.4     03-06    RADIOLOGY:  MRI lumbar spine pending

## 2025-03-07 NOTE — PROGRESS NOTE ADULT - SUBJECTIVE AND OBJECTIVE BOX
CHIEF COMPLAINT: LE edema, weakness, numbness; urinary retention      FROM H&P:  74-year-old male with a past medical history of benign prostatic hyperplasia, diverticulosis, Graves' disease, hemorrhoids, hyperlipidemia, hypothyroidism, osteoarthritis, spinal stenosis, spinal cord injury (2004), and multiple spinal surgeries (three cervical and one lumbar). He presents with complaints of lower extremity swelling and abnormal BUN/creatinine levels. The patient was recently admitted to [facility] from 02/18/25 to 02/25/25 for sepsis secondary to epidural abscess. He has a peripherally inserted central catheter line in the right arm and is on nightly Ceftriaxone treatment until 04/16/25. Following his discharge, he was transferred to [facility] (formerly [facility] Documentation from the rehabilitation facility notes a decline in his functional status and dysfunction with activities of daily living. At the rehabilitation facility, he was noted to have worsening LE swelling and was started on torsemide. However, his Cr levels subsequently became elevated, leading to the discontinuation of torsemide. He was sent to the [facility] Emergency Department for further evaluation of his LE swelling and elevated Cr levels. He denies any chest pain, dyspnea, orthopnea, PND.       SUBJECTIVE/SIGNIFICANT INTERVAL EVENTS/OVERNIGHT EVENTS:    3/5: pt feels well. c/o LE numbness @ thighs and L hand numbness. denies saddle anesthesia. Cr improving     3/6: Cr continues to improve, c/o generalized weakness noble while ambulating but no new neuro deficits     3/7:   Gupta out with hematuria. Patient had no sense of it coming out->recurrent retention->gupta re-inserted. MRI lumbar negative.   Discussed with neurosurgery. CLeared from their standpoint. Advised of upper extremity symptoms. Order MRI C and T Spine for further evaluation and if anything significant will re-call neurosurgery  Signficaant ansarca. Cr improving. Arterial doppler ordered for further evaluation. ON AC for DVT. TTE reviewed and grossly unremarkable. Will discuss with nephro  Rising leukocytosis. Unclear source. Afebrile and vitals stable. Continue to monitor    Review of Systems: 14 Point review of systems reviewed and reported as negative unless otherwise stated  above      PHYSICAL EXAM:    T(C): 36.8 (03-05-25 @ 08:30), Max: 36.9 (03-04-25 @ 19:55)  HR: 92 (03-05-25 @ 08:30) (68 - 92)  BP: 140/92 (03-05-25 @ 08:30) (140/92 - 184/96)  RR: 18 (03-05-25 @ 08:30) (16 - 18)  SpO2: 94% (03-05-25 @ 08:30) (93% - 98%)    General: AAOx3; NAD  Head: AT/NC  Neck: Non-tender; No JVD  CVS: RRR, S1&S2, No murmur, Anasarca +  Respiratory: Lungs CTA B/L; Normal Respiratory Effort  Abdomen/GI: Soft, non-tender, non-distended  : No bladder distention, No Gupta  Extremities: No cyanosis, No clubbing, Significant LE edema to the pelvix  MSK: No CVA tenderness, Normal ROM, No injury  Neuro: AAOx3, numbness to upper b/l thighs, 3/5 L hand weakness, ,no saddle anesthesia   Psych: Appropriate, Cooperative,   Skin: Clean, Dry and Intact      LABS:                          11.1   14.48 )-----------( 224      ( 07 Mar 2025 08:27 )             33.5     03-07    133[L]  |  102  |  56[H]  ----------------------------<  115[H]  5.4[H]   |  25  |  1.71[H]    Ca    8.9      07 Mar 2025 08:27  Phos  3.8     03-06  Mg     2.4     03-06      SARS-CoV-2: NotDetec (17 Feb 2025 21:11)    CAPILLARY BLOOD GLUCOSE                                12.4   12.72 )-----------( 249      ( 05 Mar 2025 08:31 )             37.1     03-05    135  |  104  |  74[H]  ----------------------------<  112[H]  5.2   |  24  |  2.29[H]    Ca    9.1      05 Mar 2025 08:31  Phos  4.4     03-05  Mg     2.4     03-05    TPro  6.7  /  Alb  2.9[L]  /  TBili  0.8  /  DBili  x   /  AST  34  /  ALT  45  /  AlkPhos  64  03-05    SARS-CoV-2: NotDetec (17 Feb 2025 21:11)    CAPILLARY BLOOD GLUCOSE      Urinalysis with Rflx Culture (collected 04 Mar 2025 15:12)      RADIOLOGY:  < from: MR Lumbar Spine w/wo IV Cont (03.07.25 @ 15:11) >  IMPRESSION:    No abnormal enhancement.    No evidence for epidural abscess or discitis/osteomyelitis.    Multilevel degenerative changes detailed in the body the report.    < end of copied text >      EKG:  < from: 12 Lead ECG (03.05.25 @ 07:16) >    Diagnosis Line Normal sinus rhythm  Normal ECG  When compared with ECG of 04-MAR-2025 14:10,  No significant change was found    < end of copied text >      ECHO:  < from: TTE W or WO Ultrasound Enhancing Agent (02.18.25 @ 12:28) >  CONCLUSIONS:      1. Left ventricular systolic function is normal with an ejection fraction of 66 % by Meyers's method of disks.   2. Trace tricuspid regurgitation.   3. Estimated pulmonary artery systolic pressure is 35 mmHg, consistent with mild pulmonary hypertension.   4. Interatrial septum is aneurysmal.   5. Technically difficultimage quality.   6. The peak transaortic velocity is 2.29 m/s, peak transaortic gradient is 21.0 mmHg and mean transaortic gradient is 12.0 mmHg with an LVOT/aortic valve VTI ratio of 0.42. The effective orifice area is estimated at 1.61 cm² by the continuity equation.   7. Trileaflet aortic valve with reduced systolic excursion. There is moderate calcification of the aortic valve leaflets. Mild aortic stenosis.    < end of copied text >              I personally reviewed labs, imaging, ekg, orders and vitals.    Discussed case with:  [X]RN  [X]CM/SW  [X]Patient  []Family  [X]Specialist: NS

## 2025-03-07 NOTE — PROGRESS NOTE ADULT - ASSESSMENT
74 year old Male with noted history of spinal surgeries recent admission in February with noted streptococcus galactiae bacteremia and cervical spinal epidural abscess now admitted this time for ARF with lower extreme edema.     Recommendation:  No acute neurosurgical intervention  Continue present management as per hospitalist team  MRI Lumbar Spine with and without to evaluate for any compressive lesions   If there are no compressive lesions on MRI findings above, disposition as per primary team  Case d/w Dr. Cantu

## 2025-03-07 NOTE — OCCUPATIONAL THERAPY INITIAL EVALUATION ADULT - PERTINENT HX OF CURRENT PROBLEM, REHAB EVAL
Per chart, pt is a 74-year-old male with a PMHx of benign prostatic hyperplasia, diverticulosis, Graves' disease, hemorrhoids, hyperlipidemia, hypothyroidism, osteoarthritis, spinal stenosis, spinal cord injury (2004), and multiple spinal surgeries (three cervical and one lumbar). He presents with complaints of lower extremity swelling and abnormal BUN/creatinine levels. The patient was recently admitted to [facility] from 02/18/25 to 02/25/25 for sepsis secondary to epidural abscess, he was sent to the [facility] Emergency Department for further evaluation of his LE swelling and elevated Cr levels, c/o LE numbness @ thighs and L hand numbness. MRI lumbar spine ordered.    US LLE: DVT noted in the left gastrocnemius and soleal veins.

## 2025-03-08 LAB
ALBUMIN SERPL ELPH-MCNC: 2.3 G/DL — LOW (ref 3.3–5)
ALP SERPL-CCNC: 50 U/L — SIGNIFICANT CHANGE UP (ref 40–120)
ALT FLD-CCNC: 46 U/L — SIGNIFICANT CHANGE UP (ref 12–78)
ANION GAP SERPL CALC-SCNC: 4 MMOL/L — LOW (ref 5–17)
AST SERPL-CCNC: 72 U/L — HIGH (ref 15–37)
BILIRUB SERPL-MCNC: 0.7 MG/DL — SIGNIFICANT CHANGE UP (ref 0.2–1.2)
BUN SERPL-MCNC: 47 MG/DL — HIGH (ref 7–23)
CALCIUM SERPL-MCNC: 8.6 MG/DL — SIGNIFICANT CHANGE UP (ref 8.5–10.1)
CHLORIDE SERPL-SCNC: 101 MMOL/L — SIGNIFICANT CHANGE UP (ref 96–108)
CO2 SERPL-SCNC: 28 MMOL/L — SIGNIFICANT CHANGE UP (ref 22–31)
CREAT SERPL-MCNC: 1.56 MG/DL — HIGH (ref 0.5–1.3)
EGFR: 47 ML/MIN/1.73M2 — LOW
EGFR: 47 ML/MIN/1.73M2 — LOW
GLUCOSE SERPL-MCNC: 120 MG/DL — HIGH (ref 70–99)
HCT VFR BLD CALC: 27.5 % — LOW (ref 39–50)
HGB BLD-MCNC: 9.1 G/DL — LOW (ref 13–17)
MAGNESIUM SERPL-MCNC: 2.1 MG/DL — SIGNIFICANT CHANGE UP (ref 1.6–2.6)
MCHC RBC-ENTMCNC: 28.8 PG — SIGNIFICANT CHANGE UP (ref 27–34)
MCHC RBC-ENTMCNC: 33.1 G/DL — SIGNIFICANT CHANGE UP (ref 32–36)
MCV RBC AUTO: 87 FL — SIGNIFICANT CHANGE UP (ref 80–100)
NRBC # BLD AUTO: 0 K/UL — SIGNIFICANT CHANGE UP (ref 0–0)
NRBC # FLD: 0 K/UL — SIGNIFICANT CHANGE UP (ref 0–0)
NRBC BLD AUTO-RTO: 0 /100 WBCS — SIGNIFICANT CHANGE UP (ref 0–0)
PHOSPHATE SERPL-MCNC: 3.9 MG/DL — SIGNIFICANT CHANGE UP (ref 2.5–4.5)
PLATELET # BLD AUTO: 182 K/UL — SIGNIFICANT CHANGE UP (ref 150–400)
PMV BLD: 10.1 FL — SIGNIFICANT CHANGE UP (ref 7–13)
POTASSIUM SERPL-MCNC: 5 MMOL/L — SIGNIFICANT CHANGE UP (ref 3.5–5.3)
POTASSIUM SERPL-SCNC: 5 MMOL/L — SIGNIFICANT CHANGE UP (ref 3.5–5.3)
PROT SERPL-MCNC: 5.8 GM/DL — LOW (ref 6–8.3)
PROT SERPL-MCNC: 6.5 G/DL — SIGNIFICANT CHANGE UP (ref 6–8.3)
RBC # BLD: 3.16 M/UL — LOW (ref 4.2–5.8)
RBC # FLD: 14.5 % — SIGNIFICANT CHANGE UP (ref 10.3–14.5)
SODIUM SERPL-SCNC: 133 MMOL/L — LOW (ref 135–145)
WBC # BLD: 15.41 K/UL — HIGH (ref 3.8–10.5)
WBC # FLD AUTO: 15.41 K/UL — HIGH (ref 3.8–10.5)

## 2025-03-08 PROCEDURE — 99231 SBSQ HOSP IP/OBS SF/LOW 25: CPT

## 2025-03-08 PROCEDURE — 99233 SBSQ HOSP IP/OBS HIGH 50: CPT

## 2025-03-08 PROCEDURE — 72157 MRI CHEST SPINE W/O & W/DYE: CPT | Mod: 26

## 2025-03-08 PROCEDURE — 72156 MRI NECK SPINE W/O & W/DYE: CPT | Mod: 26

## 2025-03-08 RX ORDER — ALBUMIN (HUMAN) 12.5 G/50ML
50 INJECTION, SOLUTION INTRAVENOUS ONCE
Refills: 0 | Status: COMPLETED | OUTPATIENT
Start: 2025-03-08 | End: 2025-03-08

## 2025-03-08 RX ORDER — FUROSEMIDE 10 MG/ML
40 INJECTION INTRAMUSCULAR; INTRAVENOUS ONCE
Refills: 0 | Status: COMPLETED | OUTPATIENT
Start: 2025-03-08 | End: 2025-03-08

## 2025-03-08 RX ADMIN — ALBUMIN (HUMAN) 50 MILLILITER(S): 12.5 INJECTION, SOLUTION INTRAVENOUS at 13:24

## 2025-03-08 RX ADMIN — TIZANIDINE 2 MILLIGRAM(S): 4 TABLET ORAL at 23:04

## 2025-03-08 RX ADMIN — APIXABAN 10 MILLIGRAM(S): 2.5 TABLET, FILM COATED ORAL at 23:03

## 2025-03-08 RX ADMIN — Medication 150 MICROGRAM(S): at 05:45

## 2025-03-08 RX ADMIN — METOPROLOL SUCCINATE 25 MILLIGRAM(S): 50 TABLET, EXTENDED RELEASE ORAL at 10:23

## 2025-03-08 RX ADMIN — OXYCODONE HYDROCHLORIDE 5 MILLIGRAM(S): 30 TABLET ORAL at 10:23

## 2025-03-08 RX ADMIN — CEFTRIAXONE 2000 MILLIGRAM(S): 500 INJECTION, POWDER, FOR SOLUTION INTRAMUSCULAR; INTRAVENOUS at 23:02

## 2025-03-08 RX ADMIN — APIXABAN 10 MILLIGRAM(S): 2.5 TABLET, FILM COATED ORAL at 10:23

## 2025-03-08 RX ADMIN — ROSUVASTATIN CALCIUM 5 MILLIGRAM(S): 5 TABLET, FILM COATED ORAL at 23:03

## 2025-03-08 RX ADMIN — Medication 3 MILLIGRAM(S): at 23:07

## 2025-03-08 RX ADMIN — TAMSULOSIN HYDROCHLORIDE 0.8 MILLIGRAM(S): 0.4 CAPSULE ORAL at 23:03

## 2025-03-08 RX ADMIN — FUROSEMIDE 40 MILLIGRAM(S): 10 INJECTION INTRAMUSCULAR; INTRAVENOUS at 14:44

## 2025-03-08 RX ADMIN — OXYCODONE HYDROCHLORIDE 5 MILLIGRAM(S): 30 TABLET ORAL at 18:08

## 2025-03-08 RX ADMIN — TIZANIDINE 2 MILLIGRAM(S): 4 TABLET ORAL at 05:44

## 2025-03-08 RX ADMIN — OXYCODONE HYDROCHLORIDE 5 MILLIGRAM(S): 30 TABLET ORAL at 04:16

## 2025-03-08 RX ADMIN — TIZANIDINE 2 MILLIGRAM(S): 4 TABLET ORAL at 13:24

## 2025-03-08 NOTE — PROGRESS NOTE ADULT - ASSESSMENT
74-year-old male with a past medical history of benign prostatic hyperplasia, diverticulosis, Graves' disease, hemorrhoids, hyperlipidemia, hypothyroidism, osteoarthritis, spinal stenosis, spinal cord injury (2004), and multiple spinal surgeries (three cervical and one lumbar). He presents with complaints of lower extremity swelling and abnormal BUN/creatinine levels. The patient was recently admitted to [facility] from 02/18/25 to 02/25/25 for sepsis secondary to epidural abscess. At the rehabilitation facility, he was noted to have worsening LE swelling and was noted w rising creatinine. Treated w diuretics at rehab.     GOVIND - improving   -No nsaids/contrast  -Trend daily labs/lytes    Edema/Ansarca  out of proportion to hypoalbuminemia / proteinuria    r/o other etiology - cardiac/hepatic ?  - poor intake and depressed albumin   -Optimize intake/protein stores     Nephrotic range proteinuria 3.4 grams ( gupta specimen) - check serologies for completeness - pending   - trial of Lasix + SPA     d/w Dr Gutiérrez this AM    *pt seen earlier, note now

## 2025-03-08 NOTE — PROGRESS NOTE ADULT - ASSESSMENT
MEDICATIONS  (STANDING):  apixaban 10 milliGRAM(s) Oral every 12 hours  cefTRIAXone Injectable. 2000 milliGRAM(s) IV Push every 24 hours  levothyroxine 150 MICROGram(s) Oral daily  metoprolol succinate ER 25 milliGRAM(s) Oral daily  rosuvastatin 5 milliGRAM(s) Oral at bedtime  sodium zirconium cyclosilicate 5 Gram(s) Oral once  tamsulosin 0.8 milliGRAM(s) Oral at bedtime  tiZANidine 2 milliGRAM(s) Oral every 8 hours    MEDICATIONS  (PRN):  acetaminophen     Tablet .. 650 milliGRAM(s) Oral every 6 hours PRN Mild Pain (1 - 3)  aluminum hydroxide/magnesium hydroxide/simethicone Suspension 30 milliLiter(s) Oral every 4 hours PRN Dyspepsia  lactulose Syrup 20 Gram(s) Oral three times a day PRN for constipation  melatonin 3 milliGRAM(s) Oral at bedtime PRN Insomnia  ondansetron Injectable 4 milliGRAM(s) IV Push every 6 hours PRN Nausea and/or Vomiting  oxyCODONE    IR 5 milliGRAM(s) Oral every 6 hours PRN Moderate Pain (4 - 6)      74M admitted for GOVIND, and DVT.         #GOVIND on CKD  #LE edema  #hypoalbuminemia  - Cr improved from 2.8 > 1.9 today  - continue to hold torsemide  - IVF as needed with fluid balance  -Significant debilitating ansarca. Trial of lasix/albumin? WIll discuss with nephro->-Trial of albumin lasix started 3/8  - nephrology consult/recs  -Continue to monitor Cr/Electrolyte/IOs    #DVT  - appears provoked from decrease ambulation from recent spinal abscess  - could not tolerate VQ scan for r/o PE given pain/hx spinal surgeries  - switch to eliquis today, will be new med on discharge     #Spinal epidural abscess POA and already being treated with IV antibiotics  - Chart reviewed extensively  - PICC w CTX for 8 week course to finish 4/16/25  - Reviewed neurosurgery notes and current neuro exam unchanged and no worse from prior admission, imaging reviewed  - currently since neuro exam unchanged will not pursue more imaging, low threshold to re-scan if neuro exam worsens    #leukocytosis  - likely rx to DVT  - no other signs of sepsis , afebrile       #hypothyroidism  - c/w synthroid    #CAD  - c/w metoprolol, statin    #HTN  - c/w metoprolol  - BP elevated 140-160s, if remains elevated will adjust meds > BP down to 130s today no adjustment of meds     #HLD  - c/w statin    #anemia   - 12>10 likely dilutional   - no signs of bleeding   trend     #BPH  #Urinary retention likely augmented by degree of anasarca  - gupta placed during prior admission  - TOV today ->failed. Gupta re-inserted 3/6->fell out without knowing with associated hematuria->recurrent retention 3/7->Gupta re-inserted (3/7)    #dvt ppx - eliquis     3/8:  Cr improving. Anasarca persists and debilitating. Discussed with nephrology->Trial of albumin lasix  MRI Cervical Spin and thoracic spine done. Pending read  Rising leukocytosis? Unclear etiology. Afebrile. Follow imaging results.   Continue to monitor fluid status/Cr/Electrolytes closely. If stable and improving plan for bid albumin/lasix for adequate diuresis

## 2025-03-08 NOTE — PROGRESS NOTE ADULT - SUBJECTIVE AND OBJECTIVE BOX
Patient is a 74y Male with  who reports   feelng bloated   no sob        Allergies    No Known Allergies    Intolerances        MEDICATIONS  (STANDING):  apixaban 10 milliGRAM(s) Oral every 12 hours  cefTRIAXone Injectable. 2000 milliGRAM(s) IV Push every 24 hours  levothyroxine 150 MICROGram(s) Oral daily  metoprolol succinate ER 25 milliGRAM(s) Oral daily  rosuvastatin 5 milliGRAM(s) Oral at bedtime  tamsulosin 0.8 milliGRAM(s) Oral at bedtime  tiZANidine 2 milliGRAM(s) Oral every 8 hours      Vitals:  T(F): 97.6 (03-08-25), Max: 99.9 (03-08-25)  HR: 71 (03-08-25) (71 - 89)  BP: 121/58 (03-08-25) (121/58 - 168/76)  RR: 18 (03-08-25)  SpO2: 94% (03-08-25)    I and O's:    03-08 @ 06:01  -  03-08 @ 19:17  --------------------------------------------------------  IN: 0 mL / OUT: 1400 mL / NET: -1400 mL          PHYSICAL EXAM:    Constitutional: NAD,   HEENT:   MM  Respiratory: CTAB  Cardiovascular: S1 and S2  Gastrointestinal: ansarca, distended ++   Extremities: ++  peripheral edema  Neurological: A/O  : No Ortega  Dialysis Access: Not applicable    LABS:                        9.1    15.41 )-----------( 182      ( 08 Mar 2025 05:47 )             27.5                         11.1   14.48 )-----------( 224      ( 07 Mar 2025 08:27 )             33.5       133    |  101    |  47     ----------------------------<  120       08 Mar 2025 05:47  5.0     |  28     |  1.56     133    |  102    |  56     ----------------------------<  115       07 Mar 2025 08:27  5.4     |  25     |  1.71     134    |  104    |  71     ----------------------------<  120       06 Mar 2025 08:11  5.2     |  24     |  1.92     Ca    8.6        08 Mar 2025 05:47  Ca    8.9        07 Mar 2025 08:27    Phos  3.9       08 Mar 2025 05:47  Phos  3.8       06 Mar 2025 08:11    Mg     2.1       08 Mar 2025 05:47  Mg     2.4       06 Mar 2025 08:11    TPro  5.8    /  Alb  2.3    /  TBili  0.7    /        08 Mar 2025 05:47  DBili  x      /  AST  72     /  ALT  46     /  AlkPhos  50       TPro  6.5    /  Alb  x      /  TBili  x      /        07 Mar 2025 12:24  DBili  x      /  AST  x      /  ALT  x      /  AlkPhos  x            Serum Osmo:   Serum Uric Acid:     Urine Studies:  Urinalysis Basic - ( 08 Mar 2025 05:47 )    Color: x / Appearance: x / SG: x / pH: x  Gluc: 120 mg/dL / Ketone: x  / Bili: x / Urobili: x   Blood: x / Protein: x / Nitrite: x   Leuk Esterase: x / RBC: x / WBC x   Sq Epi: x / Non Sq Epi: x / Bacteria: x        Urine chemistry:   Urine Na: Sodium, Random Urine: 29 mmol/L (03-05 @ 04:43)    Urine Creatinine: Creatinine, Random Urine: 64 mg/dL (03-05 @ 04:43)    Urine Protein/Cr ratio:  Urine K: Potassium, Random Urine: 23.5 mmol/L (03-05 @ 04:43)    Urine Osm:       RADIOLOGY & ADDITIONAL STUDIES:

## 2025-03-08 NOTE — PROGRESS NOTE ADULT - SUBJECTIVE AND OBJECTIVE BOX
CHIEF COMPLAINT: LE edema, weakness, numbness; urinary retention      FROM H&P:  74-year-old male with a past medical history of benign prostatic hyperplasia, diverticulosis, Graves' disease, hemorrhoids, hyperlipidemia, hypothyroidism, osteoarthritis, spinal stenosis, spinal cord injury (2004), and multiple spinal surgeries (three cervical and one lumbar). He presents with complaints of lower extremity swelling and abnormal BUN/creatinine levels. The patient was recently admitted to [facility] from 02/18/25 to 02/25/25 for sepsis secondary to epidural abscess. He has a peripherally inserted central catheter line in the right arm and is on nightly Ceftriaxone treatment until 04/16/25. Following his discharge, he was transferred to [facility]. Documentation from the rehabilitation facility notes a decline in his functional status and dysfunction with activities of daily living. At the rehabilitation facility, he was noted to have worsening LE swelling and was started on torsemide. However, his Cr levels subsequently became elevated, leading to the discontinuation of torsemide. He was sent to the [facility] Emergency Department for further evaluation of his LE swelling and elevated Cr levels. He denies any chest pain, dyspnea, orthopnea, PND.       SUBJECTIVE/SIGNIFICANT INTERVAL EVENTS/OVERNIGHT EVENTS:    3/5: pt feels well. c/o LE numbness @ thighs and L hand numbness. denies saddle anesthesia. Cr improving     3/6: Cr continues to improve, c/o generalized weakness noble while ambulating but no new neuro deficits     3/7:   Gupta out with hematuria. Patient had no sense of it coming out->recurrent retention->gupta re-inserted. MRI lumbar negative.   Discussed with neurosurgery. CLeared from their standpoint. Advised of upper extremity symptoms. Order MRI C and T Spine for further evaluation and if anything significant will re-call neurosurgery  Signficaant ansarca. Cr improving. Arterial doppler ordered for further evaluation. ON AC for DVT. TTE reviewed and grossly unremarkable. Will discuss with nephro  Rising leukocytosis. Unclear source. Afebrile and vitals stable. Continue to monitor    3/8:  Cr improving. Anasarca persists and debilitating. Discussed with nephrology->Trial of albumin lasix  MRI Cervical Spin and thoracic spine done. Pending read  Rising leukocytosis? Unclear etiology. Afebrile. Follow imaging results.   Continue to monitor fluid status/Cr/Electrolytes closely. If stable and improving plan for bid albumin/lasix for adequate diuresis      Review of Systems: 14 Point review of systems reviewed and reported as negative unless otherwise stated  above      PHYSICAL EXAM:    T(C): 36.8 (03-05-25 @ 08:30), Max: 36.9 (03-04-25 @ 19:55)  HR: 92 (03-05-25 @ 08:30) (68 - 92)  BP: 140/92 (03-05-25 @ 08:30) (140/92 - 184/96)  RR: 18 (03-05-25 @ 08:30) (16 - 18)  SpO2: 94% (03-05-25 @ 08:30) (93% - 98%)    General: AAOx3; NAD  Head: AT/NC  Neck: Non-tender; No JVD  CVS: RRR, S1&S2, No murmur, Anasarca +  Respiratory: Lungs CTA B/L; Normal Respiratory Effort  Abdomen/GI: Soft, non-tender, non-distended  : No bladder distention, No Gupta  Extremities: No cyanosis, No clubbing, Significant LE edema to the pelvix  MSK: No CVA tenderness, Normal ROM, No injury  Neuro: AAOx3, numbness to upper b/l thighs, 3/5 L hand weakness, ,no saddle anesthesia   Psych: Appropriate, Cooperative,   Skin: Clean, Dry and Intact      LABS:                          11.1   14.48 )-----------( 224      ( 07 Mar 2025 08:27 )             33.5     03-07    133[L]  |  102  |  56[H]  ----------------------------<  115[H]  5.4[H]   |  25  |  1.71[H]    Ca    8.9      07 Mar 2025 08:27  Phos  3.8     03-06  Mg     2.4     03-06      SARS-CoV-2: NotDetec (17 Feb 2025 21:11)    CAPILLARY BLOOD GLUCOSE                                12.4   12.72 )-----------( 249      ( 05 Mar 2025 08:31 )             37.1     03-05    135  |  104  |  74[H]  ----------------------------<  112[H]  5.2   |  24  |  2.29[H]    Ca    9.1      05 Mar 2025 08:31  Phos  4.4     03-05  Mg     2.4     03-05    TPro  6.7  /  Alb  2.9[L]  /  TBili  0.8  /  DBili  x   /  AST  34  /  ALT  45  /  AlkPhos  64  03-05    SARS-CoV-2: NotDetec (17 Feb 2025 21:11)    CAPILLARY BLOOD GLUCOSE      Urinalysis with Rflx Culture (collected 04 Mar 2025 15:12)      RADIOLOGY:  < from: MR Lumbar Spine w/wo IV Cont (03.07.25 @ 15:11) >  IMPRESSION:    No abnormal enhancement.    No evidence for epidural abscess or discitis/osteomyelitis.    Multilevel degenerative changes detailed in the body the report.    < end of copied text >      EKG:  < from: 12 Lead ECG (03.05.25 @ 07:16) >    Diagnosis Line Normal sinus rhythm  Normal ECG  When compared with ECG of 04-MAR-2025 14:10,  No significant change was found    < end of copied text >      ECHO:  < from: TTE W or WO Ultrasound Enhancing Agent (02.18.25 @ 12:28) >  CONCLUSIONS:      1. Left ventricular systolic function is normal with an ejection fraction of 66 % by Meyers's method of disks.   2. Trace tricuspid regurgitation.   3. Estimated pulmonary artery systolic pressure is 35 mmHg, consistent with mild pulmonary hypertension.   4. Interatrial septum is aneurysmal.   5. Technically difficultimage quality.   6. The peak transaortic velocity is 2.29 m/s, peak transaortic gradient is 21.0 mmHg and mean transaortic gradient is 12.0 mmHg with an LVOT/aortic valve VTI ratio of 0.42. The effective orifice area is estimated at 1.61 cm² by the continuity equation.   7. Trileaflet aortic valve with reduced systolic excursion. There is moderate calcification of the aortic valve leaflets. Mild aortic stenosis.    < end of copied text >              I personally reviewed labs, imaging, ekg, orders and vitals.    Discussed case with:  [X]RN  [X]CM/WYATT  [X]Patient  []Family  [X]Specialist: NS

## 2025-03-09 LAB
ALBUMIN SERPL ELPH-MCNC: 2.4 G/DL — LOW (ref 3.3–5)
ALP SERPL-CCNC: 59 U/L — SIGNIFICANT CHANGE UP (ref 40–120)
ALT FLD-CCNC: 59 U/L — SIGNIFICANT CHANGE UP (ref 12–78)
ANION GAP SERPL CALC-SCNC: 9 MMOL/L — SIGNIFICANT CHANGE UP (ref 5–17)
AST SERPL-CCNC: 101 U/L — HIGH (ref 15–37)
BILIRUB SERPL-MCNC: 0.6 MG/DL — SIGNIFICANT CHANGE UP (ref 0.2–1.2)
BUN SERPL-MCNC: 43 MG/DL — HIGH (ref 7–23)
CALCIUM SERPL-MCNC: 8.5 MG/DL — SIGNIFICANT CHANGE UP (ref 8.5–10.1)
CHLORIDE SERPL-SCNC: 100 MMOL/L — SIGNIFICANT CHANGE UP (ref 96–108)
CO2 SERPL-SCNC: 25 MMOL/L — SIGNIFICANT CHANGE UP (ref 22–31)
CREAT SERPL-MCNC: 1.46 MG/DL — HIGH (ref 0.5–1.3)
EGFR: 51 ML/MIN/1.73M2 — LOW
EGFR: 51 ML/MIN/1.73M2 — LOW
GLUCOSE SERPL-MCNC: 146 MG/DL — HIGH (ref 70–99)
HCT VFR BLD CALC: 28.4 % — LOW (ref 39–50)
HGB BLD-MCNC: 9.2 G/DL — LOW (ref 13–17)
MAGNESIUM SERPL-MCNC: 2.1 MG/DL — SIGNIFICANT CHANGE UP (ref 1.6–2.6)
MCHC RBC-ENTMCNC: 28.1 PG — SIGNIFICANT CHANGE UP (ref 27–34)
MCHC RBC-ENTMCNC: 32.4 G/DL — SIGNIFICANT CHANGE UP (ref 32–36)
MCV RBC AUTO: 86.9 FL — SIGNIFICANT CHANGE UP (ref 80–100)
NRBC # BLD AUTO: 0 K/UL — SIGNIFICANT CHANGE UP (ref 0–0)
NRBC # FLD: 0 K/UL — SIGNIFICANT CHANGE UP (ref 0–0)
NRBC BLD AUTO-RTO: 0 /100 WBCS — SIGNIFICANT CHANGE UP (ref 0–0)
NT-PROBNP SERPL-SCNC: 273 PG/ML — HIGH (ref 0–125)
PHOSPHATE SERPL-MCNC: 3.9 MG/DL — SIGNIFICANT CHANGE UP (ref 2.5–4.5)
PLATELET # BLD AUTO: 210 K/UL — SIGNIFICANT CHANGE UP (ref 150–400)
PMV BLD: 10 FL — SIGNIFICANT CHANGE UP (ref 7–13)
POTASSIUM SERPL-MCNC: 4.9 MMOL/L — SIGNIFICANT CHANGE UP (ref 3.5–5.3)
POTASSIUM SERPL-SCNC: 4.9 MMOL/L — SIGNIFICANT CHANGE UP (ref 3.5–5.3)
PROT SERPL-MCNC: 6.1 GM/DL — SIGNIFICANT CHANGE UP (ref 6–8.3)
RBC # BLD: 3.27 M/UL — LOW (ref 4.2–5.8)
RBC # FLD: 14.6 % — HIGH (ref 10.3–14.5)
SODIUM SERPL-SCNC: 134 MMOL/L — LOW (ref 135–145)
WBC # BLD: 15.09 K/UL — HIGH (ref 3.8–10.5)
WBC # FLD AUTO: 15.09 K/UL — HIGH (ref 3.8–10.5)

## 2025-03-09 PROCEDURE — 99231 SBSQ HOSP IP/OBS SF/LOW 25: CPT

## 2025-03-09 PROCEDURE — 99233 SBSQ HOSP IP/OBS HIGH 50: CPT

## 2025-03-09 RX ORDER — ALBUMIN (HUMAN) 12.5 G/50ML
50 INJECTION, SOLUTION INTRAVENOUS EVERY 12 HOURS
Refills: 0 | Status: COMPLETED | OUTPATIENT
Start: 2025-03-09 | End: 2025-03-11

## 2025-03-09 RX ORDER — FUROSEMIDE 10 MG/ML
40 INJECTION INTRAMUSCULAR; INTRAVENOUS EVERY 12 HOURS
Refills: 0 | Status: DISCONTINUED | OUTPATIENT
Start: 2025-03-09 | End: 2025-03-11

## 2025-03-09 RX ADMIN — OXYCODONE HYDROCHLORIDE 5 MILLIGRAM(S): 30 TABLET ORAL at 22:28

## 2025-03-09 RX ADMIN — APIXABAN 10 MILLIGRAM(S): 2.5 TABLET, FILM COATED ORAL at 10:03

## 2025-03-09 RX ADMIN — FUROSEMIDE 40 MILLIGRAM(S): 10 INJECTION INTRAMUSCULAR; INTRAVENOUS at 12:39

## 2025-03-09 RX ADMIN — Medication 150 MICROGRAM(S): at 05:17

## 2025-03-09 RX ADMIN — Medication 3 MILLIGRAM(S): at 22:27

## 2025-03-09 RX ADMIN — TAMSULOSIN HYDROCHLORIDE 0.8 MILLIGRAM(S): 0.4 CAPSULE ORAL at 21:55

## 2025-03-09 RX ADMIN — CEFTRIAXONE 2000 MILLIGRAM(S): 500 INJECTION, POWDER, FOR SOLUTION INTRAMUSCULAR; INTRAVENOUS at 22:06

## 2025-03-09 RX ADMIN — TIZANIDINE 2 MILLIGRAM(S): 4 TABLET ORAL at 13:03

## 2025-03-09 RX ADMIN — FUROSEMIDE 40 MILLIGRAM(S): 10 INJECTION INTRAMUSCULAR; INTRAVENOUS at 21:56

## 2025-03-09 RX ADMIN — OXYCODONE HYDROCHLORIDE 5 MILLIGRAM(S): 30 TABLET ORAL at 05:18

## 2025-03-09 RX ADMIN — ROSUVASTATIN CALCIUM 5 MILLIGRAM(S): 5 TABLET, FILM COATED ORAL at 21:55

## 2025-03-09 RX ADMIN — TIZANIDINE 2 MILLIGRAM(S): 4 TABLET ORAL at 21:59

## 2025-03-09 RX ADMIN — TIZANIDINE 2 MILLIGRAM(S): 4 TABLET ORAL at 05:17

## 2025-03-09 RX ADMIN — ALBUMIN (HUMAN) 50 MILLILITER(S): 12.5 INJECTION, SOLUTION INTRAVENOUS at 22:03

## 2025-03-09 RX ADMIN — OXYCODONE HYDROCHLORIDE 5 MILLIGRAM(S): 30 TABLET ORAL at 12:40

## 2025-03-09 RX ADMIN — ALBUMIN (HUMAN) 50 MILLILITER(S): 12.5 INJECTION, SOLUTION INTRAVENOUS at 11:31

## 2025-03-09 RX ADMIN — APIXABAN 10 MILLIGRAM(S): 2.5 TABLET, FILM COATED ORAL at 21:55

## 2025-03-09 RX ADMIN — METOPROLOL SUCCINATE 25 MILLIGRAM(S): 50 TABLET, EXTENDED RELEASE ORAL at 10:03

## 2025-03-09 NOTE — PROGRESS NOTE ADULT - SUBJECTIVE AND OBJECTIVE BOX
Patient is a 74y Male with  who reports   still feels swollen        Allergies    No Known Allergies    Intolerances        MEDICATIONS  (STANDING):  albumin human 25% IVPB 50 milliLiter(s) IV Intermittent every 12 hours  apixaban 10 milliGRAM(s) Oral every 12 hours  cefTRIAXone Injectable. 2000 milliGRAM(s) IV Push every 24 hours  furosemide   Injectable 40 milliGRAM(s) IV Push every 12 hours  levothyroxine 150 MICROGram(s) Oral daily  metoprolol succinate ER 25 milliGRAM(s) Oral daily  rosuvastatin 5 milliGRAM(s) Oral at bedtime  tamsulosin 0.8 milliGRAM(s) Oral at bedtime  tiZANidine 2 milliGRAM(s) Oral every 8 hours      Vitals:  T(F): 98 (03-09-25), Max: 98 (03-09-25)  HR: 79 (03-09-25) (71 - 79)  BP: 116/68 (03-09-25) (116/68 - 139/78)  RR: 18 (03-09-25)  SpO2: 94% (03-09-25)    I and O's:    03-08 @ 06:01  -  03-09 @ 07:00  --------------------------------------------------------  IN: 0 mL / OUT: 1400 mL / NET: -1400 mL    03-09 @ 07:01  -  03-09 @ 15:58  --------------------------------------------------------  IN: 0 mL / OUT: 900 mL / NET: -900 mL      PHYSICAL EXAM:    Constitutional: NAD,   HEENT:   MM  Respiratory: CTAB  Cardiovascular: S1 and S2  Gastrointestinal: abd wall edema ++    Extremities: ++3  peripheral edema  Neurological: A/O  :  Ortega +  Dialysis Access: Not applicable    LABS:                        9.2    15.09 )-----------( 210      ( 09 Mar 2025 05:14 )             28.4                         9.1    15.41 )-----------( 182      ( 08 Mar 2025 05:47 )             27.5       134    |  100    |  43     ----------------------------<  146       09 Mar 2025 05:14  4.9     |  25     |  1.46     133    |  101    |  47     ----------------------------<  120       08 Mar 2025 05:47  5.0     |  28     |  1.56     133    |  102    |  56     ----------------------------<  115       07 Mar 2025 08:27  5.4     |  25     |  1.71     Ca    8.5        09 Mar 2025 05:14  Ca    8.6        08 Mar 2025 05:47    Phos  3.9       09 Mar 2025 05:14  Phos  3.9       08 Mar 2025 05:47    Mg     2.1       09 Mar 2025 05:14  Mg     2.1       08 Mar 2025 05:47    TPro  6.1    /  Alb  2.4    /  TBili  0.6    /        09 Mar 2025 05:14  DBili  x      /  AST  101    /  ALT  59     /  AlkPhos  59       TPro  5.8    /  Alb  2.3    /  TBili  0.7    /        08 Mar 2025 05:47  DBili  x      /  AST  72     /  ALT  46     /  AlkPhos  50

## 2025-03-09 NOTE — PROGRESS NOTE ADULT - ASSESSMENT
MEDICATIONS  (STANDING):  apixaban 10 milliGRAM(s) Oral every 12 hours  cefTRIAXone Injectable. 2000 milliGRAM(s) IV Push every 24 hours  levothyroxine 150 MICROGram(s) Oral daily  metoprolol succinate ER 25 milliGRAM(s) Oral daily  rosuvastatin 5 milliGRAM(s) Oral at bedtime  sodium zirconium cyclosilicate 5 Gram(s) Oral once  tamsulosin 0.8 milliGRAM(s) Oral at bedtime  tiZANidine 2 milliGRAM(s) Oral every 8 hours    MEDICATIONS  (PRN):  acetaminophen     Tablet .. 650 milliGRAM(s) Oral every 6 hours PRN Mild Pain (1 - 3)  aluminum hydroxide/magnesium hydroxide/simethicone Suspension 30 milliLiter(s) Oral every 4 hours PRN Dyspepsia  lactulose Syrup 20 Gram(s) Oral three times a day PRN for constipation  melatonin 3 milliGRAM(s) Oral at bedtime PRN Insomnia  ondansetron Injectable 4 milliGRAM(s) IV Push every 6 hours PRN Nausea and/or Vomiting  oxyCODONE    IR 5 milliGRAM(s) Oral every 6 hours PRN Moderate Pain (4 - 6)      74M admitted for GOVIND, and DVT.         #GOVIND on CKD  #LE edema  #hypoalbuminemia  - Cr improved from 2.8 > 1.9 today  - continue to hold torsemide  - IVF as needed with fluid balance  -Significant debilitating ansarca. Trial of lasix/albumin? WIll discuss with nephro->-Trial of albumin lasix started 3/8->improvement. Continue BID IV Laxis/IV albumin (3/9)  - nephrology consult/recs  -Continue to monitor Cr/Electrolyte/IOs    #DVT  - appears provoked from decrease ambulation from recent spinal abscess  - could not tolerate VQ scan for r/o PE given pain/hx spinal surgeries  - switch to eliquis today, will be new med on discharge     #Spinal epidural abscess POA and already being treated with IV antibiotics  - Chart reviewed extensively  - PICC w CTX for 8 week course to finish 4/16/25  - Reviewed neurosurgery notes and current neuro exam unchanged and no worse from prior admission, imaging reviewed  - currently since neuro exam unchanged will not pursue more imaging, low threshold to re-scan if neuro exam worsens  -Repeat MRI noted->neuroseurgery/Spine/ID recs.     #leukocytosis  - likely rx to DVT  - no other signs of sepsis , afebrile       #hypothyroidism  - c/w synthroid    #CAD  - c/w metoprolol, statin    #HTN  - c/w metoprolol  - BP elevated 140-160s, if remains elevated will adjust meds > BP down to 130s today no adjustment of meds     #HLD  - c/w statin    #anemia   - 12>10 likely dilutional   - no signs of bleeding   trend     #BPH  #Urinary retention likely augmented by degree of anasarca  - gupta placed during prior admission  - TOV today ->failed. Gupta re-inserted 3/6->fell out without knowing with associated hematuria->recurrent retention 3/7->Gupta re-inserted (3/7)    #dvt ppx - eliquis       3/9: Patient feels the same from weakness, edema, numbness standpoint. Apparent improvement on Physical exam in edema. Cr improved. Discussed with nephrology. IV Albumin/Lasix bid and continue to assess. MR C-Spine similar to prior. MR T-Spine with reported worsening discitis and possible new abscess. Re-called neurosurgery/spine to re-evaluate. Antibiotics as per ID.     I spent a total of 52 minutes in face-to-face time with the patient and on the floor managing patient including coordination of care. Overall 50% of the time spent in discussion of the diagnosis, counseling and treatment plan.

## 2025-03-09 NOTE — PROGRESS NOTE ADULT - ASSESSMENT
74-year-old male with a past medical history of benign prostatic hyperplasia, diverticulosis, Graves' disease, hemorrhoids, hyperlipidemia, hypothyroidism, osteoarthritis, spinal stenosis, spinal cord injury (2004), and multiple spinal surgeries (three cervical and one lumbar). He presents with complaints of lower extremity swelling and abnormal BUN/creatinine levels. The patient was recently admitted to [facility] from 02/18/25 to 02/25/25 for sepsis secondary to epidural abscess. At the rehabilitation facility, he was noted to have worsening LE swelling and was noted w rising creatinine. Treated w diuretics at rehab.     GOVIND - improving   -No nsaids/contrast  -Trend daily labs/lytes    Edema/Anasarca  out of proportion to hypoalbuminemia / proteinuria    r/o other etiology - cardiac/hepatic ?  - poor intake and depressed albumin   -Optimize intake/protein stores  - trial of IV lasix + SPA q12 x 3 days - excellent UOP response after 1 dose   - daily labs while on diuretics  - strict I/O's  - daily standing weights to measure progress      Nephrotic range proteinuria 3.4 grams ( gupta specimen)   - fup on serologies  - trial of Lasix + SPA as above     d/w Dr Gutiérrez this AM    *pt seen earlier, note now

## 2025-03-09 NOTE — PROGRESS NOTE ADULT - SUBJECTIVE AND OBJECTIVE BOX
CHIEF COMPLAINT: LE edema, weakness, numbness; urinary retention      FROM H&P:  74-year-old male with a past medical history of benign prostatic hyperplasia, diverticulosis, Graves' disease, hemorrhoids, hyperlipidemia, hypothyroidism, osteoarthritis, spinal stenosis, spinal cord injury (2004), and multiple spinal surgeries (three cervical and one lumbar). He presents with complaints of lower extremity swelling and abnormal BUN/creatinine levels. The patient was recently admitted to [facility] from 02/18/25 to 02/25/25 for sepsis secondary to epidural abscess. He has a peripherally inserted central catheter line in the right arm and is on nightly Ceftriaxone treatment until 04/16/25. Following his discharge, he was transferred to [facility]. Documentation from the rehabilitation facility notes a decline in his functional status and dysfunction with activities of daily living. At the rehabilitation facility, he was noted to have worsening LE swelling and was started on torsemide. However, his Cr levels subsequently became elevated, leading to the discontinuation of torsemide. He was sent to the [facility] Emergency Department for further evaluation of his LE swelling and elevated Cr levels. He denies any chest pain, dyspnea, orthopnea, PND.       SUBJECTIVE/SIGNIFICANT INTERVAL EVENTS/OVERNIGHT EVENTS:    3/5: pt feels well. c/o LE numbness @ thighs and L hand numbness. denies saddle anesthesia. Cr improving     3/6: Cr continues to improve, c/o generalized weakness noble while ambulating but no new neuro deficits     3/7:   Gupta out with hematuria. Patient had no sense of it coming out->recurrent retention->gupta re-inserted. MRI lumbar negative.   Discussed with neurosurgery. CLeared from their standpoint. Advised of upper extremity symptoms. Order MRI C and T Spine for further evaluation and if anything significant will re-call neurosurgery  Signficaant ansarca. Cr improving. Arterial doppler ordered for further evaluation. ON AC for DVT. TTE reviewed and grossly unremarkable. Will discuss with nephro  Rising leukocytosis. Unclear source. Afebrile and vitals stable. Continue to monitor    3/8:  Cr improving. Anasarca persists and debilitating. Discussed with nephrology->Trial of albumin lasix  MRI Cervical Spin and thoracic spine done. Pending read  Rising leukocytosis? Unclear etiology. Afebrile. Follow imaging results.   Continue to monitor fluid status/Cr/Electrolytes closely. If stable and improving plan for bid albumin/lasix for adequate diuresis    3/9: Patient feels the same from weakness, edema, numbness standpoint. Apparent improvement on Physical exam in edema. Cr improved. Discussed with nephrology. IV Albumin/Lasix bid and continue to assess. MR C-Spine similar to prior. MR T-Spine with reported worsening discitis and possible new abscess. Re-called neurosurgery/spine to re-evaluate. Antibiotics as per ID.       Review of Systems: 14 Point review of systems reviewed and reported as negative unless otherwise stated  above      PHYSICAL EXAM:    T(C): 36.5 (03-09-25 @ 17:03), Max: 36.7 (03-09-25 @ 08:43)  HR: 87 (03-09-25 @ 17:03) (79 - 87)  BP: 119/66 (03-09-25 @ 17:03) (116/68 - 139/78)  RR: 18 (03-09-25 @ 17:03) (18 - 18)  SpO2: 98% (03-09-25 @ 17:03) (94% - 98%)  General: AAOx3; NAD  Head: AT/NC  Neck: Non-tender; No JVD  CVS: RRR, S1&S2, No murmur, Anasarca +  Respiratory: Lungs CTA B/L; Normal Respiratory Effort  Abdomen/GI: Soft, non-tender, non-distended  : No bladder distention, No Gupta  Extremities: No cyanosis, No clubbing, Significant LE edema to the pelvix  MSK: No CVA tenderness, Normal ROM, No injury  Neuro: AAOx3, numbness to upper b/l thighs, 3/5 L hand weakness, ,no saddle anesthesia   Psych: Appropriate, Cooperative,   Skin: Clean, Dry and Intact      LABS:                          9.2    15.09 )-----------( 210      ( 09 Mar 2025 05:14 )             28.4     03-09    134[L]  |  100  |  43[H]  ----------------------------<  146[H]  4.9   |  25  |  1.46[H]    Ca    8.5      09 Mar 2025 05:14  Phos  3.9     03-09  Mg     2.1     03-09    TPro  6.1  /  Alb  2.4[L]  /  TBili  0.6  /  DBili  x   /  AST  101[H]  /  ALT  59  /  AlkPhos  59  03-09    SARS-CoV-2: NotDetec (17 Feb 2025 21:11)    CAPILLARY BLOOD GLUCOSE        RADIOLOGY:  < from: MR Lumbar Spine w/wo IV Cont (03.07.25 @ 15:11) >  IMPRESSION:    No abnormal enhancement.    No evidence for epidural abscess or discitis/osteomyelitis.    Multilevel degenerative changes detailed in the body the report.    < end of copied text >  < from: MR Thoracic Spine w/wo IV Cont (03.08.25 @ 12:24) >  IMPRESSION: Worsening discitis/osteomyelitis is seen with epidural   phlegmon again seen unchanged though increased bilaterally and ventral,   this is consistent with paraspinal phlegmon though there is a possible   early abscess seenon the left side as described above.    < end of copied text >  < from: MR Cervical Spine w/wo IV Cont (03.08.25 @ 12:24) >    IMPRESSION:    1.  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  2.  Chronic cord myelomalacia at C3-4, status post decompression.  3.  Stable severe left C7-T1 foraminal stenosis, with moderate narrowing   of the central canal.  4.  Stable moderate to severe bilateral C6-7 foraminal stenoses.    < end of copied text >        EKG:  < from: 12 Lead ECG (03.05.25 @ 07:16) >    Diagnosis Line Normal sinus rhythm  Normal ECG  When compared with ECG of 04-MAR-2025 14:10,  No significant change was found    < end of copied text >      ECHO:  < from: TTE W or WO Ultrasound Enhancing Agent (02.18.25 @ 12:28) >  CONCLUSIONS:      1. Left ventricular systolic function is normal with an ejection fraction of 66 % by Meyers's method of disks.   2. Trace tricuspid regurgitation.   3. Estimated pulmonary artery systolic pressure is 35 mmHg, consistent with mild pulmonary hypertension.   4. Interatrial septum is aneurysmal.   5. Technically difficultimage quality.   6. The peak transaortic velocity is 2.29 m/s, peak transaortic gradient is 21.0 mmHg and mean transaortic gradient is 12.0 mmHg with an LVOT/aortic valve VTI ratio of 0.42. The effective orifice area is estimated at 1.61 cm² by the continuity equation.   7. Trileaflet aortic valve with reduced systolic excursion. There is moderate calcification of the aortic valve leaflets. Mild aortic stenosis.    < end of copied text >              I personally reviewed labs, imaging, ekg, orders and vitals.    Discussed case with:  [X]RN  [X]CM/WYATT  [X]Patient  [X]Family  [X]Specialist: NS/Nephrology

## 2025-03-10 LAB
ANION GAP SERPL CALC-SCNC: 5 MMOL/L — SIGNIFICANT CHANGE UP (ref 5–17)
AUTO DIFF PNL BLD: NEGATIVE — SIGNIFICANT CHANGE UP
BUN SERPL-MCNC: 43 MG/DL — HIGH (ref 7–23)
C-ANCA SER-ACNC: NEGATIVE — SIGNIFICANT CHANGE UP
C3 SERPL-MCNC: 128 MG/DL — SIGNIFICANT CHANGE UP (ref 81–157)
C4 SERPL-MCNC: 17 MG/DL — SIGNIFICANT CHANGE UP (ref 13–39)
CALCIUM SERPL-MCNC: 8.7 MG/DL — SIGNIFICANT CHANGE UP (ref 8.5–10.1)
CHLORIDE SERPL-SCNC: 102 MMOL/L — SIGNIFICANT CHANGE UP (ref 96–108)
CO2 SERPL-SCNC: 28 MMOL/L — SIGNIFICANT CHANGE UP (ref 22–31)
CREAT SERPL-MCNC: 1.44 MG/DL — HIGH (ref 0.5–1.3)
CRP SERPL-MCNC: 125 MG/ML — HIGH (ref 0–5)
EGFR: 52 ML/MIN/1.73M2 — LOW
EGFR: 52 ML/MIN/1.73M2 — LOW
ERYTHROCYTE [SEDIMENTATION RATE] IN BLOOD: 96 MM/HR — HIGH (ref 0–20)
FERRITIN SERPL-MCNC: 744 NG/ML — HIGH (ref 30–400)
FOLATE SERPL-MCNC: 8.7 NG/ML — SIGNIFICANT CHANGE UP
GLUCOSE SERPL-MCNC: 125 MG/DL — HIGH (ref 70–99)
HCT VFR BLD CALC: 26.7 % — LOW (ref 39–50)
HGB BLD-MCNC: 8.7 G/DL — LOW (ref 13–17)
IRON SATN MFR SERPL: 16 % — SIGNIFICANT CHANGE UP (ref 16–55)
IRON SATN MFR SERPL: 26 UG/DL — LOW (ref 45–165)
KAPPA LC SER QL IFE: 9.4 MG/DL — HIGH (ref 0.33–1.94)
KAPPA/LAMBDA FREE LIGHT CHAIN RATIO, SERUM: 2.48 RATIO — HIGH (ref 0.26–1.65)
LAMBDA LC SER QL IFE: 3.79 MG/DL — HIGH (ref 0.57–2.63)
MAGNESIUM SERPL-MCNC: 2 MG/DL — SIGNIFICANT CHANGE UP (ref 1.6–2.6)
MCHC RBC-ENTMCNC: 28.8 PG — SIGNIFICANT CHANGE UP (ref 27–34)
MCHC RBC-ENTMCNC: 32.6 G/DL — SIGNIFICANT CHANGE UP (ref 32–36)
MCV RBC AUTO: 88.4 FL — SIGNIFICANT CHANGE UP (ref 80–100)
MPO AB + PR3 PNL SER: SIGNIFICANT CHANGE UP
NRBC # BLD AUTO: 0 K/UL — SIGNIFICANT CHANGE UP (ref 0–0)
NRBC # FLD: 0 K/UL — SIGNIFICANT CHANGE UP (ref 0–0)
NRBC BLD AUTO-RTO: 0 /100 WBCS — SIGNIFICANT CHANGE UP (ref 0–0)
P-ANCA SER-ACNC: NEGATIVE — SIGNIFICANT CHANGE UP
PHOSPHATE SERPL-MCNC: 4.1 MG/DL — SIGNIFICANT CHANGE UP (ref 2.5–4.5)
PLATELET # BLD AUTO: 231 K/UL — SIGNIFICANT CHANGE UP (ref 150–400)
PMV BLD: 9.8 FL — SIGNIFICANT CHANGE UP (ref 7–13)
POTASSIUM SERPL-MCNC: 4.7 MMOL/L — SIGNIFICANT CHANGE UP (ref 3.5–5.3)
POTASSIUM SERPL-SCNC: 4.7 MMOL/L — SIGNIFICANT CHANGE UP (ref 3.5–5.3)
RBC # BLD: 3.02 M/UL — LOW (ref 4.2–5.8)
RBC # FLD: 14.6 % — HIGH (ref 10.3–14.5)
SODIUM SERPL-SCNC: 135 MMOL/L — SIGNIFICANT CHANGE UP (ref 135–145)
TIBC SERPL-MCNC: 160 UG/DL — LOW (ref 220–430)
UIBC SERPL-MCNC: 134 UG/DL — SIGNIFICANT CHANGE UP (ref 110–370)
VIT B12 SERPL-MCNC: 636 PG/ML — SIGNIFICANT CHANGE UP (ref 232–1245)
WBC # BLD: 12.53 K/UL — HIGH (ref 3.8–10.5)
WBC # FLD AUTO: 12.53 K/UL — HIGH (ref 3.8–10.5)

## 2025-03-10 PROCEDURE — 99233 SBSQ HOSP IP/OBS HIGH 50: CPT

## 2025-03-10 PROCEDURE — 99231 SBSQ HOSP IP/OBS SF/LOW 25: CPT

## 2025-03-10 RX ADMIN — OXYCODONE HYDROCHLORIDE 5 MILLIGRAM(S): 30 TABLET ORAL at 22:33

## 2025-03-10 RX ADMIN — TIZANIDINE 2 MILLIGRAM(S): 4 TABLET ORAL at 06:49

## 2025-03-10 RX ADMIN — OXYCODONE HYDROCHLORIDE 5 MILLIGRAM(S): 30 TABLET ORAL at 23:00

## 2025-03-10 RX ADMIN — APIXABAN 10 MILLIGRAM(S): 2.5 TABLET, FILM COATED ORAL at 10:10

## 2025-03-10 RX ADMIN — TIZANIDINE 2 MILLIGRAM(S): 4 TABLET ORAL at 23:00

## 2025-03-10 RX ADMIN — TIZANIDINE 2 MILLIGRAM(S): 4 TABLET ORAL at 14:48

## 2025-03-10 RX ADMIN — TAMSULOSIN HYDROCHLORIDE 0.8 MILLIGRAM(S): 0.4 CAPSULE ORAL at 22:33

## 2025-03-10 RX ADMIN — OXYCODONE HYDROCHLORIDE 5 MILLIGRAM(S): 30 TABLET ORAL at 11:10

## 2025-03-10 RX ADMIN — FUROSEMIDE 40 MILLIGRAM(S): 10 INJECTION INTRAMUSCULAR; INTRAVENOUS at 23:58

## 2025-03-10 RX ADMIN — ALBUMIN (HUMAN) 50 MILLILITER(S): 12.5 INJECTION, SOLUTION INTRAVENOUS at 10:11

## 2025-03-10 RX ADMIN — OXYCODONE HYDROCHLORIDE 5 MILLIGRAM(S): 30 TABLET ORAL at 04:43

## 2025-03-10 RX ADMIN — OXYCODONE HYDROCHLORIDE 5 MILLIGRAM(S): 30 TABLET ORAL at 05:08

## 2025-03-10 RX ADMIN — CEFTRIAXONE 2000 MILLIGRAM(S): 500 INJECTION, POWDER, FOR SOLUTION INTRAMUSCULAR; INTRAVENOUS at 22:33

## 2025-03-10 RX ADMIN — Medication 150 MICROGRAM(S): at 06:49

## 2025-03-10 RX ADMIN — ROSUVASTATIN CALCIUM 5 MILLIGRAM(S): 5 TABLET, FILM COATED ORAL at 23:00

## 2025-03-10 RX ADMIN — METOPROLOL SUCCINATE 25 MILLIGRAM(S): 50 TABLET, EXTENDED RELEASE ORAL at 10:10

## 2025-03-10 RX ADMIN — APIXABAN 10 MILLIGRAM(S): 2.5 TABLET, FILM COATED ORAL at 22:34

## 2025-03-10 RX ADMIN — ALBUMIN (HUMAN) 50 MILLILITER(S): 12.5 INJECTION, SOLUTION INTRAVENOUS at 22:34

## 2025-03-10 RX ADMIN — Medication 3 MILLIGRAM(S): at 22:33

## 2025-03-10 RX ADMIN — OXYCODONE HYDROCHLORIDE 5 MILLIGRAM(S): 30 TABLET ORAL at 10:10

## 2025-03-10 RX ADMIN — FUROSEMIDE 40 MILLIGRAM(S): 10 INJECTION INTRAMUSCULAR; INTRAVENOUS at 11:28

## 2025-03-10 NOTE — CONSULT NOTE ADULT - ASSESSMENT
Assessment:  74M with benign prostatic hyperplasia, diverticulosis, Graves' disease, hemorrhoids, hyperlipidemia, hypothyroidism, osteoarthritis, spinal stenosis, spinal cord injury (2004), and multiple spinal surgeries (three cervical and one lumbar), recently admitted 02/18/25 to 02/25/25 for sepsis secondary to High-grade Strep Bacteremia with Discitis OM and epidural abscess on IV CTX 2G q24 till 4/16/2025 presents back 3/4/2025 from [facility] for worsening LE swelling and GOVIND, Found to have LLE DVT  Remains afebrile and on RA  Repeat MRI T spine with 3/9 showing Worsening discitis/osteomyelitis is seen with epidural phlegmon again seen unchanged though increased bilaterally and ventral, this is consistent with paraspinal phlegmon though there is a possible early abscess seenon the left side as described above  Neurosurgery recommend no surgical intervention and continue antibiotic    Prior work up:  History of multiple spinal surgery, C and T spine laminectomy with fusion hardware (2004), L spine laminectomy with fusion hardware (2006), all done in [facility]. Also has L knee replacement (2017) though no symptoms there, L knee ROM normal no pain No cardiac devices  BCx (2/17) with Strep dysgalacteae, Sy S  MRI 2/18 with concern for discitis T1-T3, possible phlegmon C7-T3, no abscess  TTE without obvious vegetation  BCx 2/19 negative    Antimicrobials:  cefTRIAXone Injectable. 2000 every 24 hours (2/18 --- )    Impression:   #High-grade Strep Bacteremia, Neck Pain, Concern for Spinal Involvement in setting of Multilevel C/T/L Spine Laminectomy with fusion hardware  #GOVIND  #DVT  #Leukocytosis    Recommendations:  - continue Ceftriaxone 2G q24  - monitor temperature curve  - trend WBC  - reason for abx use reviewed with patient  - side effects of antibiotic discussed, tolerating abx well so far  - Prior cultures reviewed. An epidemiologic assessment was performed. There is a significant risk for resistant microorganisms to spread to family members, and/or healthcare staff. The patient will be placed on isolation according to infection control policy. Will reconsider isolation measures based on new culture results and other clinical data as appropriate Appropriate cultures collected and an appropriate broad spectrum antibiotic therapy will be considered  - monitor worsening neurologic deficit  - follow up Neurosurgery, so far no surgical intervention planned  - continue prolong IV therapy; IV CTX 2G q24 via PICC line for 8-week course (enddate: 4/16/2025). Weekly CBC, CMP, ESR, CRP  - rest per primary team    [facility] IV to PO Token not applicable; Patient to continue with IV Therapy     Assessment:  74M with benign prostatic hyperplasia, diverticulosis, Graves' disease, hemorrhoids, hyperlipidemia, hypothyroidism, osteoarthritis, spinal stenosis, spinal cord injury (2004), and multiple spinal surgeries (three cervical and one lumbar), recently admitted 02/18/25 to 02/25/25 for sepsis secondary to High-grade Strep Bacteremia with Discitis OM and epidural abscess on IV CTX 2G q24 till 4/16/2025 presents back 3/4/2025 from [facility] for worsening LE swelling and GOVIND, Found to have LLE DVT  Remains afebrile and on RA  Repeat MRI T spine with 3/9 showing Worsening discitis/osteomyelitis is seen with epidural phlegmon again seen unchanged though increased bilaterally and ventral, this is consistent with paraspinal phlegmon though there is a possible early abscess seenon the left side as described above  Neurosurgery recommend no surgical intervention and continue antibiotic  So far reports no fever or chills, back pain is stable,  strength the same    Prior work up:  History of multiple spinal surgery, C and T spine laminectomy with fusion hardware (2004), L spine laminectomy with fusion hardware (2006), all done in [facility]. Also has L knee replacement (2017) though no symptoms there, L knee ROM normal no pain No cardiac devices  BCx (2/17) with Strep dysgalacteae, Sy S  MRI 2/18 with concern for discitis T1-T3, possible phlegmon C7-T3, no abscess  TTE without obvious vegetation  BCx 2/19 negative    Antimicrobials:  cefTRIAXone Injectable. 2000 every 24 hours (2/18 --- )    Impression:   #High-grade Strep Bacteremia, Neck Pain, Concern for Spinal Involvement in setting of Multilevel C/T/L Spine Laminectomy with fusion hardware  #GOVIND  #DVT  #Leukocytosis  - PICC line appears clean, no signs of infection  - unclear if MRI is lagging behind, seems symptomatically stable, reports no fever or chills, no worsening back pain or neuropathy     Recommendations:  - continue Ceftriaxone 2G q24  - monitor temperature curve  - trend WBC  - reason for abx use reviewed with patient  - side effects of antibiotic discussed, tolerating abx well so far  - Prior cultures reviewed. An epidemiologic assessment was performed. There is a significant risk for resistant microorganisms to spread to family members, and/or healthcare staff. The patient will be placed on isolation according to infection control policy. Will reconsider isolation measures based on new culture results and other clinical data as appropriate Appropriate cultures collected and an appropriate broad spectrum antibiotic therapy will be considered  - trend ESR, CRP  - monitor worsening neurologic deficit  - follow up Neurosurgery, so far no surgical intervention planned  - continue prolong IV therapy; IV CTX 2G q24 via PICC line for 8-week course (enddate: 4/16/2025). Weekly CBC, CMP, ESR, CRP  - rest per primary team    [facility] IV to PO Token not applicable; Patient to continue with IV Therapy

## 2025-03-10 NOTE — PROGRESS NOTE ADULT - ASSESSMENT
MEDICATIONS  (STANDING):  albumin human 25% IVPB 50 milliLiter(s) IV Intermittent every 12 hours  apixaban 10 milliGRAM(s) Oral every 12 hours  cefTRIAXone Injectable. 2000 milliGRAM(s) IV Push every 24 hours  furosemide   Injectable 40 milliGRAM(s) IV Push every 12 hours  levothyroxine 150 MICROGram(s) Oral daily  metoprolol succinate ER 25 milliGRAM(s) Oral daily  rosuvastatin 5 milliGRAM(s) Oral at bedtime  tamsulosin 0.8 milliGRAM(s) Oral at bedtime  tiZANidine 2 milliGRAM(s) Oral every 8 hours    MEDICATIONS  (PRN):  acetaminophen     Tablet .. 650 milliGRAM(s) Oral every 6 hours PRN Mild Pain (1 - 3)  aluminum hydroxide/magnesium hydroxide/simethicone Suspension 30 milliLiter(s) Oral every 4 hours PRN Dyspepsia  lactulose Syrup 20 Gram(s) Oral three times a day PRN for constipation  melatonin 3 milliGRAM(s) Oral at bedtime PRN Insomnia  ondansetron Injectable 4 milliGRAM(s) IV Push every 6 hours PRN Nausea and/or Vomiting  oxyCODONE    IR 5 milliGRAM(s) Oral every 6 hours PRN Moderate Pain (4 - 6)      74M admitted for GOVIND, and DVT.         #GOVIND on CKD  #LE edema  #hypoalbuminemia  - Cr improved from 2.8 > 1.9 today  - continue to hold torsemide  - IVF as needed with fluid balance  -Significant debilitating ansarca. Trial of lasix/albumin? WIll discuss with nephro->-Trial of albumin lasix started 3/8->improvement. Continue BID IV Laxis/IV albumin (3/9)  - nephrology consult/recs  -Continue to monitor Cr/Electrolyte/IOs    #DVT  - appears provoked from decrease ambulation from recent spinal abscess  - could not tolerate VQ scan for r/o PE given pain/hx spinal surgeries  - switch to eliquis today, will be new med on discharge     #Spinal epidural abscess POA and already being treated with IV antibiotics  - Chart reviewed extensively  - PICC w CTX for 8 week course to finish 4/16/25  - Reviewed neurosurgery notes and current neuro exam unchanged and no worse from prior admission, imaging reviewed  - currently since neuro exam unchanged will not pursue more imaging, low threshold to re-scan if neuro exam worsens  -Repeat MRI noted->neuroseurgery/Spine/ID recs.     #leukocytosis  - likely rx to DVT  - no other signs of sepsis , afebrile       #hypothyroidism  - c/w synthroid    #CAD  - c/w metoprolol, statin    #HTN  - c/w metoprolol  - BP elevated 140-160s, if remains elevated will adjust meds > BP down to 130s today no adjustment of meds     #HLD  - c/w statin    #anemia   - 12>10 likely dilutional   - no signs of bleeding   trend     #BPH  #Urinary retention likely augmented by degree of anasarca  - gupta placed during prior admission  - TOV today ->failed. Gupta re-inserted 3/6->fell out without knowing with associated hematuria->recurrent retention 3/7->Gupta re-inserted (3/7)    #dvt ppx - eliquis     3/10: ID not consulted on admission. Consult ID. Recs appreciated->continue IV antibiotics through april. Continue IV lasix/Albumin. No noticeable improvement as per patient. Continue to monitor. Leukocytosis improvin wtihout any change in intervention minus diuresis. Reactive? Neurosurgery recs appreciated->no acute intervention. Continue IV antibiotics and follow up outpatient. No events on telemetry->DC telemetry.     I spent a total of 51 minutes in face-to-face time with the patient and on the floor managing patient including coordination of care. Overall 50% of the time spent in discussion of the diagnosis, counseling and treatment plan.

## 2025-03-10 NOTE — CONSULT NOTE ADULT - SUBJECTIVE AND OBJECTIVE BOX
Patient is a 74y old  Male who presents with a chief complaint of acute renal failure (07 Mar 2025 08:58)    HPI:  74-year-old male with a past medical history of benign prostatic hyperplasia, diverticulosis, Graves' disease, hemorrhoids, hyperlipidemia, hypothyroidism, osteoarthritis, spinal stenosis, spinal cord injury (2004), and multiple spinal surgeries (three cervical and one lumbar). He presents with complaints of lower extremity swelling and abnormal BUN/creatinine levels. The patient was recently admitted to [facility] from 02/18/25 to 02/25/25 for sepsis secondary to epidural abscess. He has a peripherally inserted central catheter line in the right arm and is on nightly Ceftriaxone treatment until 04/16/25. Following his discharge, he was transferred to [facility]. Documentation from the rehabilitation facility notes a decline in his functional status and dysfunction with activities of daily living. At the rehabilitation facility, he was noted to have worsening LE swelling and was started on torsemide. However, his Cr levels subsequently became elevated, leading to the discontinuation of torsemide. He was sent to the [facility] Emergency Department for further evaluation of his LE swelling and elevated Cr levels. He denies any chest pain, dyspnea, orthopnea, PND. (04 Mar 2025 18:35)  Above HPI reviewed and reconciled with patient    74M with benign prostatic hyperplasia, diverticulosis, Graves' disease, hemorrhoids, hyperlipidemia, hypothyroidism, osteoarthritis, spinal stenosis, spinal cord injury (2004), and multiple spinal surgeries (three cervical and one lumbar), recently admitted 02/18/25 to 02/25/25 for sepsis secondary to High-grade Strep Bacteremia with Discitis OM and epidural abscess on IV CTX 2G q24 till 4/16/2025 presents back 3/4/2025 from [facility] for worsening LE swelling and GOVIND, Found to have LLE DVT  Remains afebrile and on RA  Repeat MRI T spine with 3/9 showing Worsening discitis/osteomyelitis is seen with epidural phlegmon again seen unchanged though increased bilaterally and ventral, this is consistent with paraspinal phlegmon though there is a possible early abscess seenon the left side as described above  Neurosurgery recommend no surgical intervention and continue antibiotic    Prior work up:  History of multiple spinal surgery, C and T spine laminectomy with fusion hardware (2004), L spine laminectomy with fusion hardware (2006), all done in [facility]. Also has L knee replacement (2017) though no symptoms there, L knee ROM normal no pain No cardiac devices  BCx (2/17) with Strep dysgalacteae, Sy S  MRI 2/18 with concern for discitis T1-T3, possible phlegmon C7-T3, no abscess  TTE without obvious vegetation  BCx 2/19 negative    PAST MEDICAL & SURGICAL HISTORY:  Hyperlipidemia, unspecified hyperlipidemia type      Diverticulosis of large intestine without hemorrhage      History of colon polyps      Hemorrhoids, unspecified hemorrhoid type      Benign prostatic hyperplasia without lower urinary tract symptoms, unspecified morphology      Osteoarthritis of knee, unilateral  left      Spinal cord injury at C5-C7 level without injury of spinal bone, subsequent encounter      Spastic      Weakness  to right side      Myelopathy      Spinal stenosis, unspecified spinal region      Hypothyroidism, unspecified type      Graves disease      Alcoholic  recovering alcoholic x 40years      History of cervical discectomy      S/P laminectomy  Cervical      H/O lumbar discectomy  with laminectomy      H/O arthroscopy of knee  Left x 2. Unsure of years      H/O colonoscopy with polypectomy  2014        FAMILY HISTORY:  Family history of colon cancer in mother (Mother)    Family history of liver disease (Father)  father    Family history of cancer (Sibling)  SIster - bile duct      Social Hx: former smoker      Allergies  No Known Allergies    ANTIMICROBIALS (past 90 days)  MEDICATIONS  (STANDING):    cefTRIAXone Injectable.   2000 milliGRAM(s) IV Push (03-04-25 @ 20:07)    cefTRIAXone Injectable.   2000 milliGRAM(s) IV Push (03-09-25 @ 22:06)   2000 milliGRAM(s) IV Push (03-08-25 @ 23:02)   2000 milliGRAM(s) IV Push (03-07-25 @ 21:53)   2000 milliGRAM(s) IV Push (03-06-25 @ 21:29)   2000 milliGRAM(s) IV Push (03-05-25 @ 21:28)        ACTIVE ANTIMICROBIALS  cefTRIAXone Injectable. 2000 every 24 hours (3/4 --- )    MEDICATIONS  (STANDING):  acetaminophen     Tablet .. 650 every 6 hours PRN  aluminum hydroxide/magnesium hydroxide/simethicone Suspension 30 every 4 hours PRN  apixaban 10 every 12 hours  furosemide   Injectable 40 every 12 hours  lactulose Syrup 20 three times a day PRN  levothyroxine 150 daily  melatonin 3 at bedtime PRN  metoprolol succinate ER 25 daily  ondansetron Injectable 4 every 6 hours PRN  oxyCODONE    IR 5 every 6 hours PRN  rosuvastatin 5 at bedtime  tamsulosin 0.8 at bedtime  tiZANidine 2 every 8 hours      REVIEW OF SYSTEMS  [  ] ROS unobtainable because:    [ x ] All other systems negative except as noted below:	    Constitutional:  [ ] fever [ ] chills  [ ] weight loss  [ ] weakness  Skin:  [ ] rash [ ] phlebitis	  Eyes: [ ] icterus [ ] pain  [ ] discharge	  ENMT: [ ] sore throat  [ ] thrush [ ] ulcers [ ] exudates  Respiratory: [ ] dyspnea [ ] hemoptysis [ ] cough [ ] sputum	  Cardiovascular:  [ ] chest pain [ ] palpitations [ ] edema	  Gastrointestinal:  [ ] nausea [ ] vomiting [ ] diarrhea [ ] constipation [ ] pain	  Genitourinary:  [ ] dysuria [ ] frequency [ ] hematuria [ ] discharge [ ] flank pain  [ ] incontinence  Musculoskeletal:  [ ] myalgias [ ] arthralgias [ ] arthritis  [ ] back pain  Neurological:  [ ] headache [ ] seizures  [ ] confusion/altered mental status  Psychiatric:  [ ] anxiety [ ] depression	  Hematology/Lymphatics:  [ ] lymphadenopathy  Endocrine:  [ ] adrenal [ ] thyroid  Allergic/Immunologic:	 [ ] transplant [ ] seasonal    Vital Signs Last 24 Hrs  T(C): 36.2 (10 Mar 2025 12:19), Max: 36.5 (09 Mar 2025 17:03)  T(F): 97.2 (10 Mar 2025 12:19), Max: 97.7 (09 Mar 2025 17:03)  HR: 72 (10 Mar 2025 08:34) (72 - 87)  BP: 134/70 (10 Mar 2025 12:19) (119/66 - 134/70)  BP(mean): --  RR: 18 (10 Mar 2025 12:19) (18 - 18)  SpO2: 98% (10 Mar 2025 12:19) (96% - 98%)    Parameters below as of 10 Mar 2025 08:34  Patient On (Oxygen Delivery Method): room air        Physical Exam:  Constitutional: frail, NAD  Head/Eyes: no icterus  LUNGS:  CTA  CVS:  regular rhythm  Abd:  soft, non-tender; non-distended  Ext:  no edema  Back: no spinal tenderness noted  Vascular:  IV site no erythema tenderness or discharge  Neuro: AAO X 3, weak R hand       Labs: all available labs reviewed                        8.7    12.53 )-----------( 231      ( 10 Mar 2025 04:30 )             26.7     03-10    135  |  102  |  43[H]  ----------------------------<  125[H]  4.7   |  28  |  1.44[H]    Ca    8.7      10 Mar 2025 04:30  Phos  4.1     03-10  Mg     2.0     03-10    TPro  6.1  /  Alb  2.4[L]  /  TBili  0.6  /  DBili  x   /  AST  101[H]  /  ALT  59  /  AlkPhos  59  03-09     LIVER FUNCTIONS - ( 09 Mar 2025 05:14 )  Alb: 2.4 g/dL / Pro: 6.1 gm/dL / ALK PHOS: 59 U/L / ALT: 59 U/L / AST: 101 U/L / GGT: x           Urinalysis Basic - ( 10 Mar 2025 04:30 )    Color: x / Appearance: x / SG: x / pH: x  Gluc: 125 mg/dL / Ketone: x  / Bili: x / Urobili: x   Blood: x / Protein: x / Nitrite: x   Leuk Esterase: x / RBC: x / WBC x   Sq Epi: x / Non Sq Epi: x / Bacteria: x          Radiology: all available radiological tests reviewed    Advanced directives addressed: full resuscitation Patient is a 74y old  Male who presents with a chief complaint of acute renal failure (07 Mar 2025 08:58)    HPI:  74-year-old male with a past medical history of benign prostatic hyperplasia, diverticulosis, Graves' disease, hemorrhoids, hyperlipidemia, hypothyroidism, osteoarthritis, spinal stenosis, spinal cord injury (2004), and multiple spinal surgeries (three cervical and one lumbar). He presents with complaints of lower extremity swelling and abnormal BUN/creatinine levels. The patient was recently admitted to [facility] from 02/18/25 to 02/25/25 for sepsis secondary to epidural abscess. He has a peripherally inserted central catheter line in the right arm and is on nightly Ceftriaxone treatment until 04/16/25. Following his discharge, he was transferred to [facility] Documentation from the rehabilitation facility notes a decline in his functional status and dysfunction with activities of daily living. At the rehabilitation facility, he was noted to have worsening LE swelling and was started on torsemide. However, his Cr levels subsequently became elevated, leading to the discontinuation of torsemide. He was sent to the [facility] Emergency Department for further evaluation of his LE swelling and elevated Cr levels. He denies any chest pain, dyspnea, orthopnea, PND. (04 Mar 2025 18:35)  Above HPI reviewed and reconciled with patient    74M with benign prostatic hyperplasia, diverticulosis, Graves' disease, hemorrhoids, hyperlipidemia, hypothyroidism, osteoarthritis, spinal stenosis, spinal cord injury (2004), and multiple spinal surgeries (three cervical and one lumbar), recently admitted 02/18/25 to 02/25/25 for sepsis secondary to High-grade Strep Bacteremia with Discitis OM and epidural abscess on IV CTX 2G q24 till 4/16/2025 presents back 3/4/2025 from [facility] for worsening LE swelling and GOVIND, Found to have LLE DVT  Remains afebrile and on RA  Repeat MRI T spine with 3/9 showing Worsening discitis/osteomyelitis is seen with epidural phlegmon again seen unchanged though increased bilaterally and ventral, this is consistent with paraspinal phlegmon though there is a possible early abscess seenon the left side as described above  Neurosurgery recommend no surgical intervention and continue antibiotic  So far reports no fever or chills, back pain is stable,  strength the same    Prior work up:  History of multiple spinal surgery, C and T spine laminectomy with fusion hardware (2004), L spine laminectomy with fusion hardware (2006), all done in [facility]. Also has L knee replacement (2017) though no symptoms there, L knee ROM normal no pain No cardiac devices  BCx (2/17) with Strep dysgalacteae, Sy S  MRI 2/18 with concern for discitis T1-T3, possible phlegmon C7-T3, no abscess  TTE without obvious vegetation  BCx 2/19 negative    PAST MEDICAL & SURGICAL HISTORY:  Hyperlipidemia, unspecified hyperlipidemia type      Diverticulosis of large intestine without hemorrhage      History of colon polyps      Hemorrhoids, unspecified hemorrhoid type      Benign prostatic hyperplasia without lower urinary tract symptoms, unspecified morphology      Osteoarthritis of knee, unilateral  left      Spinal cord injury at C5-C7 level without injury of spinal bone, subsequent encounter      Spastic      Weakness  to right side      Myelopathy      Spinal stenosis, unspecified spinal region      Hypothyroidism, unspecified type      Graves disease      Alcoholic  recovering alcoholic x 40years      History of cervical discectomy      S/P laminectomy  Cervical      H/O lumbar discectomy  with laminectomy      H/O arthroscopy of knee  Left x 2. Unsure of years      H/O colonoscopy with polypectomy  2014        FAMILY HISTORY:  Family history of colon cancer in mother (Mother)    Family history of liver disease (Father)  father    Family history of cancer (Sibling)  SIster - bile duct      Social Hx: former smoker      Allergies  No Known Allergies    ANTIMICROBIALS (past 90 days)  MEDICATIONS  (STANDING):    cefTRIAXone Injectable.   2000 milliGRAM(s) IV Push (03-04-25 @ 20:07)    cefTRIAXone Injectable.   2000 milliGRAM(s) IV Push (03-09-25 @ 22:06)   2000 milliGRAM(s) IV Push (03-08-25 @ 23:02)   2000 milliGRAM(s) IV Push (03-07-25 @ 21:53)   2000 milliGRAM(s) IV Push (03-06-25 @ 21:29)   2000 milliGRAM(s) IV Push (03-05-25 @ 21:28)        ACTIVE ANTIMICROBIALS  cefTRIAXone Injectable. 2000 every 24 hours (3/4 --- )    MEDICATIONS  (STANDING):  acetaminophen     Tablet .. 650 every 6 hours PRN  aluminum hydroxide/magnesium hydroxide/simethicone Suspension 30 every 4 hours PRN  apixaban 10 every 12 hours  furosemide   Injectable 40 every 12 hours  lactulose Syrup 20 three times a day PRN  levothyroxine 150 daily  melatonin 3 at bedtime PRN  metoprolol succinate ER 25 daily  ondansetron Injectable 4 every 6 hours PRN  oxyCODONE    IR 5 every 6 hours PRN  rosuvastatin 5 at bedtime  tamsulosin 0.8 at bedtime  tiZANidine 2 every 8 hours      REVIEW OF SYSTEMS  [  ] ROS unobtainable because:    [ x ] All other systems negative except as noted below:	    Constitutional:  [ ] fever [ ] chills  [ ] weight loss  [ ] weakness  Skin:  [ ] rash [ ] phlebitis	  Eyes: [ ] icterus [ ] pain  [ ] discharge	  ENMT: [ ] sore throat  [ ] thrush [ ] ulcers [ ] exudates  Respiratory: [ ] dyspnea [ ] hemoptysis [ ] cough [ ] sputum	  Cardiovascular:  [ ] chest pain [ ] palpitations [ ] edema	  Gastrointestinal:  [ ] nausea [ ] vomiting [ ] diarrhea [ ] constipation [ ] pain	  Genitourinary:  [ ] dysuria [ ] frequency [ ] hematuria [ ] discharge [ ] flank pain  [ ] incontinence  Musculoskeletal:  [ ] myalgias [ ] arthralgias [ ] arthritis  [ ] back pain  Neurological:  [ ] headache [ ] seizures  [ ] confusion/altered mental status  Psychiatric:  [ ] anxiety [ ] depression	  Hematology/Lymphatics:  [ ] lymphadenopathy  Endocrine:  [ ] adrenal [ ] thyroid  Allergic/Immunologic:	 [ ] transplant [ ] seasonal    Vital Signs Last 24 Hrs  T(C): 36.2 (10 Mar 2025 12:19), Max: 36.5 (09 Mar 2025 17:03)  T(F): 97.2 (10 Mar 2025 12:19), Max: 97.7 (09 Mar 2025 17:03)  HR: 72 (10 Mar 2025 08:34) (72 - 87)  BP: 134/70 (10 Mar 2025 12:19) (119/66 - 134/70)  BP(mean): --  RR: 18 (10 Mar 2025 12:19) (18 - 18)  SpO2: 98% (10 Mar 2025 12:19) (96% - 98%)    Parameters below as of 10 Mar 2025 08:34  Patient On (Oxygen Delivery Method): room air        Physical Exam:  Constitutional: frail, NAD  Head/Eyes: no icterus  LUNGS:  CTA  CVS:  regular rhythm  Abd:  soft, non-tender; non-distended  Ext:  no edema  Back: no spinal tenderness noted  Vascular:  IV site no erythema tenderness or discharge  Neuro: AAO X 3, weak R hand       Labs: all available labs reviewed                        8.7    12.53 )-----------( 231      ( 10 Mar 2025 04:30 )             26.7     03-10    135  |  102  |  43[H]  ----------------------------<  125[H]  4.7   |  28  |  1.44[H]    Ca    8.7      10 Mar 2025 04:30  Phos  4.1     03-10  Mg     2.0     03-10    TPro  6.1  /  Alb  2.4[L]  /  TBili  0.6  /  DBili  x   /  AST  101[H]  /  ALT  59  /  AlkPhos  59  03-09     LIVER FUNCTIONS - ( 09 Mar 2025 05:14 )  Alb: 2.4 g/dL / Pro: 6.1 gm/dL / ALK PHOS: 59 U/L / ALT: 59 U/L / AST: 101 U/L / GGT: x           Urinalysis Basic - ( 10 Mar 2025 04:30 )    Color: x / Appearance: x / SG: x / pH: x  Gluc: 125 mg/dL / Ketone: x  / Bili: x / Urobili: x   Blood: x / Protein: x / Nitrite: x   Leuk Esterase: x / RBC: x / WBC x   Sq Epi: x / Non Sq Epi: x / Bacteria: x          Radiology: all available radiological tests reviewed    Advanced directives addressed: full resuscitation

## 2025-03-10 NOTE — PROGRESS NOTE ADULT - SUBJECTIVE AND OBJECTIVE BOX
CHIEF COMPLAINT: LE edema, weakness, numbness; urinary retention      FROM H&P:  74-year-old male with a past medical history of benign prostatic hyperplasia, diverticulosis, Graves' disease, hemorrhoids, hyperlipidemia, hypothyroidism, osteoarthritis, spinal stenosis, spinal cord injury (2004), and multiple spinal surgeries (three cervical and one lumbar). He presents with complaints of lower extremity swelling and abnormal BUN/creatinine levels. The patient was recently admitted to [facility] from 02/18/25 to 02/25/25 for sepsis secondary to epidural abscess. He has a peripherally inserted central catheter line in the right arm and is on nightly Ceftriaxone treatment until 04/16/25. Following his discharge, he was transferred to [facility]. Documentation from the rehabilitation facility notes a decline in his functional status and dysfunction with activities of daily living. At the rehabilitation facility, he was noted to have worsening LE swelling and was started on torsemide. However, his Cr levels subsequently became elevated, leading to the discontinuation of torsemide. He was sent to the [facility] Emergency Department for further evaluation of his LE swelling and elevated Cr levels. He denies any chest pain, dyspnea, orthopnea, PND.       SUBJECTIVE/SIGNIFICANT INTERVAL EVENTS/OVERNIGHT EVENTS:    3/5: pt feels well. c/o LE numbness @ thighs and L hand numbness. denies saddle anesthesia. Cr improving     3/6: Cr continues to improve, c/o generalized weakness noble while ambulating but no new neuro deficits     3/7:   Gupta out with hematuria. Patient had no sense of it coming out->recurrent retention->gupta re-inserted. MRI lumbar negative.   Discussed with neurosurgery. CLeared from their standpoint. Advised of upper extremity symptoms. Order MRI C and T Spine for further evaluation and if anything significant will re-call neurosurgery  Signficaant ansarca. Cr improving. Arterial doppler ordered for further evaluation. ON AC for DVT. TTE reviewed and grossly unremarkable. Will discuss with nephro  Rising leukocytosis. Unclear source. Afebrile and vitals stable. Continue to monitor    3/8:  Cr improving. Anasarca persists and debilitating. Discussed with nephrology->Trial of albumin lasix  MRI Cervical Spin and thoracic spine done. Pending read  Rising leukocytosis? Unclear etiology. Afebrile. Follow imaging results.   Continue to monitor fluid status/Cr/Electrolytes closely. If stable and improving plan for bid albumin/lasix for adequate diuresis    3/9: Patient feels the same from weakness, edema, numbness standpoint. Apparent improvement on Physical exam in edema. Cr improved. Discussed with nephrology. IV Albumin/Lasix bid and continue to assess. MR C-Spine similar to prior. MR T-Spine with reported worsening discitis and possible new abscess. Re-called neurosurgery/spine to re-evaluate. Antibiotics as per ID.     3/10: ID not consulted on admission. Consult ID. Recs appreciated->continue IV antibiotics through april. Continue IV lasix/Albumin. No noticeable improvement as per patient. Continue to monitor. Leukocytosis improvin wtihout any change in intervention minus diuresis. Reactive? Neurosurgery recs appreciated->no acute intervention. Continue IV antibiotics and follow up outpatient. No events on telemetry->DC telemetry.     Review of Systems: 14 Point review of systems reviewed and reported as negative unless otherwise stated  above      PHYSICAL EXAM:    T(C): 36.2 (03-10-25 @ 12:19), Max: 36.5 (03-09-25 @ 17:03)  HR: 72 (03-10-25 @ 08:34) (72 - 87)  BP: 134/70 (03-10-25 @ 12:19) (119/66 - 134/70)  RR: 18 (03-10-25 @ 12:19) (18 - 18)  SpO2: 98% (03-10-25 @ 12:19) (96% - 98%)  General: AAOx3; NAD  Head: AT/NC  Neck: Non-tender; No JVD  CVS: RRR, S1&S2, No murmur, Anasarca +  Respiratory: Lungs CTA B/L; Normal Respiratory Effort  Abdomen/GI: Soft, non-tender, non-distended  : No bladder distention, No Gupta  Extremities: No cyanosis, No clubbing, Significant LE edema to the pelvix  MSK: No CVA tenderness, Normal ROM, No injury  Neuro: AAOx3, numbness to upper b/l thighs, 3/5 L hand weakness, ,no saddle anesthesia   Psych: Appropriate, Cooperative,   Skin: Clean, Dry and Intact      LABS:                                     8.7    12.53 )-----------( 231      ( 10 Mar 2025 04:30 )             26.7     03-10    135  |  102  |  43[H]  ----------------------------<  125[H]  4.7   |  28  |  1.44[H]    Ca    8.7      10 Mar 2025 04:30  Phos  4.1     03-10  Mg     2.0     03-10    TPro  6.1  /  Alb  2.4[L]  /  TBili  0.6  /  DBili  x   /  AST  101[H]  /  ALT  59  /  AlkPhos  59  03-09    SARS-CoV-2: NotDetec (17 Feb 2025 21:11)    CAPILLARY BLOOD GLUCOSE                                8.7    12.53 )-----------( 231      ( 10 Mar 2025 04:30 )             26.7     03-10    135  |  102  |  43[H]  ----------------------------<  125[H]  4.7   |  28  |  1.44[H]    Ca    8.7      10 Mar 2025 04:30  Phos  4.1     03-10  Mg     2.0     03-10    TPro  6.1  /  Alb  2.4[L]  /  TBili  0.6  /  DBili  x   /  AST  101[H]  /  ALT  59  /  AlkPhos  59  03-09    SARS-CoV-2: NotDetec (17 Feb 2025 21:11)    CAPILLARY BLOOD GLUCOSE            RADIOLOGY:  < from: MR Lumbar Spine w/wo IV Cont (03.07.25 @ 15:11) >  IMPRESSION:    No abnormal enhancement.    No evidence for epidural abscess or discitis/osteomyelitis.    Multilevel degenerative changes detailed in the body the report.    < end of copied text >  < from: MR Thoracic Spine w/wo IV Cont (03.08.25 @ 12:24) >  IMPRESSION: Worsening discitis/osteomyelitis is seen with epidural   phlegmon again seen unchanged though increased bilaterally and ventral,   this is consistent with paraspinal phlegmon though there is a possible   early abscess seenon the left side as described above.    < end of copied text >  < from: MR Cervical Spine w/wo IV Cont (03.08.25 @ 12:24) >    IMPRESSION:    1.  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  2.  Chronic cord myelomalacia at C3-4, status post decompression.  3.  Stable severe left C7-T1 foraminal stenosis, with moderate narrowing   of the central canal.  4.  Stable moderate to severe bilateral C6-7 foraminal stenoses.    < end of copied text >        EKG:  < from: 12 Lead ECG (03.05.25 @ 07:16) >    Diagnosis Line Normal sinus rhythm  Normal ECG  When compared with ECG of 04-MAR-2025 14:10,  No significant change was found    < end of copied text >      ECHO:  < from: TTE W or WO Ultrasound Enhancing Agent (02.18.25 @ 12:28) >  CONCLUSIONS:      1. Left ventricular systolic function is normal with an ejection fraction of 66 % by Meyers's method of disks.   2. Trace tricuspid regurgitation.   3. Estimated pulmonary artery systolic pressure is 35 mmHg, consistent with mild pulmonary hypertension.   4. Interatrial septum is aneurysmal.   5. Technically difficultimage quality.   6. The peak transaortic velocity is 2.29 m/s, peak transaortic gradient is 21.0 mmHg and mean transaortic gradient is 12.0 mmHg with an LVOT/aortic valve VTI ratio of 0.42. The effective orifice area is estimated at 1.61 cm² by the continuity equation.   7. Trileaflet aortic valve with reduced systolic excursion. There is moderate calcification of the aortic valve leaflets. Mild aortic stenosis.    < end of copied text >              I personally reviewed labs, imaging, ekg, orders and vitals.    Discussed case with:  [X]RN  [X]CM/SW  [X]Patient  [X]Family  [X]Specialist: NS/Nephrology

## 2025-03-10 NOTE — PROGRESS NOTE ADULT - ASSESSMENT
74-year-old male with a past medical history of benign prostatic hyperplasia, diverticulosis, Graves' disease, hemorrhoids, hyperlipidemia, hypothyroidism, osteoarthritis, spinal stenosis, spinal cord injury (2004), and multiple spinal surgeries w extremity swelling and abnormal BUN/creatinine levels.       Edema/Anasarca     -Optimize intake/protein stores  -Monitor labs closely w ongoing diuresis  - daily   weights       Nephrotic range proteinuria 3.4 grams    - fup on serologies  - trial of Lasix      d/w staff

## 2025-03-10 NOTE — PROGRESS NOTE ADULT - SUBJECTIVE AND OBJECTIVE BOX
Patient is a 74y Male who reports no complaints as new    MEDICATIONS  (STANDING):  albumin human 25% IVPB 50 milliLiter(s) IV Intermittent every 12 hours  apixaban 10 milliGRAM(s) Oral every 12 hours  cefTRIAXone Injectable. 2000 milliGRAM(s) IV Push every 24 hours  furosemide   Injectable 40 milliGRAM(s) IV Push every 12 hours  levothyroxine 150 MICROGram(s) Oral daily  metoprolol succinate ER 25 milliGRAM(s) Oral daily  rosuvastatin 5 milliGRAM(s) Oral at bedtime  tamsulosin 0.8 milliGRAM(s) Oral at bedtime  tiZANidine 2 milliGRAM(s) Oral every 8 hours    MEDICATIONS  (PRN):  acetaminophen     Tablet .. 650 milliGRAM(s) Oral every 6 hours PRN Mild Pain (1 - 3)  aluminum hydroxide/magnesium hydroxide/simethicone Suspension 30 milliLiter(s) Oral every 4 hours PRN Dyspepsia  lactulose Syrup 20 Gram(s) Oral three times a day PRN for constipation  melatonin 3 milliGRAM(s) Oral at bedtime PRN Insomnia  ondansetron Injectable 4 milliGRAM(s) IV Push every 6 hours PRN Nausea and/or Vomiting  oxyCODONE    IR 5 milliGRAM(s) Oral every 6 hours PRN Moderate Pain (4 - 6)        T(C): , Max: 36.7 (03-10-25 @ 16:33)  T(F): , Max: 98 (03-10-25 @ 16:33)  HR: 97 (03-10-25 @ 20:45)  BP: 122/59 (03-10-25 @ 20:45)  BP(mean): --  RR: 18 (03-10-25 @ 20:45)  SpO2: 97% (03-10-25 @ 20:45)  Wt(kg): --    03-09 @ 07:01  -  03-10 @ 07:00  --------------------------------------------------------  IN: 0 mL / OUT: 1550 mL / NET: -1550 mL    03-10 @ 07:01  -  03-10 @ 22:09  --------------------------------------------------------  IN: 0 mL / OUT: 2200 mL / NET: -2200 mL          PHYSICAL EXAM:    Constitutional: NAD,MM  dist  Cardiovascular: S1 and S2  Neurological: A/O x 3  chronic changes      LABS:                        8.7    12.53 )-----------( 231      ( 10 Mar 2025 04:30 )             26.7     10 Mar 2025 04:30    135    |  102    |  43     ----------------------------<  125    4.7     |  28     |  1.44   09 Mar 2025 05:14    134    |  100    |  43     ----------------------------<  146    4.9     |  25     |  1.46   08 Mar 2025 05:47    133    |  101    |  47     ----------------------------<  120    5.0     |  28     |  1.56   07 Mar 2025 08:27    133    |  102    |  56     ----------------------------<  115    5.4     |  25     |  1.71     Ca    8.7        10 Mar 2025 04:30  Ca    8.5        09 Mar 2025 05:14  Ca    8.6        08 Mar 2025 05:47  Ca    8.9        07 Mar 2025 08:27  Phos  4.1       10 Mar 2025 04:30  Phos  3.9       09 Mar 2025 05:14  Phos  3.9       08 Mar 2025 05:47  Mg     2.0       10 Mar 2025 04:30  Mg     2.1       09 Mar 2025 05:14  Mg     2.1       08 Mar 2025 05:47    TPro  6.1    /  Alb  2.4    /  TBili  0.6    /  DBili  x      /  AST  101    /  ALT  59     /  AlkPhos  59     09 Mar 2025 05:14  TPro  5.8    /  Alb  2.3    /  TBili  0.7    /  DBili  x      /  AST  72     /  ALT  46     /  AlkPhos  50     08 Mar 2025 05:47  TPro  6.5    /  Alb  x      /  TBili  x      /  DBili  x      /  AST  x      /  ALT  x      /  AlkPhos  x      07 Mar 2025 12:24          Urine Studies:  Urinalysis Basic - ( 10 Mar 2025 04:30 )    Color: x / Appearance: x / SG: x / pH: x  Gluc: 125 mg/dL / Ketone: x  / Bili: x / Urobili: x   Blood: x / Protein: x / Nitrite: x   Leuk Esterase: x / RBC: x / WBC x   Sq Epi: x / Non Sq Epi: x / Bacteria: x            RADIOLOGY & ADDITIONAL STUDIES:

## 2025-03-11 LAB
ADD ON TEST-SPECIMEN IN LAB: SIGNIFICANT CHANGE UP
ALBUMIN SERPL ELPH-MCNC: 2.3 G/DL — LOW (ref 3.3–5)
ALP SERPL-CCNC: 68 U/L — SIGNIFICANT CHANGE UP (ref 40–120)
ALT FLD-CCNC: 169 U/L — HIGH (ref 12–78)
ANA TITR SER: NEGATIVE — SIGNIFICANT CHANGE UP
ANION GAP SERPL CALC-SCNC: 6 MMOL/L — SIGNIFICANT CHANGE UP (ref 5–17)
AST SERPL-CCNC: 179 U/L — HIGH (ref 15–37)
BILIRUB SERPL-MCNC: 0.8 MG/DL — SIGNIFICANT CHANGE UP (ref 0.2–1.2)
BUN SERPL-MCNC: 43 MG/DL — HIGH (ref 7–23)
CALCIUM SERPL-MCNC: 8.8 MG/DL — SIGNIFICANT CHANGE UP (ref 8.5–10.1)
CHLORIDE SERPL-SCNC: 101 MMOL/L — SIGNIFICANT CHANGE UP (ref 96–108)
CK SERPL-CCNC: 2673 U/L — HIGH (ref 26–308)
CO2 SERPL-SCNC: 28 MMOL/L — SIGNIFICANT CHANGE UP (ref 22–31)
CREAT SERPL-MCNC: 1.41 MG/DL — HIGH (ref 0.5–1.3)
CRP SERPL-MCNC: 110 MG/ML — HIGH (ref 0–5)
EGFR: 53 ML/MIN/1.73M2 — LOW
EGFR: 53 ML/MIN/1.73M2 — LOW
ERYTHROCYTE [SEDIMENTATION RATE] IN BLOOD: 76 MM/HR — HIGH (ref 0–20)
GLUCOSE SERPL-MCNC: 128 MG/DL — HIGH (ref 70–99)
HCT VFR BLD CALC: 24.6 % — LOW (ref 39–50)
HGB BLD-MCNC: 7.9 G/DL — LOW (ref 13–17)
MAGNESIUM SERPL-MCNC: 1.8 MG/DL — SIGNIFICANT CHANGE UP (ref 1.6–2.6)
MCHC RBC-ENTMCNC: 28.2 PG — SIGNIFICANT CHANGE UP (ref 27–34)
MCHC RBC-ENTMCNC: 32.1 G/DL — SIGNIFICANT CHANGE UP (ref 32–36)
MCV RBC AUTO: 87.9 FL — SIGNIFICANT CHANGE UP (ref 80–100)
NRBC # BLD AUTO: 0 K/UL — SIGNIFICANT CHANGE UP (ref 0–0)
NRBC # FLD: 0 K/UL — SIGNIFICANT CHANGE UP (ref 0–0)
NRBC BLD AUTO-RTO: 0 /100 WBCS — SIGNIFICANT CHANGE UP (ref 0–0)
NT-PROBNP SERPL-SCNC: 136 PG/ML — HIGH (ref 0–125)
PHOSPHATE SERPL-MCNC: 4 MG/DL — SIGNIFICANT CHANGE UP (ref 2.5–4.5)
PLATELET # BLD AUTO: 219 K/UL — SIGNIFICANT CHANGE UP (ref 150–400)
PMV BLD: 9.6 FL — SIGNIFICANT CHANGE UP (ref 7–13)
POTASSIUM SERPL-MCNC: 4.3 MMOL/L — SIGNIFICANT CHANGE UP (ref 3.5–5.3)
POTASSIUM SERPL-SCNC: 4.3 MMOL/L — SIGNIFICANT CHANGE UP (ref 3.5–5.3)
PROT SERPL-MCNC: 5.7 GM/DL — LOW (ref 6–8.3)
RBC # BLD: 2.8 M/UL — LOW (ref 4.2–5.8)
RBC # FLD: 14.2 % — SIGNIFICANT CHANGE UP (ref 10.3–14.5)
SODIUM SERPL-SCNC: 135 MMOL/L — SIGNIFICANT CHANGE UP (ref 135–145)
WBC # BLD: 13.12 K/UL — HIGH (ref 3.8–10.5)
WBC # FLD AUTO: 13.12 K/UL — HIGH (ref 3.8–10.5)

## 2025-03-11 PROCEDURE — 99233 SBSQ HOSP IP/OBS HIGH 50: CPT

## 2025-03-11 PROCEDURE — 99231 SBSQ HOSP IP/OBS SF/LOW 25: CPT

## 2025-03-11 PROCEDURE — 76705 ECHO EXAM OF ABDOMEN: CPT | Mod: 26

## 2025-03-11 RX ORDER — BUMETANIDE 1 MG/1
2 TABLET ORAL EVERY 12 HOURS
Refills: 0 | Status: DISCONTINUED | OUTPATIENT
Start: 2025-03-11 | End: 2025-03-15

## 2025-03-11 RX ADMIN — ROSUVASTATIN CALCIUM 5 MILLIGRAM(S): 5 TABLET, FILM COATED ORAL at 22:13

## 2025-03-11 RX ADMIN — OXYCODONE HYDROCHLORIDE 5 MILLIGRAM(S): 30 TABLET ORAL at 23:05

## 2025-03-11 RX ADMIN — ALBUMIN (HUMAN) 50 MILLILITER(S): 12.5 INJECTION, SOLUTION INTRAVENOUS at 22:14

## 2025-03-11 RX ADMIN — TAMSULOSIN HYDROCHLORIDE 0.8 MILLIGRAM(S): 0.4 CAPSULE ORAL at 22:13

## 2025-03-11 RX ADMIN — Medication 150 MICROGRAM(S): at 05:40

## 2025-03-11 RX ADMIN — METOPROLOL SUCCINATE 25 MILLIGRAM(S): 50 TABLET, EXTENDED RELEASE ORAL at 10:49

## 2025-03-11 RX ADMIN — ALBUMIN (HUMAN) 50 MILLILITER(S): 12.5 INJECTION, SOLUTION INTRAVENOUS at 10:48

## 2025-03-11 RX ADMIN — OXYCODONE HYDROCHLORIDE 5 MILLIGRAM(S): 30 TABLET ORAL at 23:30

## 2025-03-11 RX ADMIN — BUMETANIDE 2 MILLIGRAM(S): 1 TABLET ORAL at 23:48

## 2025-03-11 RX ADMIN — OXYCODONE HYDROCHLORIDE 5 MILLIGRAM(S): 30 TABLET ORAL at 16:27

## 2025-03-11 RX ADMIN — BUMETANIDE 2 MILLIGRAM(S): 1 TABLET ORAL at 12:02

## 2025-03-11 RX ADMIN — APIXABAN 10 MILLIGRAM(S): 2.5 TABLET, FILM COATED ORAL at 22:14

## 2025-03-11 RX ADMIN — TIZANIDINE 2 MILLIGRAM(S): 4 TABLET ORAL at 05:39

## 2025-03-11 RX ADMIN — APIXABAN 10 MILLIGRAM(S): 2.5 TABLET, FILM COATED ORAL at 10:49

## 2025-03-11 RX ADMIN — CEFTRIAXONE 2000 MILLIGRAM(S): 500 INJECTION, POWDER, FOR SOLUTION INTRAMUSCULAR; INTRAVENOUS at 20:03

## 2025-03-11 RX ADMIN — Medication 3 MILLIGRAM(S): at 22:13

## 2025-03-11 NOTE — PROGRESS NOTE ADULT - ASSESSMENT
74-year-old male with a past medical history of benign prostatic hyperplasia, diverticulosis, Graves' disease, hemorrhoids, hyperlipidemia, hypothyroidism, osteoarthritis, spinal stenosis, spinal cord injury (2004), and multiple spinal surgeries w extremity swelling and abnormal BUN/creatinine levels.       Edema/Anasarca     -Optimize intake/protein stores  -Monitor labs closely w ongoing diuresis, changed to bumex  -May need diuretic infusion if persistent  -Daily  weights       Nephrotic range proteinuria 3.4 grams    - fup on serologies  - trial of diuretic  -hep panel,Pla2r ordered  -Repeat urine protein    d/w staff, Dr Gutiérrze

## 2025-03-11 NOTE — PROGRESS NOTE ADULT - ASSESSMENT
MEDICATIONS  (STANDING):  albumin human 25% IVPB 50 milliLiter(s) IV Intermittent every 12 hours  apixaban 10 milliGRAM(s) Oral every 12 hours  buMETAnide Injectable 2 milliGRAM(s) IV Push every 12 hours  cefTRIAXone Injectable. 2000 milliGRAM(s) IV Push every 24 hours  levothyroxine 150 MICROGram(s) Oral daily  metoprolol succinate ER 25 milliGRAM(s) Oral daily  rosuvastatin 5 milliGRAM(s) Oral at bedtime  tamsulosin 0.8 milliGRAM(s) Oral at bedtime    MEDICATIONS  (PRN):  acetaminophen     Tablet .. 650 milliGRAM(s) Oral every 6 hours PRN Mild Pain (1 - 3)  aluminum hydroxide/magnesium hydroxide/simethicone Suspension 30 milliLiter(s) Oral every 4 hours PRN Dyspepsia  lactulose Syrup 20 Gram(s) Oral three times a day PRN for constipation  melatonin 3 milliGRAM(s) Oral at bedtime PRN Insomnia  ondansetron Injectable 4 milliGRAM(s) IV Push every 6 hours PRN Nausea and/or Vomiting  oxyCODONE    IR 5 milliGRAM(s) Oral every 6 hours PRN Moderate Pain (4 - 6)      74M admitted for GOVIND, and DVT.         #GOVIND on CKD  #LE edema  #hypoalbuminemia  - Cr improved from 2.8 > 1.9 today  - continue to hold torsemide  - IVF as needed with fluid balance  -Significant debilitating ansarca. Trial of lasix/albumin? WIll discuss with nephro->-Trial of albumin lasix started 3/8->improvement. Continue BID IV Laxis/IV albumin (3/9)->Change to Bumex/Albumin (3/11)  - nephrology consult/recs  -Continue to monitor Cr/Electrolyte/IOs    #DVT  - appears provoked from decrease ambulation from recent spinal abscess  - could not tolerate VQ scan for r/o PE given pain/hx spinal surgeries  - switch to eliquis today, will be new med on discharge     #Spinal epidural abscess POA and already being treated with IV antibiotics  - Chart reviewed extensively  - PICC w CTX for 8 week course to finish 4/16/25  - Reviewed neurosurgery notes and current neuro exam unchanged and no worse from prior admission, imaging reviewed  - currently since neuro exam unchanged will not pursue more imaging, low threshold to re-scan if neuro exam worsens  -Repeat MRI noted->neuroseurgery/Spine/ID recs. -> No acute intervention. Upper extremity numbness and weakness not present on prior admission present on this admission. Anasarca likely not contributing to upper extremity symptoms.  Will re-discuss with NS    #leukocytosis  - likely rx to DVT  - no other signs of sepsis , afebrile       #hypothyroidism  - c/w synthroid    #CAD  - c/w metoprolol, statin    #HTN  - c/w metoprolol  - BP elevated 140-160s, if remains elevated will adjust meds > BP down to 130s today no adjustment of meds     #HLD  - c/w statin    #anemia   - 12>10 likely dilutional   - no signs of bleeding   trend     #BPH  #Urinary retention likely augmented by degree of anasarca  - gupta placed during prior admission  - TOV today ->failed. Gupta re-inserted 3/6->fell out without knowing with associated hematuria->recurrent retention 3/7->Gupta re-inserted (3/7)    #dvt ppx - eliquis       3/11: No change in edema. weight difficult to assess as patient unable to stand. Cr Relatively stable. Change to bumex 2mg bid. Still with upper extremity parathesia/weakness. Edema limited above torso so unlikely contributing to UE symptoms. Will re-discuss with Neurosurgery. Cntinue antibiotics.     I spent a total of 51 minutes in face-to-face time with the patient and on the floor managing patient including coordination of care. Overall 50% of the time spent in discussion of the diagnosis, counseling and treatment plan.

## 2025-03-11 NOTE — PROGRESS NOTE ADULT - SUBJECTIVE AND OBJECTIVE BOX
CHIEF COMPLAINT: LE edema, weakness, numbness; urinary retention      FROM H&P:  74-year-old male with a past medical history of benign prostatic hyperplasia, diverticulosis, Graves' disease, hemorrhoids, hyperlipidemia, hypothyroidism, osteoarthritis, spinal stenosis, spinal cord injury (2004), and multiple spinal surgeries (three cervical and one lumbar). He presents with complaints of lower extremity swelling and abnormal BUN/creatinine levels. The patient was recently admitted to [facility] from 02/18/25 to 02/25/25 for sepsis secondary to epidural abscess. He has a peripherally inserted central catheter line in the right arm and is on nightly Ceftriaxone treatment until 04/16/25. Following his discharge, he was transferred to [facility]. Documentation from the rehabilitation facility notes a decline in his functional status and dysfunction with activities of daily living. At the rehabilitation facility, he was noted to have worsening LE swelling and was started on torsemide. However, his Cr levels subsequently became elevated, leading to the discontinuation of torsemide. He was sent to the [facility] Emergency Department for further evaluation of his LE swelling and elevated Cr levels. He denies any chest pain, dyspnea, orthopnea, PND.       SUBJECTIVE/SIGNIFICANT INTERVAL EVENTS/OVERNIGHT EVENTS:    3/5: pt feels well. c/o LE numbness @ thighs and L hand numbness. denies saddle anesthesia. Cr improving     3/6: Cr continues to improve, c/o generalized weakness noble while ambulating but no new neuro deficits     3/7:   Gupta out with hematuria. Patient had no sense of it coming out->recurrent retention->gupta re-inserted. MRI lumbar negative.   Discussed with neurosurgery. CLeared from their standpoint. Advised of upper extremity symptoms. Order MRI C and T Spine for further evaluation and if anything significant will re-call neurosurgery  Signficaant ansarca. Cr improving. Arterial doppler ordered for further evaluation. ON AC for DVT. TTE reviewed and grossly unremarkable. Will discuss with nephro  Rising leukocytosis. Unclear source. Afebrile and vitals stable. Continue to monitor    3/8:  Cr improving. Anasarca persists and debilitating. Discussed with nephrology->Trial of albumin lasix  MRI Cervical Spin and thoracic spine done. Pending read  Rising leukocytosis? Unclear etiology. Afebrile. Follow imaging results.   Continue to monitor fluid status/Cr/Electrolytes closely. If stable and improving plan for bid albumin/lasix for adequate diuresis    3/9: Patient feels the same from weakness, edema, numbness standpoint. Apparent improvement on Physical exam in edema. Cr improved. Discussed with nephrology. IV Albumin/Lasix bid and continue to assess. MR C-Spine similar to prior. MR T-Spine with reported worsening discitis and possible new abscess. Re-called neurosurgery/spine to re-evaluate. Antibiotics as per ID.     3/10: ID not consulted on admission. Consult ID. Recs appreciated->continue IV antibiotics through april. Continue IV lasix/Albumin. No noticeable improvement as per patient. Continue to monitor. Leukocytosis improvin wtihout any change in intervention minus diuresis. Reactive? Neurosurgery recs appreciated->no acute intervention. Continue IV antibiotics and follow up outpatient. No events on telemetry->DC telemetry.     3/11: No change in edema. weight difficult to assess as patient unable to stand. Cr Relatively stable. Change to bumex 2mg bid. Still with upper extremity parathesia/weakness. Edema limited above torso so unlikely contributing to UE symptoms. Will re-discuss with Neurosurgery. Cntinue antibiotics.     Review of Systems: 14 Point review of systems reviewed and reported as negative unless otherwise stated  above      PHYSICAL EXAM:    T(C): 36.9 (03-11-25 @ 08:31), Max: 36.9 (03-11-25 @ 08:31)  HR: 79 (03-11-25 @ 08:31) (76 - 97)  BP: 118/72 (03-11-25 @ 08:31) (118/72 - 143/88)  RR: 18 (03-11-25 @ 08:31) (18 - 18)  SpO2: 98% (03-11-25 @ 08:31) (95% - 99%)  General: AAOx3; NAD  Head: AT/NC  Neck: Non-tender; No JVD  CVS: RRR, S1&S2, No murmur, Anasarca +  Respiratory: Lungs CTA B/L; Normal Respiratory Effort  Abdomen/GI: Soft, non-tender, non-distended; anasarca +  : No bladder distention, No Gupta  Extremities: No cyanosis, No clubbing, Significant LE edema to the pelvix  MSK: No CVA tenderness, Limited ROM LE bilaterally because of marked edema No injury  Neuro: AAOx3, numbness to upper b/l thighs, 3/5 L hand weakness,   Psych: Appropriate, Cooperative,   Skin: Clean, Dry and Intact      LABS:                          7.9    13.12 )-----------( 219      ( 11 Mar 2025 05:19 )             24.6     03-11    135  |  101  |  43[H]  ----------------------------<  128[H]  4.3   |  28  |  1.41[H]    Ca    8.8      11 Mar 2025 05:19  Phos  4.0     03-11  Mg     1.8     03-11    TPro  5.7[L]  /  Alb  2.3[L]  /  TBili  0.8  /  DBili  x   /  AST  179[H]  /  ALT  169[H]  /  AlkPhos  68  03-11    SARS-CoV-2: NotDetec (17 Feb 2025 21:11)    CAPILLARY BLOOD GLUCOSE                                     8.7    12.53 )-----------( 231      ( 10 Mar 2025 04:30 )             26.7     03-10    135  |  102  |  43[H]  ----------------------------<  125[H]  4.7   |  28  |  1.44[H]    Ca    8.7      10 Mar 2025 04:30  Phos  4.1     03-10  Mg     2.0     03-10    TPro  6.1  /  Alb  2.4[L]  /  TBili  0.6  /  DBili  x   /  AST  101[H]  /  ALT  59  /  AlkPhos  59  03-09    SARS-CoV-2: NotDetec (17 Feb 2025 21:11)    CAPILLARY BLOOD GLUCOSE      RADIOLOGY:  < from: MR Lumbar Spine w/wo IV Cont (03.07.25 @ 15:11) >  IMPRESSION:    No abnormal enhancement.    No evidence for epidural abscess or discitis/osteomyelitis.    Multilevel degenerative changes detailed in the body the report.    < end of copied text >  < from: MR Thoracic Spine w/wo IV Cont (03.08.25 @ 12:24) >  IMPRESSION: Worsening discitis/osteomyelitis is seen with epidural   phlegmon again seen unchanged though increased bilaterally and ventral,   this is consistent with paraspinal phlegmon though there is a possible   early abscess seenon the left side as described above.    < end of copied text >  < from: MR Cervical Spine w/wo IV Cont (03.08.25 @ 12:24) >    IMPRESSION:    1.  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  2.  Chronic cord myelomalacia at C3-4, status post decompression.  3.  Stable severe left C7-T1 foraminal stenosis, with moderate narrowing   of the central canal.  4.  Stable moderate to severe bilateral C6-7 foraminal stenoses.    < end of copied text >        EKG:  < from: 12 Lead ECG (03.05.25 @ 07:16) >    Diagnosis Line Normal sinus rhythm  Normal ECG  When compared with ECG of 04-MAR-2025 14:10,  No significant change was found    < end of copied text >      ECHO:  < from: TTE W or WO Ultrasound Enhancing Agent (02.18.25 @ 12:28) >  CONCLUSIONS:      1. Left ventricular systolic function is normal with an ejection fraction of 66 % by Meyers's method of disks.   2. Trace tricuspid regurgitation.   3. Estimated pulmonary artery systolic pressure is 35 mmHg, consistent with mild pulmonary hypertension.   4. Interatrial septum is aneurysmal.   5. Technically difficultimage quality.   6. The peak transaortic velocity is 2.29 m/s, peak transaortic gradient is 21.0 mmHg and mean transaortic gradient is 12.0 mmHg with an LVOT/aortic valve VTI ratio of 0.42. The effective orifice area is estimated at 1.61 cm² by the continuity equation.   7. Trileaflet aortic valve with reduced systolic excursion. There is moderate calcification of the aortic valve leaflets. Mild aortic stenosis.    < end of copied text >              I personally reviewed labs, imaging, ekg, orders and vitals.    Discussed case with:  [X]RN  [X]CM/WYATT  [X]Patient  [X]Family  [X]Specialist: Nephrology/ID

## 2025-03-11 NOTE — PROGRESS NOTE ADULT - SUBJECTIVE AND OBJECTIVE BOX
Patient is a 74y Male who reports no complaints as new, + UOP. Kwan austin UOP    MEDICATIONS  (STANDING):  albumin human 25% IVPB 50 milliLiter(s) IV Intermittent every 12 hours  apixaban 10 milliGRAM(s) Oral every 12 hours  buMETAnide Injectable 2 milliGRAM(s) IV Push every 12 hours  cefTRIAXone Injectable. 2000 milliGRAM(s) IV Push every 24 hours  levothyroxine 150 MICROGram(s) Oral daily  metoprolol succinate ER 25 milliGRAM(s) Oral daily  rosuvastatin 5 milliGRAM(s) Oral at bedtime  tamsulosin 0.8 milliGRAM(s) Oral at bedtime    MEDICATIONS  (PRN):  acetaminophen     Tablet .. 650 milliGRAM(s) Oral every 6 hours PRN Mild Pain (1 - 3)  aluminum hydroxide/magnesium hydroxide/simethicone Suspension 30 milliLiter(s) Oral every 4 hours PRN Dyspepsia  lactulose Syrup 20 Gram(s) Oral three times a day PRN for constipation  melatonin 3 milliGRAM(s) Oral at bedtime PRN Insomnia  ondansetron Injectable 4 milliGRAM(s) IV Push every 6 hours PRN Nausea and/or Vomiting  oxyCODONE    IR 5 milliGRAM(s) Oral every 6 hours PRN Moderate Pain (4 - 6)        T(C): , Max: 36.9 (03-11-25 @ 08:31)  T(F): , Max: 98.5 (03-11-25 @ 08:31)  HR: 79 (03-11-25 @ 08:31)  BP: 118/72 (03-11-25 @ 08:31)  BP(mean): 104 (03-10-25 @ 23:00)  RR: 18 (03-11-25 @ 08:31)  SpO2: 98% (03-11-25 @ 08:31)  Wt(kg): --    03-10 @ 07:01  -  03-11 @ 07:00  --------------------------------------------------------  IN: 50 mL / OUT: 3800 mL / NET: -3750 mL    03-11 @ 07:01 - 03-11 @ 14:42  --------------------------------------------------------  IN: 450 mL / OUT: 1200 mL / NET: -750 mL          PHYSICAL EXAM:    Constitutional: NAD,  MM  Neck: No LAD, No JVD  Respiratory: dist  Cardiovascular: S1 and S2  MO  Extremities: ++ peripheral edema  Neurological: A/O x 3, no focal deficits  Psychiatric: Normal mood, normal affect  gupta        LABS:                        7.9    13.12 )-----------( 219      ( 11 Mar 2025 05:19 )             24.6     11 Mar 2025 05:19    135    |  101    |  43     ----------------------------<  128    4.3     |  28     |  1.41   10 Mar 2025 04:30    135    |  102    |  43     ----------------------------<  125    4.7     |  28     |  1.44   09 Mar 2025 05:14    134    |  100    |  43     ----------------------------<  146    4.9     |  25     |  1.46   08 Mar 2025 05:47    133    |  101    |  47     ----------------------------<  120    5.0     |  28     |  1.56     Ca    8.8        11 Mar 2025 05:19  Ca    8.7        10 Mar 2025 04:30  Ca    8.5        09 Mar 2025 05:14  Ca    8.6        08 Mar 2025 05:47  Phos  4.0       11 Mar 2025 05:19  Phos  4.1       10 Mar 2025 04:30  Phos  3.9       09 Mar 2025 05:14  Phos  3.9       08 Mar 2025 05:47  Mg     1.8       11 Mar 2025 05:19  Mg     2.0       10 Mar 2025 04:30  Mg     2.1       09 Mar 2025 05:14  Mg     2.1       08 Mar 2025 05:47    TPro  5.7    /  Alb  2.3    /  TBili  0.8    /  DBili  x      /  AST  179    /  ALT  169    /  AlkPhos  68     11 Mar 2025 05:19  TPro  6.1    /  Alb  2.4    /  TBili  0.6    /  DBili  x      /  AST  101    /  ALT  59     /  AlkPhos  59     09 Mar 2025 05:14  TPro  5.8    /  Alb  2.3    /  TBili  0.7    /  DBili  x      /  AST  72     /  ALT  46     /  AlkPhos  50     08 Mar 2025 05:47          Urine Studies:  Urinalysis Basic - ( 11 Mar 2025 05:19 )    Color: x / Appearance: x / SG: x / pH: x  Gluc: 128 mg/dL / Ketone: x  / Bili: x / Urobili: x   Blood: x / Protein: x / Nitrite: x   Leuk Esterase: x / RBC: x / WBC x   Sq Epi: x / Non Sq Epi: x / Bacteria: x            RADIOLOGY & ADDITIONAL STUDIES:

## 2025-03-12 LAB
ADD ON TEST-SPECIMEN IN LAB: SIGNIFICANT CHANGE UP
ALBUMIN SERPL ELPH-MCNC: 2.5 G/DL — LOW (ref 3.3–5)
ALP SERPL-CCNC: 67 U/L — SIGNIFICANT CHANGE UP (ref 40–120)
ALT FLD-CCNC: 155 U/L — HIGH (ref 12–78)
ANION GAP SERPL CALC-SCNC: 6 MMOL/L — SIGNIFICANT CHANGE UP (ref 5–17)
AST SERPL-CCNC: 117 U/L — HIGH (ref 15–37)
BILIRUB DIRECT SERPL-MCNC: 0.3 MG/DL — SIGNIFICANT CHANGE UP (ref 0–0.3)
BILIRUB INDIRECT FLD-MCNC: 0.7 MG/DL — SIGNIFICANT CHANGE UP (ref 0.2–1)
BILIRUB SERPL-MCNC: 1 MG/DL — SIGNIFICANT CHANGE UP (ref 0.2–1.2)
BUN SERPL-MCNC: 39 MG/DL — HIGH (ref 7–23)
CALCIUM SERPL-MCNC: 8.8 MG/DL — SIGNIFICANT CHANGE UP (ref 8.5–10.1)
CHLORIDE SERPL-SCNC: 99 MMOL/L — SIGNIFICANT CHANGE UP (ref 96–108)
CK SERPL-CCNC: 1467 U/L — HIGH (ref 26–308)
CO2 SERPL-SCNC: 30 MMOL/L — SIGNIFICANT CHANGE UP (ref 22–31)
CREAT SERPL-MCNC: 1.27 MG/DL — SIGNIFICANT CHANGE UP (ref 0.5–1.3)
EGFR: 60 ML/MIN/1.73M2 — SIGNIFICANT CHANGE UP
EGFR: 60 ML/MIN/1.73M2 — SIGNIFICANT CHANGE UP
GBM IGG SER-ACNC: <0.2 AI — SIGNIFICANT CHANGE UP
GLOMERULAR BASEMENT MEMBRANE A INTERPRETATION: NEGATIVE — SIGNIFICANT CHANGE UP
GLUCOSE SERPL-MCNC: 119 MG/DL — HIGH (ref 70–99)
HCT VFR BLD CALC: 25 % — LOW (ref 39–50)
HGB BLD-MCNC: 8.1 G/DL — LOW (ref 13–17)
MAGNESIUM SERPL-MCNC: 1.8 MG/DL — SIGNIFICANT CHANGE UP (ref 1.6–2.6)
MCHC RBC-ENTMCNC: 28.3 PG — SIGNIFICANT CHANGE UP (ref 27–34)
MCHC RBC-ENTMCNC: 32.4 G/DL — SIGNIFICANT CHANGE UP (ref 32–36)
MCV RBC AUTO: 87.4 FL — SIGNIFICANT CHANGE UP (ref 80–100)
NRBC # BLD AUTO: 0 K/UL — SIGNIFICANT CHANGE UP (ref 0–0)
NRBC # FLD: 0 K/UL — SIGNIFICANT CHANGE UP (ref 0–0)
NRBC BLD AUTO-RTO: 0 /100 WBCS — SIGNIFICANT CHANGE UP (ref 0–0)
PHOSPHATE SERPL-MCNC: 3.2 MG/DL — SIGNIFICANT CHANGE UP (ref 2.5–4.5)
PLATELET # BLD AUTO: 242 K/UL — SIGNIFICANT CHANGE UP (ref 150–400)
PMV BLD: 9.8 FL — SIGNIFICANT CHANGE UP (ref 7–13)
POTASSIUM SERPL-MCNC: 3.8 MMOL/L — SIGNIFICANT CHANGE UP (ref 3.5–5.3)
POTASSIUM SERPL-SCNC: 3.8 MMOL/L — SIGNIFICANT CHANGE UP (ref 3.5–5.3)
PROT SERPL-MCNC: 5.9 GM/DL — LOW (ref 6–8.3)
RBC # BLD: 2.86 M/UL — LOW (ref 4.2–5.8)
RBC # FLD: 14.1 % — SIGNIFICANT CHANGE UP (ref 10.3–14.5)
SODIUM SERPL-SCNC: 135 MMOL/L — SIGNIFICANT CHANGE UP (ref 135–145)
WBC # BLD: 13.83 K/UL — HIGH (ref 3.8–10.5)
WBC # FLD AUTO: 13.83 K/UL — HIGH (ref 3.8–10.5)

## 2025-03-12 PROCEDURE — 99231 SBSQ HOSP IP/OBS SF/LOW 25: CPT

## 2025-03-12 PROCEDURE — 99233 SBSQ HOSP IP/OBS HIGH 50: CPT

## 2025-03-12 RX ORDER — ALBUMIN (HUMAN) 12.5 G/50ML
100 INJECTION, SOLUTION INTRAVENOUS EVERY 12 HOURS
Refills: 0 | Status: DISCONTINUED | OUTPATIENT
Start: 2025-03-12 | End: 2025-03-15

## 2025-03-12 RX ORDER — APIXABAN 2.5 MG/1
10 TABLET, FILM COATED ORAL EVERY 12 HOURS
Refills: 0 | Status: DISCONTINUED | OUTPATIENT
Start: 2025-03-12 | End: 2025-03-12

## 2025-03-12 RX ORDER — ENOXAPARIN SODIUM 100 MG/ML
110 INJECTION SUBCUTANEOUS EVERY 12 HOURS
Refills: 0 | Status: DISCONTINUED | OUTPATIENT
Start: 2025-03-12 | End: 2025-03-14

## 2025-03-12 RX ADMIN — TAMSULOSIN HYDROCHLORIDE 0.8 MILLIGRAM(S): 0.4 CAPSULE ORAL at 21:43

## 2025-03-12 RX ADMIN — OXYCODONE HYDROCHLORIDE 5 MILLIGRAM(S): 30 TABLET ORAL at 15:43

## 2025-03-12 RX ADMIN — Medication 3 MILLIGRAM(S): at 21:44

## 2025-03-12 RX ADMIN — OXYCODONE HYDROCHLORIDE 5 MILLIGRAM(S): 30 TABLET ORAL at 22:14

## 2025-03-12 RX ADMIN — ALBUMIN (HUMAN) 50 MILLILITER(S): 12.5 INJECTION, SOLUTION INTRAVENOUS at 18:42

## 2025-03-12 RX ADMIN — OXYCODONE HYDROCHLORIDE 5 MILLIGRAM(S): 30 TABLET ORAL at 05:49

## 2025-03-12 RX ADMIN — ENOXAPARIN SODIUM 110 MILLIGRAM(S): 100 INJECTION SUBCUTANEOUS at 21:44

## 2025-03-12 RX ADMIN — Medication 150 MICROGRAM(S): at 05:13

## 2025-03-12 RX ADMIN — OXYCODONE HYDROCHLORIDE 5 MILLIGRAM(S): 30 TABLET ORAL at 21:44

## 2025-03-12 RX ADMIN — BUMETANIDE 2 MILLIGRAM(S): 1 TABLET ORAL at 13:27

## 2025-03-12 RX ADMIN — CEFTRIAXONE 2000 MILLIGRAM(S): 500 INJECTION, POWDER, FOR SOLUTION INTRAMUSCULAR; INTRAVENOUS at 20:31

## 2025-03-12 RX ADMIN — BUMETANIDE 2 MILLIGRAM(S): 1 TABLET ORAL at 19:49

## 2025-03-12 RX ADMIN — ALBUMIN (HUMAN) 50 MILLILITER(S): 12.5 INJECTION, SOLUTION INTRAVENOUS at 10:00

## 2025-03-12 RX ADMIN — METOPROLOL SUCCINATE 25 MILLIGRAM(S): 50 TABLET, EXTENDED RELEASE ORAL at 10:02

## 2025-03-12 RX ADMIN — ALBUMIN (HUMAN) 50 MILLILITER(S): 12.5 INJECTION, SOLUTION INTRAVENOUS at 11:54

## 2025-03-12 RX ADMIN — OXYCODONE HYDROCHLORIDE 5 MILLIGRAM(S): 30 TABLET ORAL at 05:13

## 2025-03-12 RX ADMIN — APIXABAN 10 MILLIGRAM(S): 2.5 TABLET, FILM COATED ORAL at 10:29

## 2025-03-12 NOTE — PROGRESS NOTE ADULT - SUBJECTIVE AND OBJECTIVE BOX
Patient is a 74y Male with  who reports   c/o LE cramps  still bloated      Allergies    No Known Allergies    Intolerances        MEDICATIONS  (STANDING):  albumin human 25% IVPB 100 milliLiter(s) IV Intermittent every 12 hours  buMETAnide Injectable 2 milliGRAM(s) IV Push every 12 hours  cefTRIAXone Injectable. 2000 milliGRAM(s) IV Push every 24 hours  enoxaparin Injectable 110 milliGRAM(s) SubCutaneous every 12 hours  levothyroxine 150 MICROGram(s) Oral daily  metoprolol succinate ER 25 milliGRAM(s) Oral daily  tamsulosin 0.8 milliGRAM(s) Oral at bedtime      Vitals:  T(F): 98.1 (03-12-25), Max: 98.5 (03-12-25)  HR: 83 (03-12-25) (76 - 85)  BP: 127/69 (03-12-25) (116/71 - 127/69)  RR: 18 (03-12-25)  SpO2: 93% (03-12-25)    I and O's:    03-11 @ 07:01  -  03-12 @ 07:00  --------------------------------------------------------  IN: 0 mL / OUT: 4200 mL / NET: -4200 mL    03-12 @ 07:01  -  03-12 @ 23:47  --------------------------------------------------------  IN: 0 mL / OUT: 3725 mL / NET: -3725 mL            PHYSICAL EXAM:    Constitutional: NAD,   HEENT:   MM  Respiratory: CTAB  Cardiovascular: S1 and S2  Gastrointestinal: less distension   Extremities:  peripheral edema ++3   Neurological: A/O  : No Ortega  Dialysis Access: Not applicable    LABS:                        8.1    13.83 )-----------( 242      ( 12 Mar 2025 04:23 )             25.0                         7.9    13.12 )-----------( 219      ( 11 Mar 2025 05:19 )             24.6       135    |  99     |  39     ----------------------------<  119       12 Mar 2025 04:23  3.8     |  30     |  1.27     135    |  101    |  43     ----------------------------<  128       11 Mar 2025 05:19  4.3     |  28     |  1.41     135    |  102    |  43     ----------------------------<  125       10 Mar 2025 04:30  4.7     |  28     |  1.44     Ca    8.8        12 Mar 2025 04:23  Ca    8.8        11 Mar 2025 05:19    Phos  3.2       12 Mar 2025 04:23  Phos  4.0       11 Mar 2025 05:19    Mg     1.8       12 Mar 2025 04:23  Mg     1.8       11 Mar 2025 05:19    TPro  5.9    /  Alb  2.5    /  TBili  1.0    /        12 Mar 2025 04:23  DBili  0.3    /  AST  117    /  ALT  155    /  AlkPhos  67       TPro  5.7    /  Alb  2.3    /  TBili  0.8    /        11 Mar 2025 05:19  DBili  x      /  AST  179    /  ALT  169    /  AlkPhos  68         Historical Values  Creatine Kinase: 1467 U/L (03.12.25 @ 04:23)   Creatine Kinase: 2673 U/L (03.11.25 @ 05:19)     Serum Osmo:   Serum Uric Acid:     Urine Studies:  Urinalysis Basic - ( 12 Mar 2025 04:23 )    Color: x / Appearance: x / SG: x / pH: x  Gluc: 119 mg/dL / Ketone: x  / Bili: x / Urobili: x   Blood: x / Protein: x / Nitrite: x   Leuk Esterase: x / RBC: x / WBC x   Sq Epi: x / Non Sq Epi: x / Bacteria: x        Urine chemistry:   Urine Na:   Urine Creatinine:   Urine Protein/Cr ratio:  Urine K:   Urine Osm:       RADIOLOGY & ADDITIONAL STUDIES:

## 2025-03-12 NOTE — PROGRESS NOTE ADULT - ASSESSMENT
MEDICATIONS  (STANDING):  albumin human 25% IVPB 50 milliLiter(s) IV Intermittent every 12 hours  apixaban 10 milliGRAM(s) Oral every 12 hours  buMETAnide Injectable 2 milliGRAM(s) IV Push every 12 hours  cefTRIAXone Injectable. 2000 milliGRAM(s) IV Push every 24 hours  levothyroxine 150 MICROGram(s) Oral daily  metoprolol succinate ER 25 milliGRAM(s) Oral daily  rosuvastatin 5 milliGRAM(s) Oral at bedtime  tamsulosin 0.8 milliGRAM(s) Oral at bedtime    MEDICATIONS  (PRN):  acetaminophen     Tablet .. 650 milliGRAM(s) Oral every 6 hours PRN Mild Pain (1 - 3)  aluminum hydroxide/magnesium hydroxide/simethicone Suspension 30 milliLiter(s) Oral every 4 hours PRN Dyspepsia  lactulose Syrup 20 Gram(s) Oral three times a day PRN for constipation  melatonin 3 milliGRAM(s) Oral at bedtime PRN Insomnia  ondansetron Injectable 4 milliGRAM(s) IV Push every 6 hours PRN Nausea and/or Vomiting  oxyCODONE    IR 5 milliGRAM(s) Oral every 6 hours PRN Moderate Pain (4 - 6)      74M admitted for GOVIND, and DVT.         #GOVIND on CKD  #LE edema  #hypoalbuminemia  #Proteinuria  - Cr continues to improve  - continue to hold torsemide  - IVF as needed with fluid balance  -Significant debilitating ansarca. Trial of lasix/albumin? WIll discuss with nephro->-Trial of albumin lasix started 3/8->improvement. Continue BID IV Laxis/IV albumin (3/9)->Change to Bumex/Albumin (3/11)  - nephrology consult/recs  -Continue to monitor Cr/Electrolyte/IOs    #DVT  - appears provoked from decrease ambulation from recent spinal abscess  - could not tolerate VQ scan for r/o PE given pain/hx spinal surgeries  - switch to eliquis today, will be new med on discharge ->therapeutic lovenox (3/12) in the setting of possible spine intervention.     #Spinal epidural abscess POA and already being treated with IV antibiotics  - Chart reviewed extensively  - PICC w CTX for 8 week course to finish 4/16/25  - Reviewed neurosurgery notes and current neuro exam unchanged and no worse from prior admission, imaging reviewed  - currently since neuro exam unchanged will not pursue more imaging, low threshold to re-scan if neuro exam worsens  -Repeat MRI noted->neuroseurgery/Spine/ID recs. -> No acute intervention. Upper extremity numbness and weakness not present on prior admission present on this admission. Anasarca likely not contributing to upper extremity symptoms.  Will re-discuss with NS->after further evaluation and given perspsective from medicine/ID that complete or expected significant improvement with antibiotics alone may be difficult, neurosurgery offering surgical intervention. Patient to decide on wheter to proceed or not.     #leukocytosis  - likely rx to DVT + Discitis.  - no other signs of sepsis , afebrile       #hypothyroidism  - c/w synthroid    #CAD  - c/w metoprolol, statin    #HTN  - c/w metoprolol  - BP elevated 140-160s, if remains elevated will adjust meds > BP down to 130s today no adjustment of meds     #HLD  - c/w statin    #anemia   - 12>10 likely dilutional   - no signs of bleeding   trend     #BPH  #Urinary retention likely augmented by degree of anasarca  - gupta placed during prior admission  - TOV  ->failed. Gupta re-inserted 3/6->fell out without knowing with associated hematuria->recurrent retention 3/7->Gupta re-inserted (3/7)    #dvt ppx - eliquis       3/12: Improvement in edema with bumex. CPK elevated ysterday->improved today. Continue to monitor. Cr stable. Continue IV albumin/bumex and monitoring Cr/electrolytes. Discussed with ID and Neurosurgery and expected difficult clearance of infection with antibiotics along and neurosurgery offering surgical intervention/washout. Patient to determine whether he would like to proceed. Eliquis to therapeutic lovenox.     I spent a total of 51 minutes in face-to-face time with the patient and on the floor managing patient including coordination of care. Overall 50% of the time spent in discussion of the diagnosis, counseling and treatment plan.

## 2025-03-12 NOTE — PROGRESS NOTE ADULT - SUBJECTIVE AND OBJECTIVE BOX
CHIEF COMPLAINT: LE edema, weakness, numbness; urinary retention      FROM H&P:  74-year-old male with a past medical history of benign prostatic hyperplasia, diverticulosis, Graves' disease, hemorrhoids, hyperlipidemia, hypothyroidism, osteoarthritis, spinal stenosis, spinal cord injury (2004), and multiple spinal surgeries (three cervical and one lumbar). He presents with complaints of lower extremity swelling and abnormal BUN/creatinine levels. The patient was recently admitted to [facility] from 02/18/25 to 02/25/25 for sepsis secondary to epidural abscess. He has a peripherally inserted central catheter line in the right arm and is on nightly Ceftriaxone treatment until 04/16/25. Following his discharge, he was transferred to [facility]. Documentation from the rehabilitation facility notes a decline in his functional status and dysfunction with activities of daily living. At the rehabilitation facility, he was noted to have worsening LE swelling and was started on torsemide. However, his Cr levels subsequently became elevated, leading to the discontinuation of torsemide. He was sent to the [facility] Emergency Department for further evaluation of his LE swelling and elevated Cr levels. He denies any chest pain, dyspnea, orthopnea, PND.       SUBJECTIVE/SIGNIFICANT INTERVAL EVENTS/OVERNIGHT EVENTS:    3/5: pt feels well. c/o LE numbness @ thighs and L hand numbness. denies saddle anesthesia. Cr improving     3/6: Cr continues to improve, c/o generalized weakness noble while ambulating but no new neuro deficits     3/7:   Gupta out with hematuria. Patient had no sense of it coming out->recurrent retention->gupta re-inserted. MRI lumbar negative.   Discussed with neurosurgery. CLeared from their standpoint. Advised of upper extremity symptoms. Order MRI C and T Spine for further evaluation and if anything significant will re-call neurosurgery  Signficaant ansarca. Cr improving. Arterial doppler ordered for further evaluation. ON AC for DVT. TTE reviewed and grossly unremarkable. Will discuss with nephro  Rising leukocytosis. Unclear source. Afebrile and vitals stable. Continue to monitor    3/8:  Cr improving. Anasarca persists and debilitating. Discussed with nephrology->Trial of albumin lasix  MRI Cervical Spin and thoracic spine done. Pending read  Rising leukocytosis? Unclear etiology. Afebrile. Follow imaging results.   Continue to monitor fluid status/Cr/Electrolytes closely. If stable and improving plan for bid albumin/lasix for adequate diuresis    3/9: Patient feels the same from weakness, edema, numbness standpoint. Apparent improvement on Physical exam in edema. Cr improved. Discussed with nephrology. IV Albumin/Lasix bid and continue to assess. MR C-Spine similar to prior. MR T-Spine with reported worsening discitis and possible new abscess. Re-called neurosurgery/spine to re-evaluate. Antibiotics as per ID.     3/10: ID not consulted on admission. Consult ID. Recs appreciated->continue IV antibiotics through april. Continue IV lasix/Albumin. No noticeable improvement as per patient. Continue to monitor. Leukocytosis improvin wtihout any change in intervention minus diuresis. Reactive? Neurosurgery recs appreciated->no acute intervention. Continue IV antibiotics and follow up outpatient. No events on telemetry->DC telemetry.     3/11: No change in edema. weight difficult to assess as patient unable to stand. Cr Relatively stable. Change to bumex 2mg bid. Still with upper extremity parathesia/weakness. Edema limited above torso so unlikely contributing to UE symptoms. Will re-discuss with Neurosurgery. Cntinue antibiotics.     3/12: Improvement in edema with bumex. CPK elevated ysterday->improved today. Continue to monitor. Cr stable. Continue IV albumin/bumex and monitoring Cr/electrolytes. Discussed with ID and Neurosurgery and expected difficult clearance of infection with antibiotics along and neurosurgery offering surgical intervention/washout. Patient to determine whether he would like to proceed. Eliquis to therapeutic lovenox.     Review of Systems: 14 Point review of systems reviewed and reported as negative unless otherwise stated  above      PHYSICAL EXAM:  T(C): 36.9 (03-12-25 @ 07:36), Max: 37.4 (03-11-25 @ 23:35)  HR: 80 (03-12-25 @ 07:36) (80 - 91)  BP: 125/63 (03-12-25 @ 07:36) (121/69 - 157/74)  RR: 18 (03-12-25 @ 07:36) (18 - 18)  SpO2: 95% (03-12-25 @ 07:36) (95% - 96%)  General: AAOx3; NAD  Head: AT/NC  Neck: Non-tender; No JVD  CVS: RRR, S1&S2, No murmur, Anasarca +  Respiratory: Lungs CTA B/L; Normal Respiratory Effort  Abdomen/GI: Soft, non-tender, non-distended; anasarca +  : No bladder distention, No Gupta  Extremities: No cyanosis, No clubbing, Significant LE edema to the pelvix  MSK: No CVA tenderness, Limited ROM LE bilaterally because of marked edema No injury  Neuro: AAOx3, numbness to upper b/l thighs, 3/5 L hand weakness,   Psych: Appropriate, Cooperative,   Skin: Clean, Dry and Intact      LABS:                                     8.1    13.83 )-----------( 242      ( 12 Mar 2025 04:23 )             25.0     03-12    135  |  99  |  39[H]  ----------------------------<  119[H]  3.8   |  30  |  1.27    Ca    8.8      12 Mar 2025 04:23  Phos  3.2     03-12  Mg     1.8     03-12    TPro  5.9[L]  /  Alb  2.5[L]  /  TBili  1.0  /  DBili  0.3  /  AST  117[H]  /  ALT  155[H]  /  AlkPhos  67  03-12    SARS-CoV-2: NotDetec (17 Feb 2025 21:11)    CAPILLARY BLOOD GLUCOSE        RADIOLOGY:  < from: MR Lumbar Spine w/wo IV Cont (03.07.25 @ 15:11) >  IMPRESSION:    No abnormal enhancement.    No evidence for epidural abscess or discitis/osteomyelitis.    Multilevel degenerative changes detailed in the body the report.    < end of copied text >  < from: MR Thoracic Spine w/wo IV Cont (03.08.25 @ 12:24) >  IMPRESSION: Worsening discitis/osteomyelitis is seen with epidural   phlegmon again seen unchanged though increased bilaterally and ventral,   this is consistent with paraspinal phlegmon though there is a possible   early abscess seenon the left side as described above.    < end of copied text >  < from: MR Cervical Spine w/wo IV Cont (03.08.25 @ 12:24) >    IMPRESSION:    1.  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  2.  Chronic cord myelomalacia at C3-4, status post decompression.  3.  Stable severe left C7-T1 foraminal stenosis, with moderate narrowing   of the central canal.  4.  Stable moderate to severe bilateral C6-7 foraminal stenoses.    < end of copied text >        EKG:  < from: 12 Lead ECG (03.05.25 @ 07:16) >    Diagnosis Line Normal sinus rhythm  Normal ECG  When compared with ECG of 04-MAR-2025 14:10,  No significant change was found    < end of copied text >      ECHO:  < from: TTE W or WO Ultrasound Enhancing Agent (02.18.25 @ 12:28) >  CONCLUSIONS:      1. Left ventricular systolic function is normal with an ejection fraction of 66 % by Meyers's method of disks.   2. Trace tricuspid regurgitation.   3. Estimated pulmonary artery systolic pressure is 35 mmHg, consistent with mild pulmonary hypertension.   4. Interatrial septum is aneurysmal.   5. Technically difficultimage quality.   6. The peak transaortic velocity is 2.29 m/s, peak transaortic gradient is 21.0 mmHg and mean transaortic gradient is 12.0 mmHg with an LVOT/aortic valve VTI ratio of 0.42. The effective orifice area is estimated at 1.61 cm² by the continuity equation.   7. Trileaflet aortic valve with reduced systolic excursion. There is moderate calcification of the aortic valve leaflets. Mild aortic stenosis.    < end of copied text >              I personally reviewed labs, imaging, ekg, orders and vitals.    Discussed case with:  [X]RN  [X]CM/SW  [X]Patient  [X]Family  [X]Specialist: Nephrology/ID/Neurosurgery

## 2025-03-12 NOTE — PROGRESS NOTE ADULT - SUBJECTIVE AND OBJECTIVE BOX
Date of Service:03-12-25 @ 12:50  Interval History/ROS: Afebrile overnight, on RA, still having LE swelling, back pain is tolerable, R  still weak but not worsen      REVIEW OF SYSTEMS  [  ] ROS unobtainable because:    [ x ] All other systems negative except as noted below    Constitutional:  [ ] fever [ ] chills  [ ] weight loss  [ ]night sweat  [ ]poor appetite/PO intake [ ]fatigue   Skin:  [ ] rash [ ] phlebitis	  Eyes: [ ] icterus [ ] pain  [ ] discharge	  ENMT: [ ] sore throat  [ ] thrush [ ] ulcers [ ] exudates [ ]anosmia  Respiratory: [ ] dyspnea [ ] hemoptysis [ ] cough [ ] sputum	  Cardiovascular:  [ ] chest pain [ ] palpitations [ ] edema	  Gastrointestinal:  [ ] nausea [ ] vomiting [ ] diarrhea [ ] constipation [ ] pain	  Genitourinary:  [ ] dysuria [ ] frequency [ ] hematuria [ ] discharge [ ] flank pain  [ ] incontinence  Musculoskeletal:  [ ] myalgias [ ] arthralgias [ ] arthritis  [ ] back pain  Neurological:  [ ] headache [ ] weakness [ ] seizures  [ ] confusion/altered mental status    Allergies  No Known Allergies        ANTIMICROBIALS:    cefTRIAXone Injectable. 2000 every 24 hours        OTHER MEDS: MEDICATIONS  (STANDING):  acetaminophen     Tablet .. 650 every 6 hours PRN  aluminum hydroxide/magnesium hydroxide/simethicone Suspension 30 every 4 hours PRN  buMETAnide Injectable 2 every 12 hours  enoxaparin Injectable 110 every 12 hours  lactulose Syrup 20 three times a day PRN  levothyroxine 150 daily  melatonin 3 at bedtime PRN  metoprolol succinate ER 25 daily  ondansetron Injectable 4 every 6 hours PRN  oxyCODONE    IR 5 every 6 hours PRN  tamsulosin 0.8 at bedtime      Vital Signs Last 24 Hrs  T(F): 98.5 (03-12-25 @ 07:36), Max: 99.9 (03-08-25 @ 04:00)    Vital Signs Last 24 Hrs  HR: 80 (03-12-25 @ 07:36) (80 - 91)  BP: 125/63 (03-12-25 @ 07:36) (121/69 - 157/74)  RR: 18 (03-12-25 @ 07:36)  SpO2: 95% (03-12-25 @ 07:36) (95% - 96%)  Wt(kg): --    EXAM:    Constitutional: frail, NAD  Head/Eyes: no icterus  LUNGS:  CTA  CVS:  regular rhythm  Abd:  soft, non-tender; non-distended  Ext:  +edema  Back: no spinal tenderness noted  Vascular:  IV site no erythema tenderness or discharge  Neuro: AAO X 3, weak R hand     Labs:                        8.1    13.83 )-----------( 242      ( 12 Mar 2025 04:23 )             25.0     03-12    135  |  99  |  39[H]  ----------------------------<  119[H]  3.8   |  30  |  1.27    Ca    8.8      12 Mar 2025 04:23  Phos  3.2     03-12  Mg     1.8     03-12    TPro  5.9[L]  /  Alb  2.5[L]  /  TBili  1.0  /  DBili  0.3  /  AST  117[H]  /  ALT  155[H]  /  AlkPhos  67  03-12      WBC Trend:  WBC Count: 13.83 (03-12-25 @ 04:23)  WBC Count: 13.12 (03-11-25 @ 05:19)  WBC Count: 12.53 (03-10-25 @ 04:30)  WBC Count: 15.09 (03-09-25 @ 05:14)      Creatine Trend:  Creatinine: 1.27 (03-12)  Creatinine: 1.41 (03-11)  Creatinine: 1.44 (03-10)  Creatinine: 1.46 (03-09)      Liver Biochemical Testing Trend:  Alanine Aminotransferase (ALT/SGPT): 155 *H* (03-12)  Alanine Aminotransferase (ALT/SGPT): 169 *H* (03-11)  Alanine Aminotransferase (ALT/SGPT): 59 (03-09)  Alanine Aminotransferase (ALT/SGPT): 46 (03-08)  Alanine Aminotransferase (ALT/SGPT): 45 (03-05)  Aspartate Aminotransferase (AST/SGOT): 117 (03-12-25 @ 04:23)  Aspartate Aminotransferase (AST/SGOT): 179 (03-11-25 @ 05:19)  Aspartate Aminotransferase (AST/SGOT): 101 (03-09-25 @ 05:14)  Aspartate Aminotransferase (AST/SGOT): 72 (03-08-25 @ 05:47)  Aspartate Aminotransferase (AST/SGOT): 34 (03-05-25 @ 08:31)  Bilirubin Direct: 0.3 (03-12)  Bilirubin Total: 1.0 (03-12)  Bilirubin Total: 0.8 (03-11)  Bilirubin Total: 0.6 (03-09)  Bilirubin Total: 0.7 (03-08)      Trend LDH      Urinalysis Basic - ( 12 Mar 2025 04:23 )    Color: x / Appearance: x / SG: x / pH: x  Gluc: 119 mg/dL / Ketone: x  / Bili: x / Urobili: x   Blood: x / Protein: x / Nitrite: x   Leuk Esterase: x / RBC: x / WBC x   Sq Epi: x / Non Sq Epi: x / Bacteria: x        MICROBIOLOGY:    MRSA PCR Result.: NotDete (02-19-25 @ 18:15)      Culture - Urine (collected 04 Mar 2025 15:12)  Source: .Urine None  Final Report:    <10,000 CFU/mL Normal Urogenital Altagracia    Urinalysis with Rflx Culture (collected 04 Mar 2025 15:12)    Culture - Blood (collected 19 Feb 2025 08:17)  Source: .Blood None  Final Report:    No growth at 5 days    Culture - Blood (collected 19 Feb 2025 08:10)  Source: .Blood None  Final Report:    No growth at 5 days    Urinalysis with Rflx Culture (collected 18 Feb 2025 01:54)    Culture - Blood (collected 17 Feb 2025 21:11)  Source: .Blood BLOOD  Final Report:    Growth in aerobic and anaerobic bottles: Streptococcus dysgalactiae    (Group C/G)    Direct identification is available within approximately 3-5    hours either by Blood Panel Multiplexed PCR or Direct    MALDI-TOF. Details: [email or web address]  Organism: Blood Culture PCR  Streptococcus dysgalactiae  Organism: Streptococcus dysgalactiae    Sensitivities:      Method Type: EDEL      -  Ceftriaxone: S <=0.25      -  Clindamycin: S <=0.06      -  Levofloxacin: S 0.5      -  Penicillin: S <=0.03 Predicts results for ampicillin, amoxicillin, amoxicillin/clavulanate, ampicillin/sulbactam, 1st, 2nd and 3rd generation cephalosporins and carbapenems.      -  Tetracycline: S <=0.5      -  Vancomycin: S 0.5  Organism: Blood Culture PCR    Sensitivities:      Method Type: PCR      -  Streptococcus sp. (Not Grp A, B or S pneumoniae): Detec    Culture - Blood (collected 17 Feb 2025 21:11)  Source: .Blood BLOOD  Final Report:    Growth in aerobic and anaerobic bottles: Streptococcus dysgalactiae    (Group C/G)    See previous culture [id number]      C-Reactive Protein: 110.0 (03-11)  C-Reactive Protein: 125.0 (03-10)    Ferritin: 744 (03-10)    Sedimentation Rate, Erythrocyte: 76 mm/hr (03-11-25 @ 05:19)  Sedimentation Rate, Erythrocyte: 96 mm/hr (03-10-25 @ 17:08)  Sedimentation Rate, Erythrocyte: 14 mm/hr (02-18-25 @ 07:10)      RADIOLOGY:  imaging below personally reviewed

## 2025-03-12 NOTE — PROGRESS NOTE ADULT - ASSESSMENT
Assessment:  74M with benign prostatic hyperplasia, diverticulosis, Graves' disease, hemorrhoids, hyperlipidemia, hypothyroidism, osteoarthritis, spinal stenosis, spinal cord injury (2004), and multiple spinal surgeries (three cervical and one lumbar), recently admitted 02/18/25 to 02/25/25 for sepsis secondary to High-grade Strep Bacteremia with Discitis OM and epidural abscess on IV CTX 2G q24 till 4/16/2025 presents back 3/4/2025 from [facility] for worsening LE swelling and GOVIND, Found to have LLE DVT  Remains afebrile and on RA  Repeat MRI T spine with 3/9 showing Worsening discitis/osteomyelitis is seen with epidural phlegmon again seen unchanged though increased bilaterally and ventral, this is consistent with paraspinal phlegmon though there is a possible early abscess  Neurosurgery recommend no surgical intervention and continue antibiotic  So far reports no fever or chills, back pain is stable,  strength the same    Prior work up:  History of multiple spinal surgery, C and T spine laminectomy with fusion hardware (2004), L spine laminectomy with fusion hardware (2006), all done in [facility]. Also has L knee replacement (2017) though no symptoms there, L knee ROM normal no pain No cardiac devices  BCx (2/17) with Strep dysgalacteae, Sy S  MRI 2/18 with concern for discitis T1-T3, possible phlegmon C7-T3, no abscess  TTE without obvious vegetation  BCx 2/19 negative    Antimicrobials:  cefTRIAXone Injectable. 2000 every 24 hours (2/18 --- )    Impression:   #High-grade Strep Bacteremia, Neck Pain, Concern for Spinal Involvement in setting of Multilevel C/T/L Spine Laminectomy with fusion hardware  #GOVIND  #DVT  #Leukocytosis  - PICC line appears clean, no signs of infection  - symptomatically stable yet still has R  weakness, MRI concerning T spine discitis OM with early paraspinal abscess, is this lagging radiologic findings other worsening infection? ESR 76 (14 in Feb), and  (169 in Feb)    Recommendations:  - continue Ceftriaxone 2G q24  - monitor temperature curve  - trend WBC  - reason for abx use reviewed with patient  - side effects of antibiotic discussed, tolerating abx well so far  - Prior cultures reviewed. An epidemiologic assessment was performed. There is a significant risk for resistant microorganisms to spread to family members, and/or healthcare staff. The patient will be placed on isolation according to infection control policy. Will reconsider isolation measures based on new culture results and other clinical data as appropriate Appropriate cultures collected and an appropriate broad spectrum antibiotic therapy will be considered  - trend ESR, CRP  - monitor worsening neurologic deficit  - antibiotic alone will unlikely resolve this spinal infection with hardware involvement and abscess formation, would consider surgical intervention if able to perform based on risk/benefit  - follow up Neurosurgery  - rest per primary team    [facility] IV to PO Token not applicable; Patient to continue with IV Therapy

## 2025-03-12 NOTE — PROGRESS NOTE ADULT - ASSESSMENT
74-year-old male with a past medical history of benign prostatic hyperplasia, diverticulosis, Graves' disease, hemorrhoids, hyperlipidemia, hypothyroidism, osteoarthritis, spinal stenosis, spinal cord injury (2004), and multiple spinal surgeries w extremity swelling and abnormal BUN/creatinine levels.       Edema/Anasarca     -Optimize intake/protein stores  -Monitor labs closely w ongoing diuresis BUmex IV + SPA  -Daily  weights     - I/O's   - if no improvement in weight or edema then lasix gtt trial     Nephrotic range proteinuria 3.4 grams    - fup on serologies  - trial of diuretic    Mild Rhabdo with elevated LFT - CPK trending down    sec to dehydration and Statin use while on diuretics   stop Statin     trend CPK        d/w staff, Dr Gutiérrez    * pt seen this AM, note now

## 2025-03-13 LAB
ALBUMIN SERPL ELPH-MCNC: 2.7 G/DL — LOW (ref 3.3–5)
ALP SERPL-CCNC: 64 U/L — SIGNIFICANT CHANGE UP (ref 40–120)
ALT FLD-CCNC: 140 U/L — HIGH (ref 12–78)
ANION GAP SERPL CALC-SCNC: 5 MMOL/L — SIGNIFICANT CHANGE UP (ref 5–17)
AST SERPL-CCNC: 101 U/L — HIGH (ref 15–37)
BILIRUB SERPL-MCNC: 1 MG/DL — SIGNIFICANT CHANGE UP (ref 0.2–1.2)
BUN SERPL-MCNC: 39 MG/DL — HIGH (ref 7–23)
CALCIUM SERPL-MCNC: 9 MG/DL — SIGNIFICANT CHANGE UP (ref 8.5–10.1)
CHLORIDE SERPL-SCNC: 99 MMOL/L — SIGNIFICANT CHANGE UP (ref 96–108)
CK SERPL-CCNC: 760 U/L — HIGH (ref 26–308)
CO2 SERPL-SCNC: 33 MMOL/L — HIGH (ref 22–31)
CREAT SERPL-MCNC: 1.23 MG/DL — SIGNIFICANT CHANGE UP (ref 0.5–1.3)
EGFR: 62 ML/MIN/1.73M2 — SIGNIFICANT CHANGE UP
EGFR: 62 ML/MIN/1.73M2 — SIGNIFICANT CHANGE UP
GLUCOSE SERPL-MCNC: 117 MG/DL — HIGH (ref 70–99)
HAV IGM SER-ACNC: SIGNIFICANT CHANGE UP
HBV CORE IGM SER-ACNC: SIGNIFICANT CHANGE UP
HBV SURFACE AG SER-ACNC: SIGNIFICANT CHANGE UP
HCT VFR BLD CALC: 26.6 % — LOW (ref 39–50)
HGB BLD-MCNC: 8.6 G/DL — LOW (ref 13–17)
MAGNESIUM SERPL-MCNC: 1.6 MG/DL — SIGNIFICANT CHANGE UP (ref 1.6–2.6)
MCHC RBC-ENTMCNC: 28.5 PG — SIGNIFICANT CHANGE UP (ref 27–34)
MCHC RBC-ENTMCNC: 32.3 G/DL — SIGNIFICANT CHANGE UP (ref 32–36)
MCV RBC AUTO: 88.1 FL — SIGNIFICANT CHANGE UP (ref 80–100)
NRBC # BLD AUTO: 0 K/UL — SIGNIFICANT CHANGE UP (ref 0–0)
NRBC # FLD: 0 K/UL — SIGNIFICANT CHANGE UP (ref 0–0)
NRBC BLD AUTO-RTO: 0 /100 WBCS — SIGNIFICANT CHANGE UP (ref 0–0)
PHOSPHATE SERPL-MCNC: 3.7 MG/DL — SIGNIFICANT CHANGE UP (ref 2.5–4.5)
PLATELET # BLD AUTO: 256 K/UL — SIGNIFICANT CHANGE UP (ref 150–400)
PMV BLD: 9.1 FL — SIGNIFICANT CHANGE UP (ref 7–13)
POTASSIUM SERPL-MCNC: 3.5 MMOL/L — SIGNIFICANT CHANGE UP (ref 3.5–5.3)
POTASSIUM SERPL-SCNC: 3.5 MMOL/L — SIGNIFICANT CHANGE UP (ref 3.5–5.3)
PROT SERPL-MCNC: 6.1 GM/DL — SIGNIFICANT CHANGE UP (ref 6–8.3)
RBC # BLD: 3.02 M/UL — LOW (ref 4.2–5.8)
RBC # FLD: 13.9 % — SIGNIFICANT CHANGE UP (ref 10.3–14.5)
SODIUM SERPL-SCNC: 137 MMOL/L — SIGNIFICANT CHANGE UP (ref 135–145)
WBC # BLD: 11.15 K/UL — HIGH (ref 3.8–10.5)
WBC # FLD AUTO: 11.15 K/UL — HIGH (ref 3.8–10.5)

## 2025-03-13 PROCEDURE — 99233 SBSQ HOSP IP/OBS HIGH 50: CPT | Mod: 57

## 2025-03-13 PROCEDURE — 99233 SBSQ HOSP IP/OBS HIGH 50: CPT

## 2025-03-13 RX ADMIN — OXYCODONE HYDROCHLORIDE 5 MILLIGRAM(S): 30 TABLET ORAL at 13:17

## 2025-03-13 RX ADMIN — METOPROLOL SUCCINATE 25 MILLIGRAM(S): 50 TABLET, EXTENDED RELEASE ORAL at 10:13

## 2025-03-13 RX ADMIN — Medication 150 MICROGRAM(S): at 06:05

## 2025-03-13 RX ADMIN — ALBUMIN (HUMAN) 50 MILLILITER(S): 12.5 INJECTION, SOLUTION INTRAVENOUS at 10:14

## 2025-03-13 RX ADMIN — OXYCODONE HYDROCHLORIDE 5 MILLIGRAM(S): 30 TABLET ORAL at 22:46

## 2025-03-13 RX ADMIN — TAMSULOSIN HYDROCHLORIDE 0.8 MILLIGRAM(S): 0.4 CAPSULE ORAL at 21:41

## 2025-03-13 RX ADMIN — ALBUMIN (HUMAN) 50 MILLILITER(S): 12.5 INJECTION, SOLUTION INTRAVENOUS at 17:27

## 2025-03-13 RX ADMIN — BUMETANIDE 2 MILLIGRAM(S): 1 TABLET ORAL at 11:05

## 2025-03-13 RX ADMIN — BUMETANIDE 2 MILLIGRAM(S): 1 TABLET ORAL at 18:27

## 2025-03-13 RX ADMIN — ENOXAPARIN SODIUM 110 MILLIGRAM(S): 100 INJECTION SUBCUTANEOUS at 21:41

## 2025-03-13 RX ADMIN — Medication 3 MILLIGRAM(S): at 23:07

## 2025-03-13 RX ADMIN — ENOXAPARIN SODIUM 110 MILLIGRAM(S): 100 INJECTION SUBCUTANEOUS at 10:18

## 2025-03-13 RX ADMIN — CEFTRIAXONE 2000 MILLIGRAM(S): 500 INJECTION, POWDER, FOR SOLUTION INTRAMUSCULAR; INTRAVENOUS at 21:40

## 2025-03-13 RX ADMIN — OXYCODONE HYDROCHLORIDE 5 MILLIGRAM(S): 30 TABLET ORAL at 21:46

## 2025-03-13 NOTE — PROGRESS NOTE ADULT - SUBJECTIVE AND OBJECTIVE BOX
CHIEF COMPLAINT: LE edema, weakness, numbness; urinary retention      FROM H&P:  74-year-old male with a past medical history of benign prostatic hyperplasia, diverticulosis, Graves' disease, hemorrhoids, hyperlipidemia, hypothyroidism, osteoarthritis, spinal stenosis, spinal cord injury (2004), and multiple spinal surgeries (three cervical and one lumbar). He presents with complaints of lower extremity swelling and abnormal BUN/creatinine levels. The patient was recently admitted to [facility] from 02/18/25 to 02/25/25 for sepsis secondary to epidural abscess. He has a peripherally inserted central catheter line in the right arm and is on nightly Ceftriaxone treatment until 04/16/25. Following his discharge, he was transferred to [facility] (formerly [facility]). Documentation from the rehabilitation facility notes a decline in his functional status and dysfunction with activities of daily living. At the rehabilitation facility, he was noted to have worsening LE swelling and was started on torsemide. However, his Cr levels subsequently became elevated, leading to the discontinuation of torsemide. He was sent to the [facility] Emergency Department for further evaluation of his LE swelling and elevated Cr levels. He denies any chest pain, dyspnea, orthopnea, PND.       SUBJECTIVE/SIGNIFICANT INTERVAL EVENTS/OVERNIGHT EVENTS:    3/5: pt feels well. c/o LE numbness @ thighs and L hand numbness. denies saddle anesthesia. Cr improving     3/6: Cr continues to improve, c/o generalized weakness noble while ambulating but no new neuro deficits     3/7:   Gupta out with hematuria. Patient had no sense of it coming out->recurrent retention->gupta re-inserted. MRI lumbar negative.   Discussed with neurosurgery. CLeared from their standpoint. Advised of upper extremity symptoms. Order MRI C and T Spine for further evaluation and if anything significant will re-call neurosurgery  Signficaant ansarca. Cr improving. Arterial doppler ordered for further evaluation. ON AC for DVT. TTE reviewed and grossly unremarkable. Will discuss with nephro  Rising leukocytosis. Unclear source. Afebrile and vitals stable. Continue to monitor    3/8:  Cr improving. Anasarca persists and debilitating. Discussed with nephrology->Trial of albumin lasix  MRI Cervical Spin and thoracic spine done. Pending read  Rising leukocytosis? Unclear etiology. Afebrile. Follow imaging results.   Continue to monitor fluid status/Cr/Electrolytes closely. If stable and improving plan for bid albumin/lasix for adequate diuresis    3/9: Patient feels the same from weakness, edema, numbness standpoint. Apparent improvement on Physical exam in edema. Cr improved. Discussed with nephrology. IV Albumin/Lasix bid and continue to assess. MR C-Spine similar to prior. MR T-Spine with reported worsening discitis and possible new abscess. Re-called neurosurgery/spine to re-evaluate. Antibiotics as per ID.     3/10: ID not consulted on admission. Consult ID. Recs appreciated->continue IV antibiotics through april. Continue IV lasix/Albumin. No noticeable improvement as per patient. Continue to monitor. Leukocytosis improvin wtihout any change in intervention minus diuresis. Reactive? Neurosurgery recs appreciated->no acute intervention. Continue IV antibiotics and follow up outpatient. No events on telemetry->DC telemetry.     3/11: No change in edema. weight difficult to assess as patient unable to stand. Cr Relatively stable. Change to bumex 2mg bid. Still with upper extremity parathesia/weakness. Edema limited above torso so unlikely contributing to UE symptoms. Will re-discuss with Neurosurgery. Cntinue antibiotics.     3/12: Improvement in edema with bumex. CPK elevated ysterday->improved today. Continue to monitor. Cr stable. Continue IV albumin/bumex and monitoring Cr/electrolytes. Discussed with ID and Neurosurgery and expected difficult clearance of infection with antibiotics along and neurosurgery offering surgical intervention/washout. Patient to determine whether he would like to proceed. Eliquis to therapeutic lovenox.     3/13: Interval improvement in edema. Cr Stable. Discussed with nephrology->Continue IV albumin/Bumex. Continue to monitor I/Os and Cr/electroytes. Patient agreeable for surgical intervention. Date for intervention TBD. AC hold 24 hours prior to intervention. Patient with below the knee DVT so able to hold AC for surgery. Post op for period off AC can do serial US several days until cleared to resume AC. WIll order doppler today for preop baseline. At this time no indication for IVC. Otherwise patient medically optimized as best as possible to proceed with spine intervention with neurosurgery. Continue antibiotics as per ID.    Review of Systems: 14 Point review of systems reviewed and reported as negative unless otherwise stated  above      PHYSICAL EXAM:  T(C): 36.5 (03-13-25 @ 07:24), Max: 36.7 (03-12-25 @ 23:11)  HR: 75 (03-13-25 @ 07:24) (75 - 85)  BP: 124/73 (03-13-25 @ 07:24) (116/71 - 127/69)  RR: 18 (03-13-25 @ 07:24) (18 - 18)  SpO2: 94% (03-13-25 @ 07:24) (93% - 97%)  General: AAOx3; NAD  Head: AT/NC  Neck: Non-tender; No JVD  CVS: RRR, S1&S2, No murmur, Anasarca +  Respiratory: Lungs CTA B/L; Normal Respiratory Effort  Abdomen/GI: Soft, non-tender, non-distended; anasarca +  : No bladder distention, No Gupta  Extremities: No cyanosis, No clubbing, Significant LE edema to the pelvix  MSK: No CVA tenderness, Limited ROM LE bilaterally because of marked edema No injury  Neuro: AAOx3, numbness to upper b/l thighs, 3/5 L hand weakness,   Psych: Appropriate, Cooperative,   Skin: Clean, Dry and Intact      LABS:                          8.6    11.15 )-----------( 256      ( 13 Mar 2025 05:18 )             26.6     03-13    137  |  99  |  39[H]  ----------------------------<  117[H]  3.5   |  33[H]  |  1.23    Ca    9.0      13 Mar 2025 05:18  Phos  3.7     03-13  Mg     1.6     03-13    TPro  6.1  /  Alb  2.7[L]  /  TBili  1.0  /  DBili  x   /  AST  101[H]  /  ALT  140[H]  /  AlkPhos  64  03-13    SARS-CoV-2: NotDete (17 Feb 2025 21:11)    CAPILLARY BLOOD GLUCOSE                                           8.1    13.83 )-----------( 242      ( 12 Mar 2025 04:23 )             25.0     03-12    135  |  99  |  39[H]  ----------------------------<  119[H]  3.8   |  30  |  1.27    Ca    8.8      12 Mar 2025 04:23  Phos  3.2     03-12  Mg     1.8     03-12    TPro  5.9[L]  /  Alb  2.5[L]  /  TBili  1.0  /  DBili  0.3  /  AST  117[H]  /  ALT  155[H]  /  AlkPhos  67  03-12    SARS-CoV-2: NotDetec (17 Feb 2025 21:11)    CAPILLARY BLOOD GLUCOSE        RADIOLOGY:  < from: MR Lumbar Spine w/wo IV Cont (03.07.25 @ 15:11) >  IMPRESSION:    No abnormal enhancement.    No evidence for epidural abscess or discitis/osteomyelitis.    Multilevel degenerative changes detailed in the body the report.    < end of copied text >  < from: MR Thoracic Spine w/wo IV Cont (03.08.25 @ 12:24) >  IMPRESSION: Worsening discitis/osteomyelitis is seen with epidural   phlegmon again seen unchanged though increased bilaterally and ventral,   this is consistent with paraspinal phlegmon though there is a possible   early abscess seenon the left side as described above.    < end of copied text >  < from: MR Cervical Spine w/wo IV Cont (03.08.25 @ 12:24) >    IMPRESSION:    1.  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  2.  Chronic cord myelomalacia at C3-4, status post decompression.  3.  Stable severe left C7-T1 foraminal stenosis, with moderate narrowing   of the central canal.  4.  Stable moderate to severe bilateral C6-7 foraminal stenoses.    < end of copied text >        EKG:  < from: 12 Lead ECG (03.05.25 @ 07:16) >    Diagnosis Line Normal sinus rhythm  Normal ECG  When compared with ECG of 04-MAR-2025 14:10,  No significant change was found    < end of copied text >      ECHO:  < from: TTE W or WO Ultrasound Enhancing Agent (02.18.25 @ 12:28) >  CONCLUSIONS:      1. Left ventricular systolic function is normal with an ejection fraction of 66 % by Meyers's method of disks.   2. Trace tricuspid regurgitation.   3. Estimated pulmonary artery systolic pressure is 35 mmHg, consistent with mild pulmonary hypertension.   4. Interatrial septum is aneurysmal.   5. Technically difficultimage quality.   6. The peak transaortic velocity is 2.29 m/s, peak transaortic gradient is 21.0 mmHg and mean transaortic gradient is 12.0 mmHg with an LVOT/aortic valve VTI ratio of 0.42. The effective orifice area is estimated at 1.61 cm² by the continuity equation.   7. Trileaflet aortic valve with reduced systolic excursion. There is moderate calcification of the aortic valve leaflets. Mild aortic stenosis.    < end of copied text >              I personally reviewed labs, imaging, ekg, orders and vitals.    Discussed case with:  [X]RN  [X]CM/SW  [X]Patient  [X]Family  [X]Specialist: Nephrology/ID/Neurosurgery         CHIEF COMPLAINT: LE edema, weakness, numbness; urinary retention      FROM H&P:  74-year-old male with a past medical history of benign prostatic hyperplasia, diverticulosis, Graves' disease, hemorrhoids, hyperlipidemia, hypothyroidism, osteoarthritis, spinal stenosis, spinal cord injury (2004), and multiple spinal surgeries (three cervical and one lumbar). He presents with complaints of lower extremity swelling and abnormal BUN/creatinine levels. The patient was recently admitted to [facility] from 02/18/25 to 02/25/25 for sepsis secondary to epidural abscess. He has a peripherally inserted central catheter line in the right arm and is on nightly Ceftriaxone treatment until 04/16/25. Following his discharge, he was transferred to [facility]. Documentation from the rehabilitation facility notes a decline in his functional status and dysfunction with activities of daily living. At the rehabilitation facility, he was noted to have worsening LE swelling and was started on torsemide. However, his Cr levels subsequently became elevated, leading to the discontinuation of torsemide. He was sent to the [facility] Emergency Department for further evaluation of his LE swelling and elevated Cr levels. He denies any chest pain, dyspnea, orthopnea, PND.       SUBJECTIVE/SIGNIFICANT INTERVAL EVENTS/OVERNIGHT EVENTS:    3/5: pt feels well. c/o LE numbness @ thighs and L hand numbness. denies saddle anesthesia. Cr improving     3/6: Cr continues to improve, c/o generalized weakness noble while ambulating but no new neuro deficits     3/7:   Gupta out with hematuria. Patient had no sense of it coming out->recurrent retention->gupta re-inserted. MRI lumbar negative.   Discussed with neurosurgery. CLeared from their standpoint. Advised of upper extremity symptoms. Order MRI C and T Spine for further evaluation and if anything significant will re-call neurosurgery  Signficaant ansarca. Cr improving. Arterial doppler ordered for further evaluation. ON AC for DVT. TTE reviewed and grossly unremarkable. Will discuss with nephro  Rising leukocytosis. Unclear source. Afebrile and vitals stable. Continue to monitor    3/8:  Cr improving. Anasarca persists and debilitating. Discussed with nephrology->Trial of albumin lasix  MRI Cervical Spin and thoracic spine done. Pending read  Rising leukocytosis? Unclear etiology. Afebrile. Follow imaging results.   Continue to monitor fluid status/Cr/Electrolytes closely. If stable and improving plan for bid albumin/lasix for adequate diuresis    3/9: Patient feels the same from weakness, edema, numbness standpoint. Apparent improvement on Physical exam in edema. Cr improved. Discussed with nephrology. IV Albumin/Lasix bid and continue to assess. MR C-Spine similar to prior. MR T-Spine with reported worsening discitis and possible new abscess. Re-called neurosurgery/spine to re-evaluate. Antibiotics as per ID.     3/10: ID not consulted on admission. Consult ID. Recs appreciated->continue IV antibiotics through april. Continue IV lasix/Albumin. No noticeable improvement as per patient. Continue to monitor. Leukocytosis improvin wtihout any change in intervention minus diuresis. Reactive? Neurosurgery recs appreciated->no acute intervention. Continue IV antibiotics and follow up outpatient. No events on telemetry->DC telemetry.     3/11: No change in edema. weight difficult to assess as patient unable to stand. Cr Relatively stable. Change to bumex 2mg bid. Still with upper extremity parathesia/weakness. Edema limited above torso so unlikely contributing to UE symptoms. Will re-discuss with Neurosurgery. Cntinue antibiotics.     3/12: Improvement in edema with bumex. CPK elevated ysterday->improved today. Continue to monitor. Cr stable. Continue IV albumin/bumex and monitoring Cr/electrolytes. Discussed with ID and Neurosurgery and expected difficult clearance of infection with antibiotics along and neurosurgery offering surgical intervention/washout. Patient to determine whether he would like to proceed. Eliquis to therapeutic lovenox.     3/13: Interval improvement in edema. Cr Stable. Discussed with nephrology->Continue IV albumin/Bumex. Continue to monitor I/Os and Cr/electroytes. Patient agreeable for surgical intervention. Tentative surgery 3/18. Last dose of lovenox night of 3/16. Patient with below the knee DVT so able to hold AC for surgery. Post op for period off AC can do serial US several days until cleared to resume AC. WIll order doppler today for preop baseline. At this time no indication for IVC Filter at this time. . Otherwise patient medically optimized as best as possible to proceed with spine intervention with neurosurgery. Continue antibiotics as per ID.    Review of Systems: 14 Point review of systems reviewed and reported as negative unless otherwise stated  above      PHYSICAL EXAM:  T(C): 36.5 (03-13-25 @ 07:24), Max: 36.7 (03-12-25 @ 23:11)  HR: 75 (03-13-25 @ 07:24) (75 - 85)  BP: 124/73 (03-13-25 @ 07:24) (116/71 - 127/69)  RR: 18 (03-13-25 @ 07:24) (18 - 18)  SpO2: 94% (03-13-25 @ 07:24) (93% - 97%)  General: AAOx3; NAD  Head: AT/NC  Neck: Non-tender; No JVD  CVS: RRR, S1&S2, No murmur, Anasarca +  Respiratory: Lungs CTA B/L; Normal Respiratory Effort  Abdomen/GI: Soft, non-tender, non-distended; anasarca +  : No bladder distention, No Gupta  Extremities: No cyanosis, No clubbing, Significant LE edema to the pelvix  MSK: No CVA tenderness, Limited ROM LE bilaterally because of marked edema No injury  Neuro: AAOx3, numbness to upper b/l thighs, 3/5 L hand weakness,   Psych: Appropriate, Cooperative,   Skin: Clean, Dry and Intact      LABS:                          8.6    11.15 )-----------( 256      ( 13 Mar 2025 05:18 )             26.6     03-13    137  |  99  |  39[H]  ----------------------------<  117[H]  3.5   |  33[H]  |  1.23    Ca    9.0      13 Mar 2025 05:18  Phos  3.7     03-13  Mg     1.6     03-13    TPro  6.1  /  Alb  2.7[L]  /  TBili  1.0  /  DBili  x   /  AST  101[H]  /  ALT  140[H]  /  AlkPhos  64  03-13    SARS-CoV-2: NotDete (17 Feb 2025 21:11)    CAPILLARY BLOOD GLUCOSE                                           8.1    13.83 )-----------( 242      ( 12 Mar 2025 04:23 )             25.0     03-12    135  |  99  |  39[H]  ----------------------------<  119[H]  3.8   |  30  |  1.27    Ca    8.8      12 Mar 2025 04:23  Phos  3.2     03-12  Mg     1.8     03-12    TPro  5.9[L]  /  Alb  2.5[L]  /  TBili  1.0  /  DBili  0.3  /  AST  117[H]  /  ALT  155[H]  /  AlkPhos  67  03-12    SARS-CoV-2: NotDetec (17 Feb 2025 21:11)    CAPILLARY BLOOD GLUCOSE        RADIOLOGY:  < from: MR Lumbar Spine w/wo IV Cont (03.07.25 @ 15:11) >  IMPRESSION:    No abnormal enhancement.    No evidence for epidural abscess or discitis/osteomyelitis.    Multilevel degenerative changes detailed in the body the report.    < end of copied text >  < from: MR Thoracic Spine w/wo IV Cont (03.08.25 @ 12:24) >  IMPRESSION: Worsening discitis/osteomyelitis is seen with epidural   phlegmon again seen unchanged though increased bilaterally and ventral,   this is consistent with paraspinal phlegmon though there is a possible   early abscess seenon the left side as described above.    < end of copied text >  < from: MR Cervical Spine w/wo IV Cont (03.08.25 @ 12:24) >    IMPRESSION:    1.  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  2.  Chronic cord myelomalacia at C3-4, status post decompression.  3.  Stable severe left C7-T1 foraminal stenosis, with moderate narrowing   of the central canal.  4.  Stable moderate to severe bilateral C6-7 foraminal stenoses.    < end of copied text >        EKG:  < from: 12 Lead ECG (03.05.25 @ 07:16) >    Diagnosis Line Normal sinus rhythm  Normal ECG  When compared with ECG of 04-MAR-2025 14:10,  No significant change was found    < end of copied text >      ECHO:  < from: TTE W or WO Ultrasound Enhancing Agent (02.18.25 @ 12:28) >  CONCLUSIONS:      1. Left ventricular systolic function is normal with an ejection fraction of 66 % by Meyers's method of disks.   2. Trace tricuspid regurgitation.   3. Estimated pulmonary artery systolic pressure is 35 mmHg, consistent with mild pulmonary hypertension.   4. Interatrial septum is aneurysmal.   5. Technically difficultimage quality.   6. The peak transaortic velocity is 2.29 m/s, peak transaortic gradient is 21.0 mmHg and mean transaortic gradient is 12.0 mmHg with an LVOT/aortic valve VTI ratio of 0.42. The effective orifice area is estimated at 1.61 cm² by the continuity equation.   7. Trileaflet aortic valve with reduced systolic excursion. There is moderate calcification of the aortic valve leaflets. Mild aortic stenosis.    < end of copied text >              I personally reviewed labs, imaging, ekg, orders and vitals.    Discussed case with:  [X]RN  [X]CM/SW  [X]Patient  [X]Family  [X]Specialist: Nephrology/ID/Neurosurgery

## 2025-03-13 NOTE — PROGRESS NOTE ADULT - SUBJECTIVE AND OBJECTIVE BOX
Patient is a 74y Male with  who reports   no complaints overnight.    good UOP to IV lasix     Allergies    No Known Allergies    Intolerances        MEDICATIONS  (STANDING):  albumin human 25% IVPB 100 milliLiter(s) IV Intermittent every 12 hours  buMETAnide Injectable 2 milliGRAM(s) IV Push every 12 hours  cefTRIAXone Injectable. 2000 milliGRAM(s) IV Push every 24 hours  enoxaparin Injectable 110 milliGRAM(s) SubCutaneous every 12 hours  levothyroxine 150 MICROGram(s) Oral daily  metoprolol succinate ER 25 milliGRAM(s) Oral daily  tamsulosin 0.8 milliGRAM(s) Oral at bedtime      Vitals:  T(F): 97.7 (03-13-25), Max: 98.1 (03-12-25)  HR: 75 (03-13-25) (75 - 85)  BP: 124/73 (03-13-25) (116/71 - 127/69)  RR: 18 (03-13-25)  SpO2: 94% (03-13-25)    I and O's:    03-12 @ 07:01  -  03-13 @ 07:00  --------------------------------------------------------  IN: 0 mL / OUT: 3725 mL / NET: -3725 mL            PHYSICAL EXAM:    Constitutional: NAD,   HEENT:   MM  Respiratory: CTAB  Cardiovascular: S1 and S2  Gastrointestinal: abd wall edema ++  BS+, soft, NT/ND  Extremities:  peripheral edema ++  Neurological: A/O  : No Ortega  Dialysis Access: Not applicable    LABS:                        8.6    11.15 )-----------( 256      ( 13 Mar 2025 05:18 )             26.6                         8.1    13.83 )-----------( 242      ( 12 Mar 2025 04:23 )             25.0       137    |  99     |  39     ----------------------------<  117       13 Mar 2025 05:18  3.5     |  33     |  1.23     135    |  99     |  39     ----------------------------<  119       12 Mar 2025 04:23  3.8     |  30     |  1.27     135    |  101    |  43     ----------------------------<  128       11 Mar 2025 05:19  4.3     |  28     |  1.41     Ca    9.0        13 Mar 2025 05:18  Ca    8.8        12 Mar 2025 04:23    Phos  3.7       13 Mar 2025 05:18  Phos  3.2       12 Mar 2025 04:23    Mg     1.6       13 Mar 2025 05:18  Mg     1.8       12 Mar 2025 04:23    TPro  6.1    /  Alb  2.7    /  TBili  1.0    /        13 Mar 2025 05:18  DBili  x      /  AST  101    /  ALT  140    /  AlkPhos  64       TPro  5.9    /  Alb  2.5    /  TBili  1.0    /        12 Mar 2025 04:23  DBili  0.3    /  AST  117    /  ALT  155    /  AlkPhos  67               RADIOLOGY & ADDITIONAL STUDIES:

## 2025-03-13 NOTE — PROGRESS NOTE ADULT - ASSESSMENT
74-year-old male with a past medical history of benign prostatic hyperplasia, diverticulosis, Graves' disease, hemorrhoids, hyperlipidemia, hypothyroidism, osteoarthritis, spinal stenosis, spinal cord injury (2004), and multiple spinal surgeries w extremity swelling and abnormal BUN/creatinine levels.     Edema/Anasarca     - Optimize intake/protein stores supplements  - Monitor labs closely w ongoing diuresis Bumex IV + SPA  - Daily  weights   standing scale- advised staff again   - I/O's - adivsed staff again      Nephrotic range proteinuria 3.4 grams    - fup on serologies - neg so far  - fup pla2  - trial of diuretic    Mild Rhabdo with elevated LFT - CPK trending down    sec to dehydration and Statin use while on diuretics   Statin  DC'd   trend CPK      d/w staff, Dr Gutiérrez

## 2025-03-13 NOTE — PROGRESS NOTE ADULT - ASSESSMENT
MEDICATIONS  (STANDING):  albumin human 25% IVPB 50 milliLiter(s) IV Intermittent every 12 hours  apixaban 10 milliGRAM(s) Oral every 12 hours  buMETAnide Injectable 2 milliGRAM(s) IV Push every 12 hours  cefTRIAXone Injectable. 2000 milliGRAM(s) IV Push every 24 hours  levothyroxine 150 MICROGram(s) Oral daily  metoprolol succinate ER 25 milliGRAM(s) Oral daily  rosuvastatin 5 milliGRAM(s) Oral at bedtime  tamsulosin 0.8 milliGRAM(s) Oral at bedtime    MEDICATIONS  (PRN):  acetaminophen     Tablet .. 650 milliGRAM(s) Oral every 6 hours PRN Mild Pain (1 - 3)  aluminum hydroxide/magnesium hydroxide/simethicone Suspension 30 milliLiter(s) Oral every 4 hours PRN Dyspepsia  lactulose Syrup 20 Gram(s) Oral three times a day PRN for constipation  melatonin 3 milliGRAM(s) Oral at bedtime PRN Insomnia  ondansetron Injectable 4 milliGRAM(s) IV Push every 6 hours PRN Nausea and/or Vomiting  oxyCODONE    IR 5 milliGRAM(s) Oral every 6 hours PRN Moderate Pain (4 - 6)      74M admitted for GOVIND, and DVT.         #GOVIND on CKD  #LE edema  #hypoalbuminemia  #Proteinuria  - IVF as needed with fluid balance  -Significant debilitating ansarca. Trial of lasix/albumin? WIll discuss with nephro->-Trial of albumin lasix started 3/8->improvement. Continue BID IV Laxis/IV albumin (3/9)->Change to Bumex/Albumin (3/11)-> good effect-> continue for now  - nephrology consult/recs  -Continue to monitor Cr/Electrolyte/IOs    #DVT  - appears provoked from decrease ambulation from recent spinal abscess  - could not tolerate VQ scan for r/o PE given pain/hx spinal surgeries  - switch to eliquis today, will be new med on discharge ->therapeutic lovenox (3/12) in the setting of possible spine intervention.   - Baseline doppler preop ordered  - AC hold 24 hours prior to intervention. Patient with below the knee DVT so able to hold AC for surgery. Post op for period off AC can do serial US several days until cleared to resume AC. WIll order doppler today for preop baseline. At this time no indication for IVC.    #Spinal epidural abscess POA and already being treated with IV antibiotics with suspected worsening of disease  - Chart reviewed extensively  - PICC w CTX for 8 week course to finish 4/16/25  - Reviewed neurosurgery notes and current neuro exam unchanged and no worse from prior admission, imaging reviewed  - currently since neuro exam unchanged will not pursue more imaging, low threshold to re-scan if neuro exam worsens  -Repeat MRI noted->neuroseurgery/Spine/ID recs. -> No acute intervention. Upper extremity numbness and weakness not present on prior admission present on this admission. Anasarca likely not contributing to upper extremity symptoms.  Will re-discuss with NS->after further evaluation and given perspsective from medicine/ID that complete or expected significant improvement with antibiotics alone may be difficult, neurosurgery offering surgical intervention. Patient to decide on wheter to proceed or not.   - Patient agreeable for surgery->Date of surgery TBD  - Hold AC 24 hours prior to planned intervention.     #leukocytosis  - likely rx to DVT + Discitis.  - no other signs of sepsis , afebrile       #hypothyroidism  - c/w synthroid    #CAD  - c/w metoprolol, statin    #HTN  - c/w metoprolol  - titrate Rx accordingly    #HLD  - c/w statin    #anemia   - 12>10 likely dilutional ->mid 8s and stable.   - no signs of bleeding   trend     #BPH  #Urinary retention likely augmented by degree of anasarca  - gupta placed during prior admission  - TOV  ->failed. Gupta re-inserted 3/6->fell out without knowing with associated hematuria->recurrent retention 3/7->Gupta re-inserted (3/7)    #dvt ppx - Therapeutic lovenox in anticipation for OR with neurosurgery (hold 24 hours prior to planned surgery)    3/13: Interval improvement in edema. Cr Stable. Discussed with nephrology->Continue IV albumin/Bumex. Continue to monitor I/Os and Cr/electroytes. Patient agreeable for surgical intervention. Date for intervention TBD. AC hold 24 hours prior to intervention. Patient with below the knee DVT so able to hold AC for surgery. Post op for period off AC can do serial US several days until cleared to resume AC. WIll order doppler today for preop baseline. At this time no indication for IVC. Otherwise patient medically optimized as best as possible to proceed with spine intervention with neurosurgery. Continue antibiotics as per ID.    I spent a total of 51 minutes in face-to-face time with the patient and on the floor managing patient including coordination of care. Overall 50% of the time spent in discussion of the diagnosis, counseling and treatment plan.    MEDICATIONS  (STANDING):  albumin human 25% IVPB 50 milliLiter(s) IV Intermittent every 12 hours  apixaban 10 milliGRAM(s) Oral every 12 hours  buMETAnide Injectable 2 milliGRAM(s) IV Push every 12 hours  cefTRIAXone Injectable. 2000 milliGRAM(s) IV Push every 24 hours  levothyroxine 150 MICROGram(s) Oral daily  metoprolol succinate ER 25 milliGRAM(s) Oral daily  rosuvastatin 5 milliGRAM(s) Oral at bedtime  tamsulosin 0.8 milliGRAM(s) Oral at bedtime    MEDICATIONS  (PRN):  acetaminophen     Tablet .. 650 milliGRAM(s) Oral every 6 hours PRN Mild Pain (1 - 3)  aluminum hydroxide/magnesium hydroxide/simethicone Suspension 30 milliLiter(s) Oral every 4 hours PRN Dyspepsia  lactulose Syrup 20 Gram(s) Oral three times a day PRN for constipation  melatonin 3 milliGRAM(s) Oral at bedtime PRN Insomnia  ondansetron Injectable 4 milliGRAM(s) IV Push every 6 hours PRN Nausea and/or Vomiting  oxyCODONE    IR 5 milliGRAM(s) Oral every 6 hours PRN Moderate Pain (4 - 6)      74M admitted for GOVIND, and DVT.         #GOVIND on CKD  #LE edema  #hypoalbuminemia  #Proteinuria  - IVF as needed with fluid balance  -Significant debilitating ansarca. Trial of lasix/albumin? WIll discuss with nephro->-Trial of albumin lasix started 3/8->improvement. Continue BID IV Laxis/IV albumin (3/9)->Change to Bumex/Albumin (3/11)-> good effect-> continue for now  - nephrology consult/recs  -Continue to monitor Cr/Electrolyte/IOs    #DVT  - appears provoked from decrease ambulation from recent spinal abscess  - could not tolerate VQ scan for r/o PE given pain/hx spinal surgeries  - switch to eliquis, will be new med on discharge (of note patient completed eliquis load) ->therapeutic lovenox (3/12) in the setting of possible spine intervention.   - Baseline doppler preop ordered  - Last dose of AC night of 3/16. REsumption post op when cleared by neurosurgery       #Spinal epidural abscess POA and already being treated with IV antibiotics with suspected worsening of disease  - Chart reviewed extensively  - PICC w CTX for 8 week course to finish 4/16/25  - Reviewed neurosurgery notes and current neuro exam unchanged and no worse from prior admission, imaging reviewed  - currently since neuro exam unchanged will not pursue more imaging, low threshold to re-scan if neuro exam worsens  -Repeat MRI noted->neuroseurgery/Spine/ID recs. -> No acute intervention. Upper extremity numbness and weakness not present on prior admission present on this admission. Anasarca likely not contributing to upper extremity symptoms.  Will re-discuss with NS->after further evaluation and given perspsective from medicine/ID that complete or expected significant improvement with antibiotics alone may be difficult, neurosurgery offering surgical intervention. Patient to decide on wheter to proceed or not.   - Patient agreeable for surgery->Tentatively 3/18  - Last dose of AC night of 3/16. REsumption post op when cleared by neurosurgery     #leukocytosis  - likely rx to DVT + Discitis.  - no other signs of sepsis , afebrile       #hypothyroidism  - c/w synthroid    #CAD  - c/w metoprolol, statin    #HTN  - c/w metoprolol  - titrate Rx accordingly    #HLD  - c/w statin    #anemia   - 12>10 likely dilutional ->mid 8s and stable.   - no signs of bleeding   trend     #BPH  #Urinary retention likely augmented by degree of anasarca  - gupta placed during prior admission  - TOV  ->failed. Gupta re-inserted 3/6->fell out without knowing with associated hematuria->recurrent retention 3/7->Gupta re-inserted (3/7)    #dvt ppx - Therapeutic lovenox in anticipation for OR with neurosurgery (hold 24 hours prior to planned surgery)    3/13: Interval improvement in edema. Cr Stable. Discussed with nephrology->Continue IV albumin/Bumex. Continue to monitor I/Os and Cr/electroytes. Patient agreeable for surgical intervention. Tentative surgery 3/18. Last dose of lovenox night of 3/16. Patient with below the knee DVT so able to hold AC for surgery. Post op for period off AC can do serial US several days until cleared to resume AC. WIll order doppler today for preop baseline. At this time no indication for IVC Filter at this time. . Otherwise patient medically optimized as best as possible to proceed with spine intervention with neurosurgery. Continue antibiotics as per ID.    I spent a total of 51 minutes in face-to-face time with the patient and on the floor managing patient including coordination of care. Overall 50% of the time spent in discussion of the diagnosis, counseling and treatment plan.    MEDICATIONS  (STANDING):  albumin human 25% IVPB 50 milliLiter(s) IV Intermittent every 12 hours  apixaban 10 milliGRAM(s) Oral every 12 hours  buMETAnide Injectable 2 milliGRAM(s) IV Push every 12 hours  cefTRIAXone Injectable. 2000 milliGRAM(s) IV Push every 24 hours  levothyroxine 150 MICROGram(s) Oral daily  metoprolol succinate ER 25 milliGRAM(s) Oral daily  rosuvastatin 5 milliGRAM(s) Oral at bedtime  tamsulosin 0.8 milliGRAM(s) Oral at bedtime    MEDICATIONS  (PRN):  acetaminophen     Tablet .. 650 milliGRAM(s) Oral every 6 hours PRN Mild Pain (1 - 3)  aluminum hydroxide/magnesium hydroxide/simethicone Suspension 30 milliLiter(s) Oral every 4 hours PRN Dyspepsia  lactulose Syrup 20 Gram(s) Oral three times a day PRN for constipation  melatonin 3 milliGRAM(s) Oral at bedtime PRN Insomnia  ondansetron Injectable 4 milliGRAM(s) IV Push every 6 hours PRN Nausea and/or Vomiting  oxyCODONE    IR 5 milliGRAM(s) Oral every 6 hours PRN Moderate Pain (4 - 6)      74M admitted for GOVIND, and DVT.         #GOVIND on CKD  #LE edema  #hypoalbuminemia  #Proteinuria  - IVF as needed with fluid balance  -Significant debilitating ansarca. Trial of lasix/albumin? WIll discuss with nephro->-Trial of albumin lasix started 3/8->improvement. Continue BID IV Laxis/IV albumin (3/9)->Change to Bumex/Albumin (3/11)-> good effect-> continue for now  - nephrology consult/recs  -Continue to monitor Cr/Electrolyte/IOs    #DVT  - appears provoked from decrease ambulation from recent spinal abscess  - could not tolerate VQ scan for r/o PE given pain/hx spinal surgeries  - switch to eliquis, will be new med on discharge (of note patient completed eliquis load) ->therapeutic lovenox (3/12) in the setting of possible spine intervention.   - Baseline doppler preop ordered  - Last dose of AC night of 3/16. REsumption post op when cleared by neurosurgery       #Spinal epidural abscess POA and already being treated with IV antibiotics with suspected worsening of disease  - Chart reviewed extensively  - PICC w CTX for 8 week course to finish 4/16/25  - Reviewed neurosurgery notes and current neuro exam unchanged and no worse from prior admission, imaging reviewed  - currently since neuro exam unchanged will not pursue more imaging, low threshold to re-scan if neuro exam worsens  -Repeat MRI noted->neuroseurgery/Spine/ID recs. -> No acute intervention. Upper extremity numbness and weakness not present on prior admission present on this admission. Anasarca likely not contributing to upper extremity symptoms.  Will re-discuss with NS->after further evaluation and given perspsective from medicine/ID that complete or expected significant improvement with antibiotics alone may be difficult, neurosurgery offering surgical intervention. Patient to decide on wheter to proceed or not.   - Patient agreeable for surgery->Tentatively 3/18  - Last dose of AC night of 3/16. REsumption post op when cleared by neurosurgery     #Mild Rhabdomyolysis Improving  -Suspect from Ansarca/Edema  -Improving with adequate diuresis of edema  -Contineu to monitor  -Associated elevated transaminase; relatively stable. Continue to monitor     #leukocytosis  - likely rx to DVT + Discitis.  - no other signs of sepsis , afebrile       #hypothyroidism  - c/w synthroid    #CAD  - c/w metoprolol, statin    #HTN  - c/w metoprolol  - titrate Rx accordingly    #HLD  - c/w statin    #anemia   - 12>10 likely dilutional ->mid 8s and stable.   - no signs of bleeding   trend     #BPH  #Urinary retention likely augmented by degree of anasarca  - gupta placed during prior admission  - TOV  ->failed. Gupta re-inserted 3/6->fell out without knowing with associated hematuria->recurrent retention 3/7->Gupta re-inserted (3/7)    #dvt ppx - Therapeutic lovenox in anticipation for OR with neurosurgery (hold 24 hours prior to planned surgery)    3/13: Interval improvement in edema. Cr Stable. Discussed with nephrology->Continue IV albumin/Bumex. Continue to monitor I/Os and Cr/electroytes. Patient agreeable for surgical intervention. Tentative surgery 3/18. Last dose of lovenox night of 3/16. Patient with below the knee DVT so able to hold AC for surgery. Post op for period off AC can do serial US several days until cleared to resume AC. WIll order doppler today for preop baseline. At this time no indication for IVC Filter at this time. . Otherwise patient medically optimized as best as possible to proceed with spine intervention with neurosurgery. Continue antibiotics as per ID.    I spent a total of 51 minutes in face-to-face time with the patient and on the floor managing patient including coordination of care. Overall 50% of the time spent in discussion of the diagnosis, counseling and treatment plan.

## 2025-03-14 LAB
% ALBUMIN: 47.1 % — SIGNIFICANT CHANGE UP
% ALPHA 1: 9.6 % — SIGNIFICANT CHANGE UP
% ALPHA 2: 12 % — SIGNIFICANT CHANGE UP
% BETA: 14.3 % — SIGNIFICANT CHANGE UP
% GAMMA: 17 % — SIGNIFICANT CHANGE UP
% M SPIKE: SIGNIFICANT CHANGE UP
ADD ON TEST-SPECIMEN IN LAB: SIGNIFICANT CHANGE UP
ALBUMIN SERPL ELPH-MCNC: 2.9 G/DL — LOW (ref 3.3–5)
ALBUMIN SERPL ELPH-MCNC: 3.1 G/DL — LOW (ref 3.6–5.5)
ALBUMIN/GLOB SERPL ELPH: 0.9 RATIO — SIGNIFICANT CHANGE UP
ALP SERPL-CCNC: 64 U/L — SIGNIFICANT CHANGE UP (ref 40–120)
ALPHA1 GLOB SERPL ELPH-MCNC: 0.6 G/DL — HIGH (ref 0.1–0.4)
ALPHA2 GLOB SERPL ELPH-MCNC: 0.8 G/DL — SIGNIFICANT CHANGE UP (ref 0.5–1)
ALT FLD-CCNC: 141 U/L — HIGH (ref 12–78)
ANION GAP SERPL CALC-SCNC: 5 MMOL/L — SIGNIFICANT CHANGE UP (ref 5–17)
AST SERPL-CCNC: 80 U/L — HIGH (ref 15–37)
B-GLOBULIN SERPL ELPH-MCNC: 0.9 G/DL — SIGNIFICANT CHANGE UP (ref 0.5–1)
BILIRUB SERPL-MCNC: 0.9 MG/DL — SIGNIFICANT CHANGE UP (ref 0.2–1.2)
BUN SERPL-MCNC: 38 MG/DL — HIGH (ref 7–23)
CALCIUM SERPL-MCNC: 9.2 MG/DL — SIGNIFICANT CHANGE UP (ref 8.5–10.1)
CHLORIDE SERPL-SCNC: 97 MMOL/L — SIGNIFICANT CHANGE UP (ref 96–108)
CK SERPL-CCNC: 463 U/L — HIGH (ref 26–308)
CO2 SERPL-SCNC: 34 MMOL/L — HIGH (ref 22–31)
CREAT SERPL-MCNC: 1.3 MG/DL — SIGNIFICANT CHANGE UP (ref 0.5–1.3)
EGFR: 58 ML/MIN/1.73M2 — LOW
EGFR: 58 ML/MIN/1.73M2 — LOW
ERYTHROCYTE [SEDIMENTATION RATE] IN BLOOD: 77 MM/HR — HIGH (ref 0–20)
GAMMA GLOBULIN: 1.1 G/DL — SIGNIFICANT CHANGE UP (ref 0.6–1.6)
GLUCOSE SERPL-MCNC: 124 MG/DL — HIGH (ref 70–99)
HCT VFR BLD CALC: 26.1 % — LOW (ref 39–50)
HCV AB S/CO SERPL IA: 0.07 S/CO — SIGNIFICANT CHANGE UP (ref 0–0.79)
HCV AB SERPL-IMP: SIGNIFICANT CHANGE UP
HGB BLD-MCNC: 8.5 G/DL — LOW (ref 13–17)
IMMATURE RETICULOCYTE FRACTION %: 16.3 % — SIGNIFICANT CHANGE UP
INTERPRETATION SERPL IFE-IMP: SIGNIFICANT CHANGE UP
LDH SERPL L TO P-CCNC: 427 U/L — HIGH (ref 84–241)
M-SPIKE: SIGNIFICANT CHANGE UP (ref 0–0)
MAGNESIUM SERPL-MCNC: 1.7 MG/DL — SIGNIFICANT CHANGE UP (ref 1.6–2.6)
MCHC RBC-ENTMCNC: 28.4 PG — SIGNIFICANT CHANGE UP (ref 27–34)
MCHC RBC-ENTMCNC: 32.6 G/DL — SIGNIFICANT CHANGE UP (ref 32–36)
MCV RBC AUTO: 87.3 FL — SIGNIFICANT CHANGE UP (ref 80–100)
NRBC # BLD AUTO: 0 K/UL — SIGNIFICANT CHANGE UP (ref 0–0)
NRBC # FLD: 0 K/UL — SIGNIFICANT CHANGE UP (ref 0–0)
NRBC BLD AUTO-RTO: 0 /100 WBCS — SIGNIFICANT CHANGE UP (ref 0–0)
NT-PROBNP SERPL-SCNC: 125 PG/ML — SIGNIFICANT CHANGE UP (ref 0–125)
PHOSPHATE SERPL-MCNC: 3.6 MG/DL — SIGNIFICANT CHANGE UP (ref 2.5–4.5)
PLATELET # BLD AUTO: 274 K/UL — SIGNIFICANT CHANGE UP (ref 150–400)
PMV BLD: 9.3 FL — SIGNIFICANT CHANGE UP (ref 7–13)
POTASSIUM SERPL-MCNC: 3.5 MMOL/L — SIGNIFICANT CHANGE UP (ref 3.5–5.3)
POTASSIUM SERPL-SCNC: 3.5 MMOL/L — SIGNIFICANT CHANGE UP (ref 3.5–5.3)
PROT PATTERN SERPL ELPH-IMP: SIGNIFICANT CHANGE UP
PROT SERPL-MCNC: 6.2 GM/DL — SIGNIFICANT CHANGE UP (ref 6–8.3)
PROT SERPL-MCNC: 6.5 G/DL — SIGNIFICANT CHANGE UP (ref 6–8.3)
RBC # BLD: 2.99 M/UL — LOW (ref 4.2–5.8)
RBC # FLD: 14 % — SIGNIFICANT CHANGE UP (ref 10.3–14.5)
RETICS #: 65.4 K/UL — SIGNIFICANT CHANGE UP (ref 25–125)
RETICS/RBC NFR: 2.2 % — SIGNIFICANT CHANGE UP (ref 0.5–2.5)
RETICULOCYTE HEMOGLOBIN EQUIVALENT: 29.5 PG — LOW (ref 36–38.6)
SODIUM SERPL-SCNC: 136 MMOL/L — SIGNIFICANT CHANGE UP (ref 135–145)
WBC # BLD: 10.84 K/UL — HIGH (ref 3.8–10.5)
WBC # FLD AUTO: 10.84 K/UL — HIGH (ref 3.8–10.5)

## 2025-03-14 PROCEDURE — 93970 EXTREMITY STUDY: CPT | Mod: 26

## 2025-03-14 PROCEDURE — 99233 SBSQ HOSP IP/OBS HIGH 50: CPT

## 2025-03-14 PROCEDURE — 71045 X-RAY EXAM CHEST 1 VIEW: CPT | Mod: 26

## 2025-03-14 RX ORDER — ENOXAPARIN SODIUM 100 MG/ML
110 INJECTION SUBCUTANEOUS EVERY 12 HOURS
Refills: 0 | Status: COMPLETED | OUTPATIENT
Start: 2025-03-14 | End: 2025-03-15

## 2025-03-14 RX ORDER — OXYCODONE HYDROCHLORIDE 30 MG/1
5 TABLET ORAL EVERY 6 HOURS
Refills: 0 | Status: DISCONTINUED | OUTPATIENT
Start: 2025-03-14 | End: 2025-03-17

## 2025-03-14 RX ORDER — FERROUS SULFATE 137(45) MG
325 TABLET, EXTENDED RELEASE ORAL DAILY
Refills: 0 | Status: DISCONTINUED | OUTPATIENT
Start: 2025-03-14 | End: 2025-03-24

## 2025-03-14 RX ADMIN — OXYCODONE HYDROCHLORIDE 5 MILLIGRAM(S): 30 TABLET ORAL at 23:23

## 2025-03-14 RX ADMIN — LACTULOSE 20 GRAM(S): 10 SOLUTION ORAL at 10:06

## 2025-03-14 RX ADMIN — ENOXAPARIN SODIUM 110 MILLIGRAM(S): 100 INJECTION SUBCUTANEOUS at 10:07

## 2025-03-14 RX ADMIN — BUMETANIDE 2 MILLIGRAM(S): 1 TABLET ORAL at 18:49

## 2025-03-14 RX ADMIN — TAMSULOSIN HYDROCHLORIDE 0.8 MILLIGRAM(S): 0.4 CAPSULE ORAL at 21:21

## 2025-03-14 RX ADMIN — OXYCODONE HYDROCHLORIDE 5 MILLIGRAM(S): 30 TABLET ORAL at 17:02

## 2025-03-14 RX ADMIN — BUMETANIDE 2 MILLIGRAM(S): 1 TABLET ORAL at 14:15

## 2025-03-14 RX ADMIN — CEFTRIAXONE 2000 MILLIGRAM(S): 500 INJECTION, POWDER, FOR SOLUTION INTRAMUSCULAR; INTRAVENOUS at 20:11

## 2025-03-14 RX ADMIN — ENOXAPARIN SODIUM 110 MILLIGRAM(S): 100 INJECTION SUBCUTANEOUS at 21:22

## 2025-03-14 RX ADMIN — ALBUMIN (HUMAN) 50 MILLILITER(S): 12.5 INJECTION, SOLUTION INTRAVENOUS at 17:47

## 2025-03-14 RX ADMIN — Medication 150 MICROGRAM(S): at 05:19

## 2025-03-14 RX ADMIN — ALBUMIN (HUMAN) 50 MILLILITER(S): 12.5 INJECTION, SOLUTION INTRAVENOUS at 10:08

## 2025-03-14 RX ADMIN — METOPROLOL SUCCINATE 25 MILLIGRAM(S): 50 TABLET, EXTENDED RELEASE ORAL at 10:06

## 2025-03-14 RX ADMIN — Medication 3 MILLIGRAM(S): at 21:21

## 2025-03-14 NOTE — PROGRESS NOTE ADULT - SUBJECTIVE AND OBJECTIVE BOX
Patient is a 74y Male who reports no complaints as new, Reports good UOP, improving edema he belives    MEDICATIONS  (STANDING):  albumin human 25% IVPB 100 milliLiter(s) IV Intermittent every 12 hours  buMETAnide Injectable 2 milliGRAM(s) IV Push every 12 hours  cefTRIAXone Injectable. 2000 milliGRAM(s) IV Push every 24 hours  enoxaparin Injectable 110 milliGRAM(s) SubCutaneous every 12 hours  ferrous    sulfate 325 milliGRAM(s) Oral daily  levothyroxine 150 MICROGram(s) Oral daily  metoprolol succinate ER 25 milliGRAM(s) Oral daily  tamsulosin 0.8 milliGRAM(s) Oral at bedtime    MEDICATIONS  (PRN):  acetaminophen     Tablet .. 650 milliGRAM(s) Oral every 6 hours PRN Mild Pain (1 - 3)  aluminum hydroxide/magnesium hydroxide/simethicone Suspension 30 milliLiter(s) Oral every 4 hours PRN Dyspepsia  lactulose Syrup 20 Gram(s) Oral three times a day PRN for constipation  melatonin 3 milliGRAM(s) Oral at bedtime PRN Insomnia  ondansetron Injectable 4 milliGRAM(s) IV Push every 6 hours PRN Nausea and/or Vomiting  oxyCODONE    IR 5 milliGRAM(s) Oral every 6 hours PRN Moderate Pain (4 - 6)        T(C): , Max: 36.7 (03-14-25 @ 00:51)  T(F): , Max: 98.1 (03-14-25 @ 00:51)  HR: 78 (03-14-25 @ 15:06)  BP: 108/73 (03-14-25 @ 15:06)  BP(mean): --  RR: 18 (03-14-25 @ 15:06)  SpO2: 98% (03-14-25 @ 15:06)  Wt(kg): --    03-13 @ 07:01  -  03-14 @ 07:00  --------------------------------------------------------  IN: 0 mL / OUT: 1300 mL / NET: -1300 mL    03-14 @ 07:01  -  03-14 @ 19:20  --------------------------------------------------------  IN: 0 mL / OUT: 600 mL / NET: -600 mL          PHYSICAL EXAM:    Constitutional:  mMM  Neck: No LAD, No JVD  Respiratory: dist  Cardiovascular: S1 and S2,   Extremities: improving peripheral edema  Neurological: A/O x 3      LABS:                        8.5    10.84 )-----------( 274      ( 14 Mar 2025 06:05 )             26.1     14 Mar 2025 06:05    136    |  97     |  38     ----------------------------<  124    3.5     |  34     |  1.30   13 Mar 2025 05:18    137    |  99     |  39     ----------------------------<  117    3.5     |  33     |  1.23   12 Mar 2025 04:23    135    |  99     |  39     ----------------------------<  119    3.8     |  30     |  1.27   11 Mar 2025 05:19    135    |  101    |  43     ----------------------------<  128    4.3     |  28     |  1.41     Ca    9.2        14 Mar 2025 06:05  Ca    9.0        13 Mar 2025 05:18  Ca    8.8        12 Mar 2025 04:23  Ca    8.8        11 Mar 2025 05:19  Phos  3.6       14 Mar 2025 06:05  Phos  3.7       13 Mar 2025 05:18  Phos  3.2       12 Mar 2025 04:23  Phos  4.0       11 Mar 2025 05:19  Mg     1.7       14 Mar 2025 06:05  Mg     1.6       13 Mar 2025 05:18  Mg     1.8       12 Mar 2025 04:23  Mg     1.8       11 Mar 2025 05:19    TPro  6.2    /  Alb  2.9    /  TBili  0.9    /  DBili  x      /  AST  80     /  ALT  141    /  AlkPhos  64     14 Mar 2025 06:05  TPro  6.1    /  Alb  2.7    /  TBili  1.0    /  DBili  x      /  AST  101    /  ALT  140    /  AlkPhos  64     13 Mar 2025 05:18  TPro  5.9    /  Alb  2.5    /  TBili  1.0    /  DBili  0.3    /  AST  117    /  ALT  155    /  AlkPhos  67     12 Mar 2025 04:23  TPro  5.7    /  Alb  2.3    /  TBili  0.8    /  DBili  x      /  AST  179    /  ALT  169    /  AlkPhos  68     11 Mar 2025 05:19          Urine Studies:  Urinalysis Basic - ( 14 Mar 2025 06:05 )    Color: x / Appearance: x / SG: x / pH: x  Gluc: 124 mg/dL / Ketone: x  / Bili: x / Urobili: x   Blood: x / Protein: x / Nitrite: x   Leuk Esterase: x / RBC: x / WBC x   Sq Epi: x / Non Sq Epi: x / Bacteria: x            RADIOLOGY & ADDITIONAL STUDIES:

## 2025-03-14 NOTE — PROGRESS NOTE ADULT - SUBJECTIVE AND OBJECTIVE BOX
HOSPITALIST ATTENDING PROGRESS NOTE    Chart and meds reviewed.  Patient seen and examined.    CC: edema/ARF    Subjective: lower extremity edema slowly improving (L>R); no trouble breathing.  strength poor.     All other systems reviewed and found to be negative with the exception of what has been described above.    MEDICATIONS  (STANDING):  albumin human 25% IVPB 100 milliLiter(s) IV Intermittent every 12 hours  buMETAnide Injectable 2 milliGRAM(s) IV Push every 12 hours  cefTRIAXone Injectable. 2000 milliGRAM(s) IV Push every 24 hours  enoxaparin Injectable 110 milliGRAM(s) SubCutaneous every 12 hours  levothyroxine 150 MICROGram(s) Oral daily  metoprolol succinate ER 25 milliGRAM(s) Oral daily  tamsulosin 0.8 milliGRAM(s) Oral at bedtime    MEDICATIONS  (PRN):  acetaminophen     Tablet .. 650 milliGRAM(s) Oral every 6 hours PRN Mild Pain (1 - 3)  aluminum hydroxide/magnesium hydroxide/simethicone Suspension 30 milliLiter(s) Oral every 4 hours PRN Dyspepsia  lactulose Syrup 20 Gram(s) Oral three times a day PRN for constipation  melatonin 3 milliGRAM(s) Oral at bedtime PRN Insomnia  ondansetron Injectable 4 milliGRAM(s) IV Push every 6 hours PRN Nausea and/or Vomiting      PHYSICAL EXAM:  Gen: NAD  CV:  +S1, +S2, regular, no murmurs or rubs  RESP:   lungs clear to auscultation bilaterally, no wheezing, rales, rhonchi, good air entry bilaterally  GI:  abdomen soft, non-tender, non-distended, normal BS, no bruits, no abdominal masses, no palpable masses  :  not examined  MSK:   normal muscle tone, no atrophy, no rigidity, no contractions  EXT:  b/l pitting edema (L>R); up to hip on left and up to mid shin on right - notably DVT in left   NEURO:  AAOX3, moves all extremities; no sensory deficits;  strength b/l weak - unable to wrap index finger into fist    LABS:                            8.5    10.84 )-----------( 274      ( 14 Mar 2025 06:05 )             26.1     03-14    136  |  97  |  38[H]  ----------------------------<  124[H]  3.5   |  34[H]  |  1.30    Ca    9.2      14 Mar 2025 06:05  Phos  3.6     03-14  Mg     1.7     03-14    TPro  6.2  /  Alb  2.9[L]  /  TBili  0.9  /  DBili  x   /  AST  80[H]  /  ALT  141[H]  /  AlkPhos  64  03-14        LIVER FUNCTIONS - ( 14 Mar 2025 06:05 )  Alb: 2.9 g/dL / Pro: 6.2 gm/dL / ALK PHOS: 64 U/L / ALT: 141 U/L / AST: 80 U/L / GGT: x             Urinalysis Basic - ( 14 Mar 2025 06:05 )    Color: x / Appearance: x / SG: x / pH: x  Gluc: 124 mg/dL / Ketone: x  / Bili: x / Urobili: x   Blood: x / Protein: x / Nitrite: x   Leuk Esterase: x / RBC: x / WBC x   Sq Epi: x / Non Sq Epi: x / Bacteria: x              CULTURES: reviewed

## 2025-03-14 NOTE — PROGRESS NOTE ADULT - ASSESSMENT
#GOVIND on CKD  #LE edema  #hypoalbuminemia  #Proteinuria  cr improving with diuresis   discussed with lala c/w iv buymex 2mg iv bid with albumin     #DVT   on lovenox BID   last dose saturday night for MONDAY surgery  - could not tolerate VQ scan for r/o PE given pain/hx spinal surgeries  repeat doppler with improvement in dvt - no progression    #Spinal epidural abscess POA and already being treated with IV antibiotics with suspected worsening of disease  Will be undergoing T1-3 Laminectomy with decompression and fusion with connection / extension to prior Cervical hardware.   - PICC w CTX for 8 week course to finish 4/16/25    #mild rhabdo with elevated LFTS   possible 2/2 intravascular depletion with statin use   hold statin   benefits outweight risks to continue diuresis   ck trending down     #CAD  - c/w metoprolol, statin    #HTN  - c/w metoprolol  - titrate Rx accordingly    #HLD  - c/w statin    #anemia   possible dilutional   low iron - start po iron  multifactorial given recent ARF, iron deficiency and sepsis   check ldh haptoglobin and retic for completeness  keep type and screen active     #BPH  #Urinary retention likely augmented by degree of anasarca  - gupta placed during prior admission  - TOV  ->failed. Gupta re-inserted 3/6->fell out without knowing with associated hematuria->recurrent retention 3/7->Gupta re-inserted (3/7)    #pre-op Eval   CXR - clear   EKG - NSR; repeat pending   TTE last month with normal ef normal diastolic function; interatrial aneuysm and mild pulm htn - cardio clearance requested   will repeat TTE for recheck pulm arterial pressure and check for RV strain  RCRI 1   medically, pt is moderate risk for high risk procedure given anemia, dvt without full eval of PE, and significant lower extremity anasarca  full clearance pending cardio and repeat TTE     VTE: fully anticoagulated with lovenox

## 2025-03-14 NOTE — PROGRESS NOTE ADULT - ASSESSMENT
74-year-old male with a past medical history of benign prostatic hyperplasia, diverticulosis, Graves' disease, hemorrhoids, hyperlipidemia, hypothyroidism, osteoarthritis, spinal stenosis, spinal cord injury (2004), and multiple spinal surgeries w extremity swelling and abnormal BUN/creatinine levels.     Edema/Anasarca     - Optimize intake/protein stores    - Monitor labs closely w ongoing diuresis Bumex IV + SPA  - Daily  weights   standing scale- advised   - I/O's   -^If co2 continues to rise then add diamox or lower bumex dosing     Nephrotic range proteinuria 3.4 grams    - fup on serologies - neg so far  - fup pla2  - diuretics as above  -No repeat urine done?    Mild Rhabdo with elevated LFT - CPK trending down    sec to dehydration and Statin use while on diuretics   Statin  DC'd    d/w staff, AP  Dr Morin to cover weekend

## 2025-03-15 ENCOUNTER — RESULT REVIEW (OUTPATIENT)
Age: 75
End: 2025-03-15

## 2025-03-15 DIAGNOSIS — Z01.818 ENCOUNTER FOR OTHER PREPROCEDURAL EXAMINATION: ICD-10-CM

## 2025-03-15 LAB
ALBUMIN SERPL ELPH-MCNC: 3 G/DL — LOW (ref 3.3–5)
ALP SERPL-CCNC: 62 U/L — SIGNIFICANT CHANGE UP (ref 40–120)
ALT FLD-CCNC: 121 U/L — HIGH (ref 12–78)
ANION GAP SERPL CALC-SCNC: 5 MMOL/L — SIGNIFICANT CHANGE UP (ref 5–17)
AST SERPL-CCNC: 58 U/L — HIGH (ref 15–37)
BILIRUB SERPL-MCNC: 0.8 MG/DL — SIGNIFICANT CHANGE UP (ref 0.2–1.2)
BLD GP AB SCN SERPL QL: SIGNIFICANT CHANGE UP
BUN SERPL-MCNC: 36 MG/DL — HIGH (ref 7–23)
CALCIUM SERPL-MCNC: 9.4 MG/DL — SIGNIFICANT CHANGE UP (ref 8.5–10.1)
CHLORIDE SERPL-SCNC: 97 MMOL/L — SIGNIFICANT CHANGE UP (ref 96–108)
CK SERPL-CCNC: 297 U/L — SIGNIFICANT CHANGE UP (ref 26–308)
CO2 SERPL-SCNC: 35 MMOL/L — HIGH (ref 22–31)
CREAT SERPL-MCNC: 1.23 MG/DL — SIGNIFICANT CHANGE UP (ref 0.5–1.3)
EGFR: 62 ML/MIN/1.73M2 — SIGNIFICANT CHANGE UP
EGFR: 62 ML/MIN/1.73M2 — SIGNIFICANT CHANGE UP
GLUCOSE SERPL-MCNC: 125 MG/DL — HIGH (ref 70–99)
HAPTOGLOB SERPL-MCNC: 268 MG/DL — HIGH (ref 34–200)
HCT VFR BLD CALC: 27.2 % — LOW (ref 39–50)
HGB BLD-MCNC: 8.8 G/DL — LOW (ref 13–17)
MCHC RBC-ENTMCNC: 28.2 PG — SIGNIFICANT CHANGE UP (ref 27–34)
MCHC RBC-ENTMCNC: 32.4 G/DL — SIGNIFICANT CHANGE UP (ref 32–36)
MCV RBC AUTO: 87.2 FL — SIGNIFICANT CHANGE UP (ref 80–100)
NRBC # BLD AUTO: 0 K/UL — SIGNIFICANT CHANGE UP (ref 0–0)
NRBC # FLD: 0 K/UL — SIGNIFICANT CHANGE UP (ref 0–0)
NRBC BLD AUTO-RTO: 0 /100 WBCS — SIGNIFICANT CHANGE UP (ref 0–0)
PHOSPHOLIPASE A2 RECEPTOR ELISA: <1.8 RU/ML — SIGNIFICANT CHANGE UP (ref 0–19.9)
PHOSPHOLIPASE A2 RECEPTOR ELISA: <1.8 RU/ML — SIGNIFICANT CHANGE UP (ref 0–19.9)
PLATELET # BLD AUTO: 270 K/UL — SIGNIFICANT CHANGE UP (ref 150–400)
PMV BLD: 9 FL — SIGNIFICANT CHANGE UP (ref 7–13)
POTASSIUM SERPL-MCNC: 3.2 MMOL/L — LOW (ref 3.5–5.3)
POTASSIUM SERPL-SCNC: 3.2 MMOL/L — LOW (ref 3.5–5.3)
PROT SERPL-MCNC: 6.6 GM/DL — SIGNIFICANT CHANGE UP (ref 6–8.3)
RBC # BLD: 3.12 M/UL — LOW (ref 4.2–5.8)
RBC # FLD: 13.8 % — SIGNIFICANT CHANGE UP (ref 10.3–14.5)
SODIUM SERPL-SCNC: 137 MMOL/L — SIGNIFICANT CHANGE UP (ref 135–145)
WBC # BLD: 9.28 K/UL — SIGNIFICANT CHANGE UP (ref 3.8–10.5)
WBC # FLD AUTO: 9.28 K/UL — SIGNIFICANT CHANGE UP (ref 3.8–10.5)

## 2025-03-15 PROCEDURE — 93321 DOPPLER ECHO F-UP/LMTD STD: CPT | Mod: 26

## 2025-03-15 PROCEDURE — 93010 ELECTROCARDIOGRAM REPORT: CPT

## 2025-03-15 PROCEDURE — 99223 1ST HOSP IP/OBS HIGH 75: CPT | Mod: FS

## 2025-03-15 PROCEDURE — 99233 SBSQ HOSP IP/OBS HIGH 50: CPT

## 2025-03-15 PROCEDURE — 93308 TTE F-UP OR LMTD: CPT | Mod: 26

## 2025-03-15 PROCEDURE — 93325 DOPPLER ECHO COLOR FLOW MAPG: CPT | Mod: 26

## 2025-03-15 RX ADMIN — CEFTRIAXONE 2000 MILLIGRAM(S): 500 INJECTION, POWDER, FOR SOLUTION INTRAMUSCULAR; INTRAVENOUS at 21:21

## 2025-03-15 RX ADMIN — METOPROLOL SUCCINATE 25 MILLIGRAM(S): 50 TABLET, EXTENDED RELEASE ORAL at 09:49

## 2025-03-15 RX ADMIN — BUMETANIDE 2 MILLIGRAM(S): 1 TABLET ORAL at 11:35

## 2025-03-15 RX ADMIN — Medication 150 MICROGRAM(S): at 05:57

## 2025-03-15 RX ADMIN — ENOXAPARIN SODIUM 110 MILLIGRAM(S): 100 INJECTION SUBCUTANEOUS at 09:50

## 2025-03-15 RX ADMIN — TAMSULOSIN HYDROCHLORIDE 0.8 MILLIGRAM(S): 0.4 CAPSULE ORAL at 21:21

## 2025-03-15 RX ADMIN — Medication 325 MILLIGRAM(S): at 09:49

## 2025-03-15 RX ADMIN — OXYCODONE HYDROCHLORIDE 5 MILLIGRAM(S): 30 TABLET ORAL at 16:21

## 2025-03-15 RX ADMIN — Medication 40 MILLIEQUIVALENT(S): at 09:49

## 2025-03-15 RX ADMIN — Medication 40 MILLIEQUIVALENT(S): at 14:18

## 2025-03-15 RX ADMIN — ALBUMIN (HUMAN) 50 MILLILITER(S): 12.5 INJECTION, SOLUTION INTRAVENOUS at 09:50

## 2025-03-15 RX ADMIN — LACTULOSE 20 GRAM(S): 10 SOLUTION ORAL at 09:49

## 2025-03-15 RX ADMIN — ENOXAPARIN SODIUM 110 MILLIGRAM(S): 100 INJECTION SUBCUTANEOUS at 21:21

## 2025-03-15 RX ADMIN — Medication 3 MILLIGRAM(S): at 21:21

## 2025-03-15 RX ADMIN — OXYCODONE HYDROCHLORIDE 5 MILLIGRAM(S): 30 TABLET ORAL at 17:00

## 2025-03-15 RX ADMIN — Medication 40 MILLIEQUIVALENT(S): at 18:08

## 2025-03-15 NOTE — PROGRESS NOTE ADULT - ASSESSMENT
#GOVIND on CKD  #LE edema  #hypoalbuminemia  #Proteinuria  cr improving with diuresis   discussed with lala c/w iv buymex 2mg iv bid with albumin     #DVT   on lovenox BID   last dose saturday night for MONDAY surgery  - could not tolerate VQ scan for r/o PE given pain/hx spinal surgeries  repeat doppler with improvement in dvt - no progression    #Spinal epidural abscess POA and already being treated with IV antibiotics with suspected worsening of disease  Will be undergoing T1-3 Laminectomy with decompression and fusion with connection / extension to prior Cervical hardware.   - PICC w CTX for 8 week course to finish 4/16/25    #mild rhabdo with elevated LFTS   possible 2/2 intravascular depletion with statin use   hold statin   benefits outweight risks to continue diuresis   ck trending down     #CAD  - c/w metoprolol, statin    #HTN  - c/w metoprolol  - titrate Rx accordingly    #HLD  - c/w statin    #anemia   possible dilutional   low iron - start po iron  multifactorial given recent ARF, iron deficiency and sepsis   check ldh haptoglobin and retic for completeness  keep type and screen active     #BPH  #Urinary retention likely augmented by degree of anasarca  - gupta placed during prior admission  - TOV  ->failed. Gupta re-inserted 3/6->fell out without knowing with associated hematuria->recurrent retention 3/7->Gupta re-inserted (3/7)    #pre-op Eval   CXR - clear   EKG - NSR no acute changes from prior  TTE last month with normal ef normal diastolic function; interatrial aneuysm and mild pulm htn - cardio clearance requested   will repeat TTE for recheck pulm arterial pressure and check for RV strain  RCRI 1   prior to lower ext edema, pt able to walk 4 miles uninterrupted Mets:>4   medically, pt is moderate risk for high risk procedure given anemia, dvt without full eval of PE, and significant lower extremity anasarca  full clearance pending cardio and repeat TTE - performed pending read  await TTE and cardio clearance - cardio aware  if no rv strain on echo, stable or improved pulm htn on echo and cardio clears, pt may proceed at moderate but not prohibitive risk     VTE: fully anticoagulated with lovenox - last dose tonight       #GOVIND on CKD  #LE edema  #hypoalbuminemia  #Proteinuria  cr improving with diuresis   discussed with lala c/w iv buymex 2mg iv bid with albumin     #DVT   on lovenox BID   last dose saturday night for MONDAY surgery  - could not tolerate VQ scan for r/o PE given pain/hx spinal surgeries  repeat doppler with improvement in dvt - no progression    #Spinal epidural abscess POA and already being treated with IV antibiotics with suspected worsening of disease  Will be undergoing T1-3 Laminectomy with decompression and fusion with connection / extension to prior Cervical hardware.   - PICC w CTX for 8 week course to finish 4/16/25    #mild rhabdo (resolved) with elevated LFTS   possible 2/2 intravascular depletion with statin use   hold statin   benefits outweight risks to continue diuresis   ck trending down - now normal    #CAD  - c/w metoprolol, statin    #HTN  - c/w metoprolol  - titrate Rx accordingly    #HLD  - c/w statin    #anemia   possible dilutional   low iron - start po iron  multifactorial given recent ARF, iron deficiency and sepsis   check ldh haptoglobin and retic for completeness  keep type and screen active     #BPH  #Urinary retention likely augmented by degree of anasarca  - gupta placed during prior admission  - TOV  ->failed. Gupta re-inserted 3/6->fell out without knowing with associated hematuria->recurrent retention 3/7->Gupta re-inserted (3/7)    #hypokalemia   repleted    #pre-op Eval   CXR - clear   EKG - NSR no acute changes from prior  TTE last month with normal ef normal diastolic function; interatrial aneuysm and mild pulm htn - cardio clearance requested   will repeat TTE for recheck pulm arterial pressure and check for RV strain  RCRI 1   prior to lower ext edema, pt able to walk 4 miles uninterrupted Mets:>4   medically, pt is moderate risk for high risk procedure given anemia, dvt without full eval of PE, and significant lower extremity anasarca  full clearance pending cardio and repeat TTE - performed pending read  await TTE and cardio clearance - cardio aware  if no rv strain on echo, stable or improved pulm htn on echo and cardio clears, pt may proceed at moderate but not prohibitive risk   with anemia stable, leukocytosis resolved, rhabdo resolved, govind significantly improved, pt is medically optimized pending above    VTE: fully anticoagulated with lovenox - last dose tonight       #GOVIND on CKD  #LE edema  #hypoalbuminemia  #Proteinuria  cr improving with diuresis   discussed with lala c/w iv buymex 2mg iv bid with albumin     #DVT   on lovenox BID   last dose saturday night for MONDAY surgery  - could not tolerate VQ scan for r/o PE given pain/hx spinal surgeries  repeat doppler with improvement in dvt - no progression    #Spinal epidural abscess POA and already being treated with IV antibiotics with suspected worsening of disease  Will be undergoing T1-3 Laminectomy with decompression and fusion with connection / extension to prior Cervical hardware.   - PICC w CTX for 8 week course to finish 4/16/25    #mild rhabdo (resolved) with elevated LFTS   possible 2/2 intravascular depletion with statin use   hold statin   benefits outweight risks to continue diuresis   ck trending down - now normal    #CAD  - c/w metoprolol, statin    #HTN  - c/w metoprolol  - titrate Rx accordingly    #HLD  - c/w statin    #anemia   possible dilutional   low iron - start po iron  multifactorial given recent ARF, iron deficiency and sepsis   check ldh haptoglobin and retic for completeness  keep type and screen active     #BPH  #Urinary retention likely augmented by degree of anasarca  - gupta placed during prior admission  - TOV  ->failed. Gupta re-inserted 3/6->fell out without knowing with associated hematuria->recurrent retention 3/7->Gupta re-inserted (3/7)    #hypokalemia   repleted    #pre-op Eval   CXR - clear   EKG - NSR no acute changes from prior  TTE last month with normal ef normal diastolic function; interatrial aneuysm and mild pulm htn - cardio clearance requested   will repeat TTE for recheck pulm arterial pressure and check for RV strain  RCRI 1   prior to lower ext edema, pt able to walk 4 miles uninterrupted Mets:>4   medically, pt is moderate risk for high risk procedure given anemia, dvt without full eval of PE, and significant lower extremity anasarca  full clearance pending cardio and repeat TTE - performed pending read  await TTE and cardio clearance - cardio aware  will also check CTA prior to surgery now that govind significantly improved; per Nephro, ok to hold bumex tonight and tomorrow am and check CTA tomorrow afternoon     VTE: fully anticoagulated with lovenox - last dose tonight       #GOVIND on CKD  #LE edema  #hypoalbuminemia  #Proteinuria  cr improving with diuresis   discussed with lala c/w iv buymex 2mg iv bid with albumin - hold next 2 doses prior to CTA    #DVT   on lovenox BID   last dose saturday night for MONDAY surgery  - could not tolerate VQ scan for r/o PE given pain/hx spinal surgeries  repeat doppler with improvement in dvt - no progression    #Spinal epidural abscess POA and already being treated with IV antibiotics with suspected worsening of disease  Will be undergoing T1-3 Laminectomy with decompression and fusion with connection / extension to prior Cervical hardware.   - PICC w CTX for 8 week course to finish 4/16/25    #mild rhabdo (resolved) with elevated LFTS   possible 2/2 intravascular depletion with statin use   hold statin   benefits outweight risks to continue diuresis   ck trending down - now normal    #CAD  - c/w metoprolol, statin    #HTN  - c/w metoprolol  - titrate Rx accordingly    #HLD  - c/w statin    #anemia   possible dilutional   low iron - start po iron  multifactorial given recent ARF, iron deficiency and sepsis   check ldh haptoglobin and retic for completeness  keep type and screen active     #BPH  #Urinary retention likely augmented by degree of anasarca  - gupta placed during prior admission  - TOV  ->failed. Gupta re-inserted 3/6->fell out without knowing with associated hematuria->recurrent retention 3/7->Gupta re-inserted (3/7)    #hypokalemia   repleted    #pre-op Eval   CXR - clear   EKG - NSR no acute changes from prior  TTE last month with normal ef normal diastolic function; interatrial aneuysm and mild pulm htn - cardio clearance requested   will repeat TTE for recheck pulm arterial pressure and check for RV strain  RCRI 1   prior to lower ext edema, pt able to walk 4 miles uninterrupted Mets:>4   Discussed with Dr. Gonzalez   full clearance pending cardio and repeat TTE - performed pending read  await TTE and cardio clearance  will also check CTA prior to surgery now that govind significantly improved; per Nephro, ok to hold bumex tonight and tomorrow am and check CTA tomorrow afternoon   pt states he will be able to lie flat for CTA now - feels his pain is well controlled now    VTE: fully anticoagulated with lovenox - last dose tonight

## 2025-03-15 NOTE — PROGRESS NOTE ADULT - SUBJECTIVE AND OBJECTIVE BOX
HOSPITALIST ATTENDING PROGRESS NOTE    Chart and meds reviewed.  Patient seen and examined.    CC: edema    Subjective: pt sitting in the chair. feels well. no acute complaints    All other systems reviewed and found to be negative with the exception of what has been described above.    MEDICATIONS  (STANDING):  albumin human 25% IVPB 100 milliLiter(s) IV Intermittent every 12 hours  buMETAnide Injectable 2 milliGRAM(s) IV Push every 12 hours  cefTRIAXone Injectable. 2000 milliGRAM(s) IV Push every 24 hours  enoxaparin Injectable 110 milliGRAM(s) SubCutaneous every 12 hours  ferrous    sulfate 325 milliGRAM(s) Oral daily  levothyroxine 150 MICROGram(s) Oral daily  metoprolol succinate ER 25 milliGRAM(s) Oral daily  potassium chloride    Tablet ER 40 milliEquivalent(s) Oral every 4 hours  tamsulosin 0.8 milliGRAM(s) Oral at bedtime    MEDICATIONS  (PRN):  acetaminophen     Tablet .. 650 milliGRAM(s) Oral every 6 hours PRN Mild Pain (1 - 3)  aluminum hydroxide/magnesium hydroxide/simethicone Suspension 30 milliLiter(s) Oral every 4 hours PRN Dyspepsia  lactulose Syrup 20 Gram(s) Oral three times a day PRN for constipation  melatonin 3 milliGRAM(s) Oral at bedtime PRN Insomnia  ondansetron Injectable 4 milliGRAM(s) IV Push every 6 hours PRN Nausea and/or Vomiting  oxyCODONE    IR 5 milliGRAM(s) Oral every 6 hours PRN Moderate Pain (4 - 6)      PHYSICAL EXAM:  Gen: NAD  CV:  +S1, +S2, regular, no murmurs or rubs  RESP:   lungs clear to auscultation bilaterally, no wheezing, rales, rhonchi, good air entry bilaterally  GI:  abdomen soft, non-tender, non-distended, normal BS, no bruits, no abdominal masses, no palpable masses  :  not examined  MSK:   normal muscle tone, no atrophy, no rigidity, no contractions  EXT:  b/l pitting edema (L>R); up to hip on left and up to mid shin on right - notably DVT in left   NEURO:  AAOX3, moves all extremities; no sensory deficits;  strength b/l weak - unable to wrap index finger into fist    LABS:                            8.8    9.28  )-----------( 270      ( 15 Mar 2025 07:25 )             27.2     03-15    137  |  97  |  36[H]  ----------------------------<  125[H]  3.2[L]   |  35[H]  |  1.23    Ca    9.4      15 Mar 2025 07:25  Phos  3.6     03-14  Mg     1.7     03-14    TPro  6.6  /  Alb  3.0[L]  /  TBili  0.8  /  DBili  x   /  AST  58[H]  /  ALT  121[H]  /  AlkPhos  62  03-15        LIVER FUNCTIONS - ( 15 Mar 2025 07:25 )  Alb: 3.0 g/dL / Pro: 6.6 gm/dL / ALK PHOS: 62 U/L / ALT: 121 U/L / AST: 58 U/L / GGT: x             Urinalysis Basic - ( 15 Mar 2025 07:25 )    Color: x / Appearance: x / SG: x / pH: x  Gluc: 125 mg/dL / Ketone: x  / Bili: x / Urobili: x   Blood: x / Protein: x / Nitrite: x   Leuk Esterase: x / RBC: x / WBC x   Sq Epi: x / Non Sq Epi: x / Bacteria: x

## 2025-03-15 NOTE — CONSULT NOTE ADULT - PROBLEM SELECTOR RECOMMENDATION 9
-intermediate risk surgery.  Reviewed chart - antibiotics v. surgery offered to pt.  -no cardiac symptoms.  ECG unremarkable. functional capacity in past 2 months >4 METs.  -recent DVT diagnosis noted. CT/VQ scan not performed as pt could not lie flat.  also had GOVIND. Cr now improved to normal.  -Prior Echo Feb '25 reviewed: normal EF.  mild AS, interatrial septal aneurysm are not concerns intraoperatively.    -Discussed w/ Dr. Dickson: agree w/ repeat echo to reassess RV to confirm no RV strain is present & no significant change in pulmonary pressures.  -We also discussed CT chest w/ IV contrast if pt's condition has improved & he can lie flat  if large thrombus burden in pulmonary artery, holding anticoag for surgery may be too risky.  -Await results of these two scans prior to clearing for surgery.

## 2025-03-15 NOTE — PROGRESS NOTE ADULT - SUBJECTIVE AND OBJECTIVE BOX
HPI:   74-year-old male with PMH of BPH, diverticulosis, Graves' disease, hemorrhoids, HLD, hypothyroidism, osteoarthritis, spinal stenosis, spinal cord injury (2004), and multiple spinal surgeries (three cervical and one lumbar). He presents with complaints of lower extremity swelling and abnormal BUN/creatinine levels. The patient was recently admitted to [facility] from 02/18/25 to 02/25/25 for sepsis in the setting of streptococcus dysgalactiae bacteremia and was followed by neurosurgery for cervicothoracic spine infection. He was discharged to [facility] with a PICC for IV ceftriaxone until 04/16/25. Documentation from the rehabilitation facility notes a decline in his functional status and dysfunction with activities of daily living. At the rehabilitation facility, he was noted to have worsening LE swelling and was started on torsemide. However, his Cr levels subsequently became elevated, leading to the discontinuation of torsemide. He was sent to the  ED for further evaluation of his LE swelling and elevated Cr levels. He denies any chest pain, dyspnea, orthopnea, PND.     Repeat MRI done this admission showed worsening discitis/osteomyelitis. Per ID, antibiotics alone will unlikely resolve this spinal infection and would consider surgical intervention if able to perform based on risk/benefit. After discussion with patient and offering surgical interventions vs continued medical management with antibiotics, patient wished to proceed with surgery. Of note, hospital course has been complicated by below the knee DVT, currently on therapeutic Lovenox, urinary retention requiring gupta, and significant LE anasarca.    3/15- Pt seen and examined at bedside, resting comfortably and in NAD. Reports chronic right sided spacticity from prior spinal cord injury/surgery. He has had progressive worsening in hand  and loss of dexterity over the last few months. Pending medical clearance for OR Monday for thoracic laminectomy and fusion for epidural abscess.       acetaminophen     Tablet .. 650 milliGRAM(s) Oral every 6 hours PRN  albumin human 25% IVPB 100 milliLiter(s) IV Intermittent every 12 hours  aluminum hydroxide/magnesium hydroxide/simethicone Suspension 30 milliLiter(s) Oral every 4 hours PRN  buMETAnide Injectable 2 milliGRAM(s) IV Push every 12 hours  cefTRIAXone Injectable. 2000 milliGRAM(s) IV Push every 24 hours  enoxaparin Injectable 110 milliGRAM(s) SubCutaneous every 12 hours  ferrous    sulfate 325 milliGRAM(s) Oral daily  lactulose Syrup 20 Gram(s) Oral three times a day PRN  levothyroxine 150 MICROGram(s) Oral daily  melatonin 3 milliGRAM(s) Oral at bedtime PRN  metoprolol succinate ER 25 milliGRAM(s) Oral daily  ondansetron Injectable 4 milliGRAM(s) IV Push every 6 hours PRN  oxyCODONE    IR 5 milliGRAM(s) Oral every 6 hours PRN  potassium chloride    Tablet ER 40 milliEquivalent(s) Oral every 4 hours  tamsulosin 0.8 milliGRAM(s) Oral at bedtime      Physical Exam:  Constitutional: Awake / alert  HEENT: PERRL, EOMI  Neck: Supple  Respiratory: Respirations non-labored  Cardiovascular: regular rate  Gastrointestinal: Soft, NT/ND  Extremities:  no edema  Musculoskeletal: no abnormal movements, diffuse LE edema   Skin: No rashes    Neurological Exam:  HF: AA&O x 3, follows commands, normal affect, speech fluent  CN: PERRL, EOMI, no NLFD, tongue midline  Motor:   	RUE: Deltoid 5/5, bicep 5/5, tricep 5/5, wrist ext 5/5,  4-/5, unable to fully flex fingers  	LUE: Deltoid 5/5, bicep 5/5, tricep 5/5, wrist ext 5/5,  4-/5, unable to fully flex fingers  	RLE: IP 2/5, Quad 2/5, EHL 5/5, Dorsi/plantarflexion 5/5 (proximal leg significantly limited by pain and edema)  	LLE: IP 4/5, Quad 4/5, EHL 5/5, Dorsi/plantarflexion 5/5 (some limitations 2/2 LE edema)  Sens: Intact to light touch except diminished in LLE  Reflexes: hyperreflexic RUE/RLE, diminished DTRs in LUE/LLE, +Right ankle clonus, +R babinski, eduardo negative b/l  Coord:  No dysmetria  Gait/Balance: Cannot test        Vital Signs Last 24 Hrs  T(C): 36.4 (15 Mar 2025 08:00), Max: 36.6 (14 Mar 2025 23:12)  T(F): 97.5 (15 Mar 2025 08:00), Max: 97.9 (14 Mar 2025 23:12)  HR: 77 (15 Mar 2025 08:00) (77 - 80)  BP: 110/75 (15 Mar 2025 11:33) (108/73 - 132/78)  BP(mean): --  RR: 18 (15 Mar 2025 08:00) (18 - 18)  SpO2: 96% (15 Mar 2025 08:00) (96% - 98%)    Parameters below as of 15 Mar 2025 08:00  Patient On (Oxygen Delivery Method): room air                              8.8    9.28  )-----------( 270      ( 15 Mar 2025 07:25 )             27.2       03-15    137  |  97  |  36[H]  ----------------------------<  125[H]  3.2[L]   |  35[H]  |  1.23    Ca    9.4      15 Mar 2025 07:25  Phos  3.6     03-14  Mg     1.7     03-14    TPro  6.6  /  Alb  3.0[L]  /  TBili  0.8  /  DBili  x   /  AST  58[H]  /  ALT  121[H]  /  AlkPhos  62  03-15          Radiology:  MR Thoracic Spine w/wo IV Cont (03.08.25 @ 12:24)   IMPRESSION: Worsening discitis/osteomyelitis is seen with epidural   phlegmon again seen unchanged though increased bilaterally and ventral,   this is consistent with paraspinal phlegmon though there is a possible   early abscess seenon the left side as described above.           HPI:   74-year-old male with PMH of BPH, diverticulosis, Graves' disease, hemorrhoids, HLD, hypothyroidism, osteoarthritis, spinal stenosis, spinal cord injury (2004), and multiple spinal surgeries (three cervical and one lumbar). He presents with complaints of lower extremity swelling and abnormal BUN/creatinine levels. The patient was recently admitted to [facility] from 02/18/25 to 02/25/25 for sepsis in the setting of streptococcus dysgalactiae bacteremia and was followed by neurosurgery for cervicothoracic spine infection. He was discharged to [facility] with a PICC for IV ceftriaxone until 04/16/25. Documentation from the rehabilitation facility notes a decline in his functional status and dysfunction with activities of daily living. At the rehabilitation facility, he was noted to have worsening LE swelling and was started on torsemide. However, his Cr levels subsequently became elevated, leading to the discontinuation of torsemide. He was sent to the  ED for further evaluation of his LE swelling and elevated Cr levels. He denies any chest pain, dyspnea, orthopnea, PND.     Repeat MRI done this admission showed worsening discitis/osteomyelitis. Per ID, antibiotics alone will unlikely resolve this spinal infection and would consider surgical intervention if able to perform based on risk/benefit. After discussion with patient and offering surgical interventions vs continued medical management with antibiotics, patient wished to proceed with surgery. Of note, hospital course has been complicated by below the knee DVT, currently on therapeutic Lovenox, urinary retention requiring gupta, and significant LE anasarca.    3/15- Pt seen and examined at bedside, resting comfortably and in NAD. Reports chronic right sided spacticity from prior spinal cord injury/surgery. He has had progressive worsening in hand  and loss of dexterity over the last few months. Pending medical clearance for OR Monday for thoracic laminectomy and fusion for epidural abscess.       acetaminophen     Tablet .. 650 milliGRAM(s) Oral every 6 hours PRN  albumin human 25% IVPB 100 milliLiter(s) IV Intermittent every 12 hours  aluminum hydroxide/magnesium hydroxide/simethicone Suspension 30 milliLiter(s) Oral every 4 hours PRN  buMETAnide Injectable 2 milliGRAM(s) IV Push every 12 hours  cefTRIAXone Injectable. 2000 milliGRAM(s) IV Push every 24 hours  enoxaparin Injectable 110 milliGRAM(s) SubCutaneous every 12 hours  ferrous    sulfate 325 milliGRAM(s) Oral daily  lactulose Syrup 20 Gram(s) Oral three times a day PRN  levothyroxine 150 MICROGram(s) Oral daily  melatonin 3 milliGRAM(s) Oral at bedtime PRN  metoprolol succinate ER 25 milliGRAM(s) Oral daily  ondansetron Injectable 4 milliGRAM(s) IV Push every 6 hours PRN  oxyCODONE    IR 5 milliGRAM(s) Oral every 6 hours PRN  potassium chloride    Tablet ER 40 milliEquivalent(s) Oral every 4 hours  tamsulosin 0.8 milliGRAM(s) Oral at bedtime      Physical Exam:  Constitutional: Awake / alert  HEENT: PERRL, EOMI  Neck: Supple  Respiratory: Respirations non-labored  Cardiovascular: regular rate  Gastrointestinal: Soft, NT/ND  Extremities:  no edema  Musculoskeletal: no abnormal movements, diffuse LE edema   Skin: No rashes    Neurological Exam:  HF: AA&O x 3, follows commands, normal affect, speech fluent  CN: PERRL, EOMI, no NLFD, tongue midline  Motor:   	RUE: Deltoid 5/5, bicep 5/5, tricep 5/5, wrist ext 5/5,  4-/5, unable to fully flex fingers  	LUE: Deltoid 5/5, bicep 5/5, tricep 5/5, wrist ext 5/5,  4-/5, unable to fully flex fingers  	RLE: IP 4/5, Quad 4/5, EHL 5/5, Dorsi/plantarflexion 5/5 (some limitations 2/2 LE edema)  	LLE: IP 2/5, Quad 1/5, EHL 5/5, Dorsi/plantarflexion 5/5 (proximal leg significantly limited by pain and edema)  Sens: Intact to light touch except diminished in LLE  Reflexes: hyperreflexic RUE/RLE, diminished DTRs in LUE/LLE, +Right ankle clonus, +R babinski, eduardo negative b/l  Coord:  No dysmetria  Gait/Balance: Cannot test        Vital Signs Last 24 Hrs  T(C): 36.4 (15 Mar 2025 08:00), Max: 36.6 (14 Mar 2025 23:12)  T(F): 97.5 (15 Mar 2025 08:00), Max: 97.9 (14 Mar 2025 23:12)  HR: 77 (15 Mar 2025 08:00) (77 - 80)  BP: 110/75 (15 Mar 2025 11:33) (108/73 - 132/78)  BP(mean): --  RR: 18 (15 Mar 2025 08:00) (18 - 18)  SpO2: 96% (15 Mar 2025 08:00) (96% - 98%)    Parameters below as of 15 Mar 2025 08:00  Patient On (Oxygen Delivery Method): room air                              8.8    9.28  )-----------( 270      ( 15 Mar 2025 07:25 )             27.2       03-15    137  |  97  |  36[H]  ----------------------------<  125[H]  3.2[L]   |  35[H]  |  1.23    Ca    9.4      15 Mar 2025 07:25  Phos  3.6     03-14  Mg     1.7     03-14    TPro  6.6  /  Alb  3.0[L]  /  TBili  0.8  /  DBili  x   /  AST  58[H]  /  ALT  121[H]  /  AlkPhos  62  03-15          Radiology:  MR Thoracic Spine w/wo IV Cont (03.08.25 @ 12:24)   IMPRESSION: Worsening discitis/osteomyelitis is seen with epidural   phlegmon again seen unchanged though increased bilaterally and ventral,   this is consistent with paraspinal phlegmon though there is a possible   early abscess seenon the left side as described above.

## 2025-03-15 NOTE — PROGRESS NOTE ADULT - ASSESSMENT
74 year old Male with noted history of multiple spinal surgeries, recent admission in February with noted streptococcus galactiae bacteremia and cervical spinal infection, now admitted for lower extremey edema.    Plan:  - Repeat MRI done this admission showed worsening discitis/osteomyelitis. Per ID, antibiotics alone will unlikely resolve this spinal infection and would consider surgical intervention if able to perform based on risk/benefit. After discussion with patient and offering surgical interventions vs continued medical management with antibiotics, patient wished to proceed with surgery.   - OR Monday for T1-3 laminectomies for decompression and extension of prior cervical fusion to thoracic spine  - Please Hold Lovenox after SATURDAY PM DOSE.     - Please document all necessary Medical clearance prior to procedure  - Abx as per ID recommendations    Discussed with Dr. Cantu  74 year old Male with noted history of multiple spinal surgeries, recent admission in February with noted streptococcus galactiae bacteremia and cervical spinal infection, now admitted for lower extremity edema.    Plan:  - Repeat MRI done this admission showed worsening discitis/osteomyelitis. Per ID, antibiotics alone will unlikely resolve this spinal infection and would consider surgical intervention if able to perform based on risk/benefit. After discussion with patient and offering surgical interventions vs continued medical management with antibiotics, patient wished to proceed with surgery.   - OR Monday for T1-3 laminectomies for decompression and extension of prior cervical fusion to thoracic spine  - Please Hold Lovenox after SATURDAY PM DOSE.     - Please document all necessary Medical clearance prior to procedure  - Abx as per ID recommendations    Discussed with Dr. Cantu

## 2025-03-15 NOTE — CONSULT NOTE ADULT - SUBJECTIVE AND OBJECTIVE BOX
CHIEF COMPLAINT:    HPI:  74-year-old male with a past medical history of benign prostatic hyperplasia, diverticulosis, Graves' disease, hemorrhoids, hyperlipidemia, hypothyroidism, osteoarthritis, spinal stenosis, spinal cord injury (2004), and multiple spinal surgeries (three cervical and one lumbar). He presents with complaints of lower extremity swelling and abnormal BUN/creatinine levels. The patient was recently admitted to [facility] from 02/18/25 to 02/25/25 for sepsis secondary to epidural abscess. He has a peripherally inserted central catheter line in the right arm and is on nightly Ceftriaxone treatment until 04/16/25. Following his discharge, he was transferred to [facility]. Documentation from the rehabilitation facility notes a decline in his functional status and dysfunction with activities of daily living. At the rehabilitation facility, he was noted to have worsening LE swelling and was started on torsemide. However, his Cr levels subsequently became elevated, leading to the discontinuation of torsemide. He was sent to the [facility] Emergency Department for further evaluation of his LE swelling and elevated Cr levels. He denies any chest pain, dyspnea, orthopnea, PND.     In ED VSS  CBC: WBC 10, H/H 12/36, Plt 235  CMP: Na 133, K 5.3, BUN/Cr 86/2.77, Glu 145, Albumin 2.8  Trops: wnl, Pro-BNP 1132  UA: Turbid, red, large blood, 47 wbcs, > 1900 rbcs.   US dopplers b/l LE: Acute deep venous thrombosis: below the knee.  DVT within the left soleal vein.  CT abdomen: No hydronephrosis or radiodense calculus identified bilaterally. Enlarged prostate, correlate with PSA level.  ECHO (2/18/25): Technically difficult image quality. Left ventricular systolic function is normal with an ejection fraction of 66 % by Meyers's method of disks. Trace tricuspid regurgitation. Estimated pulmonary artery systolic pressure is 35 mmHg, consistent with mild pulmonary hypertension. Interatrial septum is aneurysmal. The peak transaortic velocity is 2.29 m/s, peak transaortic gradient is 21.0 mmHg and mean transaortic gradient is 12.0 mmHg with an LVOT/aortic valve VTI ratio of 0.42. The effective orifice area is estimated at 1.61 cm² by the continuity equation. Trileaflet aortic valve with reduced systolic excursion. There is moderate calcification of the aortic valve leaflets. Mild aortic stenosis.    ED intervention: patient started on hep gtt for DVT (04 Mar 2025 18:35)    consulted for preop clearance.  pt denies h/o CAD/stents/MI/CHF/afib.  prior to illness was walking 2 flights w/o problems.      PAST MEDICAL AND SURGICAL HISTORY:  PAST MEDICAL & SURGICAL HISTORY:  Hyperlipidemia, unspecified hyperlipidemia type      Diverticulosis of large intestine without hemorrhage      History of colon polyps      Hemorrhoids, unspecified hemorrhoid type      Benign prostatic hyperplasia without lower urinary tract symptoms, unspecified morphology      Osteoarthritis of knee, unilateral  left      Spinal cord injury at C5-C7 level without injury of spinal bone, subsequent encounter      Spastic      Weakness  to right side      Myelopathy      Spinal stenosis, unspecified spinal region      Hypothyroidism, unspecified type      Graves disease      Alcoholic  recovering alcoholic x 40years      History of cervical discectomy      S/P laminectomy  Cervical      H/O lumbar discectomy  with laminectomy      H/O arthroscopy of knee  Left x 2. Unsure of years      H/O colonoscopy with polypectomy  2014          ALLERGIES:  Allergies    No Known Allergies    Intolerances        SOCIAL HISTORY:  Social History:      FAMILY  HISTORY:  FAMILY HISTORY:  Family history of colon cancer in mother (Mother)    Family history of liver disease (Father)  father    Family history of cancer (Sibling)  SIster - bile duct        MEDICATIONS:  OUTPATIENT:  Home Medications:  acetaminophen 325 mg oral tablet: 2 tab(s) orally every 6 hours As needed Temp greater or equal to 38C (100.4F), Mild Pain (1 - 3) (04 Mar 2025 16:37)  Aspirin Low Dose 81 mg oral delayed release tablet: 1 tab(s) orally once a day (04 Mar 2025 16:37)  cefTRIAXone 2 g injection: 2 gram(s) intravenously once a day (at bedtime) for 7 weeks (04 Mar 2025 18:38)  Colace 100 mg oral capsule: 2 cap(s) orally once a day (at bedtime) (04 Mar 2025 18:38)  Crestor 5 mg oral tablet: 1 tab(s) orally once a day (at bedtime) (04 Mar 2025 16:32)  Gemtesa 75 mg oral tablet: 1 tab(s) orally once a day (at bedtime) (04 Mar 2025 16:37)  lactulose 10 g/15 mL oral syrup: 30 milliliter(s) orally 3 times a day as needed for  constipation (04 Mar 2025 18:38)  levothyroxine 150 mcg (0.15 mg) oral tablet: 1 tab(s) orally once a day ***Dr. Castillo shows 175mcg filled last on 12/18/24, List from [facility] confirms pt&#x27;s current dose is 150mcg*** (04 Mar 2025 16:38)  melatonin 5 mg oral tablet: 2 tab(s) orally once a day (at bedtime) (04 Mar 2025 18:38)  metoprolol succinate 25 mg oral tablet, extended release: 1 tab(s) orally once a day (04 Mar 2025 18:38)  tamsulosin 0.4 mg oral capsule: 2 cap(s) orally once a day (at bedtime) (04 Mar 2025 16:33)  tiZANidine 2 mg oral capsule: 1 cap(s) orally every 8 hours (04 Mar 2025 16:36)  torsemide 20 mg oral tablet: 1 tab(s) orally once a day for 6 days (04 Mar 2025 18:38)  traMADol 50 mg oral tablet: 1 tab(s) orally every 6 hours for 14 days (04 Mar 2025 18:38)  Vitamin D3 2000 intl units oral capsule: 1 cap(s) orally once a day (04 Mar 2025 16:37)      INPATIENT:  MEDICATIONS  (STANDING):  albumin human 25% IVPB 100 milliLiter(s) IV Intermittent every 12 hours  buMETAnide Injectable 2 milliGRAM(s) IV Push every 12 hours  cefTRIAXone Injectable. 2000 milliGRAM(s) IV Push every 24 hours  enoxaparin Injectable 110 milliGRAM(s) SubCutaneous every 12 hours  ferrous    sulfate 325 milliGRAM(s) Oral daily  levothyroxine 150 MICROGram(s) Oral daily  metoprolol succinate ER 25 milliGRAM(s) Oral daily  potassium chloride    Tablet ER 40 milliEquivalent(s) Oral every 4 hours  tamsulosin 0.8 milliGRAM(s) Oral at bedtime    MEDICATIONS  (PRN):  acetaminophen     Tablet .. 650 milliGRAM(s) Oral every 6 hours PRN Mild Pain (1 - 3)  aluminum hydroxide/magnesium hydroxide/simethicone Suspension 30 milliLiter(s) Oral every 4 hours PRN Dyspepsia  lactulose Syrup 20 Gram(s) Oral three times a day PRN for constipation  melatonin 3 milliGRAM(s) Oral at bedtime PRN Insomnia  ondansetron Injectable 4 milliGRAM(s) IV Push every 6 hours PRN Nausea and/or Vomiting  oxyCODONE    IR 5 milliGRAM(s) Oral every 6 hours PRN Moderate Pain (4 - 6)    MEDICATIONS  (PRN):  acetaminophen     Tablet .. 650 milliGRAM(s) Oral every 6 hours PRN Mild Pain (1 - 3)  aluminum hydroxide/magnesium hydroxide/simethicone Suspension 30 milliLiter(s) Oral every 4 hours PRN Dyspepsia  lactulose Syrup 20 Gram(s) Oral three times a day PRN for constipation  melatonin 3 milliGRAM(s) Oral at bedtime PRN Insomnia  ondansetron Injectable 4 milliGRAM(s) IV Push every 6 hours PRN Nausea and/or Vomiting  oxyCODONE    IR 5 milliGRAM(s) Oral every 6 hours PRN Moderate Pain (4 - 6)      REVIEW OF SYSTEMS:  ===============================  ===============================      Vital Signs Last 24 Hrs  T(C): 36.6 (15 Mar 2025 15:20), Max: 36.6 (14 Mar 2025 23:12)  T(F): 97.9 (15 Mar 2025 15:20), Max: 97.9 (14 Mar 2025 23:12)  HR: 73 (15 Mar 2025 15:20) (73 - 80)  BP: 121/69 (15 Mar 2025 15:20) (110/75 - 132/78)  BP(mean): --  RR: 18 (15 Mar 2025 15:20) (18 - 18)  SpO2: 97% (15 Mar 2025 15:20) (96% - 97%)    Parameters below as of 15 Mar 2025 15:20  Patient On (Oxygen Delivery Method): room air        I&O's Summary    14 Mar 2025 07:01  -  15 Mar 2025 07:00  --------------------------------------------------------  IN: 0 mL / OUT: 2950 mL / NET: -2950 mL    15 Mar 2025 07:01  -  15 Mar 2025 17:35  --------------------------------------------------------  IN: 0 mL / OUT: 900 mL / NET: -900 mL        I&O's Detail    14 Mar 2025 07:01  -  15 Mar 2025 07:00  --------------------------------------------------------  IN:  Total IN: 0 mL    OUT:    Indwelling Catheter - Urethral (mL): 2950 mL  Total OUT: 2950 mL    Total NET: -2950 mL      15 Mar 2025 07:01  -  15 Mar 2025 17:35  --------------------------------------------------------  IN:  Total IN: 0 mL    OUT:    Indwelling Catheter - Urethral (mL): 900 mL  Total OUT: 900 mL    Total NET: -900 mL          PHYSICAL EXAM:    Constitutional: NAD, awake   HEENT: PERR, EOMI,  No oral cyananosis.  Neck:  supple,  No JVD  Respiratory: Breath sounds are clear bilaterally, No wheezing, rales or rhonchi  Cardiovascular: S1 and S2, regular rate and rhythm, no Murmurs, gallops or rubs  Gastrointestinal: Bowel Sounds present, soft, nontender.   Extremities: No peripheral edema. No clubbing or cyanosis.  Vascular: 2+ peripheral pulses  Neurological: A/O x 3, no focal deficits      ===============================  ===============================  LABS:                         8.8    9.28  )-----------( 270      ( 15 Mar 2025 07:25 )             27.2                         8.5    10.84 )-----------( 274      ( 14 Mar 2025 06:05 )             26.1                         8.6    11.15 )-----------( 256      ( 13 Mar 2025 05:18 )             26.6     15 Mar 2025 07:25    137    |  97     |  36     ----------------------------<  125    3.2     |  35     |  1.23   14 Mar 2025 06:05    136    |  97     |  38     ----------------------------<  124    3.5     |  34     |  1.30   13 Mar 2025 05:18    137    |  99     |  39     ----------------------------<  117    3.5     |  33     |  1.23     Ca    9.4        15 Mar 2025 07:25  Ca    9.2        14 Mar 2025 06:05  Ca    9.0        13 Mar 2025 05:18  Phos  3.6       14 Mar 2025 06:05  Phos  3.7       13 Mar 2025 05:18  Mg     1.7       14 Mar 2025 06:05  Mg     1.6       13 Mar 2025 05:18    TPro  6.6    /  Alb  3.0    /  TBili  0.8    /  DBili  x      /  AST  58     /  ALT  121    /  AlkPhos  62     15 Mar 2025 07:25  TPro  6.2    /  Alb  2.9    /  TBili  0.9    /  DBili  x      /  AST  80     /  ALT  141    /  AlkPhos  64     14 Mar 2025 06:05  TPro  6.1    /  Alb  2.7    /  TBili  1.0    /  DBili  x      /  AST  101    /  ALT  140    /  AlkPhos  64     13 Mar 2025 05:18        THYROID STUDIES:    ===============================  ===============================  CARDIAC BIOMARKERS:  -------  -BNP VALUES:  Pro-Brain Natriuretic Peptide: 125 pg/mL (03.14.25 @ 17:19)   Pro-Brain Natriuretic Peptide: 136 pg/mL (03.11.25 @ 05:19)   Pro-Brain Natriuretic Peptide: 273 pg/mL (03.09.25 @ 05:14)   Pro-Brain Natriuretic Peptide: 1132 pg/mL (03.04.25 @ 15:12)     -------  -TROPONIN VALUES:   Troponin I, High Sensitivity Result: 15.62 ng/L (03-04-25 @ 15:12)  Troponin I, High Sensitivity Result: 70.14 ng/L (02-18-25 @ 07:10)  Troponin I, High Sensitivity Result: 79.44 ng/L *H* (02-18-25 @ 01:43)  Troponin I, High Sensitivity Result: 32.13 ng/L (02-17-25 @ 21:11)    ===============================  ===============================  EKG: NSR no ischemic changes.  ===============================  ===============================    < from: TTE W or WO Ultrasound Enhancing Agent (02.18.25 @ 12:28) >     CONCLUSIONS:      1. Left ventricular systolic function is normal with an ejection fraction of 66 % by Meyers's method of disks.   2. Trace tricuspid regurgitation.   3. Estimated pulmonary artery systolic pressure is 35 mmHg, consistent with mild pulmonary hypertension.   4. Interatrial septum is aneurysmal.   5. Technically difficultimage quality.   6. The peak transaortic velocity is 2.29 m/s, peak transaortic gradient is 21.0 mmHg and mean transaortic gradient is 12.0 mmHg with an LVOT/aortic valve VTI ratio of 0.42. The effective orifice area is estimated at 1.61 cm² by the continuity equation.   7. Trileaflet aortic valve with reduced systolic excursion. There is moderate calcification of the aortic valve leaflets. Mild aortic stenosis.  ===============================  ===============================

## 2025-03-16 LAB
ALBUMIN SERPL ELPH-MCNC: 3 G/DL — LOW (ref 3.3–5)
ALP SERPL-CCNC: 61 U/L — SIGNIFICANT CHANGE UP (ref 40–120)
ALT FLD-CCNC: 97 U/L — HIGH (ref 12–78)
ANION GAP SERPL CALC-SCNC: 3 MMOL/L — LOW (ref 5–17)
APPEARANCE UR: CLEAR — SIGNIFICANT CHANGE UP
AST SERPL-CCNC: 37 U/L — SIGNIFICANT CHANGE UP (ref 15–37)
BACTERIA # UR AUTO: NEGATIVE /HPF — SIGNIFICANT CHANGE UP
BILIRUB SERPL-MCNC: 0.7 MG/DL — SIGNIFICANT CHANGE UP (ref 0.2–1.2)
BILIRUB UR-MCNC: NEGATIVE — SIGNIFICANT CHANGE UP
BUN SERPL-MCNC: 38 MG/DL — HIGH (ref 7–23)
CALCIUM SERPL-MCNC: 9.3 MG/DL — SIGNIFICANT CHANGE UP (ref 8.5–10.1)
CAST: 3 /LPF — SIGNIFICANT CHANGE UP (ref 0–4)
CHLORIDE SERPL-SCNC: 98 MMOL/L — SIGNIFICANT CHANGE UP (ref 96–108)
CO2 SERPL-SCNC: 36 MMOL/L — HIGH (ref 22–31)
COLOR SPEC: YELLOW — SIGNIFICANT CHANGE UP
CREAT ?TM UR-MCNC: 92 MG/DL — SIGNIFICANT CHANGE UP
CREAT SERPL-MCNC: 1.2 MG/DL — SIGNIFICANT CHANGE UP (ref 0.5–1.3)
DIFF PNL FLD: ABNORMAL
EGFR: 64 ML/MIN/1.73M2 — SIGNIFICANT CHANGE UP
EGFR: 64 ML/MIN/1.73M2 — SIGNIFICANT CHANGE UP
GLUCOSE SERPL-MCNC: 120 MG/DL — HIGH (ref 70–99)
GLUCOSE UR QL: NEGATIVE MG/DL — SIGNIFICANT CHANGE UP
HCT VFR BLD CALC: 25.9 % — LOW (ref 39–50)
HGB BLD-MCNC: 8.3 G/DL — LOW (ref 13–17)
KETONES UR-MCNC: NEGATIVE MG/DL — SIGNIFICANT CHANGE UP
LEUKOCYTE ESTERASE UR-ACNC: NEGATIVE — SIGNIFICANT CHANGE UP
MAGNESIUM SERPL-MCNC: 1.8 MG/DL — SIGNIFICANT CHANGE UP (ref 1.6–2.6)
MCHC RBC-ENTMCNC: 28.1 PG — SIGNIFICANT CHANGE UP (ref 27–34)
MCHC RBC-ENTMCNC: 32 G/DL — SIGNIFICANT CHANGE UP (ref 32–36)
MCV RBC AUTO: 87.8 FL — SIGNIFICANT CHANGE UP (ref 80–100)
NITRITE UR-MCNC: NEGATIVE — SIGNIFICANT CHANGE UP
NRBC # BLD AUTO: 0 K/UL — SIGNIFICANT CHANGE UP (ref 0–0)
NRBC # FLD: 0 K/UL — SIGNIFICANT CHANGE UP (ref 0–0)
NRBC BLD AUTO-RTO: 0 /100 WBCS — SIGNIFICANT CHANGE UP (ref 0–0)
PH UR: 5.5 — SIGNIFICANT CHANGE UP (ref 5–8)
PLATELET # BLD AUTO: 273 K/UL — SIGNIFICANT CHANGE UP (ref 150–400)
PMV BLD: 8.9 FL — SIGNIFICANT CHANGE UP (ref 7–13)
POTASSIUM SERPL-MCNC: 3.7 MMOL/L — SIGNIFICANT CHANGE UP (ref 3.5–5.3)
POTASSIUM SERPL-SCNC: 3.7 MMOL/L — SIGNIFICANT CHANGE UP (ref 3.5–5.3)
PROT ?TM UR-MCNC: 20 MG/DL — HIGH (ref 0–12)
PROT SERPL-MCNC: 6.2 GM/DL — SIGNIFICANT CHANGE UP (ref 6–8.3)
PROT UR-MCNC: SIGNIFICANT CHANGE UP MG/DL
PROT/CREAT UR-RTO: 0.2 RATIO — SIGNIFICANT CHANGE UP (ref 0–0.2)
RBC # BLD: 2.95 M/UL — LOW (ref 4.2–5.8)
RBC # FLD: 14 % — SIGNIFICANT CHANGE UP (ref 10.3–14.5)
RBC CASTS # UR COMP ASSIST: 24 /HPF — HIGH (ref 0–4)
SODIUM SERPL-SCNC: 137 MMOL/L — SIGNIFICANT CHANGE UP (ref 135–145)
SP GR SPEC: >1.03 — HIGH (ref 1–1.03)
SQUAMOUS # UR AUTO: 1 /HPF — SIGNIFICANT CHANGE UP (ref 0–5)
UROBILINOGEN FLD QL: 1 MG/DL — SIGNIFICANT CHANGE UP (ref 0.2–1)
WBC # BLD: 9.44 K/UL — SIGNIFICANT CHANGE UP (ref 3.8–10.5)
WBC # FLD AUTO: 9.44 K/UL — SIGNIFICANT CHANGE UP (ref 3.8–10.5)
WBC UR QL: 1 /HPF — SIGNIFICANT CHANGE UP (ref 0–5)

## 2025-03-16 PROCEDURE — 99233 SBSQ HOSP IP/OBS HIGH 50: CPT | Mod: FS

## 2025-03-16 PROCEDURE — 99232 SBSQ HOSP IP/OBS MODERATE 35: CPT

## 2025-03-16 PROCEDURE — 71275 CT ANGIOGRAPHY CHEST: CPT | Mod: 26

## 2025-03-16 PROCEDURE — 99233 SBSQ HOSP IP/OBS HIGH 50: CPT

## 2025-03-16 RX ADMIN — Medication 325 MILLIGRAM(S): at 10:01

## 2025-03-16 RX ADMIN — OXYCODONE HYDROCHLORIDE 5 MILLIGRAM(S): 30 TABLET ORAL at 10:01

## 2025-03-16 RX ADMIN — OXYCODONE HYDROCHLORIDE 5 MILLIGRAM(S): 30 TABLET ORAL at 18:09

## 2025-03-16 RX ADMIN — OXYCODONE HYDROCHLORIDE 5 MILLIGRAM(S): 30 TABLET ORAL at 10:30

## 2025-03-16 RX ADMIN — Medication 3 MILLIGRAM(S): at 21:09

## 2025-03-16 RX ADMIN — OXYCODONE HYDROCHLORIDE 5 MILLIGRAM(S): 30 TABLET ORAL at 00:52

## 2025-03-16 RX ADMIN — CEFTRIAXONE 2000 MILLIGRAM(S): 500 INJECTION, POWDER, FOR SOLUTION INTRAMUSCULAR; INTRAVENOUS at 21:09

## 2025-03-16 RX ADMIN — Medication 150 MICROGRAM(S): at 05:13

## 2025-03-16 RX ADMIN — METOPROLOL SUCCINATE 25 MILLIGRAM(S): 50 TABLET, EXTENDED RELEASE ORAL at 10:01

## 2025-03-16 RX ADMIN — TAMSULOSIN HYDROCHLORIDE 0.8 MILLIGRAM(S): 0.4 CAPSULE ORAL at 21:09

## 2025-03-16 RX ADMIN — OXYCODONE HYDROCHLORIDE 5 MILLIGRAM(S): 30 TABLET ORAL at 01:35

## 2025-03-16 RX ADMIN — OXYCODONE HYDROCHLORIDE 5 MILLIGRAM(S): 30 TABLET ORAL at 19:00

## 2025-03-16 NOTE — PROGRESS NOTE ADULT - ASSESSMENT
74-year-old male with a past medical history of benign prostatic hyperplasia, diverticulosis, Graves' disease, hemorrhoids, hyperlipidemia, hypothyroidism, osteoarthritis, spinal stenosis, spinal cord injury (2004), and multiple spinal surgeries w extremity swelling and abnormal BUN/creatinine levels.     Edema/Anasarca     - Optimize intake/protein stores    - Monitor labs closely w ongoing diuresis Bumex IV + SPA  - Daily  weights   standing scale- advised   - I/O's   -^If co2 continues to rise then add diamox or lower bumex dosing     Nephrotic range proteinuria 3.4 grams    - fup on serologies - neg so far  - fup pla2  - diuretics as above  -No repeat urine done?    Mild Rhabdo with elevated LFT - CPK trending down    sec to dehydration and Statin use while on diuretics   Statin  DC'd    3/16 SY          74-year-old male with a past medical history of benign prostatic hyperplasia, diverticulosis, Graves' disease, hemorrhoids, hyperlipidemia, hypothyroidism, osteoarthritis, spinal stenosis, spinal cord injury (2004), and multiple spinal surgeries w extremity swelling and abnormal BUN/creatinine levels.     Edema/Anasarca     - Optimize intake/protein stores    - Monitor labs closely w ongoing diuresis Bumex IV + SPA  - Daily  weights   standing scale- advised   - I/O's   -^If co2 continues to rise then add diamox or lower bumex dosing     Nephrotic range proteinuria 3.4 grams    - fup on serologies - neg so far  - fup pla2  - diuretics as above  -No repeat urine done?    Mild Rhabdo with elevated LFT - CPK trending down    sec to dehydration and Statin use while on diuretics   Statin  DC'd    3/16 SY  --GOVIND :improved and stable.  --Edema with NRP of 3.4    All serologies negative including PLA2R ab.    No clear etiology for NRP.    Of note Serum  TRUDY positive for weak IgG Lambda band.  --Mild Rhabdo resolved.  Recheck urine prot/creat.  --Neurosurgery : planned for T1-3 laminectomies and extension of prior cervical fusion to thoracic spine.     Pt has DVT-- PE to be ruled out with absolute certainty for medical clearance for neuro surgery.--CTA of chest to be done today.     Explained to pt and family that need for neurosurgery intervention far outweighs the  risk of TYREE.     Diuretic and SPA discontinued --assess for resumption post surgery.  D/w Jaylin Alas and [place].    Jaylin Magallon/Chana to resume care 3/17

## 2025-03-16 NOTE — PROGRESS NOTE ADULT - SUBJECTIVE AND OBJECTIVE BOX
HOSPITALIST ATTENDING PROGRESS NOTE    Chart and meds reviewed.  Patient seen and examined.    CC: edema    Subjective: pt seen today, feels well, reports tingling/numbness to b/l upper thighs     All other systems reviewed and found to be negative with the exception of what has been described above.      MEDICATIONS  (STANDING):  cefTRIAXone Injectable. 2000 milliGRAM(s) IV Push every 24 hours  ferrous    sulfate 325 milliGRAM(s) Oral daily  levothyroxine 150 MICROGram(s) Oral daily  metoprolol succinate ER 25 milliGRAM(s) Oral daily  tamsulosin 0.8 milliGRAM(s) Oral at bedtime    MEDICATIONS  (PRN):  acetaminophen     Tablet .. 650 milliGRAM(s) Oral every 6 hours PRN Mild Pain (1 - 3)  aluminum hydroxide/magnesium hydroxide/simethicone Suspension 30 milliLiter(s) Oral every 4 hours PRN Dyspepsia  lactulose Syrup 20 Gram(s) Oral three times a day PRN for constipation  melatonin 3 milliGRAM(s) Oral at bedtime PRN Insomnia  ondansetron Injectable 4 milliGRAM(s) IV Push every 6 hours PRN Nausea and/or Vomiting  oxyCODONE    IR 5 milliGRAM(s) Oral every 6 hours PRN Moderate Pain (4 - 6)      Vital Signs Last 24 Hrs  T(C): 36.6 (16 Mar 2025 16:19), Max: 36.9 (16 Mar 2025 00:02)  T(F): 97.9 (16 Mar 2025 16:19), Max: 98.4 (16 Mar 2025 00:02)  HR: 70 (16 Mar 2025 16:19) (70 - 90)  BP: 135/71 (16 Mar 2025 16:19) (108/75 - 135/71)  BP(mean): 88 (16 Mar 2025 07:26) (88 - 88)  RR: 16 (16 Mar 2025 16:19) (16 - 19)  SpO2: 97% (16 Mar 2025 16:19) (94% - 97%)    Parameters below as of 16 Mar 2025 16:19  Patient On (Oxygen Delivery Method): room air      PHYSICAL EXAM:  GENERAL: NAD, lying in bed comfortably  HEAD:  Atraumatic, Normocephalic  EYES: conjunctiva and sclera clear  ENT: Moist mucous membranes  NECK: Supple, No JVD  CHEST/LUNG: Clear to auscultation bilaterally; No rales, rhonchi, wheezing. Unlabored respirations  HEART: Regular rate and rhythm; No murmurs  ABDOMEN: Bowel sounds present; Soft, Nontender, Nondistended.   EXTREMITIES:  2+ Peripheral Pulses, brisk capillary refill. No clubbing, cyanosis, + RLE edema, mild LLE edema   NERVOUS SYSTEM:  Alert & Oriented X3, speech clear. No deficits   MSK: FROM all 4 extremities, full and equal strength          LABS:                          8.3    9.44  )-----------( 273      ( 16 Mar 2025 06:59 )             25.9     16 Mar 2025 06:59    137    |  98     |  38     ----------------------------<  120    3.7     |  36     |  1.20     Ca    9.3        16 Mar 2025 06:59  Mg     1.8       16 Mar 2025 06:59    TPro  6.2    /  Alb  3.0    /  TBili  0.7    /  DBili  x      /  AST  37     /  ALT  97     /  AlkPhos  61     16 Mar 2025 06:59    LIVER FUNCTIONS - ( 16 Mar 2025 06:59 )  Alb: 3.0 g/dL / Pro: 6.2 gm/dL / ALK PHOS: 61 U/L / ALT: 97 U/L / AST: 37 U/L / GGT: x             CAPILLARY BLOOD GLUCOSE            Urinalysis Basic - ( 16 Mar 2025 06:59 )    Color: x / Appearance: x / SG: x / pH: x  Gluc: 120 mg/dL / Ketone: x  / Bili: x / Urobili: x   Blood: x / Protein: x / Nitrite: x   Leuk Esterase: x / RBC: x / WBC x   Sq Epi: x / Non Sq Epi: x / Bacteria: x        RADIOLOGY:      < from: CT Angio Chest PE Protocol w/ IV Cont (03.16.25 @ 16:47) >    FINDINGS:    LUNGS AND LARGEAIRWAYS: Patent central airways. No pulmonary   consolidation or suspicious nodules.  PLEURA: No pleural effusion.  VESSELS: No pulmonary emboli. No thoracic aortic aneurysm. Tip of right   PICC line at the cavoatrial junction.  HEART: Heart size is normal. Aortic valvular calcifications. No   pericardial effusion.  MEDIASTINUM AND JOSÉ: No lymphadenopathy.  CHEST WALL AND LOWER NECK: Within normal limits.  VISUALIZED UPPER ABDOMEN: A few hepatic cysts. Cholelithiasis. Nodular   thickening of theleft adrenal gland.  BONES: Degenerative changes.    IMPRESSION:  No pulmonary embolism.      < from: TTE W or WO Ultrasound Enhancing Agent (03.15.25 @ 12:38) >  CONCLUSIONS:      1. Left ventricular systolic function is normal with an ejection fraction of 65 % by Meyers's method of disks.   2. Normal right ventricular cavity size and normal right ventricular systolic function.   3. Mild tricuspid regurgitation.   4. Estimated pulmonary artery systolic pressure is 27 mmHg, consistent with normal pulmonary artery pressure.    < from: US Duplex Venous Lower Ext Complete, Bilateral (03.14.25 @ 09:55) >  IMPRESSION:  Acute deep venous thrombosis: below the knee.    Left calf vein deep venous thrombosis improved compared to prior   examination 3/6/2025    <     HOSPITALIST ATTENDING PROGRESS NOTE    Chart and meds reviewed.  Patient seen and examined.    CC: LE edema    Subjective: pt seen today, feels well, reports tingling/numbness to b/l upper thighs     All other systems reviewed and found to be negative with the exception of what has been described above.      MEDICATIONS  (STANDING):  cefTRIAXone Injectable. 2000 milliGRAM(s) IV Push every 24 hours  ferrous    sulfate 325 milliGRAM(s) Oral daily  levothyroxine 150 MICROGram(s) Oral daily  metoprolol succinate ER 25 milliGRAM(s) Oral daily  tamsulosin 0.8 milliGRAM(s) Oral at bedtime    MEDICATIONS  (PRN):  acetaminophen     Tablet .. 650 milliGRAM(s) Oral every 6 hours PRN Mild Pain (1 - 3)  aluminum hydroxide/magnesium hydroxide/simethicone Suspension 30 milliLiter(s) Oral every 4 hours PRN Dyspepsia  lactulose Syrup 20 Gram(s) Oral three times a day PRN for constipation  melatonin 3 milliGRAM(s) Oral at bedtime PRN Insomnia  ondansetron Injectable 4 milliGRAM(s) IV Push every 6 hours PRN Nausea and/or Vomiting  oxyCODONE    IR 5 milliGRAM(s) Oral every 6 hours PRN Moderate Pain (4 - 6)      Vital Signs Last 24 Hrs  T(C): 36.6 (16 Mar 2025 16:19), Max: 36.9 (16 Mar 2025 00:02)  T(F): 97.9 (16 Mar 2025 16:19), Max: 98.4 (16 Mar 2025 00:02)  HR: 70 (16 Mar 2025 16:19) (70 - 90)  BP: 135/71 (16 Mar 2025 16:19) (108/75 - 135/71)  BP(mean): 88 (16 Mar 2025 07:26) (88 - 88)  RR: 16 (16 Mar 2025 16:19) (16 - 19)  SpO2: 97% (16 Mar 2025 16:19) (94% - 97%)    Parameters below as of 16 Mar 2025 16:19  Patient On (Oxygen Delivery Method): room air      PHYSICAL EXAM:  GENERAL: NAD, lying in bed comfortably  HEAD:  Atraumatic, Normocephalic  EYES: conjunctiva and sclera clear  ENT: Moist mucous membranes  NECK: Supple, No JVD  CHEST/LUNG: Clear to auscultation bilaterally; No rales, rhonchi, wheezing. Unlabored respirations  HEART: Regular rate and rhythm; No murmurs  ABDOMEN: Bowel sounds present; Soft, Nontender, Nondistended.   EXTREMITIES:  2+ Peripheral Pulses, brisk capillary refill. No clubbing, cyanosis, + RLE edema, mild LLE edema   NERVOUS SYSTEM:  Alert & Oriented X3, speech clear. No deficits   MSK: FROM all 4 extremities, full and equal strength          LABS:                          8.3    9.44  )-----------( 273      ( 16 Mar 2025 06:59 )             25.9     16 Mar 2025 06:59    137    |  98     |  38     ----------------------------<  120    3.7     |  36     |  1.20     Ca    9.3        16 Mar 2025 06:59  Mg     1.8       16 Mar 2025 06:59    TPro  6.2    /  Alb  3.0    /  TBili  0.7    /  DBili  x      /  AST  37     /  ALT  97     /  AlkPhos  61     16 Mar 2025 06:59    LIVER FUNCTIONS - ( 16 Mar 2025 06:59 )  Alb: 3.0 g/dL / Pro: 6.2 gm/dL / ALK PHOS: 61 U/L / ALT: 97 U/L / AST: 37 U/L / GGT: x             CAPILLARY BLOOD GLUCOSE            Urinalysis Basic - ( 16 Mar 2025 06:59 )    Color: x / Appearance: x / SG: x / pH: x  Gluc: 120 mg/dL / Ketone: x  / Bili: x / Urobili: x   Blood: x / Protein: x / Nitrite: x   Leuk Esterase: x / RBC: x / WBC x   Sq Epi: x / Non Sq Epi: x / Bacteria: x        RADIOLOGY:      < from: CT Angio Chest PE Protocol w/ IV Cont (03.16.25 @ 16:47) >    FINDINGS:    LUNGS AND LARGEAIRWAYS: Patent central airways. No pulmonary   consolidation or suspicious nodules.  PLEURA: No pleural effusion.  VESSELS: No pulmonary emboli. No thoracic aortic aneurysm. Tip of right   PICC line at the cavoatrial junction.  HEART: Heart size is normal. Aortic valvular calcifications. No   pericardial effusion.  MEDIASTINUM AND JOSÉ: No lymphadenopathy.  CHEST WALL AND LOWER NECK: Within normal limits.  VISUALIZED UPPER ABDOMEN: A few hepatic cysts. Cholelithiasis. Nodular   thickening of theleft adrenal gland.  BONES: Degenerative changes.    IMPRESSION:  No pulmonary embolism.      < from: TTE W or WO Ultrasound Enhancing Agent (03.15.25 @ 12:38) >  CONCLUSIONS:      1. Left ventricular systolic function is normal with an ejection fraction of 65 % by Meyers's method of disks.   2. Normal right ventricular cavity size and normal right ventricular systolic function.   3. Mild tricuspid regurgitation.   4. Estimated pulmonary artery systolic pressure is 27 mmHg, consistent with normal pulmonary artery pressure.    < from: US Duplex Venous Lower Ext Complete, Bilateral (03.14.25 @ 09:55) >  IMPRESSION:  Acute deep venous thrombosis: below the knee.    Left calf vein deep venous thrombosis improved compared to prior   examination 3/6/2025    < from: US Duplex Venous Lower Ext Ltd, Left (03.06.25 @ 17:41) >    FINDINGS:    There is normal compressibility of the left common femoral, femoral and   popliteal veins.  The contralateral common femoral vein is patent.  DVT noted in the left gastrocnemius and soleal veins.    IMPRESSION:  DVT noted in the left gastrocnemius and soleal veins.      < from: US Duplex Venous Lower Ext Complete, Bilateral (03.04.25 @ 15:47) >  FINDINGS:    RIGHT:  Normal compressibility of the RIGHT common femoral, femoral and popliteal   veins.  Doppler examination shows normal spontaneous and phasic flow.  No RIGHT calf vein thrombosis is detected.    LEFT:  Normal compressibility of the LEFT common femoral, femoral and popliteal   veins.  Doppler examination shows normal spontaneous and phasic flow.  LEFT calf vein thrombosis is detected in the soleal vein.    There is bilateral lower extremity soft tissue edema.    IMPRESSION:  Acute deep venous thrombosis: below the knee.  DVT within the left soleal vein.

## 2025-03-16 NOTE — PROGRESS NOTE ADULT - SUBJECTIVE AND OBJECTIVE BOX
NEPHROLOGY INTERVAL HPI/OVERNIGHT EVENTS:    Date of Service: 25 @ 13:32    Covering for Jaylin Magallon/Chana    3/16--    HPI:     74-year-old male with a past medical history of benign prostatic hyperplasia, diverticulosis, Graves' disease, hemorrhoids, hyperlipidemia, hypothyroidism, osteoarthritis, spinal stenosis, spinal cord injury (), and multiple spinal surgeries (three cervical and one lumbar). He presents with complaints of lower extremity swelling and abnormal BUN/creatinine levels. The patient was recently admitted to [facility] from 25 to 25 for sepsis secondary to epidural abscess. At the rehabilitation facility, he was noted to have worsening LE swelling and was noted w rising creatinine. Treated w diuretics at rehab.     MEDICATIONS  (STANDING):  cefTRIAXone Injectable. 2000 milliGRAM(s) IV Push every 24 hours  ferrous    sulfate 325 milliGRAM(s) Oral daily  levothyroxine 150 MICROGram(s) Oral daily  metoprolol succinate ER 25 milliGRAM(s) Oral daily  tamsulosin 0.8 milliGRAM(s) Oral at bedtime    MEDICATIONS  (PRN):  acetaminophen     Tablet .. 650 milliGRAM(s) Oral every 6 hours PRN Mild Pain (1 - 3)  aluminum hydroxide/magnesium hydroxide/simethicone Suspension 30 milliLiter(s) Oral every 4 hours PRN Dyspepsia  lactulose Syrup 20 Gram(s) Oral three times a day PRN for constipation  melatonin 3 milliGRAM(s) Oral at bedtime PRN Insomnia  ondansetron Injectable 4 milliGRAM(s) IV Push every 6 hours PRN Nausea and/or Vomiting  oxyCODONE    IR 5 milliGRAM(s) Oral every 6 hours PRN Moderate Pain (4 - 6)    Vital Signs Last 24 Hrs  T(C): 36.5 (16 Mar 2025 07:26), Max: 36.9 (16 Mar 2025 00:02)  T(F): 97.7 (16 Mar 2025 07:26), Max: 98.4 (16 Mar 2025 00:02)  HR: 75 (16 Mar 2025 10:00) (72 - 90)  BP: 108/75 (16 Mar 2025 10:00) (108/75 - 123/74)  BP(mean): 88 (16 Mar 2025 07:26) (88 - 88)  RR: 18 (16 Mar 2025 07:26) (18 - 19)  SpO2: 95% (16 Mar 2025 07:26) (94% - 97%)    Parameters below as of 16 Mar 2025 07:26  Patient On (Oxygen Delivery Method): room air     Daily Weight in k.2 (16 Mar 2025 05:36)    15 @ 07:01  -  03 @ 07:00  --------------------------------------------------------  IN: 0 mL / OUT: 1750 mL / NET: -1750 mL    PHYSICAL EXAM:  GENERAL:   CHEST/LUNG:   HEART:   ABDOMEN:   EXTREMITIES:   SKIN:     LABS:                        8.3    9.44  )-----------( 273      ( 16 Mar 2025 06:59 )             25.9     03-    137  |  98  |  38[H]  ----------------------------<  120[H]  3.7   |  36[H]  |  1.20    Ca    9.3      16 Mar 2025 06:59  Mg     1.8         TPro  6.2  /  Alb  3.0[L]  /  TBili  0.7  /  DBili  x   /  AST  37  /  ALT  97[H]  /  AlkPhos  61  -      Urinalysis Basic - ( 16 Mar 2025 06:59 )    Color: x / Appearance: x / SG: x / pH: x  Gluc: 120 mg/dL / Ketone: x  / Bili: x / Urobili: x   Blood: x / Protein: x / Nitrite: x   Leuk Esterase: x / RBC: x / WBC x   Sq Epi: x / Non Sq Epi: x / Bacteria: x      Magnesium: 1.8 mg/dL ( @ 06:59)          RADIOLOGY & ADDITIONAL TESTS:   NEPHROLOGY INTERVAL HPI/OVERNIGHT EVENTS:    Date of Service: 25 @ 13:32    Covering for Jaylin Magallon/Chana    3/16--Sitting up in chair.  No distress.  No SOB but concerned due to edema    HPI:     74-year-old male with a past medical history of benign prostatic hyperplasia, diverticulosis, Graves' disease, hemorrhoids, hyperlipidemia, hypothyroidism, osteoarthritis, spinal stenosis, spinal cord injury (), and multiple spinal surgeries (three cervical and one lumbar). He presents with complaints of lower extremity swelling and abnormal BUN/creatinine levels. The patient was recently admitted to [facility] from 25 to 25 for sepsis secondary to epidural abscess. At the rehabilitation facility, he was noted to have worsening LE swelling and was noted w rising creatinine. Treated w diuretics at rehab.     MEDICATIONS  (STANDING):  cefTRIAXone Injectable. 2000 milliGRAM(s) IV Push every 24 hours  ferrous    sulfate 325 milliGRAM(s) Oral daily  levothyroxine 150 MICROGram(s) Oral daily  metoprolol succinate ER 25 milliGRAM(s) Oral daily  tamsulosin 0.8 milliGRAM(s) Oral at bedtime    MEDICATIONS  (PRN):  acetaminophen     Tablet .. 650 milliGRAM(s) Oral every 6 hours PRN Mild Pain (1 - 3)  aluminum hydroxide/magnesium hydroxide/simethicone Suspension 30 milliLiter(s) Oral every 4 hours PRN Dyspepsia  lactulose Syrup 20 Gram(s) Oral three times a day PRN for constipation  melatonin 3 milliGRAM(s) Oral at bedtime PRN Insomnia  ondansetron Injectable 4 milliGRAM(s) IV Push every 6 hours PRN Nausea and/or Vomiting  oxyCODONE    IR 5 milliGRAM(s) Oral every 6 hours PRN Moderate Pain (4 - 6)    Vital Signs Last 24 Hrs  T(C): 36.5 (16 Mar 2025 07:26), Max: 36.9 (16 Mar 2025 00:02)  T(F): 97.7 (16 Mar 2025 07:26), Max: 98.4 (16 Mar 2025 00:02)  HR: 75 (16 Mar 2025 10:00) (72 - 90)  BP: 108/75 (16 Mar 2025 10:00) (108/75 - 123/74)  BP(mean): 88 (16 Mar 2025 07:26) (88 - 88)  RR: 18 (16 Mar 2025 07:26) (18 - 19)  SpO2: 95% (16 Mar 2025 07:26) (94% - 97%)    Parameters below as of 16 Mar 2025 07:26  Patient On (Oxygen Delivery Method): room air     Daily Weight in k.2 (16 Mar 2025 05:36)    03-15 @ 07:01  -  03 @ 07:00  --------------------------------------------------------  IN: 0 mL / OUT: 1750 mL / NET: -1750 mL    PHYSICAL EXAM:  GENERAL: no distress  CHEST/LUNG: fair air entry  HEART: S1S2 RRR  ABDOMEN: soft  EXTREMITIES: 2-3+ edema  SKIN:     LABS:                        8.3    9.44  )-----------( 273      ( 16 Mar 2025 06:59 )             25.9     03-    137  |  98  |  38[H]  ----------------------------<  120[H]  3.7   |  36[H]  |  1.20    Ca    9.3      16 Mar 2025 06:59  Mg     1.8         TPro  6.2  /  Alb  3.0[L]  /  TBili  0.7  /  DBili  x   /  AST  37  /  ALT  97[H]  /  AlkPhos  61  03-16      Urinalysis Basic - ( 16 Mar 2025 06:59 )    Color: x / Appearance: x / SG: x / pH: x  Gluc: 120 mg/dL / Ketone: x  / Bili: x / Urobili: x   Blood: x / Protein: x / Nitrite: x   Leuk Esterase: x / RBC: x / WBC x   Sq Epi: x / Non Sq Epi: x / Bacteria: x      Magnesium: 1.8 mg/dL ( @ 06:59)          RADIOLOGY & ADDITIONAL TESTS:

## 2025-03-16 NOTE — PROGRESS NOTE ADULT - SUBJECTIVE AND OBJECTIVE BOX
CHIEF COMPLAINT:    HPI:  74-year-old male with a past medical history of benign prostatic hyperplasia, diverticulosis, Graves' disease, hemorrhoids, hyperlipidemia, hypothyroidism, osteoarthritis, spinal stenosis, spinal cord injury (2004), and multiple spinal surgeries (three cervical and one lumbar). He presents with complaints of lower extremity swelling and abnormal BUN/creatinine levels. The patient was recently admitted to [facility] from 02/18/25 to 02/25/25 for sepsis secondary to epidural abscess. He has a peripherally inserted central catheter line in the right arm and is on nightly Ceftriaxone treatment until 04/16/25. Following his discharge, he was transferred to [facility]. Documentation from the rehabilitation facility notes a decline in his functional status and dysfunction with activities of daily living. At the rehabilitation facility, he was noted to have worsening LE swelling and was started on torsemide. However, his Cr levels subsequently became elevated, leading to the discontinuation of torsemide. He was sent to the [facility] Emergency Department for further evaluation of his LE swelling and elevated Cr levels. He denies any chest pain, dyspnea, orthopnea, PND.     In ED VSS  CBC: WBC 10, H/H 12/36, Plt 235  CMP: Na 133, K 5.3, BUN/Cr 86/2.77, Glu 145, Albumin 2.8  Trops: wnl, Pro-BNP 1132  UA: Turbid, red, large blood, 47 wbcs, > 1900 rbcs.   US dopplers b/l LE: Acute deep venous thrombosis: below the knee.  DVT within the left soleal vein.  CT abdomen: No hydronephrosis or radiodense calculus identified bilaterally. Enlarged prostate, correlate with PSA level.  ECHO (2/18/25): Technically difficult image quality. Left ventricular systolic function is normal with an ejection fraction of 66 % by Meyers's method of disks. Trace tricuspid regurgitation. Estimated pulmonary artery systolic pressure is 35 mmHg, consistent with mild pulmonary hypertension. Interatrial septum is aneurysmal. The peak transaortic velocity is 2.29 m/s, peak transaortic gradient is 21.0 mmHg and mean transaortic gradient is 12.0 mmHg with an LVOT/aortic valve VTI ratio of 0.42. The effective orifice area is estimated at 1.61 cm² by the continuity equation. Trileaflet aortic valve with reduced systolic excursion. There is moderate calcification of the aortic valve leaflets. Mild aortic stenosis.    ED intervention: patient started on hep gtt for DVT (04 Mar 2025 18:35)    consulted for preop clearance.  pt denies h/o CAD/stents/MI/CHF/afib.  prior to illness was walking 2 flights w/o problems.      3/16/25: Patient seen OOB to chair. No cardiac complaints. Not on telemetry.  Planned for surgery tomorrow            MEDICATIONS:  OUTPATIENT:  Home Medications:  acetaminophen 325 mg oral tablet: 2 tab(s) orally every 6 hours As needed Temp greater or equal to 38C (100.4F), Mild Pain (1 - 3) (04 Mar 2025 16:37)  Aspirin Low Dose 81 mg oral delayed release tablet: 1 tab(s) orally once a day (04 Mar 2025 16:37)  cefTRIAXone 2 g injection: 2 gram(s) intravenously once a day (at bedtime) for 7 weeks (04 Mar 2025 18:38)  Colace 100 mg oral capsule: 2 cap(s) orally once a day (at bedtime) (04 Mar 2025 18:38)  Crestor 5 mg oral tablet: 1 tab(s) orally once a day (at bedtime) (04 Mar 2025 16:32)  Gemtesa 75 mg oral tablet: 1 tab(s) orally once a day (at bedtime) (04 Mar 2025 16:37)  lactulose 10 g/15 mL oral syrup: 30 milliliter(s) orally 3 times a day as needed for  constipation (04 Mar 2025 18:38)  MEDICATIONS  (STANDING):  cefTRIAXone Injectable. 2000 milliGRAM(s) IV Push every 24 hours  ferrous    sulfate 325 milliGRAM(s) Oral daily  levothyroxine 150 MICROGram(s) Oral daily  metoprolol succinate ER 25 milliGRAM(s) Oral daily  tamsulosin 0.8 milliGRAM(s) Oral at bedtime    MEDICATIONS  (PRN):  acetaminophen     Tablet .. 650 milliGRAM(s) Oral every 6 hours PRN Mild Pain (1 - 3)  aluminum hydroxide/magnesium hydroxide/simethicone Suspension 30 milliLiter(s) Oral every 4 hours PRN Dyspepsia  lactulose Syrup 20 Gram(s) Oral three times a day PRN for constipation  melatonin 3 milliGRAM(s) Oral at bedtime PRN Insomnia  ondansetron Injectable 4 milliGRAM(s) IV Push every 6 hours PRN Nausea and/or Vomiting  oxyCODONE    IR 5 milliGRAM(s) Oral every 6 hours PRN Moderate Pain (4 - 6)  levothyroxine 150 mcg (0.15 mg) oral tablet: 1 tab(s) orally once a day ***Dr. Castillo shows 175mcg filled last on 12/18/24, List from [facility] confirms pt&#x27;s current dose is 150mcg*** (04 Mar 2025 16:38)  melatonin 5 mg oral tablet: 2 tab(s) orally once a day (at bedtime) (04 Mar 2025 18:38)  metoprolol succinate 25 mg oral tablet, extended release: 1 tab(s) orally once a day (04 Mar 2025 18:38)  tamsulosin 0.4 mg oral capsule: 2 cap(s) orally once a day (at bedtime) (04 Mar 2025 16:33)  tiZANidine 2 mg oral capsule: 1 cap(s) orally every 8 hours (04 Mar 2025 16:36)  torsemide 20 mg oral tablet: 1 tab(s) orally once a day for 6 days (04 Mar 2025 18:38)  traMADol 50 mg oral tablet: 1 tab(s) orally every 6 hours for 14 days (04 Mar 2025 18:38)  Vitamin D3 2000 intl units oral capsule: 1 cap(s) orally once a day (04 Mar 2025 16:37)    Vital Signs Last 24 Hrs  T(C): 36.5 (16 Mar 2025 07:26), Max: 36.9 (16 Mar 2025 00:02)  T(F): 97.7 (16 Mar 2025 07:26), Max: 98.4 (16 Mar 2025 00:02)  HR: 75 (16 Mar 2025 10:00) (72 - 90)  BP: 108/75 (16 Mar 2025 10:00) (108/75 - 123/74)  BP(mean): 88 (16 Mar 2025 07:26) (88 - 88)  RR: 18 (16 Mar 2025 07:26) (18 - 19)  SpO2: 95% (16 Mar 2025 07:26) (94% - 97%)    Parameters below as of 16 Mar 2025 07:26  Patient On (Oxygen Delivery Method): room air    PHYSICAL EXAM:    Constitutional: NAD, awake   HEENT: PERR, EOMI,  No oral cyanosis  Neck:  supple,  No JVD  Respiratory: Breath sounds are clear bilaterally, No wheezing, rales or rhonchi  Cardiovascular: S1 and S2, regular rate and rhythm, no Murmurs, gallops or rubs  Gastrointestinal: Bowel Sounds present, soft, nontender.   Extremities: No peripheral edema. No clubbing or cyanosis.  Vascular: 2+ peripheral pulses  Neurological: A/O x 3, no focal deficits      ===============================  ===============================  LABS:    03-16    137  |  98  |  38[H]  ----------------------------<  120[H]  3.7   |  36[H]  |  1.20    Ca    9.3      16 Mar 2025 06:59  Mg     1.8     03-16    TPro  6.2  /  Alb  3.0[L]  /  TBili  0.7  /  DBili  x   /  AST  37  /  ALT  97[H]  /  AlkPhos  61  03-16                                    8.3    9.44  )-----------( 273      ( 16 Mar 2025 06:59 )             25.9   THYROID STUDIES:    ===============================  ===============================  CARDIAC BIOMARKERS:  -------  -BNP VALUES:  Pro-Brain Natriuretic Peptide: 125 pg/mL (03.14.25 @ 17:19)   Pro-Brain Natriuretic Peptide: 136 pg/mL (03.11.25 @ 05:19)   Pro-Brain Natriuretic Peptide: 273 pg/mL (03.09.25 @ 05:14)   Pro-Brain Natriuretic Peptide: 1132 pg/mL (03.04.25 @ 15:12)     -------  -TROPONIN VALUES:   Troponin I, High Sensitivity Result: 15.62 ng/L (03-04-25 @ 15:12)  Troponin I, High Sensitivity Result: 70.14 ng/L (02-18-25 @ 07:10)  Troponin I, High Sensitivity Result: 79.44 ng/L *H* (02-18-25 @ 01:43)  Troponin I, High Sensitivity Result: 32.13 ng/L (02-17-25 @ 21:11)    ===============================  ===============================  EKG: NSR no ischemic changes.  ===============================  ===============================    < from: TTE W or WO Ultrasound Enhancing Agent (03.15.25 @ 12:38) >  CONCLUSIONS:      1. Left ventricular systolic function is normal with an ejection fraction of 65 % by Meyers's method of disks.   2. Normal right ventricular cavity size and normal right ventricular systolic function.   3. Mild tricuspid regurgitation.   4. Estimated pulmonary artery systolic pressure is 27 mmHg, consistent with normal pulmonary artery pressure.      < from: TTE W or WO Ultrasound Enhancing Agent (02.18.25 @ 12:28) >     CONCLUSIONS:      1. Left ventricular systolic function is normal with an ejection fraction of 66 % by Meyers's method of disks.   2. Trace tricuspid regurgitation.   3. Estimated pulmonary artery systolic pressure is 35 mmHg, consistent with mild pulmonary hypertension.   4. Interatrial septum is aneurysmal.   5. Technically difficultimage quality.   6. The peak transaortic velocity is 2.29 m/s, peak transaortic gradient is 21.0 mmHg and mean transaortic gradient is 12.0 mmHg with an LVOT/aortic valve VTI ratio of 0.42. The effective orifice area is estimated at 1.61 cm² by the continuity equation.   7. Trileaflet aortic valve with reduced systolic excursion. There is moderate calcification of the aortic valve leaflets. Mild aortic stenosis.  ===============================  ===============================

## 2025-03-16 NOTE — PROGRESS NOTE ADULT - PROBLEM SELECTOR PLAN 1
ecommendation: -intermediate risk surgery.  Reviewed chart - antibiotics v. surgery offered to pt.  -no cardiac symptoms.  ECG unremarkable. functional capacity in past 2 months >4 METs.  -recent DVT diagnosis noted. CT/VQ scan not performed as pt could not lie flat.  also had GOVIND. Cr now improved to normal.  -Prior Echo Feb '25 reviewed: normal EF.  mild AS, interatrial septal aneurysm are not concerns intraoperatively.  -Repeat echo 3/15: nml LV function, nml RV  -Creatinine improved, patient able to lye flat.  CT chest to be performed asa this afternoon  -Await results of CT prior to clearing for surgery

## 2025-03-16 NOTE — PROGRESS NOTE ADULT - ASSESSMENT
74 year old Male with noted history of multiple spinal surgeries, recent admission in February with noted streptococcus galactiae bacteremia and cervical spinal infection, now admitted for lower extremity edema. Concern for worsening OM.     Plan:  - OR tomorrow for T1-3 laminectomies for decompression and extension of prior cervical fusion to thoracic spine  - Pending final medical/cardiac clearance   - Lovenox for DVT held (last dose 3/15 pm)  - Please document all necessary Medical clearance prior to procedure  - Abx as per ID recommendations  - npo after midnight   - pre-op labs     Discussed with Dr. Cantu

## 2025-03-16 NOTE — PROGRESS NOTE ADULT - SUBJECTIVE AND OBJECTIVE BOX
HPI:   74-year-old male with PMH of BPH, diverticulosis, Graves' disease, hemorrhoids, HLD, hypothyroidism, osteoarthritis, spinal stenosis, spinal cord injury (2004), and multiple spinal surgeries (three cervical and one lumbar). He presents with complaints of lower extremity swelling and abnormal BUN/creatinine levels. The patient was recently admitted to [facility] from 02/18/25 to 02/25/25 for sepsis in the setting of streptococcus dysgalactiae bacteremia and was followed by neurosurgery for cervicothoracic spine infection. He was discharged to [facility] with a PICC for IV ceftriaxone until 04/16/25. Documentation from the rehabilitation facility notes a decline in his functional status and dysfunction with activities of daily living. At the rehabilitation facility, he was noted to have worsening LE swelling and was started on torsemide. However, his Cr levels subsequently became elevated, leading to the discontinuation of torsemide. He was sent to the  ED for further evaluation of his LE swelling and elevated Cr levels. He denies any chest pain, dyspnea, orthopnea, PND.     Repeat MRI done this admission showed worsening discitis/osteomyelitis. Per ID, antibiotics alone will unlikely resolve this spinal infection and would consider surgical intervention if able to perform based on risk/benefit. After discussion with patient and offering surgical interventions vs continued medical management with antibiotics, patient wished to proceed with surgery. Of note, hospital course has been complicated by below the knee DVT, currently on therapeutic Lovenox, urinary retention requiring gupta, and significant LE anasarca.    3/15- Pt seen and examined at bedside, resting comfortably and in NAD. Reports chronic right sided spacticity from prior spinal cord injury/surgery. He has had progressive worsening in hand  and loss of dexterity over the last few months. Pending medical clearance for OR Monday for thoracic laminectomy and fusion for epidural abscess.     3/16- Pt seen and examined at bedside, resting comfortably and in NAD. Reports some improvement in abdominal/leg pain 2/2 edema. Pending OR tomorrow.       acetaminophen     Tablet .. 650 milliGRAM(s) Oral every 6 hours PRN  aluminum hydroxide/magnesium hydroxide/simethicone Suspension 30 milliLiter(s) Oral every 4 hours PRN  cefTRIAXone Injectable. 2000 milliGRAM(s) IV Push every 24 hours  ferrous    sulfate 325 milliGRAM(s) Oral daily  lactulose Syrup 20 Gram(s) Oral three times a day PRN  levothyroxine 150 MICROGram(s) Oral daily  melatonin 3 milliGRAM(s) Oral at bedtime PRN  metoprolol succinate ER 25 milliGRAM(s) Oral daily  ondansetron Injectable 4 milliGRAM(s) IV Push every 6 hours PRN  oxyCODONE    IR 5 milliGRAM(s) Oral every 6 hours PRN  tamsulosin 0.8 milliGRAM(s) Oral at bedtime      Physical Exam:  Constitutional: Awake / alert  HEENT: PERRL, EOMI  Neck: Supple  Respiratory: Respirations non-labored  Cardiovascular: regular rate  Gastrointestinal: NT/ND  Extremities:  no edema  Musculoskeletal: no abnormal movements, diffuse LE edema   Skin: No rashes    Neurological Exam:  HF: AA&O x 3, follows commands, normal affect, speech fluent  CN: PERRL, EOMI, no NLFD, tongue midline  Motor:   	RUE: Deltoid 5/5, bicep 5/5, tricep 5/5, wrist ext 5/5,  4-/5, unable to fully flex 2nd digit  	LUE: Deltoid 5/5, bicep 5/5, tricep 5/5, wrist ext 5/5,  4-/5, unable to fully flex 2nd digit  	RLE: IP 4/5, Quad 4/5, EHL 5/5, Dorsi/plantarflexion 5/5 (some limitations 2/2 LE edema)  	LLE: IP 2/5, Quad 1/5, EHL 5/5, Dorsi/plantarflexion 5/5 (proximal leg significantly limited by pain and edema)  Sens: Intact to light touch except diminished in LLE  Reflexes: hyperreflexic RUE/RLE, diminished DTRs in LUE/LLE, +Right ankle clonus, +R babinski, eduardo negative b/l  Coord:  No dysmetria  Gait/Balance: Cannot test        Vital Signs Last 24 Hrs  T(C): 36.5 (16 Mar 2025 07:26), Max: 36.9 (16 Mar 2025 00:02)  T(F): 97.7 (16 Mar 2025 07:26), Max: 98.4 (16 Mar 2025 00:02)  HR: 72 (16 Mar 2025 07:26) (72 - 90)  BP: 123/74 (16 Mar 2025 07:26) (110/75 - 123/74)  BP(mean): 88 (16 Mar 2025 07:26) (88 - 88)  RR: 18 (16 Mar 2025 07:26) (18 - 19)  SpO2: 95% (16 Mar 2025 07:26) (94% - 97%)    Parameters below as of 16 Mar 2025 07:26  Patient On (Oxygen Delivery Method): room air                              8.3    9.44  )-----------( 273      ( 16 Mar 2025 06:59 )             25.9       03-16    137  |  98  |  38[H]  ----------------------------<  120[H]  3.7   |  36[H]  |  1.20    Ca    9.3      16 Mar 2025 06:59  Mg     1.8     03-16    TPro  6.2  /  Alb  3.0[L]  /  TBili  0.7  /  DBili  x   /  AST  37  /  ALT  97[H]  /  AlkPhos  61  03-16

## 2025-03-16 NOTE — PROGRESS NOTE ADULT - ASSESSMENT
#GOVIND on CKD  #LE edema  #hypoalbuminemia  #Proteinuria  cr improving with diuresis   discussed with neprho c/w iv bumex 2mg iv bid with albumin - currently on hold for CT angio   renal function stable  d/w Dr. Watkins today     #DVT   s/p lovenox BID   last dose saturday night for MONDAY surgery  - could not tolerate VQ scan for r/o PE given pain/hx spinal surgeries  repeat doppler with improvement in dvt - no progression  CT angio PE protocol w/o evidence of pulm embolism     #Spinal epidural abscess POA and already being treated with IV antibiotics with suspected worsening of disease  Will be undergoing T1-3 Laminectomy with decompression and fusion with connection / extension to prior Cervical hardware.   - PICC w CTX for 8 week course to finish 4/16/25    #mild rhabdo (resolved) with elevated LFTS   possible 2/2 intravascular depletion with statin use   hold statin   benefits outweight risks to continue diuresis   ck trending down - now normal    #CAD  - c/w metoprolol, statin    #HTN  - c/w metoprolol  - titrate Rx accordingly    #HLD  - c/w statin    #anemia   possible dilutional   low iron - start po iron  multifactorial given recent ARF, iron deficiency and sepsis   check ldh haptoglobin and retic for completeness  keep type and screen active     #BPH  #Urinary retention likely augmented by degree of anasarca  - gupta placed during prior admission  - TOV  ->failed. Gupta re-inserted 3/6->fell out without knowing with associated hematuria->recurrent retention 3/7->Gupta re-inserted (3/7)    #hypokalemia   repleted    #pre-op Eval   CXR - clear   EKG - NSR no acute changes from prior  TTE last month with normal ef normal diastolic function; interatrial aneuysm and mild pulm htn - cardio clearance requested   will repeat TTE for recheck pulm arterial pressure and check for RV strain  RCRI 1   prior to lower ext edema, pt able to walk 4 miles uninterrupted Mets:>4   full clearance pending cardio clearance   TTE as above   CT angio neg for PE      VTE: off AC for OR tomorrow  #GOVIND on CKD  #LE edema  #hypoalbuminemia  #Proteinuria  cr improving with diuresis   renal function stable  d/w Dr. Watkins today, diuretics on hold for now, for contrast CT    #DVT   Initial study on 3/4 with L soleal vein DVT, on 3/6 DVT noted in the left gastrocnemius and soleal veins  s/p heparin gtt, s/p Lovenox BID   last dose of Lovenox was Saturday (3/15) night for MONDAY surgery  -could not tolerate VQ scan for r/o PE given pain/hx spinal surgeries  -repeat doppler 3/14 with improvement  CT angio PE protocol w/o evidence of pulm embolism   d/w Cardiology today, recommending IVC filter placement prior to surgery, Dr. Brennan will evaluate patient in Am  given acute DVT with location in soleal and gastrocnemius with risk of propagation, agree with plan for IVC filter, especially given time that pt will need to be off AC  after discussion with neurosurgical team, will be able to restart prophylactic dose of heparin 24-48 hrs after procedure and full anticoagulation 4 days after    #Spinal epidural abscess POA and already being treated with IV antibiotics with suspected worsening of disease  Neurosurgery recommendations noted   Will be undergoing T1-3 Laminectomy with decompression and fusion with connection / extension to prior Cervical hardware.   -PICC w CTX for 8 week course to finish 4/16/25    #Pre-operative risk evaluation and management.   No cardiac symptoms.   Non-ischemic ECG.   CT angio neg for PE  repeat TTE with preserved EF, no significant valvular disease, normal pulmonary arterial pressure   RCRI 1    prior to lower ext edema, pt able to walk 4 miles uninterrupted Mets:>4   full clearance pending evaluation by Dr Brennan in AM and possible IVC filter placement tomorrow      #Mild rhabdo (resolved) with elevated LFTS   possible 2/2 intravascular depletion with statin use   hold statin   benefits outweigh risks to continue diuresis   ck trending down - now normal    #CAD/HTN  c/w metoprolol  titrate Rx accordingly    #HLD  -hold statin 2/2 rhabdo    #Anemia   low iron - started po iron  multifactorial given recent ARF, iron deficiency and sepsis   ldh haptoglobin both elevated, retic wnl  keep type and screen active     #BPH  #Urinary retention likely augmented by degree of anasarca  gupta placed during prior admission  TOV  ->failed. Gupta re-inserted 3/6->fell out without knowing with associated hematuria->recurrent retention 3/7->Gupta re-inserted (3/7)    #hypokalemia   repleted      VTE: off AC for OR tomorrow     DISPO: for possible IVC filter placement in AM and OR with neurosurgery as planned  #GOVIND on CKD  #LE edema  #hypoalbuminemia  #Proteinuria  cr improving with diuresis   renal function stable  d/w Dr. Watkins today, diuretics on hold for now, for contrast CT    #DVT   Initial study on 3/4 with L soleal vein DVT, on 3/6 DVT noted in the left gastrocnemius and soleal veins  s/p heparin gtt, s/p Lovenox BID   last dose of Lovenox was Saturday (3/15) night for MONDAY surgery  -could not tolerate VQ scan for r/o PE given pain/hx spinal surgeries  -repeat doppler 3/14 with improvement  CT angio PE protocol w/o evidence of pulm embolism   d/w Cardiology today, recommending IVC filter placement prior to surgery, Dr. Brennan will evaluate patient in Am  given acute DVT with location in soleal and gastrocnemius with risk of propagation, agree with plan for IVC filter, especially given time that pt will need to be off AC  after discussion with neurosurgical team, will be able to restart prophylactic dose of heparin 24-48 hrs after procedure and full anticoagulation 4 days after    #Spinal epidural abscess POA and already being treated with IV antibiotics with suspected worsening of disease  Neurosurgery recommendations noted   Will be undergoing T1-3 Laminectomy with decompression and fusion with connection / extension to prior Cervical hardware.   -PICC w CTX for 8 week course to finish 4/16/25    #Pre-operative risk evaluation and management.   No cardiac symptoms.   Non-ischemic ECG.   CT angio neg for PE  repeat TTE with preserved EF, no significant valvular disease, normal pulmonary arterial pressure   RCRI 1    prior to lower ext edema, pt able to walk 4 miles uninterrupted Mets:>4   full clearance pending evaluation by Dr Brennan in AM and possible IVC filter placement tomorrow    once IVF filter in place, patient is otherwise optimized from a medical standpoint and therefore no absolute contraindication to proceeding with procedure.    #Mild rhabdo (resolved) with elevated LFTS   possible 2/2 intravascular depletion with statin use   hold statin   benefits outweigh risks to continue diuresis   ck trending down - now normal    #CAD/HTN  c/w metoprolol  titrate Rx accordingly    #HLD  -hold statin 2/2 rhabdo    #Anemia   low iron - started po iron  multifactorial given recent ARF, iron deficiency and sepsis   ldh haptoglobin both elevated, retic wnl  keep type and screen active     #BPH  #Urinary retention likely augmented by degree of anasarca  gupta placed during prior admission  TOV  ->failed. Gupta re-inserted 3/6->fell out without knowing with associated hematuria->recurrent retention 3/7->Gupta re-inserted (3/7)    #hypokalemia   repleted      VTE: off AC for OR tomorrow     DISPO: for possible IVC filter placement in AM and OR with neurosurgery as planned

## 2025-03-17 ENCOUNTER — APPOINTMENT (OUTPATIENT)
Dept: NEUROSURGERY | Facility: HOSPITAL | Age: 75
End: 2025-03-17

## 2025-03-17 LAB
ANION GAP SERPL CALC-SCNC: 4 MMOL/L — LOW (ref 5–17)
BASE EXCESS BLDA CALC-SCNC: 6.6 MMOL/L — HIGH (ref -2–3)
BLD GP AB SCN SERPL QL: SIGNIFICANT CHANGE UP
BLOOD GAS COMMENTS ARTERIAL: SIGNIFICANT CHANGE UP
BUN SERPL-MCNC: 32 MG/DL — HIGH (ref 7–23)
CALCIUM SERPL-MCNC: 9.2 MG/DL — SIGNIFICANT CHANGE UP (ref 8.5–10.1)
CHLORIDE SERPL-SCNC: 98 MMOL/L — SIGNIFICANT CHANGE UP (ref 96–108)
CO2 SERPL-SCNC: 32 MMOL/L — HIGH (ref 22–31)
CREAT SERPL-MCNC: 1.03 MG/DL — SIGNIFICANT CHANGE UP (ref 0.5–1.3)
EGFR: 77 ML/MIN/1.73M2 — SIGNIFICANT CHANGE UP
EGFR: 77 ML/MIN/1.73M2 — SIGNIFICANT CHANGE UP
GLUCOSE SERPL-MCNC: 106 MG/DL — HIGH (ref 70–99)
HCO3 BLDA-SCNC: 29 MMOL/L — HIGH (ref 21–28)
HCT VFR BLD CALC: 24.6 % — LOW (ref 39–50)
HCT VFR BLD CALC: 25.5 % — LOW (ref 39–50)
HCT VFR BLD CALC: 29.5 % — LOW (ref 39–50)
HGB BLD-MCNC: 8.1 G/DL — LOW (ref 13–17)
HGB BLD-MCNC: 8.2 G/DL — LOW (ref 13–17)
HGB BLD-MCNC: 9.5 G/DL — LOW (ref 13–17)
INR BLD: 1.15 RATIO — SIGNIFICANT CHANGE UP (ref 0.85–1.16)
MCHC RBC-ENTMCNC: 28.2 PG — SIGNIFICANT CHANGE UP (ref 27–34)
MCHC RBC-ENTMCNC: 28.3 PG — SIGNIFICANT CHANGE UP (ref 27–34)
MCHC RBC-ENTMCNC: 28.4 PG — SIGNIFICANT CHANGE UP (ref 27–34)
MCHC RBC-ENTMCNC: 32.2 G/DL — SIGNIFICANT CHANGE UP (ref 32–36)
MCHC RBC-ENTMCNC: 32.2 G/DL — SIGNIFICANT CHANGE UP (ref 32–36)
MCHC RBC-ENTMCNC: 32.9 G/DL — SIGNIFICANT CHANGE UP (ref 32–36)
MCV RBC AUTO: 86.3 FL — SIGNIFICANT CHANGE UP (ref 80–100)
MCV RBC AUTO: 87.6 FL — SIGNIFICANT CHANGE UP (ref 80–100)
MCV RBC AUTO: 87.8 FL — SIGNIFICANT CHANGE UP (ref 80–100)
NRBC # BLD AUTO: 0 K/UL — SIGNIFICANT CHANGE UP (ref 0–0)
NRBC # FLD: 0 K/UL — SIGNIFICANT CHANGE UP (ref 0–0)
NRBC BLD AUTO-RTO: 0 /100 WBCS — SIGNIFICANT CHANGE UP (ref 0–0)
PCO2 BLDA: 33 MMHG — LOW (ref 35–48)
PH BLDA: 7.55 — HIGH (ref 7.35–7.45)
PLATELET # BLD AUTO: 245 K/UL — SIGNIFICANT CHANGE UP (ref 150–400)
PLATELET # BLD AUTO: 247 K/UL — SIGNIFICANT CHANGE UP (ref 150–400)
PLATELET # BLD AUTO: 266 K/UL — SIGNIFICANT CHANGE UP (ref 150–400)
PMV BLD: 8.8 FL — SIGNIFICANT CHANGE UP (ref 7–13)
PMV BLD: 9 FL — SIGNIFICANT CHANGE UP (ref 7–13)
PMV BLD: 9.3 FL — SIGNIFICANT CHANGE UP (ref 7–13)
PO2 BLDA: 211 MMHG — HIGH (ref 83–108)
POTASSIUM SERPL-MCNC: 3.7 MMOL/L — SIGNIFICANT CHANGE UP (ref 3.5–5.3)
POTASSIUM SERPL-SCNC: 3.7 MMOL/L — SIGNIFICANT CHANGE UP (ref 3.5–5.3)
PROTHROM AB SERPL-ACNC: 13.2 SEC — SIGNIFICANT CHANGE UP (ref 9.9–13.4)
RBC # BLD: 2.85 M/UL — LOW (ref 4.2–5.8)
RBC # BLD: 2.91 M/UL — LOW (ref 4.2–5.8)
RBC # BLD: 3.36 M/UL — LOW (ref 4.2–5.8)
RBC # FLD: 14 % — SIGNIFICANT CHANGE UP (ref 10.3–14.5)
RBC # FLD: 14 % — SIGNIFICANT CHANGE UP (ref 10.3–14.5)
RBC # FLD: 14.3 % — SIGNIFICANT CHANGE UP (ref 10.3–14.5)
SAO2 % BLDA: 99 % — HIGH (ref 94–98)
SODIUM SERPL-SCNC: 134 MMOL/L — LOW (ref 135–145)
WBC # BLD: 11.05 K/UL — HIGH (ref 3.8–10.5)
WBC # BLD: 12.24 K/UL — HIGH (ref 3.8–10.5)
WBC # BLD: 14.59 K/UL — HIGH (ref 3.8–10.5)
WBC # FLD AUTO: 11.05 K/UL — HIGH (ref 3.8–10.5)
WBC # FLD AUTO: 12.24 K/UL — HIGH (ref 3.8–10.5)
WBC # FLD AUTO: 14.59 K/UL — HIGH (ref 3.8–10.5)

## 2025-03-17 PROCEDURE — 63266 EXCISE INTRSPINL LESION THRC: CPT | Mod: AS

## 2025-03-17 PROCEDURE — 22842 INSERT SPINE FIXATION DEVICE: CPT | Mod: AS

## 2025-03-17 PROCEDURE — 22842 INSERT SPINE FIXATION DEVICE: CPT

## 2025-03-17 PROCEDURE — 22614 ARTHRD PST TQ 1NTRSPC EA ADD: CPT

## 2025-03-17 PROCEDURE — 22610 ARTHRD PST TQ 1NTRSPC THRC: CPT | Mod: AS

## 2025-03-17 PROCEDURE — 22610 ARTHRD PST TQ 1NTRSPC THRC: CPT

## 2025-03-17 PROCEDURE — 63266 EXCISE INTRSPINL LESION THRC: CPT

## 2025-03-17 PROCEDURE — 37191 INS ENDOVAS VENA CAVA FILTR: CPT

## 2025-03-17 PROCEDURE — 99233 SBSQ HOSP IP/OBS HIGH 50: CPT

## 2025-03-17 PROCEDURE — 99223 1ST HOSP IP/OBS HIGH 75: CPT | Mod: 25

## 2025-03-17 PROCEDURE — 61783 SCAN PROC SPINAL: CPT | Mod: AS

## 2025-03-17 PROCEDURE — 22614 ARTHRD PST TQ 1NTRSPC EA ADD: CPT | Mod: AS

## 2025-03-17 PROCEDURE — 61783 SCAN PROC SPINAL: CPT

## 2025-03-17 RX ORDER — HYDROMORPHONE/SOD CHLOR,ISO/PF 2 MG/10 ML
30 SYRINGE (ML) INJECTION
Refills: 0 | Status: DISCONTINUED | OUTPATIENT
Start: 2025-03-17 | End: 2025-03-19

## 2025-03-17 RX ORDER — NALOXONE HYDROCHLORIDE 0.4 MG/ML
0.1 INJECTION, SOLUTION INTRAMUSCULAR; INTRAVENOUS; SUBCUTANEOUS
Refills: 0 | Status: DISCONTINUED | OUTPATIENT
Start: 2025-03-17 | End: 2025-03-25

## 2025-03-17 RX ORDER — HYDROMORPHONE/SOD CHLOR,ISO/PF 2 MG/10 ML
0.5 SYRINGE (ML) INJECTION
Refills: 0 | Status: DISCONTINUED | OUTPATIENT
Start: 2025-03-17 | End: 2025-03-19

## 2025-03-17 RX ORDER — ONDANSETRON HCL/PF 4 MG/2 ML
4 VIAL (ML) INJECTION ONCE
Refills: 0 | Status: DISCONTINUED | OUTPATIENT
Start: 2025-03-17 | End: 2025-03-18

## 2025-03-17 RX ORDER — DIAZEPAM 2 MG/1
5 TABLET ORAL EVERY 8 HOURS
Refills: 0 | Status: DISCONTINUED | OUTPATIENT
Start: 2025-03-17 | End: 2025-03-24

## 2025-03-17 RX ORDER — ACETAMINOPHEN 500 MG/5ML
1000 LIQUID (ML) ORAL ONCE
Refills: 0 | Status: DISCONTINUED | OUTPATIENT
Start: 2025-03-17 | End: 2025-03-25

## 2025-03-17 RX ORDER — SODIUM CHLORIDE 9 G/1000ML
1000 INJECTION, SOLUTION INTRAVENOUS
Refills: 0 | Status: DISCONTINUED | OUTPATIENT
Start: 2025-03-17 | End: 2025-03-18

## 2025-03-17 RX ADMIN — OXYCODONE HYDROCHLORIDE 5 MILLIGRAM(S): 30 TABLET ORAL at 00:45

## 2025-03-17 RX ADMIN — Medication 30 MILLILITER(S): at 15:29

## 2025-03-17 RX ADMIN — TAMSULOSIN HYDROCHLORIDE 0.8 MILLIGRAM(S): 0.4 CAPSULE ORAL at 21:57

## 2025-03-17 RX ADMIN — Medication 3 MILLIGRAM(S): at 21:57

## 2025-03-17 RX ADMIN — OXYCODONE HYDROCHLORIDE 5 MILLIGRAM(S): 30 TABLET ORAL at 00:03

## 2025-03-17 RX ADMIN — CEFTRIAXONE 2000 MILLIGRAM(S): 500 INJECTION, POWDER, FOR SOLUTION INTRAMUSCULAR; INTRAVENOUS at 21:58

## 2025-03-17 NOTE — CONSULT NOTE ADULT - NS ATTEND AMEND GEN_ALL_CORE FT
Agree with above. IVC filter placed in the setting of AC cessation for multiple days due to neurosurgery. Findings relayed to patient and primary team.     Thank you for allowing me to participate in the care of your patient. Feel free to contact me if any clinical issues arise via contact information below or MS Teams.    Luis A Brennan MD, FACC, [facility]   Director, Interventional Cardiology, [address], [address] [place] [address], [address] Office: [phone]  [place] Office: [phone]  Email: [email or web address]

## 2025-03-17 NOTE — CONSULT NOTE ADULT - SUBJECTIVE AND OBJECTIVE BOX
Date of service is equal to the date of entry    HPI: 73 y/o M w/ pmh of bph, diverticulosis, graves dz, hemorrhoids, hld, hypothyroid, OA, SS, spinal cord injury (2004), multiple prior spinal surgeries (cervical and lumbar), presented to  for lower ext swelling and elevated BUN/Cr. Hospital course c/b UE weakness L>R and pt was found to have worsening discitis/osteomyelitis w/ epidural phlegmon. P      24 hour events:     Review of Systems:  Constitutional: No fever, chills, fatigue  Neuro: No headache, numbness, weakness  Resp: No cough, wheezing, shortness of breath  CVS: No chest pain, palpitations, leg swelling  GI: No abdominal pain, nausea, vomiting, diarrhea   : No dysuria, frequency, incontinence  Skin: No itching, burning, rashes, or lesions   Msk: No joint pain or swelling  Psych: No depression, anxiety, mood swings    T(F): 98.5 (03-17-25 @ 14:40), Max: 98.5 (03-17-25 @ 14:40)  HR: 70 (03-17-25 @ 16:00) (70 - 87)  BP: 136/86 (03-17-25 @ 16:00) (107/63 - 136/86)  RR: 12 (03-17-25 @ 16:00) (12 - 19)  SpO2: 98% (03-17-25 @ 16:00) (93% - 98%)  Wt(kg): --      03-16-25 @ 07:01  -  03-17-25 @ 07:00  --------------------------------------------------------  IN: 0 mL / OUT: 1650 mL / NET: -1650 mL    03-17-25 @ 07:01  -  03-17-25 @ 16:15  --------------------------------------------------------  IN: 4000 mL / OUT: 225 mL / NET: 3775 mL        CAPILLARY BLOOD GLUCOSE      POCT Blood Glucose.: 142 mg/dL (17 Mar 2025 14:42)      I&O's Summary    16 Mar 2025 07:01  -  17 Mar 2025 07:00  --------------------------------------------------------  IN: 0 mL / OUT: 1650 mL / NET: -1650 mL    17 Mar 2025 07:01  -  17 Mar 2025 16:15  --------------------------------------------------------  IN: 4000 mL / OUT: 225 mL / NET: 3775 mL        Physical Exam:   Gen: Comfortable in bed in NAD  Neuro: awake, alert following commands  HEENT: NC/AT  Resp: good air entry b/l  CVS: +RRR  Abd: BSx4, soft, nt/nd  Ext: no edema   Skin: warm/dry    Meds:  cefTRIAXone Injectable. IV Push    metoprolol succinate ER Oral    levothyroxine Oral      acetaminophen     Tablet .. Oral PRN  acetaminophen   IVPB .. IV Intermittent PRN  diazepam    Tablet Oral PRN  HYDROmorphone PCA (1 mG/mL) PCA Continuous  HYDROmorphone PCA (1 mG/mL) Rescue Clinician Bolus IV Push PRN  melatonin Oral PRN  ondansetron Injectable IV Push PRN  ondansetron Injectable IV Push PRN        aluminum hydroxide/magnesium hydroxide/simethicone Suspension Oral PRN  lactulose Syrup Oral PRN    tamsulosin Oral    ferrous    sulfate Oral  lactated ringers. IV Continuous        naloxone Injectable IV Push PRN                            8.1    12.24 )-----------( 245      ( 17 Mar 2025 12:45 )             24.6       03-17    134[L]  |  98  |  32[H]  ----------------------------<  106[H]  3.7   |  32[H]  |  1.03    Ca    9.2      17 Mar 2025 06:11  Mg     1.8     03-16    TPro  6.2  /  Alb  3.0[L]  /  TBili  0.7  /  DBili  x   /  AST  37  /  ALT  97[H]  /  AlkPhos  61  03-16          PT/INR - ( 17 Mar 2025 06:11 )   PT: 13.2 sec;   INR: 1.15 ratio           Urinalysis Basic - ( 17 Mar 2025 06:11 )    Color: x / Appearance: x / SG: x / pH: x  Gluc: 106 mg/dL / Ketone: x  / Bili: x / Urobili: x   Blood: x / Protein: x / Nitrite: x   Leuk Esterase: x / RBC: x / WBC x   Sq Epi: x / Non Sq Epi: x / Bacteria: x      Radiology:     < from: CT Angio Chest PE Protocol w/ IV Cont (03.16.25 @ 16:47) >    ACC: [id number] EXAM:  CT ANGIO CHEST PULM [facility]   ORDERED BY: DANNY JARAMILLO     PROCEDURE DATE:  03/16/2025          INTERPRETATION:  CLINICAL INFORMATION: N17.9. Rule out pulmonary embolism.    COMPARISON: 2/17/2025    CONTRAST/COMPLICATIONS:  IV Contrast: Omnipaque 350  70 cc administered   0 cc discarded  Oral Contrast: NONE  .    PROCEDURE:  CT Angiography of the Chest.  Sagittal and coronal reformats were performed as well as 3D (MIP)   reconstructions.    FINDINGS:    LUNGS AND LARGEAIRWAYS: Patent central airways. No pulmonary   consolidation or suspicious nodules.  PLEURA: No pleural effusion.  VESSELS: No pulmonary emboli. No thoracic aortic aneurysm. Tip of right   PICC line at the cavoatrial junction.  HEART: Heart size is normal. Aortic valvular calcifications. No   pericardial effusion.  MEDIASTINUM AND JOSÉ: No lymphadenopathy.  CHEST WALL AND LOWER NECK: Within normal limits.  VISUALIZED UPPER ABDOMEN: A few hepatic cysts. Cholelithiasis. Nodular   thickening of theleft adrenal gland.  BONES: Degenerative changes.    IMPRESSION:  No pulmonary embolism.    --- End of Report ---    WARREN BARRETO MD; Attending Radiologist  This document has been electronically signed. Mar 16 2025  4:59PM    < end of copied text >      < from: Xray Chest 1 View-PORTABLE IMMEDIATE (Xray Chest 1 View-PORTABLE IMMEDIATE .) (03.14.25 @ 10:35) >    ACC: [id number] EXAM:  XR CHEST PORTABLE IMMED 1V   ORDERED BY: YULIYA MENDOZA     PROCEDURE DATE:  03/14/2025          INTERPRETATION:  Chest one view    HISTORY: Check PICC position    COMPARISON STUDY: 3/4/2025    Frontal expiratory view of the chestshows the heart to be similar in   size. Right PICC reaches the superior vena cava. Cervical spine hardware   is again noted.    The lungs are clear and there is no evidence of pneumothorax nor pleural   effusion.    IMPRESSION:  Right PICC to SVC.      Thank you for the courtesy of this referral.    --- End of Report ---      SELENA SOSA MD; Attending Interventional Radiologist  This document has been electronically signed. Mar 14 2025 10:40AM    < end of copied text >      CODE STATUS: FULL CODE         Date of service is equal to the date of entry    HPI: 75 y/o M w/ pmh of bph, diverticulosis, graves dz, hemorrhoids, hld, hypothyroid, OA, SS, spinal cord injury (2004), multiple prior spinal surgeries (cervical and lumbar), presented to  for lower ext swelling and elevated BUN/Cr. Hospital course c/b UE weakness L>R and pt was found to have worsening discitis/osteomyelitis w/ epidural phlegmon.     24 hour events: Pt today taken to the OR for thoracic lami/driange with plan for post-op admission to SICU for post-op observation. Pt reported to have been given 2U of PRBC intraop. Critical Care consult requested for medical management Pt seen and examined at bedside in PACU currently comfortable and w/out any complains at this time.    Review of Systems:  Constitutional: No fever, chills, fatigue  Neuro: No headache, numbness, weakness  Resp: No cough, wheezing, shortness of breath  CVS: No chest pain, palpitations, leg swelling  GI: No abdominal pain, nausea, vomiting, diarrhea   : No dysuria, frequency, incontinence  Skin: No itching, burning, rashes, or lesions   Msk: No joint pain or swelling  Psych: No depression, anxiety, mood swings    T(F): 98.5 (03-17-25 @ 14:40), Max: 98.5 (03-17-25 @ 14:40)  HR: 70 (03-17-25 @ 16:00) (70 - 87)  BP: 136/86 (03-17-25 @ 16:00) (107/63 - 136/86)  RR: 12 (03-17-25 @ 16:00) (12 - 19)  SpO2: 98% (03-17-25 @ 16:00) (93% - 98%)  Wt(kg): --      03-16-25 @ 07:01  -  03-17-25 @ 07:00  --------------------------------------------------------  IN: 0 mL / OUT: 1650 mL / NET: -1650 mL    03-17-25 @ 07:01  -  03-17-25 @ 16:15  --------------------------------------------------------  IN: 4000 mL / OUT: 225 mL / NET: 3775 mL        CAPILLARY BLOOD GLUCOSE      POCT Blood Glucose.: 142 mg/dL (17 Mar 2025 14:42)      I&O's Summary    16 Mar 2025 07:01  -  17 Mar 2025 07:00  --------------------------------------------------------  IN: 0 mL / OUT: 1650 mL / NET: -1650 mL    17 Mar 2025 07:01  -  17 Mar 2025 16:15  --------------------------------------------------------  IN: 4000 mL / OUT: 225 mL / NET: 3775 mL        Physical Exam:   Gen: Comfortable in bed in NAD  Neuro: awake, alert following commands  HEENT: NC/AT  Resp: good air entry b/l  CVS: +RRR  Abd: BSx4, soft, nt/nd  Ext: no edema   Skin: warm/dry    Meds:  cefTRIAXone Injectable. IV Push    metoprolol succinate ER Oral    levothyroxine Oral      acetaminophen     Tablet .. Oral PRN  acetaminophen   IVPB .. IV Intermittent PRN  diazepam    Tablet Oral PRN  HYDROmorphone PCA (1 mG/mL) PCA Continuous  HYDROmorphone PCA (1 mG/mL) Rescue Clinician Bolus IV Push PRN  melatonin Oral PRN  ondansetron Injectable IV Push PRN  ondansetron Injectable IV Push PRN        aluminum hydroxide/magnesium hydroxide/simethicone Suspension Oral PRN  lactulose Syrup Oral PRN    tamsulosin Oral    ferrous    sulfate Oral  lactated ringers. IV Continuous        naloxone Injectable IV Push PRN                            8.1    12.24 )-----------( 245      ( 17 Mar 2025 12:45 )             24.6       03-17    134[L]  |  98  |  32[H]  ----------------------------<  106[H]  3.7   |  32[H]  |  1.03    Ca    9.2      17 Mar 2025 06:11  Mg     1.8     03-16    TPro  6.2  /  Alb  3.0[L]  /  TBili  0.7  /  DBili  x   /  AST  37  /  ALT  97[H]  /  AlkPhos  61  03-16          PT/INR - ( 17 Mar 2025 06:11 )   PT: 13.2 sec;   INR: 1.15 ratio           Urinalysis Basic - ( 17 Mar 2025 06:11 )    Color: x / Appearance: x / SG: x / pH: x  Gluc: 106 mg/dL / Ketone: x  / Bili: x / Urobili: x   Blood: x / Protein: x / Nitrite: x   Leuk Esterase: x / RBC: x / WBC x   Sq Epi: x / Non Sq Epi: x / Bacteria: x      Radiology:     < from: CT Angio Chest PE Protocol w/ IV Cont (03.16.25 @ 16:47) >    ACC: [id number] EXAM:  [facility]   ORDERED BY: DANNY JARAMILLO     PROCEDURE DATE:  03/16/2025          INTERPRETATION:  CLINICAL INFORMATION: N17.9. Rule out pulmonary embolism.    COMPARISON: 2/17/2025    CONTRAST/COMPLICATIONS:  IV Contrast: Omnipaque 350  70 cc administered   0 cc discarded  Oral Contrast: NONE  .    PROCEDURE:  CT Angiography of the Chest.  Sagittal and coronal reformats were performed as well as 3D (MIP)   reconstructions.    FINDINGS:    LUNGS AND LARGEAIRWAYS: Patent central airways. No pulmonary   consolidation or suspicious nodules.  PLEURA: No pleural effusion.  VESSELS: No pulmonary emboli. No thoracic aortic aneurysm. Tip of right   PICC line at the cavoatrial junction.  HEART: Heart size is normal. Aortic valvular calcifications. No   pericardial effusion.  MEDIASTINUM AND JOSÉ: No lymphadenopathy.  CHEST WALL AND LOWER NECK: Within normal limits.  VISUALIZED UPPER ABDOMEN: A few hepatic cysts. Cholelithiasis. Nodular   thickening of theleft adrenal gland.  BONES: Degenerative changes.    IMPRESSION:  No pulmonary embolism.    --- End of Report ---    WARREN BARRETO MD; Attending Radiologist  This document has been electronically signed. Mar 16 2025  4:59PM    < end of copied text >      < from: Xray Chest 1 View-PORTABLE IMMEDIATE (Xray Chest 1 View-PORTABLE IMMEDIATE .) (03.14.25 @ 10:35) >    ACC: [id number] EXAM:  XR CHEST PORTABLE IMMED 1V   ORDERED BY: YULIYA MENDOZA     PROCEDURE DATE:  03/14/2025          INTERPRETATION:  Chest one view    HISTORY: Check PICC position    COMPARISON STUDY: 3/4/2025    Frontal expiratory view of the chestshows the heart to be similar in   size. Right PICC reaches the superior vena cava. Cervical spine hardware   is again noted.    The lungs are clear and there is no evidence of pneumothorax nor pleural   effusion.    IMPRESSION:  Right PICC to SVC.      Thank you for the courtesy of this referral.    --- End of Report ---      SELENA SOSA MD; Attending Interventional Radiologist  This document has been electronically signed. Mar 14 2025 10:40AM    < end of copied text >      CODE STATUS: FULL CODE

## 2025-03-17 NOTE — PROGRESS NOTE ADULT - SUBJECTIVE AND OBJECTIVE BOX
Department of Cardiology                                                               Division of Interventional Cardiology                                                               [facility] /[address] [address]                                                                                 [phone]                                                                                                                          Interventional Cardiology Nurse Practitioner Progress  Note  3/6/25  Patient is a 74y old  Male who presents with a chief complaint of worsening edema arf (15 Mar 2025 17:25)  Repeat MRI done this admission showed worsening discitis/osteomyelitis. Per ID, antibiotics alone will unlikely resolve this spinal infection and would consider surgical intervention if able to perform based on risk/benefit. After discussion with patient and offering surgical interventions vs continued medical management with antibiotics, patient wished to proceed with surgery. Of note, hospital course has been complicated by below the knee DVT, currently on therapeutic Lovenox, urinary retention requiring gupta, and significant LE anasarca.      Events/ Update:   3/17/25. Pt has opted for surgical intervention for infection. He is scheduled for the OR today, lovenox held since 3/16/25.         Vital Signs Last 24 Hrs  T(C): 36.8 (16 Mar 2025 23:20), Max: 36.8 (16 Mar 2025 23:20)  T(F): 98.2 (16 Mar 2025 23:20), Max: 98.2 (16 Mar 2025 23:20)  HR: 81 (16 Mar 2025 23:20) (70 - 81)  BP: 107/63 (16 Mar 2025 23:20) (107/63 - 135/71)  BP(mean): --  RR: 18 (16 Mar 2025 23:20) (16 - 18)  SpO2: 94% (16 Mar 2025 23:20) (94% - 97%)    Parameters below as of 16 Mar 2025 23:20  Patient On (Oxygen Delivery Method): room air        PHYSICAL EXAM:  NEURO: Non-focal, AxOx3.  No neuro deficits NECK: Supple, No JVD, No LAD  CHEST/LUNG: Clear to auscultation bilaterally; No wheeze  HEART: s1 s2 Regular rate and rhythm; No murmurs, rubs, or gallops  ABDOMEN: Soft, Nontender, Nondistended; Bowel sounds present X 4 quadrants   EXTREMITIES:  2+ Peripheral Pulses, No clubbing, cyanosis, or edema   VASCULAR: Peripheral pulses palpable 2+ bilaterally    Labs:                        8.2    11.05 )-----------( 266      ( 17 Mar 2025 06:11 )             25.5     03-17    134[L]  |  98  |  32[H]  ----------------------------<  106[H]  3.7   |  32[H]  |  1.03    Ca    9.2      17 Mar 2025 06:11  Mg     1.8     03-16    TPro  6.2  /  Alb  3.0[L]  /  TBili  0.7  /  DBili  x   /  AST  37  /  ALT  97[H]  /  AlkPhos  61  03-16    PT/INR - ( 17 Mar 2025 06:11 )   PT: 13.2 sec;   INR: 1.15 ratio                 MEDICATIONS  (STANDING):  cefTRIAXone Injectable. 2000 milliGRAM(s) IV Push every 24 hours  ferrous    sulfate 325 milliGRAM(s) Oral daily  levothyroxine 150 MICROGram(s) Oral daily  metoprolol succinate ER 25 milliGRAM(s) Oral daily  tamsulosin 0.8 milliGRAM(s) Oral at bedtime      RADIOLOGY     EKG    ECHO    Assessment       Plan:    Department of Cardiology                                                               Division of Interventional Cardiology                                                               [facility] /[address] [address]                                                                                 [phone]                                                                                                                          Interventional Cardiology Nurse Practitioner Progress  Note  3/6/25  Patient is a 74y old  Male who presents with a chief complaint of worsening edema arf (15 Mar 2025 17:25)  Repeat MRI done this admission showed worsening discitis/osteomyelitis. Per ID, antibiotics alone will unlikely resolve this spinal infection and would consider surgical intervention if able to perform based on risk/benefit. After discussion with patient and offering surgical interventions vs continued medical management with antibiotics, patient wished to proceed with surgery. Of note, hospital course has been complicated by below the knee DVT, currently on therapeutic Lovenox, urinary retention requiring gupta, and significant LE anasarca.      Events/ Update:   3/17/25. Pt has opted for surgical intervention for infection. He is scheduled for the OR today, last dose of Lovenox given on 3/15/25. IVC placement requested just prior to surgery.         Vital Signs Last 24 Hrs  T(C): 36.8 (16 Mar 2025 23:20), Max: 36.8 (16 Mar 2025 23:20)  T(F): 98.2 (16 Mar 2025 23:20), Max: 98.2 (16 Mar 2025 23:20)  HR: 81 (16 Mar 2025 23:20) (70 - 81)  BP: 107/63 (16 Mar 2025 23:20) (107/63 - 135/71)  BP(mean): --  RR: 18 (16 Mar 2025 23:20) (16 - 18)  SpO2: 94% (16 Mar 2025 23:20) (94% - 97%)    Parameters below as of 16 Mar 2025 23:20  Patient On (Oxygen Delivery Method): room air        PHYSICAL EXAM:  NEURO: Non-focal, AxOx3.  No neuro deficits NECK: Supple, No JVD, No LAD  CHEST/LUNG: Clear to auscultation bilaterally; No wheeze  HEART: s1 s2 Regular rate and rhythm; No murmurs, rubs, or gallops  ABDOMEN: Soft, Nontender, Nondistended; Bowel sounds present X 4 quadrants   EXTREMITIES:  2+ Peripheral Pulses, No clubbing, cyanosis, or edema   VASCULAR: Peripheral pulses palpable 2+ bilaterally    Labs:                        8.2    11.05 )-----------( 266      ( 17 Mar 2025 06:11 )             25.5     03-17    134[L]  |  98  |  32[H]  ----------------------------<  106[H]  3.7   |  32[H]  |  1.03    Ca    9.2      17 Mar 2025 06:11  Mg     1.8     03-16    TPro  6.2  /  Alb  3.0[L]  /  TBili  0.7  /  DBili  x   /  AST  37  /  ALT  97[H]  /  AlkPhos  61  03-16    PT/INR - ( 17 Mar 2025 06:11 )   PT: 13.2 sec;   INR: 1.15 ratio      MEDICATIONS  (STANDING):  cefTRIAXone Injectable. 2000 milliGRAM(s) IV Push every 24 hours  ferrous    sulfate 325 milliGRAM(s) Oral daily  levothyroxine 150 MICROGram(s) Oral daily  metoprolol succinate ER 25 milliGRAM(s) Oral daily  tamsulosin 0.8 milliGRAM(s) Oral at bedtime        Plan:  s/p IVC placement with only local anesthetic   sheath removed, pressure held. Clean dry dressing in place.   Pt brought directly to the OR   [facility] cardiology will follow up with patient tomorrow 3/18/25  -defer to neurosurgery for resuming AC  Will plan for removal once patient is able to tolerate AC

## 2025-03-17 NOTE — BRIEF OPERATIVE NOTE - FIRST ASSIST NAME
Spoke to staff member that stated they did mail the letter out. It must still be pending in the mailbox to be sent out.      Mother verbalized understanding    Also will print and mail letter again.   Maricruz Ni (Physician Assistant)

## 2025-03-17 NOTE — CONSULT NOTE ADULT - NS ATTEND AMEND GEN_ALL_CORE FT
73 y/o M w/ pmh of bph, diverticulosis, graves dz, hemorrhoids, hld, hypothyroid, OA, SS, spinal cord injury (2004), multiple prior spinal surgeries (cervical and lumbar), presented to  for lower ext swelling and elevated BUN/Cr. Found to have acute L soleal/gastroc DVT, was started on AC. Hospital course c/b UE weakness L>R and pt was found to have worsening discitis/osteomyelitis w/ epidural phlegmon now POD#0 for thoracic lami/driange. IVC filter placed pre-op today for L gastroc DVT.    Problems:  #Spinal epidural abscess  #L gastrocnemius/soleal DVT  #GOVIND  #Anemia    Plan:  - Mentation intact, pain control dilaudid PCA, prn tylenol and valium, neurosurgery recs appreciated  - Hemodynamically stable, c/w metoprolol, cards following recs appreciated, TTE reviewed nl LVEF  - On room air  - NPO, will advance diet as tolerated in AM  - GOVIND improving, cont to monitor Is/Os and renal indices, c/w IVF while NPO, flomax for BPH  - C/w IV ceftriaxone for discitis/OM, ID following recs appreciated  - C/w synthroid, FS q6h  - DVT ppx SCDs, hold AC given recent surgery, s/p IVC filter placement today by interventional cards, f/u nsgy when okay to resume, hgb stable 9.5 postop cont to monitor 73 y/o M w/ pmh of bph, diverticulosis, graves dz, hemorrhoids, hld, hypothyroid, OA, SS, spinal cord injury (2004), multiple prior spinal surgeries (cervical and lumbar), presented to  for lower ext swelling and elevated BUN/Cr. Found to have acute L soleal/gastroc DVT, was started on AC. Hospital course c/b UE weakness L>R and pt was found to have worsening discitis/osteomyelitis w/ epidural phlegmon now POD#0 for thoracic lami/driange. IVC filter placed pre-op today for L gastroc DVT.    Problems:  #Spinal epidural abscess  #L gastrocnemius/soleal DVT  #GOVIND  #Anemia    Plan:  - Mentation intact, pain control dilaudid PCA, prn tylenol and valium, neurosurgery recs appreciated  - Hemodynamically stable, c/w metoprolol, cards following recs appreciated, TTE reviewed nl LVEF  - On room air  - NPO, will advance diet as tolerated in AM  - GOVIND improving, cont to monitor Is/Os and renal indices, c/w IVF while NPO, flomax for BPH  - C/w IV ceftriaxone for discitis/OM, ID following recs appreciated  - C/w synthroid, FS q6h  - DVT ppx SCDs, hold AC given recent surgery, s/p IVC filter placement today by interventional cards, f/u nsgy when okay to resume, hgb stable 9.5 postop cont to monitor, c/w iron supplementation

## 2025-03-17 NOTE — BRIEF OPERATIVE NOTE - NSICDXBRIEFPROCEDURE_GEN_ALL_CORE_FT
PROCEDURES:  Posterior thoracic laminectomy 18-Mar-2025 12:52:42 T1-3 laminectomy Keith Cantu  Fusion of thoracic spine by posterior technique 18-Mar-2025 12:53:25 C5-T3 fusion Keith Cantu

## 2025-03-17 NOTE — CONSULT NOTE ADULT - ASSESSMENT
75 y/o M w/ pmh of bph, diverticulosis, graves dz, hemorrhoids, hld, hypothyroid, OA, SS, spinal cord injury (2004), multiple prior spinal surgeries (cervical and lumbar), presented to  for lower ext swelling and elevated BUN/Cr. Hospital course c/b UE weakness L>R and pt was found to have worsening discitis/osteomyelitis w/ epidural phlegmon now POD#0 for thoracic lami/driange.    -Neuro: No acute issues, post-op pain control w/ dilaudid PCA, valium IV tylenol  -Cardiac: HDS, maintain MAP >65  -Resp: Resp status stable   -GI: maintain NPO tonight can likely restart diet in the AM  -Renal: Renal function stable cont to monitor along w/ lytes, maintain gupta and monitor UOP, cont IVF w/ LR  -ID: Discitis/omsteomyleitis on IV Rocephin  -Endo: Hypothyroid on synthroid, POC FS q6h while NPO  -Heme: DVT ppx w/ SCD, no chemical AC given pt is POD#0 spinal surgery, repeat CBC stable pt given 2U of PRBC intraop repeat CBC stable  -Disp: Admit to SICU, pt is full code, case d/w dr michael

## 2025-03-17 NOTE — CONSULT NOTE ADULT - SUBJECTIVE AND OBJECTIVE BOX
Department of Cardiology                                                               Division of Interventional Cardiology                                                               [facility] /[address] [address]                                                                                 [phone]           Interventional Cardiology Consult     Patient is a 74y old  Male who presents with a chief complaint of worsening edema arf   74-year-old male with a past medical history of benign prostatic hyperplasia, diverticulosis, Graves' disease, hemorrhoids, hyperlipidemia, hypothyroidism, osteoarthritis, spinal stenosis, spinal cord injury (2004), and multiple spinal surgeries (three cervical and one lumbar). He presents with complaints of lower extremity swelling and abnormal BUN/creatinine levels. The patient was recently admitted to [facility] from 02/18/25 to 02/25/25 for sepsis secondary to epidural abscess. He has a peripherally inserted central catheter line in the right arm and is on nightly Ceftriaxone treatment until 04/16/25. Following his discharge, he was transferred to [facility]. Documentation from the rehabilitation facility notes a decline in his functional status and dysfunction with activities of daily living. At the rehabilitation facility, he was noted to have worsening LE swelling and was started on torsemide. However, his Cr levels subsequently became elevated, leading to the discontinuation of torsemide. He was sent to the [facility] Emergency Department for further evaluation of his LE swelling and elevated Cr levels. He denies any chest pain, dyspnea, orthopnea, PND.   74 year old Male with noted history of spinal surgeries recent admission in February with noted streptococcus galactiae bacteremia and cervical spinal epidural abscess now admitted this time for ARF with lower extreme edema.   Repeat MRI done this admission showed worsening discitis/osteomyelitis. Per ID, antibiotics alone will unlikely resolve this spinal infection and would consider surgical intervention if able to perform based on risk/benefit. After discussion with patient and offering surgical interventions vs continued medical management with antibiotics, patient wished to proceed with surgery. Of note, hospital course has been complicated by below the knee DVT, currently on therapeutic Lovenox, urinary retention requiring gupta, and significant LE anasarca.Repeat MRI done this admission showed worsening discitis/osteomyelitis. Per ID, antibiotics alone will unlikely resolve this spinal infection and would consider surgical intervention if able to perform based on risk/benefit. After discussion with patient and offering surgical interventions vs continued medical management with antibiotics, patient wished to proceed with surgery. Of note, hospital course has been complicated by below the knee DVT, currently on therapeutic Lovenox, urinary retention requiring gupta, and significant LE anasarca.Repeat MRI done this admission showed worsening discitis/osteomyelitis. Per ID, antibiotics alone will unlikely resolve this spinal infection and would consider surgical intervention if able to perform based on risk/benefit. After discussion with patient and offering surgical interventions vs continued medical management with antibiotics, patient wished to proceed with surgery. Of note, hospital course has been complicated by below the knee DVT, currently on therapeutic Lovenox, urinary retention requiring gupta, and significant LE anasarca.      Interventional cardiology consulted for IVC filter  3/17/25n Pt has opted for surgical intervention for spinal abscess who is currently on Lovenox for known DVT. Last dose of lovenox was given on 3/15/25, pt presents to cardiac cath for IVC placement just prior to surgery today.       PREVIOUS DIAGNOSTIC TESTING  < from: US Duplex Venous Lower Ext Complete, Bilateral (03.14.25 @ 09:55) >  LEFT:  Normal compressibility of the LEFT common femoral, femoral and popliteal   veins.  Doppler examination shows normal spontaneous and phasic flow.  Posterior tibial and peroneal veins arecompressible, without evidence of   thrombus. Redemonstrated gastrocnemius vein thrombus cannot nonocclusive.   Previously present soleal vein clot is no longer seen.    IMPRESSION:  Acute deep venous thrombosis: below the knee.    Left calf vein deep venous thrombosis improved compared to prior   examination 3/6/2025          --- End of Report ---      < end of copied text >    Vital Signs  Vital Signs Last 24 Hrs  T(C): 36.8 (16 Mar 2025 23:20), Max: 36.8 (16 Mar 2025 23:20)  T(F): 98.2 (16 Mar 2025 23:20), Max: 98.2 (16 Mar 2025 23:20)  HR: 81 (16 Mar 2025 23:20) (70 - 81)  BP: 107/63 (16 Mar 2025 23:20) (107/63 - 135/71)  BP(mean): --  RR: 18 (16 Mar 2025 23:20) (16 - 18)  SpO2: 94% (16 Mar 2025 23:20) (94% - 97%)    Parameters below as of 16 Mar 2025 23:20  Patient On (Oxygen Delivery Method): room air    Labs:                        8.2    11.05 )-----------( 266      ( 17 Mar 2025 06:11 )             25.5     03-17    134[L]  |  98  |  32[H]  ----------------------------<  106[H]  3.7   |  32[H]  |  1.03    Ca    9.2      17 Mar 2025 06:11  Mg     1.8     03-16    TPro  6.2  /  Alb  3.0[L]  /  TBili  0.7  /  DBili  x   /  AST  37  /  ALT  97[H]  /  AlkPhos  61  03-16    PT/INR - ( 17 Mar 2025 06:11 )   PT: 13.2 sec;   INR: 1.15 ratio        MEDICATIONS  (STANDING):  cefTRIAXone Injectable. 2000 milliGRAM(s) IV Push every 24 hours  ferrous    sulfate 325 milliGRAM(s) Oral daily  levothyroxine 150 MICROGram(s) Oral daily  metoprolol succinate ER 25 milliGRAM(s) Oral daily  tamsulosin 0.8 milliGRAM(s) Oral at bedtime    MEDICATIONS  (PRN):  acetaminophen     Tablet .. 650 milliGRAM(s) Oral every 6 hours PRN Mild Pain (1 - 3)  aluminum hydroxide/magnesium hydroxide/simethicone Suspension 30 milliLiter(s) Oral every 4 hours PRN Dyspepsia  lactulose Syrup 20 Gram(s) Oral three times a day PRN for constipation  melatonin 3 milliGRAM(s) Oral at bedtime PRN Insomnia  ondansetron Injectable 4 milliGRAM(s) IV Push every 6 hours PRN Nausea and/or Vomiting  oxyCODONE    IR 5 milliGRAM(s) Oral every 6 hours PRN Moderate Pain (4 - 6)      PHYSICAL EXAM  GENERAL: NAD, AAOx3  CHEST/LUNG: Clear to auscultation bilaterally; No wheeze  HEART: s1 s2 Regular rate and rhythm; No murmurs, rubs, or gallops  ABDOMEN: Soft, Nontender, Nondistended; Bowel sounds present X 4 quadrants   EXTREMITIES:  LLE edema   Psych: normal affect and behavior, calm and cooperative     Plan:  IVC placement with local   directly to OR post placement.

## 2025-03-17 NOTE — PROGRESS NOTE ADULT - ASSESSMENT
Assessment:  74M with benign prostatic hyperplasia, diverticulosis, Graves' disease, hemorrhoids, hyperlipidemia, hypothyroidism, osteoarthritis, spinal stenosis, spinal cord injury (2004), and multiple spinal surgeries (three cervical and one lumbar), recently admitted 02/18/25 to 02/25/25 for sepsis secondary to High-grade Strep Bacteremia with Discitis OM and epidural abscess on IV CTX 2G q24 till 4/16/2025 presents back 3/4/2025 from [facility] for worsening LE swelling and GOVIND, Found to have LLE DVT  Remains afebrile and on RA  Repeat MRI T spine with 3/9 showing Worsening discitis/osteomyelitis is seen with epidural phlegmon again seen unchanged though increased bilaterally and ventral, this is consistent with paraspinal phlegmon though there is a possible early abscess  Neurosurgery recommend no surgical intervention and continue antibiotic  So far reports no fever or chills, back pain is stable,  strength the same    Prior work up:  History of multiple spinal surgery, C and T spine laminectomy with fusion hardware (2004), L spine laminectomy with fusion hardware (2006), all done in [facility]. Also has L knee replacement (2017) though no symptoms there, L knee ROM normal no pain No cardiac devices  BCx (2/17) with Strep dysgalacteae, Sy S  MRI 2/18 with concern for discitis T1-T3, possible phlegmon C7-T3, no abscess  TTE without obvious vegetation  BCx 2/19 negative    Antimicrobials:  cefTRIAXone Injectable. 2000 every 24 hours (2/18 --- )    Impression:   #High-grade Strep Bacteremia, Neck Pain, Concern for Spinal Involvement in setting of Multilevel C/T/L Spine Laminectomy with fusion hardware  #GOVIND  #DVT  #Leukocytosis  - PICC line appears clean, no signs of infection  - symptomatically stable yet still has R  weakness, MRI concerning T spine discitis OM with early paraspinal abscess, is this lagging radiologic findings other worsening infection? ESR 76 (14 in Feb), and  (169 in Feb)  - remains afebrile    Recommendations:  - continue Ceftriaxone 2G q24  - monitor temperature curve  - trend WBC  - reason for abx use reviewed with patient  - side effects of antibiotic discussed, tolerating abx well so far  - Prior cultures reviewed. An epidemiologic assessment was performed. There is a significant risk for resistant microorganisms to spread to family members, and/or healthcare staff. The patient will be placed on isolation according to infection control policy. Will reconsider isolation measures based on new culture results and other clinical data as appropriate Appropriate cultures collected and an appropriate broad spectrum antibiotic therapy will be considered  - Neurosurgery appreciated; OR today (3/17) for T1-3 laminectomies for decompression and extension of prior cervical fusion to thoracic spine  - will likely plan to extend IV therapy course pending OR findings  - rest per primary team    [facility] IV to PO Token not applicable; Patient to continue with IV Therapy

## 2025-03-17 NOTE — PROVIDER CONTACT NOTE (OTHER) - RECOMMENDATIONS
Spoke with [facility] in the OR.  OR would like patient to come directly to them after their IVC filter procedure.  Will assess groin in OR.  Dr. Brennan made aware.  Patient received local anesthesia.

## 2025-03-17 NOTE — CONSULT NOTE ADULT - TIME BILLING
Differential diagnosis and plan of care discussed with patient after the evaluation.   Counseling on diet, exercise, and medication compliance was done.  Counseling on smoking and alcohol cessation was offered when appropriate.  Pain assessed and judicious use of narcotics when appropriate was discussed.  Importance of fall prevention discussed.  Advanced care planning was discussed with patient and family.
.

## 2025-03-17 NOTE — PROGRESS NOTE ADULT - SUBJECTIVE AND OBJECTIVE BOX
Date of Service:03-17-25 @ 10:20  Interval History/ROS: Afebrile overnight, comfortable on RA, reports feeling fine, no fever or chills, plan for IVC filter placement and spinal OR today      REVIEW OF SYSTEMS  [  ] ROS unobtainable because:    [ x ] All other systems negative except as noted below    Constitutional:  [ ] fever [ ] chills  [ ] weight loss  [ ]night sweat  [ ]poor appetite/PO intake [ ]fatigue   Skin:  [ ] rash [ ] phlebitis	  Eyes: [ ] icterus [ ] pain  [ ] discharge	  ENMT: [ ] sore throat  [ ] thrush [ ] ulcers [ ] exudates [ ]anosmia  Respiratory: [ ] dyspnea [ ] hemoptysis [ ] cough [ ] sputum	  Cardiovascular:  [ ] chest pain [ ] palpitations [ ] edema	  Gastrointestinal:  [ ] nausea [ ] vomiting [ ] diarrhea [ ] constipation [ ] pain	  Genitourinary:  [ ] dysuria [ ] frequency [ ] hematuria [ ] discharge [ ] flank pain  [ ] incontinence  Musculoskeletal:  [ ] myalgias [ ] arthralgias [ ] arthritis  [ ] back pain  Neurological:  [ ] headache [ ] weakness [ ] seizures  [ ] confusion/altered mental status    Allergies  No Known Allergies        ANTIMICROBIALS:    cefTRIAXone Injectable. 2000 every 24 hours        OTHER MEDS: MEDICATIONS  (STANDING):  acetaminophen     Tablet .. 650 every 6 hours PRN  aluminum hydroxide/magnesium hydroxide/simethicone Suspension 30 every 4 hours PRN  lactulose Syrup 20 three times a day PRN  levothyroxine 150 daily  melatonin 3 at bedtime PRN  metoprolol succinate ER 25 daily  ondansetron Injectable 4 every 6 hours PRN  oxyCODONE    IR 5 every 6 hours PRN  tamsulosin 0.8 at bedtime      Vital Signs Last 24 Hrs  T(F): 98.2 (03-16-25 @ 23:20), Max: 99.3 (03-11-25 @ 23:35)    Vital Signs Last 24 Hrs  HR: 81 (03-16-25 @ 23:20) (70 - 81)  BP: 107/63 (03-16-25 @ 23:20) (107/63 - 135/71)  RR: 18 (03-16-25 @ 23:20)  SpO2: 94% (03-16-25 @ 23:20) (94% - 97%)  Wt(kg): --    EXAM:    Constitutional: frail, NAD  Head/Eyes: no icterus  LUNGS:  CTA  CVS:  regular rhythm  Abd:  soft, non-tender; non-distended  Ext:  +edema  Back: no spinal tenderness noted  Vascular:  IV site no erythema tenderness or discharge  Neuro: AAO X 3, weak R hand     Labs:                        8.2    11.05 )-----------( 266      ( 17 Mar 2025 06:11 )             25.5     03-17    134[L]  |  98  |  32[H]  ----------------------------<  106[H]  3.7   |  32[H]  |  1.03    Ca    9.2      17 Mar 2025 06:11  Mg     1.8     03-16    TPro  6.2  /  Alb  3.0[L]  /  TBili  0.7  /  DBili  x   /  AST  37  /  ALT  97[H]  /  AlkPhos  61  03-16      WBC Trend:  WBC Count: 11.05 (03-17-25 @ 06:11)  WBC Count: 9.44 (03-16-25 @ 06:59)  WBC Count: 9.28 (03-15-25 @ 07:25)  WBC Count: 10.84 (03-14-25 @ 06:05)      Creatine Trend:  Creatinine: 1.03 (03-17)  Creatinine: 1.20 (03-16)  Creatinine: 1.23 (03-15)  Creatinine: 1.30 (03-14)      Liver Biochemical Testing Trend:  Alanine Aminotransferase (ALT/SGPT): 97 *H* (03-16)  Alanine Aminotransferase (ALT/SGPT): 121 *H* (03-15)  Alanine Aminotransferase (ALT/SGPT): 141 *H* (03-14)  Alanine Aminotransferase (ALT/SGPT): 140 *H* (03-13)  Alanine Aminotransferase (ALT/SGPT): 155 *H* (03-12)  Aspartate Aminotransferase (AST/SGOT): 37 (03-16-25 @ 06:59)  Aspartate Aminotransferase (AST/SGOT): 58 (03-15-25 @ 07:25)  Aspartate Aminotransferase (AST/SGOT): 80 (03-14-25 @ 06:05)  Aspartate Aminotransferase (AST/SGOT): 101 (03-13-25 @ 05:18)  Aspartate Aminotransferase (AST/SGOT): 117 (03-12-25 @ 04:23)  Bilirubin Total: 0.7 (03-16)  Bilirubin Total: 0.8 (03-15)  Bilirubin Total: 0.9 (03-14)  Bilirubin Total: 1.0 (03-13)  Bilirubin Direct: 0.3 (03-12)      Trend LDH  03-14-25 @ 06:05  427[H]      Urinalysis Basic - ( 17 Mar 2025 06:11 )    Color: x / Appearance: x / SG: x / pH: x  Gluc: 106 mg/dL / Ketone: x  / Bili: x / Urobili: x   Blood: x / Protein: x / Nitrite: x   Leuk Esterase: x / RBC: x / WBC x   Sq Epi: x / Non Sq Epi: x / Bacteria: x        MICROBIOLOGY:    MRSA PCR Result.: NotDetec (02-19-25 @ 18:15)      Culture - Urine (collected 04 Mar 2025 15:12)  Source: .Urine None  Final Report:    <10,000 CFU/mL Normal Urogenital Altagracia    Urinalysis with Rflx Culture (collected 04 Mar 2025 15:12)    Culture - Blood (collected 19 Feb 2025 08:17)  Source: .Blood None  Final Report:    No growth at 5 days    Culture - Blood (collected 19 Feb 2025 08:10)  Source: .Blood None  Final Report:    No growth at 5 days    Urinalysis with Rflx Culture (collected 18 Feb 2025 01:54)    Culture - Blood (collected 17 Feb 2025 21:11)  Source: .Blood BLOOD  Final Report:    Growth in aerobic and anaerobic bottles: Streptococcus dysgalactiae    (Group C/G)    Direct identification is available within approximately 3-5    hours either by Blood Panel Multiplexed PCR or Direct    MALDI-TOF. Details: [email or web address]  Organism: Blood Culture PCR  Streptococcus dysgalactiae  Organism: Streptococcus dysgalactiae    Sensitivities:      Method Type: EDEL      -  Ceftriaxone: S <=0.25      -  Clindamycin: S <=0.06      -  Levofloxacin: S 0.5      -  Penicillin: S <=0.03 Predicts results for ampicillin, amoxicillin, amoxicillin/clavulanate, ampicillin/sulbactam, 1st, 2nd and 3rd generation cephalosporins and carbapenems.      -  Tetracycline: S <=0.5      -  Vancomycin: S 0.5  Organism: Blood Culture PCR    Sensitivities:      Method Type: PCR      -  Streptococcus sp. (Not Grp A, B or S pneumoniae): Detec    Culture - Blood (collected 17 Feb 2025 21:11)  Source: .Blood BLOOD  Final Report:    Growth in aerobic and anaerobic bottles: Streptococcus dysgalactiae    (Group C/G)    See previous culture [id number]    C-Reactive Protein: 110.0 (03-11)  C-Reactive Protein: 125.0 (03-10)        Lactate Dehydrogenase, Serum: 427 (03-14)          Sedimentation Rate, Erythrocyte: 77 mm/hr (03-14-25 @ 06:05)  Sedimentation Rate, Erythrocyte: 76 mm/hr (03-11-25 @ 05:19)  Sedimentation Rate, Erythrocyte: 96 mm/hr (03-10-25 @ 17:08)  Sedimentation Rate, Erythrocyte: 14 mm/hr (02-18-25 @ 07:10)      RADIOLOGY:  imaging below personally reviewed

## 2025-03-18 LAB
ALBUMIN SERPL ELPH-MCNC: 2.8 G/DL — LOW (ref 3.3–5)
ALP SERPL-CCNC: 62 U/L — SIGNIFICANT CHANGE UP (ref 40–120)
ALT FLD-CCNC: 67 U/L — SIGNIFICANT CHANGE UP (ref 12–78)
ANION GAP SERPL CALC-SCNC: 5 MMOL/L — SIGNIFICANT CHANGE UP (ref 5–17)
AST SERPL-CCNC: 38 U/L — HIGH (ref 15–37)
BILIRUB SERPL-MCNC: 0.6 MG/DL — SIGNIFICANT CHANGE UP (ref 0.2–1.2)
BUN SERPL-MCNC: 26 MG/DL — HIGH (ref 7–23)
CALCIUM SERPL-MCNC: 9 MG/DL — SIGNIFICANT CHANGE UP (ref 8.5–10.1)
CHLORIDE SERPL-SCNC: 101 MMOL/L — SIGNIFICANT CHANGE UP (ref 96–108)
CO2 SERPL-SCNC: 29 MMOL/L — SIGNIFICANT CHANGE UP (ref 22–31)
CREAT ?TM UR-MCNC: 106 MG/DL — SIGNIFICANT CHANGE UP
CREAT SERPL-MCNC: 0.93 MG/DL — SIGNIFICANT CHANGE UP (ref 0.5–1.3)
EGFR: 87 ML/MIN/1.73M2 — SIGNIFICANT CHANGE UP
EGFR: 87 ML/MIN/1.73M2 — SIGNIFICANT CHANGE UP
GLUCOSE SERPL-MCNC: 146 MG/DL — HIGH (ref 70–99)
HCT VFR BLD CALC: 27 % — LOW (ref 39–50)
HCT VFR BLD CALC: 28 % — LOW (ref 39–50)
HGB BLD-MCNC: 8.8 G/DL — LOW (ref 13–17)
HGB BLD-MCNC: 9.1 G/DL — LOW (ref 13–17)
MAGNESIUM SERPL-MCNC: 1.9 MG/DL — SIGNIFICANT CHANGE UP (ref 1.6–2.6)
MCHC RBC-ENTMCNC: 28.6 PG — SIGNIFICANT CHANGE UP (ref 27–34)
MCHC RBC-ENTMCNC: 32.6 G/DL — SIGNIFICANT CHANGE UP (ref 32–36)
MCV RBC AUTO: 87.7 FL — SIGNIFICANT CHANGE UP (ref 80–100)
MRSA PCR RESULT.: SIGNIFICANT CHANGE UP
NIGHT BLUE STAIN TISS: SIGNIFICANT CHANGE UP
NRBC # BLD AUTO: 0 K/UL — SIGNIFICANT CHANGE UP (ref 0–0)
NRBC # FLD: 0 K/UL — SIGNIFICANT CHANGE UP (ref 0–0)
NRBC BLD AUTO-RTO: 0 /100 WBCS — SIGNIFICANT CHANGE UP (ref 0–0)
PHOSPHATE SERPL-MCNC: 3.6 MG/DL — SIGNIFICANT CHANGE UP (ref 2.5–4.5)
PLATELET # BLD AUTO: 249 K/UL — SIGNIFICANT CHANGE UP (ref 150–400)
PMV BLD: 9.3 FL — SIGNIFICANT CHANGE UP (ref 7–13)
POTASSIUM SERPL-MCNC: 4.2 MMOL/L — SIGNIFICANT CHANGE UP (ref 3.5–5.3)
POTASSIUM SERPL-SCNC: 4.2 MMOL/L — SIGNIFICANT CHANGE UP (ref 3.5–5.3)
PROT ?TM UR-MCNC: 24 MG/DL — HIGH (ref 0–12)
PROT SERPL-MCNC: 6.2 GM/DL — SIGNIFICANT CHANGE UP (ref 6–8.3)
PROT/CREAT UR-RTO: 0.2 RATIO — SIGNIFICANT CHANGE UP (ref 0–0.2)
RBC # BLD: 3.08 M/UL — LOW (ref 4.2–5.8)
RBC # FLD: 14 % — SIGNIFICANT CHANGE UP (ref 10.3–14.5)
S AUREUS DNA NOSE QL NAA+PROBE: SIGNIFICANT CHANGE UP
SODIUM SERPL-SCNC: 135 MMOL/L — SIGNIFICANT CHANGE UP (ref 135–145)
SPECIMEN SOURCE: SIGNIFICANT CHANGE UP
WBC # BLD: 19.86 K/UL — HIGH (ref 3.8–10.5)
WBC # FLD AUTO: 19.86 K/UL — HIGH (ref 3.8–10.5)

## 2025-03-18 PROCEDURE — 99233 SBSQ HOSP IP/OBS HIGH 50: CPT

## 2025-03-18 RX ORDER — POLYETHYLENE GLYCOL 3350 17 G/17G
17 POWDER, FOR SOLUTION ORAL DAILY
Refills: 0 | Status: DISCONTINUED | OUTPATIENT
Start: 2025-03-18 | End: 2025-03-24

## 2025-03-18 RX ORDER — BUMETANIDE 1 MG/1
1 TABLET ORAL ONCE
Refills: 0 | Status: DISCONTINUED | OUTPATIENT
Start: 2025-03-18 | End: 2025-03-19

## 2025-03-18 RX ADMIN — Medication 325 MILLIGRAM(S): at 11:00

## 2025-03-18 RX ADMIN — METOPROLOL SUCCINATE 25 MILLIGRAM(S): 50 TABLET, EXTENDED RELEASE ORAL at 11:00

## 2025-03-18 RX ADMIN — POLYETHYLENE GLYCOL 3350 17 GRAM(S): 17 POWDER, FOR SOLUTION ORAL at 11:01

## 2025-03-18 RX ADMIN — CEFTRIAXONE 2000 MILLIGRAM(S): 500 INJECTION, POWDER, FOR SOLUTION INTRAMUSCULAR; INTRAVENOUS at 21:49

## 2025-03-18 RX ADMIN — TAMSULOSIN HYDROCHLORIDE 0.8 MILLIGRAM(S): 0.4 CAPSULE ORAL at 21:49

## 2025-03-18 RX ADMIN — Medication 150 MICROGRAM(S): at 05:09

## 2025-03-18 NOTE — PROGRESS NOTE ADULT - SUBJECTIVE AND OBJECTIVE BOX
HPI:  74-year-old male with PMH of BPH, diverticulosis, Graves' disease, hemorrhoids, HLD, hypothyroidism, osteoarthritis, spinal stenosis, spinal cord injury (2004), and multiple spinal surgeries (three cervical and one lumbar). He presents with complaints of lower extremity swelling and abnormal BUN/creatinine levels. The patient was recently admitted to [facility] from 02/18/25 to 02/25/25 for sepsis in the setting of streptococcus dysgalactiae bacteremia and was followed by neurosurgery for cervicothoracic spine infection. He was discharged to [facility] with a PICC for IV ceftriaxone until 04/16/25. Documentation from the rehabilitation facility notes a decline in his functional status and dysfunction with activities of daily living. At the rehabilitation facility, he was noted to have worsening LE swelling and was started on torsemide. However, his Cr levels subsequently became elevated, leading to the discontinuation of torsemide. He was sent to the  ED for further evaluation of his LE swelling and elevated Cr levels. He denies any chest pain, dyspnea, orthopnea, PND.     Repeat MRI done this admission showed worsening discitis/osteomyelitis. Per ID, antibiotics alone will unlikely resolve this spinal infection and would consider surgical intervention if able to perform based on risk/benefit. After discussion with patient and offering surgical interventions vs continued medical management with antibiotics, patient wished to proceed with surgery. Of note, hospital course has been complicated by below the knee DVT, currently on therapeutic Lovenox, urinary retention requiring gupta, and significant LE anasarca.    3/15- Pt seen and examined at bedside, resting comfortably and in NAD. Reports chronic right sided spacticity from prior spinal cord injury/surgery. He has had progressive worsening in hand  and loss of dexterity over the last few months. Pending medical clearance for OR Monday for thoracic laminectomy and fusion for epidural abscess.     3/16- Pt seen and examined at bedside, resting comfortably and in NAD. Reports some improvement in abdominal/leg pain 2/2 edema. Pending OR tomorrow.     Vital Signs Last 24 Hrs  T(C): 36.6 (18 Mar 2025 09:42), Max: 36.9 (17 Mar 2025 14:40)  T(F): 97.8 (18 Mar 2025 09:42), Max: 98.5 (17 Mar 2025 14:40)  HR: 82 (18 Mar 2025 06:00) (70 - 87)  BP: 131/72 (18 Mar 2025 06:00) (105/91 - 136/86)  BP(mean): 89 (18 Mar 2025 06:00) (89 - 96)  RR: 15 (18 Mar 2025 06:00) (10 - 19)  SpO2: 96% (18 Mar 2025 06:00) (92% - 99%)    Parameters below as of 18 Mar 2025 06:00  Patient On (Oxygen Delivery Method): room air    MEDICATIONS  (STANDING):  cefTRIAXone Injectable. 2000 milliGRAM(s) IV Push every 24 hours  ferrous    sulfate 325 milliGRAM(s) Oral daily  HYDROmorphone PCA (1 mG/mL) 30 milliLiter(s) PCA Continuous PCA Continuous  levothyroxine 150 MICROGram(s) Oral daily  metoprolol succinate ER 25 milliGRAM(s) Oral daily  polyethylene glycol 3350 17 Gram(s) Oral daily  tamsulosin 0.8 milliGRAM(s) Oral at bedtime    MEDICATIONS  (PRN):  acetaminophen     Tablet .. 650 milliGRAM(s) Oral every 6 hours PRN Mild Pain (1 - 3)  acetaminophen   IVPB .. 1000 milliGRAM(s) IV Intermittent once PRN Mild Pain (1 - 3)  aluminum hydroxide/magnesium hydroxide/simethicone Suspension 30 milliLiter(s) Oral every 4 hours PRN Dyspepsia  diazepam    Tablet 5 milliGRAM(s) Oral every 8 hours PRN Every 8 hours as needed for muscle spasm  HYDROmorphone PCA (1 mG/mL) Rescue Clinician Bolus 0.5 milliGRAM(s) IV Push every 15 minutes PRN for Pain Scale GREATER THAN 6  lactulose Syrup 20 Gram(s) Oral three times a day PRN for constipation  melatonin 3 milliGRAM(s) Oral at bedtime PRN Insomnia  naloxone Injectable 0.1 milliGRAM(s) IV Push every 3 minutes PRN For ANY of the following changes in patient status:  A. RR LESS THAN 10 breaths per minute, B. Oxygen saturation LESS THAN 90%, C. Sedation score of 6  ondansetron Injectable 4 milliGRAM(s) IV Push every 6 hours PRN Nausea and/or Vomiting    ROS: pertinent positives in HPI, all other ROS were reviewed and are negative     PHYSICAL EXAM:    LABS:                         8.8    19.86 )-----------( 249      ( 18 Mar 2025 05:40 )             27.0     03-18    135  |  101  |  26[H]  ----------------------------<  146[H]  4.2   |  29  |  0.93    Ca    9.0      18 Mar 2025 05:40  Phos  3.6     03-18  Mg     1.9     03-18    TPro  6.2  /  Alb  2.8[L]  /  TBili  0.6  /  DBili  x   /  AST  38[H]  /  ALT  67  /  AlkPhos  62  03-18    LIVER FUNCTIONS - ( 18 Mar 2025 05:40 )  Alb: 2.8 g/dL / Pro: 6.2 gm/dL / ALK PHOS: 62 U/L / ALT: 67 U/L / AST: 38 U/L / GGT: x                 RADIOLOGY:     HPI:  74-year-old male with PMH of BPH, diverticulosis, Graves' disease, hemorrhoids, HLD, hypothyroidism, osteoarthritis, spinal stenosis, spinal cord injury (2004), and multiple spinal surgeries (three cervical and one lumbar). He presents with complaints of lower extremity swelling and abnormal BUN/creatinine levels. The patient was recently admitted to [facility] from 02/18/25 to 02/25/25 for sepsis in the setting of streptococcus dysgalactiae bacteremia and was followed by neurosurgery for cervicothoracic spine infection. He was discharged to [facility] (formerly [facility]] with a PICC for IV ceftriaxone until 04/16/25. Documentation from the rehabilitation facility notes a decline in his functional status and dysfunction with activities of daily living. At the rehabilitation facility, he was noted to have worsening LE swelling and was started on torsemide. However, his Cr levels subsequently became elevated, leading to the discontinuation of torsemide. He was sent to the  ED for further evaluation of his LE swelling and elevated Cr levels. He denies any chest pain, dyspnea, orthopnea, PND.     Repeat MRI done this admission showed worsening discitis/osteomyelitis. Per ID, antibiotics alone will unlikely resolve this spinal infection and would consider surgical intervention if able to perform based on risk/benefit. After discussion with patient and offering surgical interventions vs continued medical management with antibiotics, patient wished to proceed with surgery. Of note, hospital course has been complicated by below the knee DVT, currently on therapeutic Lovenox, urinary retention requiring gupta, and significant LE anasarca.    3/15- Pt seen and examined at bedside, resting comfortably and in NAD. Reports chronic right sided spacticity from prior spinal cord injury/surgery. He has had progressive worsening in hand  and loss of dexterity over the last few months. Pending medical clearance for OR Monday for thoracic laminectomy and fusion for epidural abscess.     3/16- Pt seen and examined at bedside, resting comfortably and in NAD. Reports some improvement in abdominal/leg pain 2/2 edema. Pending OR tomorrow.     3/17- OR for posterior thoracic decompression T1-T3 with connection to existing hardware C5-T3 fusion under general anesthesia. Prior to OR pt had an ICV filter placed in IR.   Pt tolerated the procedure well, he lost approx. 1L of blood intra-op and was transfused 2 units of PRBCs. Pt was extubated at the end of the case and taken to PACU for observation then transferred to SICU for observation overnight.   Pt has a left and right drain both emptying into the same Hemovac, he was started on PCA for pain control.     3/18- POD #1, pt seen and examined in the SICU, c/o neck pain, dressing clean and dry, moving b/l upper ext antigravity, good  strength except to pointer fingers on both hands. Able to extend right lower extremitiy off the bed but not the left. Tolerating PO diet. Vital signs stable. H/H stable today. Hemovac output 240cc since surgery.     Vital Signs Last 24 Hrs  T(C): 36.6 (18 Mar 2025 09:42), Max: 36.9 (17 Mar 2025 14:40)  T(F): 97.8 (18 Mar 2025 09:42), Max: 98.5 (17 Mar 2025 14:40)  HR: 82 (18 Mar 2025 06:00) (70 - 87)  BP: 131/72 (18 Mar 2025 06:00) (105/91 - 136/86)  BP(mean): 89 (18 Mar 2025 06:00) (89 - 96)  RR: 15 (18 Mar 2025 06:00) (10 - 19)  SpO2: 96% (18 Mar 2025 06:00) (92% - 99%)    Parameters below as of 18 Mar 2025 06:00  Patient On (Oxygen Delivery Method): room air    MEDICATIONS  (STANDING):  cefTRIAXone Injectable. 2000 milliGRAM(s) IV Push every 24 hours  ferrous    sulfate 325 milliGRAM(s) Oral daily  HYDROmorphone PCA (1 mG/mL) 30 milliLiter(s) PCA Continuous PCA Continuous  levothyroxine 150 MICROGram(s) Oral daily  metoprolol succinate ER 25 milliGRAM(s) Oral daily  polyethylene glycol 3350 17 Gram(s) Oral daily  tamsulosin 0.8 milliGRAM(s) Oral at bedtime    MEDICATIONS  (PRN):  acetaminophen     Tablet .. 650 milliGRAM(s) Oral every 6 hours PRN Mild Pain (1 - 3)  acetaminophen   IVPB .. 1000 milliGRAM(s) IV Intermittent once PRN Mild Pain (1 - 3)  aluminum hydroxide/magnesium hydroxide/simethicone Suspension 30 milliLiter(s) Oral every 4 hours PRN Dyspepsia  diazepam    Tablet 5 milliGRAM(s) Oral every 8 hours PRN Every 8 hours as needed for muscle spasm  HYDROmorphone PCA (1 mG/mL) Rescue Clinician Bolus 0.5 milliGRAM(s) IV Push every 15 minutes PRN for Pain Scale GREATER THAN 6  lactulose Syrup 20 Gram(s) Oral three times a day PRN for constipation  melatonin 3 milliGRAM(s) Oral at bedtime PRN Insomnia  naloxone Injectable 0.1 milliGRAM(s) IV Push every 3 minutes PRN For ANY of the following changes in patient status:  A. RR LESS THAN 10 breaths per minute, B. Oxygen saturation LESS THAN 90%, C. Sedation score of 6  ondansetron Injectable 4 milliGRAM(s) IV Push every 6 hours PRN Nausea and/or Vomiting    ROS: pertinent positives in HPI, all other ROS were reviewed and are negative     PHYSICAL EXAM:  Constitutional: Awake / alert, appears comfortable, participates in exam   HEENT: PERRL, EOMI, hearing intact   Neck: Supple  Respiratory: Respirations non-labored  Cardiovascular: regular rate  Gastrointestinal: NT/ND  Extremities: diffuse b/l lower ext edema, right groin no hematoma from IVC filter   Musculoskeletal: no abnormal movements, diffuse LE edema   Skin: (sacral skin tear vs ulcer was noticed in the OR- wound nurse consult was placed)     Neurological Exam:  HF: AA&O x 3, follows commands, normal affect, speech fluent  CN: PERRL, EOMI, no NLFD, tongue midline  Motor:   	RUE: Deltoid 5/5, bicep 5/5, tricep 5/5, wrist ext 5/5,  4-/5, unable to fully flex 2nd digit  	LUE: Deltoid 5/5, bicep 5/5, tricep 5/5, wrist ext 5/5,  4-/5, unable to fully flex 2nd digit  	RLE: IP 4/5, Quad 4/5, EHL 5/5, Dorsi/plantarflexion 5/5 (some limitations 2/2 LE edema)  	LLE: IP 2/5, Quad 1/5, EHL 5/5, Dorsi/plantarflexion 5/5 (proximal leg significantly limited by pain and edema)  Sens: Intact to light touch except diminished in LLE  Reflexes: hyperreflexic RUE/RLE, diminished DTRs in LUE/LLE, +Right ankle clonus, +R babinski, eduardo negative b/l  Coord:  No dysmetria  Gait/Balance: Cannot test      LABS:                         8.8    19.86 )-----------( 249      ( 18 Mar 2025 05:40 )             27.0     03-18    135  |  101  |  26[H]  ----------------------------<  146[H]  4.2   |  29  |  0.93    Ca    9.0      18 Mar 2025 05:40  Phos  3.6     03-18  Mg     1.9     03-18    TPro  6.2  /  Alb  2.8[L]  /  TBili  0.6  /  DBili  x   /  AST  38[H]  /  ALT  67  /  AlkPhos  62  03-18    LIVER FUNCTIONS - ( 18 Mar 2025 05:40 )  Alb: 2.8 g/dL / Pro: 6.2 gm/dL / ALK PHOS: 62 U/L / ALT: 67 U/L / AST: 38 U/L / GGT: x           RADIOLOGY:  No new imaging

## 2025-03-18 NOTE — PROGRESS NOTE ADULT - ASSESSMENT
74 year old Male with noted history of multiple spinal surgeries, recent admission in February with noted streptococcus galactiae bacteremia and cervical spinal infection, now admitted for lower extremity edema.   Concern for worsening OM.     POD #1 s/p OR for posterior thoracic decompression T1-T3 with connection to existing hardware C5-T3 fusion under general anesthesia    PLAN-  Advance diet and activity as tolerated   Pain meds as needed, continue PCA for now, will consider transition to PO pain meds tomorrow   Valium for muscle spasm   Physical therapy as tolerated   Antibiotics as per ID   Follow up intra-op wound culture   pending sacral wound assessment by wound/skin team, continue turning patient Q 2 hours and nursing skin assessments   Monitor Hemovac output   follow H/H, 2 units of PRBCs given intra-op   Maintain Ortega for now as pt has had urinary retention with previous trial of void   Right Venodyne only due to left calf DVT (s/p pre-op IVC filter)

## 2025-03-18 NOTE — PHYSICAL THERAPY INITIAL EVALUATION ADULT - BALANCE DISTURBANCE, IDENTIFIED IMPAIRMENT CONTRIBUTE, REHAB EVAL
impaired postural control/decreased sensation/decreased strength
impaired motor control/impaired postural control/decreased sensation/decreased strength

## 2025-03-18 NOTE — PHYSICAL THERAPY INITIAL EVALUATION ADULT - GENERAL OBSERVATIONS, REHAB EVAL
Pt was found lying in bed in SICU with tele, BP cuff, IV, PCA, gupta, flowtrons, pulse ox, dressing+ upper thoracic spine, mild pain, pt is pleasant and willing to participate in PT, still feels distended/bloated
Pt was found sitting in bedside HB chair with tele, pt is pleasant and willing to participate in PT, c/o bloating, BLE edema, inc abd girth

## 2025-03-18 NOTE — PROGRESS NOTE ADULT - SUBJECTIVE AND OBJECTIVE BOX
Patient is a 74y Male who reports no complaints as new, pod 1. Reports edema persistent, taking PO    MEDICATIONS  (STANDING):  cefTRIAXone Injectable. 2000 milliGRAM(s) IV Push every 24 hours  ferrous    sulfate 325 milliGRAM(s) Oral daily  HYDROmorphone PCA (1 mG/mL) 30 milliLiter(s) PCA Continuous PCA Continuous  levothyroxine 150 MICROGram(s) Oral daily  metoprolol succinate ER 25 milliGRAM(s) Oral daily  polyethylene glycol 3350 17 Gram(s) Oral daily  tamsulosin 0.8 milliGRAM(s) Oral at bedtime    MEDICATIONS  (PRN):  acetaminophen     Tablet .. 650 milliGRAM(s) Oral every 6 hours PRN Mild Pain (1 - 3)  acetaminophen   IVPB .. 1000 milliGRAM(s) IV Intermittent once PRN Mild Pain (1 - 3)  aluminum hydroxide/magnesium hydroxide/simethicone Suspension 30 milliLiter(s) Oral every 4 hours PRN Dyspepsia  diazepam    Tablet 5 milliGRAM(s) Oral every 8 hours PRN Every 8 hours as needed for muscle spasm  HYDROmorphone PCA (1 mG/mL) Rescue Clinician Bolus 0.5 milliGRAM(s) IV Push every 15 minutes PRN for Pain Scale GREATER THAN 6  lactulose Syrup 20 Gram(s) Oral three times a day PRN for constipation  melatonin 3 milliGRAM(s) Oral at bedtime PRN Insomnia  naloxone Injectable 0.1 milliGRAM(s) IV Push every 3 minutes PRN For ANY of the following changes in patient status:  A. RR LESS THAN 10 breaths per minute, B. Oxygen saturation LESS THAN 90%, C. Sedation score of 6  ondansetron Injectable 4 milliGRAM(s) IV Push every 6 hours PRN Nausea and/or Vomiting        T(C): , Max: 36.9 (03-17-25 @ 14:40)  T(F): , Max: 98.5 (03-17-25 @ 14:40)  HR: 78 (03-18-25 @ 10:00)  BP: 110/60 (03-18-25 @ 09:00)  BP(mean): 77 (03-18-25 @ 09:00)  RR: 19 (03-18-25 @ 10:00)  SpO2: 95% (03-18-25 @ 08:00)  Wt(kg): --    03-17 @ 07:01  -  03-18 @ 07:00  --------------------------------------------------------  IN: 4125 mL / OUT: 1325 mL / NET: 2800 mL          PHYSICAL EXAM:    Constitutional: NAD   HEENT:  MM  dist  Cardiovascular: S1 and S2   Extremities:++ peripheral edema  Neurological: A/O x 3, no focal deficits  Psychiatric: Normal mood, normal affect           LABS:                        8.8    19.86 )-----------( 249      ( 18 Mar 2025 05:40 )             27.0     18 Mar 2025 05:40    135    |  101    |  26     ----------------------------<  146    4.2     |  29     |  0.93   17 Mar 2025 06:11    134    |  98     |  32     ----------------------------<  106    3.7     |  32     |  1.03   16 Mar 2025 06:59    137    |  98     |  38     ----------------------------<  120    3.7     |  36     |  1.20   15 Mar 2025 07:25    137    |  97     |  36     ----------------------------<  125    3.2     |  35     |  1.23     Ca    9.0        18 Mar 2025 05:40  Ca    9.2        17 Mar 2025 06:11  Ca    9.3        16 Mar 2025 06:59  Ca    9.4        15 Mar 2025 07:25  Phos  3.6       18 Mar 2025 05:40  Mg     1.9       18 Mar 2025 05:40  Mg     1.8       16 Mar 2025 06:59    TPro  6.2    /  Alb  2.8    /  TBili  0.6    /  DBili  x      /  AST  38     /  ALT  67     /  AlkPhos  62     18 Mar 2025 05:40  TPro  6.2    /  Alb  3.0    /  TBili  0.7    /  DBili  x      /  AST  37     /  ALT  97     /  AlkPhos  61     16 Mar 2025 06:59  TPro  6.6    /  Alb  3.0    /  TBili  0.8    /  DBili  x      /  AST  58     /  ALT  121    /  AlkPhos  62     15 Mar 2025 07:25          Urine Studies:  Urinalysis Basic - ( 18 Mar 2025 05:40 )    Color: x / Appearance: x / SG: x / pH: x  Gluc: 146 mg/dL / Ketone: x  / Bili: x / Urobili: x   Blood: x / Protein: x / Nitrite: x   Leuk Esterase: x / RBC: x / WBC x   Sq Epi: x / Non Sq Epi: x / Bacteria: x      Creatinine, Random Urine: 92 mg/dL (03-16 @ 18:16)  Protein/Creatinine Ratio Calculation: 0.2 Ratio (03-16 @ 18:16)        RADIOLOGY & ADDITIONAL STUDIES:

## 2025-03-18 NOTE — PROGRESS NOTE ADULT - SUBJECTIVE AND OBJECTIVE BOX
Patient is a 74y old  Male who presents with a chief complaint of Sepsis (18 Mar 2025 10:30)      BRIEF HOSPITAL COURSE: 75 y/o M w/ pmh of bph, diverticulosis, graves dz, hemorrhoids, hld, hypothyroid, OA, SS, spinal cord injury (2004), multiple prior spinal surgeries (cervical and lumbar), presented to  for lower ext swelling and elevated BUN/Cr. Hospital course c/b UE weakness L>R and pt was found to have worsening discitis/osteomyelitis w/ epidural phlegmon. OR on 3/17 for thoracic lami/drainage    Events last 24 hours: Seen and examined at the bedside. Feels well, complains of LLE swelling and adnominal swelling. pain is controlled with PCA pump.     PAST MEDICAL & SURGICAL HISTORY:  Hyperlipidemia, unspecified hyperlipidemia type      Diverticulosis of large intestine without hemorrhage      History of colon polyps      Hemorrhoids, unspecified hemorrhoid type      Benign prostatic hyperplasia without lower urinary tract symptoms, unspecified morphology      Osteoarthritis of knee, unilateral  left      Spinal cord injury at C5-C7 level without injury of spinal bone, subsequent encounter      Spastic      Weakness  to right side      Myelopathy      Spinal stenosis, unspecified spinal region      Hypothyroidism, unspecified type      Graves disease      Alcoholic  recovering alcoholic x 40years      History of cervical discectomy      S/P laminectomy  Cervical      H/O lumbar discectomy  with laminectomy      H/O arthroscopy of knee  Left x 2. Unsure of years      H/O colonoscopy with polypectomy  2014          Review of Systems:  CONSTITUTIONAL: No fever, chills, or fatigue  EYES: No eye pain, visual disturbances, or discharge  ENMT:  No difficulty hearing, tinnitus, vertigo; No sinus or throat pain  NECK: No pain or stiffness  RESPIRATORY: No cough, wheezing, chills or hemoptysis; No shortness of breath  CARDIOVASCULAR: No chest pain, palpitations, dizziness, or leg swelling  GASTROINTESTINAL: No abdominal or epigastric pain. No nausea, vomiting, or hematemesis; No diarrhea or constipation. No melena or hematochezia.  GENITOURINARY: No dysuria, frequency, hematuria, or incontinence  NEUROLOGICAL: No headaches, memory loss, loss of strength, numbness, or tremors  SKIN: No itching, burning, rashes, or lesions   MUSCULOSKELETAL: No joint pain or swelling; No muscle, back, or extremity pain  PSYCHIATRIC: No depression, anxiety, mood swings, or difficulty sleeping      Medications:  cefTRIAXone Injectable. 2000 milliGRAM(s) IV Push every 24 hours    metoprolol succinate ER 25 milliGRAM(s) Oral daily      acetaminophen     Tablet .. 650 milliGRAM(s) Oral every 6 hours PRN  acetaminophen   IVPB .. 1000 milliGRAM(s) IV Intermittent once PRN  diazepam    Tablet 5 milliGRAM(s) Oral every 8 hours PRN  HYDROmorphone PCA (1 mG/mL) 30 milliLiter(s) PCA Continuous PCA Continuous  HYDROmorphone PCA (1 mG/mL) Rescue Clinician Bolus 0.5 milliGRAM(s) IV Push every 15 minutes PRN  melatonin 3 milliGRAM(s) Oral at bedtime PRN  ondansetron Injectable 4 milliGRAM(s) IV Push every 6 hours PRN        aluminum hydroxide/magnesium hydroxide/simethicone Suspension 30 milliLiter(s) Oral every 4 hours PRN  lactulose Syrup 20 Gram(s) Oral three times a day PRN  polyethylene glycol 3350 17 Gram(s) Oral daily    tamsulosin 0.8 milliGRAM(s) Oral at bedtime    levothyroxine 150 MICROGram(s) Oral daily    ferrous    sulfate 325 milliGRAM(s) Oral daily        naloxone Injectable 0.1 milliGRAM(s) IV Push every 3 minutes PRN          ICU Vital Signs Last 24 Hrs  T(C): 36.6 (18 Mar 2025 09:42), Max: 36.9 (17 Mar 2025 14:40)  T(F): 97.8 (18 Mar 2025 09:42), Max: 98.5 (17 Mar 2025 14:40)  HR: 83 (18 Mar 2025 12:00) (70 - 87)  BP: 128/80 (18 Mar 2025 12:00) (105/91 - 136/86)  BP(mean): 92 (18 Mar 2025 12:00) (77 - 96)  ABP: 129/67 (18 Mar 2025 10:00) (105/82 - 140/92)  ABP(mean): 90 (18 Mar 2025 10:00) (51 - 102)  RR: 14 (18 Mar 2025 12:00) (10 - 19)  SpO2: 95% (18 Mar 2025 12:00) (92% - 99%)    O2 Parameters below as of 18 Mar 2025 06:00  Patient On (Oxygen Delivery Method): room air            ABG - ( 17 Mar 2025 12:45 )  pH, Arterial: 7.55  pH, Blood: x     /  pCO2: 33    /  pO2: 211   / HCO3: 29    / Base Excess: 6.6   /  SaO2: 99                  I&O's Detail    17 Mar 2025 07:01  -  18 Mar 2025 07:00  --------------------------------------------------------  IN:    Lactated Ringers: 125 mL    Other (mL): 4000 mL  Total IN: 4125 mL    OUT:    Accordian (mL): 240 mL    Indwelling Catheter - Urethral (mL): 300 mL    Other (mL): 225 mL    Ureteral Catheter (mL): 560 mL  Total OUT: 1325 mL    Total NET: 2800 mL            LABS:                        8.8    19.86 )-----------( 249      ( 18 Mar 2025 05:40 )             27.0     03-18    135  |  101  |  26[H]  ----------------------------<  146[H]  4.2   |  29  |  0.93    Ca    9.0      18 Mar 2025 05:40  Phos  3.6     03-18  Mg     1.9     03-18    TPro  6.2  /  Alb  2.8[L]  /  TBili  0.6  /  DBili  x   /  AST  38[H]  /  ALT  67  /  AlkPhos  62  03-18          CAPILLARY BLOOD GLUCOSE      POCT Blood Glucose.: 142 mg/dL (17 Mar 2025 14:42)    PT/INR - ( 17 Mar 2025 06:11 )   PT: 13.2 sec;   INR: 1.15 ratio           Urinalysis Basic - ( 18 Mar 2025 05:40 )    Color: x / Appearance: x / SG: x / pH: x  Gluc: 146 mg/dL / Ketone: x  / Bili: x / Urobili: x   Blood: x / Protein: x / Nitrite: x   Leuk Esterase: x / RBC: x / WBC x   Sq Epi: x / Non Sq Epi: x / Bacteria: x      CULTURES:  Culture Results:   Testing in progress (03-17-25 @ 08:44)      Physical Examination:    General:  Alert, oriented, interactive, nonfocal    HEENT: Pupils equal, reactive to light.  Symmetric.    PULM: Clear to auscultation bilaterally    CVS: Regular rate and rhythm    ABD: firm, mildly distended, non tender    EXT: LLE with swelling worse compared to right     SKIN: Warm     NEURO: A&Ox3, strength 5/5 all extremities,  no focal deficits      RADIOLOGY: reviewed       " Time spent on this patient encounter, which includes documenting this note in the electronic medical record, was 42 minutes including assessing the presenting problems with associated risks, reviewing the medical record to prepare for the encounter, and meeting face to face with patient to obtain additional history. I have also performed an appropriate physical exam, made interventions listed and ordered and interpreted appropriate diagnostic studies as documented. To improve communication and patient saftey, I have coordinated care with the multidisciplinary team including the bedside nurse, appropriate attending of record and consultants as needed. "    Date of Entry of this note is equal to the date of services rendered

## 2025-03-18 NOTE — PROGRESS NOTE ADULT - ASSESSMENT
Problem List:  1) Spinal epidural abscess  2) GOVIND - resolved  3) L gastrocnemius/soleal DVT    covering for spinal abscess with ceftriaxone 2g, ID is following. Follow on OR cultures.  Pain control with PCA pump, PRN valium   IVC placed for DVT, no A/C at this time due to spinal issues  advance diet  bowel regimen as abdominal distention most likely related to consipation  PT, OOB  AM labs  If Hgb remains stable will talk to neurosurg about starting DvtP  Plan discussed with ICu attending Dr. Tavares

## 2025-03-18 NOTE — PROVIDER CONTACT NOTE (OTHER) - SITUATION
Pt unable to void - ugpta removed 3/6 at 14:00
Office is aware that patient is in [facility].  Please fax discharge papers to [phone].

## 2025-03-18 NOTE — PHYSICAL THERAPY INITIAL EVALUATION ADULT - PERTINENT HX OF CURRENT PROBLEM, REHAB EVAL
75 y/o M w/ pmh of bph, diverticulosis, graves dz, hemorrhoids, hld, hypothyroid, OA, SS, spinal cord injury (2004), multiple prior spinal surgeries (cervical and lumbar), presented to  for lower ext swelling and elevated BUN/Cr. Hospital course c/b UE weakness L>R and pt was found to have worsening discitis/osteomyelitis w/ epidural phlegmon. Pt today taken to the OR for thoracic lami/driange with plan for post-op admission to SICU for post-op observation, IVC filter placed pre-op on 3/17 for L gastroc DVT.
74-year-old male with a past medical history of benign prostatic hyperplasia, diverticulosis, Graves' disease, hemorrhoids, hyperlipidemia, hypothyroidism, osteoarthritis, spinal stenosis, spinal cord injury (2004), and multiple spinal surgeries (three cervical and one lumbar). He presents with complaints of lower extremity swelling and abnormal BUN/creatinine levels. The patient was recently admitted to [facility] from 02/18/25 to 02/25/25 for sepsis secondary to epidural abscess, He was sent to the [facility] Emergency Department for further evaluation of his LE swelling and elevated Cr levels, c/o LE numbness @ thighs and L hand numbness.

## 2025-03-18 NOTE — PHYSICAL THERAPY INITIAL EVALUATION ADULT - MANUAL MUSCLE TESTING RESULTS, REHAB EVAL
B/l hand and fingers: 3-, fine and gross motor ms weakness, pt needing assist to feed, BLE: 3+, gross generalized weakness/grossly assessed due to
LLE: IP 2/5, Quad 1/5, EHL 5/5, Dorsi/plantarflexion 5/5 ,    RLE: IP 4/5, Quad 4/5, EHL 5/5, Dorsi/plantarflexion 5/5, B/l Shoulder and elbow : 4+/5, wrist/fingers 3-/5, has pointer finger deformity on both hands/grossly assessed due to

## 2025-03-18 NOTE — PHYSICAL THERAPY INITIAL EVALUATION ADULT - RANGE OF MOTION EXAMINATION, REHAB EVAL
no ROM deficits were identified
BUE-wrist/finger ranges limited due to weakness/swelling, other ranges WFL/deficits as listed below

## 2025-03-18 NOTE — PROGRESS NOTE ADULT - ASSESSMENT
74-year-old male with a past medical history of benign prostatic hyperplasia, diverticulosis, Graves' disease, hemorrhoids, hyperlipidemia, hypothyroidism, osteoarthritis, spinal stenosis, spinal cord injury (2004), and multiple spinal surgeries w extremity swelling and abnormal BUN/creatinine levels.     Edema/Anasarca     - Optimize intake/protein stores, mobilize as cleared  - Monitor labs closely , stable renal functoin  -Would resume bumex diuresis at 1 mg iv qd, dose now   -No further fluid  -Serum workup negative but will need follow up of  Serum  TRUDY positive for weak IgG Lambda band.  - I/O's   -Most recent UA "tr protein", repeat spot value ordered to quantify     Mild Rhabdo with elevated LFT - CPK trend    dc w staff, teamd

## 2025-03-18 NOTE — PROGRESS NOTE ADULT - SUBJECTIVE AND OBJECTIVE BOX
Department of Cardiology                                                               Division of Interventional Cardiology                                                               [facility] /[address] [address]                                                                                 [phone]                                                                                                                          Interventional Cardiology Nurse Practitioner Progress  Note    Patient is a 74y old  Male who presents with a chief complaint of Sepsis (18 Mar 2025 10:30)      s/p IPV with IVC filter placed yesterday prior to neurosx intervention on spine   seen and examined and evaluated. resting comfortably in bed in NAD, with no respiratory distress, no chest pain, dyspnea, palpitations, PND, or orthopnea.    PROCEDURE SITE: RFV accessed.  ,Site is without hematoma or bleeding. Sensation and PB intact. Distal pulses palpable 2+, capillary refill < 2 seconds. Patient denies pain, numbness, tingling, CP or SOB. dressing clean and dry, left in place, can remove when patient gets up    Vital Signs Last 24 Hrs  T(C): 36.6 (18 Mar 2025 09:42), Max: 36.9 (17 Mar 2025 14:40)  T(F): 97.8 (18 Mar 2025 09:42), Max: 98.5 (17 Mar 2025 14:40)  HR: 78 (18 Mar 2025 10:00) (70 - 87)  BP: 110/60 (18 Mar 2025 09:00) (105/91 - 136/86)  BP(mean): 77 (18 Mar 2025 09:00) (77 - 96)  RR: 19 (18 Mar 2025 10:00) (10 - 19)  SpO2: 95% (18 Mar 2025 08:00) (92% - 99%)    Parameters below as of 18 Mar 2025 06:00  Patient On (Oxygen Delivery Method): room air        PHYSICAL EXAM:  NEURO: Non-focal, AxOx3.  No neuro deficits NECK: Supple, No JVD, No LAD  CHEST/LUNG: Clear to auscultation bilaterally; No wheeze  HEART: s1 s2 Regular rate and rhythm; No murmurs, rubs, or gallops  ABDOMEN: Soft, Nontender, Nondistended; Bowel sounds present X 4 quadrants   EXTREMITIES:  2+ Peripheral Pulses, No clubbing, cyanosis, or edema   VASCULAR: Peripheral pulses palpable 2+ bilaterally    Labs:                        8.8    19.86 )-----------( 249      ( 18 Mar 2025 05:40 )             27.0     03-18    135  |  101  |  26[H]  ----------------------------<  146[H]  4.2   |  29  |  0.93    Ca    9.0      18 Mar 2025 05:40  Phos  3.6     03-18  Mg     1.9     03-18    TPro  6.2  /  Alb  2.8[L]  /  TBili  0.6  /  DBili  x   /  AST  38[H]  /  ALT  67  /  AlkPhos  62  03-18    PT/INR - ( 17 Mar 2025 06:11 )   PT: 13.2 sec;   INR: 1.15 ratio                 MEDICATIONS  (STANDING):  cefTRIAXone Injectable. 2000 milliGRAM(s) IV Push every 24 hours  ferrous    sulfate 325 milliGRAM(s) Oral daily  HYDROmorphone PCA (1 mG/mL) 30 milliLiter(s) PCA Continuous PCA Continuous  levothyroxine 150 MICROGram(s) Oral daily  metoprolol succinate ER 25 milliGRAM(s) Oral daily  polyethylene glycol 3350 17 Gram(s) Oral daily  tamsulosin 0.8 milliGRAM(s) Oral at bedtime      RADIOLOGY   < from: Cardiac Catheterization (03.17.25 @ 07:57) >  Successul placement of IVC filter in IVC location under flouroscopy  and contrast visualization.    Recommendations:     Defer resuming anticoagulation to neurosurgery. Vascular cardiology to  follow patient closely with plan for eventual filter  removal as outpatient once removed from surgery and can resume  anticoagulation safely. Findings relayed to patient and  primary team.      < end of copied text >

## 2025-03-18 NOTE — PROGRESS NOTE ADULT - ASSESSMENT
74-year-old male with a past medical history of benign prostatic hyperplasia, diverticulosis,  Graves' disease, hemorrhoids, hyperlipidemia, hypothyroidism, osteoarthritis, spinal stenosis, spinal cord  injury (2004), and multiple spinal surgeries (three cervical and one lumbar). He presents with complaints of  lower extremity swelling. Found to have acute LE DVT on left side.    -s/p IVC filter placed 3/17/25  -Defer resuming anticoagulation to neurosurgery.  -patient can follow up with Dr. Brennan from vascular cardiology and will plan for eventual filter removal as outpatient once removed from surgery and can resume anticoagulation safely.   - 1 month follow up apt 4/23/25 @ 10am Dr. Brennan in clinic  -apt card provided to patient.   - thank you for allowing us to participate in the care of this patient. no further vascular/interventional  cardiology input at this time, please don't hesitant to reconsult if new clinical issues arise

## 2025-03-19 LAB
ALBUMIN SERPL ELPH-MCNC: 2.7 G/DL — LOW (ref 3.3–5)
ALP SERPL-CCNC: 64 U/L — SIGNIFICANT CHANGE UP (ref 40–120)
ALT FLD-CCNC: 52 U/L — SIGNIFICANT CHANGE UP (ref 12–78)
ANION GAP SERPL CALC-SCNC: 3 MMOL/L — LOW (ref 5–17)
AST SERPL-CCNC: 31 U/L — SIGNIFICANT CHANGE UP (ref 15–37)
BASOPHILS # BLD AUTO: 0.09 K/UL — SIGNIFICANT CHANGE UP (ref 0–0.2)
BASOPHILS NFR BLD AUTO: 0.7 % — SIGNIFICANT CHANGE UP (ref 0–2)
BILIRUB SERPL-MCNC: 0.5 MG/DL — SIGNIFICANT CHANGE UP (ref 0.2–1.2)
BUN SERPL-MCNC: 25 MG/DL — HIGH (ref 7–23)
CALCIUM SERPL-MCNC: 8.7 MG/DL — SIGNIFICANT CHANGE UP (ref 8.5–10.1)
CHLORIDE SERPL-SCNC: 104 MMOL/L — SIGNIFICANT CHANGE UP (ref 96–108)
CO2 SERPL-SCNC: 31 MMOL/L — SIGNIFICANT CHANGE UP (ref 22–31)
CREAT SERPL-MCNC: 0.76 MG/DL — SIGNIFICANT CHANGE UP (ref 0.5–1.3)
EGFR: 96 ML/MIN/1.73M2 — SIGNIFICANT CHANGE UP
EGFR: 96 ML/MIN/1.73M2 — SIGNIFICANT CHANGE UP
EOSINOPHIL # BLD AUTO: 0.38 K/UL — SIGNIFICANT CHANGE UP (ref 0–0.5)
EOSINOPHIL NFR BLD AUTO: 2.8 % — SIGNIFICANT CHANGE UP (ref 0–6)
GLUCOSE SERPL-MCNC: 100 MG/DL — HIGH (ref 70–99)
HCT VFR BLD CALC: 27.8 % — LOW (ref 39–50)
HGB BLD-MCNC: 8.9 G/DL — LOW (ref 13–17)
IMM GRANULOCYTES # BLD AUTO: 0.18 K/UL — HIGH (ref 0–0.07)
IMM GRANULOCYTES NFR BLD AUTO: 1.3 % — HIGH (ref 0–0.9)
LYMPHOCYTES # BLD AUTO: 1.62 K/UL — SIGNIFICANT CHANGE UP (ref 1–3.3)
LYMPHOCYTES NFR BLD AUTO: 11.9 % — LOW (ref 13–44)
MAGNESIUM SERPL-MCNC: 1.8 MG/DL — SIGNIFICANT CHANGE UP (ref 1.6–2.6)
MCHC RBC-ENTMCNC: 29 PG — SIGNIFICANT CHANGE UP (ref 27–34)
MCHC RBC-ENTMCNC: 32 G/DL — SIGNIFICANT CHANGE UP (ref 32–36)
MCV RBC AUTO: 90.6 FL — SIGNIFICANT CHANGE UP (ref 80–100)
MONOCYTES # BLD AUTO: 1.12 K/UL — HIGH (ref 0–0.9)
MONOCYTES NFR BLD AUTO: 8.3 % — SIGNIFICANT CHANGE UP (ref 2–14)
NEUTROPHILS # BLD AUTO: 10.18 K/UL — HIGH (ref 1.8–7.4)
NEUTROPHILS NFR BLD AUTO: 75 % — SIGNIFICANT CHANGE UP (ref 43–77)
NRBC # BLD AUTO: 0 K/UL — SIGNIFICANT CHANGE UP (ref 0–0)
NRBC # FLD: 0 K/UL — SIGNIFICANT CHANGE UP (ref 0–0)
NRBC BLD AUTO-RTO: 0 /100 WBCS — SIGNIFICANT CHANGE UP (ref 0–0)
PHOSPHATE SERPL-MCNC: 2.9 MG/DL — SIGNIFICANT CHANGE UP (ref 2.5–4.5)
PLATELET # BLD AUTO: 237 K/UL — SIGNIFICANT CHANGE UP (ref 150–400)
PMV BLD: 9.3 FL — SIGNIFICANT CHANGE UP (ref 7–13)
POTASSIUM SERPL-MCNC: 4.3 MMOL/L — SIGNIFICANT CHANGE UP (ref 3.5–5.3)
POTASSIUM SERPL-SCNC: 4.3 MMOL/L — SIGNIFICANT CHANGE UP (ref 3.5–5.3)
PROT SERPL-MCNC: 5.8 GM/DL — LOW (ref 6–8.3)
RBC # BLD: 3.07 M/UL — LOW (ref 4.2–5.8)
RBC # FLD: 14.3 % — SIGNIFICANT CHANGE UP (ref 10.3–14.5)
SODIUM SERPL-SCNC: 138 MMOL/L — SIGNIFICANT CHANGE UP (ref 135–145)
WBC # BLD: 13.57 K/UL — HIGH (ref 3.8–10.5)
WBC # FLD AUTO: 13.57 K/UL — HIGH (ref 3.8–10.5)

## 2025-03-19 PROCEDURE — 99233 SBSQ HOSP IP/OBS HIGH 50: CPT

## 2025-03-19 RX ORDER — BUMETANIDE 1 MG/1
1 TABLET ORAL
Refills: 0 | Status: DISCONTINUED | OUTPATIENT
Start: 2025-03-19 | End: 2025-03-24

## 2025-03-19 RX ORDER — OXYCODONE HYDROCHLORIDE 30 MG/1
15 TABLET ORAL EVERY 4 HOURS
Refills: 0 | Status: DISCONTINUED | OUTPATIENT
Start: 2025-03-19 | End: 2025-03-25

## 2025-03-19 RX ORDER — MAGNESIUM SULFATE 500 MG/ML
2 SYRINGE (ML) INJECTION ONCE
Refills: 0 | Status: COMPLETED | OUTPATIENT
Start: 2025-03-19 | End: 2025-03-19

## 2025-03-19 RX ORDER — OXYCODONE HYDROCHLORIDE 30 MG/1
15 TABLET ORAL EVERY 12 HOURS
Refills: 0 | Status: DISCONTINUED | OUTPATIENT
Start: 2025-03-19 | End: 2025-03-25

## 2025-03-19 RX ORDER — OXYCODONE HYDROCHLORIDE 30 MG/1
10 TABLET ORAL EVERY 4 HOURS
Refills: 0 | Status: DISCONTINUED | OUTPATIENT
Start: 2025-03-19 | End: 2025-03-20

## 2025-03-19 RX ORDER — HYDROMORPHONE/SOD CHLOR,ISO/PF 2 MG/10 ML
1 SYRINGE (ML) INJECTION
Refills: 0 | Status: DISCONTINUED | OUTPATIENT
Start: 2025-03-19 | End: 2025-03-24

## 2025-03-19 RX ADMIN — DIAZEPAM 5 MILLIGRAM(S): 2 TABLET ORAL at 21:21

## 2025-03-19 RX ADMIN — Medication 3 MILLIGRAM(S): at 21:21

## 2025-03-19 RX ADMIN — Medication 325 MILLIGRAM(S): at 09:22

## 2025-03-19 RX ADMIN — OXYCODONE HYDROCHLORIDE 15 MILLIGRAM(S): 30 TABLET ORAL at 22:22

## 2025-03-19 RX ADMIN — OXYCODONE HYDROCHLORIDE 15 MILLIGRAM(S): 30 TABLET ORAL at 21:22

## 2025-03-19 RX ADMIN — LACTULOSE 20 GRAM(S): 10 SOLUTION ORAL at 05:36

## 2025-03-19 RX ADMIN — BUMETANIDE 1 MILLIGRAM(S): 1 TABLET ORAL at 12:09

## 2025-03-19 RX ADMIN — Medication 1 MILLIGRAM(S): at 14:40

## 2025-03-19 RX ADMIN — BUMETANIDE 1 MILLIGRAM(S): 1 TABLET ORAL at 16:10

## 2025-03-19 RX ADMIN — Medication 50 GRAM(S): at 09:21

## 2025-03-19 RX ADMIN — CEFTRIAXONE 2000 MILLIGRAM(S): 500 INJECTION, POWDER, FOR SOLUTION INTRAMUSCULAR; INTRAVENOUS at 21:21

## 2025-03-19 RX ADMIN — OXYCODONE HYDROCHLORIDE 10 MILLIGRAM(S): 30 TABLET ORAL at 16:09

## 2025-03-19 RX ADMIN — Medication 1 MILLIGRAM(S): at 15:00

## 2025-03-19 RX ADMIN — METOPROLOL SUCCINATE 25 MILLIGRAM(S): 50 TABLET, EXTENDED RELEASE ORAL at 09:22

## 2025-03-19 RX ADMIN — TAMSULOSIN HYDROCHLORIDE 0.8 MILLIGRAM(S): 0.4 CAPSULE ORAL at 21:21

## 2025-03-19 RX ADMIN — OXYCODONE HYDROCHLORIDE 10 MILLIGRAM(S): 30 TABLET ORAL at 16:45

## 2025-03-19 RX ADMIN — Medication 150 MICROGRAM(S): at 05:36

## 2025-03-19 RX ADMIN — DIAZEPAM 5 MILLIGRAM(S): 2 TABLET ORAL at 12:09

## 2025-03-19 RX ADMIN — POLYETHYLENE GLYCOL 3350 17 GRAM(S): 17 POWDER, FOR SOLUTION ORAL at 09:22

## 2025-03-19 NOTE — PROGRESS NOTE ADULT - SUBJECTIVE AND OBJECTIVE BOX
Neurosurgery:    74-year-old male with PMH of BPH, diverticulosis, Graves' disease, hemorrhoids, HLD, hypothyroidism, osteoarthritis, spinal stenosis, spinal cord injury (2004), and multiple spinal surgeries (three cervical and one lumbar). He presents with complaints of lower extremity swelling and abnormal BUN/creatinine levels. The patient was recently admitted to [facility] from 02/18/25 to 02/25/25 for sepsis in the setting of streptococcus dysgalactiae bacteremia and was followed by neurosurgery for cervicothoracic spine infection. He was discharged to [facility] with a PICC for IV ceftriaxone until 04/16/25. Documentation from the rehabilitation facility notes a decline in his functional status and dysfunction with activities of daily living. At the rehabilitation facility, he was noted to have worsening LE swelling and was started on torsemide. However, his Cr levels subsequently became elevated, leading to the discontinuation of torsemide. He was sent to the  ED for further evaluation of his LE swelling and elevated Cr levels. He denies any chest pain, dyspnea, orthopnea, PND.     Repeat MRI done this admission showed worsening discitis/osteomyelitis. Per ID, antibiotics alone will unlikely resolve this spinal infection and would consider surgical intervention if able to perform based on risk/benefit. After discussion with patient and offering surgical interventions vs continued medical management with antibiotics, patient wished to proceed with surgery. Of note, hospital course has been complicated by below the knee DVT, currently on therapeutic Lovenox, urinary retention requiring gupta, and significant LE anasarca.    3/15- Pt seen and examined at bedside, resting comfortably and in NAD. Reports chronic right sided spacticity from prior spinal cord injury/surgery. He has had progressive worsening in hand  and loss of dexterity over the last few months. Pending medical clearance for OR Monday for thoracic laminectomy and fusion for epidural abscess.     3/16- Pt seen and examined at bedside, resting comfortably and in NAD. Reports some improvement in abdominal/leg pain 2/2 edema. Pending OR tomorrow.     3/17- OR for posterior thoracic decompression T1-T3 with connection to existing hardware C5-T3 fusion under general anesthesia. Prior to OR pt had an ICV filter placed in IR.   Pt tolerated the procedure well, he lost approx. 1L of blood intra-op and was transfused 2 units of PRBCs. Pt was extubated at the end of the case and taken to PACU for observation then transferred to SICU for observation overnight.   Pt has a left and right drain both emptying into the same Hemovac, he was started on PCA for pain control.     3/18- POD #1, pt seen and examined in the SICU, c/o neck pain, dressing clean and dry, moving b/l upper ext antigravity, good  strength except to pointer fingers on both hands. Able to extend right lower extremitiy off the bed but not the left. Tolerating PO diet. Vital signs stable. H/H stable today. Hemovac output 240cc since surgery.     3/19- POD #2, pt seen and examined in the SICU, c/o neck pain, dressing clean and dry. + BM this morning.   Able to extend right lower extremitiy off the bed but not the left. Tolerating PO diet. Vital signs stable. H/H stable today. Hemovac output 140cc for 24 hours.  Gupta and PCA remains, will change to PO meds and stop PCA.  OT ordered.    MEDICATIONS  (STANDING):  buMETAnide Injectable 1 milliGRAM(s) IV Push two times a day  cefTRIAXone Injectable. 2000 milliGRAM(s) IV Push every 24 hours  ferrous    sulfate 325 milliGRAM(s) Oral daily  HYDROmorphone PCA (1 mG/mL) 30 milliLiter(s) PCA Continuous PCA Continuous  levothyroxine 150 MICROGram(s) Oral daily  metoprolol succinate ER 25 milliGRAM(s) Oral daily  polyethylene glycol 3350 17 Gram(s) Oral daily  tamsulosin 0.8 milliGRAM(s) Oral at bedtime      Vital Signs Last 24 Hrs  T(C): 36.8 (19 Mar 2025 08:35), Max: 36.8 (18 Mar 2025 12:46)  T(F): 98.2 (19 Mar 2025 08:35), Max: 98.3 (18 Mar 2025 12:46)  HR: 87 (19 Mar 2025 10:00) (68 - 90)  BP: 117/61 (19 Mar 2025 10:00) (103/53 - 142/92)  BP(mean): 77 (19 Mar 2025 10:00) (67 - 106)  RR: 16 (19 Mar 2025 10:00) (9 - 45)  SpO2: 98% (19 Mar 2025 09:00) (90% - 100%)    Parameters below as of 19 Mar 2025 09:00  Patient On (Oxygen Delivery Method): room air    Labs:                        8.9    13.57 )-----------( 237      ( 19 Mar 2025 06:04 )             27.8     03-19    138  |  104  |  25[H]  ----------------------------<  100[H]  4.3   |  31  |  0.76    Ca    8.7      19 Mar 2025 06:04  Phos  2.9     03-19  Mg     1.8     03-19    TPro  5.8[L]  /  Alb  2.7[L]  /  TBili  0.5  /  DBili  x   /  AST  31  /  ALT  52  /  AlkPhos  64  03-19    Physical Exam:  Constitutional: Awake / alert  HEENT: PERRLA, EOMI  Neck: Supple  Respiratory: Breath sounds are clear bilaterally  Cardiovascular: S1 and S2, regular rhythm  Gastrointestinal: Soft, NT/ND  Extremities:  mild 1+ to 2+ edema, long standing  Skin: (sacral skin tear vs ulcer was noticed in the OR- wound nurse management and care implemented)    Neurological Exam:  HF: AA&O x 3, follows commands, normal affect, speech fluent  CN: PERRL, EOMI, no NLFD, tongue midline  Motor:   	RUE: Deltoid 5/5, bicep 5/5, tricep 5/5, wrist ext 5/5,  4-/5, unable to fully flex 2nd digit  	LUE: Deltoid 5/5, bicep 5/5, tricep 5/5, wrist ext 5/5,  4-/5, unable to fully flex 2nd digit  	RLE: IP 4/5, Quad 4/5, EHL 5/5, Dorsi/plantarflexion 5/5 (some limitations 2/2 LE edema)  	LLE: IP 2/5, Quad 1/5, EHL 5/5, Dorsi/plantarflexion 5/5 (proximal leg significantly limited by pain and edema)  Sens: Intact to light touch except diminished in LLE  Reflexes: hyperreflexic RUE/RLE, diminished DTRs in LUE/LLE, +Right ankle clonus, +R babinski, eduardo negative b/l  Coord:  No dysmetria  Gait/Balance: Cannot test      A/P:      74 year old Male with noted history of multiple spinal surgeries, recent admission in February with noted streptococcus galactiae bacteremia and cervical spinal infection, now admitted for lower extremity edema.   Concern for worsening OM.     POD #2 s/p OR for posterior thoracic decompression T1-T3 with connection to existing hardware C5-T3 fusion under general anesthesia    PLAN-  Tolerating PO  Bowel regiment : + BM AM of 3/19  Pain meds as needed, now transition to PO pain meds on 3/19   Valium for muscle spasm   Physical therapy as tolerated || OT ordered for hand fine motor challenges / LE weakness  Antibiotics as per ID   Follow up intra-op wound culture   sacral wound assessment by wound/skin team, continue turning patient Q 2 hours and nursing skin assessments   Monitor Hemovac output - will keep as output is > 120cc / 24 hour  follow H/H, 2 units of PRBCs given intra-op   Maintain Gupta for now as pt has had urinary retention with previous trial of void - On Flomax 8mg daily  Right Venodyne only due to left calf DVT (s/p pre-op IVC filter)   d/w Dr. Cantu - continue with post op care - oob with PT as tolerated.  Ty lift to chair yesterday will attempt standing with walker when possible

## 2025-03-19 NOTE — PROGRESS NOTE ADULT - SUBJECTIVE AND OBJECTIVE BOX
Patient is a 74y old  Male who presents with a chief complaint of Sepsis (18 Mar 2025 10:30)      BRIEF HOSPITAL COURSE: 75 y/o M w/ pmh of bph, diverticulosis, graves dz, hemorrhoids, hld, hypothyroid, OA, SS, spinal cord injury (2004), multiple prior spinal surgeries (cervical and lumbar), presented to  for lower ext swelling and elevated BUN/Cr. Hospital course c/b UE weakness L>R and pt was found to have worsening discitis/osteomyelitis w/ epidural phlegmon. OR on 3/17 for thoracic lami/drainage    Events last 24 hours: OOB to chair. Still complaining of left LE edema and the fact he cannot move his leg well due to the weight and swelling. Making urine, gupta exchanged yesterday as it was malfunctioning. Tolerating oral diet.     PAST MEDICAL & SURGICAL HISTORY:  Hyperlipidemia, unspecified hyperlipidemia type      Diverticulosis of large intestine without hemorrhage      History of colon polyps      Hemorrhoids, unspecified hemorrhoid type      Benign prostatic hyperplasia without lower urinary tract symptoms, unspecified morphology      Osteoarthritis of knee, unilateral  left      Spinal cord injury at C5-C7 level without injury of spinal bone, subsequent encounter      Spastic      Weakness  to right side      Myelopathy      Spinal stenosis, unspecified spinal region      Hypothyroidism, unspecified type      Graves disease      Alcoholic  recovering alcoholic x 40years      History of cervical discectomy      S/P laminectomy  Cervical      H/O lumbar discectomy  with laminectomy      H/O arthroscopy of knee  Left x 2. Unsure of years      H/O colonoscopy with polypectomy  2014          Review of Systems:  CONSTITUTIONAL: No fever, chills, or fatigue  EYES: No eye pain, visual disturbances, or discharge  ENMT:  No difficulty hearing, tinnitus, vertigo; No sinus or throat pain  NECK: No pain or stiffness  RESPIRATORY: No cough, wheezing, chills or hemoptysis; No shortness of breath  CARDIOVASCULAR: No chest pain, palpitations, dizziness, or leg swelling  GASTROINTESTINAL: No abdominal or epigastric pain. No nausea, vomiting, or hematemesis; No diarrhea or constipation. No melena or hematochezia.  GENITOURINARY: No dysuria, frequency, hematuria, or incontinence  NEUROLOGICAL: No headaches, memory loss, loss of strength, numbness, or tremors  SKIN: No itching, burning, rashes, or lesions   MUSCULOSKELETAL: No joint pain or swelling; No muscle, back, or extremity pain  PSYCHIATRIC: No depression, anxiety, mood swings, or difficulty sleeping      Medications:  cefTRIAXone Injectable. 2000 milliGRAM(s) IV Push every 24 hours    buMETAnide Injectable 1 milliGRAM(s) IV Push two times a day  metoprolol succinate ER 25 milliGRAM(s) Oral daily      acetaminophen     Tablet .. 650 milliGRAM(s) Oral every 6 hours PRN  acetaminophen   IVPB .. 1000 milliGRAM(s) IV Intermittent once PRN  diazepam    Tablet 5 milliGRAM(s) Oral every 8 hours PRN  HYDROmorphone  Injectable 1 milliGRAM(s) IV Push every 3 hours PRN  melatonin 3 milliGRAM(s) Oral at bedtime PRN  ondansetron Injectable 4 milliGRAM(s) IV Push every 6 hours PRN  oxyCODONE    IR 10 milliGRAM(s) Oral every 4 hours PRN  oxyCODONE    IR 15 milliGRAM(s) Oral every 4 hours PRN  oxyCODONE  ER Tablet 15 milliGRAM(s) Oral every 12 hours        aluminum hydroxide/magnesium hydroxide/simethicone Suspension 30 milliLiter(s) Oral every 4 hours PRN  lactulose Syrup 20 Gram(s) Oral three times a day PRN  polyethylene glycol 3350 17 Gram(s) Oral daily    tamsulosin 0.8 milliGRAM(s) Oral at bedtime    levothyroxine 150 MICROGram(s) Oral daily    ferrous    sulfate 325 milliGRAM(s) Oral daily        naloxone Injectable 0.1 milliGRAM(s) IV Push every 3 minutes PRN          ICU Vital Signs Last 24 Hrs  T(C): 36.8 (19 Mar 2025 08:35), Max: 36.8 (18 Mar 2025 12:46)  T(F): 98.2 (19 Mar 2025 08:35), Max: 98.3 (18 Mar 2025 12:46)  HR: 87 (19 Mar 2025 10:00) (68 - 90)  BP: 117/61 (19 Mar 2025 10:00) (103/53 - 142/92)  BP(mean): 77 (19 Mar 2025 10:00) (67 - 106)  ABP: --  ABP(mean): --  RR: 16 (19 Mar 2025 10:00) (9 - 45)  SpO2: 98% (19 Mar 2025 09:00) (90% - 100%)    O2 Parameters below as of 19 Mar 2025 09:00  Patient On (Oxygen Delivery Method): room air            ABG - ( 17 Mar 2025 12:45 )  pH, Arterial: 7.55  pH, Blood: x     /  pCO2: 33    /  pO2: 211   / HCO3: 29    / Base Excess: 6.6   /  SaO2: 99                  I&O's Detail    18 Mar 2025 07:01  -  19 Mar 2025 07:00  --------------------------------------------------------  IN:  Total IN: 0 mL    OUT:    Accordian (mL): 260 mL    Indwelling Catheter - Urethral (mL): 1525 mL  Total OUT: 1785 mL    Total NET: -1785 mL            LABS:                        8.9    13.57 )-----------( 237      ( 19 Mar 2025 06:04 )             27.8     03-19    138  |  104  |  25[H]  ----------------------------<  100[H]  4.3   |  31  |  0.76    Ca    8.7      19 Mar 2025 06:04  Phos  2.9     03-19  Mg     1.8     03-19    TPro  5.8[L]  /  Alb  2.7[L]  /  TBili  0.5  /  DBili  x   /  AST  31  /  ALT  52  /  AlkPhos  64  03-19          CAPILLARY BLOOD GLUCOSE      POCT Blood Glucose.: 142 mg/dL (17 Mar 2025 14:42)      Urinalysis Basic - ( 19 Mar 2025 06:04 )    Color: x / Appearance: x / SG: x / pH: x  Gluc: 100 mg/dL / Ketone: x  / Bili: x / Urobili: x   Blood: x / Protein: x / Nitrite: x   Leuk Esterase: x / RBC: x / WBC x   Sq Epi: x / Non Sq Epi: x / Bacteria: x      CULTURES:  Culture Results:   No growth to date (03-17-25 @ 08:45)  Culture Results:   Testing in progress (03-17-25 @ 08:45)  Culture Results:   No growth to date. (03-17-25 @ 08:44)  Culture Results:   Testing in progress (03-17-25 @ 08:44)      Physical Examination:    General:  Alert, oriented, interactive, nonfocal    HEENT: Pupils equal, reactive to light.  Symmetric.    PULM: Clear to auscultation bilaterally    CVS: Regular rate and rhythm    ABD: Soft, nondistended, nontender, some edema noted lower abdomen     EXT: left LE edema is present up to thigh, right leg less swollen     SKIN: Warm     NEURO: A&Ox3, strength 5/5 all extremities,  no focal deficits      RADIOLOGY: reviewed       " Time spent on this patient encounter, which includes documenting this note in the electronic medical record, was 43 minutes including assessing the presenting problems with associated risks, reviewing the medical record to prepare for the encounter, and meeting face to face with patient to obtain additional history. I have also performed an appropriate physical exam, made interventions listed and ordered and interpreted appropriate diagnostic studies as documented. To improve communication and patient saftey, I have coordinated care with the multidisciplinary team including the bedside nurse, appropriate attending of record and consultants as needed. "    Date of Entry of this note is equal to the date of services rendered

## 2025-03-19 NOTE — PROGRESS NOTE ADULT - SUBJECTIVE AND OBJECTIVE BOX
Date of Service:03-19-25 @ 09:52  Interval History/ROS: Afebrile overnight, comfortable on RA, reports weakness is somewhat the same, back pain is improved with pain meds, denies any fever or chills       REVIEW OF SYSTEMS  [  ] ROS unobtainable because:    [ x ] All other systems negative except as noted below    Constitutional:  [ ] fever [ ] chills  [ ] weight loss  [ ]night sweat  [ ]poor appetite/PO intake [ ]fatigue   Skin:  [ ] rash [ ] phlebitis	  Eyes: [ ] icterus [ ] pain  [ ] discharge	  ENMT: [ ] sore throat  [ ] thrush [ ] ulcers [ ] exudates [ ]anosmia  Respiratory: [ ] dyspnea [ ] hemoptysis [ ] cough [ ] sputum	  Cardiovascular:  [ ] chest pain [ ] palpitations [ ] edema	  Gastrointestinal:  [ ] nausea [ ] vomiting [ ] diarrhea [ ] constipation [ ] pain	  Genitourinary:  [ ] dysuria [ ] frequency [ ] hematuria [ ] discharge [ ] flank pain  [ ] incontinence  Musculoskeletal:  [ ] myalgias [ ] arthralgias [ ] arthritis  [ ] back pain  Neurological:  [ ] headache [ ] weakness [ ] seizures  [ ] confusion/altered mental status    Allergies  No Known Allergies        ANTIMICROBIALS:    cefTRIAXone Injectable. 2000 every 24 hours        OTHER MEDS: MEDICATIONS  (STANDING):  acetaminophen     Tablet .. 650 every 6 hours PRN  acetaminophen   IVPB .. 1000 once PRN  aluminum hydroxide/magnesium hydroxide/simethicone Suspension 30 every 4 hours PRN  buMETAnide Injectable 1 two times a day  diazepam    Tablet 5 every 8 hours PRN  HYDROmorphone PCA (1 mG/mL) 30 PCA Continuous  HYDROmorphone PCA (1 mG/mL) Rescue Clinician Bolus 0.5 every 15 minutes PRN  lactulose Syrup 20 three times a day PRN  levothyroxine 150 daily  melatonin 3 at bedtime PRN  metoprolol succinate ER 25 daily  ondansetron Injectable 4 every 6 hours PRN  polyethylene glycol 3350 17 daily  tamsulosin 0.8 at bedtime      Vital Signs Last 24 Hrs  T(F): 98.2 (03-19-25 @ 08:35), Max: 98.5 (03-17-25 @ 14:40)    Vital Signs Last 24 Hrs  HR: 68 (03-19-25 @ 08:00) (68 - 90)  BP: 103/53 (03-19-25 @ 08:00) (103/53 - 140/70)  RR: 10 (03-19-25 @ 08:00)  SpO2: 94% (03-19-25 @ 08:00) (90% - 100%)  Wt(kg): --    EXAM:    Constitutional: frail, NAD  Head/Eyes: no icterus  LUNGS:  CTA  CVS:  regular rhythm  Abd:  soft, non-tender; non-distended  Ext:  +edema  Back: no spinal tenderness noted  Vascular:  IV site no erythema tenderness or discharge  Neuro: AAO X 3    Labs:                        8.9    13.57 )-----------( 237      ( 19 Mar 2025 06:04 )             27.8     03-19    138  |  104  |  25[H]  ----------------------------<  100[H]  4.3   |  31  |  0.76    Ca    8.7      19 Mar 2025 06:04  Phos  2.9     03-19  Mg     1.8     03-19    TPro  5.8[L]  /  Alb  2.7[L]  /  TBili  0.5  /  DBili  x   /  AST  31  /  ALT  52  /  AlkPhos  64  03-19      WBC Trend:  WBC Count: 13.57 (03-19-25 @ 06:04)  WBC Count: 19.86 (03-18-25 @ 05:40)  WBC Count: 14.59 (03-17-25 @ 16:28)  WBC Count: 12.24 (03-17-25 @ 12:45)      Creatine Trend:  Creatinine: 0.76 (03-19)  Creatinine: 0.93 (03-18)  Creatinine: 1.03 (03-17)  Creatinine: 1.20 (03-16)      Liver Biochemical Testing Trend:  Alanine Aminotransferase (ALT/SGPT): 52 (03-19)  Alanine Aminotransferase (ALT/SGPT): 67 (03-18)  Alanine Aminotransferase (ALT/SGPT): 97 *H* (03-16)  Alanine Aminotransferase (ALT/SGPT): 121 *H* (03-15)  Alanine Aminotransferase (ALT/SGPT): 141 *H* (03-14)  Aspartate Aminotransferase (AST/SGOT): 31 (03-19-25 @ 06:04)  Aspartate Aminotransferase (AST/SGOT): 38 (03-18-25 @ 05:40)  Aspartate Aminotransferase (AST/SGOT): 37 (03-16-25 @ 06:59)  Aspartate Aminotransferase (AST/SGOT): 58 (03-15-25 @ 07:25)  Aspartate Aminotransferase (AST/SGOT): 80 (03-14-25 @ 06:05)  Bilirubin Total: 0.5 (03-19)  Bilirubin Total: 0.6 (03-18)  Bilirubin Total: 0.7 (03-16)  Bilirubin Total: 0.8 (03-15)  Bilirubin Total: 0.9 (03-14)      Trend LDH  03-14-25 @ 06:05  427[H]      Urinalysis Basic - ( 19 Mar 2025 06:04 )    Color: x / Appearance: x / SG: x / pH: x  Gluc: 100 mg/dL / Ketone: x  / Bili: x / Urobili: x   Blood: x / Protein: x / Nitrite: x   Leuk Esterase: x / RBC: x / WBC x   Sq Epi: x / Non Sq Epi: x / Bacteria: x        MICROBIOLOGY:    MRSA PCR Result.: NotDetec (03-17-25 @ 17:30)  MRSA PCR Result.: NotDetec (02-19-25 @ 18:15)      Culture - Acid Fast - Other w/Smear (collected 17 Mar 2025 08:45)  Source: Other None    Culture - Fungal, Other (collected 17 Mar 2025 08:45)  Source: Other EPIDURAL SPACE  Preliminary Report:    Testing in progress    Culture - Fungal, Other (collected 17 Mar 2025 08:44)  Source: Other EPIDURAL SPACE  Preliminary Report:    Testing in progress    Culture - Urine (collected 04 Mar 2025 15:12)  Source: .Urine None  Final Report:    <10,000 CFU/mL Normal Urogenital Altagracia    Urinalysis with Rflx Culture (collected 04 Mar 2025 15:12)    Culture - Blood (collected 19 Feb 2025 08:17)  Source: .Blood None  Final Report:    No growth at 5 days    Culture - Blood (collected 19 Feb 2025 08:10)  Source: .Blood None  Final Report:    No growth at 5 days    Urinalysis with Rflx Culture (collected 18 Feb 2025 01:54)    Culture - Blood (collected 17 Feb 2025 21:11)  Source: .Blood BLOOD  Final Report:    Growth in aerobic and anaerobic bottles: Streptococcus dysgalactiae    (Group C/G)    Direct identification is available within approximately 3-5    hours either by Blood Panel Multiplexed PCR or Direct    MALDI-TOF. Details: [email or web address]  Organism: Blood Culture PCR  Streptococcus dysgalactiae  Organism: Streptococcus dysgalactiae    Sensitivities:      Method Type: EDEL      -  Ceftriaxone: S <=0.25      -  Clindamycin: S <=0.06      -  Levofloxacin: S 0.5      -  Penicillin: S <=0.03 Predicts results for ampicillin, amoxicillin, amoxicillin/clavulanate, ampicillin/sulbactam, 1st, 2nd and 3rd generation cephalosporins and carbapenems.      -  Tetracycline: S <=0.5      -  Vancomycin: S 0.5  Organism: Blood Culture PCR    Sensitivities:      Method Type: PCR      -  Streptococcus sp. (Not Grp A, B or S pneumoniae): Detec    Culture - Blood (collected 17 Feb 2025 21:11)  Source: .Blood BLOOD  Final Report:    Growth in aerobic and anaerobic bottles: Streptococcus dysgalactiae    (Group C/G)    See previous culture [id number]      Lactate Dehydrogenase, Serum: 427 (03-14)          Sedimentation Rate, Erythrocyte: 77 mm/hr (03-14-25 @ 06:05)  Sedimentation Rate, Erythrocyte: 76 mm/hr (03-11-25 @ 05:19)  Sedimentation Rate, Erythrocyte: 96 mm/hr (03-10-25 @ 17:08)  Sedimentation Rate, Erythrocyte: 14 mm/hr (02-18-25 @ 07:10)      RADIOLOGY:  imaging below personally reviewed

## 2025-03-19 NOTE — PROGRESS NOTE ADULT - ASSESSMENT
Problem List:  1) Spinal epidural abscess  2) GOVIND - resolved  3) L gastrocnemius/soleal DVT    Covering for spinal abscess with ceftriaxone 2g, ID is following. Some OR cultures resulting as no growth, keep monitoring results   PCA pump getting removed today per neurosurgery, transitioning to PO opiates   IVC placed for DVT, no A/C at this time due to spinal issues. Holding off on DVt-P as drain is still in place and hemovac put out 140cc. Hgb has remained stable  advance diet  bowel regimen as abdominal distention most likely related to consipation  PT, OOB  AM labs  replace mag with 2g  Will trial bumex 1mg IVP BID to help with LE swelling, follow renal function and electrolytes  Plan discussed with ICU attending Dr. Tavares  Problem List:  1) Spinal epidural abscess  2) GOVIND - resolved  3) L gastrocnemius/soleal DVT    Covering for spinal abscess with ceftriaxone 2g, ID is following. Some OR cultures resulting as no growth, keep monitoring results   PCA pump getting removed today per neurosurgery, transitioning to PO opiates   IVC placed for DVT, no A/C at this time due to spinal issues. Holding off on DVt-P as drain is still in place and hemovac put out 140cc. Hgb has remained stable  advance diet  bowel regimen as abdominal distention most likely related to constipation  PT, OOB  AM labs  replace mag with 2g  Will trial bumex 1mg IVP BID to help with LE swelling, follow renal function and electrolytes  Plan discussed with ICU attending Dr. Tavares

## 2025-03-19 NOTE — PROGRESS NOTE ADULT - ASSESSMENT
Assessment:  74M with benign prostatic hyperplasia, diverticulosis, Graves' disease, hemorrhoids, hyperlipidemia, hypothyroidism, osteoarthritis, spinal stenosis, spinal cord injury (2004), and multiple spinal surgeries (three cervical and one lumbar), recently admitted 02/18/25 to 02/25/25 for sepsis secondary to High-grade Strep Bacteremia with Discitis OM and epidural abscess on IV CTX 2G q24 till 4/16/2025 presents back 3/4/2025 from [facility] for worsening LE swelling and GOVIND, Found to have LLE DVT  Remains afebrile and on RA  Repeat MRI T spine with 3/9 showing Worsening discitis/osteomyelitis is seen with epidural phlegmon again seen unchanged though increased bilaterally and ventral, this is consistent with paraspinal phlegmon though there is a possible early abscess  Neurosurgery recommend no surgical intervention and continue antibiotic  So far reports no fever or chills, back pain is stable,  strength the same    Prior work up:  History of multiple spinal surgery, C and T spine laminectomy with fusion hardware (2004), L spine laminectomy with fusion hardware (2006), all done in [facility]. Also has L knee replacement (2017) though no symptoms there, L knee ROM normal no pain No cardiac devices  BCx (2/17) with Strep dysgalacteae, Sy S  MRI 2/18 with concern for discitis T1-T3, possible phlegmon C7-T3, no abscess  TTE without obvious vegetation  BCx 2/19 negative    Antimicrobials:  cefTRIAXone Injectable. 2000 every 24 hours (2/18 --- )    Impression:   #High-grade Strep Bacteremia, Neck Pain, Concern for Spinal Involvement in setting of Multilevel C/T/L Spine Laminectomy with fusion hardware  #GOVIND  #DVT  #Leukocytosis  - PICC line appears clean, no signs of infection  - symptomatically stable yet still has R  weakness, MRI concerning T spine discitis OM with early paraspinal abscess, is this lagging radiologic findings other worsening infection? ESR 76 (14 in Feb), and  (169 in Feb)  - s/p OR (3/17) for T1-3 laminectomies for decompression and extension of prior cervical fusion to thoracic spine  - remains afebrile, so far OR culture negative    Recommendations:  - continue Ceftriaxone 2G q24  - monitor temperature curve  - trend WBC  - reason for abx use reviewed with patient  - side effects of antibiotic discussed, tolerating abx well so far  - Prior cultures reviewed. An epidemiologic assessment was performed. There is a significant risk for resistant microorganisms to spread to family members, and/or healthcare staff. The patient will be placed on isolation according to infection control policy. Will reconsider isolation measures based on new culture results and other clinical data as appropriate Appropriate cultures collected and an appropriate broad spectrum antibiotic therapy will be considered  - Neurosurgery appreciated  - follow up OR culture (3/17), so far negative  - if cultures remain unrevealing, plan for IV CTX 2G q24 via PICC line for 6-week (enddate: 4/28/2025). Weekly CBC, CMP, ESR, CRP  - rest per primary team    [facility] IV to PO Token not applicable; Patient to continue with IV Therapy

## 2025-03-19 NOTE — PROGRESS NOTE ADULT - ASSESSMENT
74-year-old male with a past medical history of benign prostatic hyperplasia, diverticulosis, Graves' disease, hemorrhoids, hyperlipidemia, hypothyroidism, osteoarthritis, spinal stenosis, spinal cord injury (2004), and multiple spinal surgeries w extremity swelling and abnormal BUN/creatinine levels.     Edema/Anasarca    - hypoalbuminemia - third spacing  nonrenal   - Optimize intake/protein stores, mobilize as cleared  - Monitor labs closely , stable renal function w good UOP   - Resume bumex 1 mg IV BID     - No further fluid  - Serum workup negative but will need follow up of  Serum  TRUDY positive for weak IgG Lambda band.  - urine protein 200 mg only ( not nephrotic)   - I/O's      Mild Rhabdo with elevated LFT - CPK trend    d/w SICU RN

## 2025-03-19 NOTE — PROGRESS NOTE ADULT - SUBJECTIVE AND OBJECTIVE BOX
Patient is a 74y Male with  who reports   no complaints overnight.    Allergies    No Known Allergies    Intolerances        MEDICATIONS  (STANDING):  buMETAnide Injectable 1 milliGRAM(s) IV Push two times a day  cefTRIAXone Injectable. 2000 milliGRAM(s) IV Push every 24 hours  ferrous    sulfate 325 milliGRAM(s) Oral daily  HYDROmorphone PCA (1 mG/mL) 30 milliLiter(s) PCA Continuous PCA Continuous  levothyroxine 150 MICROGram(s) Oral daily  metoprolol succinate ER 25 milliGRAM(s) Oral daily  polyethylene glycol 3350 17 Gram(s) Oral daily  tamsulosin 0.8 milliGRAM(s) Oral at bedtime      Vitals:  T(F): 98.2 (03-19-25), Max: 98.3 (03-18-25)  HR: 68 (03-19-25) (68 - 90)  BP: 103/53 (03-19-25) (103/53 - 140/70)  RR: 10 (03-19-25)  SpO2: 94% (03-19-25)    I and O's:    03-18 @ 07:01  -  03-19 @ 07:00  --------------------------------------------------------  IN: 0 mL / OUT: 1525 mL / NET: -1525 mL      PHYSICAL EXAM:    Constitutional: NAD,   HEENT:   MM  Respiratory: CTAB  Cardiovascular: S1 and S2  Gastrointestinal: BS+, soft, NT/ND  Extremities: +3 proximal peripheral edema  Neurological: A/O  : ++ Ortega  Dialysis Access: Not applicable    LABS:                        8.9    13.57 )-----------( 237      ( 19 Mar 2025 06:04 )             27.8                         9.1    x     )-----------( x        ( 18 Mar 2025 13:35 )             28.0       138    |  104    |  25     ----------------------------<  100       19 Mar 2025 06:04  4.3     |  31     |  0.76     135    |  101    |  26     ----------------------------<  146       18 Mar 2025 05:40  4.2     |  29     |  0.93     134    |  98     |  32     ----------------------------<  106       17 Mar 2025 06:11  3.7     |  32     |  1.03     Ca    8.7        19 Mar 2025 06:04  Ca    9.0        18 Mar 2025 05:40    Phos  2.9       19 Mar 2025 06:04  Phos  3.6       18 Mar 2025 05:40    Mg     1.8       19 Mar 2025 06:04  Mg     1.9       18 Mar 2025 05:40    TPro  5.8    /  Alb  2.7    /  TBili  0.5    /        19 Mar 2025 06:04  DBili  x      /  AST  31     /  ALT  52     /  AlkPhos  64       TPro  6.2    /  Alb  2.8    /  TBili  0.6    /        18 Mar 2025 05:40  DBili  x      /  AST  38     /  ALT  67     /  AlkPhos  62           Protein/Creatinine Ratio Calculation: 0.2 Ratio (03.18.25 @ 19:20)       Historical Values  Protein/Creatinine Ratio Calculation: 0.2 Ratio (03.18.25 @ 19:20)   Protein/Creatinine Ratio Calculation: 0.2 Ratio (03.16.25 @ 18:16)   Protein/Creatinine Ratio Calculation: 3.4 Ratio (03.05.25 @ 04:43)       RADIOLOGY & ADDITIONAL STUDIES:

## 2025-03-20 LAB
ALBUMIN SERPL ELPH-MCNC: 2.8 G/DL — LOW (ref 3.3–5)
ALP SERPL-CCNC: 64 U/L — SIGNIFICANT CHANGE UP (ref 40–120)
ALT FLD-CCNC: 44 U/L — SIGNIFICANT CHANGE UP (ref 12–78)
ANION GAP SERPL CALC-SCNC: 1 MMOL/L — LOW (ref 5–17)
AST SERPL-CCNC: 27 U/L — SIGNIFICANT CHANGE UP (ref 15–37)
BASOPHILS # BLD AUTO: 0.07 K/UL — SIGNIFICANT CHANGE UP (ref 0–0.2)
BASOPHILS NFR BLD AUTO: 0.6 % — SIGNIFICANT CHANGE UP (ref 0–2)
BILIRUB SERPL-MCNC: 0.5 MG/DL — SIGNIFICANT CHANGE UP (ref 0.2–1.2)
BUN SERPL-MCNC: 23 MG/DL — SIGNIFICANT CHANGE UP (ref 7–23)
CALCIUM SERPL-MCNC: 9 MG/DL — SIGNIFICANT CHANGE UP (ref 8.5–10.1)
CHLORIDE SERPL-SCNC: 102 MMOL/L — SIGNIFICANT CHANGE UP (ref 96–108)
CO2 SERPL-SCNC: 34 MMOL/L — HIGH (ref 22–31)
CREAT SERPL-MCNC: 1.02 MG/DL — SIGNIFICANT CHANGE UP (ref 0.5–1.3)
EGFR: 78 ML/MIN/1.73M2 — SIGNIFICANT CHANGE UP
EGFR: 78 ML/MIN/1.73M2 — SIGNIFICANT CHANGE UP
EOSINOPHIL # BLD AUTO: 0.33 K/UL — SIGNIFICANT CHANGE UP (ref 0–0.5)
EOSINOPHIL NFR BLD AUTO: 2.9 % — SIGNIFICANT CHANGE UP (ref 0–6)
GLUCOSE SERPL-MCNC: 120 MG/DL — HIGH (ref 70–99)
HCT VFR BLD CALC: 27.7 % — LOW (ref 39–50)
HGB BLD-MCNC: 8.7 G/DL — LOW (ref 13–17)
IMM GRANULOCYTES # BLD AUTO: 0.11 K/UL — HIGH (ref 0–0.07)
IMM GRANULOCYTES NFR BLD AUTO: 1 % — HIGH (ref 0–0.9)
LYMPHOCYTES # BLD AUTO: 1.45 K/UL — SIGNIFICANT CHANGE UP (ref 1–3.3)
LYMPHOCYTES NFR BLD AUTO: 12.8 % — LOW (ref 13–44)
MAGNESIUM SERPL-MCNC: 2.1 MG/DL — SIGNIFICANT CHANGE UP (ref 1.6–2.6)
MCHC RBC-ENTMCNC: 28.6 PG — SIGNIFICANT CHANGE UP (ref 27–34)
MCHC RBC-ENTMCNC: 31.4 G/DL — LOW (ref 32–36)
MCV RBC AUTO: 91.1 FL — SIGNIFICANT CHANGE UP (ref 80–100)
MONOCYTES # BLD AUTO: 0.79 K/UL — SIGNIFICANT CHANGE UP (ref 0–0.9)
MONOCYTES NFR BLD AUTO: 7 % — SIGNIFICANT CHANGE UP (ref 2–14)
NEUTROPHILS # BLD AUTO: 8.58 K/UL — HIGH (ref 1.8–7.4)
NEUTROPHILS NFR BLD AUTO: 75.7 % — SIGNIFICANT CHANGE UP (ref 43–77)
NRBC # BLD AUTO: 0 K/UL — SIGNIFICANT CHANGE UP (ref 0–0)
NRBC # FLD: 0 K/UL — SIGNIFICANT CHANGE UP (ref 0–0)
NRBC BLD AUTO-RTO: 0 /100 WBCS — SIGNIFICANT CHANGE UP (ref 0–0)
PHOSPHATE SERPL-MCNC: 3.5 MG/DL — SIGNIFICANT CHANGE UP (ref 2.5–4.5)
PLATELET # BLD AUTO: 227 K/UL — SIGNIFICANT CHANGE UP (ref 150–400)
PMV BLD: 9.2 FL — SIGNIFICANT CHANGE UP (ref 7–13)
POTASSIUM SERPL-MCNC: 4.1 MMOL/L — SIGNIFICANT CHANGE UP (ref 3.5–5.3)
POTASSIUM SERPL-SCNC: 4.1 MMOL/L — SIGNIFICANT CHANGE UP (ref 3.5–5.3)
PROT SERPL-MCNC: 6 GM/DL — SIGNIFICANT CHANGE UP (ref 6–8.3)
RBC # BLD: 3.04 M/UL — LOW (ref 4.2–5.8)
RBC # FLD: 14.5 % — SIGNIFICANT CHANGE UP (ref 10.3–14.5)
SODIUM SERPL-SCNC: 137 MMOL/L — SIGNIFICANT CHANGE UP (ref 135–145)
WBC # BLD: 11.33 K/UL — HIGH (ref 3.8–10.5)
WBC # FLD AUTO: 11.33 K/UL — HIGH (ref 3.8–10.5)

## 2025-03-20 PROCEDURE — 99233 SBSQ HOSP IP/OBS HIGH 50: CPT

## 2025-03-20 RX ORDER — HEPARIN SODIUM 1000 [USP'U]/ML
5000 INJECTION INTRAVENOUS; SUBCUTANEOUS EVERY 8 HOURS
Refills: 0 | Status: DISCONTINUED | OUTPATIENT
Start: 2025-03-21 | End: 2025-03-25

## 2025-03-20 RX ORDER — SENNA 187 MG
2 TABLET ORAL AT BEDTIME
Refills: 0 | Status: DISCONTINUED | OUTPATIENT
Start: 2025-03-20 | End: 2025-03-25

## 2025-03-20 RX ADMIN — TAMSULOSIN HYDROCHLORIDE 0.8 MILLIGRAM(S): 0.4 CAPSULE ORAL at 22:01

## 2025-03-20 RX ADMIN — OXYCODONE HYDROCHLORIDE 15 MILLIGRAM(S): 30 TABLET ORAL at 11:31

## 2025-03-20 RX ADMIN — OXYCODONE HYDROCHLORIDE 15 MILLIGRAM(S): 30 TABLET ORAL at 22:00

## 2025-03-20 RX ADMIN — Medication 2 TABLET(S): at 22:00

## 2025-03-20 RX ADMIN — OXYCODONE HYDROCHLORIDE 15 MILLIGRAM(S): 30 TABLET ORAL at 05:29

## 2025-03-20 RX ADMIN — Medication 325 MILLIGRAM(S): at 10:11

## 2025-03-20 RX ADMIN — OXYCODONE HYDROCHLORIDE 15 MILLIGRAM(S): 30 TABLET ORAL at 11:38

## 2025-03-20 RX ADMIN — OXYCODONE HYDROCHLORIDE 15 MILLIGRAM(S): 30 TABLET ORAL at 13:00

## 2025-03-20 RX ADMIN — CEFTRIAXONE 2000 MILLIGRAM(S): 500 INJECTION, POWDER, FOR SOLUTION INTRAMUSCULAR; INTRAVENOUS at 22:00

## 2025-03-20 RX ADMIN — POLYETHYLENE GLYCOL 3350 17 GRAM(S): 17 POWDER, FOR SOLUTION ORAL at 10:11

## 2025-03-20 RX ADMIN — OXYCODONE HYDROCHLORIDE 15 MILLIGRAM(S): 30 TABLET ORAL at 18:30

## 2025-03-20 RX ADMIN — BUMETANIDE 1 MILLIGRAM(S): 1 TABLET ORAL at 05:00

## 2025-03-20 RX ADMIN — DIAZEPAM 5 MILLIGRAM(S): 2 TABLET ORAL at 22:01

## 2025-03-20 RX ADMIN — METOPROLOL SUCCINATE 25 MILLIGRAM(S): 50 TABLET, EXTENDED RELEASE ORAL at 10:11

## 2025-03-20 RX ADMIN — OXYCODONE HYDROCHLORIDE 15 MILLIGRAM(S): 30 TABLET ORAL at 06:29

## 2025-03-20 RX ADMIN — OXYCODONE HYDROCHLORIDE 15 MILLIGRAM(S): 30 TABLET ORAL at 22:45

## 2025-03-20 RX ADMIN — Medication 3 MILLIGRAM(S): at 22:01

## 2025-03-20 RX ADMIN — BUMETANIDE 1 MILLIGRAM(S): 1 TABLET ORAL at 15:19

## 2025-03-20 RX ADMIN — OXYCODONE HYDROCHLORIDE 15 MILLIGRAM(S): 30 TABLET ORAL at 10:11

## 2025-03-20 RX ADMIN — Medication 150 MICROGRAM(S): at 05:00

## 2025-03-20 RX ADMIN — OXYCODONE HYDROCHLORIDE 15 MILLIGRAM(S): 30 TABLET ORAL at 17:48

## 2025-03-20 NOTE — PROGRESS NOTE ADULT - SUBJECTIVE AND OBJECTIVE BOX
Patient is a 74y old  Male who presents with a chief complaint of Sepsis (18 Mar 2025 10:30)    BRIEF HOSPITAL COURSE:  73 yo with PMHx of BPH, HLD, hypothryoid, Graves dz, hx ACDF C3 - C6, recent admission in Feb 2025 for sepsis with strep dysgalactiae bacteremia OM/Discitis, epidural abscess was discharged to [facility] with RUE PICC line and IV CTX qd, presented to  for lower ext swelling and GOVIND. Torsemide was held, gupta place and GOVIND improved. Hospital course c/b UE weakness L>R and pt was found to have worsening discitis/osteomyelitis w/ epidural phlegmon on MRI    s/p OR for posterior thoracic decompression T1-T3 with connection to existing hardware C5-T3 fusion under general anesthesia    3/20 pt states its still difficult to eat due to weak  strength. still c/o LLE weakness. denies chest pain, sob. paresthesias in UE and LE. had BM yesterday    PAST MEDICAL & SURGICAL HISTORY:  Hyperlipidemia, unspecified hyperlipidemia type  Diverticulosis of large intestine without hemorrhage  History of colon polyps  Hemorrhoids, unspecified hemorrhoid type  Benign prostatic hyperplasia without lower urinary tract symptoms, unspecified morpholog  Osteoarthritis of knee, unilateral  left  Spinal cord injury at C5-C7 level without injury of spinal bone, subsequent encounter  Spastic  Weakness  to right side  Myelopathy  Spinal stenosis, unspecified spinal region  Hypothyroidism, unspecified type  GraVEs disease  Alcoholic  recovering alcoholic x 40years  History of cervical discectomy  S/P laminectomy Cervical  H/O lumbar discectomy with laminectomy  H/O arthroscopy of knee Left x 2. Unsure of years  H/O colonoscopy with polypectomy 2014    Medications:  cefTRIAXone Injectable. 2000 milliGRAM(s) IV Push every 24 hours  buMETAnide Injectable 1 milliGRAM(s) IV Push two times a day  metoprolol succinate ER 25 milliGRAM(s) Oral daily  acetaminophen   IVPB .. 1000 milliGRAM(s) IV Intermittent once PRN  diazepam    Tablet 5 milliGRAM(s) Oral every 8 hours PRN  HYDROmorphone  Injectable 1 milliGRAM(s) IV Push every 3 hours PRN  melatonin 3 milliGRAM(s) Oral at bedtime PRN  ondansetron Injectable 4 milliGRAM(s) IV Push every 6 hours PRN  oxyCODONE    IR 10 milliGRAM(s) Oral every 4 hours PRN  oxyCODONE    IR 15 milliGRAM(s) Oral every 4 hours PRN  oxyCODONE  ER Tablet 15 milliGRAM(s) Oral every 12 hours  aluminum hydroxide/magnesium hydroxide/simethicone Suspension 30 milliLiter(s) Oral every 4 hours PRN  lactulose Syrup 20 Gram(s) Oral three times a day PRN  polyethylene glycol 3350 17 Gram(s) Oral daily  tamsulosin 0.8 milliGRAM(s) Oral at bedtime  levothyroxine 150 MICROGram(s) Oral daily  ferrous    sulfate 325 milliGRAM(s) Oral daily  naloxone Injectable 0.1 milliGRAM(s) IV Push every 3 minutes PRN    ICU Vital Signs Last 24 Hrs  T(C): 36.9 (20 Mar 2025 12:44), Max: 36.9 (20 Mar 2025 12:44)  T(F): 98.5 (20 Mar 2025 12:44), Max: 98.5 (20 Mar 2025 12:44)  HR: 96 (20 Mar 2025 12:00) (68 - 96)  BP: 102/67 (20 Mar 2025 12:00) (102/67 - 133/74)  BP(mean): 78 (20 Mar 2025 12:00) (76 - 91)  ABP: --  ABP(mean): --  RR: 21 (20 Mar 2025 12:00) (9 - 21)  SpO2: 96% (20 Mar 2025 11:39) (91% - 98%)    O2 Parameters below as of 20 Mar 2025 11:39  Patient On (Oxygen Delivery Method): room air      I&O's Detail    19 Mar 2025 07:01  -  20 Mar 2025 07:00  --------------------------------------------------------  IN:  Total IN: 0 mL    OUT:    Accordian (mL): 23 mL    Indwelling Catheter - Urethral (mL): 2100 mL  Total OUT: 2123 mL    Total NET: -2123 mL      20 Mar 2025 07:01  -  20 Mar 2025 13:05  --------------------------------------------------------  IN:  Total IN: 0 mL    OUT:    Indwelling Catheter - Urethral (mL): 1600 mL  Total OUT: 1600 mL    Total NET: -1600 mL      LABS:                        8.7    11.33 )-----------( 227      ( 20 Mar 2025 05:42 )             27.7     03-20    137  |  102  |  23  ----------------------------<  120[H]  4.1   |  34[H]  |  1.02    Ca    9.0      20 Mar 2025 05:42  Phos  3.5     03-20  Mg     2.1     03-20    TPro  6.0  /  Alb  2.8[L]  /  TBili  0.5  /  DBili  x   /  AST  27  /  ALT  44  /  AlkPhos  64  03-20    CAPILLARY BLOOD GLUCOSE  Urinalysis Basic - ( 20 Mar 2025 05:42 )    Color: x / Appearance: x / SG: x / pH: x  Gluc: 120 mg/dL / Ketone: x  / Bili: x / Urobili: x   Blood: x / Protein: x / Nitrite: x   Leuk Esterase: x / RBC: x / WBC x   Sq Epi: x / Non Sq Epi: x / Bacteria: x    CULTURES:  Culture Results:   No growth to date (03-17 @ 08:45)  Culture Results:   Culture is being performed. Fungal cultures are held for 4 weeks. (03-17 @ 08:45)  Culture Results:   Culture is being performed. (03-17 @ 08:45)  Culture Results:   No growth to date. (03-17 @ 08:44)  Culture Results:   Culture is being performed. Fungal cultures are held for 4 weeks. (03-17 @ 08:44)    Physical Examination:    General: No acute distress.    HEENT: Pupils equal, reactive to light.  Symmetric.  PULM: Clear to auscultation bilaterally, no crackles or wheezing  CVS: Regular rate and rhythm, no murmurs  ABD: Soft, nondistended, nontender, normoactive bowel sounds  EXT: 1+ pitting edema b/l extending from feet to knees,  SKIN: Warm and well perfused  NEURO: Alert, oriented, interactive, L hand  strength slightly weaker than R hand. LLE hip and knee flexion very weak. L ankle flexion 5/5. RLE strength 2/5, able to lift off the bed. sensation to light touch intact b/l     DEVICES:   hemovac    RADIOLOGY:   < from: MR Thoracic Spine w/wo IV Cont (03.08.25 @ 12:24) >  IMPRESSION: Worsening discitis/osteomyelitis is seen with epidural   phlegmon again seen unchanged though increased bilaterally and ventral,   this is consistent with paraspinal phlegmon though there is a possible   early abscess seenon the left side as described above.    < end of copied text >    < from: MR Cervical Spine w/wo IV Cont (03.08.25 @ 12:24) >  IMPRESSION:    1.  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  2.  Chronic cord myelomalacia at C3-4, status post decompression.  3.  Stable severe left C7-T1 foraminal stenosis, with moderate narrowing   of the central canal.  4.  Stable moderate to severe bilateral C6-7 foraminal stenoses.    < end of copied text >    < from: MR Lumbar Spine w/wo IV Cont (03.07.25 @ 15:11) >  IMPRESSION:    No abnormal enhancement.    No evidence for epidural abscess or discitis/osteomyelitis.    Multilevel degenerative changes detailed in the body the report.    < end of copied text >      < from: CT Angio Chest PE Protocol w/ IV Cont (03.16.25 @ 16:47) >  IMPRESSION:  No pulmonary embolism.    < end of copied text >

## 2025-03-20 NOTE — PROGRESS NOTE ADULT - SUBJECTIVE AND OBJECTIVE BOX
Neurosurgery:    74-year-old male with PMH of BPH, diverticulosis, Graves' disease, hemorrhoids, HLD, hypothyroidism, osteoarthritis, spinal stenosis, spinal cord injury (2004), and multiple spinal surgeries (three cervical and one lumbar). He presents with complaints of lower extremity swelling and abnormal BUN/creatinine levels. The patient was recently admitted to [facility] from 02/18/25 to 02/25/25 for sepsis in the setting of streptococcus dysgalactiae bacteremia and was followed by neurosurgery for cervicothoracic spine infection. He was discharged to [facility] with a PICC for IV ceftriaxone until 04/16/25. Documentation from the rehabilitation facility notes a decline in his functional status and dysfunction with activities of daily living. At the rehabilitation facility, he was noted to have worsening LE swelling and was started on torsemide. However, his Cr levels subsequently became elevated, leading to the discontinuation of torsemide. He was sent to the  ED for further evaluation of his LE swelling and elevated Cr levels. He denies any chest pain, dyspnea, orthopnea, PND.     Repeat MRI done this admission showed worsening discitis/osteomyelitis. Per ID, antibiotics alone will unlikely resolve this spinal infection and would consider surgical intervention if able to perform based on risk/benefit. After discussion with patient and offering surgical interventions vs continued medical management with antibiotics, patient wished to proceed with surgery. Of note, hospital course has been complicated by below the knee DVT, currently on therapeutic Lovenox, urinary retention requiring gupta, and significant LE anasarca.    3/15- Pt seen and examined at bedside, resting comfortably and in NAD. Reports chronic right sided spacticity from prior spinal cord injury/surgery. He has had progressive worsening in hand  and loss of dexterity over the last few months. Pending medical clearance for OR Monday for thoracic laminectomy and fusion for epidural abscess.     3/16- Pt seen and examined at bedside, resting comfortably and in NAD. Reports some improvement in abdominal/leg pain 2/2 edema. Pending OR tomorrow.     3/17- OR for posterior thoracic decompression T1-T3 with connection to existing hardware C5-T3 fusion under general anesthesia. Prior to OR pt had an ICV filter placed in IR.   Pt tolerated the procedure well, he lost approx. 1L of blood intra-op and was transfused 2 units of PRBCs. Pt was extubated at the end of the case and taken to PACU for observation then transferred to SICU for observation overnight.   Pt has a left and right drain both emptying into the same Hemovac, he was started on PCA for pain control.     3/18- POD #1, pt seen and examined in the SICU, c/o neck pain, dressing clean and dry, moving b/l upper ext antigravity, good  strength except to pointer fingers on both hands. Able to extend right lower extremitiy off the bed but not the left. Tolerating PO diet. Vital signs stable. H/H stable today. Hemovac output 240cc since surgery.     3/19- POD #2, pt seen and examined in the SICU, c/o neck pain, dressing clean and dry. + BM this morning.   Able to extend right lower extremitiy off the bed but not the left. Tolerating PO diet. Vital signs stable. H/H stable today. Hemovac output 140cc for 24 hours.  Gupta and PCA remains, will change to PO meds and stop PCA.  OT ordered.    3/20- POD #3, pt seen and examined in the SICU, c/o neck pain, dressing clean and dry. Drain removed today. pt is  Able to extend right lower extremitiy off the bed but not the left. Tolerating PO diet. Vital signs stable. H/H stable today. Hemovac output < 50cc for 24 hours.  Gupta and tolerating PO meds at this time.  PT/OT / Dispo planning      MEDICATIONS  (STANDING):  buMETAnide Injectable 1 milliGRAM(s) IV Push two times a day  cefTRIAXone Injectable. 2000 milliGRAM(s) IV Push every 24 hours  ferrous    sulfate 325 milliGRAM(s) Oral daily  levothyroxine 150 MICROGram(s) Oral daily  metoprolol succinate ER 25 milliGRAM(s) Oral daily  oxyCODONE  ER Tablet 15 milliGRAM(s) Oral every 12 hours  polyethylene glycol 3350 17 Gram(s) Oral daily  tamsulosin 0.8 milliGRAM(s) Oral at bedtime     Vital Signs Last 24 Hrs  T(C): 36.9 (20 Mar 2025 12:44), Max: 36.9 (20 Mar 2025 12:44)  T(F): 98.5 (20 Mar 2025 12:44), Max: 98.5 (20 Mar 2025 12:44)  HR: 96 (20 Mar 2025 12:00) (68 - 96)  BP: 102/67 (20 Mar 2025 12:00) (102/67 - 133/74)  BP(mean): 78 (20 Mar 2025 12:00) (76 - 91)  RR: 21 (20 Mar 2025 12:00) (9 - 21)  SpO2: 96% (20 Mar 2025 11:39) (91% - 98%)    Parameters below as of 20 Mar 2025 11:39  Patient On (Oxygen Delivery Method): room air        Labs:                        8.7    11.33 )-----------( 227      ( 20 Mar 2025 05:42 )             27.7     03-20    137  |  102  |  23  ----------------------------<  120[H]  4.1   |  34[H]  |  1.02    Ca    9.0      20 Mar 2025 05:42  Phos  3.5     03-20  Mg     2.1     03-20    TPro  6.0  /  Alb  2.8[L]  /  TBili  0.5  /  DBili  x   /  AST  27  /  ALT  44  /  AlkPhos  64  03-20    Physical Exam:  Constitutional: Awake / alert  HEENT: PERRLA, EOMI  Neck: Supple  Respiratory: Breath sounds are clear bilaterally  Cardiovascular: S1 and S2, regular rhythm  Gastrointestinal: Soft, NT/ND  Extremities:  mild 1+ to 2+ edema, long standing  Skin: (sacral skin tear vs ulcer was noticed in the OR- wound nurse management and care implemented)    Neurological Exam:  HF: AA&O x 3, follows commands, normal affect, speech fluent  CN: PERRL, EOMI, no NLFD, tongue midline  Motor:   	RUE: Deltoid 5/5, bicep 5/5, tricep 5/5, wrist ext 5/5,  4-/5, unable to fully flex 2nd digit  	LUE: Deltoid 5/5, bicep 5/5, tricep 5/5, wrist ext 5/5,  4-/5, unable to fully flex 2nd digit  	RLE: IP 4/5, Quad 4/5, EHL 5/5, Dorsi/plantarflexion 5/5 (some limitations 2/2 LE edema)  	LLE: IP 2/5, Quad 1/5, EHL 5/5, Dorsi/plantarflexion 5/5 (proximal leg significantly limited by pain and edema)  Sens: Intact to light touch except diminished in LLE  Reflexes: hyperreflexic RUE/RLE, diminished DTRs in LUE/LLE, +Right ankle clonus, +R babinski, eduardo negative b/l  Coord:  No dysmetria  Gait/Balance: Cannot test      A/P:      74 year old Male with noted history of multiple spinal surgeries, recent admission in February with noted streptococcus galactiae bacteremia and cervical spinal infection, now admitted for lower extremity edema.   Concern for worsening OM.     POD #3 s/p OR for posterior thoracic decompression T1-T3 with connection to existing hardware C5-T3 fusion under general anesthesia    PLAN-  Tolerating PO  Bowel regiment : + BM AM of 3/19  Nuvia PO pain meds on 3/19   Valium for muscle spasm   Physical therapy as tolerated || OT ordered for hand fine motor challenges / LE weakness  Antibiotics as per ID   Follow up intra-op wound culture   sacral wound assessment by wound/skin team, continue turning patient Q 2 hours and nursing skin assessments   Remove HV and ok to stop drain ppx antibiotics  follow H/H, 2 units of PRBCs given intra-op but appears stable on trends  Maintain Gupta for now as pt has had urinary retention with previous trial of void - On Flomax 8mg daily  Right Venodyne only due to left calf DVT (s/p pre-op IVC filter)   d/w Dr. Cantu - continue with post op care - oob with PT as tolerated.  Ty lift to chair yesterday will attempt standing with walker when possible

## 2025-03-20 NOTE — PROGRESS NOTE ADULT - ASSESSMENT
74-year-old male with a past medical history of benign prostatic hyperplasia, diverticulosis, Graves' disease, hemorrhoids, hyperlipidemia, hypothyroidism, osteoarthritis, spinal stenosis, spinal cord injury (2004), and multiple spinal surgeries w extremity swelling and abnormal BUN/creatinine levels.     Edema/Anasarca  - hypoalbuminemia - third spacing  nonrenal   - Optimize intake/protein stores, mobilize as cleared  - Monitor labs closely , stable renal function w good UOP   - continue bumex 1 mg IV BID     - No further IV fluid  - Serum workup negative but will need follow up of  Serum  TRUDY positive for weak IgG Lambda band.  - urine protein 200 mg only ( not nephrotic)   - I/O's  - keep I, <<O's   - daily standing weights      d/w SICU RN

## 2025-03-20 NOTE — PROGRESS NOTE ADULT - SUBJECTIVE AND OBJECTIVE BOX
Patient is a 74y Male with  who reports   no complaints overnight.  sitting up in chair      Allergies    No Known Allergies    Intolerances        MEDICATIONS  (STANDING):  buMETAnide Injectable 1 milliGRAM(s) IV Push two times a day  cefTRIAXone Injectable. 2000 milliGRAM(s) IV Push every 24 hours  ferrous    sulfate 325 milliGRAM(s) Oral daily  levothyroxine 150 MICROGram(s) Oral daily  metoprolol succinate ER 25 milliGRAM(s) Oral daily  oxyCODONE  ER Tablet 15 milliGRAM(s) Oral every 12 hours  polyethylene glycol 3350 17 Gram(s) Oral daily  senna 2 Tablet(s) Oral at bedtime  tamsulosin 0.8 milliGRAM(s) Oral at bedtime      Vitals:  T(F): 98.5 (03-20-25), Max: 98.5 (03-20-25)  HR: 89 (03-20-25) (68 - 96)  BP: 118/67 (03-20-25) (102/67 - 133/74)  RR: 19 (03-20-25)  SpO2: 96% (03-20-25)    I and O's:    03-19 @ 07:01  -  03-20 @ 07:00  --------------------------------------------------------  IN: 0 mL / OUT: 2100 mL / NET: -2100 mL    03-20 @ 07:01  -  03-20 @ 15:17  --------------------------------------------------------  IN: 0 mL / OUT: 1600 mL / NET: -1600 mL      PHYSICAL EXAM:    Constitutional: NAD,   HEENT:   MM  Respiratory: CTAB  Cardiovascular: S1 and S2  Gastrointestinal: abd swelling ++  soft, NT/ND  Extremities: ++3 peripheral edema  Neurological: A/O  : No Ortega  Dialysis Access:     LABS:                        8.7    11.33 )-----------( 227      ( 20 Mar 2025 05:42 )             27.7                         8.9    13.57 )-----------( 237      ( 19 Mar 2025 06:04 )             27.8       137    |  102    |  23     ----------------------------<  120       20 Mar 2025 05:42  4.1     |  34     |  1.02     138    |  104    |  25     ----------------------------<  100       19 Mar 2025 06:04  4.3     |  31     |  0.76     135    |  101    |  26     ----------------------------<  146       18 Mar 2025 05:40  4.2     |  29     |  0.93     Ca    9.0        20 Mar 2025 05:42  Ca    8.7        19 Mar 2025 06:04    Phos  3.5       20 Mar 2025 05:42  Phos  2.9       19 Mar 2025 06:04    Mg     2.1       20 Mar 2025 05:42  Mg     1.8       19 Mar 2025 06:04    TPro  6.0    /  Alb  2.8    /  TBili  0.5    /        20 Mar 2025 05:42  DBili  x      /  AST  27     /  ALT  44     /  AlkPhos  64       TPro  5.8    /  Alb  2.7    /  TBili  0.5    /        19 Mar 2025 06:04  DBili  x      /  AST  31     /  ALT  52     /  AlkPhos  64           Serum Osmo:   Serum Uric Acid:     Urine Studies:  Urinalysis Basic - ( 20 Mar 2025 05:42 )          RADIOLOGY & ADDITIONAL STUDIES:

## 2025-03-20 NOTE — PROGRESS NOTE ADULT - ASSESSMENT
ASSESSMENT  75 yo with PMHx of BPH, HLD, hypothryoid, Graves dz, hx ACDF C3 - C6, recent admission in Feb 2025 for sepsis with strep dysgalactiae bacteremia OM/Discitis, epidural abscess was discharged to [facility] with RUE PICC line and IV CTX qd, presented to  for lower ext swelling and GOVIND. Torsemide was held, gupta place and GOVIND improved. Hospital course c/b UE weakness L>R and pt was found to have worsening discitis/osteomyelitis w/ epidural phlegmon on MRI    s/p OR for posterior thoracic decompression T1-T3 with connection to existing hardware C5-T3 fusion under general anesthesia 3/17  EBL 1000ml, received 2u PRBC intra op    PLAN    Neuro: AAOx 3. pain control well on current regimen, oxycontin ER 15mg q12hr, oxycodone and dilaudid PRN, tylenol  CV: BP okay. sinus rhythm. cont toprol   Pulm: on 2L sating 98%, trial on room air.  GI: PO diet as tolerated. PPI. bowel regimen prn -   Nephro: Cr 0.7 -> 1.02. restarted on IV bumex 1mg bid for LE edema. gupta placed for retention, cont flomax monitor I & Os. Trend renal fxn  Endo: cont synthroid.  Vasc: US doppler + L soleal and gastroc DVT  ID: cont IV CTX 2gm qd for strep bacteremia (POA) to be continued until 4/6 as per ID recs. OR cx neg thus far. trend WBC. monitor temps.  Heme: Hgb 8.7. DVT ppx s/p IVC filter. SCDs  PT eval/OT eval    Dispo: SICU. pain control. bumex bid, trend Cr. IV abx. PT/OT    Will discuss with Dr. Tavares ASSESSMENT  73 yo with PMHx of BPH, HLD, hypothryoid, Graves dz, hx ACDF C3 - C6, recent admission in Feb 2025 for sepsis with strep dysgalactiae bacteremia OM/Discitis, epidural abscess was discharged to [facility] with RUE PICC line and IV CTX qd, presented to  for lower ext swelling and GOVIND. Torsemide was held, gupta place and GOVIND improved. Hospital course c/b UE weakness L>R and pt was found to have worsening discitis/osteomyelitis w/ epidural phlegmon on MRI    s/p OR for posterior thoracic decompression T1-T3 with connection to existing hardware C5-T3 fusion under general anesthesia 3/17  EBL 1000ml, received 2u PRBC intra op    PLAN    Neuro: AAOx 3. pain control well on current regimen, oxycontin ER 15mg q12hr, oxycodone and dilaudid PRN, tylenol  CV: BP okay. sinus rhythm. cont toprol   Pulm: on 2L sating 98%, trial on room air.  GI: PO diet as tolerated. PPI. bowel regimen prn -   Nephro: Cr 0.7 -> 1.02. restarted on IV bumex 1mg bid for LE edema. gupta placed for retention, cont flomax monitor I & Os. Trend renal fxn  Endo: cont synthroid.  Vasc: US doppler + L soleal and gastroc DVT s/p IVC filter 3/17 with interventional cards  ID: cont IV CTX 2gm qd for strep bacteremia (POA) to be continued until 4/16 as per ID recs. OR cx neg thus far. trend WBC. monitor temps.  Heme: Hgb 8.7. DVT ppx s/p IVC filter. SCDs  PT eval/OT eval    Dispo: SICU. pain control. bumex bid, trend Cr. IV abx. PT/OT    Will discuss with Dr. Tavares ASSESSMENT  73 yo with PMHx of BPH, HLD, hypothryoid, Graves dz, hx ACDF C3 - C6, recent admission in Feb 2025 for sepsis with strep dysgalactiae bacteremia OM/Discitis, epidural abscess was discharged to [facility] with RUE PICC line and IV CTX qd, presented to  for lower ext swelling and GOVIND. Torsemide was held, gupta place and GOVIND improved. Hospital course c/b UE weakness L>R and pt was found to have worsening discitis/osteomyelitis w/ epidural phlegmon on MRI    s/p OR for posterior thoracic decompression T1-T3 with connection to existing hardware C5-T3 fusion under general anesthesia 3/17  EBL 1000ml, received 2u PRBC intra op    PLAN    Neuro: AAOx 3. pain control well on current regimen, oxycontin ER 15mg q12hr, oxycodone and dilaudid PRN, tylenol  CV: BP okay. sinus rhythm. cont toprol   Pulm: on 2L sating 98%, trial on room air.  GI: PO diet as tolerated. PPI. bowel regimen prn -   Nephro: Cr 0.7 -> 1.02. restarted on IV bumex 1mg bid for LE edema. gupta placed for retention, cont flomax monitor I & Os. Trend renal fxn  Endo: cont synthroid.  Vasc: US doppler + L soleal and gastroc DVT s/p IVC filter 3/17 with interventional cards  ID: cont IV CTX 2gm qd for strep bacteremia (POA) to be continued until 4/16 as per ID recs. OR cx neg thus far. trend WBC. monitor temps.  Heme: Hgb 8.7. DVT ppx s/p IVC filter. SCDs. Pharm DVT ppx per NSGY, hold eliquis for 2 weeks  PT eval/OT eval    Dispo: SICU. pain control. bumex bid, trend Cr. IV abx. PT/OT    Will discuss with Dr. Tavares

## 2025-03-21 LAB
-  CLINDAMYCIN: SIGNIFICANT CHANGE UP
-  ERYTHROMYCIN: SIGNIFICANT CHANGE UP
-  GENTAMICIN: SIGNIFICANT CHANGE UP
-  OXACILLIN: SIGNIFICANT CHANGE UP
-  PENICILLIN: SIGNIFICANT CHANGE UP
-  RIFAMPIN: SIGNIFICANT CHANGE UP
-  TETRACYCLINE: SIGNIFICANT CHANGE UP
-  TRIMETHOPRIM/SULFAMETHOXAZOLE: SIGNIFICANT CHANGE UP
-  VANCOMYCIN: SIGNIFICANT CHANGE UP
ADD ON TEST-SPECIMEN IN LAB: SIGNIFICANT CHANGE UP
BUN SERPL-MCNC: 24 MG/DL — HIGH (ref 7–23)
CALCIUM SERPL-MCNC: 8.9 MG/DL — SIGNIFICANT CHANGE UP (ref 8.5–10.1)
CHLORIDE SERPL-SCNC: 100 MMOL/L — SIGNIFICANT CHANGE UP (ref 96–108)
CK SERPL-CCNC: 197 U/L — SIGNIFICANT CHANGE UP (ref 26–308)
CO2 SERPL-SCNC: 34 MMOL/L — HIGH (ref 22–31)
CREAT SERPL-MCNC: 0.95 MG/DL — SIGNIFICANT CHANGE UP (ref 0.5–1.3)
CRP SERPL-MCNC: 67.6 MG/ML — HIGH (ref 0–5)
EGFR: 85 ML/MIN/1.73M2 — SIGNIFICANT CHANGE UP
EGFR: 85 ML/MIN/1.73M2 — SIGNIFICANT CHANGE UP
ERYTHROCYTE [SEDIMENTATION RATE] IN BLOOD: 38 MM/HR — HIGH (ref 0–20)
GLUCOSE SERPL-MCNC: 118 MG/DL — HIGH (ref 70–99)
HCT VFR BLD CALC: 25.6 % — LOW (ref 39–50)
HGB BLD-MCNC: 8.2 G/DL — LOW (ref 13–17)
MAGNESIUM SERPL-MCNC: 1.8 MG/DL — SIGNIFICANT CHANGE UP (ref 1.6–2.6)
MCHC RBC-ENTMCNC: 29.1 PG — SIGNIFICANT CHANGE UP (ref 27–34)
MCHC RBC-ENTMCNC: 32 G/DL — SIGNIFICANT CHANGE UP (ref 32–36)
MCV RBC AUTO: 90.8 FL — SIGNIFICANT CHANGE UP (ref 80–100)
METHOD TYPE: SIGNIFICANT CHANGE UP
NRBC # BLD AUTO: 0 K/UL — SIGNIFICANT CHANGE UP (ref 0–0)
NRBC # FLD: 0 K/UL — SIGNIFICANT CHANGE UP (ref 0–0)
NRBC BLD AUTO-RTO: 0 /100 WBCS — SIGNIFICANT CHANGE UP (ref 0–0)
PHOSPHATE SERPL-MCNC: 4 MG/DL — SIGNIFICANT CHANGE UP (ref 2.5–4.5)
PLATELET # BLD AUTO: 203 K/UL — SIGNIFICANT CHANGE UP (ref 150–400)
PMV BLD: 9.3 FL — SIGNIFICANT CHANGE UP (ref 7–13)
POTASSIUM SERPL-MCNC: 4.2 MMOL/L — SIGNIFICANT CHANGE UP (ref 3.5–5.3)
POTASSIUM SERPL-SCNC: 4.2 MMOL/L — SIGNIFICANT CHANGE UP (ref 3.5–5.3)
RBC # BLD: 2.82 M/UL — LOW (ref 4.2–5.8)
RBC # FLD: 14.6 % — HIGH (ref 10.3–14.5)
SODIUM SERPL-SCNC: 134 MMOL/L — LOW (ref 135–145)
WBC # BLD: 12.2 K/UL — HIGH (ref 3.8–10.5)
WBC # FLD AUTO: 12.2 K/UL — HIGH (ref 3.8–10.5)

## 2025-03-21 PROCEDURE — 99233 SBSQ HOSP IP/OBS HIGH 50: CPT

## 2025-03-21 PROCEDURE — 72040 X-RAY EXAM NECK SPINE 2-3 VW: CPT | Mod: 26

## 2025-03-21 PROCEDURE — 72070 X-RAY EXAM THORAC SPINE 2VWS: CPT | Mod: 26

## 2025-03-21 PROCEDURE — 99232 SBSQ HOSP IP/OBS MODERATE 35: CPT

## 2025-03-21 RX ORDER — DAPTOMYCIN 500 MG/10ML
650 INJECTION, POWDER, LYOPHILIZED, FOR SOLUTION INTRAVENOUS EVERY 24 HOURS
Refills: 0 | Status: DISCONTINUED | OUTPATIENT
Start: 2025-03-21 | End: 2025-03-25

## 2025-03-21 RX ADMIN — TAMSULOSIN HYDROCHLORIDE 0.8 MILLIGRAM(S): 0.4 CAPSULE ORAL at 21:16

## 2025-03-21 RX ADMIN — Medication 2 TABLET(S): at 21:13

## 2025-03-21 RX ADMIN — Medication 325 MILLIGRAM(S): at 09:50

## 2025-03-21 RX ADMIN — CEFTRIAXONE 2000 MILLIGRAM(S): 500 INJECTION, POWDER, FOR SOLUTION INTRAMUSCULAR; INTRAVENOUS at 21:18

## 2025-03-21 RX ADMIN — OXYCODONE HYDROCHLORIDE 15 MILLIGRAM(S): 30 TABLET ORAL at 22:01

## 2025-03-21 RX ADMIN — BUMETANIDE 1 MILLIGRAM(S): 1 TABLET ORAL at 05:56

## 2025-03-21 RX ADMIN — OXYCODONE HYDROCHLORIDE 15 MILLIGRAM(S): 30 TABLET ORAL at 10:30

## 2025-03-21 RX ADMIN — OXYCODONE HYDROCHLORIDE 15 MILLIGRAM(S): 30 TABLET ORAL at 18:00

## 2025-03-21 RX ADMIN — OXYCODONE HYDROCHLORIDE 15 MILLIGRAM(S): 30 TABLET ORAL at 13:00

## 2025-03-21 RX ADMIN — METOPROLOL SUCCINATE 25 MILLIGRAM(S): 50 TABLET, EXTENDED RELEASE ORAL at 09:50

## 2025-03-21 RX ADMIN — Medication 3 MILLIGRAM(S): at 21:16

## 2025-03-21 RX ADMIN — OXYCODONE HYDROCHLORIDE 15 MILLIGRAM(S): 30 TABLET ORAL at 09:50

## 2025-03-21 RX ADMIN — Medication 150 MICROGRAM(S): at 05:56

## 2025-03-21 RX ADMIN — OXYCODONE HYDROCHLORIDE 15 MILLIGRAM(S): 30 TABLET ORAL at 17:21

## 2025-03-21 RX ADMIN — HEPARIN SODIUM 5000 UNIT(S): 1000 INJECTION INTRAVENOUS; SUBCUTANEOUS at 15:04

## 2025-03-21 RX ADMIN — OXYCODONE HYDROCHLORIDE 15 MILLIGRAM(S): 30 TABLET ORAL at 12:04

## 2025-03-21 RX ADMIN — DIAZEPAM 5 MILLIGRAM(S): 2 TABLET ORAL at 21:13

## 2025-03-21 RX ADMIN — BUMETANIDE 1 MILLIGRAM(S): 1 TABLET ORAL at 15:04

## 2025-03-21 RX ADMIN — POLYETHYLENE GLYCOL 3350 17 GRAM(S): 17 POWDER, FOR SOLUTION ORAL at 09:50

## 2025-03-21 RX ADMIN — HEPARIN SODIUM 5000 UNIT(S): 1000 INJECTION INTRAVENOUS; SUBCUTANEOUS at 21:17

## 2025-03-21 RX ADMIN — HEPARIN SODIUM 5000 UNIT(S): 1000 INJECTION INTRAVENOUS; SUBCUTANEOUS at 05:56

## 2025-03-21 RX ADMIN — DAPTOMYCIN 126 MILLIGRAM(S): 500 INJECTION, POWDER, LYOPHILIZED, FOR SOLUTION INTRAVENOUS at 09:50

## 2025-03-21 NOTE — PROGRESS NOTE ADULT - ASSESSMENT
#Cervical spine discitis OM  -- plan for IV CTX 2G q24 and Daptomycin 650mg q24 via PICC line for 6-week (enddate: 5/2/2025). Weekly CBC, CMP, ESR, CRP, CPK  - ct abx   - s/p OR (3/17) for T1-3 laminectomies for decompression and extension of prior cervical fusion to thoracic spine  - OR culture (3/17) with rare Staph epi, likely contaminant, but given hardware and prior MRI findings, will treat  - remains afebrile  -POD #4 s/p OR for posterior thoracic decompression T1-T3 with connection to existing hardware C5-T3 fusion under general anesthesia  -ct to monitor   -rest of management as per NS team     #DVT during this admission s/p IVC filter     #GOVIND-reoslved   -monitor it on bumex     #Hypothyroidism  -ct synthroid    #TOV today to remove gupta's- monitor for retention     #BPH- tamsulosine    #dvt pr- heparin       #Discussed with pt, ICU team, nursing staff, NS team

## 2025-03-21 NOTE — PROGRESS NOTE ADULT - ASSESSMENT
Assessment:  74M with benign prostatic hyperplasia, diverticulosis, Graves' disease, hemorrhoids, hyperlipidemia, hypothyroidism, osteoarthritis, spinal stenosis, spinal cord injury (2004), and multiple spinal surgeries (three cervical and one lumbar), recently admitted 02/18/25 to 02/25/25 for sepsis secondary to High-grade Strep Bacteremia with Discitis OM and epidural abscess on IV CTX 2G q24 till 4/16/2025 presents back 3/4/2025 from [facility] for worsening LE swelling and GOVIND, Found to have LLE DVT  Remains afebrile and on RA  Repeat MRI T spine with 3/9 showing Worsening discitis/osteomyelitis is seen with epidural phlegmon again seen unchanged though increased bilaterally and ventral, this is consistent with paraspinal phlegmon though there is a possible early abscess  Neurosurgery recommend no surgical intervention and continue antibiotic  So far reports no fever or chills, back pain is stable,  strength the same    Prior work up:  History of multiple spinal surgery, C and T spine laminectomy with fusion hardware (2004), L spine laminectomy with fusion hardware (2006), all done in [facility]. Also has L knee replacement (2017) though no symptoms there, L knee ROM normal no pain No cardiac devices  BCx (2/17) with Strep dysgalacteae, Sy S  MRI 2/18 with concern for discitis T1-T3, possible phlegmon C7-T3, no abscess  TTE without obvious vegetation  BCx 2/19 negative  OR culture (3/17) with rare Staph epi    Antimicrobials:  cefTRIAXone Injectable. 2000 every 24 hours (2/18 --- )    Impression:   #High-grade Strep Bacteremia, Neck Pain, Concern for Spinal Involvement in setting of Multilevel C/T/L Spine Laminectomy with fusion hardware  #GOVIND  #DVT  #Leukocytosis  - PICC line appears clean, no signs of infection  - symptomatically stable yet still has R  weakness, MRI concerning T spine discitis OM with early paraspinal abscess, is this lagging radiologic findings other worsening infection? ESR 76 (14 in Feb), and  (169 in Feb)  - s/p OR (3/17) for T1-3 laminectomies for decompression and extension of prior cervical fusion to thoracic spine  - OR culture (3/17) with rare Staph epi, likely contaminant, but given hardware and prior MRI findings, will treat  - remains afebrile    Recommendations:  - continue Ceftriaxone 2G q24  - start Dapto  - monitor temperature curve  - trend WBC  - reason for abx use reviewed with patient  - side effects of antibiotic discussed, tolerating abx well so far  - Prior cultures reviewed. An epidemiologic assessment was performed. There is a significant risk for resistant microorganisms to spread to family members, and/or healthcare staff. The patient will be placed on isolation according to infection control policy. Will reconsider isolation measures based on new culture results and other clinical data as appropriate Appropriate cultures collected and an appropriate broad spectrum antibiotic therapy will be considered  - Neurosurgery appreciated  - follow up OR culture (3/17), so far negative  - if cultures remain unrevealing, plan for IV CTX 2G q24 via PICC line for 6-week (enddate: 4/28/2025). Weekly CBC, CMP, ESR, CRP  - rest per primary team    [facility] IV to PO Token not applicable; Patient to continue with IV Therapy Assessment:  74M with benign prostatic hyperplasia, diverticulosis, Graves' disease, hemorrhoids, hyperlipidemia, hypothyroidism, osteoarthritis, spinal stenosis, spinal cord injury (2004), and multiple spinal surgeries (three cervical and one lumbar), recently admitted 02/18/25 to 02/25/25 for sepsis secondary to High-grade Strep Bacteremia with Discitis OM and epidural abscess on IV CTX 2G q24 till 4/16/2025 presents back 3/4/2025 from [facility] for worsening LE swelling and GOVIND, Found to have LLE DVT  Remains afebrile and on RA  Repeat MRI T spine with 3/9 showing Worsening discitis/osteomyelitis is seen with epidural phlegmon again seen unchanged though increased bilaterally and ventral, this is consistent with paraspinal phlegmon though there is a possible early abscess  Neurosurgery recommend no surgical intervention and continue antibiotic  So far reports no fever or chills, back pain is stable,  strength the same    Prior work up:  History of multiple spinal surgery, C and T spine laminectomy with fusion hardware (2004), L spine laminectomy with fusion hardware (2006), all done in [facility]. Also has L knee replacement (2017) though no symptoms there, L knee ROM normal no pain No cardiac devices  BCx (2/17) with Strep dysgalacteae, Sy S  MRI 2/18 with concern for discitis T1-T3, possible phlegmon C7-T3, no abscess  TTE without obvious vegetation  BCx 2/19 negative  OR culture (3/17) with rare Staph epi    Antimicrobials:  cefTRIAXone Injectable. 2000 every 24 hours (2/18 --- )  Dapto (3/21 --- )    Impression:   #High-grade Strep Bacteremia, Neck Pain, Concern for Spinal Involvement in setting of Multilevel C/T/L Spine Laminectomy with fusion hardware  #GOVIND  #DVT  #Leukocytosis  - PICC line appears clean, no signs of infection  - symptomatically stable yet still has R  weakness, MRI concerning T spine discitis OM with early paraspinal abscess, is this lagging radiologic findings other worsening infection? ESR 76 (14 in Feb), and  (169 in Feb)  - s/p OR (3/17) for T1-3 laminectomies for decompression and extension of prior cervical fusion to thoracic spine  - OR culture (3/17) with rare Staph epi, likely contaminant, but given hardware and prior MRI findings, will treat  - remains afebrile    Recommendations:  - continue Ceftriaxone 2G q24  - start Dapto 650mg q24   - monitor temperature curve  - trend WBC  - reason for abx use reviewed with patient  - side effects of antibiotic discussed, tolerating abx well so far  - Prior cultures reviewed. An epidemiologic assessment was performed. There is a significant risk for resistant microorganisms to spread to family members, and/or healthcare staff. The patient will be placed on isolation according to infection control policy. Will reconsider isolation measures based on new culture results and other clinical data as appropriate Appropriate cultures collected and an appropriate broad spectrum antibiotic therapy will be considered  - Neurosurgery appreciated  - plan for IV CTX 2G q24 and Daptomycin 650mg q24 via PICC line for 6-week (enddate: 5/2/2025). Weekly CBC, CMP, ESR, CRP, CPK  - rest per primary team    [facility] IV to PO Token not applicable; Patient to continue with IV Therapy yes...

## 2025-03-21 NOTE — PROGRESS NOTE ADULT - SUBJECTIVE AND OBJECTIVE BOX
HPI: 75 y/o M w/ pmh of bph, diverticulosis, graves dz, hemorrhoids, hld, hypothyroid, OA, SS, spinal cord injury (2004), multiple prior spinal surgeries (cervical and lumbar), presented to  for lower ext swelling and elevated BUN/Cr. Hospital course c/b UE weakness L>R and pt was found to have worsening discitis/osteomyelitis w/ epidural phlegmon.     3/18/25: Pain control w/ PCA pump, LE swelling on bumex  3/19/25: OOB    24 hour events:     Review of Systems:  Constitutional: No fever, chills, fatigue  Neuro: No headache, numbness, weakness  Resp: No cough, wheezing, shortness of breath  CVS: No chest pain, palpitations, leg swelling  GI: No abdominal pain, nausea, vomiting, diarrhea   : No dysuria, frequency, incontinence  Skin: No itching, burning, rashes, or lesions   Msk: No joint pain or swelling  Psych: No depression, anxiety, mood swings    T(F): 98.2 (03-21-25 @ 08:52), Max: 99.2 (03-21-25 @ 06:00)  HR: 84 (03-21-25 @ 08:00) (74 - 106)  BP: 126/70 (03-21-25 @ 08:00) (95/58 - 129/74)  RR: 15 (03-21-25 @ 08:00) (9 - 21)  SpO2: 100% (03-21-25 @ 08:00) (96% - 100%)  Wt(kg): --      03-20-25 @ 07:01  -  03-21-25 @ 07:00  --------------------------------------------------------  IN: 0 mL / OUT: 3450 mL / NET: -3450 mL        CAPILLARY BLOOD GLUCOSE    I&O's Summary    20 Mar 2025 07:01  -  21 Mar 2025 07:00  --------------------------------------------------------  IN: 0 mL / OUT: 3450 mL / NET: -3450 mL      Physical Exam:   Gen:  Neuro:  HEENT:  Resp:  CVS:  Abd:  Ext:  Skin:    Meds:  cefTRIAXone Injectable. IV Push  DAPTOmycin IVPB IV Intermittent    buMETAnide Injectable IV Push  metoprolol succinate ER Oral    levothyroxine Oral      acetaminophen   IVPB .. IV Intermittent PRN  diazepam    Tablet Oral PRN  HYDROmorphone  Injectable IV Push PRN  melatonin Oral PRN  ondansetron Injectable IV Push PRN  oxyCODONE    IR Oral PRN  oxyCODONE  ER Tablet Oral      heparin   Injectable SubCutaneous    aluminum hydroxide/magnesium hydroxide/simethicone Suspension Oral PRN  lactulose Syrup Oral PRN  polyethylene glycol 3350 Oral  senna Oral    tamsulosin Oral    ferrous    sulfate Oral    naloxone Injectable IV Push PRN                            8.2    12.20 )-----------( 203      ( 21 Mar 2025 05:31 )             25.6     03-21    134[L]  |  100  |  24[H]  ----------------------------<  118[H]  4.2   |  34[H]  |  0.95    Ca    8.9      21 Mar 2025 05:31  Phos  4.0     03-21  Mg     1.8     03-21    TPro  6.0  /  Alb  2.8[L]  /  TBili  0.5  /  DBili  x   /  AST  27  /  ALT  44  /  AlkPhos  64  03-20      Urinalysis Basic - ( 21 Mar 2025 05:31 )    Color: x / Appearance: x / SG: x / pH: x  Gluc: 118 mg/dL / Ketone: x  / Bili: x / Urobili: x   Blood: x / Protein: x / Nitrite: x   Leuk Esterase: x / RBC: x / WBC x   Sq Epi: x / Non Sq Epi: x / Bacteria: x      Surgical Swab EXPLANTED HARDWARE   Rare Staphylococcus epidermidis -- 03-17 @ 08:45  Surgical Swab EPIDURAL SPACE   No growth to date. -- 03-17 @ 08:44    Radiology:     < from: CT Angio Chest PE Protocol w/ IV Cont (03.16.25 @ 16:47) >    ACC: [id number] EXAM:  CT ANGIO CHEST PULM ART WAWI   ORDERED BY: DANNY JARAMILLO     PROCEDURE DATE:  03/16/2025          INTERPRETATION:  CLINICAL INFORMATION: N17.9. Rule out pulmonary embolism.    COMPARISON: 2/17/2025    CONTRAST/COMPLICATIONS:  IV Contrast: Omnipaque 350  70 cc administered   0 cc discarded  Oral Contrast: NONE  .    PROCEDURE:  CT Angiography of the Chest.  Sagittal and coronal reformats were performed as well as 3D (MIP)   reconstructions.    FINDINGS:    LUNGS AND LARGEAIRWAYS: Patent central airways. No pulmonary   consolidation or suspicious nodules.  PLEURA: No pleural effusion.  VESSELS: No pulmonary emboli. No thoracic aortic aneurysm. Tip of right   PICC line at the cavoatrial junction.  HEART: Heart size is normal. Aortic valvular calcifications. No   pericardial effusion.  MEDIASTINUM AND JOSÉ: No lymphadenopathy.  CHEST WALL AND LOWER NECK: Within normal limits.  VISUALIZED UPPER ABDOMEN: A few hepatic cysts. Cholelithiasis. Nodular   thickening of theleft adrenal gland.  BONES: Degenerative changes.    IMPRESSION:  No pulmonary embolism.    --- End of Report ---    WARREN BARRETO MD; Attending Radiologist  This document has been electronically signed. Mar 16 2025  4:59PM    < end of copied text >      CODE STATUS: FULL CODE    Time spent on this patient encounter, which includes documenting this note in the electronic medical record, was 55 minutes including assessing the presenting problems with associated risk, reviewing the medical record to prepare for the encounter, and meeting face to face with patient to obtain additional history. I have also performed an appropriate physical exam, made interventions listed and ordered and interpreted appropriate diagnostic studies as documented. To improve communication and patient safety. I have coordinated care with the multidisciplinary team including the bedside nurse, appropriate attending of record and consultant as needed.         HPI: 73 y/o M w/ pmh of bph, diverticulosis, graves dz, hemorrhoids, hld, hypothyroid, OA, SS, spinal cord injury (2004), multiple prior spinal surgeries (cervical and lumbar), presented to  for lower ext swelling and elevated BUN/Cr. Hospital course c/b UE weakness L>R and pt was found to have worsening discitis/osteomyelitis w/ epidural phlegmon now s/p OR for posterior thoracic decompression of T1-T3 w/ connection of existing hardware to C5-T3 and fusion.    3/18/25: Pain control w/ PCA pump, LE swelling on bumex  3/19/25: OOB  3/20/25: +BM, strength improving    24 hour events: 3/21/25: Pt seen and examined this morning, no complains, states legs still feel swollen otherwise w/out any complains.    Review of Systems:  Constitutional: No fever, chills, fatigue  Neuro: No headache, numbness, weakness  Resp: No cough, wheezing, shortness of breath  CVS: No chest pain, palpitations, leg swelling  GI: No abdominal pain, nausea, vomiting, diarrhea   : No dysuria, frequency, incontinence  Skin: No itching, burning, rashes, or lesions   Msk: No joint pain or swelling  Psych: No depression, anxiety, mood swings    T(F): 98.2 (03-21-25 @ 08:52), Max: 99.2 (03-21-25 @ 06:00)  HR: 84 (03-21-25 @ 08:00) (74 - 106)  BP: 126/70 (03-21-25 @ 08:00) (95/58 - 129/74)  RR: 15 (03-21-25 @ 08:00) (9 - 21)  SpO2: 100% (03-21-25 @ 08:00) (96% - 100%)  Wt(kg): --      03-20-25 @ 07:01  -  03-21-25 @ 07:00  --------------------------------------------------------  IN: 0 mL / OUT: 3450 mL / NET: -3450 mL        CAPILLARY BLOOD GLUCOSE    I&O's Summary    20 Mar 2025 07:01  -  21 Mar 2025 07:00  --------------------------------------------------------  IN: 0 mL / OUT: 3450 mL / NET: -3450 mL      Physical Exam:   Gen: Comfortable in bed in NAD  Neuro: AAOx3  HEENT: NC/AT  Resp: good air entry b/l  CVS: +RRR  Abd: BSx4, soft, nt/nd   Ext: +edema   Skin: warm/dry    Meds:  cefTRIAXone Injectable. IV Push  DAPTOmycin IVPB IV Intermittent    buMETAnide Injectable IV Push  metoprolol succinate ER Oral    levothyroxine Oral      acetaminophen   IVPB .. IV Intermittent PRN  diazepam    Tablet Oral PRN  HYDROmorphone  Injectable IV Push PRN  melatonin Oral PRN  ondansetron Injectable IV Push PRN  oxyCODONE    IR Oral PRN  oxyCODONE  ER Tablet Oral      heparin   Injectable SubCutaneous    aluminum hydroxide/magnesium hydroxide/simethicone Suspension Oral PRN  lactulose Syrup Oral PRN  polyethylene glycol 3350 Oral  senna Oral    tamsulosin Oral    ferrous    sulfate Oral    naloxone Injectable IV Push PRN                            8.2    12.20 )-----------( 203      ( 21 Mar 2025 05:31 )             25.6     03-21    134[L]  |  100  |  24[H]  ----------------------------<  118[H]  4.2   |  34[H]  |  0.95    Ca    8.9      21 Mar 2025 05:31  Phos  4.0     03-21  Mg     1.8     03-21    TPro  6.0  /  Alb  2.8[L]  /  TBili  0.5  /  DBili  x   /  AST  27  /  ALT  44  /  AlkPhos  64  03-20      Urinalysis Basic - ( 21 Mar 2025 05:31 )    Color: x / Appearance: x / SG: x / pH: x  Gluc: 118 mg/dL / Ketone: x  / Bili: x / Urobili: x   Blood: x / Protein: x / Nitrite: x   Leuk Esterase: x / RBC: x / WBC x   Sq Epi: x / Non Sq Epi: x / Bacteria: x      Surgical Swab EXPLANTED HARDWARE   Rare Staphylococcus epidermidis -- 03-17 @ 08:45  Surgical Swab EPIDURAL SPACE   No growth to date. -- 03-17 @ 08:44    Radiology:     < from: CT Angio Chest PE Protocol w/ IV Cont (03.16.25 @ 16:47) >    ACC: [id number] EXAM:  CT ANGIO CHEST PULM ART WAWIC   ORDERED BY: DANNY JARAMILLO     PROCEDURE DATE:  03/16/2025          INTERPRETATION:  CLINICAL INFORMATION: N17.9. Rule out pulmonary embolism.    COMPARISON: 2/17/2025    CONTRAST/COMPLICATIONS:  IV Contrast: Omnipaque 350  70 cc administered   0 cc discarded  Oral Contrast: NONE  .    PROCEDURE:  CT Angiography of the Chest.  Sagittal and coronal reformats were performed as well as 3D (MIP)   reconstructions.    FINDINGS:    LUNGS AND LARGEAIRWAYS: Patent central airways. No pulmonary   consolidation or suspicious nodules.  PLEURA: No pleural effusion.  VESSELS: No pulmonary emboli. No thoracic aortic aneurysm. Tip of right   PICC line at the cavoatrial junction.  HEART: Heart size is normal. Aortic valvular calcifications. No   pericardial effusion.  MEDIASTINUM AND JOSÉ: No lymphadenopathy.  CHEST WALL AND LOWER NECK: Within normal limits.  VISUALIZED UPPER ABDOMEN: A few hepatic cysts. Cholelithiasis. Nodular   thickening of theleft adrenal gland.  BONES: Degenerative changes.    IMPRESSION:  No pulmonary embolism.    --- End of Report ---    WARREN BARRETO MD; Attending Radiologist  This document has been electronically signed. Mar 16 2025  4:59PM    < end of copied text >      CODE STATUS: FULL CODE    Time spent on this patient encounter, which includes documenting this note in the electronic medical record, was 55 minutes including assessing the presenting problems with associated risk, reviewing the medical record to prepare for the encounter, and meeting face to face with patient to obtain additional history. I have also performed an appropriate physical exam, made interventions listed and ordered and interpreted appropriate diagnostic studies as documented. To improve communication and patient safety. I have coordinated care with the multidisciplinary team including the bedside nurse, appropriate attending of record and consultant as needed.

## 2025-03-21 NOTE — PROGRESS NOTE ADULT - NS ATTEND AMEND GEN_ALL_CORE FT
73 y/o M w/ pmh of bph, diverticulosis, graves dz, hemorrhoids, hld, hypothyroid, OA, SS, spinal cord injury (2004), multiple prior spinal surgeries (cervical and lumbar), presented to  for lower ext swelling and elevated BUN/Cr. Found to have acute L soleal/gastroc DVT, was started on AC. Hospital course c/b UE weakness L>R and pt was found to have worsening discitis/osteomyelitis w/ epidural phlegmon now POD#0 for thoracic lami/driange. IVC filter placed pre-op today for L gastroc DVT.    Problems:  #Spinal epidural abscess  #L gastrocnemius/soleal DVT  #GOVIND  #Anemia    Plan:  - Mentation intact, pain control dilaudid PCA, prn tylenol and valium, neurosurgery recs appreciated, monitor vac output  - Hemodynamically stable, c/w metoprolol, cards following recs appreciated, TTE reviewed nl LVEF  - On room air  - DASH diet  - GOVIND improving, cont to monitor Is/Os and renal indices, flomax for BPH, dc IVF trial bumex 1mg IV x1 per nephro recs for edema/ansarca  - C/w IV ceftriaxone for discitis/OM, ID following recs appreciated, f/u intraop wound cultures  - C/w synthroid, FS q6h  - DVT ppx SCDs, hold AC given recent surgery, s/p IVC filter placement by interventional cards, f/u nsgy when okay to resume, hgb stable 9.1 cont to monitor, c/w iron supplementation.
Reviewed CT scan chest - no evidence of PE.  Discussed case w/ Dr. Guthrie - pt has diagnosis of DVT  in the left gastrocnemius and soleal veins.  Discussed w/ Dr. Brennan interventionalist - this diagnosis carries a substantial risk   of PE if anticoag is held especially in the setting of acute DVT.    Neurosurgery plans to hold full dose anticoag for up to 48 hrs postop   w/ DVT prophylaxis dose during that 48 hr period.    Recommend IVC filter placement  prior to surgery.
agree with assessment and plan as above

## 2025-03-21 NOTE — PROGRESS NOTE ADULT - TIME BILLING
Time spent coordinating the patient's care. This includes reviewing documentation pertinent to this admission, results and imaging. Further tests, medications, and procedures have been ordered as indicated. Laboratory results and the plan of care were communicated to the patient and family.  Discussed care plan with nursing and will discuss plan and care with appropriate consultant(s).
I spent a total of 55 minutes on the date of this encounter coordinating the patient's care. This includes reviewing prior documentation, results and imaging in addition to completing a full history and physical examination on the patient. Further tests, medications, and procedures have been ordered as indicated. Laboratory results and the plan of care were communicated to the patient and/or their family member. Supporting documentation was completed and added to the patient's chart.
Time spent coordinating the patient's care. This includes reviewing documentation pertinent to this admission, results and imaging. Further tests, medications, and procedures have been ordered as indicated. Laboratory results and the plan of care were communicated to the patient and family.  Discussed care plan with nursing and will discuss plan and care with appropriate consultant(s).
Time spent coordinating the patient's care. This includes reviewing documentation pertinent to this admission, results and imaging. Further tests, medications, and procedures have been ordered as indicated. Laboratory results and the plan of care were communicated to the patient and family.  Discussed care plan with nursing and will discuss plan and care with appropriate consultant(s).
Time spent  coordinating the patient's care. This includes reviewing documentation pertinent to this admission, results and imaging. Further tests, medications, and procedures have been ordered as indicated. Laboratory results and the plan of care were communicated to the patient. Discussed care plan with consultants including cardio. Supporting documentation was completed and added to the patient's chart.
Time spent coordinating the patient's care. This includes reviewing documentation pertinent to this admission, results and imaging. Further tests, medications, and procedures have been ordered as indicated. Laboratory results and the plan of care were communicated to the patient and family.  Discussed care plan with nursing and will discuss plan and care with appropriate consultant(s).
Time spent coordinating the patient's care. This includes reviewing documentation pertinent to this admission, results and imaging. Further tests, medications, and procedures have been ordered as indicated. Laboratory results and the plan of care were communicated to the patient and family.  Discussed care plan with nursing and will discuss plan and care with appropriate consultant(s).
.
Time spent  coordinating the patient's care. This includes reviewing documentation pertinent to this admission, results and imaging. Further tests, medications, and procedures have been ordered as indicated. Laboratory results and the plan of care were communicated to the patient. Discussed care plan with consultants including nephro. Supporting documentation was completed and added to the patient's chart.
.

## 2025-03-21 NOTE — PROGRESS NOTE ADULT - SUBJECTIVE AND OBJECTIVE BOX
Patient is a 74y Male with  who reports   no complaints overnight.  good UOP     remains bloated. swollen    denies excessive fluid intake     Allergies    No Known Allergies    Intolerances        MEDICATIONS  (STANDING):  buMETAnide Injectable 1 milliGRAM(s) IV Push two times a day  cefTRIAXone Injectable. 2000 milliGRAM(s) IV Push every 24 hours  DAPTOmycin IVPB 650 milliGRAM(s) IV Intermittent every 24 hours  ferrous    sulfate 325 milliGRAM(s) Oral daily  heparin   Injectable 5000 Unit(s) SubCutaneous every 8 hours  levothyroxine 150 MICROGram(s) Oral daily  metoprolol succinate ER 25 milliGRAM(s) Oral daily  oxyCODONE  ER Tablet 15 milliGRAM(s) Oral every 12 hours  polyethylene glycol 3350 17 Gram(s) Oral daily  senna 2 Tablet(s) Oral at bedtime  tamsulosin 0.8 milliGRAM(s) Oral at bedtime      Vitals:  T(F): 98.2 (03-21-25), Max: 99.2 (03-21-25)  HR: 116 (03-21-25) (74 - 116)  BP: 109/67 (03-21-25) (95/58 - 134/71)  RR: 14 (03-21-25)  SpO2: 88% (03-21-25)    I and O's:    03-20 @ 07:01  -  03-21 @ 07:00  --------------------------------------------------------  IN: 0 mL / OUT: 3450 mL / NET: -3450 mL    03-21 @ 07:01  -  03-21 @ 18:27  --------------------------------------------------------  IN: 0 mL / OUT: 1750 mL / NET: -1750 mL      PHYSICAL EXAM:    Constitutional: NAD,   HEENT:   MM  Respiratory: CTAB  Cardiovascular: S1 and S2  Gastrointestinal: anasarca + abd wall edema   Extremities: LE  peripheral edema ++ 4   Neurological: A/O  :  Ortega ++  Dialysis Access: Not applicable    LABS:                        8.2    12.20 )-----------( 203      ( 21 Mar 2025 05:31 )             25.6                         8.7    11.33 )-----------( 227      ( 20 Mar 2025 05:42 )             27.7       134    |  100    |  24     ----------------------------<  118       21 Mar 2025 05:31  4.2     |  34     |  0.95     137    |  102    |  23     ----------------------------<  120       20 Mar 2025 05:42  4.1     |  34     |  1.02     138    |  104    |  25     ----------------------------<  100       19 Mar 2025 06:04  4.3     |  31     |  0.76     Ca    8.9        21 Mar 2025 05:31  Ca    9.0        20 Mar 2025 05:42    Phos  4.0       21 Mar 2025 05:31  Phos  3.5       20 Mar 2025 05:42    Mg     1.8       21 Mar 2025 05:31  Mg     2.1       20 Mar 2025 05:42    TPro  6.0    /  Alb  2.8    /  TBili  0.5    /        20 Mar 2025 05:42  DBili  x      /  AST  27     /  ALT  44     /  AlkPhos  64       TPro  5.8    /  Alb  2.7    /  TBili  0.5    /        19 Mar 2025 06:04  DBili  x      /  AST  31     /  ALT  52     /  AlkPhos  64

## 2025-03-21 NOTE — PROGRESS NOTE ADULT - NSPROGADDITIONALINFOA_GEN_ALL_CORE
I spent a total of 71 minutes in face-to-face time with the patient and on the floor managing patient including coordination of care. Overt 50% of the time spent in discussion of the diagnosis, counseling and treatment plan.

## 2025-03-21 NOTE — PROGRESS NOTE ADULT - ASSESSMENT
74 year old Male with noted history of multiple spinal surgeries, recent admission in February with noted streptococcus galactiae bacteremia and cervical spinal infection, now admitted for lower extremity edema.   Concern for worsening OM.     POD #4 s/p OR for posterior thoracic decompression T1-T3 with connection to existing hardware C5-T3 fusion under general anesthesia    PLAN-  Advance diet and activity as tolerated   c-spine, T-spine standing x-ray (sitting if required)   Pain meds as needed   Valium for muscle spasm   Physical therapy and OT as tolerated   Staples to be removed on POD #14   Antibiotics as per ID   Follow up intra-op wound culture   SQ Heparin for DVT PPX   sacral wound assessment by wound/skin team, continue turning patient Q2 hours and nursing skin assessments   follow H/H, 2 units of PRBCs given intra-op but appears stable on trends  Maintain Ortega for now as pt has had urinary retention with previous trial of void - On Flomax 8mg daily  Right Venodyne only due to left calf DVT (s/p pre-op IVC filter)   d/w Dr. Cantu - continue with post op care - oob with PT as tolerated

## 2025-03-21 NOTE — PROGRESS NOTE ADULT - SUBJECTIVE AND OBJECTIVE BOX
HPI:  74-year-old male with PMH of BPH, diverticulosis, Graves' disease, hemorrhoids, HLD, hypothyroidism, osteoarthritis, spinal stenosis, spinal cord injury (2004), and multiple spinal surgeries (three cervical and one lumbar). He presents with complaints of lower extremity swelling and abnormal BUN/creatinine levels. The patient was recently admitted to [facility] from 02/18/25 to 02/25/25 for sepsis in the setting of streptococcus dysgalactiae bacteremia and was followed by neurosurgery for cervicothoracic spine infection. He was discharged to [facility] with a PICC for IV ceftriaxone until 04/16/25. Documentation from the rehabilitation facility notes a decline in his functional status and dysfunction with activities of daily living. At the rehabilitation facility, he was noted to have worsening LE swelling and was started on torsemide. However, his Cr levels subsequently became elevated, leading to the discontinuation of torsemide. He was sent to the  ED for further evaluation of his LE swelling and elevated Cr levels. He denies any chest pain, dyspnea, orthopnea, PND.     Repeat MRI done this admission showed worsening discitis/osteomyelitis. Per ID, antibiotics alone will unlikely resolve this spinal infection and would consider surgical intervention if able to perform based on risk/benefit. After discussion with patient and offering surgical interventions vs continued medical management with antibiotics, patient wished to proceed with surgery. Of note, hospital course has been complicated by below the knee DVT, currently on therapeutic Lovenox, urinary retention requiring gupta, and significant LE anasarca.    3/15- Pt seen and examined at bedside, resting comfortably and in NAD. Reports chronic right sided spacticity from prior spinal cord injury/surgery. He has had progressive worsening in hand  and loss of dexterity over the last few months. Pending medical clearance for OR Monday for thoracic laminectomy and fusion for epidural abscess.     3/16- Pt seen and examined at bedside, resting comfortably and in NAD. Reports some improvement in abdominal/leg pain 2/2 edema. Pending OR tomorrow.     3/17- OR for posterior thoracic decompression T1-T3 with connection to existing hardware C5-T3 fusion under general anesthesia. Prior to OR pt had an ICV filter placed in IR.   Pt tolerated the procedure well, he lost approx. 1L of blood intra-op and was transfused 2 units of PRBCs. Pt was extubated at the end of the case and taken to PACU for observation then transferred to SICU for observation overnight.   Pt has a left and right drain both emptying into the same Hemovac, he was started on PCA for pain control.     3/18- POD #1, pt seen and examined in the SICU, c/o neck pain, dressing clean and dry, moving b/l upper ext antigravity, good  strength except to pointer fingers on both hands. Able to extend right lower extremitiy off the bed but not the left. Tolerating PO diet. Vital signs stable. H/H stable today. Hemovac output 240cc since surgery.     3/19- POD #2, pt seen and examined in the SICU, c/o neck pain, dressing clean and dry. + BM this morning.   Able to extend right lower extremities off the bed but not the left. Tolerating PO diet. Vital signs stable. H/H stable today. Hemovac output 140cc for 24 hours.  Gupta and PCA remains, will change to PO meds and stop PCA.  OT ordered.    3/20- POD #3, pt seen and examined in the SICU, c/o neck pain, dressing clean and dry. Drain removed today. pt is  Able to extend right lower extremity off the bed but not the left. Tolerating PO diet. Vital signs stable. H/H stable today. Hemovac output < 50cc for 24 hours.  Gupta and tolerating PO meds at this time.  PT/OT / Dispo planning    3/21- POD #4, Pt seen and examined, Hemovac was removed yesterday, tolerating PO diet, pain controlled on PO pain meds, continued b/l lower ext edema,  staples c/d/i, able to participate with PT     Vital Signs Last 24 Hrs  T(C): 36.8 (21 Mar 2025 08:52), Max: 37.3 (21 Mar 2025 06:00)  T(F): 98.2 (21 Mar 2025 08:52), Max: 99.2 (21 Mar 2025 06:00)  HR: 90 (21 Mar 2025 10:00) (74 - 106)  BP: 110/61 (21 Mar 2025 10:00) (95/58 - 129/74)  BP(mean): 75 (21 Mar 2025 10:00) (69 - 106)  RR: 14 (21 Mar 2025 10:00) (9 - 21)  SpO2: 94% (21 Mar 2025 10:00) (94% - 100%)    Parameters below as of 20 Mar 2025 16:15  Patient On (Oxygen Delivery Method): room air    MEDICATIONS  (STANDING):  buMETAnide Injectable 1 milliGRAM(s) IV Push two times a day  cefTRIAXone Injectable. 2000 milliGRAM(s) IV Push every 24 hours  DAPTOmycin IVPB 650 milliGRAM(s) IV Intermittent every 24 hours  ferrous    sulfate 325 milliGRAM(s) Oral daily  heparin   Injectable 5000 Unit(s) SubCutaneous every 8 hours  levothyroxine 150 MICROGram(s) Oral daily  metoprolol succinate ER 25 milliGRAM(s) Oral daily  oxyCODONE  ER Tablet 15 milliGRAM(s) Oral every 12 hours  polyethylene glycol 3350 17 Gram(s) Oral daily  senna 2 Tablet(s) Oral at bedtime  tamsulosin 0.8 milliGRAM(s) Oral at bedtime    MEDICATIONS  (PRN):  acetaminophen   IVPB .. 1000 milliGRAM(s) IV Intermittent once PRN Mild Pain (1 - 3)  aluminum hydroxide/magnesium hydroxide/simethicone Suspension 30 milliLiter(s) Oral every 4 hours PRN Dyspepsia  diazepam    Tablet 5 milliGRAM(s) Oral every 8 hours PRN Every 8 hours as needed for muscle spasm  HYDROmorphone  Injectable 1 milliGRAM(s) IV Push every 3 hours PRN Severe Pain (7 - 10)  lactulose Syrup 20 Gram(s) Oral three times a day PRN for constipation  melatonin 3 milliGRAM(s) Oral at bedtime PRN Insomnia  naloxone Injectable 0.1 milliGRAM(s) IV Push every 3 minutes PRN For ANY of the following changes in patient status:  A. RR LESS THAN 10 breaths per minute, B. Oxygen saturation LESS THAN 90%, C. Sedation score of 6  ondansetron Injectable 4 milliGRAM(s) IV Push every 6 hours PRN Nausea and/or Vomiting  oxyCODONE    IR 15 milliGRAM(s) Oral every 4 hours PRN Moderate Pain (4 - 6)    ROS: pertinent positives in HPI, all other ROS were reviewed and are negative     PHYSICAL EXAM:  Constitutional: Awake / alert, NAD   HEENT: PERRLA, EOMI, hearing intact   Neck: Supple  Respiratory: Breath sounds are clear bilaterally  Cardiovascular: S1 and S2, regular rhythm  Gastrointestinal: Soft, NT/ND  Extremities:  pitting edema, long standing  Skin: (sacral skin tear vs ulcer was noticed in the OR- wound nurse management and care implemented)    Neurological Exam:  HF: AA&O x 3, follows commands, normal affect, speech fluent  CN: PERRL, EOMI, no NLFD, tongue midline  Motor:   	RUE: Deltoid 5/5, bicep 5/5, tricep 5/5, wrist ext 5/5,  4-/5, unable to fully flex 2nd digit  	LUE: Deltoid 5/5, bicep 5/5, tricep 5/5, wrist ext 5/5,  4-/5, unable to fully flex 2nd digit  	RLE: IP 4/5, Quad 4/5, EHL 5/5, Dorsi/plantarflexion 5/5 (some limitations 2/2 LE edema)  	LLE: IP 2/5, Quad 1/5, EHL 5/5, Dorsi/plantarflexion 5/5 (proximal leg significantly limited by pain and edema)  Sens: Intact to light touch except diminished in LLE  Reflexes: hyperreflexic RUE/RLE, diminished DTRs in LUE/LLE, +Right ankle clonus, +R babinski, eduardo negative b/l  Coord:  No dysmetria  Gait/Balance: Cannot test    LABS:                         8.2    12.20 )-----------( 203      ( 21 Mar 2025 05:31 )             25.6     03-21    134[L]  |  100  |  24[H]  ----------------------------<  118[H]  4.2   |  34[H]  |  0.95    Ca    8.9      21 Mar 2025 05:31  Phos  4.0     03-21  Mg     1.8     03-21    TPro  6.0  /  Alb  2.8[L]  /  TBili  0.5  /  DBili  x   /  AST  27  /  ALT  44  /  AlkPhos  64  03-20    LIVER FUNCTIONS - ( 20 Mar 2025 05:42 )  Alb: 2.8 g/dL / Pro: 6.0 gm/dL / ALK PHOS: 64 U/L / ALT: 44 U/L / AST: 27 U/L / GGT: x           RADIOLOGY:

## 2025-03-21 NOTE — PROGRESS NOTE ADULT - SUBJECTIVE AND OBJECTIVE BOX
Patient is a 74y old  Male who presents with a chief complaint of GOVIND, lower ext pitting edema, spinal epidural abscess (21 Mar 2025 10:56)    HPI:  74-year-old male with a past medical history of benign prostatic hyperplasia, diverticulosis, Graves' disease, hemorrhoids, hyperlipidemia, hypothyroidism, osteoarthritis, spinal stenosis, spinal cord injury (2004), and multiple spinal surgeries (three cervical and one lumbar). He presents with complaints of lower extremity swelling and abnormal BUN/creatinine levels. The patient was recently admitted to [facility] from 02/18/25 to 02/25/25 for sepsis secondary to epidural abscess. He has a peripherally inserted central catheter line in the right arm and is on nightly Ceftriaxone treatment until 04/16/25. Following his discharge, he was transferred to [facility]. Documentation from the rehabilitation facility notes a decline in his functional status and dysfunction with activities of daily living. At the rehabilitation facility, he was noted to have worsening LE swelling and was started on torsemide. However, his Cr levels subsequently became elevated, leading to the discontinuation of torsemide. He was sent to the [facility] Emergency Department for further evaluation of his LE swelling and elevated Cr levels. He denies any chest pain, dyspnea, orthopnea, PND.       Review of Systems:  CONSTITUTIONAL: as per hpi   EYES/ENT: No visual changes;  No vertigo or throat pain   NECK: as per hpi   RESPIRATORY: No cough, wheezing, hemoptysis; No shortness of breath,   CARDIOVASCULAR: No chest pain or palpitations  GASTROINTESTINAL: No abdominal or epigastric pain. No nausea, vomiting, or hematemesis; No diarrhea or constipation.   GENITOURINARY: No dysuria, frequency or hematuria  NEUROLOGICAL: as per hpi   SKIN: No itching, burning, rashes, or lesions   All other review of systems is negative unless indicated above    PAST MEDICAL & SURGICAL HISTORY:  Hyperlipidemia, unspecified hyperlipidemia type      Diverticulosis of large intestine without hemorrhage      History of colon polyps      Hemorrhoids, unspecified hemorrhoid type      Benign prostatic hyperplasia without lower urinary tract symptoms, unspecified morphology      Osteoarthritis of knee, unilateral  left      Spinal cord injury at C5-C7 level without injury of spinal bone, subsequent encounter      Spastic      Weakness  to right side      Myelopathy      Spinal stenosis, unspecified spinal region      Hypothyroidism, unspecified type      Graves disease      Alcoholic  recovering alcoholic x 40years      History of cervical discectomy      S/P laminectomy  Cervical      H/O lumbar discectomy  with laminectomy      H/O arthroscopy of knee  Left x 2. Unsure of years      H/O colonoscopy with polypectomy  2014        FAMILY HISTORY:  Family history of colon cancer in mother (Mother)    Family history of liver disease (Father)  father    Family history of cancer (Sibling)  SIster - bile duct      Social history -no drug abuse, no etoh use disorder     Vital Signs Last 24 Hrs  T(C): 36.8 (21 Mar 2025 08:52), Max: 37.3 (21 Mar 2025 06:00)  T(F): 98.2 (21 Mar 2025 08:52), Max: 99.2 (21 Mar 2025 06:00)  HR: 102 (21 Mar 2025 13:00) (74 - 106)  BP: 124/79 (21 Mar 2025 13:00) (95/58 - 134/71)  BP(mean): 93 (21 Mar 2025 13:00) (69 - 93)  RR: 16 (21 Mar 2025 13:00) (9 - 22)  SpO2: 94% (21 Mar 2025 10:00) (94% - 100%)    Parameters below as of 20 Mar 2025 16:15  Patient On (Oxygen Delivery Method): room air        General: WN/WD NAD  Head- Atraumatic, normocephalic   Neurology: A&Ox3,  HEENT- PERRLA, moist muscous membrane  Neck-supple, no JVD  Respiratory: Air entry equal b/l, CTA   CVS:  S1S2, no murmurs, rubs or gallops  Abdominal: Soft, NT, ND +BS,   Genitourinary- voiding, non palpable bladder  Extremities: No edema, + peripheral pulses  Skin- no rash, no ulcer  Psychiatric- mood stable   LN- no lymphadenopathy                             8.2    12.20 )-----------( 203      ( 21 Mar 2025 05:31 )             25.6     03-21    134[L]  |  100  |  24[H]  ----------------------------<  118[H]  4.2   |  34[H]  |  0.95    Ca    8.9      21 Mar 2025 05:31  Phos  4.0     03-21  Mg     1.8     03-21    TPro  6.0  /  Alb  2.8[L]  /  TBili  0.5  /  DBili  x   /  AST  27  /  ALT  44  /  AlkPhos  64  03-20    LIVER FUNCTIONS - ( 20 Mar 2025 05:42 )  Alb: 2.8 g/dL / Pro: 6.0 gm/dL / ALK PHOS: 64 U/L / ALT: 44 U/L / AST: 27 U/L / GGT: x               Urinalysis Basic - ( 21 Mar 2025 05:31 )    Color: x / Appearance: x / SG: x / pH: x  Gluc: 118 mg/dL / Ketone: x  / Bili: x / Urobili: x   Blood: x / Protein: x / Nitrite: x   Leuk Esterase: x / RBC: x / WBC x   Sq Epi: x / Non Sq Epi: x / Bacteria: x          CAPILLARY BLOOD GLUCOSE          MEDICATIONS  (STANDING):  buMETAnide Injectable 1 milliGRAM(s) IV Push two times a day  cefTRIAXone Injectable. 2000 milliGRAM(s) IV Push every 24 hours  DAPTOmycin IVPB 650 milliGRAM(s) IV Intermittent every 24 hours  ferrous    sulfate 325 milliGRAM(s) Oral daily  heparin   Injectable 5000 Unit(s) SubCutaneous every 8 hours  levothyroxine 150 MICROGram(s) Oral daily  metoprolol succinate ER 25 milliGRAM(s) Oral daily  oxyCODONE  ER Tablet 15 milliGRAM(s) Oral every 12 hours  polyethylene glycol 3350 17 Gram(s) Oral daily  senna 2 Tablet(s) Oral at bedtime  tamsulosin 0.8 milliGRAM(s) Oral at bedtime    MEDICATIONS  (PRN):  acetaminophen   IVPB .. 1000 milliGRAM(s) IV Intermittent once PRN Mild Pain (1 - 3)  aluminum hydroxide/magnesium hydroxide/simethicone Suspension 30 milliLiter(s) Oral every 4 hours PRN Dyspepsia  diazepam    Tablet 5 milliGRAM(s) Oral every 8 hours PRN Every 8 hours as needed for muscle spasm  HYDROmorphone  Injectable 1 milliGRAM(s) IV Push every 3 hours PRN Severe Pain (7 - 10)  lactulose Syrup 20 Gram(s) Oral three times a day PRN for constipation  melatonin 3 milliGRAM(s) Oral at bedtime PRN Insomnia  naloxone Injectable 0.1 milliGRAM(s) IV Push every 3 minutes PRN For ANY of the following changes in patient status:  A. RR LESS THAN 10 breaths per minute, B. Oxygen saturation LESS THAN 90%, C. Sedation score of 6  ondansetron Injectable 4 milliGRAM(s) IV Push every 6 hours PRN Nausea and/or Vomiting  oxyCODONE    IR 15 milliGRAM(s) Oral every 4 hours PRN Moderate Pain (4 - 6)

## 2025-03-21 NOTE — PROGRESS NOTE ADULT - NS ATTEND OPT1A GEN_ALL_CORE
History/Exam/Medical decision making
History/Exam/Medical decision making
History
History/Exam/Medical decision making
History/Exam/Medical decision making

## 2025-03-21 NOTE — PROGRESS NOTE ADULT - ASSESSMENT
74-year-old male with a past medical history of benign prostatic hyperplasia, diverticulosis, Graves' disease, hemorrhoids, hyperlipidemia, hypothyroidism, osteoarthritis, spinal stenosis, spinal cord injury (2004), and multiple spinal surgeries w extremity swelling and abnormal BUN/creatinine levels.     Edema/Anasarca  - hypoalbuminemia - third spacing  nonrenal in origin   Hyponatremia  s/p Cervical spine washout    - limit free water intake   - Optimize intake/protein stores, mobilize as cleared  - Monitor labs closely , stable renal function w good UOP 3.5 liters x 24   - remains overloaded but improving - continue bumex 1 mg IV BID   - No further IVF   - Serum workup negative but will need follow up of  Serum  TRUDY positive for weak IgG Lambda band.  - urine protein 200 mg only ( not nephrotic)   - I/O's  - keep I, <<O's   - daily standing weights       d/w SICU RN     Dr Magallon to resume care tomorrow     * pt seen earlier, note now

## 2025-03-21 NOTE — PROGRESS NOTE ADULT - ASSESSMENT
73 y/o M w/ pmh of bph, diverticulosis, graves dz, hemorrhoids, hld, hypothyroid, OA, SS, spinal cord injury (2004), multiple prior spinal surgeries (cervical and lumbar), presented to  for lower ext swelling and elevated BUN/Cr. Hospital course c/b UE weakness L>R and pt was found to have worsening discitis/osteomyelitis w/ epidural phlegmon now s/p OR for posterior thoracic decompression of T1-T3 w/ connection of existing hardware to C5-T3 and fusion.    -Neuro: No acute issues, Post-op Pain control w/ tylenol, oxycodone, oxycontin and dilaudid per pain scale  -Cardiac: HDS maintain MAP >65, LE edema on bumex, htn on toprol  -Resp: no acute issues, o2 stable on RA  -GI: Cont diet as ordered/tolerated, on bowel regimen  -Renal: Renal function stable cont to monitor along w/ lytes, gupta in placed for retention currently on flomax, will try to d/c gupta for TOV if retention again will place gupta back  -ID: Stap epi possible contaminate but on IV rocephin and dapto ID following input noted  -Endo: hypothyroid on synthroid  -Heme: DVT ppx w/ SCD/subQ heparin  -Dispo: Transfer to floor, neurosurgery onboard with transfer, pt signed out to medical team dr lee to follow for medical management, case d/w dr michael 73 y/o M w/ pmh of bph, diverticulosis, graves dz, hemorrhoids, hld, hypothyroid, OA, SS, spinal cord injury (2004), multiple prior spinal surgeries (cervical and lumbar), presented to  for lower ext swelling and elevated BUN/Cr. Hospital course c/b UE weakness L>R and pt was found to have worsening discitis/osteomyelitis w/ epidural phlegmon now s/p OR for posterior thoracic decompression of T1-T3 w/ connection of existing hardware to C5-T3 and fusion.    -Neuro: No acute issues, Post-op Pain control w/ tylenol, oxycodone, oxycontin and dilaudid per pain scale  -Cardiac: HDS maintain MAP >65, LE edema on bumex, htn on toprol  -Resp: no acute issues, o2 stable on RA  -GI: Cont diet as ordered/tolerated, on bowel regimen  -Renal: Renal function stable cont to monitor along w/ lytes, gupta in placed for retention currently on flomax, will try to d/c gupta for TOV if retention again will place gupta back  -ID: Stap epi possible contaminate but on IV rocephin and dapto ID following input noted  -Endo: hypothyroid on synthroid  -Heme: DVT ppx w/ SCD/subQ heparin, LLE below the knee DVT improving on serial US (last US on 3/14/25) can cont serial US, eliquis on hold given pt is postop for now  -Dispo: Transfer to floor, neurosurgery onboard with transfer, pt signed out to medical team dr lee to follow for medical management, case d/w dr michael

## 2025-03-21 NOTE — PROGRESS NOTE ADULT - SUBJECTIVE AND OBJECTIVE BOX
Date of Service:03-21-25 @ 08:35  Interval History/ROS: Afebrile overnight, comfortable on RA, reports feeling fine, denies any fever or chills, feels like hes getting stronger little by little      REVIEW OF SYSTEMS  [  ] ROS unobtainable because:    [ x ] All other systems negative except as noted below    Constitutional:  [ ] fever [ ] chills  [ ] weight loss  [ ]night sweat  [ ]poor appetite/PO intake [ ]fatigue   Skin:  [ ] rash [ ] phlebitis	  Eyes: [ ] icterus [ ] pain  [ ] discharge	  ENMT: [ ] sore throat  [ ] thrush [ ] ulcers [ ] exudates [ ]anosmia  Respiratory: [ ] dyspnea [ ] hemoptysis [ ] cough [ ] sputum	  Cardiovascular:  [ ] chest pain [ ] palpitations [ ] edema	  Gastrointestinal:  [ ] nausea [ ] vomiting [ ] diarrhea [ ] constipation [ ] pain	  Genitourinary:  [ ] dysuria [ ] frequency [ ] hematuria [ ] discharge [ ] flank pain  [ ] incontinence  Musculoskeletal:  [ ] myalgias [ ] arthralgias [ ] arthritis  [ ] back pain  Neurological:  [ ] headache [ ] weakness [ ] seizures  [ ] confusion/altered mental status    Allergies  No Known Allergies        ANTIMICROBIALS:    cefTRIAXone Injectable. 2000 every 24 hours        OTHER MEDS: MEDICATIONS  (STANDING):  acetaminophen   IVPB .. 1000 once PRN  aluminum hydroxide/magnesium hydroxide/simethicone Suspension 30 every 4 hours PRN  buMETAnide Injectable 1 two times a day  diazepam    Tablet 5 every 8 hours PRN  heparin   Injectable 5000 every 8 hours  HYDROmorphone  Injectable 1 every 3 hours PRN  lactulose Syrup 20 three times a day PRN  levothyroxine 150 daily  melatonin 3 at bedtime PRN  metoprolol succinate ER 25 daily  ondansetron Injectable 4 every 6 hours PRN  oxyCODONE    IR 15 every 4 hours PRN  oxyCODONE  ER Tablet 15 every 12 hours  polyethylene glycol 3350 17 daily  senna 2 at bedtime  tamsulosin 0.8 at bedtime      Vital Signs Last 24 Hrs  T(F): 99.2 (03-21-25 @ 06:00), Max: 99.2 (03-21-25 @ 06:00)    Vital Signs Last 24 Hrs  HR: 84 (03-21-25 @ 08:00) (74 - 106)  BP: 126/70 (03-21-25 @ 08:00) (95/58 - 132/68)  RR: 15 (03-21-25 @ 08:00)  SpO2: 100% (03-21-25 @ 08:00) (95% - 100%)  Wt(kg): --    EXAM:    Constitutional: frail, NAD  Head/Eyes: no icterus  LUNGS:  CTA  CVS:  regular rhythm  Abd:  soft, non-tender; non-distended  Ext:  +edema  Back: no spinal tenderness noted  Vascular:  IV site no erythema tenderness or discharge  Neuro: AAO X 3    Labs:                        8.2    12.20 )-----------( 203      ( 21 Mar 2025 05:31 )             25.6     03-21    134[L]  |  100  |  24[H]  ----------------------------<  118[H]  4.2   |  34[H]  |  0.95    Ca    8.9      21 Mar 2025 05:31  Phos  4.0     03-21  Mg     1.8     03-21    TPro  6.0  /  Alb  2.8[L]  /  TBili  0.5  /  DBili  x   /  AST  27  /  ALT  44  /  AlkPhos  64  03-20      WBC Trend:  WBC Count: 12.20 (03-21-25 @ 05:31)  WBC Count: 11.33 (03-20-25 @ 05:42)  WBC Count: 13.57 (03-19-25 @ 06:04)  WBC Count: 19.86 (03-18-25 @ 05:40)      Creatine Trend:  Creatinine: 0.95 (03-21)  Creatinine: 1.02 (03-20)  Creatinine: 0.76 (03-19)  Creatinine: 0.93 (03-18)      Liver Biochemical Testing Trend:  Alanine Aminotransferase (ALT/SGPT): 44 (03-20)  Alanine Aminotransferase (ALT/SGPT): 52 (03-19)  Alanine Aminotransferase (ALT/SGPT): 67 (03-18)  Alanine Aminotransferase (ALT/SGPT): 97 *H* (03-16)  Alanine Aminotransferase (ALT/SGPT): 121 *H* (03-15)  Aspartate Aminotransferase (AST/SGOT): 27 (03-20-25 @ 05:42)  Aspartate Aminotransferase (AST/SGOT): 31 (03-19-25 @ 06:04)  Aspartate Aminotransferase (AST/SGOT): 38 (03-18-25 @ 05:40)  Aspartate Aminotransferase (AST/SGOT): 37 (03-16-25 @ 06:59)  Aspartate Aminotransferase (AST/SGOT): 58 (03-15-25 @ 07:25)  Bilirubin Total: 0.5 (03-20)  Bilirubin Total: 0.5 (03-19)  Bilirubin Total: 0.6 (03-18)  Bilirubin Total: 0.7 (03-16)  Bilirubin Total: 0.8 (03-15)      Trend LDH  03-14-25 @ 06:05  427[H]      Urinalysis Basic - ( 21 Mar 2025 05:31 )    Color: x / Appearance: x / SG: x / pH: x  Gluc: 118 mg/dL / Ketone: x  / Bili: x / Urobili: x   Blood: x / Protein: x / Nitrite: x   Leuk Esterase: x / RBC: x / WBC x   Sq Epi: x / Non Sq Epi: x / Bacteria: x        MICROBIOLOGY:    MRSA PCR Result.: NotDetec (03-17-25 @ 17:30)  MRSA PCR Result.: NotDetec (02-19-25 @ 18:15)      Culture - Fungal, Other (collected 17 Mar 2025 08:45)  Source: Other EPIDURAL SPACE  Preliminary Report:    Culture is being performed. Fungal cultures are held for 4 weeks.    Culture - Acid Fast - Other w/Smear (collected 17 Mar 2025 08:45)  Source: Other None  Preliminary Report:    Culture is being performed.    Culture - Fungal, Other (collected 17 Mar 2025 08:44)  Source: Other EPIDURAL SPACE  Preliminary Report:    Culture is being performed. Fungal cultures are held for 4 weeks.    Urinalysis with Rflx Culture (collected 04 Mar 2025 15:12)    Culture - Urine (collected 04 Mar 2025 15:12)  Source: .Urine None  Final Report:    <10,000 CFU/mL Normal Urogenital Altagracia    Culture - Blood (collected 19 Feb 2025 08:17)  Source: .Blood None  Final Report:    No growth at 5 days    Culture - Blood (collected 19 Feb 2025 08:10)  Source: .Blood None  Final Report:    No growth at 5 days    Urinalysis with Rflx Culture (collected 18 Feb 2025 01:54)    Culture - Blood (collected 17 Feb 2025 21:11)  Source: .Blood BLOOD  Final Report:    Growth in aerobic and anaerobic bottles: Streptococcus dysgalactiae    (Group C/G)    Direct identification is available within approximately 3-5    hours either by Blood Panel Multiplexed PCR or Direct    MALDI-TOF. Details: [email or web address]  Organism: Blood Culture PCR  Streptococcus dysgalactiae  Organism: Streptococcus dysgalactiae    Sensitivities:      -  Levofloxacin: S 0.5      -  Clindamycin: S <=0.06      -  Vancomycin: S 0.5      -  Ceftriaxone: S <=0.25      -  Tetracycline: S <=0.5      Method Type: EDEL      -  Penicillin: S <=0.03 Predicts results for ampicillin, amoxicillin, amoxicillin/clavulanate, ampicillin/sulbactam, 1st, 2nd and 3rd generation cephalosporins and carbapenems.  Organism: Blood Culture PCR    Sensitivities:      Method Type: PCR      -  Streptococcus sp. (Not Grp A, B or S pneumoniae): Detec    Culture - Blood (collected 17 Feb 2025 21:11)  Source: .Blood BLOOD  Final Report:    Growth in aerobic and anaerobic bottles: Streptococcus dysgalactiae    (Group C/G)    See previous culture [id number]    C-Reactive Protein: 67.6 (03-21)    Sedimentation Rate, Erythrocyte: 38 mm/hr (03-21-25 @ 05:31)  Sedimentation Rate, Erythrocyte: 77 mm/hr (03-14-25 @ 06:05)  Sedimentation Rate, Erythrocyte: 76 mm/hr (03-11-25 @ 05:19)  Sedimentation Rate, Erythrocyte: 96 mm/hr (03-10-25 @ 17:08)  Sedimentation Rate, Erythrocyte: 14 mm/hr (02-18-25 @ 07:10)      RADIOLOGY:  imaging below personally reviewed

## 2025-03-22 LAB
ALBUMIN SERPL ELPH-MCNC: 2.7 G/DL — LOW (ref 3.3–5)
ALP SERPL-CCNC: 67 U/L — SIGNIFICANT CHANGE UP (ref 40–120)
ALT FLD-CCNC: 44 U/L — SIGNIFICANT CHANGE UP (ref 12–78)
ANION GAP SERPL CALC-SCNC: 2 MMOL/L — LOW (ref 5–17)
AST SERPL-CCNC: 30 U/L — SIGNIFICANT CHANGE UP (ref 15–37)
BILIRUB SERPL-MCNC: 0.7 MG/DL — SIGNIFICANT CHANGE UP (ref 0.2–1.2)
BUN SERPL-MCNC: 28 MG/DL — HIGH (ref 7–23)
CALCIUM SERPL-MCNC: 8.9 MG/DL — SIGNIFICANT CHANGE UP (ref 8.5–10.1)
CHLORIDE SERPL-SCNC: 98 MMOL/L — SIGNIFICANT CHANGE UP (ref 96–108)
CO2 SERPL-SCNC: 34 MMOL/L — HIGH (ref 22–31)
CREAT SERPL-MCNC: 1.06 MG/DL — SIGNIFICANT CHANGE UP (ref 0.5–1.3)
CULTURE RESULTS: SIGNIFICANT CHANGE UP
EGFR: 75 ML/MIN/1.73M2 — SIGNIFICANT CHANGE UP
EGFR: 75 ML/MIN/1.73M2 — SIGNIFICANT CHANGE UP
GLUCOSE SERPL-MCNC: 111 MG/DL — HIGH (ref 70–99)
HCT VFR BLD CALC: 27.6 % — LOW (ref 39–50)
HGB BLD-MCNC: 8.9 G/DL — LOW (ref 13–17)
MAGNESIUM SERPL-MCNC: 1.7 MG/DL — SIGNIFICANT CHANGE UP (ref 1.6–2.6)
MCHC RBC-ENTMCNC: 28.7 PG — SIGNIFICANT CHANGE UP (ref 27–34)
MCHC RBC-ENTMCNC: 32.2 G/DL — SIGNIFICANT CHANGE UP (ref 32–36)
MCV RBC AUTO: 89 FL — SIGNIFICANT CHANGE UP (ref 80–100)
NRBC # BLD AUTO: 0 K/UL — SIGNIFICANT CHANGE UP (ref 0–0)
NRBC # FLD: 0 K/UL — SIGNIFICANT CHANGE UP (ref 0–0)
NRBC BLD AUTO-RTO: 0 /100 WBCS — SIGNIFICANT CHANGE UP (ref 0–0)
PHOSPHATE SERPL-MCNC: 3.6 MG/DL — SIGNIFICANT CHANGE UP (ref 2.5–4.5)
PLATELET # BLD AUTO: 231 K/UL — SIGNIFICANT CHANGE UP (ref 150–400)
PMV BLD: 9.3 FL — SIGNIFICANT CHANGE UP (ref 7–13)
POTASSIUM SERPL-MCNC: 4.1 MMOL/L — SIGNIFICANT CHANGE UP (ref 3.5–5.3)
POTASSIUM SERPL-SCNC: 4.1 MMOL/L — SIGNIFICANT CHANGE UP (ref 3.5–5.3)
PROT SERPL-MCNC: 6.2 GM/DL — SIGNIFICANT CHANGE UP (ref 6–8.3)
RBC # BLD: 3.1 M/UL — LOW (ref 4.2–5.8)
RBC # FLD: 14.6 % — HIGH (ref 10.3–14.5)
SODIUM SERPL-SCNC: 134 MMOL/L — LOW (ref 135–145)
SPECIMEN SOURCE: SIGNIFICANT CHANGE UP
WBC # BLD: 11.38 K/UL — HIGH (ref 3.8–10.5)
WBC # FLD AUTO: 11.38 K/UL — HIGH (ref 3.8–10.5)

## 2025-03-22 RX ADMIN — OXYCODONE HYDROCHLORIDE 15 MILLIGRAM(S): 30 TABLET ORAL at 23:10

## 2025-03-22 RX ADMIN — OXYCODONE HYDROCHLORIDE 15 MILLIGRAM(S): 30 TABLET ORAL at 22:40

## 2025-03-22 RX ADMIN — METOPROLOL SUCCINATE 25 MILLIGRAM(S): 50 TABLET, EXTENDED RELEASE ORAL at 11:26

## 2025-03-22 RX ADMIN — Medication 150 MICROGRAM(S): at 05:55

## 2025-03-22 RX ADMIN — DAPTOMYCIN 126 MILLIGRAM(S): 500 INJECTION, POWDER, LYOPHILIZED, FOR SOLUTION INTRAVENOUS at 11:24

## 2025-03-22 RX ADMIN — POLYETHYLENE GLYCOL 3350 17 GRAM(S): 17 POWDER, FOR SOLUTION ORAL at 11:25

## 2025-03-22 RX ADMIN — BUMETANIDE 1 MILLIGRAM(S): 1 TABLET ORAL at 14:32

## 2025-03-22 RX ADMIN — OXYCODONE HYDROCHLORIDE 15 MILLIGRAM(S): 30 TABLET ORAL at 11:24

## 2025-03-22 RX ADMIN — DIAZEPAM 5 MILLIGRAM(S): 2 TABLET ORAL at 22:47

## 2025-03-22 RX ADMIN — HEPARIN SODIUM 5000 UNIT(S): 1000 INJECTION INTRAVENOUS; SUBCUTANEOUS at 05:55

## 2025-03-22 RX ADMIN — HEPARIN SODIUM 5000 UNIT(S): 1000 INJECTION INTRAVENOUS; SUBCUTANEOUS at 22:39

## 2025-03-22 RX ADMIN — BUMETANIDE 1 MILLIGRAM(S): 1 TABLET ORAL at 06:33

## 2025-03-22 RX ADMIN — Medication 3 MILLIGRAM(S): at 22:40

## 2025-03-22 RX ADMIN — HEPARIN SODIUM 5000 UNIT(S): 1000 INJECTION INTRAVENOUS; SUBCUTANEOUS at 14:32

## 2025-03-22 RX ADMIN — Medication 325 MILLIGRAM(S): at 11:25

## 2025-03-22 RX ADMIN — CEFTRIAXONE 2000 MILLIGRAM(S): 500 INJECTION, POWDER, FOR SOLUTION INTRAMUSCULAR; INTRAVENOUS at 22:39

## 2025-03-22 RX ADMIN — TAMSULOSIN HYDROCHLORIDE 0.8 MILLIGRAM(S): 0.4 CAPSULE ORAL at 22:40

## 2025-03-22 RX ADMIN — Medication 2 TABLET(S): at 22:40

## 2025-03-22 NOTE — CHART NOTE - NSCHARTNOTESELECT_GEN_ALL_CORE
Neurosurgery PA/Event Note
Neurosurgery PA/Event Note
Neurosurgery/Event Note
Neurosurgery/Event Note
Event Note
Occupational Therapy Re-evaluation

## 2025-03-22 NOTE — PROGRESS NOTE ADULT - ASSESSMENT
74 year old Male with noted history of multiple spinal surgeries, recent admission in February with noted streptococcus galactiae bacteremia and cervical spinal infection, now admitted for lower extremity edema.   Concern for worsening OM.     POD #5 s/p OR for posterior thoracic decompression T1-T3 with connection to existing hardware C5-T3 fusion under general anesthesia    PLAN-  Advance diet and activity as tolerated    brace when OOB  c-spine, T-spine standing x-ray reviewed are stable  Pain meds as needed   Valium for muscle spasm   Physical therapy and OT as tolerated   Physiatry consult  Staples to be removed on POD #14   Antibiotics as per ID   Follow up intra-op wound culture   SQ Heparin for DVT PPX   sacral wound assessment by wound/skin team, continue turning patient Q2 hours and nursing skin assessments   follow H/H, 2 units of PRBCs given intra-op but appears stable on trends  Maintain Ortega for now as pt has had urinary retention with previous trial of void - On Flomax 8mg daily  Right Venodyne only due to left calf DVT (s/p pre-op IVC filter)     d/w Dr. Cantu

## 2025-03-22 NOTE — PROGRESS NOTE ADULT - SUBJECTIVE AND OBJECTIVE BOX
HPI:  74-year-old male with PMH of BPH, diverticulosis, Graves' disease, hemorrhoids, HLD, hypothyroidism, osteoarthritis, spinal stenosis, spinal cord injury (2004), and multiple spinal surgeries (three cervical and one lumbar). He presents with complaints of lower extremity swelling and abnormal BUN/creatinine levels. The patient was recently admitted to [facility] from 02/18/25 to 02/25/25 for sepsis in the setting of streptococcus dysgalactiae bacteremia and was followed by neurosurgery for cervicothoracic spine infection. He was discharged to [facility] with a PICC for IV ceftriaxone until 04/16/25. Documentation from the rehabilitation facility notes a decline in his functional status and dysfunction with activities of daily living. At the rehabilitation facility, he was noted to have worsening LE swelling and was started on torsemide. However, his Cr levels subsequently became elevated, leading to the discontinuation of torsemide. He was sent to the  ED for further evaluation of his LE swelling and elevated Cr levels. He denies any chest pain, dyspnea, orthopnea, PND.     Repeat MRI done this admission showed worsening discitis/osteomyelitis. Per ID, antibiotics alone will unlikely resolve this spinal infection and would consider surgical intervention if able to perform based on risk/benefit. After discussion with patient and offering surgical interventions vs continued medical management with antibiotics, patient wished to proceed with surgery. Of note, hospital course has been complicated by below the knee DVT, currently on therapeutic Lovenox, urinary retention requiring gupta, and significant LE anasarca.    3/15- Pt seen and examined at bedside, resting comfortably and in NAD. Reports chronic right sided spacticity from prior spinal cord injury/surgery. He has had progressive worsening in hand  and loss of dexterity over the last few months. Pending medical clearance for OR Monday for thoracic laminectomy and fusion for epidural abscess.     3/16- Pt seen and examined at bedside, resting comfortably and in NAD. Reports some improvement in abdominal/leg pain 2/2 edema. Pending OR tomorrow.     3/17- OR for posterior thoracic decompression T1-T3 with connection to existing hardware C5-T3 fusion under general anesthesia. Prior to OR pt had an ICV filter placed in IR.   Pt tolerated the procedure well, he lost approx. 1L of blood intra-op and was transfused 2 units of PRBCs. Pt was extubated at the end of the case and taken to PACU for observation then transferred to SICU for observation overnight.   Pt has a left and right drain both emptying into the same Hemovac, he was started on PCA for pain control.     3/18- POD #1, pt seen and examined in the SICU, c/o neck pain, dressing clean and dry, moving b/l upper ext antigravity, good  strength except to pointer fingers on both hands. Able to extend right lower extremitiy off the bed but not the left. Tolerating PO diet. Vital signs stable. H/H stable today. Hemovac output 240cc since surgery.     3/19- POD #2, pt seen and examined in the SICU, c/o neck pain, dressing clean and dry. + BM this morning.   Able to extend right lower extremities off the bed but not the left. Tolerating PO diet. Vital signs stable. H/H stable today. Hemovac output 140cc for 24 hours.  Gupta and PCA remains, will change to PO meds and stop PCA.  OT ordered.    3/20- POD #3, pt seen and examined in the SICU, c/o neck pain, dressing clean and dry. Drain removed today. pt is  Able to extend right lower extremity off the bed but not the left. Tolerating PO diet. Vital signs stable. H/H stable today. Hemovac output < 50cc for 24 hours.  Gupta and tolerating PO meds at this time.  PT/OT / Dispo planning    3/21- POD #4, Pt seen and examined, Hemovac was removed yesterday, tolerating PO diet, pain controlled on PO pain meds, continued b/l lower ext edema,  staples c/d/i, able to participate with PT     3/22- POD#5 PAtient seen and examined, no acute events overnight. Sitting up in chair NAD, no new complaints. Awaiting physiatry eval.    Vital Signs Last 24 Hrs  T(C): 36.6 (21 Mar 2025 23:30), Max: 36.8 (21 Mar 2025 18:00)  T(F): 97.9 (21 Mar 2025 23:30), Max: 98.2 (21 Mar 2025 18:00)  HR: 90 (22 Mar 2025 06:31) (90 - 116)  BP: 120/50 (22 Mar 2025 06:31) (109/67 - 124/79)  BP(mean): 80 (21 Mar 2025 18:00) (77 - 93)  RR: 14 (21 Mar 2025 18:00) (14 - 22)  SpO2: 94% (21 Mar 2025 23:30) (88% - 94%)    Parameters below as of 21 Mar 2025 23:30  Patient On (Oxygen Delivery Method): room air        MEDICATIONS  (STANDING):  buMETAnide Injectable 1 milliGRAM(s) IV Push two times a day  cefTRIAXone Injectable. 2000 milliGRAM(s) IV Push every 24 hours  DAPTOmycin IVPB 650 milliGRAM(s) IV Intermittent every 24 hours  ferrous    sulfate 325 milliGRAM(s) Oral daily  heparin   Injectable 5000 Unit(s) SubCutaneous every 8 hours  levothyroxine 150 MICROGram(s) Oral daily  metoprolol succinate ER 25 milliGRAM(s) Oral daily  oxyCODONE  ER Tablet 15 milliGRAM(s) Oral every 12 hours  polyethylene glycol 3350 17 Gram(s) Oral daily  senna 2 Tablet(s) Oral at bedtime  tamsulosin 0.8 milliGRAM(s) Oral at bedtime    MEDICATIONS  (PRN):  acetaminophen   IVPB .. 1000 milliGRAM(s) IV Intermittent once PRN Mild Pain (1 - 3)  aluminum hydroxide/magnesium hydroxide/simethicone Suspension 30 milliLiter(s) Oral every 4 hours PRN Dyspepsia  diazepam    Tablet 5 milliGRAM(s) Oral every 8 hours PRN Every 8 hours as needed for muscle spasm  HYDROmorphone  Injectable 1 milliGRAM(s) IV Push every 3 hours PRN Severe Pain (7 - 10)  lactulose Syrup 20 Gram(s) Oral three times a day PRN for constipation  melatonin 3 milliGRAM(s) Oral at bedtime PRN Insomnia  naloxone Injectable 0.1 milliGRAM(s) IV Push every 3 minutes PRN For ANY of the following changes in patient status:  A. RR LESS THAN 10 breaths per minute, B. Oxygen saturation LESS THAN 90%, C. Sedation score of 6  ondansetron Injectable 4 milliGRAM(s) IV Push every 6 hours PRN Nausea and/or Vomiting  oxyCODONE    IR 15 milliGRAM(s) Oral every 4 hours PRN Moderate Pain (4 - 6)      PHYSICAL EXAM:  Constitutional: Awake / alert, NAD   HEENT: PERRLA, EOMI, hearing intact   Neck: Supple  Respiratory: Breath sounds are clear bilaterally  Cardiovascular: S1 and S2, regular rhythm  Gastrointestinal: Soft, NT/ND  Extremities:  pitting edema, long standing  Skin: (sacral skin tear vs ulcer was noticed in the OR- wound nurse management and care implemented)    Neurological Exam:  HF: AA&O x 3, follows commands, normal affect, speech fluent  CN: PERRL, EOMI, no NLFD, tongue midline  Motor:   	RUE: Deltoid 5/5, bicep 5/5, tricep 5/5, wrist ext 5/5,  4-/5, unable to fully flex 2nd digit  	LUE: Deltoid 5/5, bicep 5/5, tricep 5/5, wrist ext 5/5,  4-/5, unable to fully flex 2nd digit  	RLE: IP 4/5, Quad 4/5, EHL 5/5, Dorsi/plantarflexion 5/5 (some limitations 2/2 LE edema)  	LLE: IP 2/5, Quad 1/5, EHL 5/5, Dorsi/plantarflexion 5/5 (proximal leg significantly limited by pain and edema)  Sens: Intact to light touch except diminished in LLE  Reflexes: hyperreflexic RUE/RLE, diminished DTRs in LUE/LLE, +Right ankle clonus, +R babinski, eduardo negative b/l  Coord:  No dysmetria  Gait/Balance: Cannot test          LABS:                         8.9    11.38 )-----------( 231      ( 22 Mar 2025 08:57 )             27.6     03-22    134[L]  |  98  |  28[H]  ----------------------------<  111[H]  4.1   |  34[H]  |  1.06    Ca    8.9      22 Mar 2025 08:57  Phos  3.6     03-22  Mg     1.7     03-22    TPro  6.2  /  Alb  2.7[L]  /  TBili  0.7  /  DBili  x   /  AST  30  /  ALT  44  /  AlkPhos  67  03-22    LIVER FUNCTIONS - ( 22 Mar 2025 08:57 )  Alb: 2.7 g/dL / Pro: 6.2 gm/dL / ALK PHOS: 67 U/L / ALT: 44 U/L / AST: 30 U/L / GGT: x

## 2025-03-22 NOTE — PROGRESS NOTE ADULT - ASSESSMENT
74-year-old male with a past medical history of benign prostatic hyperplasia, diverticulosis, Graves' disease, hemorrhoids, hyperlipidemia, hypothyroidism, osteoarthritis, spinal stenosis, spinal cord injury (2004), and multiple spinal surgeries w extremity swelling and abnormal BUN/creatinine levels.     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  - urine protein 200 mg only ( not nephrotic)   - I/O's  - keep I, <<O's   d/w SICU RN

## 2025-03-22 NOTE — CHART NOTE - NSCHARTNOTEFT_GEN_A_CORE
-MD Order: "OT Evaluate and Treat"- MD orders received. Chart reviewed, contents noted, conferred with RN, cleared for re-evaluation. Please refer to Therapeutic Interventions under Adult A & I for future documentation and treatment notes.  -Change in patient status/Reason for Re-Evaluation: Pt on 3/17 returned to OR for posterior thoracic decompression T1-T3 with connection to existing hardware C5-T3 fusion under general anesthesia. Prior to OR pt had an ICV filter placed in IR.     -OT Diagnosis: decreased ADL participation s/p thoracic lami/drainage  -General Observations: Pt rec'vd semi-shields position in bed, bed alarmed, SICU lines intact, A-line removed prior to IE, +PCA pump and PIV, +b/l SCD's, +gupta, agreeable to re-evaluation. Pt left in bedside recliner chair, in NAD, lines intact, VSS (117/58), chair alarmed, RN aware, lulú pad in place, items left in reach, needs met.     -Pertinent History of Current Problem	Per chart, pt is a 74-year-old male with a PMHx of benign prostatic hyperplasia, diverticulosis, Graves' disease, hemorrhoids, hyperlipidemia, hypothyroidism, osteoarthritis, spinal stenosis, spinal cord injury (2004), and multiple spinal surgeries (three cervical and one lumbar). He presents with complaints of lower extremity swelling and abnormal BUN/creatinine levels. The patient was recently admitted to [facility] from 02/18/25 to 02/25/25 for sepsis secondary to epidural abscess, he was sent to the [facility] Emergency Department for further evaluation of his LE swelling and elevated Cr levels, c/o LE numbness @ thighs and L hand numbness. US LLE: DVT noted in the left gastrocnemius and soleal veins.    · MD/RN Notified	yes  · Name of Clinician: ESTEFANIA Chery     PMH/PSH:   Past Medical/Surgical History:   	Alcoholic: Description: recovering alcoholic x 40years, Last Modified By: Marilu Pena, Entered Date: 15-Feb-2017 13:49  	Graves disease: Last Modified By: Nicky Ruano, Entered Date: 08-Feb-2017 11:18  	Hypothyroidism, unspecified type: Last Modified By: Nicky Ruano, Entered Date: 08-Feb-2017 11:18  	Spinal stenosis, unspecified spinal region: Last Modified By: Nicky Ruano, Entered Date: 08-Feb-2017 11:17  	Myelopathy: Last Modified By: Nicky Ruano, Entered Date: 08-Feb-2017 11:16  	Weakness: Description: to right side, Last Modified By: Nicky Ruano, Entered Date: 08-Feb-2017 11:16  	Spastic: Last Modified By: Nicky Ruano, Entered Date: 08-Feb-2017 11:15  	Spinal cord injury at C5-C7 level without injury of spinal bone, subsequent encounter: Last Modified By: Nicky Ruano, Entered Date: 08-Feb-2017 11:15  	Osteoarthritis of knee, unilateral: Description: left, Last Modified By: Nicky Ruano, Entered Date: 08-Feb-2017 11:14  	Benign prostatic hyperplasia without lower urinary tract symptoms, unspecified morphology: Last Modified By: Nicky Ruano, Entered Date: 08-Feb-2017 11:13  	Hemorrhoids, unspecified hemorrhoid type: Last Modified By: Nicky Ruano, Entered Date: 08-Feb-2017 11:12  	History of colon polyps: Last Modified By: Nicky Ruano, Entered Date: 08-Feb-2017 11:12  	Diverticulosis of large intestine without hemorrhage: Last Modified By: Nicky Ruano, Entered Date: 08-Feb-2017 11:12  	Hyperlipidemia, unspecified hyperlipidemia type: Last Modified By: Nicky Ruano, Entered Date: 08-Feb-2017 11:11  	H/O colonoscopy with polypectomy: Description: 2014, Last Modified By: Nicky Ruano, Entered Date: 08-Feb-2017 11:19  	H/O arthroscopy of knee: Description: Left x 2. Unsure of years, Last Modified By: Nicky Ruano, Entered Date: 08-Feb-2017 11:19  	H/O lumbar discectomy: Description: with laminectomy, Last Modified By: Nikcy Ruano, Entered Date: 08-Feb-2017 10:35  	S/P laminectomy: Description: Cervical, Last Modified By: Nicky Ruano, Entered Date: 08-Feb-2017 10:34  	History of cervical discectomy: Last Modified By: Nicky Ruano, Entered Date: 08-Feb-2017 10:33    Social History:   · Marital Status:   · Occupation: Owns a [employer] business  · Lives With: from [facility] at this admission  · Social Concerns: None    Previous Level of Function: Pt on this admission came from [facility]- last month February 9th, pt was totally (I) with all ADL/IADL tasks, walked without AD. Pt was at home in a PH with his wife in a split level house with 7 steps to each floor. Pt has a tub/shower combo +curtain, standard toilet, RHD. Since then has been in hospital and [facility].    Cognitive Status Examination:   · Level of Consciousness: alert  · Orientation: oriented to person, place, time and situation  · Follow Commands/Answers Questions: able to follow multistep instructions  · Personal Safety and Judgment: decreased insight to situation  · Short Term Memory: intact  · Long Term Memory: intact    Range of Motion Exam:   · Range of Motion Examination, Upper Extremity limited shoulder AROM on R side (reports old spinal injury), WFL distally. LUE: WFL at shoulder/elbow, difficulty making a full composite fist    Manual Muscle Testing:   · Manual Muscle Testing Results: grossly assessed due to s/p spinal surgery, RUE weaker at shoulder/elbow compared to LUE 2* old injury per pt, however- B/l hand and fingers: 3-, fine and gross motor ms weakness    Bed Mobility: Sit to Supine:   · Level of [place]: maximal assistance (25% patients effort)     Transfer: Sit to Stand:   · Level of [place]: moderate assistance x 2 via non-mechanical device owen steady   	  Sensory Examination:   Light Touch Sensation:   · Left UE	within normal limits  · Right UE: within normal limits  · Left LE: impaired  · Right LE: impaired     Visual Assessment:   · Visual Tracking: pt with no r/o diplopia or blurry vision, wears glasses at baseline; normal  · Visual Neglect: not observed  · Visual Field Cuts: not observed    Fine Motor Coordination:   · Left Hand, Finger to Nose	normal performance  · Right Hand, Finger to Nose	normal performance  · Left Hand Thumb/Finger Opposition Skills: severe impairment  · Right Hand Thumb/Finger Opposition Skills: mild impairment  · Left Hand, Manipulation of Objects: severe impairment  · Right Hand, Manipulation of Objects: mild impairment    Upper Body Dressing Training:   · Level of [place]: maximum assist (25% patients effort)    Lower Body Dressing Training:   · Level of [place]: dependent (less than 25% patients effort)    Toilet Hygiene Training:   · Level of [place]: dependent (less than 25% patients effort)    Grooming Training:   · Level of [place]: moderate assist (50% patients effort)    Eating/Self-Feeding Training:   · Level of [place]: minimum assist (75% patients effort)    Clinical Impression:   · Rehab Potential: good, to achieve stated therapy goals  · Therapy Frequency: 5-7x/week  · Occupational Therapy DME Recommendations: TBD pending progress  · Occupational Therapy Discharge Recommendations: Sub-acute Rehab    General Therapy Interventions:   · Planned Therapy Interventions: ADL retraining; transfer training  · ADL Retraining: Pt will improve self-feeding, LB dressing, toileting, and UB dressing by 1 grade in 1 week.  · Transfer Training: Pt will perform toilet/commode transfer with maximal assist in 1 week using necessary DME.  · Occupational Therapy Activity Recommendations: Pt presents with impaired balance, endurance and muscle strength that will benefit from skilled OT to improve independence in ADLs, reduce fall risk and chance for readmission.
Rishabh was awake and alert sitting in the chair beside during evaluation, measurements, and fitting of a [place] . Fit was good. Showed patient how to don and doff . Provided written and verbal instructions. [facility] [phone]
MRI C and T spine were done and reviewed with Dr. Cantu    MRI C-Spine  IMPRESSION:    1.  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  2.  Chronic cord myelomalacia at C3-4, status post decompression.  3.  Stable severe left C7-T1 foraminal stenosis, with moderate narrowing   of the central canal.  4.  Stable moderate to severe bilateral C6-7 foraminal stenoses.    MRI T-Spine  IMPRESSION: Worsening discitis/osteomyelitis is seen with epidural   phlegmon again seen unchanged though increased bilaterally and ventral,   this is consistent with paraspinal phlegmon though there is a possible   early abscess seenon the left side as described above.    Recommend:   Continue course and antibiotics as per ID recommendations  Would have the patient follow up in outpatient with ID (with coordination of repeat MRI spinal axis, ESR/CRP) prior to follow up with Dr. Cantu 2 weeks after completion of antibiotics  Neurosurgery to sign off at this time, please re-consult PRN  Case d/w Dr. Cantu
MRI Lumbar Spine done interpreted as follows:  IMPRESSION:    No abnormal enhancement.    No evidence for epidural abscess or discitis/osteomyelitis.    Multilevel degenerative changes detailed in the body the report.      Recommendation:  No acute neurosurgical intervention  Continue present management as per primary team  Neurosurgery to sign off at this time, please re-consult PRN  Case d/w Dr. Cantu
Patient Scheduled for surgery MONDAY 3/17.  Will be undergoing T1-3 Laminectomy with decompression and fusion with connection / extension to prior Cervical hardware.  Please Hold SQ Lovenox after SATURDAY PM DOSE.   Please document all necessary Medical clearance prior to procedure.
Patient seen with Dr Cantu yesterday.  Upon examination, patient noted to have upper extremity HG weakness L>R but improved based on prior physical examinations.   Patient states hand  weakness has been present for some time but has improved since last admission.  MR imaging reviewed and discussed with patient. Of note: patient has had prior cervical / lumbar surgeries, no immediate concern for hardware involvement.  Area of question is thoracic and read as follows:     IMPRESSION: Worsening discitis/osteomyelitis is seen with epidural   phlegmon again seen unchanged though increased bilaterally and ventral,   this is consistent with paraspinal phlegmon though there is a possible   early abscess seen on the left side as described above.    Surgical intervention is offered to patient but it was explained that surgery alone may not improve hand  weakness.   Patient to decide if they would want surgery this admission, likely early next week, or continue with conservative treatment and follow-up outpatient with Dr. Keith Cantu in 4-6 weeks upon ABX completion.     Neurosurgery available for further questions.  Will follow-up with patient.

## 2025-03-22 NOTE — PROGRESS NOTE ADULT - SUBJECTIVE AND OBJECTIVE BOX
Patient is a 74y Male who reports no complaints as events, diuresing well.     MEDICATIONS  (STANDING):  buMETAnide Injectable 1 milliGRAM(s) IV Push two times a day  cefTRIAXone Injectable. 2000 milliGRAM(s) IV Push every 24 hours  DAPTOmycin IVPB 650 milliGRAM(s) IV Intermittent every 24 hours  ferrous    sulfate 325 milliGRAM(s) Oral daily  heparin   Injectable 5000 Unit(s) SubCutaneous every 8 hours  levothyroxine 150 MICROGram(s) Oral daily  metoprolol succinate ER 25 milliGRAM(s) Oral daily  oxyCODONE  ER Tablet 15 milliGRAM(s) Oral every 12 hours  polyethylene glycol 3350 17 Gram(s) Oral daily  senna 2 Tablet(s) Oral at bedtime  tamsulosin 0.8 milliGRAM(s) Oral at bedtime    MEDICATIONS  (PRN):  acetaminophen   IVPB .. 1000 milliGRAM(s) IV Intermittent once PRN Mild Pain (1 - 3)  aluminum hydroxide/magnesium hydroxide/simethicone Suspension 30 milliLiter(s) Oral every 4 hours PRN Dyspepsia  diazepam    Tablet 5 milliGRAM(s) Oral every 8 hours PRN Every 8 hours as needed for muscle spasm  HYDROmorphone  Injectable 1 milliGRAM(s) IV Push every 3 hours PRN Severe Pain (7 - 10)  lactulose Syrup 20 Gram(s) Oral three times a day PRN for constipation  melatonin 3 milliGRAM(s) Oral at bedtime PRN Insomnia  naloxone Injectable 0.1 milliGRAM(s) IV Push every 3 minutes PRN For ANY of the following changes in patient status:  A. RR LESS THAN 10 breaths per minute, B. Oxygen saturation LESS THAN 90%, C. Sedation score of 6  ondansetron Injectable 4 milliGRAM(s) IV Push every 6 hours PRN Nausea and/or Vomiting  oxyCODONE    IR 15 milliGRAM(s) Oral every 4 hours PRN Moderate Pain (4 - 6)    T(C): , Max: 36.8 (03-21-25 @ 18:00)  T(F): , Max: 98.2 (03-21-25 @ 18:00)  HR: 99 (03-22-25 @ 14:58)  BP: 118/79 (03-22-25 @ 14:58)  BP(mean): 80 (03-21-25 @ 18:00)  RR: 18 (03-22-25 @ 14:58)  SpO2: 93% (03-22-25 @ 14:58)  Wt(kg): --    03-21 @ 07:01  -  03-22 @ 07:00  --------------------------------------------------------  IN: 50 mL / OUT: 2600 mL / NET: -2550 mL      PHYSICAL EXAM:    Constitutional: NAD  MM  dist  Cardiovascular: S1 and S2,  Extremities: improving peripheral edema  Neurological: A/O x 3,        LABS:                        8.9    11.38 )-----------( 231      ( 22 Mar 2025 08:57 )             27.6     22 Mar 2025 08:57    134    |  98     |  28     ----------------------------<  111    4.1     |  34     |  1.06   21 Mar 2025 05:31    134    |  100    |  24     ----------------------------<  118    4.2     |  34     |  0.95   20 Mar 2025 05:42    137    |  102    |  23     ----------------------------<  120    4.1     |  34     |  1.02   19 Mar 2025 06:04    138    |  104    |  25     ----------------------------<  100    4.3     |  31     |  0.76     Ca    8.9        22 Mar 2025 08:57  Ca    8.9        21 Mar 2025 05:31  Ca    9.0        20 Mar 2025 05:42  Ca    8.7        19 Mar 2025 06:04  Phos  3.6       22 Mar 2025 08:57  Phos  4.0       21 Mar 2025 05:31  Phos  3.5       20 Mar 2025 05:42  Phos  2.9       19 Mar 2025 06:04  Mg     1.7       22 Mar 2025 08:57  Mg     1.8       21 Mar 2025 05:31  Mg     2.1       20 Mar 2025 05:42  Mg     1.8       19 Mar 2025 06:04    TPro  6.2    /  Alb  2.7    /  TBili  0.7    /  DBili  x      /  AST  30     /  ALT  44     /  AlkPhos  67     22 Mar 2025 08:57  TPro  6.0    /  Alb  2.8    /  TBili  0.5    /  DBili  x      /  AST  27     /  ALT  44     /  AlkPhos  64     20 Mar 2025 05:42  TPro  5.8    /  Alb  2.7    /  TBili  0.5    /  DBili  x      /  AST  31     /  ALT  52     /  AlkPhos  64     19 Mar 2025 06:04          Urine Studies:  Urinalysis Basic - ( 22 Mar 2025 08:57 )    Color: x / Appearance: x / SG: x / pH: x  Gluc: 111 mg/dL / Ketone: x  / Bili: x / Urobili: x   Blood: x / Protein: x / Nitrite: x   Leuk Esterase: x / RBC: x / WBC x   Sq Epi: x / Non Sq Epi: x / Bacteria: x            RADIOLOGY & ADDITIONAL STUDIES:

## 2025-03-23 LAB
CULTURE RESULTS: ABNORMAL
ORGANISM # SPEC MICROSCOPIC CNT: ABNORMAL
ORGANISM # SPEC MICROSCOPIC CNT: SIGNIFICANT CHANGE UP
SPECIMEN SOURCE: SIGNIFICANT CHANGE UP

## 2025-03-23 RX ADMIN — Medication 3 MILLIGRAM(S): at 21:09

## 2025-03-23 RX ADMIN — OXYCODONE HYDROCHLORIDE 15 MILLIGRAM(S): 30 TABLET ORAL at 21:08

## 2025-03-23 RX ADMIN — METOPROLOL SUCCINATE 25 MILLIGRAM(S): 50 TABLET, EXTENDED RELEASE ORAL at 09:52

## 2025-03-23 RX ADMIN — BUMETANIDE 1 MILLIGRAM(S): 1 TABLET ORAL at 14:07

## 2025-03-23 RX ADMIN — TAMSULOSIN HYDROCHLORIDE 0.8 MILLIGRAM(S): 0.4 CAPSULE ORAL at 21:08

## 2025-03-23 RX ADMIN — DAPTOMYCIN 126 MILLIGRAM(S): 500 INJECTION, POWDER, LYOPHILIZED, FOR SOLUTION INTRAVENOUS at 09:52

## 2025-03-23 RX ADMIN — CEFTRIAXONE 2000 MILLIGRAM(S): 500 INJECTION, POWDER, FOR SOLUTION INTRAMUSCULAR; INTRAVENOUS at 21:08

## 2025-03-23 RX ADMIN — DIAZEPAM 5 MILLIGRAM(S): 2 TABLET ORAL at 21:13

## 2025-03-23 RX ADMIN — HEPARIN SODIUM 5000 UNIT(S): 1000 INJECTION INTRAVENOUS; SUBCUTANEOUS at 14:07

## 2025-03-23 RX ADMIN — OXYCODONE HYDROCHLORIDE 15 MILLIGRAM(S): 30 TABLET ORAL at 14:07

## 2025-03-23 RX ADMIN — Medication 150 MICROGRAM(S): at 06:09

## 2025-03-23 RX ADMIN — OXYCODONE HYDROCHLORIDE 15 MILLIGRAM(S): 30 TABLET ORAL at 21:38

## 2025-03-23 RX ADMIN — OXYCODONE HYDROCHLORIDE 15 MILLIGRAM(S): 30 TABLET ORAL at 09:52

## 2025-03-23 RX ADMIN — OXYCODONE HYDROCHLORIDE 15 MILLIGRAM(S): 30 TABLET ORAL at 23:00

## 2025-03-23 RX ADMIN — Medication 325 MILLIGRAM(S): at 09:52

## 2025-03-23 RX ADMIN — HEPARIN SODIUM 5000 UNIT(S): 1000 INJECTION INTRAVENOUS; SUBCUTANEOUS at 06:09

## 2025-03-23 RX ADMIN — OXYCODONE HYDROCHLORIDE 15 MILLIGRAM(S): 30 TABLET ORAL at 22:38

## 2025-03-23 RX ADMIN — POLYETHYLENE GLYCOL 3350 17 GRAM(S): 17 POWDER, FOR SOLUTION ORAL at 09:52

## 2025-03-23 RX ADMIN — BUMETANIDE 1 MILLIGRAM(S): 1 TABLET ORAL at 06:09

## 2025-03-23 RX ADMIN — HEPARIN SODIUM 5000 UNIT(S): 1000 INJECTION INTRAVENOUS; SUBCUTANEOUS at 21:09

## 2025-03-23 RX ADMIN — Medication 2 TABLET(S): at 21:09

## 2025-03-23 NOTE — PROGRESS NOTE ADULT - ASSESSMENT
74 year old Male with noted history of multiple spinal surgeries, recent admission in February with noted streptococcus galactiae bacteremia and cervical spinal infection, now admitted for lower extremity edema.   Concern for worsening OM.     POD #6 s/p OR for posterior thoracic decompression T1-T3 with connection to existing hardware C5-T3 fusion under general anesthesia    PLAN-  Advance diet and activity as tolerated    brace when OOB  c-spine, T-spine standing x-ray reviewed are stable  Pain meds as needed   Valium for muscle spasm   Physical therapy and OT as tolerated   Physiatry consult  Staples to be removed on POD #14   Antibiotics as per ID   Follow up intra-op wound culture   SQ Heparin for DVT PPX   sacral wound assessment by wound/skin team, continue turning patient Q2 hours and nursing skin assessments   follow H/H, 2 units of PRBCs given intra-op but appears stable on trends  Maintain Ortega for now as pt has had urinary retention with previous trial of void - On Flomax 8mg daily  Right Venodyne only due to left calf DVT (s/p pre-op IVC filter)     d/w Dr. Cantu

## 2025-03-23 NOTE — PROGRESS NOTE ADULT - ASSESSMENT
74-year-old male with a past medical history of benign prostatic hyperplasia, diverticulosis, Graves' disease, hemorrhoids, hyperlipidemia, hypothyroidism, osteoarthritis, spinal stenosis, spinal cord injury (2004), and multiple spinal surgeries w extremity swelling and abnormal BUN/creatinine levels.     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  -DAILY weights  - urine protein 200 mg only ( not nephrotic)   - I/O's  - keep I, <<O's   d/w RN

## 2025-03-23 NOTE — PROGRESS NOTE ADULT - SUBJECTIVE AND OBJECTIVE BOX
HPI:  74-year-old male with PMH of BPH, diverticulosis, Graves' disease, hemorrhoids, HLD, hypothyroidism, osteoarthritis, spinal stenosis, spinal cord injury (2004), and multiple spinal surgeries (three cervical and one lumbar). He presents with complaints of lower extremity swelling and abnormal BUN/creatinine levels. The patient was recently admitted to [facility] from 02/18/25 to 02/25/25 for sepsis in the setting of streptococcus dysgalactiae bacteremia and was followed by neurosurgery for cervicothoracic spine infection. He was discharged to [facility] with a PICC for IV ceftriaxone until 04/16/25. Documentation from the rehabilitation facility notes a decline in his functional status and dysfunction with activities of daily living. At the rehabilitation facility, he was noted to have worsening LE swelling and was started on torsemide. However, his Cr levels subsequently became elevated, leading to the discontinuation of torsemide. He was sent to the  ED for further evaluation of his LE swelling and elevated Cr levels. He denies any chest pain, dyspnea, orthopnea, PND.     Repeat MRI done this admission showed worsening discitis/osteomyelitis. Per ID, antibiotics alone will unlikely resolve this spinal infection and would consider surgical intervention if able to perform based on risk/benefit. After discussion with patient and offering surgical interventions vs continued medical management with antibiotics, patient wished to proceed with surgery. Of note, hospital course has been complicated by below the knee DVT, currently on therapeutic Lovenox, urinary retention requiring gupta, and significant LE anasarca.    3/15- Pt seen and examined at bedside, resting comfortably and in NAD. Reports chronic right sided spacticity from prior spinal cord injury/surgery. He has had progressive worsening in hand  and loss of dexterity over the last few months. Pending medical clearance for OR Monday for thoracic laminectomy and fusion for epidural abscess.     3/16- Pt seen and examined at bedside, resting comfortably and in NAD. Reports some improvement in abdominal/leg pain 2/2 edema. Pending OR tomorrow.     3/17- OR for posterior thoracic decompression T1-T3 with connection to existing hardware C5-T3 fusion under general anesthesia. Prior to OR pt had an ICV filter placed in IR.   Pt tolerated the procedure well, he lost approx. 1L of blood intra-op and was transfused 2 units of PRBCs. Pt was extubated at the end of the case and taken to PACU for observation then transferred to SICU for observation overnight.   Pt has a left and right drain both emptying into the same Hemovac, he was started on PCA for pain control.     3/18- POD #1, pt seen and examined in the SICU, c/o neck pain, dressing clean and dry, moving b/l upper ext antigravity, good  strength except to pointer fingers on both hands. Able to extend right lower extremitiy off the bed but not the left. Tolerating PO diet. Vital signs stable. H/H stable today. Hemovac output 240cc since surgery.     3/19- POD #2, pt seen and examined in the SICU, c/o neck pain, dressing clean and dry. + BM this morning.   Able to extend right lower extremities off the bed but not the left. Tolerating PO diet. Vital signs stable. H/H stable today. Hemovac output 140cc for 24 hours.  Gupta and PCA remains, will change to PO meds and stop PCA.  OT ordered.    3/20- POD #3, pt seen and examined in the SICU, c/o neck pain, dressing clean and dry. Drain removed today. pt is  Able to extend right lower extremity off the bed but not the left. Tolerating PO diet. Vital signs stable. H/H stable today. Hemovac output < 50cc for 24 hours.  Gupta and tolerating PO meds at this time.  PT/OT / Dispo planning    3/21- POD #4, Pt seen and examined, Hemovac was removed yesterday, tolerating PO diet, pain controlled on PO pain meds, continued b/l lower ext edema,  staples c/d/i, able to participate with PT     3/22- POD#5 Patient seen and examined, no acute events overnight. Sitting up in chair NAD, no new complaints. Awaiting physiatry eval.    3/23- POD#6 Patient seen and examined, no acute events overnight. Awake/alert, sitting up in chair, NAD.  brace fitted yesterday, no issues with placement per patient.    Vital Signs Last 24 Hrs  T(C): 36.8 (23 Mar 2025 06:50), Max: 36.9 (23 Mar 2025 00:10)  T(F): 98.3 (23 Mar 2025 06:50), Max: 98.5 (23 Mar 2025 00:10)  HR: 84 (23 Mar 2025 06:50) (84 - 99)  BP: 104/63 (23 Mar 2025 06:50) (104/63 - 118/79)  BP(mean): --  RR: 19 (23 Mar 2025 06:50) (17 - 19)  SpO2: 94% (23 Mar 2025 06:50) (93% - 94%)    Parameters below as of 23 Mar 2025 06:50  Patient On (Oxygen Delivery Method): room air        MEDICATIONS  (STANDING):  buMETAnide Injectable 1 milliGRAM(s) IV Push two times a day  cefTRIAXone Injectable. 2000 milliGRAM(s) IV Push every 24 hours  DAPTOmycin IVPB 650 milliGRAM(s) IV Intermittent every 24 hours  ferrous    sulfate 325 milliGRAM(s) Oral daily  heparin   Injectable 5000 Unit(s) SubCutaneous every 8 hours  levothyroxine 150 MICROGram(s) Oral daily  metoprolol succinate ER 25 milliGRAM(s) Oral daily  oxyCODONE  ER Tablet 15 milliGRAM(s) Oral every 12 hours  polyethylene glycol 3350 17 Gram(s) Oral daily  senna 2 Tablet(s) Oral at bedtime  tamsulosin 0.8 milliGRAM(s) Oral at bedtime    MEDICATIONS  (PRN):  acetaminophen   IVPB .. 1000 milliGRAM(s) IV Intermittent once PRN Mild Pain (1 - 3)  aluminum hydroxide/magnesium hydroxide/simethicone Suspension 30 milliLiter(s) Oral every 4 hours PRN Dyspepsia  diazepam    Tablet 5 milliGRAM(s) Oral every 8 hours PRN Every 8 hours as needed for muscle spasm  HYDROmorphone  Injectable 1 milliGRAM(s) IV Push every 3 hours PRN Severe Pain (7 - 10)  lactulose Syrup 20 Gram(s) Oral three times a day PRN for constipation  melatonin 3 milliGRAM(s) Oral at bedtime PRN Insomnia  naloxone Injectable 0.1 milliGRAM(s) IV Push every 3 minutes PRN For ANY of the following changes in patient status:  A. RR LESS THAN 10 breaths per minute, B. Oxygen saturation LESS THAN 90%, C. Sedation score of 6  ondansetron Injectable 4 milliGRAM(s) IV Push every 6 hours PRN Nausea and/or Vomiting  oxyCODONE    IR 15 milliGRAM(s) Oral every 4 hours PRN Moderate Pain (4 - 6)      PHYSICAL EXAM:  Constitutional: Awake / alert, NAD   HEENT: PERRLA, EOMI, hearing intact   Neck: Supple  Respiratory: Breath sounds are clear bilaterally  Cardiovascular: S1 and S2, regular rhythm  Gastrointestinal: Soft, NT/ND  Extremities:  pitting edema, long standing  Skin: posterior cervicothoracic staples c/d/i    Neurological Exam:  HF: AA&O x 3, follows commands, normal affect, speech fluent  CN: PERRL, EOMI, no NLFD, tongue midline  Motor:   	RUE: Deltoid 5/5, bicep 5/5, tricep 5/5, wrist ext 5/5,  4-/5, unable to fully flex 2nd digit  	LUE: Deltoid 5/5, bicep 5/5, tricep 5/5, wrist ext 5/5,  4-/5, unable to fully flex 2nd digit  	RLE: IP 4/5, Quad 4/5, EHL 5/5, Dorsi/plantarflexion 5/5 (some limitations 2/2 LE edema)  	LLE: IP 2/5, Quad 1/5, EHL 5/5, Dorsi/plantarflexion 5/5 (proximal leg significantly limited by pain and edema)  Sens: Intact to light touch except diminished in LLE  Reflexes: hyperreflexic RUE/RLE, diminished DTRs in LUE/LLE, +Right ankle clonus, +R babinski, eduardo negative b/l  Coord:  No dysmetria  Gait/Balance: Cannot test        LABS:                         8.9    11.38 )-----------( 231      ( 22 Mar 2025 08:57 )             27.6     03-22    134[L]  |  98  |  28[H]  ----------------------------<  111[H]  4.1   |  34[H]  |  1.06    Ca    8.9      22 Mar 2025 08:57  Phos  3.6     03-22  Mg     1.7     03-22    TPro  6.2  /  Alb  2.7[L]  /  TBili  0.7  /  DBili  x   /  AST  30  /  ALT  44  /  AlkPhos  67  03-22    LIVER FUNCTIONS - ( 22 Mar 2025 08:57 )  Alb: 2.7 g/dL / Pro: 6.2 gm/dL / ALK PHOS: 67 U/L / ALT: 44 U/L / AST: 30 U/L / GGT: x

## 2025-03-23 NOTE — PROGRESS NOTE ADULT - SUBJECTIVE AND OBJECTIVE BOX
Patient is a 74y Male who reports no complaints as new    MEDICATIONS  (STANDING):  buMETAnide Injectable 1 milliGRAM(s) IV Push two times a day  cefTRIAXone Injectable. 2000 milliGRAM(s) IV Push every 24 hours  DAPTOmycin IVPB 650 milliGRAM(s) IV Intermittent every 24 hours  ferrous    sulfate 325 milliGRAM(s) Oral daily  heparin   Injectable 5000 Unit(s) SubCutaneous every 8 hours  levothyroxine 150 MICROGram(s) Oral daily  metoprolol succinate ER 25 milliGRAM(s) Oral daily  oxyCODONE  ER Tablet 15 milliGRAM(s) Oral every 12 hours  polyethylene glycol 3350 17 Gram(s) Oral daily  senna 2 Tablet(s) Oral at bedtime  tamsulosin 0.8 milliGRAM(s) Oral at bedtime    MEDICATIONS  (PRN):  acetaminophen   IVPB .. 1000 milliGRAM(s) IV Intermittent once PRN Mild Pain (1 - 3)  aluminum hydroxide/magnesium hydroxide/simethicone Suspension 30 milliLiter(s) Oral every 4 hours PRN Dyspepsia  diazepam    Tablet 5 milliGRAM(s) Oral every 8 hours PRN Every 8 hours as needed for muscle spasm  HYDROmorphone  Injectable 1 milliGRAM(s) IV Push every 3 hours PRN Severe Pain (7 - 10)  lactulose Syrup 20 Gram(s) Oral three times a day PRN for constipation  melatonin 3 milliGRAM(s) Oral at bedtime PRN Insomnia  naloxone Injectable 0.1 milliGRAM(s) IV Push every 3 minutes PRN For ANY of the following changes in patient status:  A. RR LESS THAN 10 breaths per minute, B. Oxygen saturation LESS THAN 90%, C. Sedation score of 6  ondansetron Injectable 4 milliGRAM(s) IV Push every 6 hours PRN Nausea and/or Vomiting  oxyCODONE    IR 15 milliGRAM(s) Oral every 4 hours PRN Moderate Pain (4 - 6)        T(C): , Max: 36.9 (03-23-25 @ 00:10)  T(F): , Max: 98.5 (03-23-25 @ 00:10)  HR: 84 (03-23-25 @ 06:50)  BP: 104/63 (03-23-25 @ 06:50)  BP(mean): --  RR: 19 (03-23-25 @ 06:50)  SpO2: 94% (03-23-25 @ 06:50)  Wt(kg): --    03-22 @ 07:01  -  03-23 @ 07:00  --------------------------------------------------------  IN: 200 mL / OUT: 1400 mL / NET: -1200 mL          PHYSICAL EXAM:    Constitutional: NAD   HEENT: P MM  dist  Cardiovascular: S1 and S2   Extremities:+ peripheral edema  Neurological: A/O x 3         LABS:                        8.9    11.38 )-----------( 231      ( 22 Mar 2025 08:57 )             27.6     22 Mar 2025 08:57    134    |  98     |  28     ----------------------------<  111    4.1     |  34     |  1.06   21 Mar 2025 05:31    134    |  100    |  24     ----------------------------<  118    4.2     |  34     |  0.95   20 Mar 2025 05:42    137    |  102    |  23     ----------------------------<  120    4.1     |  34     |  1.02     Ca    8.9        22 Mar 2025 08:57  Ca    8.9        21 Mar 2025 05:31  Ca    9.0        20 Mar 2025 05:42  Phos  3.6       22 Mar 2025 08:57  Phos  4.0       21 Mar 2025 05:31  Phos  3.5       20 Mar 2025 05:42  Mg     1.7       22 Mar 2025 08:57  Mg     1.8       21 Mar 2025 05:31  Mg     2.1       20 Mar 2025 05:42    TPro  6.2    /  Alb  2.7    /  TBili  0.7    /  DBili  x      /  AST  30     /  ALT  44     /  AlkPhos  67     22 Mar 2025 08:57  TPro  6.0    /  Alb  2.8    /  TBili  0.5    /  DBili  x      /  AST  27     /  ALT  44     /  AlkPhos  64     20 Mar 2025 05:42          Urine Studies:  Urinalysis Basic - ( 22 Mar 2025 08:57 )    Color: x / Appearance: x / SG: x / pH: x  Gluc: 111 mg/dL / Ketone: x  / Bili: x / Urobili: x   Blood: x / Protein: x / Nitrite: x   Leuk Esterase: x / RBC: x / WBC x   Sq Epi: x / Non Sq Epi: x / Bacteria: x            RADIOLOGY & ADDITIONAL STUDIES:

## 2025-03-24 LAB
ALBUMIN SERPL ELPH-MCNC: 2.3 G/DL — LOW (ref 3.3–5)
ALP SERPL-CCNC: 62 U/L — SIGNIFICANT CHANGE UP (ref 40–120)
ALT FLD-CCNC: 57 U/L — SIGNIFICANT CHANGE UP (ref 12–78)
ANION GAP SERPL CALC-SCNC: 2 MMOL/L — LOW (ref 5–17)
AST SERPL-CCNC: 38 U/L — HIGH (ref 15–37)
BILIRUB SERPL-MCNC: 0.6 MG/DL — SIGNIFICANT CHANGE UP (ref 0.2–1.2)
BUN SERPL-MCNC: 28 MG/DL — HIGH (ref 7–23)
CALCIUM SERPL-MCNC: 8.8 MG/DL — SIGNIFICANT CHANGE UP (ref 8.5–10.1)
CHLORIDE SERPL-SCNC: 99 MMOL/L — SIGNIFICANT CHANGE UP (ref 96–108)
CK SERPL-CCNC: 99 U/L — SIGNIFICANT CHANGE UP (ref 26–308)
CO2 SERPL-SCNC: 35 MMOL/L — HIGH (ref 22–31)
CREAT SERPL-MCNC: 0.86 MG/DL — SIGNIFICANT CHANGE UP (ref 0.5–1.3)
CRP SERPL-MCNC: 96.8 MG/ML — HIGH (ref 0–5)
EGFR: 92 ML/MIN/1.73M2 — SIGNIFICANT CHANGE UP
EGFR: 92 ML/MIN/1.73M2 — SIGNIFICANT CHANGE UP
ERYTHROCYTE [SEDIMENTATION RATE] IN BLOOD: 47 MM/HR — HIGH (ref 0–20)
FERRITIN SERPL-MCNC: 611 NG/ML — HIGH (ref 30–400)
FOLATE SERPL-MCNC: 8.3 NG/ML — SIGNIFICANT CHANGE UP
GLUCOSE SERPL-MCNC: 111 MG/DL — HIGH (ref 70–99)
HCT VFR BLD CALC: 25.3 % — LOW (ref 39–50)
HGB BLD-MCNC: 8 G/DL — LOW (ref 13–17)
IMMATURE RETICULOCYTE FRACTION %: 14 % — SIGNIFICANT CHANGE UP
IRON SATN MFR SERPL: 24 % — SIGNIFICANT CHANGE UP (ref 16–55)
IRON SATN MFR SERPL: 33 UG/DL — LOW (ref 45–165)
MAGNESIUM SERPL-MCNC: 1.9 MG/DL — SIGNIFICANT CHANGE UP (ref 1.6–2.6)
MCHC RBC-ENTMCNC: 28.3 PG — SIGNIFICANT CHANGE UP (ref 27–34)
MCHC RBC-ENTMCNC: 31.6 G/DL — LOW (ref 32–36)
MCV RBC AUTO: 89.4 FL — SIGNIFICANT CHANGE UP (ref 80–100)
NRBC # BLD AUTO: 0 K/UL — SIGNIFICANT CHANGE UP (ref 0–0)
NRBC # FLD: 0 K/UL — SIGNIFICANT CHANGE UP (ref 0–0)
NRBC BLD AUTO-RTO: 0 /100 WBCS — SIGNIFICANT CHANGE UP (ref 0–0)
PHOSPHATE SERPL-MCNC: 3.8 MG/DL — SIGNIFICANT CHANGE UP (ref 2.5–4.5)
PLATELET # BLD AUTO: 214 K/UL — SIGNIFICANT CHANGE UP (ref 150–400)
PMV BLD: 9.6 FL — SIGNIFICANT CHANGE UP (ref 7–13)
POTASSIUM SERPL-MCNC: 4 MMOL/L — SIGNIFICANT CHANGE UP (ref 3.5–5.3)
POTASSIUM SERPL-SCNC: 4 MMOL/L — SIGNIFICANT CHANGE UP (ref 3.5–5.3)
PROT SERPL-MCNC: 5.5 GM/DL — LOW (ref 6–8.3)
RBC # BLD: 2.83 M/UL — LOW (ref 4.2–5.8)
RBC # BLD: 2.83 M/UL — LOW (ref 4.2–5.8)
RBC # FLD: 14.4 % — SIGNIFICANT CHANGE UP (ref 10.3–14.5)
RETICS #: 79.2 K/UL — SIGNIFICANT CHANGE UP (ref 25–125)
RETICS/RBC NFR: 2.8 % — HIGH (ref 0.5–2.5)
RETICULOCYTE HEMOGLOBIN EQUIVALENT: 25.9 PG — LOW (ref 36–38.6)
SODIUM SERPL-SCNC: 136 MMOL/L — SIGNIFICANT CHANGE UP (ref 135–145)
TIBC SERPL-MCNC: 134 UG/DL — LOW (ref 220–430)
UIBC SERPL-MCNC: 101 UG/DL — LOW (ref 110–370)
VIT B12 SERPL-MCNC: 583 PG/ML — SIGNIFICANT CHANGE UP (ref 232–1245)
WBC # BLD: 8.2 K/UL — SIGNIFICANT CHANGE UP (ref 3.8–10.5)
WBC # FLD AUTO: 8.2 K/UL — SIGNIFICANT CHANGE UP (ref 3.8–10.5)

## 2025-03-24 PROCEDURE — 99222 1ST HOSP IP/OBS MODERATE 55: CPT

## 2025-03-24 PROCEDURE — 99233 SBSQ HOSP IP/OBS HIGH 50: CPT

## 2025-03-24 RX ORDER — BISACODYL 5 MG
10 TABLET, DELAYED RELEASE (ENTERIC COATED) ORAL ONCE
Refills: 0 | Status: COMPLETED | OUTPATIENT
Start: 2025-03-24 | End: 2025-03-24

## 2025-03-24 RX ORDER — POLYETHYLENE GLYCOL 3350 17 G/17G
17 POWDER, FOR SOLUTION ORAL
Refills: 0 | Status: DISCONTINUED | OUTPATIENT
Start: 2025-03-24 | End: 2025-03-25

## 2025-03-24 RX ORDER — ZALEPLON 10 MG/1
5 CAPSULE ORAL AT BEDTIME
Refills: 0 | Status: DISCONTINUED | OUTPATIENT
Start: 2025-03-24 | End: 2025-03-24

## 2025-03-24 RX ORDER — BUMETANIDE 1 MG/1
1 TABLET ORAL
Refills: 0 | Status: DISCONTINUED | OUTPATIENT
Start: 2025-03-24 | End: 2025-03-24

## 2025-03-24 RX ORDER — BUMETANIDE 1 MG/1
1 TABLET ORAL
Refills: 0 | Status: DISCONTINUED | OUTPATIENT
Start: 2025-03-24 | End: 2025-03-25

## 2025-03-24 RX ADMIN — DIAZEPAM 5 MILLIGRAM(S): 2 TABLET ORAL at 23:21

## 2025-03-24 RX ADMIN — HEPARIN SODIUM 5000 UNIT(S): 1000 INJECTION INTRAVENOUS; SUBCUTANEOUS at 23:21

## 2025-03-24 RX ADMIN — BUMETANIDE 1 MILLIGRAM(S): 1 TABLET ORAL at 05:40

## 2025-03-24 RX ADMIN — POLYETHYLENE GLYCOL 3350 17 GRAM(S): 17 POWDER, FOR SOLUTION ORAL at 21:16

## 2025-03-24 RX ADMIN — OXYCODONE HYDROCHLORIDE 15 MILLIGRAM(S): 30 TABLET ORAL at 21:17

## 2025-03-24 RX ADMIN — HEPARIN SODIUM 5000 UNIT(S): 1000 INJECTION INTRAVENOUS; SUBCUTANEOUS at 05:40

## 2025-03-24 RX ADMIN — Medication 325 MILLIGRAM(S): at 11:05

## 2025-03-24 RX ADMIN — OXYCODONE HYDROCHLORIDE 15 MILLIGRAM(S): 30 TABLET ORAL at 21:35

## 2025-03-24 RX ADMIN — BUMETANIDE 1 MILLIGRAM(S): 1 TABLET ORAL at 16:49

## 2025-03-24 RX ADMIN — HEPARIN SODIUM 5000 UNIT(S): 1000 INJECTION INTRAVENOUS; SUBCUTANEOUS at 16:49

## 2025-03-24 RX ADMIN — TAMSULOSIN HYDROCHLORIDE 0.8 MILLIGRAM(S): 0.4 CAPSULE ORAL at 21:16

## 2025-03-24 RX ADMIN — OXYCODONE HYDROCHLORIDE 15 MILLIGRAM(S): 30 TABLET ORAL at 22:05

## 2025-03-24 RX ADMIN — Medication 10 MILLIGRAM(S): at 18:18

## 2025-03-24 RX ADMIN — POLYETHYLENE GLYCOL 3350 17 GRAM(S): 17 POWDER, FOR SOLUTION ORAL at 11:05

## 2025-03-24 RX ADMIN — METOPROLOL SUCCINATE 25 MILLIGRAM(S): 50 TABLET, EXTENDED RELEASE ORAL at 11:05

## 2025-03-24 RX ADMIN — DAPTOMYCIN 126 MILLIGRAM(S): 500 INJECTION, POWDER, LYOPHILIZED, FOR SOLUTION INTRAVENOUS at 11:09

## 2025-03-24 RX ADMIN — Medication 150 MICROGRAM(S): at 05:40

## 2025-03-24 RX ADMIN — OXYCODONE HYDROCHLORIDE 15 MILLIGRAM(S): 30 TABLET ORAL at 11:04

## 2025-03-24 RX ADMIN — CEFTRIAXONE 2000 MILLIGRAM(S): 500 INJECTION, POWDER, FOR SOLUTION INTRAMUSCULAR; INTRAVENOUS at 21:16

## 2025-03-24 RX ADMIN — Medication 2 TABLET(S): at 21:15

## 2025-03-24 NOTE — PROGRESS NOTE ADULT - ASSESSMENT
74-year-old male with a past medical history of benign prostatic hyperplasia, diverticulosis, Graves' disease, hemorrhoids, hyperlipidemia, hypothyroidism, osteoarthritis, spinal stenosis, spinal cord injury (2004), and multiple spinal surgeries w extremity swelling and abnormal BUN/creatinine levels.     Edema/Anasarca  - hypoalbuminemia   - limit free water intake   - Optimize intake/protein stores  -  table renal function last availble and volume/weight improving  -  bumex   PO conversion  - Serum workup negative but will need follow up of  Serum  TRUDY positive for weak IgG Lambda band.  - urine protein 200 mg only ( not nephrotic)     dw RN, medical attending  Will sign off, call with new questions or issues

## 2025-03-24 NOTE — CONSULT NOTE ADULT - CONSULT REASON
left lower extremity radiculopathy
IVC filter placement
post-op monitoring
GOVIND
functional evaluation for acute rehab
"cardio clearance for spine surgery  interarterial aneurysm, mild pulm htn  acute DVT unable to lay flat for VQ or CTA"
OM discitis

## 2025-03-24 NOTE — PROGRESS NOTE ADULT - ASSESSMENT
Assessment:  74M with benign prostatic hyperplasia, diverticulosis, Graves' disease, hemorrhoids, hyperlipidemia, hypothyroidism, osteoarthritis, spinal stenosis, spinal cord injury (2004), and multiple spinal surgeries (three cervical and one lumbar), recently admitted 02/18/25 to 02/25/25 for sepsis secondary to High-grade Strep Bacteremia with Discitis OM and epidural abscess on IV CTX 2G q24 till 4/16/2025 presents back 3/4/2025 from [facility] for worsening LE swelling and GOVIND, Found to have LLE DVT  Remains afebrile and on RA  Repeat MRI T spine with 3/9 showing Worsening discitis/osteomyelitis is seen with epidural phlegmon again seen unchanged though increased bilaterally and ventral, this is consistent with paraspinal phlegmon though there is a possible early abscess  Neurosurgery recommend no surgical intervention and continue antibiotic  So far reports no fever or chills, back pain is stable,  strength the same    Prior work up:  History of multiple spinal surgery, C and T spine laminectomy with fusion hardware (2004), L spine laminectomy with fusion hardware (2006), all done in [facility]. Also has L knee replacement (2017) though no symptoms there, L knee ROM normal no pain No cardiac devices  BCx (2/17) with Strep dysgalacteae, Sy S  MRI 2/18 with concern for discitis T1-T3, possible phlegmon C7-T3, no abscess  TTE without obvious vegetation  BCx 2/19 negative  OR culture (3/17) with rare Staph epi    Antimicrobials:  cefTRIAXone Injectable. 2000 every 24 hours (2/18 --- )  Dapto (3/21 --- )    Impression:   #High-grade Strep Bacteremia, Neck Pain, Concern for Spinal Involvement in setting of Multilevel C/T/L Spine Laminectomy with fusion hardware  #GOVIND  #DVT  #Leukocytosis  - PICC line appears clean, no signs of infection  - symptomatically stable yet still has R  weakness, MRI concerning T spine discitis OM with early paraspinal abscess, is this lagging radiologic findings other worsening infection? ESR 76 (14 in Feb), and  (169 in Feb)  - s/p OR (3/17) for T1-3 laminectomies for decompression and extension of prior cervical fusion to thoracic spine  - OR culture (3/17) with rare Staph epi, likely contaminant, but given hardware and prior MRI findings, will treat  - remains afebrile    Recommendations:  - continue Ceftriaxone 2G q24  - continue Dapto 650mg q24   - monitor temperature curve  - trend WBC  - reason for abx use reviewed with patient  - side effects of antibiotic discussed, tolerating abx well so far  - Prior cultures reviewed. An epidemiologic assessment was performed. There is a significant risk for resistant microorganisms to spread to family members, and/or healthcare staff. The patient will be placed on isolation according to infection control policy. Will reconsider isolation measures based on new culture results and other clinical data as appropriate Appropriate cultures collected and an appropriate broad spectrum antibiotic therapy will be considered  - Neurosurgery appreciated  - plan for IV CTX 2G q24 and Daptomycin 650mg q24 via PICC line for 6-week (enddate: 5/2/2025)  - rest per primary team    [facility] IV to PO Token not applicable; Patient to continue with IV Therapy

## 2025-03-24 NOTE — PROGRESS NOTE ADULT - SUBJECTIVE AND OBJECTIVE BOX
Date of Service:03-24-25 @ 13:52  Interval History/ROS: Afebrile overnight, sitting in chair, eating well, no fever or chills reported, plan to go to acute rehab      REVIEW OF SYSTEMS  [  ] ROS unobtainable because:    [ x ] All other systems negative except as noted below    Constitutional:  [ ] fever [ ] chills  [ ] weight loss  [ ]night sweat  [ ]poor appetite/PO intake [ ]fatigue   Skin:  [ ] rash [ ] phlebitis	  Eyes: [ ] icterus [ ] pain  [ ] discharge	  ENMT: [ ] sore throat  [ ] thrush [ ] ulcers [ ] exudates [ ]anosmia  Respiratory: [ ] dyspnea [ ] hemoptysis [ ] cough [ ] sputum	  Cardiovascular:  [ ] chest pain [ ] palpitations [ ] edema	  Gastrointestinal:  [ ] nausea [ ] vomiting [ ] diarrhea [ ] constipation [ ] pain	  Genitourinary:  [ ] dysuria [ ] frequency [ ] hematuria [ ] discharge [ ] flank pain  [ ] incontinence  Musculoskeletal:  [ ] myalgias [ ] arthralgias [ ] arthritis  [ ] back pain  Neurological:  [ ] headache [ ] weakness [ ] seizures  [ ] confusion/altered mental status    Allergies  No Known Allergies        ANTIMICROBIALS:    cefTRIAXone Injectable. 2000 every 24 hours  DAPTOmycin IVPB 650 every 24 hours        OTHER MEDS: MEDICATIONS  (STANDING):  acetaminophen   IVPB .. 1000 once PRN  aluminum hydroxide/magnesium hydroxide/simethicone Suspension 30 every 4 hours PRN  buMETAnide Injectable 1 two times a day  diazepam    Tablet 5 every 8 hours PRN  heparin   Injectable 5000 every 8 hours  HYDROmorphone  Injectable 1 every 3 hours PRN  lactulose Syrup 20 three times a day PRN  levothyroxine 150 daily  melatonin 3 at bedtime PRN  metoprolol succinate ER 25 daily  ondansetron Injectable 4 every 6 hours PRN  oxyCODONE    IR 15 every 4 hours PRN  oxyCODONE  ER Tablet 15 every 12 hours  polyethylene glycol 3350 17 daily  senna 2 at bedtime  tamsulosin 0.8 at bedtime      Vital Signs Last 24 Hrs  T(F): 97.7 (03-24-25 @ 07:20), Max: 99.2 (03-21-25 @ 06:00)    Vital Signs Last 24 Hrs  HR: 88 (03-24-25 @ 11:00) (86 - 102)  BP: 105/51 (03-24-25 @ 11:00) (105/51 - 116/69)  RR: 17 (03-24-25 @ 11:00)  SpO2: 96% (03-24-25 @ 11:00) (93% - 96%)  Wt(kg): --    EXAM:    Constitutional: frail, NAD  Head/Eyes: no icterus  LUNGS:  CTA  CVS:  regular rhythm  Abd:  soft, non-tender; non-distended  Ext:  +edema  Back: no spinal tenderness noted  Vascular:  IV site no erythema tenderness or discharge  Neuro: AAO X 3    Labs:                        8.0    8.20  )-----------( 214      ( 24 Mar 2025 08:03 )             25.3     03-24    136  |  99  |  28[H]  ----------------------------<  111[H]  4.0   |  35[H]  |  0.86    Ca    8.8      24 Mar 2025 08:03  Phos  3.8     03-24  Mg     1.9     03-24    TPro  5.5[L]  /  Alb  2.3[L]  /  TBili  0.6  /  DBili  x   /  AST  38[H]  /  ALT  57  /  AlkPhos  62  03-24      WBC Trend:  WBC Count: 8.20 (03-24-25 @ 08:03)  WBC Count: 11.38 (03-22-25 @ 08:57)  WBC Count: 12.20 (03-21-25 @ 05:31)  WBC Count: 11.33 (03-20-25 @ 05:42)      Creatine Trend:  Creatinine: 0.86 (03-24)  Creatinine: 1.06 (03-22)  Creatinine: 0.95 (03-21)  Creatinine: 1.02 (03-20)      Liver Biochemical Testing Trend:  Alanine Aminotransferase (ALT/SGPT): 57 (03-24)  Alanine Aminotransferase (ALT/SGPT): 44 (03-22)  Alanine Aminotransferase (ALT/SGPT): 44 (03-20)  Alanine Aminotransferase (ALT/SGPT): 52 (03-19)  Alanine Aminotransferase (ALT/SGPT): 67 (03-18)  Aspartate Aminotransferase (AST/SGOT): 38 (03-24-25 @ 08:03)  Aspartate Aminotransferase (AST/SGOT): 30 (03-22-25 @ 08:57)  Aspartate Aminotransferase (AST/SGOT): 27 (03-20-25 @ 05:42)  Aspartate Aminotransferase (AST/SGOT): 31 (03-19-25 @ 06:04)  Aspartate Aminotransferase (AST/SGOT): 38 (03-18-25 @ 05:40)  Bilirubin Total: 0.6 (03-24)  Bilirubin Total: 0.7 (03-22)  Bilirubin Total: 0.5 (03-20)  Bilirubin Total: 0.5 (03-19)  Bilirubin Total: 0.6 (03-18)      Trend LDH  03-14-25 @ 06:05  427[H]      Urinalysis Basic - ( 24 Mar 2025 08:03 )    Color: x / Appearance: x / SG: x / pH: x  Gluc: 111 mg/dL / Ketone: x  / Bili: x / Urobili: x   Blood: x / Protein: x / Nitrite: x   Leuk Esterase: x / RBC: x / WBC x   Sq Epi: x / Non Sq Epi: x / Bacteria: x        MICROBIOLOGY:    MRSA PCR Result.: NotDetec (03-17-25 @ 17:30)  MRSA PCR Result.: NotDetec (02-19-25 @ 18:15)      Culture - Acid Fast - Other w/Smear (collected 17 Mar 2025 08:45)  Source: Other None  Preliminary Report:    Culture is being performed.    Culture - Fungal, Other (collected 17 Mar 2025 08:45)  Source: Other EPIDURAL SPACE  Preliminary Report:    Culture is being performed. Fungal cultures are held for 4 weeks.    Culture - Fungal, Other (collected 17 Mar 2025 08:44)  Source: Other EPIDURAL SPACE  Preliminary Report:    Culture is being performed. Fungal cultures are held for 4 weeks.    Culture - Urine (collected 04 Mar 2025 15:12)  Source: .Urine None  Final Report:    <10,000 CFU/mL Normal Urogenital Altagracia    Urinalysis with Rflx Culture (collected 04 Mar 2025 15:12)    Culture - Blood (collected 19 Feb 2025 08:17)  Source: .Blood None  Final Report:    No growth at 5 days    Culture - Blood (collected 19 Feb 2025 08:10)  Source: .Blood None  Final Report:    No growth at 5 days    Urinalysis with Rflx Culture (collected 18 Feb 2025 01:54)    Culture - Blood (collected 17 Feb 2025 21:11)  Source: .Blood BLOOD  Final Report:    Growth in aerobic and anaerobic bottles: Streptococcus dysgalactiae    (Group C/G)    Direct identification is available within approximately 3-5    hours either by Blood Panel Multiplexed PCR or Direct    MALDI-TOF. Details: [email or web address]  Organism: Blood Culture PCR  Streptococcus dysgalactiae  Organism: Streptococcus dysgalactiae    Sensitivities:      Method Type: EDEL      -  Ceftriaxone: S <=0.25      -  Clindamycin: S <=0.06      -  Levofloxacin: S 0.5      -  Penicillin: S <=0.03 Predicts results for ampicillin, amoxicillin, amoxicillin/clavulanate, ampicillin/sulbactam, 1st, 2nd and 3rd generation cephalosporins and carbapenems.      -  Tetracycline: S <=0.5      -  Vancomycin: S 0.5  Organism: Blood Culture PCR    Sensitivities:      Method Type: PCR      -  Streptococcus sp. (Not Grp A, B or S pneumoniae): Detec    Culture - Blood (collected 17 Feb 2025 21:11)  Source: .Blood BLOOD  Final Report:    Growth in aerobic and anaerobic bottles: Streptococcus dysgalactiae    (Group C/G)    See previous culture [id number]    C-Reactive Protein: 96.8 (03-24)  C-Reactive Protein: 67.6 (03-21)      Sedimentation Rate, Erythrocyte: 47 mm/hr (03-24-25 @ 08:03)  Sedimentation Rate, Erythrocyte: 38 mm/hr (03-21-25 @ 05:31)  Sedimentation Rate, Erythrocyte: 77 mm/hr (03-14-25 @ 06:05)  Sedimentation Rate, Erythrocyte: 76 mm/hr (03-11-25 @ 05:19)  Sedimentation Rate, Erythrocyte: 96 mm/hr (03-10-25 @ 17:08)  Sedimentation Rate, Erythrocyte: 14 mm/hr (02-18-25 @ 07:10)      RADIOLOGY:  imaging below personally reviewed

## 2025-03-24 NOTE — PROGRESS NOTE ADULT - ASSESSMENT
MEDICATIONS  (STANDING):  bisacodyl Suppository 10 milliGRAM(s) Rectal once  buMETAnide 1 milliGRAM(s) Oral two times a day  cefTRIAXone Injectable. 2000 milliGRAM(s) IV Push every 24 hours  DAPTOmycin IVPB 650 milliGRAM(s) IV Intermittent every 24 hours  heparin   Injectable 5000 Unit(s) SubCutaneous every 8 hours  levothyroxine 150 MICROGram(s) Oral daily  metoprolol succinate ER 25 milliGRAM(s) Oral daily  oxyCODONE  ER Tablet 15 milliGRAM(s) Oral every 12 hours  polyethylene glycol 3350 17 Gram(s) Oral two times a day  senna 2 Tablet(s) Oral at bedtime  tamsulosin 0.8 milliGRAM(s) Oral at bedtime    MEDICATIONS  (PRN):  acetaminophen   IVPB .. 1000 milliGRAM(s) IV Intermittent once PRN Mild Pain (1 - 3)  aluminum hydroxide/magnesium hydroxide/simethicone Suspension 30 milliLiter(s) Oral every 4 hours PRN Dyspepsia  diazepam    Tablet 5 milliGRAM(s) Oral every 8 hours PRN Every 8 hours as needed for muscle spasm  lactulose Syrup 20 Gram(s) Oral three times a day PRN for constipation  melatonin 3 milliGRAM(s) Oral at bedtime PRN Insomnia  naloxone Injectable 0.1 milliGRAM(s) IV Push every 3 minutes PRN For ANY of the following changes in patient status:  A. RR LESS THAN 10 breaths per minute, B. Oxygen saturation LESS THAN 90%, C. Sedation score of 6  ondansetron Injectable 4 milliGRAM(s) IV Push every 6 hours PRN Nausea and/or Vomiting  oxyCODONE    IR 15 milliGRAM(s) Oral every 4 hours PRN Moderate Pain (4 - 6)      74M admitted for GOVIND, and DVT.         #Spinal epidural abscess POA and already being treated with IV antibiotics with suspected worsening of disease  - Chart reviewed extensively  - PICC w CTX for 8 week course to finish 4/16/25  - Reviewed neurosurgery notes and current neuro exam unchanged and no worse from prior admission, imaging reviewed  - currently since neuro exam unchanged will not pursue more imaging, low threshold to re-scan if neuro exam worsens  -Repeat MRI noted->neuroseurgery/Spine/ID recs. -> No acute intervention. Upper extremity numbness and weakness not present on prior admission present on this admission. Anasarca likely not contributing to upper extremity symptoms.  Will re-discuss with NS->after further evaluation and given perspsective from medicine/ID that complete or expected significant improvement with antibiotics alone may be difficult, neurosurgery offering surgical intervention. Patient to decide on wheter to proceed or not.   - Patient agreeable for surgery  - s/p thoracic laminectomy and fusion 3/18. brace and PT as per neurosurgery/spine  - Surgical culture with staph epi possible contaminant but given clinical presentation on admission, IV Dapto added. To continue IV rocephin/daptomycin through 5/2/2025  - IV Rocephin/Dapto thru 5/2/2025    #GOVIND on CKD. Resolved  #LE edema  #hypoalbuminemia  #Proteinuria  - IVF as needed with fluid balance  -Significant debilitating ansarca. Trial of lasix/albumin? WIll discuss with nephro->-Trial of albumin lasix started 3/8->improvement. Continue BID IV Laxis/IV albumin (3/9)->Change to Bumex/Albumin (3/11)-> good effect-> continue for now->Continue on IV bumex->Switched to PO bumex bid 3/24  - nephrology consult/recs  -Continue to monitor Cr/Electrolyte/IOs    #DVT  - appears provoked from decrease ambulation from recent spinal abscess  - could not tolerate VQ scan for r/o PE given pain/hx spinal surgeries  - switch to eliquis, will be new med on discharge (of note patient completed eliquis load) ->therapeutic lovenox (3/12) in the setting of possible spine intervention.   - Baseline doppler preop ordered  - Last dose of AC night of 3/16. REsumption post op when cleared by neurosurgery   - s/p IVC filter placement 3/17.      #Mild Rhabdomyolysis Improving  #Assocaited elevated transaminases  -Suspect from Ansarca/Edema  -Improving with adequate diuresis of edema  -Contineu to monitor  -Associated elevated transaminase; relatively stable. Continue to monitor   -Statin discontinued. Howevere CPK improved without IVF and diuresis before statin discontinued. Mild rhabdo more likely from pesudo compartment from the degree of anasarca rather than the statin    #leukocytosis  - likely rx to DVT + Discitis.  - no other signs of sepsis , afebrile       #hypothyroidism  - c/w synthroid    #CAD  - c/w metoprolol, statin    #HTN  - c/w metoprolol  - titrate Rx accordingly    #HLD  - c/w statin    #anemia/MARLENA  - 12>10 likely dilutional ->mid 8s and stable.   - no signs of bleeding   trend     #BPH  #Urinary retention likely augmented by degree of anasarca  - gupta placed during prior admission  - TOV  ->failed. Gupta re-inserted 3/6->fell out without knowing with associated hematuria->recurrent retention 3/7->Gupta re-inserted (3/7)    #dvt ppx - heparin subq->full dose ac when cleared by neurosurgery    Disposition:  3/24: I saw and evaluated the patient. Pain controlled. No BM x 3 days. Suppository ordered. Miralax to bid. DC IV dilaudid. DC PO iron (going to take forever to correct any iron deficieny and augment constipation from opioids). Check Iron levels and likely to administer IV iron. PT as tolerated. Discussed with nephrology. IV bumex to po. Physiatry evaluated->acute rehab candidate. Referral sent. Pending neurosurgery clearance to resume full dose AC.     I spent a total of 51 minutes in face-to-face time with the patient and on the floor managing patient including coordination of care. Overall 50% of the time spent in discussion of the diagnosis, counseling and treatment plan.

## 2025-03-24 NOTE — PROGRESS NOTE ADULT - ASSESSMENT
74 year old Male with noted history of multiple spinal surgeries, recent admission in February with noted streptococcus galactiae bacteremia and cervical spinal infection, now admitted for lower extremity edema.   Concern for worsening OM.     POD #7 OR for posterior thoracic decompression T1-T3 with connection to existing hardware C5-T3 fusion under general anesthesia    PLAN-  Advance diet and activity as tolerated    brace when OOB  c-spine, T-spine standing x-ray reviewed are stable  Pain meds as needed   Valium for muscle spasm   Physical therapy and OT as tolerated   Physiatry consult  Staples to be removed on POD #14   Antibiotics as per ID   Follow up intra-op wound culture   SQ Heparin for DVT PPX   sacral wound assessment by wound/skin team, continue turning patient Q2 hours and nursing skin assessments   follow H/H, 2 units of PRBCs given intra-op but appears stable on trends  Maintain Ortega for now as pt has had urinary retention with previous trial of void - On Flomax 8mg daily  Right Venodyne only due to left calf DVT (s/p pre-op IVC filter)     d/w Dr. Cantu

## 2025-03-24 NOTE — PROGRESS NOTE ADULT - SUBJECTIVE AND OBJECTIVE BOX
Patient is a 74y Male who reports no complaints as new    MEDICATIONS  (STANDING):  buMETAnide 1 milliGRAM(s) Oral two times a day  cefTRIAXone Injectable. 2000 milliGRAM(s) IV Push every 24 hours  DAPTOmycin IVPB 650 milliGRAM(s) IV Intermittent every 24 hours  heparin   Injectable 5000 Unit(s) SubCutaneous every 8 hours  levothyroxine 150 MICROGram(s) Oral daily  metoprolol succinate ER 25 milliGRAM(s) Oral daily  oxyCODONE  ER Tablet 15 milliGRAM(s) Oral every 12 hours  polyethylene glycol 3350 17 Gram(s) Oral two times a day  senna 2 Tablet(s) Oral at bedtime  tamsulosin 0.8 milliGRAM(s) Oral at bedtime    MEDICATIONS  (PRN):  acetaminophen   IVPB .. 1000 milliGRAM(s) IV Intermittent once PRN Mild Pain (1 - 3)  aluminum hydroxide/magnesium hydroxide/simethicone Suspension 30 milliLiter(s) Oral every 4 hours PRN Dyspepsia  diazepam    Tablet 5 milliGRAM(s) Oral every 8 hours PRN Every 8 hours as needed for muscle spasm  lactulose Syrup 20 Gram(s) Oral three times a day PRN for constipation  melatonin 3 milliGRAM(s) Oral at bedtime PRN Insomnia  naloxone Injectable 0.1 milliGRAM(s) IV Push every 3 minutes PRN For ANY of the following changes in patient status:  A. RR LESS THAN 10 breaths per minute, B. Oxygen saturation LESS THAN 90%, C. Sedation score of 6  ondansetron Injectable 4 milliGRAM(s) IV Push every 6 hours PRN Nausea and/or Vomiting  oxyCODONE    IR 15 milliGRAM(s) Oral every 4 hours PRN Moderate Pain (4 - 6)        T(C): , Max: 37 (03-24-25 @ 16:05)  T(F): , Max: 98.6 (03-24-25 @ 16:05)  HR: 94 (03-24-25 @ 16:05)  BP: 116/69 (03-24-25 @ 16:05)  BP(mean): 70 (03-24-25 @ 07:20)  RR: 18 (03-24-25 @ 16:05)  SpO2: 94% (03-24-25 @ 16:05)  Wt(kg): --    03-23 @ 07:01  -  03-24 @ 07:00  --------------------------------------------------------  IN: 150 mL / OUT: 1550 mL / NET: -1400 mL          PHYSICAL EXAM:    Constitutional: NAD d  HEENT:  MM  dist  Cardiovascular: S1 and S2   Extremities: improving peripheral edema  Neurological: A/O x 3, no focal deficits  Psychiatric: Normal mood, normal affect        LABS:                        8.0    8.20  )-----------( 214      ( 24 Mar 2025 08:03 )             25.3     24 Mar 2025 08:03    136    |  99     |  28     ----------------------------<  111    4.0     |  35     |  0.86   22 Mar 2025 08:57    134    |  98     |  28     ----------------------------<  111    4.1     |  34     |  1.06   21 Mar 2025 05:31    134    |  100    |  24     ----------------------------<  118    4.2     |  34     |  0.95     Ca    8.8        24 Mar 2025 08:03  Ca    8.9        22 Mar 2025 08:57  Ca    8.9        21 Mar 2025 05:31  Phos  3.8       24 Mar 2025 08:03  Phos  3.6       22 Mar 2025 08:57  Phos  4.0       21 Mar 2025 05:31  Mg     1.9       24 Mar 2025 08:03  Mg     1.7       22 Mar 2025 08:57  Mg     1.8       21 Mar 2025 05:31    TPro  5.5    /  Alb  2.3    /  TBili  0.6    /  DBili  x      /  AST  38     /  ALT  57     /  AlkPhos  62     24 Mar 2025 08:03  TPro  6.2    /  Alb  2.7    /  TBili  0.7    /  DBili  x      /  AST  30     /  ALT  44     /  AlkPhos  67     22 Mar 2025 08:57          Urine Studies:  Urinalysis Basic - ( 24 Mar 2025 08:03 )    Color: x / Appearance: x / SG: x / pH: x  Gluc: 111 mg/dL / Ketone: x  / Bili: x / Urobili: x   Blood: x / Protein: x / Nitrite: x   Leuk Esterase: x / RBC: x / WBC x   Sq Epi: x / Non Sq Epi: x / Bacteria: x            RADIOLOGY & ADDITIONAL STUDIES:

## 2025-03-24 NOTE — PROGRESS NOTE ADULT - SUBJECTIVE AND OBJECTIVE BOX
74-year-old male with PMH of BPH, diverticulosis, Graves' disease, hemorrhoids, HLD, hypothyroidism, osteoarthritis, spinal stenosis, spinal cord injury (2004), and multiple spinal surgeries (three cervical and one lumbar). He presents with complaints of lower extremity swelling and abnormal BUN/creatinine levels. The patient was recently admitted to [facility] from 02/18/25 to 02/25/25 for sepsis in the setting of streptococcus dysgalactiae bacteremia and was followed by neurosurgery for cervicothoracic spine infection. He was discharged to [facility] with a PICC for IV ceftriaxone until 04/16/25. Documentation from the rehabilitation facility notes a decline in his functional status and dysfunction with activities of daily living. At the rehabilitation facility, he was noted to have worsening LE swelling and was started on torsemide. However, his Cr levels subsequently became elevated, leading to the discontinuation of torsemide. He was sent to the  ED for further evaluation of his LE swelling and elevated Cr levels. He denies any chest pain, dyspnea, orthopnea, PND.     Repeat MRI done this admission showed worsening discitis/osteomyelitis. Per ID, antibiotics alone will unlikely resolve this spinal infection and would consider surgical intervention if able to perform based on risk/benefit. After discussion with patient and offering surgical interventions vs continued medical management with antibiotics, patient wished to proceed with surgery. Of note, hospital course has been complicated by below the knee DVT, currently on therapeutic Lovenox, urinary retention requiring gupta, and significant LE anasarca.    3/15- Pt seen and examined at bedside, resting comfortably and in NAD. Reports chronic right sided spacticity from prior spinal cord injury/surgery. He has had progressive worsening in hand  and loss of dexterity over the last few months. Pending medical clearance for OR Monday for thoracic laminectomy and fusion for epidural abscess.     3/16- Pt seen and examined at bedside, resting comfortably and in NAD. Reports some improvement in abdominal/leg pain 2/2 edema. Pending OR tomorrow.     3/17- OR for posterior thoracic decompression T1-T3 with connection to existing hardware C5-T3 fusion under general anesthesia. Prior to OR pt had an ICV filter placed in IR.   Pt tolerated the procedure well, he lost approx. 1L of blood intra-op and was transfused 2 units of PRBCs. Pt was extubated at the end of the case and taken to PACU for observation then transferred to SICU for observation overnight.   Pt has a left and right drain both emptying into the same Hemovac, he was started on PCA for pain control.     3/18- POD #1, pt seen and examined in the SICU, c/o neck pain, dressing clean and dry, moving b/l upper ext antigravity, good  strength except to pointer fingers on both hands. Able to extend right lower extremitiy off the bed but not the left. Tolerating PO diet. Vital signs stable. H/H stable today. Hemovac output 240cc since surgery.     3/19- POD #2, pt seen and examined in the SICU, c/o neck pain, dressing clean and dry. + BM this morning.   Able to extend right lower extremities off the bed but not the left. Tolerating PO diet. Vital signs stable. H/H stable today. Hemovac output 140cc for 24 hours.  Gupta and PCA remains, will change to PO meds and stop PCA.  OT ordered.    3/20- POD #3, pt seen and examined in the SICU, c/o neck pain, dressing clean and dry. Drain removed today. pt is  Able to extend right lower extremity off the bed but not the left. Tolerating PO diet. Vital signs stable. H/H stable today. Hemovac output < 50cc for 24 hours.  Gupta and tolerating PO meds at this time.  PT/OT / Dispo planning    3/21- POD #4, Pt seen and examined, Hemovac was removed yesterday, tolerating PO diet, pain controlled on PO pain meds, continued b/l lower ext edema,  staples c/d/i, able to participate with PT     3/22- POD#5 Patient seen and examined, no acute events overnight. Sitting up in chair NAD, no new complaints. Awaiting physiatry eval.    3/23- POD#6 Patient seen and examined, no acute events overnight. Awake/alert, sitting up in chair, NAD.  brace fitted yesterday, no issues with placement per patient.    3/24 POD# 7 patient seen and examined, no acute events. patient sitting up in chair no complaints at this time                          8.0    8.20  )-----------( 214      ( 24 Mar 2025 08:03 )             25.3   03-24    136  |  99  |  28[H]  ----------------------------<  111[H]  4.0   |  35[H]  |  0.86    Ca    8.8      24 Mar 2025 08:03  Phos  3.8     03-24  Mg     1.9     03-24    TPro  5.5[L]  /  Alb  2.3[L]  /  TBili  0.6  /  DBili  x   /  AST  38[H]  /  ALT  57  /  AlkPhos  62  03-24  Vital Signs Last 24 Hrs  T(C): 37 (24 Mar 2025 16:05), Max: 37 (24 Mar 2025 16:05)  T(F): 98.6 (24 Mar 2025 16:05), Max: 98.6 (24 Mar 2025 16:05)  HR: 94 (24 Mar 2025 16:05) (86 - 99)  BP: 116/69 (24 Mar 2025 16:05) (105/51 - 116/69)  BP(mean): 70 (24 Mar 2025 07:20) (70 - 70)  RR: 18 (24 Mar 2025 16:05) (16 - 18)  SpO2: 94% (24 Mar 2025 16:05) (93% - 96%)    Parameters below as of 24 Mar 2025 16:05  Patient On (Oxygen Delivery Method): room air    MEDICATIONS  (STANDING):  bisacodyl Suppository 10 milliGRAM(s) Rectal once  buMETAnide 1 milliGRAM(s) Oral two times a day  cefTRIAXone Injectable. 2000 milliGRAM(s) IV Push every 24 hours  DAPTOmycin IVPB 650 milliGRAM(s) IV Intermittent every 24 hours  heparin   Injectable 5000 Unit(s) SubCutaneous every 8 hours  levothyroxine 150 MICROGram(s) Oral daily  metoprolol succinate ER 25 milliGRAM(s) Oral daily  oxyCODONE  ER Tablet 15 milliGRAM(s) Oral every 12 hours  polyethylene glycol 3350 17 Gram(s) Oral two times a day  senna 2 Tablet(s) Oral at bedtime  tamsulosin 0.8 milliGRAM(s) Oral at bedtime      PHYSICAL EXAM:  Constitutional: Awake / alert, NAD   HEENT: PERRLA, EOMI, hearing intact   Neck: Supple  Respiratory: Breath sounds are clear bilaterally  Cardiovascular: S1 and S2, regular rhythm  Gastrointestinal: Soft, NT/ND  Extremities:  pitting edema, long standing  Skin: posterior cervicothoracic staples c/d/i    Neurological Exam:  HF: AA&O x 3, follows commands, normal affect, speech fluent  CN: PERRL, EOMI, no NLFD, tongue midline  Motor:   	RUE: Deltoid 5/5, bicep 5/5, tricep 5/5, wrist ext 5/5,  4-/5, unable to fully flex 2nd digit  	LUE: Deltoid 5/5, bicep 5/5, tricep 5/5, wrist ext 5/5,  4-/5, unable to fully flex 2nd digit  	RLE: IP 4/5, Quad 4/5, EHL 5/5, Dorsi/plantarflexion 5/5 (some limitations 2/2 LE edema)  	LLE: IP 2/5, Quad 1/5, EHL 5/5, Dorsi/plantarflexion 5/5 (proximal leg significantly limited by pain and edema)  Sens: Intact to light touch except diminished in LLE  Reflexes: hyperreflexic RUE/RLE, diminished DTRs in LUE/LLE, +Right ankle clonus, +R babinski, eduardo negative b/l  Coord:  No dysmetria  Gait/Balance: Cannot test

## 2025-03-24 NOTE — CONSULT NOTE ADULT - SUBJECTIVE AND OBJECTIVE BOX
74yM was admitted on 03-04    In ED, GCS=    Patient is a 74y old  Male who presents with a chief complaint of s/p T1-3 decompression, C5-T3 fusion (23 Mar 2025 12:07)    HPI:  74-year-old male with PMH of BPH, diverticulosis, Graves' disease, hemorrhoids, HLD, hypothyroidism, osteoarthritis, spinal stenosis, spinal cord injury (2004), and multiple spinal surgeries (three cervical and one lumbar). He presents with complaints of lower extremity swelling and abnormal BUN/creatinine levels. The patient was recently admitted to [facility] from 02/18/25 to 02/25/25 for sepsis in the setting of streptococcus dysgalactiae bacteremia and was followed by neurosurgery for cervicothoracic spine infection. He was discharged to [facility] with a PICC for IV ceftriaxone until 04/16/25. Documentation from the rehabilitation facility notes a decline in his functional status and dysfunction with activities of daily living. At the rehabilitation facility, he was noted to have worsening LE swelling and was started on torsemide. However, his Cr levels subsequently became elevated, leading to the discontinuation of torsemide. He was sent to the  ED for further evaluation of his LE swelling and elevated Cr levels. He denies any chest pain, dyspnea, orthopnea, PND.     Repeat MRI done this admission showed worsening discitis/osteomyelitis. Per ID, antibiotics alone will unlikely resolve this spinal infection and would consider surgical intervention if able to perform based on risk/benefit. After discussion with patient and offering surgical interventions vs continued medical management with antibiotics, patient wished to proceed with surgery. Of note, hospital course has been complicated by below the knee DVT, currently on therapeutic Lovenox, urinary retention requiring gupta, and significant LE anasarca.     Patient is now s/p IVC filter placed by IR and posterior thoracic decompression T1-T3 with connection to existing hardware C5-T3 fusion by NSGY (Dr. Cantu) on 3/17. He was downgraded from ICU and hemovac was removed.     PM&R consulted for eval of candidacy for acute rehab    Imaging showed:  MRI cervical spine: ACDF at C3 6 and laminectomies at C3-C6 with posterior   fusion utilizing pedicle screws and fusion rods.   Moderate disc   degeneration at C2-3 and C6/7 and C7-T1 with loss of disc height and   associated degenerative endplate changes. There is narrowing of the   BILATERAL C3-4 and BILATERAL C6-7 and C7-T1 neural foramina due to   uncovertebral spurring and facet osteophytic hypertrophy. Posterior   osteophytic ridge/disc complexes at T6/7 and C7-T1 abut the ventral cord.   Myelomalacia is noted at C3-4.There is enhancement of the ventral and   posterior spinal canal at the C7-T4 levels suggesting tiny epidural   abscess. Prevertebral enhancement extends from the C7 level into the   thoracic spine.    MRI thoracic spine:  Marrow signal demonstrates edema at the T1-2 and T2-3   endplate with minimal enhancement at T2-3 and T3-4 intravertebral discs   which may reflect early discitis. Prevertebral enhancement extends from   the C7 level to the T4 level consistent with phlegmon. Enhancement at the   anterior and posterior aspects of the spinal canal extending from the   cervical canal to the CT for level which may reflect a small epidural   abscesses. No significant underlying edema is noted.      REVIEW OF SYSTEMS  + neck and upper back pain  + RUE > LUE weakness  + LLE > RLE weakness  + numbness especially left thigh   + Gupta  + Constipation (pt reports last BM 4 days ago)  + [place]     VITALS  T(C): 36.5 (03-24-25 @ 07:20), Max: 37.1 (03-23-25 @ 15:29)  HR: 86 (03-24-25 @ 07:20) (86 - 102)  BP: 111/58 (03-24-25 @ 07:20) (111/58 - 116/69)  RR: 17 (03-24-25 @ 07:20) (16 - 18)  SpO2: 95% (03-24-25 @ 07:20) (93% - 96%)  Wt(kg): --    PAST MEDICAL & SURGICAL HISTORY  Hyperlipidemia, unspecified hyperlipidemia type    Diverticulosis of large intestine without hemorrhage    History of colon polyps    Hemorrhoids, unspecified hemorrhoid type    Benign prostatic hyperplasia without lower urinary tract symptoms, unspecified morphology    Osteoarthritis of knee, unilateral    Spinal cord injury at C5-C7 level without injury of spinal bone, subsequent encounter    Spastic    Weakness    Myelopathy    Spinal stenosis, unspecified spinal region    Hypothyroidism, unspecified type    Graves disease    Alcoholic    History of cervical discectomy    S/P laminectomy    H/O lumbar discectomy    H/O arthroscopy of knee    H/O colonoscopy with polypectomy        SOCIAL HISTORY - as per documentation/history  Smoking - None  EtOH - None  Drugs - None    FUNCTIONAL HISTORY  Lives at home w/ wife, 5 YVES no HR. House is split level, ground floor is living room/kitchen, 5 stairs up to the 2nd floor, and 5 stairs down to the den/lower level  Independent PTA    CURRENT FUNCTIONAL STATUS      FAMILY HISTORY   Family history of colon cancer in mother (Mother)    Family history of liver disease (Father)    Family history of cancer (Sibling)        RECENT LABS/IMAGING  CBC Full  -  ( 24 Mar 2025 08:03 )  WBC Count : 8.20 K/uL  RBC Count : 2.83 M/uL  Hemoglobin : 8.0 g/dL  Hematocrit : 25.3 %  Platelet Count - Automated : 214 K/uL  Mean Cell Volume : 89.4 fl  Mean Cell Hemoglobin : 28.3 pg  Mean Cell Hemoglobin Concentration : 31.6 g/dL  Auto Neutrophil # : x  Auto Lymphocyte # : x  Auto Monocyte # : x  Auto Eosinophil # : x  Auto Basophil # : x  Auto Neutrophil % : x  Auto Lymphocyte % : x  Auto Monocyte % : x  Auto Eosinophil % : x  Auto Basophil % : x    03-24    136  |  99  |  28[H]  ----------------------------<  111[H]  4.0   |  35[H]  |  0.86    Ca    8.8      24 Mar 2025 08:03  Phos  3.8     03-24  Mg     1.9     03-24    TPro  5.5[L]  /  Alb  2.3[L]  /  TBili  0.6  /  DBili  x   /  AST  38[H]  /  ALT  57  /  AlkPhos  62  03-24    Urinalysis Basic - ( 24 Mar 2025 08:03 )    Color: x / Appearance: x / SG: x / pH: x  Gluc: 111 mg/dL / Ketone: x  / Bili: x / Urobili: x   Blood: x / Protein: x / Nitrite: x   Leuk Esterase: x / RBC: x / WBC x   Sq Epi: x / Non Sq Epi: x / Bacteria: x        ALLERGIES  No Known Allergies      MEDICATIONS   acetaminophen   IVPB .. 1000 milliGRAM(s) IV Intermittent once PRN  aluminum hydroxide/magnesium hydroxide/simethicone Suspension 30 milliLiter(s) Oral every 4 hours PRN  buMETAnide Injectable 1 milliGRAM(s) IV Push two times a day  cefTRIAXone Injectable. 2000 milliGRAM(s) IV Push every 24 hours  DAPTOmycin IVPB 650 milliGRAM(s) IV Intermittent every 24 hours  diazepam    Tablet 5 milliGRAM(s) Oral every 8 hours PRN  ferrous    sulfate 325 milliGRAM(s) Oral daily  heparin   Injectable 5000 Unit(s) SubCutaneous every 8 hours  HYDROmorphone  Injectable 1 milliGRAM(s) IV Push every 3 hours PRN  lactulose Syrup 20 Gram(s) Oral three times a day PRN  levothyroxine 150 MICROGram(s) Oral daily  melatonin 3 milliGRAM(s) Oral at bedtime PRN  metoprolol succinate ER 25 milliGRAM(s) Oral daily  naloxone Injectable 0.1 milliGRAM(s) IV Push every 3 minutes PRN  ondansetron Injectable 4 milliGRAM(s) IV Push every 6 hours PRN  oxyCODONE    IR 15 milliGRAM(s) Oral every 4 hours PRN  oxyCODONE  ER Tablet 15 milliGRAM(s) Oral every 12 hours  polyethylene glycol 3350 17 Gram(s) Oral daily  senna 2 Tablet(s) Oral at bedtime  tamsulosin 0.8 milliGRAM(s) Oral at bedtime       74yM was admitted on 03-04    In ED, GCS=    Patient is a 74y old  Male who presents with a chief complaint of s/p T1-3 decompression, C5-T3 fusion (23 Mar 2025 12:07)    HPI:  74-year-old male with PMH of BPH, diverticulosis, Graves' disease, hemorrhoids, HLD, hypothyroidism, osteoarthritis, spinal stenosis, spinal cord injury (2004), and multiple spinal surgeries (three cervical and one lumbar). He presents with complaints of lower extremity swelling and abnormal BUN/creatinine levels. The patient was recently admitted to [facility] from 02/18/25 to 02/25/25 for sepsis in the setting of streptococcus dysgalactiae bacteremia and was followed by neurosurgery for cervicothoracic spine infection. He was discharged to [facility] with a PICC for IV ceftriaxone until 04/16/25. Documentation from the rehabilitation facility notes a decline in his functional status and dysfunction with activities of daily living. At the rehabilitation facility, he was noted to have worsening LE swelling and was started on torsemide. However, his Cr levels subsequently became elevated, leading to the discontinuation of torsemide. He was sent to the  ED for further evaluation of his LE swelling and elevated Cr levels. He denies any chest pain, dyspnea, orthopnea, PND.     Repeat MRI done this admission showed worsening discitis/osteomyelitis. Per ID, antibiotics alone will unlikely resolve this spinal infection and would consider surgical intervention if able to perform based on risk/benefit. After discussion with patient and offering surgical interventions vs continued medical management with antibiotics, patient wished to proceed with surgery. Of note, hospital course has been complicated by below the knee DVT, currently on therapeutic Lovenox, urinary retention requiring gupta, and significant LE anasarca.     Patient is now s/p IVC filter placed by IR and posterior thoracic decompression T1-T3 with connection to existing hardware C5-T3 fusion by NSGY (Dr. Cantu) on 3/17. He was downgraded from ICU and hemovac was removed.     PM&R consulted for eval of candidacy for acute rehab    Imaging showed:  MRI cervical spine: ACDF at C3 6 and laminectomies at C3-C6 with posterior   fusion utilizing pedicle screws and fusion rods.   Moderate disc   degeneration at C2-3 and C6/7 and C7-T1 with loss of disc height and   associated degenerative endplate changes. There is narrowing of the   BILATERAL C3-4 and BILATERAL C6-7 and C7-T1 neural foramina due to   uncovertebral spurring and facet osteophytic hypertrophy. Posterior   osteophytic ridge/disc complexes at T6/7 and C7-T1 abut the ventral cord.   Myelomalacia is noted at C3-4.There is enhancement of the ventral and   posterior spinal canal at the C7-T4 levels suggesting tiny epidural   abscess. Prevertebral enhancement extends from the C7 level into the   thoracic spine.    MRI thoracic spine:  Marrow signal demonstrates edema at the T1-2 and T2-3   endplate with minimal enhancement at T2-3 and T3-4 intravertebral discs   which may reflect early discitis. Prevertebral enhancement extends from   the C7 level to the T4 level consistent with phlegmon. Enhancement at the   anterior and posterior aspects of the spinal canal extending from the   cervical canal to the CT for level which may reflect a small epidural   abscesses. No significant underlying edema is noted.      REVIEW OF SYSTEMS  + neck and upper back pain  + RUE > LUE weakness  + LLE > RLE weakness  + numbness especially left thigh   + Gupta  + Constipation (pt reports last BM 4 days ago)  + [place]     VITALS  T(C): 36.5 (03-24-25 @ 07:20), Max: 37.1 (03-23-25 @ 15:29)  HR: 86 (03-24-25 @ 07:20) (86 - 102)  BP: 111/58 (03-24-25 @ 07:20) (111/58 - 116/69)  RR: 17 (03-24-25 @ 07:20) (16 - 18)  SpO2: 95% (03-24-25 @ 07:20) (93% - 96%)  Wt(kg): --    PAST MEDICAL & SURGICAL HISTORY  Hyperlipidemia, unspecified hyperlipidemia type    Diverticulosis of large intestine without hemorrhage    History of colon polyps    Hemorrhoids, unspecified hemorrhoid type    Benign prostatic hyperplasia without lower urinary tract symptoms, unspecified morphology    Osteoarthritis of knee, unilateral    Spinal cord injury at C5-C7 level without injury of spinal bone, subsequent encounter    Spastic    Weakness    Myelopathy    Spinal stenosis, unspecified spinal region    Hypothyroidism, unspecified type    Graves disease    Alcoholic    History of cervical discectomy    S/P laminectomy    H/O lumbar discectomy    H/O arthroscopy of knee    H/O colonoscopy with polypectomy        SOCIAL HISTORY - as per documentation/history  Smoking - former smoker  EtOH - None  Drugs - None    FUNCTIONAL HISTORY  Lives at home w/ wife, 5 YVES no HR. House is split level, ground floor is living room/kitchen, 5 stairs up to the 2nd floor, and 5 stairs down to the den/lower level  Independent PTA    CURRENT FUNCTIONAL STATUS  -Bed mobility: Max assist  - Transfers: Moderate assistance  - Ambulation: TBD  - ADLs: bathing mod assist, brushing min assist, eating independently      FAMILY HISTORY   Family history of colon cancer in mother (Mother)    Family history of liver disease (Father)    Family history of cancer (Sibling)        RECENT LABS/IMAGING  CBC Full  -  ( 24 Mar 2025 08:03 )  WBC Count : 8.20 K/uL  RBC Count : 2.83 M/uL  Hemoglobin : 8.0 g/dL  Hematocrit : 25.3 %  Platelet Count - Automated : 214 K/uL  Mean Cell Volume : 89.4 fl  Mean Cell Hemoglobin : 28.3 pg  Mean Cell Hemoglobin Concentration : 31.6 g/dL  Auto Neutrophil # : x  Auto Lymphocyte # : x  Auto Monocyte # : x  Auto Eosinophil # : x  Auto Basophil # : x  Auto Neutrophil % : x  Auto Lymphocyte % : x  Auto Monocyte % : x  Auto Eosinophil % : x  Auto Basophil % : x    03-24    136  |  99  |  28[H]  ----------------------------<  111[H]  4.0   |  35[H]  |  0.86    Ca    8.8      24 Mar 2025 08:03  Phos  3.8     03-24  Mg     1.9     03-24    TPro  5.5[L]  /  Alb  2.3[L]  /  TBili  0.6  /  DBili  x   /  AST  38[H]  /  ALT  57  /  AlkPhos  62  03-24    Urinalysis Basic - ( 24 Mar 2025 08:03 )    Color: x / Appearance: x / SG: x / pH: x  Gluc: 111 mg/dL / Ketone: x  / Bili: x / Urobili: x   Blood: x / Protein: x / Nitrite: x   Leuk Esterase: x / RBC: x / WBC x   Sq Epi: x / Non Sq Epi: x / Bacteria: x        ALLERGIES  No Known Allergies      MEDICATIONS   acetaminophen   IVPB .. 1000 milliGRAM(s) IV Intermittent once PRN  aluminum hydroxide/magnesium hydroxide/simethicone Suspension 30 milliLiter(s) Oral every 4 hours PRN  buMETAnide Injectable 1 milliGRAM(s) IV Push two times a day  cefTRIAXone Injectable. 2000 milliGRAM(s) IV Push every 24 hours  DAPTOmycin IVPB 650 milliGRAM(s) IV Intermittent every 24 hours  diazepam    Tablet 5 milliGRAM(s) Oral every 8 hours PRN  ferrous    sulfate 325 milliGRAM(s) Oral daily  heparin   Injectable 5000 Unit(s) SubCutaneous every 8 hours  HYDROmorphone  Injectable 1 milliGRAM(s) IV Push every 3 hours PRN  lactulose Syrup 20 Gram(s) Oral three times a day PRN  levothyroxine 150 MICROGram(s) Oral daily  melatonin 3 milliGRAM(s) Oral at bedtime PRN  metoprolol succinate ER 25 milliGRAM(s) Oral daily  naloxone Injectable 0.1 milliGRAM(s) IV Push every 3 minutes PRN  ondansetron Injectable 4 milliGRAM(s) IV Push every 6 hours PRN  oxyCODONE    IR 15 milliGRAM(s) Oral every 4 hours PRN  oxyCODONE  ER Tablet 15 milliGRAM(s) Oral every 12 hours  polyethylene glycol 3350 17 Gram(s) Oral daily  senna 2 Tablet(s) Oral at bedtime  tamsulosin 0.8 milliGRAM(s) Oral at bedtime    ----------------------------------------------------------------------------------------  PHYSICAL EXAM  Constitutional - NAD, Comfortable  HEENT - NCAT  Neck - Aspen Cervical Collar   Chest - Breathing comfortably, No wheezing  Cardiovascular - S1S2   Abdomen - Tympanic   Extremities - +Left proximal thigh edema, 1-2+ distal pitting edema bilaterally  Neurologic Exam -                    Cognitive - Awake, Alert, AAO to self, place, date, year, situation     Communication - Fluent, No dysarthria     Cranial Nerves - CN 2-12 intact     Motor -                     LEFT    UE - ShAB 5/5, EF 5/5, EE 5/5, WE 5/5,  3/5                    RIGHT UE - ShAB 4/5, EF 4/5, EE 4/5, WE 4/5,  4/5                    LEFT    LE - HF 1/5, KE 2/5, DF 5/5, PF 5/5                    RIGHT LE - HF 2/5, KE 5/5, DF 5/5, PF 5/5        Sensory - diminished to LT on the left anterior thigh, approx L1 and L2 dermatomes     Reflexes - DTR Intact, No primitive reflexes        Balance - impaired  Psychiatric - Mood stable, Affect WNL  ----------------------------------------------------------------------------------------    ASSESSMENT/PLAN  74-year-old male with PMH of BPH, diverticulosis, Graves' disease, hemorrhoids, HLD, hypothyroidism, osteoarthritis, spinal stenosis, spinal cord injury (2004), and multiple spinal surgeries (three cervical and one lumbar), with functional defecits following worsening spinal epidural abscess and disciitis, failed IV Abx therapy and developed DVT, now s/p IVC filter placed by IR and posterior thoracic decompression T1-T3 with connection to existing hardware C5-T3 fusion by NSGY (Dr. Cantu) on 3/17.     He has been making significant functional gains compared to prior to his most recent spine surgery.    Impairment code: 04.130 Other Non-Traumatic Spinal Cord Dysfunction (spinal epidural abscess and disciitis s/p decompression and fusion)    Pain - Tylenol, oxycodone 15mg q4 hr prn moderate pain, diluadid 1mg iv q3h prn severe pain. Oxycontin ER 15mg q 12h ; diazepam 5mg q8h prn spasms  DVT PPX - SCDs  BM - senna 2 tabs qhs, lactulose 20mg TID prn constipation. He may need a standing dose if he continues to be constipated as last BM was 4 days ago.  Rehab -    Recommend ACUTE inpatient rehabilitation for the functional deficits consisting of 3 hours of therapy/day & 24 hour RN/daily PMR physician for comorbid medical management. Will continue to follow for ongoing rehab needs and recommendations. Patient will be able to tolerate 3 hours a day.    74yM was admitted on 03-04    In ED, GCS=    Patient is a 74y old  Male who presents with a chief complaint of s/p T1-3 decompression, C5-T3 fusion (23 Mar 2025 12:07)    HPI:  74-year-old male with PMH of BPH, diverticulosis, Graves' disease, hemorrhoids, HLD, hypothyroidism, osteoarthritis, spinal stenosis, spinal cord injury (2004), and multiple spinal surgeries (three cervical and one lumbar). He presents with complaints of lower extremity swelling and abnormal BUN/creatinine levels. The patient was recently admitted to [facility] from 02/18/25 to 02/25/25 for sepsis in the setting of streptococcus dysgalactiae bacteremia and was followed by neurosurgery for cervicothoracic spine infection. He was discharged to [facility] with a PICC for IV ceftriaxone until 04/16/25. Documentation from the rehabilitation facility notes a decline in his functional status and dysfunction with activities of daily living. At the rehabilitation facility, he was noted to have worsening LE swelling and was started on torsemide. However, his Cr levels subsequently became elevated, leading to the discontinuation of torsemide. He was sent to the  ED for further evaluation of his LE swelling and elevated Cr levels. He denies any chest pain, dyspnea, orthopnea, PND.     Repeat MRI done this admission showed worsening discitis/osteomyelitis. Per ID, antibiotics alone will unlikely resolve this spinal infection and would consider surgical intervention if able to perform based on risk/benefit. After discussion with patient and offering surgical interventions vs continued medical management with antibiotics, patient wished to proceed with surgery. Of note, hospital course has been complicated by below the knee DVT, currently on therapeutic Lovenox, urinary retention requiring gupta, and significant LE anasarca.     Patient is now s/p IVC filter placed by IR and posterior thoracic decompression T1-T3 with connection to existing hardware C5-T3 fusion by NSGY (Dr. Cantu) on 3/17. He was downgraded from ICU and hemovac was removed.     PM&R consulted for eval of candidacy for acute rehab    Imaging showed:  MRI cervical spine: ACDF at C3 6 and laminectomies at C3-C6 with posterior   fusion utilizing pedicle screws and fusion rods.   Moderate disc   degeneration at C2-3 and C6/7 and C7-T1 with loss of disc height and   associated degenerative endplate changes. There is narrowing of the   BILATERAL C3-4 and BILATERAL C6-7 and C7-T1 neural foramina due to   uncovertebral spurring and facet osteophytic hypertrophy. Posterior   osteophytic ridge/disc complexes at T6/7 and C7-T1 abut the ventral cord.   Myelomalacia is noted at C3-4.There is enhancement of the ventral and   posterior spinal canal at the C7-T4 levels suggesting tiny epidural   abscess. Prevertebral enhancement extends from the C7 level into the   thoracic spine.    MRI thoracic spine:  Marrow signal demonstrates edema at the T1-2 and T2-3   endplate with minimal enhancement at T2-3 and T3-4 intravertebral discs   which may reflect early discitis. Prevertebral enhancement extends from   the C7 level to the T4 level consistent with phlegmon. Enhancement at the   anterior and posterior aspects of the spinal canal extending from the   cervical canal to the CT for level which may reflect a small epidural   abscesses. No significant underlying edema is noted.      REVIEW OF SYSTEMS  + neck and upper back pain  + RUE > LUE weakness  + LLE > RLE weakness  + numbness especially left thigh   + Gupta  + Constipation (pt reports last BM 4 days ago)  + [place]     VITALS  T(C): 36.5 (03-24-25 @ 07:20), Max: 37.1 (03-23-25 @ 15:29)  HR: 86 (03-24-25 @ 07:20) (86 - 102)  BP: 111/58 (03-24-25 @ 07:20) (111/58 - 116/69)  RR: 17 (03-24-25 @ 07:20) (16 - 18)  SpO2: 95% (03-24-25 @ 07:20) (93% - 96%)  Wt(kg): --    PAST MEDICAL & SURGICAL HISTORY  Hyperlipidemia, unspecified hyperlipidemia type    Diverticulosis of large intestine without hemorrhage    History of colon polyps    Hemorrhoids, unspecified hemorrhoid type    Benign prostatic hyperplasia without lower urinary tract symptoms, unspecified morphology    Osteoarthritis of knee, unilateral    Spinal cord injury at C5-C7 level without injury of spinal bone, subsequent encounter    Spastic    Weakness    Myelopathy    Spinal stenosis, unspecified spinal region    Hypothyroidism, unspecified type    Graves disease    Alcoholic    History of cervical discectomy    S/P laminectomy    H/O lumbar discectomy    H/O arthroscopy of knee    H/O colonoscopy with polypectomy        SOCIAL HISTORY - as per documentation/history  Smoking - former smoker  EtOH - None  Drugs - None    FUNCTIONAL HISTORY  Lives at home w/ wife, 5 YVES no HR. House is split level, ground floor is living room/kitchen, 5 stairs up to the 2nd floor, and 5 stairs down to the den/lower level  Independent PTA    CURRENT FUNCTIONAL STATUS  -Bed mobility: Max assist  - Transfers: Moderate assistance  - Ambulation: TBD  - ADLs: bathing mod assist, brushing min assist, eating independently      FAMILY HISTORY   Family history of colon cancer in mother (Mother)    Family history of liver disease (Father)    Family history of cancer (Sibling)        RECENT LABS/IMAGING  CBC Full  -  ( 24 Mar 2025 08:03 )  WBC Count : 8.20 K/uL  RBC Count : 2.83 M/uL  Hemoglobin : 8.0 g/dL  Hematocrit : 25.3 %  Platelet Count - Automated : 214 K/uL  Mean Cell Volume : 89.4 fl  Mean Cell Hemoglobin : 28.3 pg  Mean Cell Hemoglobin Concentration : 31.6 g/dL  Auto Neutrophil # : x  Auto Lymphocyte # : x  Auto Monocyte # : x  Auto Eosinophil # : x  Auto Basophil # : x  Auto Neutrophil % : x  Auto Lymphocyte % : x  Auto Monocyte % : x  Auto Eosinophil % : x  Auto Basophil % : x    03-24    136  |  99  |  28[H]  ----------------------------<  111[H]  4.0   |  35[H]  |  0.86    Ca    8.8      24 Mar 2025 08:03  Phos  3.8     03-24  Mg     1.9     03-24    TPro  5.5[L]  /  Alb  2.3[L]  /  TBili  0.6  /  DBili  x   /  AST  38[H]  /  ALT  57  /  AlkPhos  62  03-24    Urinalysis Basic - ( 24 Mar 2025 08:03 )    Color: x / Appearance: x / SG: x / pH: x  Gluc: 111 mg/dL / Ketone: x  / Bili: x / Urobili: x   Blood: x / Protein: x / Nitrite: x   Leuk Esterase: x / RBC: x / WBC x   Sq Epi: x / Non Sq Epi: x / Bacteria: x        ALLERGIES  No Known Allergies      MEDICATIONS   acetaminophen   IVPB .. 1000 milliGRAM(s) IV Intermittent once PRN  aluminum hydroxide/magnesium hydroxide/simethicone Suspension 30 milliLiter(s) Oral every 4 hours PRN  buMETAnide Injectable 1 milliGRAM(s) IV Push two times a day  cefTRIAXone Injectable. 2000 milliGRAM(s) IV Push every 24 hours  DAPTOmycin IVPB 650 milliGRAM(s) IV Intermittent every 24 hours  diazepam    Tablet 5 milliGRAM(s) Oral every 8 hours PRN  ferrous    sulfate 325 milliGRAM(s) Oral daily  heparin   Injectable 5000 Unit(s) SubCutaneous every 8 hours  HYDROmorphone  Injectable 1 milliGRAM(s) IV Push every 3 hours PRN  lactulose Syrup 20 Gram(s) Oral three times a day PRN  levothyroxine 150 MICROGram(s) Oral daily  melatonin 3 milliGRAM(s) Oral at bedtime PRN  metoprolol succinate ER 25 milliGRAM(s) Oral daily  naloxone Injectable 0.1 milliGRAM(s) IV Push every 3 minutes PRN  ondansetron Injectable 4 milliGRAM(s) IV Push every 6 hours PRN  oxyCODONE    IR 15 milliGRAM(s) Oral every 4 hours PRN  oxyCODONE  ER Tablet 15 milliGRAM(s) Oral every 12 hours  polyethylene glycol 3350 17 Gram(s) Oral daily  senna 2 Tablet(s) Oral at bedtime  tamsulosin 0.8 milliGRAM(s) Oral at bedtime    ----------------------------------------------------------------------------------------  PHYSICAL EXAM  Constitutional - NAD, Comfortable  HEENT - NCAT  Neck - Aspen Cervical Collar   Chest - Breathing comfortably, No wheezing  Cardiovascular - S1S2   Abdomen - Tympanic   Extremities - +Left proximal thigh edema, 1-2+ distal pitting edema bilaterally  Neurologic Exam -                    Cognitive - Awake, Alert, AAO to self, place, date, year, situation     Communication - Fluent, No dysarthria     Cranial Nerves - CN 2-12 intact     Motor -                     LEFT    UE - ShAB 5/5, EF 5/5, EE 5/5, WE 5/5,  3/5                    RIGHT UE - ShAB 4/5, EF 4/5, EE 4/5, WE 4/5,  4/5                    LEFT    LE - HF 1/5, KE 2/5, DF 5/5, PF 5/5                    RIGHT LE - HF 2/5, KE 5/5, DF 5/5, PF 5/5        Sensory - diminished to LT on the left anterior thigh, approx L1 and L2 dermatomes     Reflexes - DTR Intact, No primitive reflexes        Balance - impaired  Psychiatric - Mood stable, Affect WNL  ----------------------------------------------------------------------------------------    ASSESSMENT/PLAN  74-year-old male with PMH of BPH, diverticulosis, Graves' disease, hemorrhoids, HLD, hypothyroidism, osteoarthritis, spinal stenosis, spinal cord injury (2004), and multiple spinal surgeries (three cervical and one lumbar), with functional defecits following worsening spinal epidural abscess and disciitis, failed IV Abx therapy and developed DVT, now s/p IVC filter placed by IR and posterior thoracic decompression T1-T3 with connection to existing hardware C5-T3 fusion by NSGY (Dr. Cantu) on 3/17.     He has been making significant functional gains compared to prior to his most recent spine surgery.    Impairment code: 04.130 Other Non-Traumatic Spinal Cord Dysfunction (spinal epidural abscess and disciitis s/p decompression and fusion)    Pain - Tylenol, oxycodone 15mg q4 hr prn moderate pain, diluadid 1mg iv q3h prn severe pain. Oxycontin ER 15mg q 12h ; diazepam 5mg q8h prn spasms  DVT PPX - SCDs, hep subQ 5000ui q8h  BM - senna 2 tabs qhs, lactulose 20mg TID prn constipation. He may need a standing dose if he continues to be constipated as last BM was 4 days ago.  Rehab -    Recommend ACUTE inpatient rehabilitation for the functional deficits consisting of 3 hours of therapy/day & 24 hour RN/daily PMR physician for comorbid medical management. Will continue to follow for ongoing rehab needs and recommendations. Patient will be able to tolerate 3 hours a day.

## 2025-03-24 NOTE — CONSULT NOTE ADULT - CONSULT REQUESTED DATE/TIME
17-Mar-2025 16:15
05-Mar-2025 09:46
10-Mar-2025 12:59
24-Mar-2025 09:49
06-Mar-2025 16:53
15-Mar-2025 17:34
17-Mar-2025 08:22

## 2025-03-24 NOTE — PROGRESS NOTE ADULT - SUBJECTIVE AND OBJECTIVE BOX
CHIEF COMPLAINT: LE edema, weakness, numbness; urinary retention      FROM H&P:  74-year-old male with a past medical history of benign prostatic hyperplasia, diverticulosis, Graves' disease, hemorrhoids, hyperlipidemia, hypothyroidism, osteoarthritis, spinal stenosis, spinal cord injury (2004), and multiple spinal surgeries (three cervical and one lumbar). He presents with complaints of lower extremity swelling and abnormal BUN/creatinine levels. The patient was recently admitted to [facility] from 02/18/25 to 02/25/25 for sepsis secondary to epidural abscess. He has a peripherally inserted central catheter line in the right arm and is on nightly Ceftriaxone treatment until 04/16/25. Following his discharge, he was transferred to [facility]. Documentation from the rehabilitation facility notes a decline in his functional status and dysfunction with activities of daily living. At the rehabilitation facility, he was noted to have worsening LE swelling and was started on torsemide. However, his Cr levels subsequently became elevated, leading to the discontinuation of torsemide. He was sent to the [facility] Emergency Department for further evaluation of his LE swelling and elevated Cr levels. He denies any chest pain, dyspnea, orthopnea, PND.       SUBJECTIVE/SIGNIFICANT INTERVAL EVENTS/OVERNIGHT EVENTS:    3/5: pt feels well. c/o LE numbness @ thighs and L hand numbness. denies saddle anesthesia. Cr improving     3/6: Cr continues to improve, c/o generalized weakness noble while ambulating but no new neuro deficits     3/7:   Gupta out with hematuria. Patient had no sense of it coming out->recurrent retention->gupta re-inserted. MRI lumbar negative.   Discussed with neurosurgery. CLeared from their standpoint. Advised of upper extremity symptoms. Order MRI C and T Spine for further evaluation and if anything significant will re-call neurosurgery  Signficaant ansarca. Cr improving. Arterial doppler ordered for further evaluation. ON AC for DVT. TTE reviewed and grossly unremarkable. Will discuss with nephro  Rising leukocytosis. Unclear source. Afebrile and vitals stable. Continue to monitor    3/8:  Cr improving. Anasarca persists and debilitating. Discussed with nephrology->Trial of albumin lasix  MRI Cervical Spin and thoracic spine done. Pending read  Rising leukocytosis? Unclear etiology. Afebrile. Follow imaging results.   Continue to monitor fluid status/Cr/Electrolytes closely. If stable and improving plan for bid albumin/lasix for adequate diuresis    3/9: Patient feels the same from weakness, edema, numbness standpoint. Apparent improvement on Physical exam in edema. Cr improved. Discussed with nephrology. IV Albumin/Lasix bid and continue to assess. MR C-Spine similar to prior. MR T-Spine with reported worsening discitis and possible new abscess. Re-called neurosurgery/spine to re-evaluate. Antibiotics as per ID.     3/10: ID not consulted on admission. Consult ID. Recs appreciated->continue IV antibiotics through april. Continue IV lasix/Albumin. No noticeable improvement as per patient. Continue to monitor. Leukocytosis improvin wtihout any change in intervention minus diuresis. Reactive? Neurosurgery recs appreciated->no acute intervention. Continue IV antibiotics and follow up outpatient. No events on telemetry->DC telemetry.     3/11: No change in edema. weight difficult to assess as patient unable to stand. Cr Relatively stable. Change to bumex 2mg bid. Still with upper extremity parathesia/weakness. Edema limited above torso so unlikely contributing to UE symptoms. Will re-discuss with Neurosurgery. Cntinue antibiotics.     3/12: Improvement in edema with bumex. CPK elevated ysterday->improved today. Continue to monitor. Cr stable. Continue IV albumin/bumex and monitoring Cr/electrolytes. Discussed with ID and Neurosurgery and expected difficult clearance of infection with antibiotics along and neurosurgery offering surgical intervention/washout. Patient to determine whether he would like to proceed. Eliquis to therapeutic lovenox.     3/13: Interval improvement in edema. Cr Stable. Discussed with nephrology->Continue IV albumin/Bumex. Continue to monitor I/Os and Cr/electroytes. Patient agreeable for surgical intervention. Tentative surgery 3/18. Last dose of lovenox night of 3/16. Patient with below the knee DVT so able to hold AC for surgery. Post op for period off AC can do serial US several days until cleared to resume AC. WIll order doppler today for preop baseline. At this time no indication for IVC Filter at this time. . Otherwise patient medically optimized as best as possible to proceed with spine intervention with neurosurgery. Continue antibiotics as per ID.    3/14-3/18: Continued diruesis. AC held. IVC filter placed 3/17 with subsequent spine surgery with neurosurgery posterior thoracic laminectomy and fusion. transferred to surgical step down post op for closer monitoring    3/21: Surgical culture with staph epi possible contaminant but given clinical presentation on admission, IV Dapto added. To continue IV rocephin/daptomycin through 5/2/2025    3/24: I saw and evaluated the patient. Pain controlled. No BM x 3 days. Suppository ordered. Miralax to bid. DC IV dilaudid. DC PO iron (going to take forever to correct any iron deficieny and augment constipation from opioids). Check Iron levels and likely to administer IV iron. PT as tolerated. Discussed with nephrology. IV bumex to po. Physiatry evaluated->acute rehab candidate. Referral sent. Pending neurosurgery clearance to resume full dose AC.       Review of Systems: 14 Point review of systems reviewed and reported as negative unless otherwise stated  above      PHYSICAL EXAM:  T(C): 37 (03-24-25 @ 16:05), Max: 37 (03-24-25 @ 16:05)  HR: 94 (03-24-25 @ 16:05) (86 - 99)  BP: 116/69 (03-24-25 @ 16:05) (105/51 - 116/69)  RR: 18 (03-24-25 @ 16:05) (16 - 18)  SpO2: 94% (03-24-25 @ 16:05) (93% - 96%)  General: AAOx3; NAD  Head: AT/NC  Neck: Non-tender; No JVD  CVS: RRR, S1&S2, No murmur, Anasarca +  Respiratory: Lungs CTA B/L; Normal Respiratory Effort  Abdomen/GI: Soft, non-tender, non-distended; anasarca +  : No bladder distention, No Gupta  Extremities: No cyanosis, No clubbing, Significant LE edema to the pelvix  MSK: No CVA tenderness, Limited ROM LE bilaterally because of marked edema No injury; Neck and thoracic brace in place  Neuro: AAOx3, numbness to upper b/l thighs, 3/5 L hand weakness,   Psych: Appropriate, Cooperative,   Skin: Clean, Dry and Intact      LABS:                          8.0    8.20  )-----------( 214      ( 24 Mar 2025 08:03 )             25.3     03-24    136  |  99  |  28[H]  ----------------------------<  111[H]  4.0   |  35[H]  |  0.86    Ca    8.8      24 Mar 2025 08:03  Phos  3.8     03-24  Mg     1.9     03-24    TPro  5.5[L]  /  Alb  2.3[L]  /  TBili  0.6  /  DBili  x   /  AST  38[H]  /  ALT  57  /  AlkPhos  62  03-24    SARS-CoV-2: NotDetec (17 Feb 2025 21:11)    CAPILLARY BLOOD GLUCOSE          RADIOLOGY:  < from: MR Lumbar Spine w/wo IV Cont (03.07.25 @ 15:11) >  IMPRESSION:    No abnormal enhancement.    No evidence for epidural abscess or discitis/osteomyelitis.    Multilevel degenerative changes detailed in the body the report.    < end of copied text >  < from: MR Thoracic Spine w/wo IV Cont (03.08.25 @ 12:24) >  IMPRESSION: Worsening discitis/osteomyelitis is seen with epidural   phlegmon again seen unchanged though increased bilaterally and ventral,   this is consistent with paraspinal phlegmon though there is a possible   early abscess seenon the left side as described above.    < end of copied text >  < from: MR Cervical Spine w/wo IV Cont (03.08.25 @ 12:24) >    IMPRESSION:    1.  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  2.  Chronic cord myelomalacia at C3-4, status post decompression.  3.  Stable severe left C7-T1 foraminal stenosis, with moderate narrowing   of the central canal.  4.  Stable moderate to severe bilateral C6-7 foraminal stenoses.    < end of copied text >    < from: Xray Thoracic Spine 2 View (03.21.25 @ 14:25) >    IMPRESSION: Similar postoperative changes.    < end of copied text >  < from: Xray Cervical Spine AP, Lat, Dens Max 3 View (03.21.25 @ 14:23) >  IMPRESSION:  Postoperative changes.    < end of copied text >  < from: CT Angio Chest PE Protocol w/ IV Cont (03.16.25 @ 16:47) >  IMPRESSION:  No pulmonary embolism.    < end of copied text >      EKG:  < from: 12 Lead ECG (03.05.25 @ 07:16) >    Diagnosis Line Normal sinus rhythm  Normal ECG  When compared with ECG of 04-MAR-2025 14:10,  No significant change was found    < end of copied text >      ECHO:  < from: TTE W or WO Ultrasound Enhancing Agent (02.18.25 @ 12:28) >  CONCLUSIONS:      1. Left ventricular systolic function is normal with an ejection fraction of 66 % by Meyers's method of disks.   2. Trace tricuspid regurgitation.   3. Estimated pulmonary artery systolic pressure is 35 mmHg, consistent with mild pulmonary hypertension.   4. Interatrial septum is aneurysmal.   5. Technically difficultimage quality.   6. The peak transaortic velocity is 2.29 m/s, peak transaortic gradient is 21.0 mmHg and mean transaortic gradient is 12.0 mmHg with an LVOT/aortic valve VTI ratio of 0.42. The effective orifice area is estimated at 1.61 cm² by the continuity equation.   7. Trileaflet aortic valve with reduced systolic excursion. There is moderate calcification of the aortic valve leaflets. Mild aortic stenosis.    < end of copied text >              I personally reviewed labs, imaging, ekg, orders and vitals.    Discussed case with:  [X]RN  [X]CM/SW  [X]Patient  []Family  [X]Specialist: Nephrology/neurosurgery

## 2025-03-25 ENCOUNTER — TRANSCRIPTION ENCOUNTER (OUTPATIENT)
Age: 75
End: 2025-03-25

## 2025-03-25 ENCOUNTER — INPATIENT (INPATIENT)
Facility: HOSPITAL | Age: 75
LOS: 35 days | Discharge: HOME | DRG: 951 | End: 2025-04-30
Attending: PHYSICAL MEDICINE & REHABILITATION | Admitting: PHYSICAL MEDICINE & REHABILITATION
Payer: MEDICARE

## 2025-03-25 VITALS
SYSTOLIC BLOOD PRESSURE: 118 MMHG | DIASTOLIC BLOOD PRESSURE: 57 MMHG | RESPIRATION RATE: 19 BRPM | TEMPERATURE: 98 F | OXYGEN SATURATION: 94 % | HEART RATE: 104 BPM

## 2025-03-25 VITALS
DIASTOLIC BLOOD PRESSURE: 63 MMHG | RESPIRATION RATE: 16 BRPM | HEART RATE: 101 BPM | WEIGHT: 243.83 LBS | SYSTOLIC BLOOD PRESSURE: 100 MMHG | OXYGEN SATURATION: 92 % | HEIGHT: 68.5 IN | TEMPERATURE: 98 F

## 2025-03-25 DIAGNOSIS — Z95.1 PRESENCE OF AORTOCORONARY BYPASS GRAFT: ICD-10-CM

## 2025-03-25 DIAGNOSIS — Z98.890 OTHER SPECIFIED POSTPROCEDURAL STATES: Chronic | ICD-10-CM

## 2025-03-25 LAB
ANION GAP SERPL CALC-SCNC: 2 MMOL/L — LOW (ref 5–17)
BUN SERPL-MCNC: 27 MG/DL — HIGH (ref 7–23)
CALCIUM SERPL-MCNC: 8.9 MG/DL — SIGNIFICANT CHANGE UP (ref 8.5–10.1)
CHLORIDE SERPL-SCNC: 100 MMOL/L — SIGNIFICANT CHANGE UP (ref 96–108)
CO2 SERPL-SCNC: 35 MMOL/L — HIGH (ref 22–31)
CREAT SERPL-MCNC: 0.94 MG/DL — SIGNIFICANT CHANGE UP (ref 0.5–1.3)
EGFR: 86 ML/MIN/1.73M2 — SIGNIFICANT CHANGE UP
EGFR: 86 ML/MIN/1.73M2 — SIGNIFICANT CHANGE UP
GLUCOSE SERPL-MCNC: 102 MG/DL — HIGH (ref 70–99)
HCT VFR BLD CALC: 27.3 % — LOW (ref 39–50)
HGB BLD-MCNC: 8.7 G/DL — LOW (ref 13–17)
MAGNESIUM SERPL-MCNC: 1.9 MG/DL — SIGNIFICANT CHANGE UP (ref 1.6–2.6)
MCHC RBC-ENTMCNC: 28.9 PG — SIGNIFICANT CHANGE UP (ref 27–34)
MCHC RBC-ENTMCNC: 31.9 G/DL — LOW (ref 32–36)
MCV RBC AUTO: 90.7 FL — SIGNIFICANT CHANGE UP (ref 80–100)
NRBC # BLD AUTO: 0 K/UL — SIGNIFICANT CHANGE UP (ref 0–0)
NRBC # FLD: 0 K/UL — SIGNIFICANT CHANGE UP (ref 0–0)
NRBC BLD AUTO-RTO: 0 /100 WBCS — SIGNIFICANT CHANGE UP (ref 0–0)
PLATELET # BLD AUTO: 214 K/UL — SIGNIFICANT CHANGE UP (ref 150–400)
PMV BLD: 8.9 FL — SIGNIFICANT CHANGE UP (ref 7–13)
POTASSIUM SERPL-MCNC: 4.3 MMOL/L — SIGNIFICANT CHANGE UP (ref 3.5–5.3)
POTASSIUM SERPL-SCNC: 4.3 MMOL/L — SIGNIFICANT CHANGE UP (ref 3.5–5.3)
RBC # BLD: 3.01 M/UL — LOW (ref 4.2–5.8)
RBC # FLD: 14.4 % — SIGNIFICANT CHANGE UP (ref 10.3–14.5)
SARS-COV-2 RNA SPEC QL NAA+PROBE: SIGNIFICANT CHANGE UP
SODIUM SERPL-SCNC: 137 MMOL/L — SIGNIFICANT CHANGE UP (ref 135–145)
WBC # BLD: 8.69 K/UL — SIGNIFICANT CHANGE UP (ref 3.8–10.5)
WBC # FLD AUTO: 8.69 K/UL — SIGNIFICANT CHANGE UP (ref 3.8–10.5)

## 2025-03-25 PROCEDURE — 99239 HOSP IP/OBS DSCHRG MGMT >30: CPT

## 2025-03-25 PROCEDURE — 71045 X-RAY EXAM CHEST 1 VIEW: CPT | Mod: 26

## 2025-03-25 RX ORDER — OXYCODONE HYDROCHLORIDE 30 MG/1
1 TABLET ORAL
Qty: 0 | Refills: 0 | DISCHARGE
Start: 2025-03-25

## 2025-03-25 RX ORDER — METOPROLOL SUCCINATE 50 MG/1
1 TABLET, EXTENDED RELEASE ORAL
Refills: 0 | DISCHARGE

## 2025-03-25 RX ORDER — DAPTOMYCIN 500 MG/10ML
650 INJECTION, POWDER, LYOPHILIZED, FOR SOLUTION INTRAVENOUS EVERY 24 HOURS
Refills: 0 | Status: DISCONTINUED | OUTPATIENT
Start: 2025-03-26 | End: 2025-04-30

## 2025-03-25 RX ORDER — LEVOTHYROXINE SODIUM 300 MCG
150 TABLET ORAL DAILY
Refills: 0 | Status: DISCONTINUED | OUTPATIENT
Start: 2025-03-26 | End: 2025-04-30

## 2025-03-25 RX ORDER — METOPROLOL SUCCINATE 50 MG/1
1 TABLET, EXTENDED RELEASE ORAL
Qty: 0 | Refills: 0 | DISCHARGE
Start: 2025-03-25

## 2025-03-25 RX ORDER — SENNA 187 MG
2 TABLET ORAL AT BEDTIME
Refills: 0 | Status: DISCONTINUED | OUTPATIENT
Start: 2025-03-25 | End: 2025-04-30

## 2025-03-25 RX ORDER — MELATONIN 5 MG
3 TABLET ORAL AT BEDTIME
Refills: 0 | Status: DISCONTINUED | OUTPATIENT
Start: 2025-03-25 | End: 2025-04-30

## 2025-03-25 RX ORDER — DAPTOMYCIN 500 MG/10ML
650 INJECTION, POWDER, LYOPHILIZED, FOR SOLUTION INTRAVENOUS
Qty: 0 | Refills: 0 | DISCHARGE
Start: 2025-03-25

## 2025-03-25 RX ORDER — BISACODYL 5 MG
1 TABLET, DELAYED RELEASE (ENTERIC COATED) ORAL
Qty: 0 | Refills: 0 | DISCHARGE

## 2025-03-25 RX ORDER — LACTULOSE 10 G/15ML
20 SOLUTION ORAL THREE TIMES A DAY
Refills: 0 | Status: DISCONTINUED | OUTPATIENT
Start: 2025-03-25 | End: 2025-04-15

## 2025-03-25 RX ORDER — METOPROLOL SUCCINATE 50 MG/1
25 TABLET, EXTENDED RELEASE ORAL DAILY
Refills: 0 | Status: DISCONTINUED | OUTPATIENT
Start: 2025-03-26 | End: 2025-04-25

## 2025-03-25 RX ORDER — SENNA 187 MG
2 TABLET ORAL
Qty: 0 | Refills: 0 | DISCHARGE
Start: 2025-03-25

## 2025-03-25 RX ORDER — AMMONIUM LACTATE 12 %
1 LOTION (ML) TOPICAL
Refills: 0 | Status: DISCONTINUED | OUTPATIENT
Start: 2025-03-25 | End: 2025-04-30

## 2025-03-25 RX ORDER — LACTULOSE 10 G/15ML
30 SOLUTION ORAL
Qty: 0 | Refills: 0 | DISCHARGE
Start: 2025-03-25

## 2025-03-25 RX ORDER — BUMETANIDE 1 MG/1
1 TABLET ORAL
Refills: 0 | Status: DISCONTINUED | OUTPATIENT
Start: 2025-03-26 | End: 2025-03-30

## 2025-03-25 RX ORDER — METOPROLOL SUCCINATE 50 MG/1
25 TABLET, EXTENDED RELEASE ORAL DAILY
Refills: 0 | Status: DISCONTINUED | OUTPATIENT
Start: 2025-03-26 | End: 2025-03-25

## 2025-03-25 RX ORDER — MAGNESIUM, ALUMINUM HYDROXIDE 200-200 MG
30 TABLET,CHEWABLE ORAL EVERY 4 HOURS
Refills: 0 | Status: DISCONTINUED | OUTPATIENT
Start: 2025-03-25 | End: 2025-04-30

## 2025-03-25 RX ORDER — CEFTRIAXONE 500 MG/1
2000 INJECTION, POWDER, FOR SOLUTION INTRAMUSCULAR; INTRAVENOUS EVERY 24 HOURS
Refills: 0 | Status: DISCONTINUED | OUTPATIENT
Start: 2025-03-25 | End: 2025-03-25

## 2025-03-25 RX ORDER — OXYCODONE HYDROCHLORIDE 30 MG/1
15 TABLET ORAL EVERY 12 HOURS
Refills: 0 | Status: DISCONTINUED | OUTPATIENT
Start: 2025-03-25 | End: 2025-03-31

## 2025-03-25 RX ORDER — LEVOTHYROXINE SODIUM 300 MCG
1 TABLET ORAL
Qty: 0 | Refills: 0 | DISCHARGE
Start: 2025-03-25

## 2025-03-25 RX ORDER — POLYETHYLENE GLYCOL 3350 17 G/17G
17 POWDER, FOR SOLUTION ORAL DAILY
Refills: 0 | Status: DISCONTINUED | OUTPATIENT
Start: 2025-03-26 | End: 2025-03-29

## 2025-03-25 RX ORDER — OXYCODONE HYDROCHLORIDE 30 MG/1
15 TABLET ORAL EVERY 4 HOURS
Refills: 0 | Status: DISCONTINUED | OUTPATIENT
Start: 2025-03-25 | End: 2025-03-31

## 2025-03-25 RX ORDER — LACTULOSE 10 G/15ML
30 SOLUTION ORAL
Refills: 0 | DISCHARGE

## 2025-03-25 RX ORDER — BUMETANIDE 1 MG/1
1 TABLET ORAL
Qty: 0 | Refills: 0 | DISCHARGE
Start: 2025-03-25

## 2025-03-25 RX ORDER — POLYETHYLENE GLYCOL 3350 17 G/17G
17 POWDER, FOR SOLUTION ORAL
Qty: 0 | Refills: 0 | DISCHARGE
Start: 2025-03-25

## 2025-03-25 RX ORDER — HEPARIN SODIUM 1000 [USP'U]/ML
5000 INJECTION INTRAVENOUS; SUBCUTANEOUS
Qty: 0 | Refills: 0 | DISCHARGE
Start: 2025-03-25

## 2025-03-25 RX ORDER — ROSUVASTATIN CALCIUM 20 MG/1
5 TABLET, FILM COATED ORAL AT BEDTIME
Refills: 0 | Status: DISCONTINUED | OUTPATIENT
Start: 2025-03-25 | End: 2025-04-30

## 2025-03-25 RX ORDER — CEFTRIAXONE 500 MG/1
2000 INJECTION, POWDER, FOR SOLUTION INTRAMUSCULAR; INTRAVENOUS EVERY 24 HOURS
Refills: 0 | Status: DISCONTINUED | OUTPATIENT
Start: 2025-03-25 | End: 2025-04-30

## 2025-03-25 RX ORDER — TAMSULOSIN HYDROCHLORIDE 0.4 MG/1
0.8 CAPSULE ORAL AT BEDTIME
Refills: 0 | Status: DISCONTINUED | OUTPATIENT
Start: 2025-03-25 | End: 2025-04-30

## 2025-03-25 RX ORDER — WHITE PETROLATUM 1 G/G
1 OINTMENT TOPICAL THREE TIMES A DAY
Refills: 0 | Status: DISCONTINUED | OUTPATIENT
Start: 2025-03-25 | End: 2025-04-30

## 2025-03-25 RX ORDER — HEPARIN SODIUM 1000 [USP'U]/ML
5000 INJECTION INTRAVENOUS; SUBCUTANEOUS EVERY 8 HOURS
Refills: 0 | Status: DISCONTINUED | OUTPATIENT
Start: 2025-03-25 | End: 2025-04-05

## 2025-03-25 RX ADMIN — OXYCODONE HYDROCHLORIDE 15 MILLIGRAM(S): 30 TABLET ORAL at 06:14

## 2025-03-25 RX ADMIN — HEPARIN SODIUM 5000 UNIT(S): 1000 INJECTION INTRAVENOUS; SUBCUTANEOUS at 06:10

## 2025-03-25 RX ADMIN — DAPTOMYCIN 126 MILLIGRAM(S): 500 INJECTION, POWDER, LYOPHILIZED, FOR SOLUTION INTRAVENOUS at 09:08

## 2025-03-25 RX ADMIN — HEPARIN SODIUM 5000 UNIT(S): 1000 INJECTION INTRAVENOUS; SUBCUTANEOUS at 21:22

## 2025-03-25 RX ADMIN — CEFTRIAXONE 100 MILLIGRAM(S): 500 INJECTION, POWDER, FOR SOLUTION INTRAMUSCULAR; INTRAVENOUS at 21:21

## 2025-03-25 RX ADMIN — HEPARIN SODIUM 5000 UNIT(S): 1000 INJECTION INTRAVENOUS; SUBCUTANEOUS at 16:20

## 2025-03-25 RX ADMIN — METOPROLOL SUCCINATE 25 MILLIGRAM(S): 50 TABLET, EXTENDED RELEASE ORAL at 09:11

## 2025-03-25 RX ADMIN — OXYCODONE HYDROCHLORIDE 15 MILLIGRAM(S): 30 TABLET ORAL at 16:20

## 2025-03-25 RX ADMIN — OXYCODONE HYDROCHLORIDE 15 MILLIGRAM(S): 30 TABLET ORAL at 21:22

## 2025-03-25 RX ADMIN — Medication 150 MICROGRAM(S): at 06:10

## 2025-03-25 RX ADMIN — OXYCODONE HYDROCHLORIDE 15 MILLIGRAM(S): 30 TABLET ORAL at 23:34

## 2025-03-25 RX ADMIN — OXYCODONE HYDROCHLORIDE 15 MILLIGRAM(S): 30 TABLET ORAL at 06:44

## 2025-03-25 RX ADMIN — BUMETANIDE 1 MILLIGRAM(S): 1 TABLET ORAL at 16:19

## 2025-03-25 RX ADMIN — Medication 2 TABLET(S): at 21:22

## 2025-03-25 RX ADMIN — TAMSULOSIN HYDROCHLORIDE 0.8 MILLIGRAM(S): 0.4 CAPSULE ORAL at 21:22

## 2025-03-25 RX ADMIN — ROSUVASTATIN CALCIUM 5 MILLIGRAM(S): 20 TABLET, FILM COATED ORAL at 21:22

## 2025-03-25 RX ADMIN — OXYCODONE HYDROCHLORIDE 15 MILLIGRAM(S): 30 TABLET ORAL at 09:11

## 2025-03-25 RX ADMIN — POLYETHYLENE GLYCOL 3350 17 GRAM(S): 17 POWDER, FOR SOLUTION ORAL at 09:11

## 2025-03-25 RX ADMIN — BUMETANIDE 1 MILLIGRAM(S): 1 TABLET ORAL at 06:10

## 2025-03-25 NOTE — PROGRESS NOTE ADULT - PROVIDER SPECIALTY LIST ADULT
Hospitalist
Hospitalist
Infectious Disease
Infectious Disease
Nephrology
Neurosurgery
Critical Care
Critical Care
Hospitalist
Infectious Disease
Infectious Disease
Intervent Cardiology
Intervent Cardiology
Nephrology
Neurosurgery
SICU
SICU
Cardiology
Hospitalist
Infectious Disease
Nephrology
Neurosurgery
Hospitalist
Neurosurgery
Neurosurgery
Hospitalist

## 2025-03-25 NOTE — H&P ADULT - ASSESSMENT
Assessment/Plan:  CLAUDIA PIMENTEL is a 74y with a history of *** .   Now admitted to [facility] after for initiation of a multidisciplinary rehab program consisting focused on functional mobility, transfers and ADLs.    #  Deficits:   Comprehensive Multidisciplinary Rehab Program:  - Start comprehensive rehab program, 3 hours a day, 5 days a week.  - PT 1hr/day: Focused on improving strength, endurance, coordination, balance, functional mobility, and transfers  - OT 1hr/day: Focused on improving strength, fine motor skills, coordination, posture and ADLs.    - Speech Therapy 1hr/day: to diagnose and treat deficits in swallowing, cognition and communication.   - Activity Limitations: Decreased social, vocational and leisure activities, decreased self care and ADLs, decreased mobility, decreased ability to manage household and finances.     -----------------------------------------------------------------------------------  Concurrent Medical Problems    #Mood/Cognition:  Neuropsychology consult PRN    #Sleep:   Maintain quiet hours and low stim environment.  Melatonin PRN to maximize participation in therapy during the day.     #Pain Management:  Analgesic: Tylenol PRN  Narcotics:  Neuropathic agent:  Antispasmodic:   Avoid sedating medications that may interfere with cognitive recovery    #GI/Bowel:  Senna QHS, Miralax PRN Daily  GI ppx: Pantoprazole     #/Bladder:   - PVR q 8 hours (SC if > 400)    #Skin/Pressure Injury:   - Skin assessment on admission: ***  - Monitor Incisions: ***  - Turn every 2 hours while in bed, air mattress  - Soft heel protectors  - Skin barrier cream as needed  - Nursing to monitor skin Qshift    #Diet/Dysphagia:  Dysphagia: SLP consult for swallow function evaluation and treatment  Current Diet:    [X] Aspiration Precautions  Nutrition consult     #Precautions / PROPHYLAXIS:   - Falls, Cardiac, Sternal, Hip, Spinal, Seizure   - ortho: Weight bearing status: WBAT   - Lungs: Aspiration, Incentive Spirometer   - DVT ppx:   - Pressure injury/Skin: Turn Q2hrs while in bed, OOB to Chair, PT/OT      ---------------  Code Status: FULL  Emergency Contact:    Outpatient Follow-up (Specialty/Name of physician):    --------------  Goals: Safe discharge to Home*****  Estimated Length of Stay: 10-14 days  Rehab Potential: Good  Medical Prognosis: Good  Estimated Disposition: Home with Home Care  ---------------    PRESCREEN COMPARISON:  I have reviewed the prescreen information and I have found no relevant changes between the preadmission screening and my post admission evaluation.    RATIONALE FOR INPATIENT ADMISSION: Patient demonstrates the following:  [X] Medically appropriate for rehabilitation admission  [X] Has attainable rehab goals with an appropriate initial discharge plan  [X]Has rehabilitation potential (expected to make a significant improvement within a reasonable period of time)  [X] Requires close medical management by a rehab physician, rehab nursing care, Hospitalist and comprehensive interdisciplinary team (including PT, OT and/or SLP, Prosthetics and Orthotics)       Assessment/Plan:  CLAUDIA PIMENTEL is a 74M with PMHx of benign prostatic hyperplasia, diverticulosis, Graves' disease, hemorrhoids, hyperlipidemia, hypothyroidism, osteoarthritis, spinal stenosis, spinal cord injury (2004), and multiple spinal surgeries (three cervical and one lumbar),  presented to  on 3/4/25 from [facility] for lower extremity swelling and abnormal BUN/creatinine levels for further evaluation and management.  Found to have L soleal vein and L Gastrocnemius DVT and patient was started on heparin gtt , and GOVIND.  S/p IVC filter placement on 3/17 for planned surgery. S/p thoracic lami and fusion on 3/18 for worsening discitis/OM with Dr. Cantu. Now admitted to [facility] for functional deficits and initiation of a multidisciplinary rehab program consisting focused on functional mobility, transfers and ADLs.    #  Deficits:   Comprehensive Multidisciplinary Rehab Program:  - Start comprehensive rehab program, 3 hours a day, 5 days a week.  - PT 1hr/day: Focused on improving strength, endurance, coordination, balance, functional mobility, and transfers  - OT 1hr/day: Focused on improving strength, fine motor skills, coordination, posture and ADLs.    - Speech Therapy 1hr/day: to diagnose and treat deficits in swallowing, cognition and communication.   - Activity Limitations: Decreased social, vocational and leisure activities, decreased self care and ADLs, decreased mobility, decreased ability to manage household and finances.     -----------------------------------------------------------------------------------  Concurrent Medical Problems    #Mood/Cognition:  Neuropsychology consult PRN    #Sleep:   Maintain quiet hours and low stim environment.  Melatonin PRN to maximize participation in therapy during the day.     #Pain Management:  Analgesic: Tylenol PRN  Narcotics:  Neuropathic agent:  Antispasmodic:   Avoid sedating medications that may interfere with cognitive recovery    #GI/Bowel:  Senna QHS, Miralax PRN Daily  GI ppx: Pantoprazole     #/Bladder:   - PVR q 8 hours (SC if > 400)    #Skin/Pressure Injury:   - Skin assessment on admission: ***  - Monitor Incisions: ***  - Turn every 2 hours while in bed, air mattress  - Soft heel protectors  - Skin barrier cream as needed  - Nursing to monitor skin Qshift    #Diet/Dysphagia:  Dysphagia: SLP consult for swallow function evaluation and treatment  Current Diet:    [X] Aspiration Precautions  Nutrition consult     #Precautions / PROPHYLAXIS:   - Falls, Cardiac, Sternal, Hip, Spinal, Seizure   - ortho: Weight bearing status: WBAT   - Lungs: Aspiration, Incentive Spirometer   - DVT ppx:   - Pressure injury/Skin: Turn Q2hrs while in bed, OOB to Chair, PT/OT      ---------------  Code Status: FULL  Emergency Contact:    Outpatient Follow-up (Specialty/Name of physician):    --------------  Goals: Safe discharge to Home*****  Estimated Length of Stay: 10-14 days  Rehab Potential: Good  Medical Prognosis: Good  Estimated Disposition: Home with Home Care  ---------------    PRESCREEN COMPARISON:  I have reviewed the prescreen information and I have found no relevant changes between the preadmission screening and my post admission evaluation.    RATIONALE FOR INPATIENT ADMISSION: Patient demonstrates the following:  [X] Medically appropriate for rehabilitation admission  [X] Has attainable rehab goals with an appropriate initial discharge plan  [X]Has rehabilitation potential (expected to make a significant improvement within a reasonable period of time)  [X] Requires close medical management by a rehab physician, rehab nursing care, Hospitalist and comprehensive interdisciplinary team (including PT, OT and/or SLP, Prosthetics and Orthotics)       Assessment/Plan:  CLAUDIA PIMENTEL is a 74M with PMHx of benign prostatic hyperplasia, diverticulosis, Graves' disease, hemorrhoids, hyperlipidemia, hypothyroidism, osteoarthritis, spinal stenosis, spinal cord injury (2004), and multiple spinal surgeries (three cervical and one lumbar),  presented to  on 3/4/25 from [facility] for lower extremity swelling and abnormal BUN/creatinine levels for further evaluation and management.  Found to have L soleal vein and L Gastrocnemius DVT and patient was started on heparin gtt , and GOVIND.  S/p IVC filter placement on 3/17 for planned surgery. S/p thoracic lami and fusion on 3/18 for worsening discitis/OM with Dr. Cantu. Now admitted to [facility] for functional deficits and initiation of a multidisciplinary rehab program consisting focused on functional mobility, transfers and ADLs.    #GOVIND on CKD. Resolved  #LE edema  #hypoalbuminemia  #Proteinuria  - IVF as needed with fluid balance  -Significant debilitating ansarca.   -Trial of albumin lasix started 3/8->improvement.   - IV Laxis/IV albumin  BID(3/9)  -Change to Bumex/Albumin (3/11)-> good effect  -Switched to PO bumex 1mg BID 3/24  -Trend [facility]    Comprehensive Multidisciplinary Rehab Program:  - Start comprehensive rehab program, 3 hours a day, 5 days a week.  - PT 2hr/day: Focused on improving strength, endurance, coordination, balance, functional mobility, and transfers  - OT 1hr/day: Focused on improving strength, fine motor skills, coordination, posture and ADLs.    - Activity Limitations: Decreased social, vocational and leisure activities, decreased self care and ADLs, decreased mobility, decreased ability to manage household and finances.     -----------------------------------------------------------------------------------  Concurrent Medical Problems    #L gastrocnemius and L soleal DVT(appears provoked from decrease ambulation from recent spinal abscess)  - Could not tolerate VQ scan for r/o PE 2/2 pain  - switch to Eliquis will be new med on discharge (of note patient completed eliquis load)   -Therapeutic lovenox (3/12) in the setting of possible spine intervention.   - Last dose of AC night of 3/16. Restart AC 2 weeks post op (tentative 4/2/2025) per neurosx  - S/p IVC filter placement 3/17.    #Spinal epidural abscess POA and already being treated with IV antibiotics with suspected worsening of disease  - S/p thoracic laminectomy and fusion 3/18. [place]  brace   - Surgical culture with staph epi possible contaminant but given clinical presentation on admission, IV Dapto added.   - IV Rocephin 2g HS via PICC line thru 5/2/25  - IV Daptomycin 650mg daily via PICC line thru 5/2/2025  - Pain management: Tylenol PRN, Oxycodone 15mg q4h PRN moderate pain; Oxycontin 15mg BID  - Staples to be removed on POD #14 (4/1/25)  (can be done at rehab if patient is not able to see Dr. Cantu in the office)    #Mild Rhabdomyolysis Improving  #Assocaited elevated transaminases  -Suspect from Ansarca/Edema  -Improving with adequate diuresis of edema  -Associated elevated transaminase; relatively stable. Continue to monitor (3/24- AST 38/ALT 57)    #Leukocytosis  - Likely rx to DVT + Discitis.  - No other signs of sepsis , afebrile   - Trend CBC and monitor fever curve (3/25- WBC 8.69)    #Hypothyroidism  - Synthroid 150mcg daily    #CAD  #HTN  #HLD  - Metoprolol 25mg daily   - Crestor 5mg HS    #Anemia/MARLENA  - Trend H/H (3/25- 8.7/27.3)  - Transfuse if Hgb <7 PRN     #Sleep:   Maintain quiet hours and low stim environment.  Melatonin 3mg HS PRN to maximize participation in therapy during the day.     #GI/Bowel:  Senna QHS, Miralax Daily, Lactulose 30ml TID PRN  GI ppx: Pantoprazole 40mg daily     #/Bladder  #BPH  #Urinary retention likely augmented by degree of anasarca  - S/p Ortega; TOV  ->failed. Ortega re-inserted 3/6->fell out without knowing with associated hematuria->recurrent retention 3/7->Ortega re-inserted (3/7)  - Ortega on admission  - TOV when appropriate   - Flomax 0.8mg HS  - Monitor U/O    #Skin/Pressure Injury:   - Skin assessment on admission: ***    #Diet/Dysphagia:  Dysphagia: SLP consult for swallow function evaluation and treatment  Current Diet: Regular- DASH   [X] Aspiration Precautions  Nutrition consult     #Precautions / PROPHYLAXIS:   - Falls, Spinal  - Ortho: Weight bearing status: FWB;  brace OOB  - DVT ppx: Heparin 5000U TID, TEDs  - Pressure injury/Skin: Turn Q2hrs while in bed, OOB to Chair, PT/OT      ---------------  Code Status: FULL  Emergency Contact:    Outpatient Follow-up (Specialty/Name of physician):    Yue Wiggins  [facility]  [address], [address]  [place] [address]  Phone: [phone]  Fax: [phone]  Established Patient  Follow Up Time: 1 week    [facility]  Ne[facility]  [address] [place] [address], [address]  [place] [address]  Phone: [phone]  Fax: [phone]  Established Patient  Follow Up Time: 2 weeks    Luis A Brennan  [facility] Physician [facility]  CARDIOLOGY [address]  Scheduled Appointment: 04/23/2025        --------------  Goals: Safe discharge to Home*****  Estimated Length of Stay: 10-14 days  Rehab Potential: Good  Medical Prognosis: Good  Estimated Disposition: Home with Home Care  ---------------    PRESCREEN COMPARISON:  I have reviewed the prescreen information and I have found no relevant changes between the preadmission screening and my post admission evaluation.    RATIONALE FOR INPATIENT ADMISSION: Patient demonstrates the following:  [X] Medically appropriate for rehabilitation admission  [X] Has attainable rehab goals with an appropriate initial discharge plan  [X]Has rehabilitation potential (expected to make a significant improvement within a reasonable period of time)  [X] Requires close medical management by a rehab physician, rehab nursing care, Hospitalist and comprehensive interdisciplinary team (including PT, OT)       Assessment/Plan:  CLAUDIA PIMENTEL is a 74M with PMHx of benign prostatic hyperplasia, diverticulosis, Graves' disease, hemorrhoids, hyperlipidemia, hypothyroidism, osteoarthritis, spinal stenosis, spinal cord injury (2004), and multiple spinal surgeries (three cervical and one lumbar),  presented to  on 3/4/25 from [facility] for lower extremity swelling and abnormal BUN/creatinine levels for further evaluation and management.  Found to have L soleal vein and L Gastrocnemius DVT and patient was started on heparin gtt , and GOVIND.  S/p IVC filter placement on 3/17 for planned surgery. S/p thoracic lami and fusion on 3/18 for worsening discitis/OM with Dr. Cantu. [facility] and initiation of a multidisciplinary rehab program consisting focused on functional mobility, transfers and ADLs.    #GOVIND on CKD. Resolved  #LE edema  #Hypoalbuminemia  #Proteinuria  - IVF as needed with fluid balance  -Significant debilitating ansarca.   -Trial of albumin lasix started 3/8->improvement.   - IV Laxis/IV albumin  BID(3/9)  -Change to Bumex/Albumin (3/11)-> good effect  -Switched to PO bumex 1mg BID 3/24  -Trend Albumin (3/24- 2.3)  -Trend Protein total (3/24- 5.5 ); Total protein, Random Urine (3/18- 24)  -Trend CMP (3/25- creat 0.94/ BUN 27)    Comprehensive Multidisciplinary Rehab Program:  - Start comprehensive rehab program, 3 hours a day, 5 days a week.  - PT 2hr/day: Focused on improving strength, endurance, coordination, balance, functional mobility, and transfers  - OT 1hr/day: Focused on improving strength, fine motor skills, coordination, posture and ADLs.    - Activity Limitations: Decreased social, vocational and leisure activities, decreased self care and ADLs, decreased mobility, decreased ability to manage household and finances.     -----------------------------------------------------------------------------------  Concurrent Medical Problems    #L gastrocnemius and L soleal DVT(appears provoked from decrease ambulation from recent spinal abscess)  - Could not tolerate VQ scan for r/o PE 2/2 pain  - switch to Eliquis will be new med on discharge (of note patient completed eliquis load)   -Therapeutic lovenox (3/12) in the setting of possible spine intervention.   - Last dose of AC night of 3/16. Restart AC 2 weeks post op (tentative 4/2/2025) per neurosx  - S/p IVC filter placement 3/17.    #Spinal epidural abscess POA and already being treated with IV antibiotics with suspected worsening of disease  - S/p thoracic laminectomy and fusion 3/18. [place]  brace   - Surgical culture with staph epi possible contaminant but given clinical presentation on admission, IV Dapto added.   - IV Rocephin 2g HS via PICC line thru 5/2/25  - IV Daptomycin 650mg daily via PICC line thru 5/2/2025  - Pain management: Tylenol PRN, Oxycodone 15mg q4h PRN moderate pain; Oxycontin 15mg BID  - Staples to be removed on POD #14 (4/1/25)  (can be done at rehab if patient is not able to see Dr. Cantu in the office)  - CXR for PICC confirmation on admission     #Mild Rhabdomyolysis Improving  #Assocaited elevated transaminases  -Suspect from Ansarca/Edema  -Improving with adequate diuresis of edema  -Associated elevated transaminase; relatively stable. Continue to monitor (3/24- AST 38/ALT 57)    #Leukocytosis  - Likely rx to DVT + Discitis.  - No other signs of sepsis , afebrile   - Trend CBC and monitor fever curve (3/25- WBC 8.69)    #Hypothyroidism  - Synthroid 150mcg daily    #CAD  #HTN  #HLD  - Metoprolol 25mg daily   - Crestor 5mg HS    #Anemia/MARLENA  - Trend H/H (3/25- 8.7/27.3)  - Transfuse if Hgb <7 PRN     #Sleep:   Maintain quiet hours and low stim environment.  Melatonin 3mg HS PRN to maximize participation in therapy during the day.     #GI/Bowel:  Senna QHS, Miralax Daily, Lactulose 30ml TID PRN, Maalox PRN   GI ppx: Pantoprazole 40mg daily     #/Bladder  #BPH  #Urinary retention likely augmented by degree of anasarca  - S/p Ortega; TOV  ->failed. Ortega re-inserted 3/6->fell out without knowing with associated hematuria->recurrent retention 3/7->Ortega re-inserted (3/7)  - Ortega on admission  - TOV when appropriate   - Flomax 0.8mg HS  - Monitor U/O    #Skin/Pressure Injury:   - Skin assessment on admission: ***    #Diet/Dysphagia:  Dysphagia: SLP consult for swallow function evaluation and treatment  Current Diet: Regular- DASH   [X] Aspiration Precautions  Nutrition consult     #Precautions / PROPHYLAXIS:   - Falls, Spinal  - Ortho: Weight bearing status: FWB;  brace OOB  - DVT ppx: Heparin 5000U TID, TEDs  - Pressure injury/Skin: Turn Q2hrs while in bed, OOB to Chair, PT/OT      ---------------  Code Status: FULL  Emergency Contact:    Outpatient Follow-up (Specialty/Name of physician):    Yue Wiggins  [facility]  [address], [address]  [place] [address]  Phone: [phone]  Fax: [phone]  Established Patient  Follow Up Time: 1 week    [facility]  [facility]  [address] 2  [place] [address]  Phone: [phone]  Fax: [phone]  Established Patient  Follow Up Time: 2 weeks    Luis A Brennan  [facility] Physician [facility]  CARDIOLOGY [address]  Scheduled Appointment: 04/23/2025        --------------  Goals: Safe discharge to Home*****  Estimated Length of Stay: 10-14 days  Rehab Potential: Good  Medical Prognosis: Good  Estimated Disposition: Home with Home Care  ---------------    PRESCREEN COMPARISON:  I have reviewed the prescreen information and I have found no relevant changes between the preadmission screening and my post admission evaluation.    RATIONALE FOR INPATIENT ADMISSION: Patient demonstrates the following:  [X] Medically appropriate for rehabilitation admission  [X] Has attainable rehab goals with an appropriate initial discharge plan  [X]Has rehabilitation potential (expected to make a significant improvement within a reasonable period of time)  [X] Requires close medical management by a rehab physician, rehab nursing care, Hospitalist and comprehensive interdisciplinary team (including PT, OT)       Assessment/Plan:  CLAUDIA PIMENTEL is a 74M with PMHx of benign prostatic hyperplasia, diverticulosis, Graves' disease, hemorrhoids, hyperlipidemia, hypothyroidism, osteoarthritis, spinal stenosis, spinal cord injury (2004), and multiple spinal surgeries (three cervical and one lumbar),  presented to  on 3/4/25 from [facility] for lower extremity swelling and abnormal BUN/creatinine levels for further evaluation and management.  Found to have L soleal vein and L Gastrocnemius DVT and patient was started on heparin gtt , and GOVIND.  S/p IVC filter placement on 3/17 for planned surgery. S/p thoracic lami and fusion on 3/18 for worsening discitis/OM with Dr. Cantu. Now admitted to [facility] for functional deficits and initiation of a multidisciplinary rehab program consisting focused on functional mobility, transfers and ADLs.    #GOVIND on CKD. Resolved  #LE edema  #Hypoalbuminemia  #Proteinuria  - IVF as needed with fluid balance  -Significant debilitating ansarca.   -Trial of albumin lasix started 3/8->improvement.   - IV Laxis/IV albumin  BID(3/9)  -Change to Bumex/Albumin (3/11)-> good effect  -Switched to PO bumex 1mg BID 3/24  -Trend Albumin (3/24- 2.3)  -Trend Protein total (3/24- 5.5 ); Total protein, Random Urine (3/18- 24)  -Trend CMP (3/25- creat 0.94/ BUN 27)    Comprehensive Multidisciplinary Rehab Program:  - Start comprehensive rehab program, 3 hours a day, 5 days a week.  - PT 2hr/day: Focused on improving strength, endurance, coordination, balance, functional mobility, and transfers  - OT 1hr/day: Focused on improving strength, fine motor skills, coordination, posture and ADLs.    - Activity Limitations: Decreased social, vocational and leisure activities, decreased self care and ADLs, decreased mobility, decreased ability to manage household and finances.     -----------------------------------------------------------------------------------  Concurrent Medical Problems    #L gastrocnemius and L soleal DVT(appears provoked from decrease ambulation from recent spinal abscess)  - Could not tolerate VQ scan for r/o PE 2/2 pain  - switch to Eliquis will be new med on discharge (of note patient completed eliquis load)   -Therapeutic lovenox (3/12) in the setting of possible spine intervention.   - Last dose of AC night of 3/16. Restart AC (Eliquis 5mg BID) 2 weeks post op (tentative 4/2/2025) per neurosx if hgb stable and surgical site stable  - S/p IVC filter placement 3/17.    #Spinal epidural abscess POA and already being treated with IV antibiotics with suspected worsening of disease  - S/p thoracic laminectomy and fusion 3/18. [place]  brace   - Surgical culture with staph epi possible contaminant but given clinical presentation on admission, IV Dapto added.   - IV Rocephin 2g HS via PICC line thru 5/2/25  - IV Daptomycin 650mg daily via PICC line thru 5/2/2025  - Pain management: Tylenol PRN, Oxycodone 15mg q4h PRN moderate pain; Oxycontin 15mg BID  - Staples to be removed on POD #14 (4/1/25)  (can be done at rehab if patient is not able to see Dr. Cantu in the office)  - CXR for PICC confirmation on admission     #Mild Rhabdomyolysis Improving  #Assocaited elevated transaminases  -Suspect from Ansarca/Edema  -Improving with adequate diuresis of edema  -Associated elevated transaminase; relatively stable. Continue to monitor (3/24- AST 38/ALT 57)    #Leukocytosis  - Likely rx to DVT + Discitis.  - No other signs of sepsis , afebrile   - Trend CBC and monitor fever curve (3/25- WBC 8.69)    #Hypothyroidism  - Synthroid 150mcg daily    #CAD  #HTN  #HLD  - Metoprolol 25mg daily   - Crestor 5mg HS    #Anemia/MARLENA  - Trend H/H (3/25- 8.7/27.3)  - Transfuse if Hgb <7 PRN     #Sleep:   Maintain quiet hours and low stim environment.  Melatonin 3mg HS PRN to maximize participation in therapy during the day.     #GI/Bowel:  Senna QHS, Miralax Daily, Lactulose 30ml TID PRN, Maalox PRN   GI ppx: Pantoprazole 40mg daily     #/Bladder  #BPH  #Urinary retention likely augmented by degree of anasarca  - S/p Ortega; TOV  ->failed. Ortega re-inserted 3/6->fell out without knowing with associated hematuria->recurrent retention 3/7->Ortega re-inserted (3/7)  - Ortega on admission  - TOV when appropriate   - Flomax 0.8mg HS  - Monitor U/O    #Skin/Pressure Injury:   - Skin assessment on admission: ***    #Diet/Dysphagia:  Dysphagia: SLP consult for swallow function evaluation and treatment  Current Diet: Regular- DASH   [X] Aspiration Precautions  Nutrition consult     #Precautions / PROPHYLAXIS:   - Falls, Spinal  - Ortho: Weight bearing status: FWB;  brace OOB  - DVT ppx: Heparin 5000U TID, TEDs  - Pressure injury/Skin: Turn Q2hrs while in bed, OOB to Chair, PT/OT      ---------------  Code Status: FULL  Emergency Contact:    Outpatient Follow-up (Specialty/Name of physician):    Yue Wiggins  [facility]  [address], [address]  [place] [address]  Phone: [phone]  Fax: [phone]  Established Patient  Follow Up Time: 1 week    Keith Cantu [facility]  Neurosurgery  [address], [address] 2  [place] [address]  Phone: [phone]  Fax: [phone]  Established Patient  Follow Up Time: 2 weeks    Luis A Brennan  [facility] Physician [facility]  CARDIOLOGY [address]  Scheduled Appointment: 04/23/2025        --------------  Goals: Safe discharge to Home*****  Estimated Length of Stay: 10-14 days  Rehab Potential: Good  Medical Prognosis: Good  Estimated Disposition: Home with Home Care  ---------------    PRESCREEN COMPARISON:  I have reviewed the prescreen information and I have found no relevant changes between the preadmission screening and my post admission evaluation.    RATIONALE FOR INPATIENT ADMISSION: Patient demonstrates the following:  [X] Medically appropriate for rehabilitation admission  [X] Has attainable rehab goals with an appropriate initial discharge plan  [X]Has rehabilitation potential (expected to make a significant improvement within a reasonable period of time)  [X] Requires close medical management by a rehab physician, rehab nursing care, Hospitalist and comprehensive interdisciplinary team (including PT, OT)       Assessment/Plan:  CLAUDIA PIMENTEL is a 74M with PMHx of benign prostatic hyperplasia, diverticulosis, Graves' disease, hemorrhoids, hyperlipidemia, hypothyroidism, osteoarthritis, spinal stenosis, spinal cord injury (2004), and multiple spinal surgeries (three cervical and one lumbar),  presented to  on 3/4/25 from [facility] for lower extremity swelling and abnormal BUN/creatinine levels for further evaluation and management.  Found to have L soleal vein and L Gastrocnemius DVT and patient was started on heparin gtt , and GOVIND.  S/p IVC filter placement on 3/17 for planned surgery. S/p thoracic lami and fusion on 3/18 for worsening discitis/OM with Dr. Cantu. Now admitted to [facility] for functional deficits and initiation of a multidisciplinary rehab program consisting focused on functional mobility, transfers and ADLs.    #GOVIND on CKD. Resolved  #LE edema  #Hypoalbuminemia  #Proteinuria  - IVF as needed with fluid balance  -Significant debilitating ansarca.   -Trial of albumin lasix started 3/8->improvement.   - IV Laxis/IV albumin  BID(3/9)  -Change to Bumex/Albumin (3/11)-> good effect  -Switched to PO bumex 1mg BID 3/24  -Trend Albumin (3/24- 2.3)  -Trend Protein total (3/24- 5.5 ); Total protein, Random Urine (3/18- 24)  -Trend CMP (3/25- creat 0.94/ BUN 27)    Comprehensive Multidisciplinary Rehab Program:  - Start comprehensive rehab program, 3 hours a day, 5 days a week.  - PT 2hr/day: Focused on improving strength, endurance, coordination, balance, functional mobility, and transfers  - OT 1hr/day: Focused on improving strength, fine motor skills, coordination, posture and ADLs.    - Activity Limitations: Decreased social, vocational and leisure activities, decreased self care and ADLs, decreased mobility, decreased ability to manage household and finances.     -----------------------------------------------------------------------------------  Concurrent Medical Problems    #L gastrocnemius and L soleal DVT(appears provoked from decrease ambulation from recent spinal abscess)  - Could not tolerate VQ scan for r/o PE 2/2 pain  - switch to Eliquis will be new med on discharge (of note patient completed eliquis load)   -Therapeutic lovenox (3/12) in the setting of possible spine intervention.   - Last dose of AC night of 3/16. Restart AC (Eliquis 5mg BID) 2 weeks post op (tentative 4/2/2025) per neurosx if hgb stable and surgical site stable  - S/p IVC filter placement 3/17.    #Spinal epidural abscess POA and already being treated with IV antibiotics with suspected worsening of disease  - S/p thoracic laminectomy and fusion 3/18. [place]  brace   - Surgical culture with staph epi possible contaminant but given clinical presentation on admission, IV Dapto added.   - IV Rocephin 2g HS via PICC line thru 5/2/25  - IV Daptomycin 650mg daily via PICC line thru 5/2/2025  - Pain management: Tylenol PRN, Oxycodone 15mg q4h PRN moderate pain; Oxycontin 15mg BID  - Staples to be removed on POD #14 (4/1/25)  (can be done at rehab if patient is not able to see Dr. Cantu in the office)  - CXR for PICC confirmation on admission     #Mild Rhabdomyolysis Improving  #Assocaited elevated transaminases  -Suspect from Ansarca/Edema  -Improving with adequate diuresis of edema  -Associated elevated transaminase; relatively stable. Continue to monitor (3/24- AST 38/ALT 57)    #Leukocytosis  - Likely rx to DVT + Discitis.  - No other signs of sepsis , afebrile   - Trend CBC and monitor fever curve (3/25- WBC 8.69)    #Hypothyroidism  - Synthroid 150mcg daily    #CAD  #HTN  #HLD  - Metoprolol 25mg daily   - Crestor 5mg HS    #Anemia/MARLENA  - Trend H/H (3/25- 8.7/27.3)  - Transfuse if Hgb <7 PRN     #Sleep:   Maintain quiet hours and low stim environment.  Melatonin 3mg HS PRN to maximize participation in therapy during the day.     #GI/Bowel:  Senna QHS, Miralax Daily, Lactulose 30ml TID PRN, Maalox PRN   GI ppx: Pantoprazole 40mg daily     #/Bladder  #BPH  #Urinary retention likely augmented by degree of anasarca  - S/p Ortega; TOV  ->failed. Ortega re-inserted 3/6->fell out without knowing with associated hematuria->recurrent retention 3/7->Ortega re-inserted (3/7)  - Ortega on admission  - TOV when appropriate   - Flomax 0.8mg HS  - Monitor U/O    #Skin/Pressure Injury:   - Skin assessment on admission: Generalized dry flakey skin; 19cm posterior cervical incision with 32 staples and 2 steri strips; L buttock pressure injury stage 3 6x2cm with surrounding hyperpigmentation/scabbing 6x2.5cm; Lumbar incision scar; R heel blanchable redness; b/l dry cracked heels  - Lac hydrin BID BLE  - Aquaphor BID to generalized dry skin   - Appreciate wound care consult     #Diet/Dysphagia:  Dysphagia: SLP consult for swallow function evaluation and treatment  Current Diet: Regular- DASH   [X] Aspiration Precautions  Nutrition consult     #Precautions / PROPHYLAXIS:   - Falls, Spinal  - Ortho: Weight bearing status: FWB;  brace OOB  - DVT ppx: Heparin 5000U TID, TEDs  - Pressure injury/Skin: Turn Q2hrs while in bed, OOB to Chair, PT/OT      ---------------  Code Status: FULL  Emergency Contact:    Outpatient Follow-up (Specialty/Name of physician):    Yue Wiggins  [facility]  [address], [address]  [place] [address]  Phone: [phone]  Fax: [phone]  Established Patient  Follow Up Time: 1 week    Keith Cantu [facility]  Neurosurgery  [address], [address] 2  [place] [address]  Phone: [phone]  Fax: [phone]  Established Patient  Follow Up Time: 2 weeks    Luis A Brennan  [facility] Physician [facility]  CARDIOLOGY [address] [facility] [address] [facility]  Scheduled Appointment: 04/23/2025        --------------  Goals: Safe discharge to Home*****  Estimated Length of Stay: 10-14 days  Rehab Potential: Good  Medical Prognosis: Good  Estimated Disposition: Home with Home Care  ---------------    PRESCREEN COMPARISON:  I have reviewed the prescreen information and I have found no relevant changes between the preadmission screening and my post admission evaluation.    RATIONALE FOR INPATIENT ADMISSION: Patient demonstrates the following:  [X] Medically appropriate for rehabilitation admission  [X] Has attainable rehab goals with an appropriate initial discharge plan  [X]Has rehabilitation potential (expected to make a significant improvement within a reasonable period of time)  [X] Requires close medical management by a rehab physician, rehab nursing care, Hospitalist and comprehensive interdisciplinary team (including PT, OT)       Assessment/Plan:  Mr. Rishabh Shaver is a 74-year-old male patient with past medical history of benign prostatic hyperplasia, diverticulosis, Graves' disease, hemorrhoids, hyperlipidemia, hypothyroidism, osteoarthritis, spinal stenosis, spinal cord injury (2004), and multiple spinal surgeries (three cervical and one lumbar), who is admitted for Acute Inpatient Rehabilitation with a multidisciplinary rehab program at [facility] with functional impairments in ADLs and mobility secondary to a thoracic epidural abscess s/p posterior thoracic laminectomy and fusion on 3/18/25 with Dr. Keith Cantu.       #Thoracic epidural abscess s/p laminectomy and fusion surgery      *  brace when OOB      * Staples to be removed on POD #14 (4/1/25 - can be done at rehab if patient is not able to see Dr. Cantu in the office)      * Sacral wound assessment by wound/skin team,      * Maintain Ortega for now as pt has had urinary retention with previous trial of void - On Flomax 8mg daily      * Right Venodyne only due to left calf DVT (s/p pre-op IVC filter)   - Activity Limitations: Decreased social, vocational and leisure activities, decreased self care and ADLs, decreased mobility, decreased ability to manage household and finances.   - Comprehensive Multidisciplinary Rehab Program:      * 3 hours a day, 5 days a week.      * PT 1hr/day: Focused on improving strength, endurance, coordination, balance, functional mobility, and transfers      * OT 1hr/day: Focused on improving strength, fine motor skills, coordination, posture and ADLs.      -----------------------------------------------------------------------------------    Concurrent Medical Problems    #GOVIND on CKD  - Resolved  - LE edema  - Hypoalbuminemia  - Proteinuria  - IVF as needed with fluid balance  - Significant debilitating ansarca.   - Trial of albumin lasix started 3/8->improvement.   - IV Laxis/IV albumin  BID(3/9)  - Change to Bumex/Albumin (3/11)-> good effect  - Switched to PO bumex 1mg BID 3/24  - Trend Albumin (3/24- 2.3)  - Trend Protein total (3/24- 5.5 ); Total protein, Random Urine (3/18- 24)  - Trend CMP (3/25- creat 0.94/ BUN 27)    #L gastrocnemius and L soleal DVT  - appears provoked from decrease ambulation from recent spinal abscess  - Could not tolerate VQ scan for r/o PE 2/2 pain  - Switch to Eliquis will be new med on discharge (of note patient completed Eliquis load)   - Therapeutic Lovenox (3/12) in the setting of possible spine intervention.   - Last dose of AC night of 3/16. Restart AC (Eliquis 5mg BID) 2 weeks post op (tentative 4/2/2025) per neurosx if hgb stable and surgical site stable  - S/P IVC filter placement 3/17.    #Spinal epidural abscess POA and already being treated with IV antibiotics with suspected worsening of disease  - S/p thoracic laminectomy and fusion 3/18. [place]  brace   - Surgical culture with staph epi possible contaminant but given clinical presentation on admission, IV Dapto added.   - IV Rocephin 2g HS via PICC line thru 5/2/25  - IV Daptomycin 650mg daily via PICC line thru 5/2/2025  - Pain management: Tylenol PRN, Oxycodone 15mg q4h PRN moderate pain; Oxycontin 15mg BID  - Staples to be removed on POD #14 (4/1/25)  (can be done at rehab if patient is not able to see Dr. Cantu in the office)  - CXR for PICC confirmation on admission     #Mild Rhabdomyolysis Improving  - Associated elevated transaminases  - Suspect from Ansarca/Edema  - Improving with adequate diuresis of edema  - Associated elevated transaminase; relatively stable. Continue to monitor (3/24- AST 38/ALT 57)    #Leukocytosis  - Likely rx to DVT + Discitis.  - No other signs of sepsis , afebrile   - Trend CBC and monitor fever curve (3/25- WBC 8.69)    #Hypothyroidism  - Synthroid 150mcg daily    #CAD  #HTN  #HLD  - Metoprolol 25mg daily   - Crestor 5mg HS    #Anemia/MARLENA  - Trend H/H (3/25- 8.7/27.3)  - Transfuse if Hgb <7 PRN     #Sleep:   - Maintain quiet hours and low stim environment.  - Melatonin 3mg HS PRN to maximize participation in therapy during the day.     #GI/Bowel:  - Senna QHS, Miralax Daily, Lactulose 30ml TID PRN, Maalox PRN   - GI ppx: Pantoprazole 40mg daily     #/Bladder  #BPH  #Urinary retention likely augmented by degree of anasarca  - S/P Ortega; TOV  ->failed. Ortega re-inserted 3/6->fell out without knowing with associated hematuria->recurrent retention 3/7->Ortega re-inserted (3/7)  - Ortega on admission  - TOV when appropriate   - Flomax 0.8mg HS  - Monitor U/O    #Skin/Pressure Injury:   - Skin assessment on admission: Generalized dry flakey skin; 19cm posterior cervical incision with 32 staples and 2 steri strips; L buttock pressure injury stage 3 6x2cm with surrounding hyperpigmentation/scabbing 6x2.5cm; Lumbar incision scar; R heel blanchable redness; b/l dry cracked heels  - Lac hydrin BID BLE  - Aquaphor BID to generalized dry skin   - Appreciate wound care consult     #Diet/Dysphagia:  - Dysphagia: SLP consult for swallow function evaluation and treatment  - Current Diet: Regular- DASH  - Aspiration Precautions  - Nutrition consult     #Patient Education  - Education provided on the following:      * Admitting diagnosis and functional implications      * Functional goals      * Bladder management      * Bowel management      * Skin care management      * Intensity of service and scheduling of rehab disciplines      * Plan of care and role of interdisciplinary team conference in discharge planning      * Reconciliation of medications from prior institution    #Precautions / PROPHYLAXIS:   - Falls, Spinal  - Ortho: Weight bearing status: FWB;  brace OOB  - DVT ppx: Heparin 5000U TID, TEDs  - Pressure injury/Skin: Turn Q2hrs while in bed, OOB to Chair, PT/OT      ---------------  Code Status: FULL  Emergency Contact:    Outpatient Follow-up (Specialty/Name of physician):    Yue Wiggins  [facility]  [address], [address]  [place] [address]  Phone: [phone]  Fax: [phone]  Established Patient  Follow Up Time: 1 week    Keith Cantu [facility]  Neurosurgery  [address] 2  [place] [address]  Phone: [phone]  Fax: [phone]  Established Patient  Follow Up Time: 2 weeks    Luis A Brennan  [facility]  CARDIOLOGY [address] [facility] [facility]  Scheduled Appointment: 04/23/2025        --------------  Goals: Safe discharge to Home*****  Estimated Length of Stay: 10-14 days  Rehab Potential: Good  Medical Prognosis: Good  Estimated Disposition: Home with Home Care  ---------------    PRESCREEN COMPARISON:  I have reviewed the prescreen information and I have found no relevant changes between the preadmission screening and my post admission evaluation.    RATIONALE FOR INPATIENT ADMISSION: Patient demonstrates the following:  [X] Medically appropriate for rehabilitation admission  [X] Has attainable rehab goals with an appropriate initial discharge plan  [X]Has rehabilitation potential (expected to make a significant improvement within a reasonable period of time)  [X] Requires close medical management by a rehab physician, rehab nursing care, Hospitalist and comprehensive interdisciplinary team (including PT, OT)       Assessment/Plan:  Mr. Rishabh Shaver is a 74-year-old male patient with past medical history of benign prostatic hyperplasia, diverticulosis, Graves' disease, hemorrhoids, hyperlipidemia, hypothyroidism, osteoarthritis, spinal stenosis, spinal cord injury (2004), and multiple spinal surgeries (three cervical and one lumbar), who is admitted for Acute Inpatient Rehabilitation with a multidisciplinary rehab program at [facility] with functional impairments in ADLs and mobility secondary to a thoracic epidural abscess s/p posterior thoracic laminectomy and fusion on 3/18/25 with Dr. Keith Cantu.       #Thoracic epidural abscess s/p laminectomy and fusion surgery      *  brace when OOB      * Staples to be removed on POD #14 (4/1/25 - can be done at rehab if patient is not able to see Dr. Cantu in the office)      * Sacral wound assessment by wound/skin team,      * Maintain Ortega for now as pt has had urinary retention with previous trial of void - On Flomax 8mg daily      * Right Venodyne only due to left calf DVT (s/p pre-op IVC filter)   - Activity Limitations: Decreased social, vocational and leisure activities, decreased self care and ADLs, decreased mobility, decreased ability to manage household and finances.   - Comprehensive Multidisciplinary Rehab Program:      * 3 hours a day, 5 days a week.      * PT 1hr/day: Focused on improving strength, endurance, coordination, balance, functional mobility, and transfers      * OT 1hr/day: Focused on improving strength, fine motor skills, coordination, posture and ADLs.      -----------------------------------------------------------------------------------    Concurrent Medical Problems    #GOVIND on CKD  - Resolved  - LE edema  - Hypoalbuminemia  - Proteinuria  - IVF as needed with fluid balance  - Significant debilitating ansarca.   - Trial of albumin lasix started 3/8->improvement.   - IV Laxis/IV albumin  BID(3/9)  - Change to Bumex/Albumin (3/11)-> good effect  - Switched to PO bumex 1mg BID 3/24  - Trend Albumin (3/24- 2.3)  - Trend Protein total (3/24- 5.5 ); Total protein, Random Urine (3/18- 24)  - Trend CMP (3/25- creat 0.94/ BUN 27)    #L gastrocnemius and L soleal DVT  - appears provoked from decrease ambulation from recent spinal abscess  - Could not tolerate VQ scan for r/o PE 2/2 pain  - Switch to Eliquis will be new med on discharge (of note patient completed Eliquis load)   - Therapeutic Lovenox (3/12) in the setting of possible spine intervention.   - Last dose of AC night of 3/16. Restart AC (Eliquis 5mg BID) 2 weeks post op (tentative 4/2/2025) per neurosx if hgb stable and surgical site stable  - S/P IVC filter placement 3/17.    #Spinal epidural abscess POA and already being treated with IV antibiotics with suspected worsening of disease  - S/p thoracic laminectomy and fusion 3/18. [place]  brace   - Surgical culture with staph epi possible contaminant but given clinical presentation on admission, IV Dapto added.   - IV Rocephin 2g HS via PICC line thru 5/2/25  - IV Daptomycin 650mg daily via PICC line thru 5/2/2025  - Pain management: Tylenol PRN, Oxycodone 15mg q4h PRN moderate pain; Oxycontin 15mg BID  - Staples to be removed on POD #14 (4/1/25)  (can be done at rehab if patient is not able to see Dr. Cantu in the office)  - CXR for PICC confirmation on admission     #Mild Rhabdomyolysis Improving  - Associated elevated transaminases  - Suspect from Ansarca/Edema  - Improving with adequate diuresis of edema  - Associated elevated transaminase; relatively stable. Continue to monitor (3/24- AST 38/ALT 57)    #Leukocytosis  - Likely rx to DVT + Discitis.  - No other signs of sepsis , afebrile   - Trend CBC and monitor fever curve (3/25- WBC 8.69)    #Hypothyroidism  - Synthroid 150mcg daily    #CAD  #HTN  #HLD  - Metoprolol 25mg daily   - Crestor 5mg HS    #Anemia/MARLENA  - Trend H/H (3/25- 8.7/27.3)  - Transfuse if Hgb <7 PRN     #Sleep:   - Maintain quiet hours and low stim environment.  - Melatonin 3mg HS PRN to maximize participation in therapy during the day.     #GI/Bowel:  - Senna QHS, Miralax Daily, Lactulose 30ml TID PRN, Maalox PRN   - GI ppx: Pantoprazole 40mg daily     #/Bladder  #BPH  #Urinary retention likely augmented by degree of anasarca  - S/P Ortega; TOV  ->failed. Ortega re-inserted 3/6->fell out without knowing with associated hematuria->recurrent retention 3/7->Ortega re-inserted (3/7)  - Ortega on admission  - TOV when appropriate   - Flomax 0.8mg HS  - Monitor U/O    #Skin/Pressure Injury:   Skin assessment on admission:   - Generalized dry flakey skin  - 19cm posterior cervical incision with 32 staples and 2 steri strips  - Left buttock DTI pressure injury measuring 14 x 9 cm      * appearing as a dark purple discoloration of intact skin, with a central superficial open area measuring 6 x 2 cm.      * at the periphery of the 14 x 9 cm area, the discoloration is somewhat lighter;       * dark red non-blanchable erythema.  - Bilateral lower extremity edema.  - Healed lumbar surgical incision scar  - Right heel blanchable redness  - Bilateral dry cracked heels and plantar surface  - Lac hydrin BID BLE  - Aquaphor BID to generalized dry skin   - Appreciate wound care consult     #Diet/Dysphagia:  - Dysphagia: SLP consult for swallow function evaluation and treatment  - Current Diet: Regular- DASH  - Aspiration Precautions  - Nutrition consult     #Patient Education  - Education provided on the following:      * Admitting diagnosis and functional implications      * Functional goals      * Bladder management      * Bowel management      * Skin care management      * Intensity of service and scheduling of rehab disciplines      * Plan of care and role of interdisciplinary team conference in discharge planning      * Reconciliation of medications from prior institution    #Precautions / PROPHYLAXIS:   - Falls, Spinal  - Ortho: Weight bearing status: FWB;  brace OOB  - DVT ppx: Heparin 5000U TID, TEDs  - Pressure injury/Skin: Turn Q2hrs while in bed, OOB to Chair, PT/OT      ---------------  Code Status: FULL  Emergency Contact:    Outpatient Follow-up (Specialty/Name of physician):    Phillips, Yue Paige  Family Medicine  [address], [address]  [place] [address]  Phone: [phone]  Fax: [phone]  Established Patient  Follow Up Time: 1 week    Keith Cantu [facility]  Neurosurgery  [address]  [place] [address]  Phone: [phone]  Fax: [phone]  Established Patient  Follow Up Time: 2 weeks    Luis A Brennan  [facility]  CARDIOLOGY [address] [facility]  Scheduled Appointment: 04/23/2025        --------------  Goals: Safe discharge to Home*****  Estimated Length of Stay: 10-14 days  Rehab Potential: Good  Medical Prognosis: Good  Estimated Disposition: Home with Home Care  ---------------    PRESCREEN COMPARISON:  I have reviewed the prescreen information and I have found no relevant changes between the preadmission screening and my post admission evaluation.    RATIONALE FOR INPATIENT ADMISSION: Patient demonstrates the following:  [X] Medically appropriate for rehabilitation admission  [X] Has attainable rehab goals with an appropriate initial discharge plan  [X]Has rehabilitation potential (expected to make a significant improvement within a reasonable period of time)  [X] Requires close medical management by a rehab physician, rehab nursing care, Hospitalist and comprehensive interdisciplinary team (including PT, OT)

## 2025-03-25 NOTE — H&P ADULT - NSHPLABSRESULTS_GEN_ALL_CORE
Labs              8.7    8.69  )-----------( 214      ( 25 Mar 2025 10:29 )             27.3     03-25    137  |  100  |  27[H]  ----------------------------<  102[H]  4.3   |  35[H]  |  0.94    Ca    8.9      25 Mar 2025 10:29  Phos  3.8     03-24  Mg     1.9     03-25    TPro  5.5[L]  /  Alb  2.3[L]  /  TBili  0.6  /  DBili  x   /  AST  38[H]  /  ALT  57  /  AlkPhos  62  03-24    LIVER FUNCTIONS - ( 24 Mar 2025 08:03 )  Alb: 2.3 g/dL / Pro: 5.5 gm/dL / ALK PHOS: 62 U/L / ALT: 57 U/L / AST: 38 U/L / GGT: x           Urinalysis Basic - ( 25 Mar 2025 10:29 )    Color: x / Appearance: x / SG: x / pH: x  Gluc: 102 mg/dL / Ketone: x  / Bili: x / Urobili: x   Blood: x / Protein: x / Nitrite: x   Leuk Esterase: x / RBC: x / WBC x   Sq Epi: x / Non Sq Epi: x / Bacteria: x    < from: CT Angio Chest PE Protocol w/ IV Cont (03.16.25 @ 16:47) >    IMPRESSION:  No pulmonary embolism.    < from: Xray Cervical Spine AP, Lat, Dens Max 3 View (03.21.25 @ 14:23) >    IMPRESSION:  Postoperative changes.    < from: US Duplex Venous Lower Ext Complete, Bilateral (03.14.25 @ 09:55) >    IMPRESSION:  Acute deep venous thrombosis: below the knee.    Left calf vein deep venous thrombosis improved compared to prior   examination 3/6/2025    < end of copied text >< from: US Abdomen Upper Quadrant Right (03.11.25 @ 10:17) >    IMPRESSION:  Small liver cysts. Otherwise the liver appears unremarkable.  Probable gallbladder sludge. No gallstones are seen. No gallbladder wall   thickening.    < from: MR Thoracic Spine w/wo IV Cont (03.08.25 @ 12:24) >    IMPRESSION: Worsening discitis/osteomyelitis is seen with epidural   phlegmon again seen unchanged though increased bilaterally and ventral,   this is consistent with paraspinal phlegmon though there is a possible   early abscess seenon the left side as described above.    < from: MR Cervical Spine w/wo IV Cont (03.08.25 @ 12:24) >    IMPRESSION:    1.  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  2.  Chronic cord myelomalacia at C3-4, status post decompression.  3.  Stable severe left C7-T1 foraminal stenosis, with moderate narrowing   of the central canal.  4.  Stable moderate to severe bilateral C6-7 foraminal stenoses.    < from: MR Lumbar Spine w/wo IV Cont (03.07.25 @ 15:11) >    IMPRESSION:    No abnormal enhancement.    No evidence for epidural abscess or discitis/osteomyelitis.    Multilevel degenerative changes detailed in the body the report.    < from: CT Abdomen and Pelvis No Cont (03.04.25 @ 15:20) >    IMPRESSION:  No hydronephrosis or radiodense calculus identified bilaterally.  Enlarged prostate, correlate with PSA level.

## 2025-03-25 NOTE — DISCHARGE NOTE PROVIDER - CARE PROVIDER_API CALL
Yue Wiggins  [facility]  [address], [address]  [place] [address]  Phone: [phone]  Fax: [phone]  Established Patient  Follow Up Time: 1 week    Keith Cantu Chi  Neurosurgery  [address] [facility]  [place] [address]  Phone: [phone]  Fax: [phone]  Established Patient  Follow Up Time: 2 weeks

## 2025-03-25 NOTE — DISCHARGE NOTE PROVIDER - NSDCFUSCHEDAPPT_GEN_ALL_CORE_FT
Luis A Brennan  [facility]  CARDIOLOGY [address]  Scheduled Appointment: 04/23/2025

## 2025-03-25 NOTE — DISCHARGE NOTE PROVIDER - NSDCACTIVITY_GEN_ALL_CORE
Ambulation/Activity as tolerated with assistance/assistive device(s)/Do not drive or operate machinery/No heavy lifting/straining/Activity as tolerated

## 2025-03-25 NOTE — DISCHARGE NOTE PROVIDER - CARE PROVIDERS DIRECT ADDRESSES
[email or web address]

## 2025-03-25 NOTE — PATIENT PROFILE ADULT - NSPROPOAURINARYCATHETER_GEN_A_NUR
Physical Therapy  Cash Based Dry Needling Session    Dry Needling Informed Consent Signed: Yes  Patient has been made aware of the cash based cost associated with each of the above procedures: Yes    SUBJECTIVE   Patient states that she is having pain in her right quadriceps after she strained it at work. She was holding a box that was slipping through her hands through the weight so she tried to reposition the box and during this she felt a very sharp pain extend from the lateral right quadriceps into the lateral knee.    OBJECTIVE    tenderness to palpation of right quadriceps    Treatment   Education about indications, contraindications and potential side effects completed with patient.  Screen Completed   Precautions Contraindications   Local skin lesions  Lyme disease  Local lymphedema  Severe hyperalgesia/allodynia  Metal allergies: nickel and chromium  Abnormal bleeding tendency  Immunodeficiency and/or compromised immune system  Second or third trimester of pregnancy  Vascular Disease  History of spontaneous pneumothorax Local or systemic infections; including the flu  Over implants  Active cancer  Area of lymphatic compromise  Area of lumpectomy/mastectomy  First trimester of pregnancy     Patient has consented to proceed with the intervention.    Dry Needling:   Needles inserted 1   Needles removed 1   Locations and Needle Size 60 mm right quadriceps   Treatment duration 15   Patient response Soreness     Time Out performed at start of session, with patient verifying procedure and consent prior to performing the procedure.    Sign Out performed at end of session, with patient verifying procedure performed and addressing specimens if applicable upon completion of the procedure.    ASSESSMENT AND FUTURE RECOMMENDATIONS    Response to treatment: soreness  Re-assessment of dysfunctions following intervention demonstrates: soreness    Based on the above recommendation is to: Continue Cash Based Service    CASH  BASED THERAPY DAILY BILLING   Untimed Procedures:  Dry Needling - Unlisted Physical Medicine/Rehabilitation Service Or Procedure  Total time spent with patient: 15 minutes     yes

## 2025-03-25 NOTE — PATIENT PROFILE ADULT - PACKS PER DAY
H&P reviewed. No interval changes noted. Medical optimization for surgery documented in hospitalist progress note from 12/7.   
Johnathan Washington  INTERNAL MEDICINE  [facility] [place] [address]  Phone: [phone]  Fax: [phone]  Follow Up Time: 1-3 days    Brian Velazquez  NEUROLOGY  [address] [place] [address]  Phone: [phone]  Fax: [phone]  Follow Up Time: 1 week
1

## 2025-03-25 NOTE — DISCHARGE NOTE PROVIDER - NSDCMRMEDTOKEN_GEN_ALL_CORE_FT
acetaminophen 325 mg oral tablet: 2 tab(s) orally every 6 hours As needed Temp greater or equal to 38C (100.4F), Mild Pain (1 - 3)  Aspirin Low Dose 81 mg oral delayed release tablet: 1 tab(s) orally once a day  bumetanide 1 mg oral tablet: 1 tab(s) orally 2 times a day  cefTRIAXone 2 g injection: 2 gram(s) intravenously once a day (at bedtime) Last dose 5/2/2025  Colace 100 mg oral capsule: 2 cap(s) orally once a day (at bedtime)  Crestor 5 mg oral tablet: 1 tab(s) orally once a day (at bedtime)  DAPTOmycin 500 mg intravenous injection: 650 milligram(s) intravenous every 24 hours last dose 5/2/2025  Dulcogen 10 mg rectal suppository: 1 suppository(ies) rectally once a day as needed for if no bowel movement in 48 hours  Gemtesa 75 mg oral tablet: 1 tab(s) orally once a day (at bedtime)  heparin: 5,000 unit(s) subcutaneously every 8 hours Transition to eliquis 5mg bid on 4/2/2025 if Hemoglobin stable and surgical site stable  lactulose 10 g/15 mL oral syrup: 30 milliliter(s) orally 3 times a day As needed for constipation  levothyroxine 150 mcg (0.15 mg) oral tablet: 1 tab(s) orally once a day  melatonin 5 mg oral tablet: 2 tab(s) orally once a day (at bedtime)  metoprolol succinate 25 mg oral tablet, extended release: 1 tab(s) orally once a day  oxyCODONE 15 mg oral tablet: 1 tab(s) orally every 4 hours As needed Moderate Pain (4 - 6)  oxyCODONE 15 mg oral tablet, extended release: 1 tab(s) orally every 12 hours  polyethylene glycol 3350 oral powder for reconstitution: 17 gram(s) orally 2 times a day  senna leaf extract oral tablet: 2 tab(s) orally once a day (at bedtime)  tamsulosin 0.4 mg oral capsule: 2 cap(s) orally once a day (at bedtime)  Vitamin D3 2000 intl units oral capsule: 1 cap(s) orally once a day   acetaminophen 325 mg oral tablet: 2 tab(s) orally every 6 hours As needed Temp greater or equal to 38C (100.4F), Mild Pain (1 - 3)  bumetanide 1 mg oral tablet: 1 tab(s) orally 2 times a day  cefTRIAXone 2 g injection: 2 gram(s) intravenously once a day (at bedtime) Last dose 5/2/2025  Colace 100 mg oral capsule: 2 cap(s) orally once a day (at bedtime)  Crestor 5 mg oral tablet: 1 tab(s) orally once a day (at bedtime)  DAPTOmycin 500 mg intravenous injection: 650 milligram(s) intravenous every 24 hours last dose 5/2/2025  Dulcogen 10 mg rectal suppository: 1 suppository(ies) rectally once a day as needed for if no bowel movement in 48 hours  Gemtesa 75 mg oral tablet: 1 tab(s) orally once a day (at bedtime)  heparin: 5,000 unit(s) subcutaneously every 8 hours Transition to eliquis 5mg bid on 4/2/2025 if Hemoglobin stable and surgical site stable  lactulose 10 g/15 mL oral syrup: 30 milliliter(s) orally 3 times a day As needed for constipation  levothyroxine 150 mcg (0.15 mg) oral tablet: 1 tab(s) orally once a day  melatonin 5 mg oral tablet: 2 tab(s) orally once a day (at bedtime)  metoprolol succinate 25 mg oral tablet, extended release: 1 tab(s) orally once a day  oxyCODONE 15 mg oral tablet: 1 tab(s) orally every 4 hours As needed Moderate Pain (4 - 6)  oxyCODONE 15 mg oral tablet, extended release: 1 tab(s) orally every 12 hours  polyethylene glycol 3350 oral powder for reconstitution: 17 gram(s) orally 2 times a day  senna leaf extract oral tablet: 2 tab(s) orally once a day (at bedtime)  tamsulosin 0.4 mg oral capsule: 2 cap(s) orally once a day (at bedtime)  Vitamin D3 2000 intl units oral capsule: 1 cap(s) orally once a day

## 2025-03-25 NOTE — H&P ADULT - NS ATTEND AMEND GEN_ALL_CORE FT
I independently performed the documented the history, exam, and medical decision making. I have made amendments to the documentation where necessary. I have personally seen and examined the patient. Medical records were reviewed and I have made amendments to the documentation where necessary and adjusted the history, physical examination, and plan as documented by the Nurse Practitioner. Patient was seen and evaluated at bedside today. Reported no overnight events and is in no acute distress. Eager to participate on the recommended rehabilitation program. Denies any CP, SOB, KESSLER, palpitations, fever, chills, body aches, cough, congestion, or any other symptoms at this time. Admission vitals, labs, and physical exam are outlined below.    LAB                        8.2    9.45  )-----------( 213      ( 26 Mar 2025 06:37 )             25.5     03-26    137  |  97  |  24[H]  ----------------------------<  105[H]  4.1   |  34[H]  |  0.98    Ca    8.6      26 Mar 2025 06:37  Mg     1.9     03-25    TPro  5.7[L]  /  Alb  2.3[L]  /  TBili  0.9  /  DBili  x   /  AST  42[H]  /  ALT  59[H]  /  AlkPhos  71  03-26    LIVER FUNCTIONS - ( 26 Mar 2025 06:37 )  Alb: 2.3 g/dL / Pro: 5.7 g/dL / ALK PHOS: 71 U/L / ALT: 59 U/L / AST: 42 U/L / GGT: x             Vital Signs Last 24 Hrs  T(C): 36.8 (26 Mar 2025 08:38), Max: 36.8 (25 Mar 2025 19:35)  T(F): 98.3 (26 Mar 2025 08:38), Max: 98.3 (25 Mar 2025 19:35)  HR: 92 (26 Mar 2025 08:38) (92 - 104)  BP: 103/63 (26 Mar 2025 08:38) (100/63 - 118/57)  RR: 16 (26 Mar 2025 08:38) (16 - 19)  SpO2: 94% (26 Mar 2025 08:38) (92% - 94%)    PHYSICAL EXAM  Gen - NAD, Comfortable  HEENT -  EOMI, MMM, PERRLA, Normal Conjunctivae  Neck - Supple, + limited ROM, +posterior cervical incision  Pulm - CTAB, No wheeze, No rhonchi, No crackles  Cardiovascular - RRR, S1S2, No murmurs  Chest - good chest expansion, good respiratory effort  Abdomen - Firm, NT, abd distention, +BS  Extremities - No C/C, no calf tenderness, +BLE edema, +B/L pedal edema, +RUE PICC line   /GI- + Ortega draining clear urine   Neuro-     Cognitive - awake, alert, oriented to person, place, date, year, and situation.  Able  to follow command     Communication - Fluent, Comprehensible, No dysarthria, No aphasia      Cranial Nerves -No facial asymmetry, Tongue midline, EOMI, Shoulder shrug intact     Motor -          RUE: Deltoid 5/5, bicep 5/5, tricep 5/5, wrist ext 5/5,  4-/5, unable to fully flex 2nd digit          LUE: Deltoid 5/5, bicep 5/5, tricep 5/5, wrist ext 5/5,  4-/5, unable to fully flex 2nd digit          RLE: IP 3/5, Quad 3/5, EHL 5/5, Dorsi/plantarflexion 5/5 (some limitations 2/2 LE edema)          LLE: IP 2/5, Quad 2/5, EHL 5/5, Dorsi/plantarflexion 5/5 (proximal leg significantly limited by pain and      Sensory - Impaired  to LT LLE from knee to ankle     Tone - Normal  Psychiatric - Mood stable, Affect WNL  Skin:    - Generalized dry flakey skin  - 19cm posterior cervical incision with 32 staples and 2 steri strips  - Stage 3 pressure injury on left buttock measuring 4cmx2.5cm with surrounding ecchymosis, hyperpigmentation, and scabbing  - Healed lumbar surgical incision scar  - Right heel blanchable redness  - Bilateral dry cracked heels and plantar surface I independently performed the documented the history, exam, and medical decision making. I have made amendments to the documentation where necessary. I have personally seen and examined the patient. Medical records were reviewed and I have made amendments to the documentation where necessary and adjusted the history, physical examination, and plan as documented by the Nurse Practitioner. Patient was seen and evaluated at bedside today. Reported no overnight events and is in no acute distress. Eager to participate on the recommended rehabilitation program. Denies any CP, SOB, KESSLER, palpitations, fever, chills, body aches, cough, congestion, or any other symptoms at this time. Admission vitals, labs, and physical exam are outlined below.    LAB                        8.2    9.45  )-----------( 213      ( 26 Mar 2025 06:37 )             25.5     03-26    137  |  97  |  24[H]  ----------------------------<  105[H]  4.1   |  34[H]  |  0.98    Ca    8.6      26 Mar 2025 06:37  Mg     1.9     03-25    TPro  5.7[L]  /  Alb  2.3[L]  /  TBili  0.9  /  DBili  x   /  AST  42[H]  /  ALT  59[H]  /  AlkPhos  71  03-26    LIVER FUNCTIONS - ( 26 Mar 2025 06:37 )  Alb: 2.3 g/dL / Pro: 5.7 g/dL / ALK PHOS: 71 U/L / ALT: 59 U/L / AST: 42 U/L / GGT: x             Vital Signs Last 24 Hrs  T(C): 36.8 (26 Mar 2025 08:38), Max: 36.8 (25 Mar 2025 19:35)  T(F): 98.3 (26 Mar 2025 08:38), Max: 98.3 (25 Mar 2025 19:35)  HR: 92 (26 Mar 2025 08:38) (92 - 104)  BP: 103/63 (26 Mar 2025 08:38) (100/63 - 118/57)  RR: 16 (26 Mar 2025 08:38) (16 - 19)  SpO2: 94% (26 Mar 2025 08:38) (92% - 94%)    PHYSICAL EXAM  Gen - NAD, Comfortable  HEENT -  EOMI, MMM, PERRLA, Normal Conjunctivae  Neck - Supple, + limited ROM, +posterior cervical incision  Pulm - CTAB, No wheeze, No rhonchi, No crackles  Cardiovascular - RRR, S1S2, No murmurs  Chest - good chest expansion, good respiratory effort  Abdomen - Firm, NT, abd distention, +BS  Extremities - No C/C, no calf tenderness, +BLE edema, +B/L pedal edema, +RUE PICC line   /GI- + Ortega draining clear urine   Neuro-     Cognitive - awake, alert, oriented to person, place, date, year, and situation.  Able  to follow command     Communication - Fluent, Comprehensible, No dysarthria, No aphasia      Cranial Nerves -No facial asymmetry, Tongue midline, EOMI, Shoulder shrug intact     Motor -          RUE: Deltoid 5/5, bicep 5/5, tricep 5/5, wrist ext 5/5,  4-/5, unable to fully flex 2nd digit          LUE: Deltoid 5/5, bicep 5/5, tricep 5/5, wrist ext 5/5,  4-/5, unable to fully flex 2nd digit          RLE: IP 3/5, Quad 3/5, EHL 5/5, Dorsi/plantarflexion 5/5 (some limitations 2/2 LE edema)          LLE: IP 2/5, Quad 2/5, EHL 5/5, Dorsi/plantarflexion 5/5 (proximal leg significantly limited by pain and      Sensory - Impaired  to LT LLE from knee to ankle     Tone - Normal  Psychiatric - Mood stable, Affect WNL  Skin:    - Generalized dry flakey skin  - 19cm posterior cervical incision with 32 staples and 2 steri strips  - Left buttock DTI pressure injury measuring 14 x 9 cm      * appearing as a dark purple discoloration of intact skin, with a central superficial open area measuring 6 x 2 cm.      * at the periphery of the 14 x 9 cm area, the discoloration is somewhat lighter;       * dark red non-blanchable erythema.  - Bilateral lower extremity edema.  - Healed lumbar surgical incision scar  - Right heel blanchable redness  - Bilateral dry cracked heels and plantar surface

## 2025-03-25 NOTE — DISCHARGE NOTE NURSING/CASE MANAGEMENT/SOCIAL WORK - FINANCIAL ASSISTANCE
[facility] provides services at a reduced cost to those who are determined to be eligible through [facility]’s financial assistance program. Information regarding [facility]’s financial assistance program can be found by going to [email or web address] or by calling [phone].

## 2025-03-25 NOTE — PROGRESS NOTE ADULT - ASSESSMENT
74 year old Male with noted history of multiple spinal surgeries, recent admission in February with noted streptococcus galactiae bacteremia and cervical spinal infection, now admitted for lower extremity edema.   Concern for worsening OM.     POD #8 OR for posterior thoracic decompression T1-T3 with connection to existing hardware C5-T3 fusion under general anesthesia    PLAN-  Advance diet and activity as tolerated    brace when OOB-- pt encouraged to wear brace   c-spine, T-spine standing x-ray reviewed are stable  Pain meds as needed   Valium for muscle spasm   Physical therapy and OT as tolerated   Physiatry consulted-- pt requesting acute rehab   Staples to be removed on POD #14 (can be done at rehab if patient is not able to see Dr. Cantu in the office)   Antibiotics as per ID   SQ Heparin for DVT PPX   sacral wound assessment by wound/skin team, continue turning patient Q2 hours and nursing skin assessments   follow H/H, 2 units of PRBCs given intra-op but appears stable on trends  Maintain Ortega for now as pt has had urinary retention with previous trial of void - On Flomax 8mg daily  Right Venodyne only due to left calf DVT (s/p pre-op IVC filter)   Pt can be started on a DOAC two weeks after surgery- can start Tuesday 4/1/25     d/w Dr. Cantu

## 2025-03-25 NOTE — PROGRESS NOTE ADULT - SUBJECTIVE AND OBJECTIVE BOX
HPI:  74-year-old male with PMH of BPH, diverticulosis, Graves' disease, hemorrhoids, HLD, hypothyroidism, osteoarthritis, spinal stenosis, spinal cord injury (2004), and multiple spinal surgeries (three cervical and one lumbar). He presents with complaints of lower extremity swelling and abnormal BUN/creatinine levels. The patient was recently admitted to [facility] from 02/18/25 to 02/25/25 for sepsis in the setting of streptococcus dysgalactiae bacteremia and was followed by neurosurgery for cervicothoracic spine infection. He was discharged to [facility] (formerly [facility]3] with a PICC for IV ceftriaxone until 04/16/25. Documentation from the rehabilitation facility notes a decline in his functional status and dysfunction with activities of daily living. At the rehabilitation facility, he was noted to have worsening LE swelling and was started on torsemide. However, his Cr levels subsequently became elevated, leading to the discontinuation of torsemide. He was sent to the  ED for further evaluation of his LE swelling and elevated Cr levels. He denies any chest pain, dyspnea, orthopnea, PND.     Repeat MRI done this admission showed worsening discitis/osteomyelitis. Per ID, antibiotics alone will unlikely resolve this spinal infection and would consider surgical intervention if able to perform based on risk/benefit. After discussion with patient and offering surgical interventions vs continued medical management with antibiotics, patient wished to proceed with surgery. Of note, hospital course has been complicated by below the knee DVT, currently on therapeutic Lovenox, urinary retention requiring gupta, and significant LE anasarca.    3/15- Pt seen and examined at bedside, resting comfortably and in NAD. Reports chronic right sided spacticity from prior spinal cord injury/surgery. He has had progressive worsening in hand  and loss of dexterity over the last few months. Pending medical clearance for OR Monday for thoracic laminectomy and fusion for epidural abscess.     3/16- Pt seen and examined at bedside, resting comfortably and in NAD. Reports some improvement in abdominal/leg pain 2/2 edema. Pending OR tomorrow.     3/17- OR for posterior thoracic decompression T1-T3 with connection to existing hardware C5-T3 fusion under general anesthesia. Prior to OR pt had an ICV filter placed in IR.   Pt tolerated the procedure well, he lost approx. 1L of blood intra-op and was transfused 2 units of PRBCs. Pt was extubated at the end of the case and taken to PACU for observation then transferred to SICU for observation overnight.   Pt has a left and right drain both emptying into the same Hemovac, he was started on PCA for pain control.     3/18- POD #1, pt seen and examined in the SICU, c/o neck pain, dressing clean and dry, moving b/l upper ext antigravity, good  strength except to pointer fingers on both hands. Able to extend right lower extremitiy off the bed but not the left. Tolerating PO diet. Vital signs stable. H/H stable today. Hemovac output 240cc since surgery.     3/19- POD #2, pt seen and examined in the SICU, c/o neck pain, dressing clean and dry. + BM this morning.   Able to extend right lower extremities off the bed but not the left. Tolerating PO diet. Vital signs stable. H/H stable today. Hemovac output 140cc for 24 hours.  Gupta and PCA remains, will change to PO meds and stop PCA.  OT ordered.    3/20- POD #3, pt seen and examined in the SICU, c/o neck pain, dressing clean and dry. Drain removed today. pt is  Able to extend right lower extremity off the bed but not the left. Tolerating PO diet. Vital signs stable. H/H stable today. Hemovac output < 50cc for 24 hours.  Gupta and tolerating PO meds at this time.  PT/OT / Dispo planning    3/21- POD #4, Pt seen and examined, Hemovac was removed yesterday, tolerating PO diet, pain controlled on PO pain meds, continued b/l lower ext edema,  staples c/d/i, able to participate with PT     3/22- POD#5 Patient seen and examined, no acute events overnight. Sitting up in chair NAD, no new complaints. Awaiting physiatry eval.    3/23- POD#6 Patient seen and examined, no acute events overnight. Awake/alert, sitting up in chair, NAD.  brace fitted yesterday, no issues with placement per patient.    3/24 POD# 7 patient seen and examined, no acute events. patient sitting up in chair no complaints at this time    3/25, POD #8, pt seen and examined on 5E, no complaints, continued weakness in hands especially in the pointer finger- overall upper ext strength has improved pt unable to lift b/l lower extremities off the bed, awaiting rehab placement     Vital Signs Last 24 Hrs  T(C): 36.4 (25 Mar 2025 06:55), Max: 37 (24 Mar 2025 16:05)  T(F): 97.5 (25 Mar 2025 06:55), Max: 98.6 (24 Mar 2025 16:05)  HR: 84 (25 Mar 2025 09:10) (84 - 98)  BP: 101/53 (25 Mar 2025 09:10) (101/53 - 118/69)  RR: 19 (25 Mar 2025 06:55) (16 - 19)  SpO2: 94% (25 Mar 2025 06:55) (93% - 94%)    Parameters below as of 25 Mar 2025 06:55  Patient On (Oxygen Delivery Method): room air    MEDICATIONS  (STANDING):  buMETAnide 1 milliGRAM(s) Oral two times a day  cefTRIAXone Injectable. 2000 milliGRAM(s) IV Push every 24 hours  DAPTOmycin IVPB 650 milliGRAM(s) IV Intermittent every 24 hours  heparin   Injectable 5000 Unit(s) SubCutaneous every 8 hours  levothyroxine 150 MICROGram(s) Oral daily  metoprolol succinate ER 25 milliGRAM(s) Oral daily  oxyCODONE  ER Tablet 15 milliGRAM(s) Oral every 12 hours  polyethylene glycol 3350 17 Gram(s) Oral two times a day  senna 2 Tablet(s) Oral at bedtime  tamsulosin 0.8 milliGRAM(s) Oral at bedtime    MEDICATIONS  (PRN):  acetaminophen   IVPB .. 1000 milliGRAM(s) IV Intermittent once PRN Mild Pain (1 - 3)  aluminum hydroxide/magnesium hydroxide/simethicone Suspension 30 milliLiter(s) Oral every 4 hours PRN Dyspepsia  lactulose Syrup 20 Gram(s) Oral three times a day PRN for constipation  melatonin 3 milliGRAM(s) Oral at bedtime PRN Insomnia  naloxone Injectable 0.1 milliGRAM(s) IV Push every 3 minutes PRN For ANY of the following changes in patient status:  A. RR LESS THAN 10 breaths per minute, B. Oxygen saturation LESS THAN 90%, C. Sedation score of 6  ondansetron Injectable 4 milliGRAM(s) IV Push every 6 hours PRN Nausea and/or Vomiting  oxyCODONE    IR 15 milliGRAM(s) Oral every 4 hours PRN Moderate Pain (4 - 6)    ROS: pertinent positives in HPI, all other ROS were reviewed and are negative     PHYSICAL EXAM:  Constitutional: Awake / alert, NAD   HEENT: PERRLA, EOMI, hearing intact   Neck: Supple  Respiratory: Breath sounds are clear bilaterally  Cardiovascular: S1 and S2, regular rhythm  Gastrointestinal: Soft, NT/ND  Extremities: +edema, long standing-- slightly improved   Skin: posterior cervicothoracic staples c/d/i    Neurological Exam:  HF: AA&O x 3, follows commands, normal affect, speech fluent  CN: PERRL, EOMI, no NLFD, tongue midline  Motor:   	RUE: Deltoid 5/5, bicep 5/5, tricep 5/5, wrist ext 5/5,  4-/5, unable to fully flex 2nd digit  	LUE: Deltoid 5/5, bicep 5/5, tricep 5/5, wrist ext 5/5,  4-/5, unable to fully flex 2nd digit  	RLE: IP 3/5, Quad 3/5, EHL 5/5, Dorsi/plantarflexion 5/5 (some limitations 2/2 LE edema)  	LLE: IP 2/5, Quad 2/5, EHL 5/5, Dorsi/plantarflexion 5/5 (proximal leg significantly limited by pain and edema)  Sens: Intact to light touch except diminished in LLE  Reflexes: hyperreflexic RUE/RLE, diminished DTRs in LUE/LLE, +Right ankle clonus, +R babinski, eduardo negative b/l  Coord:  No dysmetria  Gait/Balance: Cannot test  LABS:                         8.7    8.69  )-----------( 214      ( 25 Mar 2025 10:29 )             27.3     03-24    136  |  99  |  28[H]  ----------------------------<  111[H]  4.0   |  35[H]  |  0.86    Ca    8.8      24 Mar 2025 08:03  Phos  3.8     03-24  Mg     1.9     03-24    TPro  5.5[L]  /  Alb  2.3[L]  /  TBili  0.6  /  DBili  x   /  AST  38[H]  /  ALT  57  /  AlkPhos  62  03-24    LIVER FUNCTIONS - ( 24 Mar 2025 08:03 )  Alb: 2.3 g/dL / Pro: 5.5 gm/dL / ALK PHOS: 62 U/L / ALT: 57 U/L / AST: 38 U/L / GGT: x           RADIOLOGY:  < from: US Duplex Venous Lower Ext Complete, Bilateral (03.14.25 @ 09:55) >  FINDINGS:  RIGHT:  Normal compressibility of the RIGHT common femoral, femoral and popliteal   veins.  Doppler examination shows normal spontaneous and phasic flow.  No RIGHT calf vein thrombosis is detected.    LEFT:  Normal compressibility of the LEFT common femoral, femoral and popliteal   veins.  Doppler examination shows normal spontaneous and phasic flow.  Posterior tibial and peroneal veins arecompressible, without evidence of   thrombus. Redemonstrated gastrocnemius vein thrombus cannot nonocclusive.   Previously present soleal vein clot is no longer seen.    IMPRESSION:  Acute deep venous thrombosis: below the knee.    Left calf vein deep venous thrombosis improved compared to prior   examination 3/6/2025

## 2025-03-25 NOTE — DISCHARGE NOTE PROVIDER - NSDCCPCAREPLAN_GEN_ALL_CORE_FT
PRINCIPAL DISCHARGE DIAGNOSIS  Diagnosis: GOVIND (acute kidney injury)  Assessment and Plan of Treatment: Drop in your kidney function supected to be multifactorial including urinary retention, infection, direusis and dehydration. Improved and restarted on diuresis with good effect and remains stable. Continue diuretic as prescribed and monitor kidney function at first every several days and then slowly space out as there is more stabtility.      SECONDARY DISCHARGE DIAGNOSES  Diagnosis: Deep vein thrombosis (DVT)  Assessment and Plan of Treatment: Blood clot in the leg. Improved with blood thinner. Blood thinner stopped because of spine surgery. IVC filter placed to prevent traveling of any clot to the lung. Cleared to resume Blood thinner (eliquis every 12 hours) on 4/2/2025 if Hemoglobin count and wound stable.    Diagnosis: Chronic back pain  Assessment and Plan of Treatment: You are prescribed pain medications/opiods to control the pain that is present as a result of the infection in your spine, chronic back changes including post surgical changes.  Opioid can have an addictive component and also there is potentional for dependency. Continue to titrate pain medications to allow for adequate pain control and down-titrate as tolerated to also improved overall pain tolerance. Opioids also can cause constipation. Continue titration of bowel regimen with stool softners and laxative to have a minimum of 1 bowel movement every 48 hours.   Overdose is a possibilty with opioids and the dose at which is occurs in unpredictable.  Opioid overdose is life-threatening and requires immediate emergency attention. Recognizing the signs of opioid overdose is essential to saving lives.  Call 911 immediately if a person exhibits ANY of the following symptoms:  Their face is extremely pale and/or feels clammy to the touch  Their body goes limp  Their fingernails or lips have a purple or blue color  They start vomiting or making gurgling noises  They cannot be awakened or are unable to speak  Their breathing or heartbeat slows or stops  Treating Opioid Overdose  If you suspect someone is experiencing an opioid overdose immediately consider the following actions to save their life:  Call 911  If the person has stopped breathing or if breathing is very weak, begin CPR (best performed by someone who has training)  If available, treat the person with naloxone to reverse opioid overdose  Family members, caregivers, or the people who spend time with individuals using opioids need to know how to recognize the signs of an overdose and how to administer life-saving services until emergency medical help arrives. Individuals experiencing an opioid overdose will not be able to treat themselves. Naloxone/Narcan is a medication approved by the Food and Drug Administration (FDA) to prevent opioid overdose.    Diagnosis: Epidural abscess  Assessment and Plan of Treatment: Continue antibotics. Underwent surgery with the spine team with subsequent improvement and addition of other antibiotic (daptomycin). Continue IV antibiotics through 5/2/2025 and follow up closely with your neurosurgeon.

## 2025-03-25 NOTE — DISCHARGE NOTE PROVIDER - PROVIDER TOKENS
PROVIDER:[TOKEN:[[id number]],FOLLOWUP:[1 week],ESTABLISHEDPATIENT:[T]],PROVIDER:[TOKEN:[[id number]],FOLLOWUP:[2 weeks],ESTABLISHEDPATIENT:[T]]

## 2025-03-25 NOTE — DISCHARGE NOTE PROVIDER - HOSPITAL COURSE
FROM H&P:    "74-year-old male with a past medical history of benign prostatic hyperplasia, diverticulosis, Graves' disease, hemorrhoids, hyperlipidemia, hypothyroidism, osteoarthritis, spinal stenosis, spinal cord injury (2004), and multiple spinal surgeries (three cervical and one lumbar). He presents with complaints of lower extremity swelling and abnormal BUN/creatinine levels. The patient was recently admitted to [facility] from 02/18/25 to 02/25/25 for sepsis secondary to epidural abscess. He has a peripherally inserted central catheter line in the right arm and is on nightly Ceftriaxone treatment until 04/16/25. Following his discharge, he was transferred to [facility]. Documentation from the rehabilitation facility notes a decline in his functional status and dysfunction with activities of daily living. At the rehabilitation facility, he was noted to have worsening LE swelling and was started on torsemide. However, his Cr levels subsequently became elevated, leading to the discontinuation of torsemide. He was sent to the [facility] Emergency Department for further evaluation of his LE swelling and elevated Cr levels. He denies any chest pain, dyspnea, orthopnea, PND.   In ED VSS  CBC: WBC 10, H/H 12/36, Plt 235  CMP: Na 133, K 5.3, BUN/Cr 86/2.77, Glu 145, Albumin 2.8  Trops: wnl, Pro-BNP 1132  UA: Turbid, red, large blood, 47 wbcs, > 1900 rbcs.   US dopplers b/l LE: Acute deep venous thrombosis: below the knee.  DVT within the left soleal vein.  CT abdomen: No hydronephrosis or radiodense calculus identified bilaterally. Enlarged prostate, correlate with PSA level.  ECHO (2/18/25): Technically difficult image quality. Left ventricular systolic function is normal with an ejection fraction of 66 % by Meyers's method of disks. Trace tricuspid regurgitation. Estimated pulmonary artery systolic pressure is 35 mmHg, consistent with mild pulmonary hypertension. Interatrial septum is aneurysmal. The peak transaortic velocity is 2.29 m/s, peak transaortic gradient is 21.0 mmHg and mean transaortic gradient is 12.0 mmHg with an LVOT/aortic valve VTI ratio of 0.42. The effective orifice area is estimated at 1.61 cm² by the continuity equation. Trileaflet aortic valve with reduced systolic excursion. There is moderate calcification of the aortic valve leaflets. Mild aortic stenosis.  ED intervention: patient started on hep gtt for DVT "  HOSPITAL COURSE:  3/5: pt feels well. c/o LE numbness @ thighs and L hand numbness. denies saddle anesthesia. Cr improving   3/6: Cr continues to improve, c/o generalized weakness noble while ambulating but no new neuro deficits   3/7: Gupta out with hematuria. Patient had no sense of it coming out->recurrent retention->gupta re-inserted. MRI lumbar negative.   Discussed with neurosurgery. CLeared from their standpoint. Advised of upper extremity symptoms. Order MRI C and T Spine for further evaluation and if anything significant will re-call neurosurgery.Signficaant ansarca. Cr improving. Arterial doppler ordered for further evaluation. ON AC for DVT. TTE reviewed and grossly unremarkable. Will discuss with nephro. Rising leukocytosis. Unclear source. Afebrile and vitals stable. Continue to monitor  3/8:Cr improving. Anasarca persists and debilitating. Discussed with nephrology->Trial of albumin lasix; MRI Cervical Spin and thoracic spine done. Pending read Rising leukocytosis? Unclear etiology. Afebrile. Follow imaging results. Continue to monitor fluid status/Cr/Electrolytes closely. If stable and improving plan for bid albumin/lasix for adequate diuresis  3/9: Patient feels the same from weakness, edema, numbness standpoint. Apparent improvement on Physical exam in edema. Cr improved. Discussed with nephrology. IV Albumin/Lasix bid and continue to assess. MR C-Spine similar to prior. MR T-Spine with reported worsening discitis and possible new abscess. Re-called neurosurgery/spine to re-evaluate. Antibiotics as per ID.   3/10: ID not consulted on admission. Consult ID. Recs appreciated->continue IV antibiotics through april. Continue IV lasix/Albumin. No noticeable improvement as per patient. Continue to monitor. Leukocytosis improvin wtihout any change in intervention minus diuresis. Reactive? Neurosurgery recs appreciated->no acute intervention. Continue IV antibiotics and follow up outpatient. No events on telemetry->DC telemetry.   3/11: No change in edema. weight difficult to assess as patient unable to stand. Cr Relatively stable. Change to bumex 2mg bid. Still with upper extremity parathesia/weakness. Edema limited above torso so unlikely contributing to UE symptoms. Will re-discuss with Neurosurgery. Cntinue antibiotics.   3/12: Improvement in edema with bumex. CPK elevated ysterday->improved today. Continue to monitor. Cr stable. Continue IV albumin/bumex and monitoring Cr/electrolytes. Discussed with ID and Neurosurgery and expected difficult clearance of infection with antibiotics along and neurosurgery offering surgical intervention/washout. Patient to determine whether he would like to proceed. Eliquis to therapeutic lovenox.   3/13: Interval improvement in edema. Cr Stable. Discussed with nephrology->Continue IV albumin/Bumex. Continue to monitor I/Os and Cr/electroytes. Patient agreeable for surgical intervention. Tentative surgery 3/18. Last dose of lovenox night of 3/16. Patient with below the knee DVT so able to hold AC for surgery. Post op for period off AC can do serial US several days until cleared to resume AC. WIll order doppler today for preop baseline. At this time no indication for IVC Filter at this time. . Otherwise patient medically optimized as best as possible to proceed with spine intervention with neurosurgery. Continue antibiotics as per ID.  3/14-3/18: Continued diruesis. AC held. IVC filter placed 3/17 with subsequent spine surgery with neurosurgery posterior thoracic laminectomy and fusion. transferred to surgical step down post op for closer monitoring  3/21: Surgical culture with staph epi possible contaminant but given clinical presentation on admission, IV Dapto added. To continue IV rocephin/daptomycin through 5/2/2025  3/24: I saw and evaluated the patient. Pain controlled. No BM x 3 days. Suppository ordered. Miralax to bid. DC IV dilaudid. DC PO iron (going to take forever to correct any iron deficieny and augment constipation from opioids). Check Iron levels and likely to administer IV iron. PT as tolerated. Discussed with nephrology. IV bumex to po. Physiatry evaluated->acute rehab candidate. Referral sent. Pending neurosurgery clearance to resume full dose AC.   3/25: Patient remains clinically stable. Cleared by neurosurgery. Restart AC 2 weeks post op (tentative 4/2/2025). Cleared by nephrology to continue IV bumex. Continue IV antiboitics with rocephin/dapto till 5/2/25. Discharge to acute rehab in stable condition and outpatient follow up.   T(C): 36.4 (03-25-25 @ 06:55), Max: 37 (03-24-25 @ 16:05)  HR: 84 (03-25-25 @ 09:10) (84 - 98)  BP: 101/53 (03-25-25 @ 09:10) (101/53 - 118/69)  RR: 19 (03-25-25 @ 06:55) (16 - 19)  SpO2: 94% (03-25-25 @ 06:55) (93% - 94%)  General: AAOx3; NAD  CVS: RRR, S1&S2, No murmur, Anasarca +  Respiratory: Lungs CTA B/L; Normal Respiratory Effort  Abdomen/GI: Soft, non-tender, non-distended; anasarca +; Gupta with clear urine  Extremities: No cyanosis, No clubbing, Significant LE edema to the pelvix  MSK: No CVA tenderness, Limited ROM LE bilaterally because of marked edema No injury; Neck and thoracic brace in place  Neuro: AAOx3, numbness to upper b/l thighs, 3/5 L hand weakness,   Discharge Management: 38 minutes  Date of Discharge/Service: 3/25/2025  #Spinal epidural abscess POA and already being treated with IV antibiotics with suspected worsening of disease  - Chart reviewed extensively  - PICC w CTX for 8 week course to finish 4/16/25  - Reviewed neurosurgery notes and current neuro exam unchanged and no worse from prior admission, imaging reviewed  - currently since neuro exam unchanged will not pursue more imaging, low threshold to re-scan if neuro exam worsens  -Repeat MRI noted->neuroseurgery/Spine/ID recs. -> No acute intervention. Upper extremity numbness and weakness not present on prior admission present on this admission. Anasarca likely not contributing to upper extremity symptoms.  Will re-discuss with NS->after further evaluation and given perspsective from medicine/ID that complete or expected significant improvement with antibiotics alone may be difficult, neurosurgery offering surgical intervention. Patient to decide on wheter to proceed or not.   - Patient agreeable for surgery  - s/p thoracic laminectomy and fusion 3/18. brace and PT as per neurosurgery/spine  - Surgical culture with staph epi possible contaminant but given clinical presentation on admission, IV Dapto added. To continue IV rocephin/daptomycin through 5/2/2025  - IV Rocephin/Dapto thru 5/2/2025    #GOVIND on CKD. Resolved  #LE edema  #hypoalbuminemia  #Proteinuria  - IVF as needed with fluid balance  -Significant debilitating ansarca. Trial of lasix/albumin? WIll discuss with nephro->-Trial of albumin lasix started 3/8->improvement. Continue BID IV Laxis/IV albumin (3/9)->Change to Bumex/Albumin (3/11)-> good effect-> continue for now->Continue on IV bumex->Switched to PO bumex bid 3/24  - nephrology consult/recs  -Continue to monitor Cr/Electrolyte/IOs#DVT  - appears provoked from decrease ambulation from recent spinal abscess  - could not tolerate VQ scan for r/o PE given pain/hx spinal surgeries  - switch to eliquis, will be new med on discharge (of note patient completed eliquis load) ->therapeutic lovenox (3/12) in the setting of possible spine intervention.   - Baseline doppler preop shoing imrpovement  - Last dose of AC night of 3/16. REsumption post op when cleared by neurosurgery ->cleared to resume 4/2/2025. Continue DVT prophylaxis with Heparin subq until then.   - s/p IVC filter placement 3/17.

## 2025-03-25 NOTE — DISCHARGE NOTE NURSING/CASE MANAGEMENT/SOCIAL WORK - PATIENT PORTAL LINK FT
You can access the FollowMyHealth Patient Portal offered by [facility] by registering at the following website: [email or web address]. By joining [employer]’s FollowMyHealth portal, you will also be able to view your health information using other applications (apps) compatible with our system.

## 2025-03-25 NOTE — DISCHARGE NOTE NURSING/CASE MANAGEMENT/SOCIAL WORK - NSDCVIVACCINE_GEN_ALL_CORE_FT
influenza, injectable, quadrivalent, preservative free; 18-Feb-2017 13:03; Sophia Livingston (ESTEFANIA); [employer]; [id number]; IntraMuscular; Deltoid Left.; 0.5 milliLiter(s); VIS (VIS Published: 07-Aug-2015, VIS Presented: 18-Feb-2017);

## 2025-03-25 NOTE — H&P ADULT - NSHPADDITIONALINFOADULT_GEN_ALL_CORE
- Left buttock DTI pressure injury measuring 14 x 9 cm      * appearing as a dark purple discoloration of intact skin, with a central superficial open area measuring 6 x 2 cm.      * at the periphery of the 14 x 9 cm area, the discoloration is somewhat lighter;       * dark red non-blanchable erythema.  - Generalized dry flakey skin; 19cm posterior cervical incision with 32 staples and 2 steri strips; Bilateral lower extremity edema; Healed lumbar surgical incision scar; Right heel blanchable redness; Bilateral dry cracked heels and plantar surface

## 2025-03-25 NOTE — PATIENT PROFILE ADULT - FALL HARM RISK - RISK INTERVENTIONS

## 2025-03-25 NOTE — H&P ADULT - HISTORY OF PRESENT ILLNESS
74M with PMHx of benign prostatic hyperplasia, diverticulosis, Graves' disease, hemorrhoids, hyperlipidemia, hypothyroidism, osteoarthritis, spinal stenosis, spinal cord injury (2004), and multiple spinal surgeries (three cervical and one lumbar),  presented to  on 3/4/25 from [facility] for lower extremity swelling and abnormal BUN/creatinine levels for further evaluation and management. The patient was recently admitted to [facility] from 02/18/25 to 02/25/25 for sepsis secondary to epidural abscess. He has a peripherally inserted central catheter line in the right arm and is on nightly Ceftriaxone treatment until 04/16/25. In ED, found to have L soleal vein and L Gastrocnemius DVT and patient was started on heparin gtt , and GOVIND. Hospital course c/b urinary retention requiring gupta,  rhabdomyolysis elevated LFTs, anasarca, leukocytosis 2/2 T spine discitis OM and early paraspinal abscess and new onset RUE paresthesias/weakness,  S/p IVC filter placement on 3/17 for planned surgery. S/p thoracic lami and fusion on 3/18 with Dr. Cantu. Surgical culture with rare staph epi, recommendations by ID to continue IV Rocephin and Daptomycin through 5/2/25. Patient optimized and was evaluated by PM&R and therapy for functional deficits, gait/ADL impairments and acute rehabilitation was recommended. Patient was cleared for discharge to [facility] IRF on 3/25/25. Mr. Rishabh Shaver is a 74-year-old male patient with past medical history of benign prostatic hyperplasia, diverticulosis, Graves' disease, hemorrhoids, hyperlipidemia, hypothyroidism, osteoarthritis, spinal stenosis, spinal cord injury (2004), and multiple spinal surgeries (three cervical and one lumbar), who presented to [facility] on 3/4/25 from [facility] due to lower extremity swelling and abnormal BUN/creatinine levels for further evaluation and management. The patient was recently admitted to [facility] from 02/18/25 through 02/25/25 for sepsis secondary to epidural abscess. He had a peripherally inserted central catheter line in the right arm and was on nightly Ceftriaxone treatment until 04/16/25. At the ED, he was found to have GOVIND, a left soleal vein, and a left gastrocnemius DVT. He was started on heparin gtt. Hospital course complicated by urinary retention requiring Ortega catheter insertion, rhabdomyolysis, elevated LFTs, anasarca, leukocytosis due to thoracic spine discitis OM and early paraspinal abscess, as well as new onset RUE paresthesias and weakness. He is s/p IVC filter placement on 3/17 and was recommended surgical management. He is s/p posterior thoracic laminectomy and fusion on 3/18/25 with Dr. Keith Cantu. Surgical culture with rare staph epi. Recommendations by ID to continue IV Rocephin and Daptomycin through 5/2/25. Patient was evaluated by PM&R and therapy for functional deficits, gait/ADL impairments and acute rehabilitation was recommended. Patient was cleared for discharge to [facility] IRF on 3/25/25.

## 2025-03-25 NOTE — H&P ADULT - NSHPPHYSICALEXAM_GEN_ALL_CORE
PHYSICAL EXAM  VITALS  T(C): 36.8 (03-25-25 @ 19:35), Max: 36.9 (03-25-25 @ 00:10)  HR: 101 (03-25-25 @ 19:35) (84 - 104)  BP: 100/63 (03-25-25 @ 19:35) (100/63 - 118/69)  RR: 16 (03-25-25 @ 19:35) (16 - 19)  SpO2: 92% (03-25-25 @ 19:35) (92% - 94%)    Gen - NAD, Comfortable  HEENT -  EOMI, MMM, PERRLA, Normal Conjunctivae  Neck - Supple, + limited ROM, +posterior cervical incision  Pulm - CTAB, No wheeze, No rhonchi, No crackles  Cardiovascular - RRR, S1S2, No murmurs  Chest - good chest expansion, good respiratory effort  Abdomen - Firm, NT, abd distention, +BS  Extremities - No C/C, no calf tenderness, +BLE edema, +B/L pedal edema, +RUE PICC line   /GI- + Ortega draining clear urine   Neuro-     Cognitive - awake, alert, oriented to person, place, date, year, and situation.  Able  to follow command     Communication - Fluent, Comprehensible, No dysarthria, No aphasia      Cranial Nerves -No facial asymmetry, Tongue midline, EOMI, Shoulder shrug intact     Motor -                    LEFT    UE - ShAB 5/5, EF 5/5, EE 5/5,  3/5                    RIGHT UE - ShAB 5/5, EF 5/5, EE 5/5,   4/5                    LEFT    LE - HF 1/5, KE 1/5, DF 5/5, PF 5/5                    RIGHT LE - HF 2/5, KE 1/5, DF 5/5, PF 5/5        Sensory - Impaired  to LT LLE from knee to ankle     Tone - Normal  Psychiatric - Mood stable, Affect WNL  Skin:  Generalized dry flakey skin; 19cm posterior cervical incision with 32 staples and 2 steri strips; L buttock pressure injury stage 3 6x2cm with surrounding hyperpigmentation/scabbing 6x2.5cm; Lumbar incision scar; R heel blanchable redness; b/l dry cracked heels PHYSICAL EXAM  VITALS  T(C): 36.8 (03-25-25 @ 19:35), Max: 36.9 (03-25-25 @ 00:10)  HR: 101 (03-25-25 @ 19:35) (84 - 104)  BP: 100/63 (03-25-25 @ 19:35) (100/63 - 118/69)  RR: 16 (03-25-25 @ 19:35) (16 - 19)  SpO2: 92% (03-25-25 @ 19:35) (92% - 94%)    Gen - NAD, Comfortable  HEENT -  EOMI, MMM, PERRLA, Normal Conjunctivae  Neck - Supple, + limited ROM, +posterior cervical incision  Pulm - CTAB, No wheeze, No rhonchi, No crackles  Cardiovascular - RRR, S1S2, No murmurs  Chest - good chest expansion, good respiratory effort  Abdomen - Firm, NT, abd distention, +BS  Extremities - No C/C, no calf tenderness, +BLE edema, +B/L pedal edema, +RUE PICC line   /GI- + Ortega draining clear urine   Neuro-     Cognitive - awake, alert, oriented to person, place, date, year, and situation.  Able  to follow command     Communication - Fluent, Comprehensible, No dysarthria, No aphasia      Cranial Nerves -No facial asymmetry, Tongue midline, EOMI, Shoulder shrug intact     Motor -                    LEFT    UE - ShAB 5/5, EF 5/5, EE 5/5,  3/5                    RIGHT UE - ShAB 5/5, EF 5/5, EE 5/5,   4/5                    LEFT    LE - HF 1/5, KE 1/5, DF 5/5, PF 5/5                    RIGHT LE - HF 2/5, KE 1/5, DF 5/5, PF 5/5        Sensory - Impaired  to LT LLE from knee to ankle     Tone - Normal  Psychiatric - Mood stable, Affect WNL  Skin: - Left buttock DTI pressure injury measuring 14 x 9 cm      * appearing as a dark purple discoloration of intact skin, with a central superficial open area measuring 6 x 2 cm.      * at the periphery of the 14 x 9 cm area, the discoloration is somewhat lighter;       * dark red non-blanchable erythema.  - Generalized dry flakey skin; 19cm posterior cervical incision with 32 staples and 2 steri strips; Bilateral lower extremity edema; Healed lumbar surgical incision scar; Right heel blanchable redness; Bilateral dry cracked heels and plantar surface

## 2025-03-25 NOTE — PROGRESS NOTE ADULT - REASON FOR ADMISSION
GOVIND, lower ext pitting edema, spinal epidural abscess
LE swelling, GOVIND, weakness
worsening edema arf
GOVIND, epidural abscess,
Sepsis
acute renal failure
s/p C5-T3 fusion
s/p T1-3 decompression, C5-T3 fusion
worsening edema ARF

## 2025-03-25 NOTE — H&P ADULT - NSHPSOCIALHISTORY_GEN_ALL_CORE
SOCIAL HISTORY  Smoking -   EtOH -   Drugs -     FUNCTIONAL HISTORY  Lives in a ______ with ______  and has __ stairs to enter + hand rails and _____ stairs inside + hand rails. Assissted device to walk ______.  Prior Level of Function: Independent in ADLs and ambulation    CURRENT FUNCTIONAL STATUS  - Bed Mobility:  - Transfers:   - Gait:  - ADLs:  -Upper Body Dressing:  -Lower Body Dressing:  -Toileting:  -Bathing: SOCIAL HISTORY  Smoking - denies  EtOH - denies  Drugs - denies    Occupation:     FUNCTIONAL HISTORY  Patient was at home in a PH with his wife in a split level house with 7 steps to each floor. Pt has a tub/shower combo +curtain, standard toilet, RHD.   Prior Level of Function: Independent in ADLs and ambulation without AD    CURRENT FUNCTIONAL STATUS  - Bed Mobility: Max A; 1PA; bed rails  - Transfers: Mod A; 2PA; FWB; non powered sit to stand device    - ADLs:  -Upper Body Dressing: Max A  -Lower Body Dressing: Dependent  -Toileting: Dependent   -Grooming: Mod A  -Eating- Min A

## 2025-03-25 NOTE — DISCHARGE NOTE PROVIDER - NSDCCAREPROVSEEN_GEN_ALL_CORE_FT
Cris, Ankit Vo, Patrick Mcdonald, Carley Ni, Maricruz Magallon, Bill Russ, Cortez De Oliveira, Raina Son, Regan Palma, Birgit Gonzalez, Yonathan Castillo, Maegan King, Modesto Tenorio, Obdulio Pacheco, Kailey Sosa, Seth Wright, Julissa Gutiérrez, Fazal Zayas, Regan Watkins, Suk-Hyeon

## 2025-03-25 NOTE — H&P ADULT - NSHPREVIEWOFSYSTEMS_GEN_ALL_CORE
REVIEW OF SYSTEMS  Constitutional: No fever, No Chills, No fatigue  HEENT: No eye pain, No visual disturbances, No difficulty hearing  Pulm: No cough,  No shortness of breath  Cardio: No chest pain, No palpitations  GI:  No abdominal pain, No nausea, No vomiting, No diarrhea, No constipation, +Bloated  : +Urinary retention, +Ortega   Neuro: No headaches, No memory loss, + loss of strength BLE, + intermittent numbness/tingling BLE, + tingling BUE, No tremors, +intermittent lightheadedness with position changes  Skin: No itching, No rashes, No lesions, +dry flakey skin   Endo: No temperature intolerance  MSK: +BLE edema, +B/L pedal edema No Neck pain,  + Upper back pain, +BUE pain  Psych:  No depression, No anxiety Constitutional: No fever, No Chills, No fatigue  HEENT: No eye pain, No visual disturbances, No difficulty hearing  Pulm: No cough,  No shortness of breath  Cardio: No chest pain, No palpitations  GI:  No abdominal pain, No nausea, No vomiting, No diarrhea, No constipation, +Bloated  : +Urinary retention, +Ortega   Neuro: No headaches, No memory loss, + loss of strength BLE, + intermittent numbness/tingling BLE, + tingling BUE, No tremors, +intermittent lightheadedness with position changes  Skin: No itching, No rashes, No lesions, +dry flakey skin   Endo: No temperature intolerance  MSK: +BLE edema, +B/L pedal edema No Neck pain,  + Upper back pain, +BUE pain  Psych:  No depression, No anxiety

## 2025-03-26 DIAGNOSIS — Z98.1 ARTHRODESIS STATUS: ICD-10-CM

## 2025-03-26 LAB
ALBUMIN SERPL ELPH-MCNC: 2.3 G/DL — LOW (ref 3.3–5)
ALP SERPL-CCNC: 71 U/L — SIGNIFICANT CHANGE UP (ref 40–120)
ALT FLD-CCNC: 59 U/L — HIGH (ref 10–45)
ANION GAP SERPL CALC-SCNC: 6 MMOL/L — SIGNIFICANT CHANGE UP (ref 5–17)
AST SERPL-CCNC: 42 U/L — HIGH (ref 10–40)
BASOPHILS # BLD AUTO: 0.05 K/UL — SIGNIFICANT CHANGE UP (ref 0–0.2)
BASOPHILS NFR BLD AUTO: 0.5 % — SIGNIFICANT CHANGE UP (ref 0–2)
BILIRUB SERPL-MCNC: 0.9 MG/DL — SIGNIFICANT CHANGE UP (ref 0.2–1.2)
BUN SERPL-MCNC: 24 MG/DL — HIGH (ref 7–23)
CALCIUM SERPL-MCNC: 8.6 MG/DL — SIGNIFICANT CHANGE UP (ref 8.4–10.5)
CHLORIDE SERPL-SCNC: 97 MMOL/L — SIGNIFICANT CHANGE UP (ref 96–108)
CO2 SERPL-SCNC: 34 MMOL/L — HIGH (ref 22–31)
CREAT SERPL-MCNC: 0.98 MG/DL — SIGNIFICANT CHANGE UP (ref 0.5–1.3)
EGFR: 82 ML/MIN/1.73M2 — SIGNIFICANT CHANGE UP
EGFR: 82 ML/MIN/1.73M2 — SIGNIFICANT CHANGE UP
EOSINOPHIL # BLD AUTO: 0.31 K/UL — SIGNIFICANT CHANGE UP (ref 0–0.5)
EOSINOPHIL NFR BLD AUTO: 3.3 % — SIGNIFICANT CHANGE UP (ref 0–6)
GLUCOSE SERPL-MCNC: 105 MG/DL — HIGH (ref 70–99)
HCT VFR BLD CALC: 25.5 % — LOW (ref 39–50)
HGB BLD-MCNC: 8.2 G/DL — LOW (ref 13–17)
IMM GRANULOCYTES NFR BLD AUTO: 0.4 % — SIGNIFICANT CHANGE UP (ref 0–0.9)
LYMPHOCYTES # BLD AUTO: 0.92 K/UL — LOW (ref 1–3.3)
LYMPHOCYTES # BLD AUTO: 9.7 % — LOW (ref 13–44)
MCHC RBC-ENTMCNC: 28.4 PG — SIGNIFICANT CHANGE UP (ref 27–34)
MCHC RBC-ENTMCNC: 32.2 G/DL — SIGNIFICANT CHANGE UP (ref 32–36)
MCV RBC AUTO: 88.2 FL — SIGNIFICANT CHANGE UP (ref 80–100)
MONOCYTES # BLD AUTO: 1.06 K/UL — HIGH (ref 0–0.9)
MONOCYTES NFR BLD AUTO: 11.2 % — SIGNIFICANT CHANGE UP (ref 2–14)
NEUTROPHILS # BLD AUTO: 7.07 K/UL — SIGNIFICANT CHANGE UP (ref 1.8–7.4)
NEUTROPHILS NFR BLD AUTO: 74.9 % — SIGNIFICANT CHANGE UP (ref 43–77)
NRBC BLD AUTO-RTO: 0 /100 WBCS — SIGNIFICANT CHANGE UP (ref 0–0)
PLATELET # BLD AUTO: 213 K/UL — SIGNIFICANT CHANGE UP (ref 150–400)
POTASSIUM SERPL-MCNC: 4.1 MMOL/L — SIGNIFICANT CHANGE UP (ref 3.5–5.3)
POTASSIUM SERPL-SCNC: 4.1 MMOL/L — SIGNIFICANT CHANGE UP (ref 3.5–5.3)
PROT SERPL-MCNC: 5.7 G/DL — LOW (ref 6–8.3)
RBC # BLD: 2.89 M/UL — LOW (ref 4.2–5.8)
RBC # FLD: 14.2 % — SIGNIFICANT CHANGE UP (ref 10.3–14.5)
SODIUM SERPL-SCNC: 137 MMOL/L — SIGNIFICANT CHANGE UP (ref 135–145)
WBC # BLD: 9.45 K/UL — SIGNIFICANT CHANGE UP (ref 3.8–10.5)
WBC # FLD AUTO: 9.45 K/UL — SIGNIFICANT CHANGE UP (ref 3.8–10.5)

## 2025-03-26 PROCEDURE — 99223 1ST HOSP IP/OBS HIGH 75: CPT

## 2025-03-26 RX ADMIN — OXYCODONE HYDROCHLORIDE 15 MILLIGRAM(S): 30 TABLET ORAL at 11:01

## 2025-03-26 RX ADMIN — OXYCODONE HYDROCHLORIDE 15 MILLIGRAM(S): 30 TABLET ORAL at 21:39

## 2025-03-26 RX ADMIN — OXYCODONE HYDROCHLORIDE 15 MILLIGRAM(S): 30 TABLET ORAL at 12:01

## 2025-03-26 RX ADMIN — ROSUVASTATIN CALCIUM 5 MILLIGRAM(S): 20 TABLET, FILM COATED ORAL at 21:30

## 2025-03-26 RX ADMIN — OXYCODONE HYDROCHLORIDE 15 MILLIGRAM(S): 30 TABLET ORAL at 06:06

## 2025-03-26 RX ADMIN — CEFTRIAXONE 100 MILLIGRAM(S): 500 INJECTION, POWDER, FOR SOLUTION INTRAMUSCULAR; INTRAVENOUS at 21:29

## 2025-03-26 RX ADMIN — METOPROLOL SUCCINATE 25 MILLIGRAM(S): 50 TABLET, EXTENDED RELEASE ORAL at 05:59

## 2025-03-26 RX ADMIN — HEPARIN SODIUM 5000 UNIT(S): 1000 INJECTION INTRAVENOUS; SUBCUTANEOUS at 13:00

## 2025-03-26 RX ADMIN — BUMETANIDE 1 MILLIGRAM(S): 1 TABLET ORAL at 13:00

## 2025-03-26 RX ADMIN — Medication 1 APPLICATION(S): at 17:05

## 2025-03-26 RX ADMIN — TAMSULOSIN HYDROCHLORIDE 0.8 MILLIGRAM(S): 0.4 CAPSULE ORAL at 21:30

## 2025-03-26 RX ADMIN — Medication 2 TABLET(S): at 21:30

## 2025-03-26 RX ADMIN — OXYCODONE HYDROCHLORIDE 15 MILLIGRAM(S): 30 TABLET ORAL at 23:46

## 2025-03-26 RX ADMIN — HEPARIN SODIUM 5000 UNIT(S): 1000 INJECTION INTRAVENOUS; SUBCUTANEOUS at 05:59

## 2025-03-26 RX ADMIN — WHITE PETROLATUM 1 APPLICATION(S): 1 OINTMENT TOPICAL at 14:15

## 2025-03-26 RX ADMIN — BUMETANIDE 1 MILLIGRAM(S): 1 TABLET ORAL at 05:59

## 2025-03-26 RX ADMIN — Medication 150 MICROGRAM(S): at 05:59

## 2025-03-26 RX ADMIN — HEPARIN SODIUM 5000 UNIT(S): 1000 INJECTION INTRAVENOUS; SUBCUTANEOUS at 21:30

## 2025-03-26 RX ADMIN — Medication 40 MILLIGRAM(S): at 06:01

## 2025-03-26 RX ADMIN — DAPTOMYCIN 126 MILLIGRAM(S): 500 INJECTION, POWDER, LYOPHILIZED, FOR SOLUTION INTRAVENOUS at 11:14

## 2025-03-26 RX ADMIN — OXYCODONE HYDROCHLORIDE 15 MILLIGRAM(S): 30 TABLET ORAL at 21:29

## 2025-03-26 NOTE — DIETITIAN INITIAL EVALUATION ADULT - ORAL INTAKE PTA/DIET HISTORY
Patient Does Not Follow Diet @Home But Occasionally Fasts  Consumes 2 Meals a Day w/ Additional Snack  Family Usually Cooks For Patient But Orders Takeout/Delivery/Eats Out Once a Week  Does Take Vitamin/Supplements @Home (Protein, Vitamin D,  Magnesium)

## 2025-03-26 NOTE — DIETITIAN INITIAL EVALUATION ADULT - OTHER INFO
Initial Nutrition Assessment   74yr Old Male   Denies Food Allergy/Intolerance  Tolerates Diet Well - No Chewing/Swallowing Complications (Per Patient)  Surgical Incision on T-Spine & Stage 3 Pressure Ulcers Buttocks (as Per Nursing Flow Sheets)  No Edema Noted (as Per Nursing Flow Sheets)  No Recent Nausea/Vomiting/Diarrhea & Some Recent Constipation (as Per Patient)

## 2025-03-26 NOTE — DIETITIAN INITIAL EVALUATION ADULT - PERTINENT LABORATORY DATA
03-26    137  |  97  |  24[H]  ----------------------------<  105[H]  4.1   |  34[H]  |  0.98    Ca    8.6      26 Mar 2025 06:37  Mg     1.9     03-25    TPro  5.7[L]  /  Alb  2.3[L]  /  TBili  0.9  /  DBili  x   /  AST  42[H]  /  ALT  59[H]  /  AlkPhos  71  03-26

## 2025-03-26 NOTE — ADVANCED PRACTICE NURSE CONSULT - RECOMMEDATIONS
Suggest twice daily (& PRN) Normal Saline Cleanse, pat thoroughly dry.  Apply Triad Cream twice daily (& PRN)  generously over entire area.  Offload all bony prominences with strict Turn & Position at least every 2 hours while in bed.  Limit time seated in wheelchair to no more than 2 hours consecutively (back to be side-lying between therapies).  Encourage frequent weight shift in chair.  Offload bilateral heels by floating on pillow or with offloading booties at all times while in bed.   Elevate bilateral LE whenever possible.  Nutritional support & hydration per Dietician's recommendations.

## 2025-03-26 NOTE — CONSULT NOTE ADULT - ASSESSMENT
74-year-old male patient with h/o BPH, diverticulosis, Graves' disease, hemorrhoids, HLD, hypothyroidism, osteoarthritis, spinal stenosis, spinal cord injury (2004), and multiple spinal surgeries (three cervical and one lumbar), who presented to [facility] on 3/4/25 from [facility] due to lower extremity swelling and abnormal BUN/creatinine levels for further evaluation and management. The patient was recently admitted to [facility] from 02/18/25 through 02/25/25 for sepsis secondary to epidural abscess. He had a peripherally inserted central catheter line in the right arm and was on nightly Ceftriaxone treatment until 04/16/25. At the ED, he was found to have GOVIND, a left soleal vein, and a left gastrocnemius DVT. He was started on heparin gtt. Hospital course complicated by urinary retention requiring Ortega catheter insertion, rhabdomyolysis, elevated LFTs, anasarca, leukocytosis due to thoracic spine discitis OM and early paraspinal abscess, as well as new onset RUE paresthesias and weakness. He is s/p IVC filter placement on 3/17 and was recommended surgical management. He is s/p posterior thoracic laminectomy and fusion on 3/18/25 with Dr. Keith Cantu. Surgical culture with rare staph epi. Recommendations by ID to continue IV Rocephin and Daptomycin through 5/2/25. Patient was evaluated by PM&R and therapy for functional deficits, gait/ADL impairments and acute rehabilitation was recommended. Patient was cleared for discharge to [facility] IRF on 3/25/25. (25 Mar 2025 17:09)    Debility  Thoracic epidural abscess s/p laminectomy and fusion surgery on 3/18    - start comprehensive rehab  -  brace  - pain control per primary  - fall, spine precautions    GOVIND [esolved]  LE edema  Hypoalbuminemia  Proteinuria  Anasarca    - c/w Bumex 1 mg BID  - Optimize protein intake. Nutrition eval   - mobilize   - Monitor labs closely , stable renal function w good UOP   - No need for further IV fluid at this time  - Serum workup negative but will need follow up of  Serum  TRUDY positive for weak IgG Lambda band.  - urine protein 200 mg only ( not nephrotic)   - I/O's.   - daily standing weights      Acute DVT:   -  US doppler + L soleal and gastroc DVT   - s/p IVC filter 3/17 with interventional cards.   - Could not tolerate VQ scan for r/o PE 2/2 pain  - On Heparin 5000 Sq Q8hrs  - Restart AC (Eliquis 5mg BID per neurosurgery, already loaded) 2 weeks post op per neurosx if hgb stable and surgical site stable. (tentative 4/2/2025)  - Please send Eliquis script sent to pharmacy on DC [ new med, started in [facility]]    Spinal epidural abscess   - S/p thoracic laminectomy and fusion 3/18.   - IV Rocephin 2g HS via PICC line thru 5/2/25  - IV Daptomycin 650mg daily via PICC line thru 5/2/2025  - [place]  brace   - Surgical culture with staph epi possible contaminant but given clinical presentation on admission, IV Dapto added.   - Staples to be removed on 4/1/25    Rhabdomyolysis   - Improving  - Encouraged PO    Transaminitis  - encouraged PO  - Limit Tylenol use  - avoid hepatotoxins    Leukocytosis  - improved  - Likely rx to DVT + Discitis.  - No signs of sepsis , afebrile   - Trend CBC and Temp    CAD  HTN  HLD  - Metoprolol 25mg daily   - Crestor 5mg HS    Anemia  MARLENA  - s/p 2 units of PRBCs given intra-op  - Trend H/H (3/25- 8.7/27.3)  - Transfuse to keep hb >7    Hypothyroidism  - Synthroid 150mcg daily    sacral Decub stage 3  - wound care cx  - Continue turning patient Q 2 hours   - Lac hydrin BID BLE  - Aquaphor BID to generalized dry skin     Constipation:  - Senna QHS, Miralax Daily, Lactulose 30ml TID PRN, Maalox PRN   - GI ppx: Pantoprazole 40mg daily     BPH  Urinary retention likely augmented by degree of anasarca  - Ortega POA  - failed TOV in [facility]. last Ortega inserted on 3/22  - On Flomax 8mg daily  - TOV today  - Monitor U/O    DVT-P: heparin SQ, right SCD. [ DVT on left]  GI: Protonix    Thanks for allowing us to see this patient. Hospitalist service will continue to follow patient progress with you. please call with any question    d/w rehab team at [facility]           74-year-old male patient with h/o BPH, diverticulosis, Graves' disease, hemorrhoids, HLD, hypothyroidism, osteoarthritis, spinal stenosis, spinal cord injury (2004), and multiple spinal surgeries (three cervical and one lumbar), who presented to [facility] on 3/4/25 from [facility] due to lower extremity swelling and abnormal BUN/creatinine levels for further evaluation and management. The patient was recently admitted to [facility] from 02/18/25 through 02/25/25 for sepsis secondary to epidural abscess. He had a peripherally inserted central catheter line in the right arm and was on nightly Ceftriaxone treatment until 04/16/25. At the ED, he was found to have GOVIND, a left soleal vein, and a left gastrocnemius DVT. He was started on heparin gtt. Hospital course complicated by urinary retention requiring Ortega catheter insertion, rhabdomyolysis, elevated LFTs, anasarca, leukocytosis due to thoracic spine discitis OM and early paraspinal abscess, as well as new onset RUE paresthesias and weakness. He is s/p IVC filter placement on 3/17 and was recommended surgical management. He is s/p posterior thoracic laminectomy and fusion on 3/18/25 with Dr. Keith Cantu. Surgical culture with rare staph epi. Recommendations by ID to continue IV Rocephin and Daptomycin through 5/2/25. Patient was evaluated by PM&R and therapy for functional deficits, gait/ADL impairments and acute rehabilitation was recommended. Patient was cleared for discharge to [facility] IRF on 3/25/25. (25 Mar 2025 17:09)    Debility  Thoracic epidural abscess s/p laminectomy and fusion surgery on 3/18    - start comprehensive rehab  -  brace  - pain control per primary  - fall, spine precautions  Spinal epidural abscess   - S/p thoracic laminectomy and fusion 3/18.   - IV Rocephin 2g HS via PICC line thru 5/2/25  - IV Daptomycin 650mg daily via PICC line thru 5/2/2025  - [place]  brace   - Surgical culture with staph epi possible contaminant but given clinical presentation on admission, IV Dapto added.   - Staples to be removed on 4/1/25    GOVIND [esolved]  LE edema  Hypoalbuminemia  Proteinuria  Anasarca    - c/w Bumex 1 mg BID  - Optimize protein intake. Nutrition eval   - mobilize   - Monitor labs closely , stable renal function w good UOP   - No need for further IV fluid at this time  - Serum workup negative but will need follow up of  Serum  TRUDY positive for weak IgG Lambda band.  - urine protein 200 mg only ( not nephrotic)   - I/O's.   - daily standing weights      Rhabdomyolysis   - Improving  - Encouraged PO    Transaminitis  - encouraged PO  - Limit Tylenol use  - avoid hepatotoxins    Leukocytosis  - improved  - Likely rx to DVT + Discitis.  - No signs of sepsis , afebrile   - Trend CBC and Temp    CAD  HTN  HLD  - Metoprolol 25mg daily   - Crestor 5mg HS    Anemia  MARLENA  - s/p 2 units of PRBCs given intra-op  - Trend H/H (3/25- 8.7/27.3)  - Transfuse to keep hb >7    Hypothyroidism  - Synthroid 150mcg daily    sacral Decub stage 3  - wound care cx  - Continue turning patient Q 2 hours   - Lac hydrin BID BLE  - Aquaphor BID to generalized dry skin     Constipation:  - Senna QHS, Miralax Daily, Lactulose 30ml TID PRN, Maalox PRN   - GI ppx: Pantoprazole 40mg daily     BPH  Urinary retention likely augmented by degree of anasarca  - Ortega POA  - failed TOV in [facility]. last Ortega inserted on 3/22  - On Flomax 8mg daily  - TOV today  - Monitor U/O    Acute DVT:   -  US doppler + L soleal and gastroc DVT   - s/p IVC filter 3/17 with interventional cards.   - Could not tolerate VQ scan for r/o PE 2/2 pain  - On Heparin 5000 Sq Q8hrs  - Restart AC (Eliquis 5mg BID per neurosurgery, already loaded) 2 weeks post op per neurosx if hgb stable and surgical site stable. (tentative 4/2/2025)  - Please send Eliquis script sent to pharmacy on DC [ new med, started in [facility]]    DVT-P: heparin SQ, right SCD. [ DVT on left]  GI: Protonix    Thanks for allowing us to see this patient. Hospitalist service will continue to follow patient progress with you. please call with any question    d/w rehab team at [facility]

## 2025-03-26 NOTE — CONSULT NOTE ADULT - SUBJECTIVE AND OBJECTIVE BOX
Dr. Esquivel Hospitalist Progress Note  CLAUDIA PIMENTEL [id number]    Patient is a 74y old  Male who presents with a chief complaint of Thoracic epidural abscess s/p laminectomy and fusion surgery (25 Mar 2025 17:09)    HPI:  Mr. Claudia Pimentel is a 74-year-old male patient with past medical history of benign prostatic hyperplasia, diverticulosis, Graves' disease, hemorrhoids, hyperlipidemia, hypothyroidism, osteoarthritis, spinal stenosis, spinal cord injury (2004), and multiple spinal surgeries (three cervical and one lumbar), who presented to [facility] on 3/4/25 from [facility] due to lower extremity swelling and abnormal BUN/creatinine levels for further evaluation and management. The patient was recently admitted to [facility] from 02/18/25 through 02/25/25 for sepsis secondary to epidural abscess. He had a peripherally inserted central catheter line in the right arm and was on nightly Ceftriaxone treatment until 04/16/25. At the ED, he was found to have GOVIND, a left soleal vein, and a left gastrocnemius DVT. He was started on heparin gtt. Hospital course complicated by urinary retention requiring Ortega catheter insertion, rhabdomyolysis, elevated LFTs, anasarca, leukocytosis due to thoracic spine discitis OM and early paraspinal abscess, as well as new onset RUE paresthesias and weakness. He is s/p IVC filter placement on 3/17 and was recommended surgical management. He is s/p posterior thoracic laminectomy and fusion on 3/18/25 with Dr. Keith Cantu. Surgical culture with rare staph epi. Recommendations by ID to continue IV Rocephin and Daptomycin through 5/2/25. Patient was evaluated by PM&R and therapy for functional deficits, gait/ADL impairments and acute rehabilitation was recommended. Patient was cleared for discharge to [facility] IRF on 3/25/25. (25 Mar 2025 17:09)    Interval:      ROS:    Past Medical, Past Surgical, and Family History:  PAST MEDICAL HISTORY:  Alcoholic recovering alcoholic x 40years    Benign prostatic hyperplasia without lower urinary tract symptoms, unspecified morphology     Diverticulosis of large intestine without hemorrhage     Graves disease     Hemorrhoids, unspecified hemorrhoid type     History of colon polyps     Hyperlipidemia, unspecified hyperlipidemia type     Hypothyroidism, unspecified type     Myelopathy     Osteoarthritis of knee, unilateral left    Spastic     Spinal cord injury at C5-C7 level without injury of spinal bone, subsequent encounter     Spinal stenosis, unspecified spinal region     Weakness to right side.     PAST SURGICAL HISTORY:  H/O arthroscopy of knee Left x 2. Unsure of years    H/O colonoscopy with polypectomy 2014    H/O lumbar discectomy with laminectomy    History of cervical discectomy     S/P laminectomy Cervical.     FAMILY HISTORY:  Father  Still living? No  Family history of liver disease, Age at diagnosis: Age Unknown    Mother  Still living? No  Family history of colon cancer in mother, Age at diagnosis: Age Unknown    Sibling  Still living? No  Family history of cancer, Age at diagnosis: Age Unknown.    SOCIAL HISTORY  Smoking - denies  EtOH - denies  Drugs - denies    Occupation:     FUNCTIONAL HISTORY  Patient was at home in a PH with his wife in a split level house with 7 steps to each floor. Pt has a tub/shower combo +curtain, standard toilet, RHD.   Prior Level of Function: Independent in ADLs and ambulation without AD    Allergy: NKDA    MEDICATIONS  (STANDING):  ammonium lactate 12% Lotion 1 Application(s) Topical two times a day  AQUAPHOR (petrolatum Ointment) 1 Application(s) Topical three times a day  buMETAnide 1 milliGRAM(s) Oral two times a day  cefTRIAXone   IVPB 2000 milliGRAM(s) IV Intermittent every 24 hours  chlorhexidine 4% Liquid 1 Application(s) Topical <User Schedule>  DAPTOmycin IVPB 650 milliGRAM(s) IV Intermittent every 24 hours  heparin   Injectable 5000 Unit(s) SubCutaneous every 8 hours  levothyroxine 150 MICROGram(s) Oral daily  metoprolol succinate ER 25 milliGRAM(s) Oral daily  oxyCODONE  ER Tablet 15 milliGRAM(s) Oral every 12 hours  pantoprazole    Tablet 40 milliGRAM(s) Oral before breakfast  polyethylene glycol 3350 17 Gram(s) Oral daily  rosuvastatin 5 milliGRAM(s) Oral at bedtime  senna 2 Tablet(s) Oral at bedtime  tamsulosin 0.8 milliGRAM(s) Oral at bedtime    MEDICATIONS  (PRN):  aluminum hydroxide/magnesium hydroxide/simethicone Suspension 30 milliLiter(s) Oral every 4 hours PRN Dyspepsia  lactulose Syrup 20 Gram(s) Oral three times a day PRN for constipation  melatonin 3 milliGRAM(s) Oral at bedtime PRN Insomnia  oxyCODONE    IR 15 milliGRAM(s) Oral every 4 hours PRN Moderate Pain (4 - 6)  sodium chloride 0.9% lock flush 10 milliLiter(s) IV Push every 1 hour PRN Pre/post blood products, medications, blood draw, and to maintain line patency        T(C): 36.8 (03-25-25 @ 19:35), Max: 36.8 (03-25-25 @ 19:35)  HR: 101 (03-25-25 @ 19:35) (84 - 104)  BP: 100/63 (03-25-25 @ 19:35) (100/63 - 118/57)  RR: 16 (03-25-25 @ 19:35) (16 - 19)  SpO2: 92% (03-25-25 @ 19:35) (92% - 94%)  CAPILLARY BLOOD GLUCOSE          Physical Exam:        Labs                        8.2    9.45  )-----------( 213      ( 26 Mar 2025 06:37 )             25.5     03-25    137  |  100  |  27[H]  ----------------------------<  102[H]  4.3   |  35[H]  |  0.94    Ca    8.9      25 Mar 2025 10:29  Phos  3.8     03-24  Mg     1.9     03-25    TPro  5.5[L]  /  Alb  2.3[L]  /  TBili  0.6  /  DBili  x   /  AST  38[H]  /  ALT  57  /  AlkPhos  62  03-24   Urinalysis Basic - ( 25 Mar 2025 10:29 )    Color: x / Appearance: x / SG: x / pH: x  Gluc: 102 mg/dL / Ketone: x  / Bili: x / Urobili: x   Blood: x / Protein: x / Nitrite: x   Leuk Esterase: x / RBC: x / WBC x   Sq Epi: x / Non Sq Epi: x / Bacteria: x                Dr. Esquivel Hospitalist Progress Note  CLAUDIA PIMENTEL [id number]    Patient is a 74y old  Male who presents with a chief complaint of Thoracic epidural abscess s/p laminectomy and fusion surgery (25 Mar 2025 17:09)    HPI:  Mr. Claudia Pimentel is a 74-year-old male patient with past medical history of benign prostatic hyperplasia, diverticulosis, Graves' disease, hemorrhoids, hyperlipidemia, hypothyroidism, osteoarthritis, spinal stenosis, spinal cord injury (2004), and multiple spinal surgeries (three cervical and one lumbar), who presented to [facility] on 3/4/25 from [facility] due to lower extremity swelling and abnormal BUN/creatinine levels for further evaluation and management. The patient was recently admitted to [facility] from 02/18/25 through 02/25/25 for sepsis secondary to epidural abscess. He had a peripherally inserted central catheter line in the right arm and was on nightly Ceftriaxone treatment until 04/16/25. At the ED, he was found to have GOVIND, a left soleal vein, and a left gastrocnemius DVT. He was started on heparin gtt. Hospital course complicated by urinary retention requiring Ortega catheter insertion, rhabdomyolysis, elevated LFTs, anasarca, leukocytosis due to thoracic spine discitis OM and early paraspinal abscess, as well as new onset RUE paresthesias and weakness. He is s/p IVC filter placement on 3/17 and was recommended surgical management. He is s/p posterior thoracic laminectomy and fusion on 3/18/25 with Dr. Keith Cantu. Surgical culture with rare staph epi. Recommendations by ID to continue IV Rocephin and Daptomycin through 5/2/25. Patient was evaluated by PM&R and therapy for functional deficits, gait/ADL impairments and acute rehabilitation was recommended. Patient was cleared for discharge to [facility] IRF on 3/25/25. (25 Mar 2025 17:09)    Interval:  seen and examined  Chart reviewed  No overnight events  Limb weakness improving with therapy  BM+  pain is controlled. leg swelling is getting better or same  No complaints      ROS:  denied fever/chills/CP/SOB/cough/palpitation/dizziness/abdominal pian/nausea/vomiting/diarrhoea/constipation/dysuria/leg or calf pain/headaches.all other ROS neg    Past Medical, Past Surgical, and Family History:  PAST MEDICAL HISTORY:  Alcoholic recovering alcoholic x 40years    Benign prostatic hyperplasia without lower urinary tract symptoms, unspecified morphology     Diverticulosis of large intestine without hemorrhage     Graves disease     Hemorrhoids, unspecified hemorrhoid type     History of colon polyps     Hyperlipidemia, unspecified hyperlipidemia type     Hypothyroidism, unspecified type     Myelopathy     Osteoarthritis of knee, unilateral left    Spastic     Spinal cord injury at C5-C7 level without injury of spinal bone, subsequent encounter     Spinal stenosis, unspecified spinal region     Weakness to right side.     PAST SURGICAL HISTORY:  H/O arthroscopy of knee Left x 2. Unsure of years    H/O colonoscopy with polypectomy 2014    H/O lumbar discectomy with laminectomy    History of cervical discectomy     S/P laminectomy Cervical.     FAMILY HISTORY:  Father  Still living? No  Family history of liver disease, Age at diagnosis: Age Unknown    Mother  Still living? No  Family history of colon cancer in mother, Age at diagnosis: Age Unknown    Sibling  Still living? No  Family history of cancer, Age at diagnosis: Age Unknown.    SOCIAL HISTORY  Smoking - denies  EtOH - denies  Drugs - denies    Occupation:     FUNCTIONAL HISTORY  Patient was at home in a PH with his wife in a split level house with 7 steps to each floor. Pt has a tub/shower combo +curtain, standard toilet, RHD.   Prior Level of Function: Independent in ADLs and ambulation without AD    Allergy: NKDA    MEDICATIONS  (STANDING):  ammonium lactate 12% Lotion 1 Application(s) Topical two times a day  AQUAPHOR (petrolatum Ointment) 1 Application(s) Topical three times a day  buMETAnide 1 milliGRAM(s) Oral two times a day  cefTRIAXone   IVPB 2000 milliGRAM(s) IV Intermittent every 24 hours  chlorhexidine 4% Liquid 1 Application(s) Topical <User Schedule>  DAPTOmycin IVPB 650 milliGRAM(s) IV Intermittent every 24 hours  heparin   Injectable 5000 Unit(s) SubCutaneous every 8 hours  levothyroxine 150 MICROGram(s) Oral daily  metoprolol succinate ER 25 milliGRAM(s) Oral daily  oxyCODONE  ER Tablet 15 milliGRAM(s) Oral every 12 hours  pantoprazole    Tablet 40 milliGRAM(s) Oral before breakfast  polyethylene glycol 3350 17 Gram(s) Oral daily  rosuvastatin 5 milliGRAM(s) Oral at bedtime  senna 2 Tablet(s) Oral at bedtime  tamsulosin 0.8 milliGRAM(s) Oral at bedtime    MEDICATIONS  (PRN):  aluminum hydroxide/magnesium hydroxide/simethicone Suspension 30 milliLiter(s) Oral every 4 hours PRN Dyspepsia  lactulose Syrup 20 Gram(s) Oral three times a day PRN for constipation  melatonin 3 milliGRAM(s) Oral at bedtime PRN Insomnia  oxyCODONE    IR 15 milliGRAM(s) Oral every 4 hours PRN Moderate Pain (4 - 6)  sodium chloride 0.9% lock flush 10 milliLiter(s) IV Push every 1 hour PRN Pre/post blood products, medications, blood draw, and to maintain line patency        T(C): 36.8 (03-25-25 @ 19:35), Max: 36.8 (03-25-25 @ 19:35)  HR: 101 (03-25-25 @ 19:35) (84 - 104)  BP: 100/63 (03-25-25 @ 19:35) (100/63 - 118/57)  RR: 16 (03-25-25 @ 19:35) (16 - 19)  SpO2: 92% (03-25-25 @ 19:35) (92% - 94%)  CAPILLARY BLOOD GLUCOSE    Physical Exam:    GENERAL: Not in distress. Alert    HEENT: clear conjuctiva, MMM. no pallor or icterus. TLSO brace   CARDIOVASCULAR: RRR S1, S2. No murmur/rubs/gallop  LUNGS: BLAE+, no rales, no wheezing, no rhonchi.    ABDOMEN: ND. Soft,  NT, no guarding / rebound / rigidity. BS normoactive  : Ortega in place. draining straw urine  BACK: No spine tenderness.  EXTREMITIES: b/l edema. no leg or calf TP.  SKIN: warm and dry  PSYCHIATRIC: Calm.  No agitation.  CNS: AAO*3. moves limbs, follows commands        Labs                        8.2    9.45  )-----------( 213      ( 26 Mar 2025 06:37 )             25.5     03-25    137  |  100  |  27[H]  ----------------------------<  102[H]  4.3   |  35[H]  |  0.94    Ca    8.9      25 Mar 2025 10:29  Phos  3.8     03-24  Mg     1.9     03-25    TPro  5.5[L]  /  Alb  2.3[L]  /  TBili  0.6  /  DBili  x   /  AST  38[H]  /  ALT  57  /  AlkPhos  62  03-24   Urinalysis Basic - ( 25 Mar 2025 10:29 )    Color: x / Appearance: x / SG: x / pH: x  Gluc: 102 mg/dL / Ketone: x  / Bili: x / Urobili: x   Blood: x / Protein: x / Nitrite: x   Leuk Esterase: x / RBC: x / WBC x   Sq Epi: x / Non Sq Epi: x / Bacteria: x

## 2025-03-26 NOTE — DIETITIAN INITIAL EVALUATION ADULT - ORAL NUTRITION SUPPLEMENTS
Class II - visualization of the soft palate, fauces, and uvula
on Ensure Plus High Protein 8oz PO BID (Provides 700kcal & 40grams of Protein)   Recommend Change Supplement to Ensure Max 11oz PO BID (Provides 300kcal & 60grams of Protein)  Recommend Initiate Lalito 1 Packet BID (Provides 180kcal & 28grams of Protein)

## 2025-03-26 NOTE — ADVANCED PRACTICE NURSE CONSULT - ASSESSMENT
Patient admitted to Acute Rehab Unit for functional deficits, gait/ADL impairments after hospitalization for Thoracic epidural abscess s/p laminectomy and fusion surgery.  Seen at bedside with primary RN, ALLEN, in no apparent distress.  Presents with 14 x 9 cm DTI pressure injury to left buttock, appearing as a dark purple discoloration of intact skin, with a central superficial open area measuring 6 x 2 cm.  At the periphery of the 14 x 9 cm area, the discoloration is somewhat lighter; dark red non-blanchable erythema.  Although presenting differently throughout, this is one pressure injury on the Left Buttock.  Also noted is bilateral lower extremity edema.

## 2025-03-26 NOTE — DIETITIAN INITIAL EVALUATION ADULT - EDUCATION DIETARY MODIFICATIONS
Education Provided on Need for Supplementation & DASH-TLC Diet/(2) meets goals/outcomes/verbalization

## 2025-03-26 NOTE — DIETITIAN INITIAL EVALUATION ADULT - NS FNS DIET ORDER
DASH-TLC Diet w/ Thin Liquids (IDDSI Level 0) & Ensure Plus High Protein 8oz PO BID  Recommend Change Supplement to Ensure Max 11oz PO BID (Provides 300kcal & 60grams of Protein)  Recommend Initiate Lalito 1 Packet BID (Provides 180kcal & 28grams of Protein)   Education Provided on Need for Supplementation & DASH-TLC Diet

## 2025-03-26 NOTE — DIETITIAN INITIAL EVALUATION ADULT - ADD RECOMMEND
1) Monitor Weights, Intake, Tolerance, Skin, POCT & Labwork  2) Education Provided on Need for Supplementation & DASH-TLC Diet   3) Recommend Change Supplement to Ensure Max 11oz PO BID  4) Recommend Initiate Lalito 1 Packet BID  5) Continue Nutrition Plan of Care

## 2025-03-26 NOTE — DIETITIAN INITIAL EVALUATION ADULT - PERTINENT MEDS FT
MEDICATIONS  (STANDING):  ammonium lactate 12% Lotion 1 Application(s) Topical two times a day  AQUAPHOR (petrolatum Ointment) 1 Application(s) Topical three times a day  buMETAnide 1 milliGRAM(s) Oral two times a day  cefTRIAXone   IVPB 2000 milliGRAM(s) IV Intermittent every 24 hours  chlorhexidine 4% Liquid 1 Application(s) Topical <User Schedule>  DAPTOmycin IVPB 650 milliGRAM(s) IV Intermittent every 24 hours  heparin   Injectable 5000 Unit(s) SubCutaneous every 8 hours  levothyroxine 150 MICROGram(s) Oral daily  metoprolol succinate ER 25 milliGRAM(s) Oral daily  oxyCODONE  ER Tablet 15 milliGRAM(s) Oral every 12 hours  pantoprazole    Tablet 40 milliGRAM(s) Oral before breakfast  polyethylene glycol 3350 17 Gram(s) Oral daily  rosuvastatin 5 milliGRAM(s) Oral at bedtime  senna 2 Tablet(s) Oral at bedtime  tamsulosin 0.8 milliGRAM(s) Oral at bedtime    MEDICATIONS  (PRN):  aluminum hydroxide/magnesium hydroxide/simethicone Suspension 30 milliLiter(s) Oral every 4 hours PRN Dyspepsia  lactulose Syrup 20 Gram(s) Oral three times a day PRN for constipation  melatonin 3 milliGRAM(s) Oral at bedtime PRN Insomnia  oxyCODONE    IR 15 milliGRAM(s) Oral every 4 hours PRN Moderate Pain (4 - 6)  sodium chloride 0.9% lock flush 10 milliLiter(s) IV Push every 1 hour PRN Pre/post blood products, medications, blood draw, and to maintain line patency

## 2025-03-26 NOTE — DIETITIAN INITIAL EVALUATION ADULT - FACTORS AFF FOOD INTAKE
States Good PO Intake/Appetite over Last Week  Denies Recent Changes/Decrease in Meal Consumption (Per Patient)/none

## 2025-03-27 LAB
ALBUMIN SERPL ELPH-MCNC: 2.4 G/DL — LOW (ref 3.3–5)
ALP SERPL-CCNC: 70 U/L — SIGNIFICANT CHANGE UP (ref 40–120)
ALT FLD-CCNC: 55 U/L — HIGH (ref 10–45)
ANION GAP SERPL CALC-SCNC: 4 MMOL/L — LOW (ref 5–17)
AST SERPL-CCNC: 42 U/L — HIGH (ref 10–40)
BASOPHILS # BLD AUTO: 0.07 K/UL — SIGNIFICANT CHANGE UP (ref 0–0.2)
BASOPHILS NFR BLD AUTO: 0.8 % — SIGNIFICANT CHANGE UP (ref 0–2)
BILIRUB SERPL-MCNC: 0.7 MG/DL — SIGNIFICANT CHANGE UP (ref 0.2–1.2)
BUN SERPL-MCNC: 28 MG/DL — HIGH (ref 7–23)
CALCIUM SERPL-MCNC: 8.7 MG/DL — SIGNIFICANT CHANGE UP (ref 8.4–10.5)
CHLORIDE SERPL-SCNC: 99 MMOL/L — SIGNIFICANT CHANGE UP (ref 96–108)
CO2 SERPL-SCNC: 34 MMOL/L — HIGH (ref 22–31)
CREAT SERPL-MCNC: 1.15 MG/DL — SIGNIFICANT CHANGE UP (ref 0.5–1.3)
EGFR: 68 ML/MIN/1.73M2 — SIGNIFICANT CHANGE UP
EGFR: 68 ML/MIN/1.73M2 — SIGNIFICANT CHANGE UP
EOSINOPHIL # BLD AUTO: 0.32 K/UL — SIGNIFICANT CHANGE UP (ref 0–0.5)
EOSINOPHIL NFR BLD AUTO: 3.9 % — SIGNIFICANT CHANGE UP (ref 0–6)
GLUCOSE SERPL-MCNC: 106 MG/DL — HIGH (ref 70–99)
HCT VFR BLD CALC: 25.3 % — LOW (ref 39–50)
HGB BLD-MCNC: 8.1 G/DL — LOW (ref 13–17)
IMM GRANULOCYTES NFR BLD AUTO: 0.5 % — SIGNIFICANT CHANGE UP (ref 0–0.9)
LYMPHOCYTES # BLD AUTO: 0.95 K/UL — LOW (ref 1–3.3)
LYMPHOCYTES # BLD AUTO: 11.4 % — LOW (ref 13–44)
MCHC RBC-ENTMCNC: 28.2 PG — SIGNIFICANT CHANGE UP (ref 27–34)
MCHC RBC-ENTMCNC: 32 G/DL — SIGNIFICANT CHANGE UP (ref 32–36)
MCV RBC AUTO: 88.2 FL — SIGNIFICANT CHANGE UP (ref 80–100)
MONOCYTES # BLD AUTO: 0.89 K/UL — SIGNIFICANT CHANGE UP (ref 0–0.9)
MONOCYTES NFR BLD AUTO: 10.7 % — SIGNIFICANT CHANGE UP (ref 2–14)
NEUTROPHILS # BLD AUTO: 6.03 K/UL — SIGNIFICANT CHANGE UP (ref 1.8–7.4)
NEUTROPHILS NFR BLD AUTO: 72.7 % — SIGNIFICANT CHANGE UP (ref 43–77)
NRBC BLD AUTO-RTO: 0 /100 WBCS — SIGNIFICANT CHANGE UP (ref 0–0)
PLATELET # BLD AUTO: 213 K/UL — SIGNIFICANT CHANGE UP (ref 150–400)
POTASSIUM SERPL-MCNC: 3.9 MMOL/L — SIGNIFICANT CHANGE UP (ref 3.5–5.3)
POTASSIUM SERPL-SCNC: 3.9 MMOL/L — SIGNIFICANT CHANGE UP (ref 3.5–5.3)
PROT SERPL-MCNC: 5.9 G/DL — LOW (ref 6–8.3)
RBC # BLD: 2.87 M/UL — LOW (ref 4.2–5.8)
RBC # FLD: 14.1 % — SIGNIFICANT CHANGE UP (ref 10.3–14.5)
SODIUM SERPL-SCNC: 137 MMOL/L — SIGNIFICANT CHANGE UP (ref 135–145)
WBC # BLD: 8.3 K/UL — SIGNIFICANT CHANGE UP (ref 3.8–10.5)
WBC # FLD AUTO: 8.3 K/UL — SIGNIFICANT CHANGE UP (ref 3.8–10.5)

## 2025-03-27 PROCEDURE — 99233 SBSQ HOSP IP/OBS HIGH 50: CPT

## 2025-03-27 RX ADMIN — Medication 3 MILLIGRAM(S): at 21:39

## 2025-03-27 RX ADMIN — DAPTOMYCIN 126 MILLIGRAM(S): 500 INJECTION, POWDER, LYOPHILIZED, FOR SOLUTION INTRAVENOUS at 12:31

## 2025-03-27 RX ADMIN — HEPARIN SODIUM 5000 UNIT(S): 1000 INJECTION INTRAVENOUS; SUBCUTANEOUS at 22:00

## 2025-03-27 RX ADMIN — HEPARIN SODIUM 5000 UNIT(S): 1000 INJECTION INTRAVENOUS; SUBCUTANEOUS at 14:58

## 2025-03-27 RX ADMIN — BUMETANIDE 1 MILLIGRAM(S): 1 TABLET ORAL at 06:01

## 2025-03-27 RX ADMIN — TAMSULOSIN HYDROCHLORIDE 0.8 MILLIGRAM(S): 0.4 CAPSULE ORAL at 21:29

## 2025-03-27 RX ADMIN — ROSUVASTATIN CALCIUM 5 MILLIGRAM(S): 20 TABLET, FILM COATED ORAL at 21:32

## 2025-03-27 RX ADMIN — OXYCODONE HYDROCHLORIDE 15 MILLIGRAM(S): 30 TABLET ORAL at 12:20

## 2025-03-27 RX ADMIN — OXYCODONE HYDROCHLORIDE 15 MILLIGRAM(S): 30 TABLET ORAL at 06:00

## 2025-03-27 RX ADMIN — WHITE PETROLATUM 1 APPLICATION(S): 1 OINTMENT TOPICAL at 21:59

## 2025-03-27 RX ADMIN — Medication 1 APPLICATION(S): at 06:01

## 2025-03-27 RX ADMIN — OXYCODONE HYDROCHLORIDE 15 MILLIGRAM(S): 30 TABLET ORAL at 17:56

## 2025-03-27 RX ADMIN — CEFTRIAXONE 100 MILLIGRAM(S): 500 INJECTION, POWDER, FOR SOLUTION INTRAMUSCULAR; INTRAVENOUS at 21:59

## 2025-03-27 RX ADMIN — OXYCODONE HYDROCHLORIDE 15 MILLIGRAM(S): 30 TABLET ORAL at 21:35

## 2025-03-27 RX ADMIN — Medication 40 MILLIGRAM(S): at 06:01

## 2025-03-27 RX ADMIN — Medication 1 APPLICATION(S): at 17:57

## 2025-03-27 RX ADMIN — HEPARIN SODIUM 5000 UNIT(S): 1000 INJECTION INTRAVENOUS; SUBCUTANEOUS at 06:01

## 2025-03-27 RX ADMIN — METOPROLOL SUCCINATE 25 MILLIGRAM(S): 50 TABLET, EXTENDED RELEASE ORAL at 06:01

## 2025-03-27 RX ADMIN — Medication 150 MICROGRAM(S): at 06:01

## 2025-03-27 RX ADMIN — BUMETANIDE 1 MILLIGRAM(S): 1 TABLET ORAL at 14:58

## 2025-03-27 NOTE — PROGRESS NOTE ADULT - ASSESSMENT
Assessment/Plan:  Mr. Rishabh Shaver is a 74-year-old male patient with past medical history of benign prostatic hyperplasia, diverticulosis, Graves' disease, hemorrhoids, hyperlipidemia, hypothyroidism, osteoarthritis, spinal stenosis, spinal cord injury (2004), and multiple spinal surgeries (three cervical and one lumbar), who is admitted for Acute Inpatient Rehabilitation with a multidisciplinary rehab program at [facility] with functional impairments in ADLs and mobility secondary to a thoracic epidural abscess s/p posterior thoracic laminectomy and fusion on 3/18/25 with Dr. Keith Cantu.       #Thoracic epidural abscess s/p laminectomy and fusion surgery      *  brace when OOB      * Staples to be removed on POD #14 (4/1/25 - can be done at rehab if patient is not able to see Dr. Cantu in the office)      * Sacral wound assessment by wound/skin team,      * Maintain Ortega for now as pt has had urinary retention with previous trial of void - On Flomax 8mg daily      * Right Venodyne only due to left calf DVT (s/p pre-op IVC filter)   - Activity Limitations: Decreased social, vocational and leisure activities, decreased self care and ADLs, decreased mobility, decreased ability to manage household and finances.   - Comprehensive Multidisciplinary Rehab Program:      * 3 hours a day, 5 days a week.      * PT 1hr/day: Focused on improving strength, endurance, coordination, balance, functional mobility, and transfers      * OT 1hr/day: Focused on improving strength, fine motor skills, coordination, posture and ADLs.      -----------------------------------------------------------------------------------    Concurrent Medical Problems    #GOVIND on CKD  - Resolved  - LE edema  - Hypoalbuminemia  - Proteinuria  - IVF as needed with fluid balance  - Significant debilitating ansarca.   - Trial of albumin lasix started 3/8->improvement.   - IV Laxis/IV albumin  BID(3/9)  - Change to Bumex/Albumin (3/11)-> good effect  - Switched to PO bumex 1mg BID 3/24  - Trend Albumin (3/24- 2.3)  - Trend Protein total (3/24- 5.5 ); Total protein, Random Urine (3/18- 24)  - Trend CMP (3/25- creat 0.94/ BUN 27)    #L gastrocnemius and L soleal DVT  - appears provoked from decrease ambulation from recent spinal abscess  - Could not tolerate VQ scan for r/o PE 2/2 pain  - Switch to Eliquis will be new med on discharge (of note patient completed Eliquis load)   - Therapeutic Lovenox (3/12) in the setting of possible spine intervention.   - Last dose of AC night of 3/16. Restart AC (Eliquis 5mg BID) 2 weeks post op (tentative 4/2/2025) per neurosx if hgb stable and surgical site stable  - S/P IVC filter placement 3/17.    #Spinal epidural abscess POA and already being treated with IV antibiotics with suspected worsening of disease  - S/p thoracic laminectomy and fusion 3/18. [facility]  brace   - Surgical culture with staph epi possible contaminant but given clinical presentation on admission, IV Dapto added.   - IV Rocephin 2g HS via PICC line thru 5/2/25  - IV Daptomycin 650mg daily via PICC line thru 5/2/2025  - Pain management: Tylenol PRN, Oxycodone 15mg q4h PRN moderate pain; Oxycontin 15mg BID  - Staples to be removed on POD #14 (4/1/25)  (can be done at rehab if patient is not able to see Dr. Cantu in the office)  - CXR for PICC confirmation on admission     #Mild Rhabdomyolysis Improving  - Associated elevated transaminases  - Suspect from Ansarca/Edema  - Improving with adequate diuresis of edema  - Associated elevated transaminase; relatively stable. Continue to monitor (3/24- AST 38/ALT 57)    #Leukocytosis  - Likely rx to DVT + Discitis.  - No other signs of sepsis , afebrile   - Trend CBC and monitor fever curve (3/25- WBC 8.69)    #Hypothyroidism  - Synthroid 150mcg daily    #CAD  #HTN  #HLD  - Metoprolol 25mg daily   - Crestor 5mg HS    #Anemia/MARLENA  - Trend H/H (3/25- 8.7/27.3)  - Transfuse if Hgb <7 PRN     #Sleep:   - Maintain quiet hours and low stim environment.  - Melatonin 3mg HS PRN to maximize participation in therapy during the day.     #GI/Bowel:  - Senna QHS, Miralax Daily, Lactulose 30ml TID PRN, Maalox PRN   - GI ppx: Pantoprazole 40mg daily     #/Bladder  #BPH  #Urinary retention likely augmented by degree of anasarca  - S/P Ortega; TOV  ->failed. Ortega re-inserted 3/6->fell out without knowing with associated hematuria->recurrent retention 3/7->Ortega re-inserted (3/7)  - Ortega on admission  - TOV when appropriate   - Flomax 0.8mg HS  - Monitor U/O    #Skin/Pressure Injury:   Skin assessment on admission:   - Generalized dry flakey skin  - 19cm posterior cervical incision with 32 staples and 2 steri strips  - Left buttock DTI pressure injury measuring 14 x 9 cm      * appearing as a dark purple discoloration of intact skin, with a central superficial open area measuring 6 x 2 cm.      * at the periphery of the 14 x 9 cm area, the discoloration is somewhat lighter;       * dark red non-blanchable erythema.  - Bilateral lower extremity edema.  - Healed lumbar surgical incision scar  - Right heel blanchable redness  - Bilateral dry cracked heels and plantar surface  - Lac hydrin BID BLE  - Aquaphor BID to generalized dry skin   - Appreciate wound care consult     #Diet/Dysphagia:  - Dysphagia: SLP consult for swallow function evaluation and treatment  - Current Diet: Regular- DASH  - Aspiration Precautions  - Nutrition consult     #Patient Education  - Education provided on the following:      * Admitting diagnosis and functional implications      * Functional goals      * Bladder management      * Bowel management      * Skin care management      * Intensity of service and scheduling of rehab disciplines      * Plan of care and role of interdisciplinary team conference in discharge planning      * Reconciliation of medications from prior institution    #Precautions / PROPHYLAXIS:   - Falls, Spinal  - Ortho: Weight bearing status: FWB;  brace OOB  - DVT ppx: Heparin 5000U TID, TEDs  - Pressure injury/Skin: Turn Q2hrs while in bed, OOB to Chair, PT/OT      ---------------  Code Status: FULL  Emergency Contact:    Outpatient Follow-up (Specialty/Name of physician):    [place], Yue Paige  Family Medicine  [address], [address]  [place] [address]  Phone: [phone]  Fax: [phone]  Established Patient  Follow Up Time: 1 week    Keith Cantu [facility]  Neurosurgery  [address]  [place] [address]  Phone: [phone]  Fax: [phone]  Established Patient  Follow Up Time: 2 weeks    Luis A Brennan  [facility]  CARDIOLOGY [address]  Scheduled Appointment: 04/23/2025        --------------  Goals: Safe discharge to Home*****  Estimated Length of Stay: 10-14 days  Rehab Potential: Good  Medical Prognosis: Good  Estimated Disposition: Home with Home Care  ---------------    PRESCREEN COMPARISON:  I have reviewed the prescreen information and I have found no relevant changes between the preadmission screening and my post admission evaluation.    RATIONALE FOR INPATIENT ADMISSION: Patient demonstrates the following:  [X] Medically appropriate for rehabilitation admission  [X] Has attainable rehab goals with an appropriate initial discharge plan  [X]Has rehabilitation potential (expected to make a significant improvement within a reasonable period of time)  [X] Requires close medical management by a rehab physician, rehab nursing care, Hospitalist and comprehensive interdisciplinary team (including PT, OT)

## 2025-03-27 NOTE — PROGRESS NOTE ADULT - SUBJECTIVE AND OBJECTIVE BOX
HPI:  Mr. Rishabh Shaver is a 74-year-old male patient with past medical history of benign prostatic hyperplasia, diverticulosis, Graves' disease, hemorrhoids, hyperlipidemia, hypothyroidism, osteoarthritis, spinal stenosis, spinal cord injury (2004), and multiple spinal surgeries (three cervical and one lumbar), who presented to [facility] on 3/4/25 from [facility] due to lower extremity swelling and abnormal BUN/creatinine levels for further evaluation and management. The patient was recently admitted to [facility] from 02/18/25 through 02/25/25 for sepsis secondary to epidural abscess. He had a peripherally inserted central catheter line in the right arm and was on nightly Ceftriaxone treatment until 04/16/25. At the ED, he was found to have GOVIND, a left soleal vein, and a left gastrocnemius DVT. He was started on heparin gtt. Hospital course complicated by urinary retention requiring Ortega catheter insertion, rhabdomyolysis, elevated LFTs, anasarca, leukocytosis due to thoracic spine discitis OM and early paraspinal abscess, as well as new onset RUE paresthesias and weakness. He is s/p IVC filter placement on 3/17 and was recommended surgical management. He is s/p posterior thoracic laminectomy and fusion on 3/18/25 with Dr. Keith Cantu. Surgical culture with rare staph epi. Recommendations by ID to continue IV Rocephin and Daptomycin through 5/2/25. Patient was evaluated by PM&R and therapy for functional deficits, gait/ADL impairments and acute rehabilitation was recommended. Patient was cleared for discharge to [facility] IRF on 3/25/25. (25 Mar 2025 17:09)        SUBJECTIVE/ROS:    Patient was seen and evaluated at bedside/sitting bedside on wheelchair /during therapy today  NAD and tolerating therapies. No other acute primary complaints.  Ortega on admission removed, now in diaper.  Last BM yesterday.  Pain well-controlled.  Case was discussed at Interdisciplinary Team meeting today.  Denies any CP, SOB, KESSLER, palpitations, fever, chills, body aches, cough, congestion, or any other symptoms at this time.       Vital Signs Last 24 Hrs  T(C): 36.7 (03-27-25 @ 08:21), Max: 37.1 (03-26-25 @ 19:34)  T(F): 98 (03-27-25 @ 08:21), Max: 98.7 (03-26-25 @ 19:34)  HR: 81 (03-27-25 @ 08:21) (81 - 96)  BP: 108/64 (03-27-25 @ 08:21) (101/65 - 125/70)  ABP: --  ABP(mean): --  RR: 16 (03-27-25 @ 08:21) (16 - 17)  SpO2: 92% (03-27-25 @ 08:21) (92% - 95%)          LABS:                            8.1    8.30  )-----------( 213      ( 27 Mar 2025 06:23 )             25.3     03-27    137  |  99  |  28[H]  ----------------------------<  106[H]  3.9   |  34[H]  |  1.15    Ca    8.7      27 Mar 2025 06:23    TPro  5.9[L]  /  Alb  2.4[L]  /  TBili  0.7  /  DBili  x   /  AST  42[H]  /  ALT  55[H]  /  AlkPhos  70  03-27      Urinalysis Basic - ( 27 Mar 2025 06:23 )    Color: x / Appearance: x / SG: x / pH: x  Gluc: 106 mg/dL / Ketone: x  / Bili: x / Urobili: x   Blood: x / Protein: x / Nitrite: x   Leuk Esterase: x / RBC: x / WBC x   Sq Epi: x / Non Sq Epi: x / Bacteria: x      CAPILLARY BLOOD GLUCOSE                  MEDICATIONS  (STANDING):  ammonium lactate 12% Lotion 1 Application(s) Topical two times a day  AQUAPHOR (petrolatum Ointment) 1 Application(s) Topical three times a day  buMETAnide 1 milliGRAM(s) Oral two times a day  cefTRIAXone   IVPB 2000 milliGRAM(s) IV Intermittent every 24 hours  chlorhexidine 4% Liquid 1 Application(s) Topical <User Schedule>  DAPTOmycin IVPB 650 milliGRAM(s) IV Intermittent every 24 hours  heparin   Injectable 5000 Unit(s) SubCutaneous every 8 hours  levothyroxine 150 MICROGram(s) Oral daily  metoprolol succinate ER 25 milliGRAM(s) Oral daily  oxyCODONE  ER Tablet 15 milliGRAM(s) Oral every 12 hours  pantoprazole    Tablet 40 milliGRAM(s) Oral before breakfast  polyethylene glycol 3350 17 Gram(s) Oral daily  rosuvastatin 5 milliGRAM(s) Oral at bedtime  senna 2 Tablet(s) Oral at bedtime  tamsulosin 0.8 milliGRAM(s) Oral at bedtime    MEDICATIONS  (PRN):  aluminum hydroxide/magnesium hydroxide/simethicone Suspension 30 milliLiter(s) Oral every 4 hours PRN Dyspepsia  lactulose Syrup 20 Gram(s) Oral three times a day PRN for constipation  melatonin 3 milliGRAM(s) Oral at bedtime PRN Insomnia  oxyCODONE    IR 15 milliGRAM(s) Oral every 4 hours PRN Moderate Pain (4 - 6)  sodium chloride 0.9% lock flush 10 milliLiter(s) IV Push every 1 hour PRN Pre/post blood products, medications, blood draw, and to maintain line patency      PHYSICAL EXAM:     Gen - NAD, Comfortable  HEENT -  EOMI,Normal Conjunctivae  Neck - Supple, + limited ROM  Pulm - breathing comfortably  Cardiovascular - warm and well perfused  Abdomen - soft  Extremities - +BLE 3+ pitting edema, +B/L pedal edema, +RUE PICC line   /GI- + diaper  Neuro-     Cognitive - awake, alert, oriented to person, place, date, year, and situation.  Able  to follow command     Communication - Fluent, Comprehensible, No dysarthria, No aphasia      Cranial Nerves -No facial asymmetry, Tongue midline, EOMI, Shoulder shrug intact     Motor -                    LEFT    UE - ShAB 5/5, EF 5/5, EE 5/5,  5/5                    RIGHT UE - ShAB 5/5, EF 5/5, EE 5/5,   5/5                    LEFT    LE - HF 1/5, KE 1/5, DF 4/5, PF 5/5                    RIGHT LE - HF 1/5, KE 1/5, DF 4/5, PF 5/5        Sensory - Impaired to LT LLE from knee to ankle. Preserved upper b/l  Psychiatric - Mood stable, Affect WNL   HPI:  Mr. Rishabh Shaver is a 74-year-old male patient with past medical history of benign prostatic hyperplasia, diverticulosis, Graves' disease, hemorrhoids, hyperlipidemia, hypothyroidism, osteoarthritis, spinal stenosis, spinal cord injury (2004), and multiple spinal surgeries (three cervical and one lumbar), who presented to [facility] on 3/4/25 from [facility] due to lower extremity swelling and abnormal BUN/creatinine levels for further evaluation and management. The patient was recently admitted to [facility] from 02/18/25 through 02/25/25 for sepsis secondary to epidural abscess. He had a peripherally inserted central catheter line in the right arm and was on nightly Ceftriaxone treatment until 04/16/25. At the ED, he was found to have GOVIND, a left soleal vein, and a left gastrocnemius DVT. He was started on heparin gtt. Hospital course complicated by urinary retention requiring Ortega catheter insertion, rhabdomyolysis, elevated LFTs, anasarca, leukocytosis due to thoracic spine discitis OM and early paraspinal abscess, as well as new onset RUE paresthesias and weakness. He is s/p IVC filter placement on 3/17 and was recommended surgical management. He is s/p posterior thoracic laminectomy and fusion on 3/18/25 with Dr. Keith Cantu. Surgical culture with rare staph epi. Recommendations by ID to continue IV Rocephin and Daptomycin through 5/2/25. Patient was evaluated by PM&R and therapy for functional deficits, gait/ADL impairments and acute rehabilitation was recommended. Patient was cleared for discharge to [facility] IRF on 3/25/25. (25 Mar 2025 17:09)    TDD: 4/22/25 home  ___________________________________________________________________________    SUBJECTIVE/ROS  Patient was seen and evaluated at bedside/sitting bedside on wheelchair /during therapy today  NAD and tolerating therapies. No other acute primary complaints.  Ortega on admission removed, now in diaper.  Last BM yesterday.  Pain well-controlled.  Case was discussed at Interdisciplinary Team meeting today.  A tentative discharge date is outlined above.  Patient is eager to continue participation on the recommended rehabilitation program.  Denies any CP, SOB, KESSLER, palpitations, fever, chills, body aches, cough, congestion, or any other symptoms at this time.     ___________________________________________________________________________    Vital Signs Last 24 Hrs  T(C): 36.7 (03-27-25 @ 08:21), Max: 37.1 (03-26-25 @ 19:34)  T(F): 98 (03-27-25 @ 08:21), Max: 98.7 (03-26-25 @ 19:34)  HR: 81 (03-27-25 @ 08:21) (81 - 96)  BP: 108/64 (03-27-25 @ 08:21) (101/65 - 125/70)  RR: 16 (03-27-25 @ 08:21) (16 - 17)  SpO2: 92% (03-27-25 @ 08:21) (92% - 95%)    ___________________________________________________________________________    LABS                          8.1    8.30  )-----------( 213      ( 27 Mar 2025 06:23 )             25.3       03-27    137  |  99  |  28[H]  ----------------------------<  106[H]  3.9   |  34[H]  |  1.15    Ca    8.7      27 Mar 2025 06:23    TPro  5.9[L]  /  Alb  2.4[L]  /  TBili  0.7  /  DBili  x   /  AST  42[H]  /  ALT  55[H]  /  AlkPhos  70  03-27    ___________________________________________________________________________    MEDICATIONS  (STANDING):  ammonium lactate 12% Lotion 1 Application(s) Topical two times a day  AQUAPHOR (petrolatum Ointment) 1 Application(s) Topical three times a day  buMETAnide 1 milliGRAM(s) Oral two times a day  cefTRIAXone   IVPB 2000 milliGRAM(s) IV Intermittent every 24 hours  chlorhexidine 4% Liquid 1 Application(s) Topical <User Schedule>  DAPTOmycin IVPB 650 milliGRAM(s) IV Intermittent every 24 hours  heparin   Injectable 5000 Unit(s) SubCutaneous every 8 hours  levothyroxine 150 MICROGram(s) Oral daily  metoprolol succinate ER 25 milliGRAM(s) Oral daily  oxyCODONE  ER Tablet 15 milliGRAM(s) Oral every 12 hours  pantoprazole    Tablet 40 milliGRAM(s) Oral before breakfast  polyethylene glycol 3350 17 Gram(s) Oral daily  rosuvastatin 5 milliGRAM(s) Oral at bedtime  senna 2 Tablet(s) Oral at bedtime  tamsulosin 0.8 milliGRAM(s) Oral at bedtime    MEDICATIONS  (PRN):  aluminum hydroxide/magnesium hydroxide/simethicone Suspension 30 milliLiter(s) Oral every 4 hours PRN Dyspepsia  lactulose Syrup 20 Gram(s) Oral three times a day PRN for constipation  melatonin 3 milliGRAM(s) Oral at bedtime PRN Insomnia  oxyCODONE    IR 15 milliGRAM(s) Oral every 4 hours PRN Moderate Pain (4 - 6)  sodium chloride 0.9% lock flush 10 milliLiter(s) IV Push every 1 hour PRN Pre/post blood products, medications, blood draw, and to maintain line patency    ___________________________________________________________________________    PHYSICAL EXAM:    Gen - NAD, Comfortable  HEENT -  EOMI,Normal Conjunctivae  Neck - Supple, + limited ROM  Pulm - breathing comfortably  Cardiovascular - warm and well perfused  Abdomen - soft  Extremities - +BLE 3+ pitting edema, +B/L pedal edema, +RUE PICC line   /GI- + diaper  Neuro-     Cognitive - awake, alert, oriented to person, place, date, year, and situation.  Able  to follow command     Communication - Fluent, Comprehensible, No dysarthria, No aphasia      Cranial Nerves -No facial asymmetry, Tongue midline, EOMI, Shoulder shrug intact     Motor -                    LEFT    UE - ShAB 5/5, EF 5/5, EE 5/5,  5/5                    RIGHT UE - ShAB 5/5, EF 5/5, EE 5/5,   5/5                    LEFT    LE - HF 1/5, KE 1/5, DF 4/5, PF 5/5                    RIGHT LE - HF 1/5, KE 1/5, DF 4/5, PF 5/5        Sensory - Impaired to LT LLE from knee to ankle. Preserved upper b/l  Psychiatric - Mood stable, Affect WNL      ___________________________________________________________________________

## 2025-03-27 NOTE — PROGRESS NOTE ADULT - SUBJECTIVE AND OBJECTIVE BOX
Medicine Progress Note    Patient is a 74y old  Male who presents with a chief complaint of Thoracic epidural abscess s/p laminectomy and fusion surgery (27 Mar 2025 10:33)      SUBJECTIVE / OVERNIGHT EVENTS:  retaining urine needed SC  no new complaints      ROS:  denied fever/chills/CP/SOB/cough/palpitation/dizziness/abdominal pian/nausea/vomiting/diarrhoea/constipation/dysuria/leg or calf pain/headaches.all other ROS neg    MEDICATIONS  (STANDING):  ammonium lactate 12% Lotion 1 Application(s) Topical two times a day  AQUAPHOR (petrolatum Ointment) 1 Application(s) Topical three times a day  buMETAnide 1 milliGRAM(s) Oral two times a day  cefTRIAXone   IVPB 2000 milliGRAM(s) IV Intermittent every 24 hours  chlorhexidine 4% Liquid 1 Application(s) Topical <User Schedule>  DAPTOmycin IVPB 650 milliGRAM(s) IV Intermittent every 24 hours  heparin   Injectable 5000 Unit(s) SubCutaneous every 8 hours  levothyroxine 150 MICROGram(s) Oral daily  metoprolol succinate ER 25 milliGRAM(s) Oral daily  oxyCODONE  ER Tablet 15 milliGRAM(s) Oral every 12 hours  pantoprazole    Tablet 40 milliGRAM(s) Oral before breakfast  polyethylene glycol 3350 17 Gram(s) Oral daily  rosuvastatin 5 milliGRAM(s) Oral at bedtime  senna 2 Tablet(s) Oral at bedtime  tamsulosin 0.8 milliGRAM(s) Oral at bedtime    MEDICATIONS  (PRN):  aluminum hydroxide/magnesium hydroxide/simethicone Suspension 30 milliLiter(s) Oral every 4 hours PRN Dyspepsia  lactulose Syrup 20 Gram(s) Oral three times a day PRN for constipation  melatonin 3 milliGRAM(s) Oral at bedtime PRN Insomnia  oxyCODONE    IR 15 milliGRAM(s) Oral every 4 hours PRN Moderate Pain (4 - 6)  sodium chloride 0.9% lock flush 10 milliLiter(s) IV Push every 1 hour PRN Pre/post blood products, medications, blood draw, and to maintain line patency    CAPILLARY BLOOD GLUCOSE        I&O's Summary    26 Mar 2025 07:01  -  27 Mar 2025 07:00  --------------------------------------------------------  IN: 2700 mL / OUT: 803 mL / NET: 1897 mL        PHYSICAL EXAM:  Vital Signs Last 24 Hrs  T(C): 36.7 (27 Mar 2025 08:21), Max: 37.1 (26 Mar 2025 19:34)  T(F): 98 (27 Mar 2025 08:21), Max: 98.7 (26 Mar 2025 19:34)  HR: 81 (27 Mar 2025 08:21) (81 - 96)  BP: 108/64 (27 Mar 2025 08:21) (101/65 - 125/70)  BP(mean): --  RR: 16 (27 Mar 2025 08:21) (16 - 17)  SpO2: 92% (27 Mar 2025 08:21) (92% - 95%)    Parameters below as of 27 Mar 2025 08:21  Patient On (Oxygen Delivery Method): room air    GENERAL: Not in distress. Alert    HEENT: clear conjuctiva, MMM. no pallor or icterus. TLSO brace   CARDIOVASCULAR: RRR S1, S2. No murmur/rubs/gallop  LUNGS: BLAE+, no rales, no wheezing, no rhonchi.    ABDOMEN: ND. Soft,  NT, no guarding / rebound / rigidity. BS normoactive  : Ortega in place. draining straw urine  BACK: No spine tenderness.  EXTREMITIES: b/l edema. no leg or calf TP.  SKIN: warm and dry  PSYCHIATRIC: Calm.  No agitation.  CNS: AAO*3. moves limbs, follows commands    LABS:                        8.1    8.30  )-----------( 213      ( 27 Mar 2025 06:23 )             25.3     03-27    137  |  99  |  28[H]  ----------------------------<  106[H]  3.9   |  34[H]  |  1.15    Ca    8.7      27 Mar 2025 06:23    TPro  5.9[L]  /  Alb  2.4[L]  /  TBili  0.7  /  DBili  x   /  AST  42[H]  /  ALT  55[H]  /  AlkPhos  70  03-27    Urinalysis Basic - ( 27 Mar 2025 06:23 )    Color: x / Appearance: x / SG: x / pH: x  Gluc: 106 mg/dL / Ketone: x  / Bili: x / Urobili: x   Blood: x / Protein: x / Nitrite: x   Leuk Esterase: x / RBC: x / WBC x   Sq Epi: x / Non Sq Epi: x / Bacteria: x        COVID-19 PCR: NotDetec (25 Mar 2025 21:35)  SARS-CoV-2: NotDetec (17 Feb 2025 21:11)      RADIOLOGY & ADDITIONAL TESTS:  Imaging from Last 24 Hours:    Electrocardiogram/QTc Interval:    COORDINATION OF CARE:  Care Discussed with Consultants/Other Providers:

## 2025-03-27 NOTE — PROGRESS NOTE ADULT - ASSESSMENT
74-year-old male patient with h/o BPH, diverticulosis, Graves' disease, hemorrhoids, HLD, hypothyroidism, osteoarthritis, spinal stenosis, spinal cord injury (2004), and multiple spinal surgeries (three cervical and one lumbar), who presented to [facility] on 3/4/25 from [facility] due to lower extremity swelling and abnormal BUN/creatinine levels for further evaluation and management. The patient was recently admitted to [facility] from 02/18/25 through 02/25/25 for sepsis secondary to epidural abscess. He had a peripherally inserted central catheter line in the right arm and was on nightly Ceftriaxone treatment until 04/16/25. At the ED, he was found to have GOVIND, a left soleal vein, and a left gastrocnemius DVT. He was started on heparin gtt. Hospital course complicated by urinary retention requiring Ortega catheter insertion, rhabdomyolysis, elevated LFTs, anasarca, leukocytosis due to thoracic spine discitis OM and early paraspinal abscess, as well as new onset RUE paresthesias and weakness. He is s/p IVC filter placement on 3/17 and was recommended surgical management. He is s/p posterior thoracic laminectomy and fusion on 3/18/25 with Dr. Keith Cantu. Surgical culture with rare staph epi. Recommendations by ID to continue IV Rocephin and Daptomycin through 5/2/25. Patient was evaluated by PM&R and therapy for functional deficits, gait/ADL impairments and acute rehabilitation was recommended. Patient was cleared for discharge to [facility] IRF on 3/25/25. (25 Mar 2025 17:09)    Debility  Thoracic epidural abscess s/p laminectomy and fusion surgery on 3/18    - Tolerating comprehensive rehab  -  brace  - pain control per primary  - fall, spine precautions  Spinal epidural abscess   - S/p thoracic laminectomy and fusion 3/18.   - IV Rocephin 2g HS via PICC line thru 5/2/25  - IV Daptomycin 650mg daily via PICC line thru 5/2/2025  - [place]  brace   - Surgical culture with staph epi possible contaminant but given clinical presentation on admission, IV Dapto added.   - Staples to be removed on 4/1/25    GOVIND resolved]  LE edema  Hypoalbuminemia  Proteinuria  Anasarca    - c/w Bumex 1 mg BID  - Optimize protein intake.   - mobilize   - Monitor labs closely , stable renal function w good UOP   - No need for further IV fluid at this time  - Serum workup negative but will need follow up of  Serum  TRUDY positive for weak IgG Lambda band.  - urine protein 200 mg only ( not nephrotic)   - I/O's.   - daily standing weights      Rhabdomyolysis   - Improving  - Encouraged PO    Transaminitis  - encouraged PO  - Limit Tylenol use  - avoid hepatotoxins    Leukocytosis  - improved  - Likely rx to DVT + Discitis.  - No signs of sepsis , afebrile   - Trend CBC and Temp    CAD  HTN  HLD  - Metoprolol 25mg daily   - Crestor 5mg HS    Anemia  MARLENA  - s/p 2 units of PRBCs given intra-op  - Trend H/H (3/25- 8.7/27.3)  - Transfuse to keep hb >7    Hypothyroidism  - Synthroid 150mcg daily    sacral Decub stage 3  - wound care cx noted  - Continue turning patient Q 2 hours   - Lac hydrin BID BLE  - Aquaphor BID to generalized dry skin     Constipation:  - Senna QHS, Miralax Daily, Lactulose 30ml TID PRN, Maalox PRN   - GI ppx: Pantoprazole 40mg daily     BPH  Urinary retention likely augmented by degree of anasarca  - Ortega POA  - failed TOV in [facility]. last Ortega inserted on 3/22.   - Ortega was removed 3/26. on TOV, retention 763 this am 3/27.   - On Flomax 8mg daily  - Monitor U/O    Acute DVT:   -  US doppler + L soleal and gastroc DVT   - s/p IVC filter 3/17 with interventional cards.   - Could not tolerate VQ scan for r/o PE 2/2 pain  - On Heparin 5000 Sq Q8hrs  - Restart AC (Eliquis 5mg BID per neurosurgery, already loaded) 2 weeks post op per neurosx if hgb stable and surgical site stable. (tentative 4/2/2025)  - Please send Eliquis script to pharmacy on DC [ new med, started in [facility]]    DVT-P: heparin SQ, right SCD. [ DVT on left]  GI: Protonix    Thanks for allowing us to see this patient. Hospitalist service will continue to follow patient progress with you. please call with any question    d/w rehab team at [facility]

## 2025-03-28 DIAGNOSIS — K57.30 DIVERTICULOSIS OF LARGE INTESTINE WITHOUT PERFORATION OR ABSCESS WITHOUT BLEEDING: ICD-10-CM

## 2025-03-28 DIAGNOSIS — R33.8 OTHER RETENTION OF URINE: ICD-10-CM

## 2025-03-28 DIAGNOSIS — E78.5 HYPERLIPIDEMIA, UNSPECIFIED: ICD-10-CM

## 2025-03-28 DIAGNOSIS — B95.5 UNSPECIFIED STREPTOCOCCUS AS THE CAUSE OF DISEASES CLASSIFIED ELSEWHERE: ICD-10-CM

## 2025-03-28 DIAGNOSIS — D64.9 ANEMIA, UNSPECIFIED: ICD-10-CM

## 2025-03-28 DIAGNOSIS — N40.1 BENIGN PROSTATIC HYPERPLASIA WITH LOWER URINARY TRACT SYMPTOMS: ICD-10-CM

## 2025-03-28 DIAGNOSIS — E05.00 THYROTOXICOSIS WITH DIFFUSE GOITER WITHOUT THYROTOXIC CRISIS OR STORM: ICD-10-CM

## 2025-03-28 DIAGNOSIS — K59.00 CONSTIPATION, UNSPECIFIED: ICD-10-CM

## 2025-03-28 DIAGNOSIS — I82.462 ACUTE EMBOLISM AND THROMBOSIS OF LEFT CALF MUSCULAR VEIN: ICD-10-CM

## 2025-03-28 DIAGNOSIS — Z87.891 PERSONAL HISTORY OF NICOTINE DEPENDENCE: ICD-10-CM

## 2025-03-28 DIAGNOSIS — D72.829 ELEVATED WHITE BLOOD CELL COUNT, UNSPECIFIED: ICD-10-CM

## 2025-03-28 DIAGNOSIS — I25.10 ATHEROSCLEROTIC HEART DISEASE OF NATIVE CORONARY ARTERY WITHOUT ANGINA PECTORIS: ICD-10-CM

## 2025-03-28 DIAGNOSIS — M48.03 SPINAL STENOSIS, CERVICOTHORACIC REGION: ICD-10-CM

## 2025-03-28 DIAGNOSIS — Z86.0100 PERSONAL HISTORY OF COLON POLYPS, UNSPECIFIED: ICD-10-CM

## 2025-03-28 DIAGNOSIS — E88.09 OTHER DISORDERS OF PLASMA-PROTEIN METABOLISM, NOT ELSEWHERE CLASSIFIED: ICD-10-CM

## 2025-03-28 DIAGNOSIS — M96.810 INTRAOPERATIVE HEMORRHAGE AND HEMATOMA OF A MUSCULOSKELETAL STRUCTURE COMPLICATING A MUSCULOSKELETAL SYSTEM PROCEDURE: ICD-10-CM

## 2025-03-28 DIAGNOSIS — E86.0 DEHYDRATION: ICD-10-CM

## 2025-03-28 DIAGNOSIS — E87.1 HYPO-OSMOLALITY AND HYPONATREMIA: ICD-10-CM
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
## 2025-03-28 DIAGNOSIS — R31.9 HEMATURIA, UNSPECIFIED: ICD-10-CM

## 2025-03-28 DIAGNOSIS — R78.81 BACTEREMIA: ICD-10-CM

## 2025-03-28 DIAGNOSIS — E87.6 HYPOKALEMIA: ICD-10-CM

## 2025-03-28 DIAGNOSIS — Z96.652 PRESENCE OF LEFT ARTIFICIAL KNEE JOINT: ICD-10-CM

## 2025-03-28 DIAGNOSIS — B95.7 OTHER STAPHYLOCOCCUS AS THE CAUSE OF DISEASES CLASSIFIED ELSEWHERE: ICD-10-CM

## 2025-03-28 DIAGNOSIS — R60.0 LOCALIZED EDEMA: ICD-10-CM

## 2025-03-28 DIAGNOSIS — G06.1 INTRASPINAL ABSCESS AND GRANULOMA: ICD-10-CM

## 2025-03-28 DIAGNOSIS — Z79.82 LONG TERM (CURRENT) USE OF ASPIRIN: ICD-10-CM

## 2025-03-28 DIAGNOSIS — Z79.890 HORMONE REPLACEMENT THERAPY: ICD-10-CM

## 2025-03-28 DIAGNOSIS — G95.29 OTHER CORD COMPRESSION: ICD-10-CM

## 2025-03-28 DIAGNOSIS — Z98.1 ARTHRODESIS STATUS: ICD-10-CM

## 2025-03-28 DIAGNOSIS — G95.89 OTHER SPECIFIED DISEASES OF SPINAL CORD: ICD-10-CM

## 2025-03-28 DIAGNOSIS — M62.838 OTHER MUSCLE SPASM: ICD-10-CM

## 2025-03-28 DIAGNOSIS — M19.90 UNSPECIFIED OSTEOARTHRITIS, UNSPECIFIED SITE: ICD-10-CM
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
## 2025-03-28 DIAGNOSIS — F10.21 ALCOHOL DEPENDENCE, IN REMISSION: ICD-10-CM

## 2025-03-28 DIAGNOSIS — E03.9 HYPOTHYROIDISM, UNSPECIFIED: ICD-10-CM

## 2025-03-28 DIAGNOSIS — N17.9 ACUTE KIDNEY FAILURE, UNSPECIFIED: ICD-10-CM

## 2025-03-28 DIAGNOSIS — R79.89 OTHER SPECIFIED ABNORMAL FINDINGS OF BLOOD CHEMISTRY: ICD-10-CM

## 2025-03-28 DIAGNOSIS — M46.44 DISCITIS, UNSPECIFIED, THORACIC REGION: ICD-10-CM

## 2025-03-28 DIAGNOSIS — M62.82 RHABDOMYOLYSIS: ICD-10-CM

## 2025-03-28 DIAGNOSIS — N18.9 CHRONIC KIDNEY DISEASE, UNSPECIFIED: ICD-10-CM

## 2025-03-28 PROCEDURE — 99232 SBSQ HOSP IP/OBS MODERATE 35: CPT

## 2025-03-28 PROCEDURE — 99233 SBSQ HOSP IP/OBS HIGH 50: CPT

## 2025-03-28 RX ORDER — BISACODYL 5 MG
10 TABLET, DELAYED RELEASE (ENTERIC COATED) ORAL ONCE
Refills: 0 | Status: COMPLETED | OUTPATIENT
Start: 2025-03-28 | End: 2025-03-28

## 2025-03-28 RX ORDER — BISACODYL 5 MG
10 TABLET, DELAYED RELEASE (ENTERIC COATED) ORAL DAILY
Refills: 0 | Status: DISCONTINUED | OUTPATIENT
Start: 2025-03-28 | End: 2025-03-29

## 2025-03-28 RX ADMIN — Medication 3 MILLIGRAM(S): at 22:37

## 2025-03-28 RX ADMIN — BUMETANIDE 1 MILLIGRAM(S): 1 TABLET ORAL at 08:58

## 2025-03-28 RX ADMIN — Medication 2 TABLET(S): at 21:39

## 2025-03-28 RX ADMIN — HEPARIN SODIUM 5000 UNIT(S): 1000 INJECTION INTRAVENOUS; SUBCUTANEOUS at 21:39

## 2025-03-28 RX ADMIN — OXYCODONE HYDROCHLORIDE 15 MILLIGRAM(S): 30 TABLET ORAL at 21:38

## 2025-03-28 RX ADMIN — OXYCODONE HYDROCHLORIDE 15 MILLIGRAM(S): 30 TABLET ORAL at 10:55

## 2025-03-28 RX ADMIN — TAMSULOSIN HYDROCHLORIDE 0.8 MILLIGRAM(S): 0.4 CAPSULE ORAL at 22:19

## 2025-03-28 RX ADMIN — POLYETHYLENE GLYCOL 3350 17 GRAM(S): 17 POWDER, FOR SOLUTION ORAL at 12:36

## 2025-03-28 RX ADMIN — DAPTOMYCIN 126 MILLIGRAM(S): 500 INJECTION, POWDER, LYOPHILIZED, FOR SOLUTION INTRAVENOUS at 12:35

## 2025-03-28 RX ADMIN — BUMETANIDE 1 MILLIGRAM(S): 1 TABLET ORAL at 13:46

## 2025-03-28 RX ADMIN — HEPARIN SODIUM 5000 UNIT(S): 1000 INJECTION INTRAVENOUS; SUBCUTANEOUS at 05:29

## 2025-03-28 RX ADMIN — Medication 1 APPLICATION(S): at 18:28

## 2025-03-28 RX ADMIN — Medication 150 MICROGRAM(S): at 05:30

## 2025-03-28 RX ADMIN — Medication 40 MILLIGRAM(S): at 05:30

## 2025-03-28 RX ADMIN — Medication 10 MILLIGRAM(S): at 12:37

## 2025-03-28 RX ADMIN — WHITE PETROLATUM 1 APPLICATION(S): 1 OINTMENT TOPICAL at 08:26

## 2025-03-28 RX ADMIN — WHITE PETROLATUM 1 APPLICATION(S): 1 OINTMENT TOPICAL at 22:19

## 2025-03-28 RX ADMIN — CEFTRIAXONE 100 MILLIGRAM(S): 500 INJECTION, POWDER, FOR SOLUTION INTRAMUSCULAR; INTRAVENOUS at 21:44

## 2025-03-28 RX ADMIN — ROSUVASTATIN CALCIUM 5 MILLIGRAM(S): 20 TABLET, FILM COATED ORAL at 21:37

## 2025-03-28 RX ADMIN — WHITE PETROLATUM 1 APPLICATION(S): 1 OINTMENT TOPICAL at 13:48

## 2025-03-28 RX ADMIN — Medication 1 APPLICATION(S): at 05:33

## 2025-03-28 RX ADMIN — Medication 1 APPLICATION(S): at 06:26

## 2025-03-28 RX ADMIN — OXYCODONE HYDROCHLORIDE 15 MILLIGRAM(S): 30 TABLET ORAL at 22:30

## 2025-03-28 RX ADMIN — HEPARIN SODIUM 5000 UNIT(S): 1000 INJECTION INTRAVENOUS; SUBCUTANEOUS at 13:46

## 2025-03-28 NOTE — PROGRESS NOTE ADULT - ASSESSMENT
74-year-old male patient with h/o BPH, diverticulosis, Graves' disease, hemorrhoids, HLD, hypothyroidism, osteoarthritis, spinal stenosis, spinal cord injury (2004), and multiple spinal surgeries (three cervical and one lumbar), who presented to [facility] on 3/4/25 from [facility] due to lower extremity swelling and abnormal BUN/creatinine levels for further evaluation and management. The patient was recently admitted to [facility] from 02/18/25 through 02/25/25 for sepsis secondary to epidural abscess. He had a peripherally inserted central catheter line in the right arm and was on nightly Ceftriaxone treatment until 04/16/25. At the ED, he was found to have GOVIND, a left soleal vein, and a left gastrocnemius DVT. He was started on heparin gtt. Hospital course complicated by urinary retention requiring Ortega catheter insertion, rhabdomyolysis, elevated LFTs, anasarca, leukocytosis due to thoracic spine discitis OM and early paraspinal abscess, as well as new onset RUE paresthesias and weakness. He is s/p IVC filter placement on 3/17 and was recommended surgical management. He is s/p posterior thoracic laminectomy and fusion on 3/18/25 with Dr. Keith Cantu. Surgical culture with rare staph epi. Recommendations by ID to continue IV Rocephin and Daptomycin through 5/2/25. Patient was evaluated by PM&R and therapy for functional deficits, gait/ADL impairments and acute rehabilitation was recommended. Patient was cleared for discharge to [facility] IRF on 3/25/25. (25 Mar 2025 17:09)    Debility  Thoracic epidural abscess s/p laminectomy and fusion surgery on 3/18    - Tolerating comprehensive rehab  -  brace  - pain control per primary  - fall, spine precautions  Spinal epidural abscess   - S/p thoracic laminectomy and fusion 3/18.   - IV Rocephin 2g HS via PICC line thru 5/2/25  - IV Daptomycin 650mg daily via PICC line thru 5/2/2025  - [place]  brace   - Surgical culture with staph epi possible contaminant but given clinical presentation on admission, IV Dapto added.   - Staples to be removed on 4/1/25    GOVIND resolved]  LE edema  Hypoalbuminemia  Proteinuria  Anasarca    - c/w Bumex 1 mg BID  - Optimize protein intake.   - mobilize   - Monitor labs closely , stable renal function w good UOP   - No need for further IV fluid at this time  - Serum workup negative but will need follow up of  Serum  TRUDY positive for weak IgG Lambda band.  - urine protein 200 mg only ( not nephrotic)   - I/O's.   - daily standing weights    Rhabdomyolysis   - Improving  - Encouraged PO    Transaminitis  - encouraged PO  - Limit Tylenol use  - avoid hepatotoxins    Leukocytosis  - improved  - Likely rx to DVT + Discitis.  - No signs of sepsis , afebrile   - Trend CBC and Temp    CAD  HTN  HLD  - Metoprolol 25mg daily   - Crestor 5mg HS    Anemia  MARLENA  - s/p 2 units of PRBCs given intra-op  - Trend H/H (3/25- 8.7/27.3)  - Transfuse to keep hb >7    Hypothyroidism  - Synthroid 150mcg daily    sacral Decub stage 3  - wound care cx noted  - Continue turning patient Q 2 hours   - Lac hydrin BID BLE  - Aquaphor BID to generalized dry skin     Constipation:  - Senna QHS, Miralax Daily, Lactulose 30ml TID PRN, Maalox PRN       BPH, chronic  Urinary retention likely augmented by degree of anasarca  - Ortega POA  - failed TOV in [facility]. last Ortega inserted on 3/22.   - Ortega was removed 3/26. on TOV, retention 763 this am 3/27.   - On Flomax 8mg daily  - Monitor U/O    Acute DVT:   -  US doppler + L soleal and gastroc DVT   - s/p IVC filter 3/17 with interventional cards.   - Could not tolerate VQ scan for r/o PE 2/2 pain  - On Heparin 5000 Sq Q8hrs  - Restart AC (Eliquis 5mg BID per neurosurgery, already loaded) 2 weeks post op per neurosx if hgb stable and surgical site stable. (tentative 4/2/2025)    DVT-P: heparin Injectable 5000 Unit(s) SubCutaneous every 8 hours. [ DVT on left]  GI: pantoprazole Tablet 40 milliGRAM(s) Oral before breakfast    MEDICATIONS  (PRN):  aluminum hydroxide/magnesium hydroxide/simethicone Suspension 30 milliLiter(s) Oral every 4 hours PRN Dyspepsia  lactulose Syrup 20 Gram(s) Oral three times a day PRN for constipation  melatonin 3 milliGRAM(s) Oral at bedtime PRN Insomnia  oxyCODONE    IR 15 milliGRAM(s) Oral every 4 hours PRN Moderate Pain (4 - 6)  sodium chloride 0.9% lock flush 10 milliLiter(s) IV Push every 1 hour PRN Pre/post blood products, medications, blood draw, and to maintain line patency

## 2025-03-28 NOTE — PROGRESS NOTE ADULT - ASSESSMENT
Assessment/Plan:  Mr. Rishabh Shaver is a 74-year-old male patient with past medical history of benign prostatic hyperplasia, diverticulosis, Graves' disease, hemorrhoids, hyperlipidemia, hypothyroidism, osteoarthritis, spinal stenosis, spinal cord injury (2004), and multiple spinal surgeries (three cervical and one lumbar), who is admitted for Acute Inpatient Rehabilitation with a multidisciplinary rehab program at [facility] with functional impairments in ADLs and mobility secondary to a thoracic epidural abscess s/p posterior thoracic laminectomy and fusion on 3/18/25 with Dr. Keith Cantu.       #Thoracic epidural abscess s/p laminectomy and fusion surgery      *  brace when OOB      * Staples to be removed on POD #14 (4/1/25 - can be done at rehab if patient is not able to see Dr. Cantu in the office)      * Sacral wound assessment by wound/skin team,      * Maintain Ortega for now as pt has had urinary retention with previous trial of void - On Flomax 8mg daily      * Right Venodyne only due to left calf DVT (s/p pre-op IVC filter)   - Activity Limitations: Decreased social, vocational and leisure activities, decreased self care and ADLs, decreased mobility, decreased ability to manage household and finances.   - Comprehensive Multidisciplinary Rehab Program:      * 3 hours a day, 5 days a week.      * PT 1hr/day: Focused on improving strength, endurance, coordination, balance, functional mobility, and transfers      * OT 1hr/day: Focused on improving strength, fine motor skills, coordination, posture and ADLs.      -----------------------------------------------------------------------------------    Concurrent Medical Problems    #GOVIND on CKD  - Resolved  - LE edema  - Hypoalbuminemia  - Proteinuria  - IVF as needed with fluid balance  - Significant debilitating ansarca.   - Trial of albumin lasix started 3/8->improvement.   - IV Laxis/IV albumin  BID(3/9)  - Change to Bumex/Albumin (3/11)-> good effect  - Switched to PO bumex 1mg BID 3/24  - Trend Albumin (3/24- 2.3)  - Trend Protein total (3/24- 5.5 ); Total protein, Random Urine (3/18- 24)  - Trend CMP (3/25- creat 0.94/ BUN 27)    #L gastrocnemius and L soleal DVT  - appears provoked from decrease ambulation from recent spinal abscess  - Could not tolerate VQ scan for r/o PE 2/2 pain  - Switch to Eliquis will be new med on discharge (of note patient completed Eliquis load)   - Therapeutic Lovenox (3/12) in the setting of possible spine intervention.   - Last dose of AC night of 3/16. Restart AC (Eliquis 5mg BID) 2 weeks post op (tentative 4/2/2025) per neurosx if hgb stable and surgical site stable  - S/P IVC filter placement 3/17.    #Spinal epidural abscess POA and already being treated with IV antibiotics with suspected worsening of disease  - S/p thoracic laminectomy and fusion 3/18. Upper Tract  brace   - Surgical culture with staph epi possible contaminant but given clinical presentation on admission, IV Dapto added.   - IV Rocephin 2g HS via PICC line thru 5/2/25  - IV Daptomycin 650mg daily via PICC line thru 5/2/2025  - Pain management: Tylenol PRN, Oxycodone 15mg q4h PRN moderate pain; Oxycontin 15mg BID  - Staples to be removed on POD #14 (4/1/25)  (can be done at rehab if patient is not able to see Dr. Cantu in the office)  - CXR for PICC confirmation on admission     #Mild Rhabdomyolysis Improving  - Associated elevated transaminases  - Suspect from Ansarca/Edema  - Improving with adequate diuresis of edema  - Associated elevated transaminase; relatively stable. Continue to monitor (3/24- AST 38/ALT 57)    #Leukocytosis  - Likely rx to DVT + Discitis.  - No other signs of sepsis , afebrile   - Trend CBC and monitor fever curve (3/25- WBC 8.69)    #Hypothyroidism  - Synthroid 150mcg daily    #CAD  #HTN  #HLD  - Metoprolol 25mg daily   - Crestor 5mg HS    #Anemia/MARLENA  - Trend H/H (3/25- 8.7/27.3)  - Transfuse if Hgb <7 PRN     #Sleep:   - Maintain quiet hours and low stim environment.  - Melatonin 3mg HS PRN to maximize participation in therapy during the day.     #GI/Bowel:  - Senna QHS, Miralax Daily, Lactulose 30ml TID PRN, Maalox PRN   - GI ppx: Pantoprazole 40mg daily     #/Bladder  #BPH  #Urinary retention likely augmented by degree of anasarca  - S/P Ortega; TOV  ->failed. Ortega re-inserted 3/6->fell out without knowing with associated hematuria->recurrent retention 3/7->Ortega re-inserted (3/7)  - Ortega on admission  - TOV when appropriate   - Flomax 0.8mg HS  - Monitor U/O    #Skin/Pressure Injury:   Skin assessment on admission:   - Generalized dry flakey skin  - 19cm posterior cervical incision with 32 staples and 2 steri strips  - Left buttock DTI pressure injury measuring 14 x 9 cm      * appearing as a dark purple discoloration of intact skin, with a central superficial open area measuring 6 x 2 cm.      * at the periphery of the 14 x 9 cm area, the discoloration is somewhat lighter;       * dark red non-blanchable erythema.  - Bilateral lower extremity edema.  - Healed lumbar surgical incision scar  - Right heel blanchable redness  - Bilateral dry cracked heels and plantar surface  - Lac hydrin BID BLE  - Aquaphor BID to generalized dry skin   - Appreciate wound care consult     #Diet/Dysphagia:  - Dysphagia: SLP consult for swallow function evaluation and treatment  - Current Diet: Regular- DASH  - Aspiration Precautions  - Nutrition consult     #Patient Education  - Education provided on the following:      * Admitting diagnosis and functional implications      * Functional goals      * Bladder management      * Bowel management      * Skin care management      * Intensity of service and scheduling of rehab disciplines      * Plan of care and role of interdisciplinary team conference in discharge planning      * Reconciliation of medications from prior institution    #Precautions / PROPHYLAXIS:   - Falls, Spinal  - Ortho: Weight bearing status: FWB;  brace OOB  - DVT ppx: Heparin 5000U TID, TEDs  - Pressure injury/Skin: Turn Q2hrs while in bed, OOB to Chair, PT/OT      ---------------  Code Status: FULL  Emergency Contact:    Outpatient Follow-up (Specialty/Name of physician):    [place], Yue Paige  Family Medicine  [address], [address]  [place] [address]  Phone: [phone]  Fax: [phone]  Established Patient  Follow Up Time: 1 week    Keith Cantu [facility]  Neurosurgery  [address], [address]  [place] [address]  Phone: [phone]  Fax: [phone]  Established Patient  Follow Up Time: 2 weeks    Luis A Brennan  [facility]  CARDIOLOGY [address]  Scheduled Appointment: 04/23/2025        --------------  Goals: Safe discharge to Home*****  Estimated Length of Stay: 10-14 days  Rehab Potential: Good  Medical Prognosis: Good  Estimated Disposition: Home with Home Care  ---------------    PRESCREEN COMPARISON:  I have reviewed the prescreen information and I have found no relevant changes between the preadmission screening and my post admission evaluation.    RATIONALE FOR INPATIENT ADMISSION: Patient demonstrates the following:  [X] Medically appropriate for rehabilitation admission  [X] Has attainable rehab goals with an appropriate initial discharge plan  [X]Has rehabilitation potential (expected to make a significant improvement within a reasonable period of time)  [X] Requires close medical management by a rehab physician, rehab nursing care, Hospitalist and comprehensive interdisciplinary team (including PT, OT)

## 2025-03-28 NOTE — CHART NOTE - NSCHARTNOTEFT_GEN_A_CORE
C8 AIS D    Motor (Key Muscles):              C5      C6      C7      C8      T1      L2      L3      L4      L5      S1  R        5/5     5/5    5/5     5/5    4/5    1/5    3/5    4/5     4/5     4/5  L         5/5    5/5     5/5    4/5    3/5     1/5   2/5     5/5     4/5     5/5        Sensory (Light touch): (0=absent, 1=impaired, 2=normal, NT=not testable)             C2   C3   C4   C5   C6   C7   C8   R         2    2      2      2     2      2     2   L         2     2     2      2     2      2     2                 T1   T2   T3   T4   T5   T6   T7   T8   T9   T10   T11   T12  R         2     2     2     2     2     2     2      2     2      2       2       2   L         2     2     2     2      2     2     2     2      2     2       2       2                  L1   L2   L3   L4   L5   S1   S2   S3   S4-5         R         2     2     2     2     2     2     2     2     2   L         2     1     1     2     2     2     2     2     2        Sensory (Pin Prick): (0=absent, 1=impaired, 2=normal, NT=not testable)             C2   C3   C4   C5   C6   C7   C8   R          2    2      2     2     2      2     2   L          2     2     2     2      2     2      2                 T1   T2   T3   T4   T5   T6   T7   T8   T9   T10   T11   T12  R         2     2     2     2     2     2     2      2     2      2       2       2   L         2     2     2     2      2     2     2     2      2     2       2       2                  L1   L2   L3   L4   L5   S1   S2   S3   S4-5         R         2     2     2     2     2     2     2     2     1   L         1     0     1     1     2     2     1     1     2    DAP- Yes  VAC- Yes             T1   T2   T3   T4   T5   T6   T7   T8   T9   T10   T11   T12  R         2     2     2     2     2      2     2     2      2     2       2       2   L         2     2     2      2     2      2     2     2     2     2       2       2                  L1   L2   L3   L4   L5   S1   S2   S3   S4-5         R         2     2     2    2     2     2     2     2      2   L         2     2     2     2     2     2     2     2      2      (DAP) Deep Anal Pressure  (Yes/No)    (VAC) Voluntary Anal Contraction  (Yes/No) C8 AIS D Incomplete paraplegia       Motor -          RIGHT: EF [5/5], WE [5/5], EE [5/5], FF [5/5], FA [5/4], HF [1/5], KE [3/5], ADF [4/5], LTE [4/5], APF [4/5]          LEFT:    EF [5/5], WE [5/5], EE [5/5], FF [4/5], FA [3/5], HF [1/5], KE [2/5], ADF [5/5], LTE [5/5], APF [5/5]     Sensory          LTR: 2/2 throughout          LTL: 1/2 on L2-L3; rest 2/2 throughout          PPR: 1/2 on S4-5; rest 2/2 throughout          PPL: 0/2 on L2; 1/2 on L1, L3, L4, S2, and S3; rest 2/2 throughout          VAC (+); DAP (+); BCR (+)

## 2025-03-28 NOTE — PROGRESS NOTE ADULT - SUBJECTIVE AND OBJECTIVE BOX
HPI:  Mr. Rishabh Shaver is a 74-year-old male patient with past medical history of benign prostatic hyperplasia, diverticulosis, Graves' disease, hemorrhoids, hyperlipidemia, hypothyroidism, osteoarthritis, spinal stenosis, spinal cord injury (2004), and multiple spinal surgeries (three cervical and one lumbar), who presented to [facility] on 3/4/25 from [facility] due to lower extremity swelling and abnormal BUN/creatinine levels for further evaluation and management. The patient was recently admitted to [facility] from 02/18/25 through 02/25/25 for sepsis secondary to epidural abscess. He had a peripherally inserted central catheter line in the right arm and was on nightly Ceftriaxone treatment until 04/16/25. At the ED, he was found to have GOVIND, a left soleal vein, and a left gastrocnemius DVT. He was started on heparin gtt. Hospital course complicated by urinary retention requiring Ortega catheter insertion, rhabdomyolysis, elevated LFTs, anasarca, leukocytosis due to thoracic spine discitis OM and early paraspinal abscess, as well as new onset RUE paresthesias and weakness. He is s/p IVC filter placement on 3/17 and was recommended surgical management. He is s/p posterior thoracic laminectomy and fusion on 3/18/25 with Dr. Keith Cantu. Surgical culture with rare staph epi. Recommendations by ID to continue IV Rocephin and Daptomycin through 5/2/25. Patient was evaluated by PM&R and therapy for functional deficits, gait/ADL impairments and acute rehabilitation was recommended. Patient was cleared for discharge to [facility] IRF on 3/25/25. (25 Mar 2025 17:09)    TDD: 4/22/25 home  ___________________________________________________________________________    SUBJECTIVE/ROS  Patient was seen and evaluated at bedside/sitting bedside on wheelchair /during therapy today  NAD and tolerating therapies. No other acute primary complaints.  Ortega on admission removed, now in diaper.  Last BM yesterday.  Pain well-controlled.  Case was discussed at Interdisciplinary Team meeting today.  A tentative discharge date is outlined above.  Patient is eager to continue participation on the recommended rehabilitation program.  Denies any CP, SOB, KESSLER, palpitations, fever, chills, body aches, cough, congestion, or any other symptoms at this time.     ___________________________________________________________________________    Vital Signs Last 24 Hrs  T(C): 36.6 (03-28-25 @ 08:55), Max: 36.8 (03-27-25 @ 19:54)  T(F): 97.8 (03-28-25 @ 08:55), Max: 98.3 (03-27-25 @ 19:54)  HR: 81 (03-28-25 @ 08:55) (79 - 91)  BP: 109/60 (03-28-25 @ 08:55) (99/61 - 116/75)  ABP: --  ABP(mean): --  RR: 16 (03-28-25 @ 08:55) (16 - 16)  SpO2: 95% (03-28-25 @ 08:55) (93% - 95%)      ___________________________________________________________________________    LAB                        8.1    8.30  )-----------( 213      ( 27 Mar 2025 06:23 )             25.3     03-27    137  |  99  |  28[H]  ----------------------------<  106[H]  3.9   |  34[H]  |  1.15    Ca    8.7      27 Mar 2025 06:23    TPro  5.9[L]  /  Alb  2.4[L]  /  TBili  0.7  /  DBili  x   /  AST  42[H]  /  ALT  55[H]  /  AlkPhos  70  03-27    LIVER FUNCTIONS - ( 27 Mar 2025 06:23 )  Alb: 2.4 g/dL / Pro: 5.9 g/dL / ALK PHOS: 70 U/L / ALT: 55 U/L / AST: 42 U/L / GGT: x                 Urinalysis Basic - ( 27 Mar 2025 06:23 )    Color: x / Appearance: x / SG: x / pH: x  Gluc: 106 mg/dL / Ketone: x  / Bili: x / Urobili: x   Blood: x / Protein: x / Nitrite: x   Leuk Esterase: x / RBC: x / WBC x   Sq Epi: x / Non Sq Epi: x / Bacteria: x      ___________________________________________________________________________    LAB                        8.1    8.30  )-----------( 213      ( 27 Mar 2025 06:23 )             25.3     03-27    137  |  99  |  28[H]  ----------------------------<  106[H]  3.9   |  34[H]  |  1.15    Ca    8.7      27 Mar 2025 06:23    TPro  5.9[L]  /  Alb  2.4[L]  /  TBili  0.7  /  DBili  x   /  AST  42[H]  /  ALT  55[H]  /  AlkPhos  70  03-27    LIVER FUNCTIONS - ( 27 Mar 2025 06:23 )  Alb: 2.4 g/dL / Pro: 5.9 g/dL / ALK PHOS: 70 U/L / ALT: 55 U/L / AST: 42 U/L / GGT: x                 Urinalysis Basic - ( 27 Mar 2025 06:23 )    Color: x / Appearance: x / SG: x / pH: x  Gluc: 106 mg/dL / Ketone: x  / Bili: x / Urobili: x   Blood: x / Protein: x / Nitrite: x   Leuk Esterase: x / RBC: x / WBC x   Sq Epi: x / Non Sq Epi: x / Bacteria: x    ___________________________________________________________________________    PHYSICAL EXAM:    Gen - NAD, Comfortable  HEENT -  EOMI,Normal Conjunctivae  Neck - Supple, + limited ROM  Pulm - breathing comfortably  Cardiovascular - warm and well perfused  Abdomen - soft  Extremities - +BLE 3+ pitting edema, +B/L pedal edema, +RUE PICC line   /GI- + diaper  Neuro-     Cognitive - awake, alert, oriented to person, place, date, year, and situation.  Able  to follow command     Communication - Fluent, Comprehensible, No dysarthria, No aphasia      Cranial Nerves -No facial asymmetry, Tongue midline, EOMI, Shoulder shrug intact     Motor -                    LEFT    UE - ShAB 5/5, EF 5/5, EE 5/5,  5/5                    RIGHT UE - ShAB 5/5, EF 5/5, EE 5/5,   5/5                    LEFT    LE - HF 1/5, KE 1/5, DF 4/5, PF 5/5                    RIGHT LE - HF 1/5, KE 1/5, DF 4/5, PF 5/5        Sensory - Impaired to LT LLE from knee to ankle. Preserved upper b/l  Psychiatric - Mood stable, Affect WNL      ___________________________________________________________________________ HPI:  Mr. Rishabh Shaver is a 74-year-old male patient with past medical history of benign prostatic hyperplasia, diverticulosis, Graves' disease, hemorrhoids, hyperlipidemia, hypothyroidism, osteoarthritis, spinal stenosis, spinal cord injury (2004), and multiple spinal surgeries (three cervical and one lumbar), who presented to [facility] on 3/4/25 from [facility] due to lower extremity swelling and abnormal BUN/creatinine levels for further evaluation and management. The patient was recently admitted to [facility] from 02/18/25 through 02/25/25 for sepsis secondary to epidural abscess. He had a peripherally inserted central catheter line in the right arm and was on nightly Ceftriaxone treatment until 04/16/25. At the ED, he was found to have GOVIND, a left soleal vein, and a left gastrocnemius DVT. He was started on heparin gtt. Hospital course complicated by urinary retention requiring Ortega catheter insertion, rhabdomyolysis, elevated LFTs, anasarca, leukocytosis due to thoracic spine discitis OM and early paraspinal abscess, as well as new onset RUE paresthesias and weakness. He is s/p IVC filter placement on 3/17 and was recommended surgical management. He is s/p posterior thoracic laminectomy and fusion on 3/18/25 with Dr. Keith Cantu. Surgical culture with rare staph epi. Recommendations by ID to continue IV Rocephin and Daptomycin through 5/2/25. Patient was evaluated by PM&R and therapy for functional deficits, gait/ADL impairments and acute rehabilitation was recommended. Patient was cleared for discharge to [facility] IRF on 3/25/25. (25 Mar 2025 17:09)    TDD: 4/22/25 home  ___________________________________________________________________________    SUBJECTIVE/ROS  Patient was seen and evaluated at bedside/sitting bedside on wheelchair /during therapy today  NAD and tolerating therapies. No other acute primary complaints.  Ortega on admission and remains with retention pattern and undergoing ISC.  Last BM yesterday. Pain well-controlled.  Case was discussed at Interdisciplinary Team meeting yesterday.  Tentative discharge date as outlined above.  Patient is eager to continue participation on the recommended rehabilitation program.  Denies any CP, SOB, KESSLER, palpitations, fever, chills, body aches, cough, congestion, or any other symptoms at this time.   ___________________________________________________________________________    Vital Signs Last 24 Hrs  T(C): 36.6 (03-28-25 @ 08:55), Max: 36.8 (03-27-25 @ 19:54)  T(F): 97.8 (03-28-25 @ 08:55), Max: 98.3 (03-27-25 @ 19:54)  HR: 81 (03-28-25 @ 08:55) (79 - 91)  BP: 109/60 (03-28-25 @ 08:55) (99/61 - 116/75)  RR: 16 (03-28-25 @ 08:55) (16 - 16)  SpO2: 95% (03-28-25 @ 08:55) (93% - 95%)    ___________________________________________________________________________    LAB                        8.1    8.30  )-----------( 213      ( 27 Mar 2025 06:23 )             25.3     03-27    137  |  99  |  28[H]  ----------------------------<  106[H]  3.9   |  34[H]  |  1.15    Ca    8.7      27 Mar 2025 06:23    TPro  5.9[L]  /  Alb  2.4[L]  /  TBili  0.7  /  DBili  x   /  AST  42[H]  /  ALT  55[H]  /  AlkPhos  70  03-27    LIVER FUNCTIONS - ( 27 Mar 2025 06:23 )  Alb: 2.4 g/dL / Pro: 5.9 g/dL / ALK PHOS: 70 U/L / ALT: 55 U/L / AST: 42 U/L / GGT: x           ___________________________________________________________________________    MEDICATIONS  (STANDING):  ammonium lactate 12% Lotion 1 Application(s) Topical two times a day  AQUAPHOR (petrolatum Ointment) 1 Application(s) Topical three times a day  bisacodyl Suppository 10 milliGRAM(s) Rectal daily  buMETAnide 1 milliGRAM(s) Oral two times a day  cefTRIAXone   IVPB 2000 milliGRAM(s) IV Intermittent every 24 hours  chlorhexidine 4% Liquid 1 Application(s) Topical <User Schedule>  DAPTOmycin IVPB 650 milliGRAM(s) IV Intermittent every 24 hours  heparin   Injectable 5000 Unit(s) SubCutaneous every 8 hours  levothyroxine 150 MICROGram(s) Oral daily  metoprolol succinate ER 25 milliGRAM(s) Oral daily  oxyCODONE  ER Tablet 15 milliGRAM(s) Oral every 12 hours  pantoprazole    Tablet 40 milliGRAM(s) Oral before breakfast  polyethylene glycol 3350 17 Gram(s) Oral daily  rosuvastatin 5 milliGRAM(s) Oral at bedtime  senna 2 Tablet(s) Oral at bedtime  tamsulosin 0.8 milliGRAM(s) Oral at bedtime    MEDICATIONS  (PRN):  aluminum hydroxide/magnesium hydroxide/simethicone Suspension 30 milliLiter(s) Oral every 4 hours PRN Dyspepsia  lactulose Syrup 20 Gram(s) Oral three times a day PRN for constipation  melatonin 3 milliGRAM(s) Oral at bedtime PRN Insomnia  oxyCODONE    IR 15 milliGRAM(s) Oral every 4 hours PRN Moderate Pain (4 - 6)  sodium chloride 0.9% lock flush 10 milliLiter(s) IV Push every 1 hour PRN Pre/post blood products, medications, blood draw, and to maintain line patency    ___________________________________________________________________________    PHYSICAL EXAM:    Gen - NAD, Comfortable  HEENT -  EOMI,Normal Conjunctivae  Neck - Supple, + limited ROM  Pulm - breathing comfortably  Cardiovascular - warm and well perfused  Abdomen - soft  Extremities - +BLE 3+ pitting edema, +B/L pedal edema, +RUE PICC line   /GI- + diaper  Neuro-     Cognitive - awake, alert, oriented to person, place, date, year, and situation.  Able  to follow command     Communication - Fluent, Comprehensible, No dysarthria, No aphasia      Cranial Nerves -No facial asymmetry, Tongue midline, EOMI, Shoulder shrug intact     Motor -                    LEFT    UE - ShAB 5/5, EF 5/5, EE 5/5,  5/5                    RIGHT UE - ShAB 5/5, EF 5/5, EE 5/5,   5/5                    LEFT    LE - HF 1/5, KE 1/5, DF 4/5, PF 5/5                    RIGHT LE - HF 1/5, KE 1/5, DF 4/5, PF 5/5        Sensory - Impaired to LT LLE from knee to ankle. Preserved upper b/l  Psychiatric - Mood stable, Affect WNL    ___________________________________________________________________________

## 2025-03-28 NOTE — PROGRESS NOTE ADULT - SUBJECTIVE AND OBJECTIVE BOX
CC: Patient is a 74y old  Male who presents with a chief complaint of Thoracic epidural abscess s/p laminectomy and fusion surgery (28 Mar 2025 09:29)      Interval History:  Chart reviewed  No major overnight events  No complaints this morning    Care Discussed with Consultants/Other Providers: Yes    Vital Signs Last 24 Hrs  T(F): 97.8 (28 Mar 2025 08:55), Max: 98.3 (27 Mar 2025 19:54)  HR: 81 (28 Mar 2025 08:55) (79 - 91)  BP: 109/60 (28 Mar 2025 08:55) (99/61 - 116/75)  RR: 16 (28 Mar 2025 08:55) (16 - 16)  SpO2: 95% (28 Mar 2025 08:55) (93% - 95%)  I&O's Summary    27 Mar 2025 07:01  -  28 Mar 2025 07:00  --------------------------------------------------------  IN: 0 mL / OUT: 1975 mL / NET: -1975 mL      BMI (kg/m2): 36.5 (03-25-25 @ 19:35)  PHYSICAL EXAM:  GENERAL: NAD  HEENT: EOMI, PERRLA  Neck: Supple  NERVOUS SYSTEM:  CN grossly intact; follows commands  HEART: s1s2, Regular rate and rhythm; No high grade murmurs, No pitting edema  CHEST/LUNG: Clear to ascultation bilaterally; No high grade adventitious sounds; normal respiratory effort, no intercostal retractions  ABDOMEN: Soft, Nontender, Bowel sounds present, no guarding  MUSCULOSKELETAL/EXTREMITIES:  No clubbing or digital cyanosis  PSYCH: Appropriate affect, Alert    LABS:                                8.1    8.30  )-----------( 213      ( 27 Mar 2025 06:23 )             25.3       03-27    137  |  99  |  28  ----------------------------<  106  3.9   |  34  |  1.15    Ca    8.7      27 Mar 2025 06:23    TPro  5.9  /  Alb  2.4  /  TBili  0.7  /  DBili  x   /  AST  42  /  ALT  55  /  AlkPhos  70  03-27

## 2025-03-29 PROCEDURE — 99232 SBSQ HOSP IP/OBS MODERATE 35: CPT

## 2025-03-29 PROCEDURE — 99233 SBSQ HOSP IP/OBS HIGH 50: CPT

## 2025-03-29 RX ORDER — POLYETHYLENE GLYCOL 3350 17 G/17G
17 POWDER, FOR SOLUTION ORAL
Refills: 0 | Status: DISCONTINUED | OUTPATIENT
Start: 2025-03-30 | End: 2025-03-29

## 2025-03-29 RX ORDER — POLYETHYLENE GLYCOL 3350 17 G/17G
17 POWDER, FOR SOLUTION ORAL
Refills: 0 | Status: DISCONTINUED | OUTPATIENT
Start: 2025-03-30 | End: 2025-04-15

## 2025-03-29 RX ORDER — BISACODYL 5 MG
10 TABLET, DELAYED RELEASE (ENTERIC COATED) ORAL
Refills: 0 | Status: DISCONTINUED | OUTPATIENT
Start: 2025-03-30 | End: 2025-03-29

## 2025-03-29 RX ORDER — BISACODYL 5 MG
10 TABLET, DELAYED RELEASE (ENTERIC COATED) ORAL
Refills: 0 | Status: DISCONTINUED | OUTPATIENT
Start: 2025-03-30 | End: 2025-04-02

## 2025-03-29 RX ADMIN — WHITE PETROLATUM 1 APPLICATION(S): 1 OINTMENT TOPICAL at 21:49

## 2025-03-29 RX ADMIN — OXYCODONE HYDROCHLORIDE 15 MILLIGRAM(S): 30 TABLET ORAL at 06:26

## 2025-03-29 RX ADMIN — OXYCODONE HYDROCHLORIDE 15 MILLIGRAM(S): 30 TABLET ORAL at 22:40

## 2025-03-29 RX ADMIN — HEPARIN SODIUM 5000 UNIT(S): 1000 INJECTION INTRAVENOUS; SUBCUTANEOUS at 06:29

## 2025-03-29 RX ADMIN — OXYCODONE HYDROCHLORIDE 15 MILLIGRAM(S): 30 TABLET ORAL at 15:34

## 2025-03-29 RX ADMIN — Medication 3 MILLIGRAM(S): at 21:42

## 2025-03-29 RX ADMIN — Medication 1 APPLICATION(S): at 17:20

## 2025-03-29 RX ADMIN — HEPARIN SODIUM 5000 UNIT(S): 1000 INJECTION INTRAVENOUS; SUBCUTANEOUS at 21:41

## 2025-03-29 RX ADMIN — Medication 150 MICROGRAM(S): at 06:27

## 2025-03-29 RX ADMIN — OXYCODONE HYDROCHLORIDE 15 MILLIGRAM(S): 30 TABLET ORAL at 07:20

## 2025-03-29 RX ADMIN — HEPARIN SODIUM 5000 UNIT(S): 1000 INJECTION INTRAVENOUS; SUBCUTANEOUS at 13:20

## 2025-03-29 RX ADMIN — OXYCODONE HYDROCHLORIDE 15 MILLIGRAM(S): 30 TABLET ORAL at 12:10

## 2025-03-29 RX ADMIN — CEFTRIAXONE 100 MILLIGRAM(S): 500 INJECTION, POWDER, FOR SOLUTION INTRAMUSCULAR; INTRAVENOUS at 21:42

## 2025-03-29 RX ADMIN — Medication 1 APPLICATION(S): at 06:37

## 2025-03-29 RX ADMIN — Medication 40 MILLIGRAM(S): at 06:27

## 2025-03-29 RX ADMIN — Medication 1 APPLICATION(S): at 06:36

## 2025-03-29 RX ADMIN — BUMETANIDE 1 MILLIGRAM(S): 1 TABLET ORAL at 06:27

## 2025-03-29 RX ADMIN — ROSUVASTATIN CALCIUM 5 MILLIGRAM(S): 20 TABLET, FILM COATED ORAL at 21:40

## 2025-03-29 RX ADMIN — WHITE PETROLATUM 1 APPLICATION(S): 1 OINTMENT TOPICAL at 13:13

## 2025-03-29 RX ADMIN — BUMETANIDE 1 MILLIGRAM(S): 1 TABLET ORAL at 13:20

## 2025-03-29 RX ADMIN — OXYCODONE HYDROCHLORIDE 15 MILLIGRAM(S): 30 TABLET ORAL at 21:42

## 2025-03-29 RX ADMIN — WHITE PETROLATUM 1 APPLICATION(S): 1 OINTMENT TOPICAL at 06:37

## 2025-03-29 RX ADMIN — POLYETHYLENE GLYCOL 3350 17 GRAM(S): 17 POWDER, FOR SOLUTION ORAL at 11:31

## 2025-03-29 RX ADMIN — OXYCODONE HYDROCHLORIDE 15 MILLIGRAM(S): 30 TABLET ORAL at 11:30

## 2025-03-29 RX ADMIN — TAMSULOSIN HYDROCHLORIDE 0.8 MILLIGRAM(S): 0.4 CAPSULE ORAL at 21:41

## 2025-03-29 RX ADMIN — DAPTOMYCIN 126 MILLIGRAM(S): 500 INJECTION, POWDER, LYOPHILIZED, FOR SOLUTION INTRAVENOUS at 11:30

## 2025-03-29 RX ADMIN — Medication 10 MILLIGRAM(S): at 08:44

## 2025-03-29 RX ADMIN — OXYCODONE HYDROCHLORIDE 15 MILLIGRAM(S): 30 TABLET ORAL at 16:10

## 2025-03-29 RX ADMIN — Medication 2 TABLET(S): at 21:41

## 2025-03-29 NOTE — PROGRESS NOTE ADULT - SUBJECTIVE AND OBJECTIVE BOX
PROGRESS NOTE:     Patient is a 74y old  Male who presents with a chief complaint of Thoracic epidural abscess s/p laminectomy and fusion surgery (28 Mar 2025 14:21)      SUBJECTIVE / OVERNIGHT EVENTS: pt was seen and evaluated     ADDITIONAL REVIEW OF SYSTEMS: as per HPI    MEDICATIONS  (STANDING):  ammonium lactate 12% Lotion 1 Application(s) Topical two times a day  AQUAPHOR (petrolatum Ointment) 1 Application(s) Topical three times a day  bisacodyl Suppository 10 milliGRAM(s) Rectal daily  buMETAnide 1 milliGRAM(s) Oral two times a day  cefTRIAXone   IVPB 2000 milliGRAM(s) IV Intermittent every 24 hours  chlorhexidine 4% Liquid 1 Application(s) Topical <User Schedule>  DAPTOmycin IVPB 650 milliGRAM(s) IV Intermittent every 24 hours  heparin   Injectable 5000 Unit(s) SubCutaneous every 8 hours  levothyroxine 150 MICROGram(s) Oral daily  metoprolol succinate ER 25 milliGRAM(s) Oral daily  oxyCODONE  ER Tablet 15 milliGRAM(s) Oral every 12 hours  pantoprazole    Tablet 40 milliGRAM(s) Oral before breakfast  polyethylene glycol 3350 17 Gram(s) Oral daily  rosuvastatin 5 milliGRAM(s) Oral at bedtime  senna 2 Tablet(s) Oral at bedtime  tamsulosin 0.8 milliGRAM(s) Oral at bedtime    MEDICATIONS  (PRN):  aluminum hydroxide/magnesium hydroxide/simethicone Suspension 30 milliLiter(s) Oral every 4 hours PRN Dyspepsia  lactulose Syrup 20 Gram(s) Oral three times a day PRN for constipation  melatonin 3 milliGRAM(s) Oral at bedtime PRN Insomnia  oxyCODONE    IR 15 milliGRAM(s) Oral every 4 hours PRN Moderate Pain (4 - 6)  sodium chloride 0.9% lock flush 10 milliLiter(s) IV Push every 1 hour PRN Pre/post blood products, medications, blood draw, and to maintain line patency      CAPILLARY BLOOD GLUCOSE        I&O's Summary    28 Mar 2025 07:01  -  29 Mar 2025 07:00  --------------------------------------------------------  IN: 0 mL / OUT: 3350 mL / NET: -3350 mL        PHYSICAL EXAM:  Vital Signs Last 24 Hrs  T(C): 36.7 (29 Mar 2025 07:45), Max: 36.8 (28 Mar 2025 19:56)  T(F): 98 (29 Mar 2025 07:45), Max: 98.3 (28 Mar 2025 19:56)  HR: 84 (29 Mar 2025 09:18) (78 - 88)  BP: 100/61 (29 Mar 2025 09:18) (95/53 - 107/65)  BP(mean): --  RR: 16 (29 Mar 2025 07:45) (16 - 16)  SpO2: 94% (29 Mar 2025 07:45) (94% - 95%)    Parameters below as of 29 Mar 2025 07:45  Patient On (Oxygen Delivery Method): room air    PHYSICAL EXAM:  GENERAL: NAD  HEENT: EOMI, PERRLA  Neck: Supple  NERVOUS SYSTEM:  CN grossly intact; follows commands  HEART: s1s2, Regular rate and rhythm; No high grade murmurs, No pitting edema  CHEST/LUNG: Clear to ascultation bilaterally; No high grade adventitious sounds; normal respiratory effort, no intercostal retractions  ABDOMEN: Soft, Nontender, Bowel sounds present, no guarding  MUSCULOSKELETAL/EXTREMITIES:  No clubbing or digital cyanosis  PSYCH: Appropriate affect, Alert      LABS:             8.1    8.30  )-----------( 213      ( 27 Mar 2025 06:23 )             25.3       03-27    137  |  99  |  28  ----------------------------<  106  3.9   |  34  |  1.15    Ca    8.7      27 Mar 2025 06:23    TPro  5.9  /  Alb  2.4  /  TBili  0.7  /  DBili  x   /  AST  42  /  ALT  55  /  AlkPhos  70  03-27                         RADIOLOGY & ADDITIONAL TESTS:  Results Reviewed:   Imaging Personally Reviewed:  Electrocardiogram Personally Reviewed:    COORDINATION OF CARE:  Care Discussed with Consultants/Other Providers [Y/N]:  Prior or Outpatient Records Reviewed [Y/N]:

## 2025-03-29 NOTE — PROGRESS NOTE ADULT - ASSESSMENT
Assessment/Plan:  Mr. Rishabh Shaver is a 74-year-old male patient with past medical history of benign prostatic hyperplasia, diverticulosis, Graves' disease, hemorrhoids, hyperlipidemia, hypothyroidism, osteoarthritis, spinal stenosis, spinal cord injury (2004), and multiple spinal surgeries (three cervical and one lumbar), who is admitted for Acute Inpatient Rehabilitation with a multidisciplinary rehab program at [facility] with functional impairments in ADLs and mobility secondary to a thoracic epidural abscess s/p posterior thoracic laminectomy and fusion on 3/18/25 with Dr. Keith Cantu.       #Thoracic epidural abscess s/p laminectomy and fusion surgery      * C8 AIS D Incomplete paraplegia      *  brace when OOB      * Staples to be removed on POD #14 (4/1/25 - can be done at rehab if patient is not able to see Dr. Cantu in the office)      * Sacral wound assessment by wound/skin team,      * Maintain Ortega for now as pt has had urinary retention with previous trial of void - On Flomax 8mg daily      * Right Venodyne only due to left calf DVT (s/p pre-op IVC filter)   - Activity Limitations: Decreased social, vocational and leisure activities, decreased self care and ADLs, decreased mobility, decreased ability to manage household and finances.   - Comprehensive Multidisciplinary Rehab Program:      * 3 hours a day, 5 days a week.      * PT 1hr/day: Focused on improving strength, endurance, coordination, balance, functional mobility, and transfers      * OT 1hr/day: Focused on improving strength, fine motor skills, coordination, posture and ADLs.      -----------------------------------------------------------------------------------    Concurrent Medical Problems    #GOVIND on CKD  - Resolved  - LE edema  - Hypoalbuminemia  - Proteinuria  - IVF as needed with fluid balance  - Significant debilitating ansarca.   - Trial of albumin lasix started 3/8->improvement.   - IV Laxis/IV albumin  BID(3/9)  - Change to Bumex/Albumin (3/11)-> good effect  - Switched to PO bumex 1mg BID 3/24  - Trend Albumin (3/24- 2.3)  - Trend Protein total (3/24- 5.5 ); Total protein, Random Urine (3/18- 24)  - Trend CMP (3/25- creat 0.94/ BUN 27)    #L gastrocnemius and L soleal DVT  - appears provoked from decrease ambulation from recent spinal abscess  - Could not tolerate VQ scan for r/o PE 2/2 pain  - Switch to Eliquis will be new med on discharge (of note patient completed Eliquis load)   - Therapeutic Lovenox (3/12) in the setting of possible spine intervention.   - Last dose of AC night of 3/16. Restart AC (Eliquis 5mg BID) 2 weeks post op (tentative 4/2/2025) per neurosx if hgb stable and surgical site stable  - S/P IVC filter placement 3/17.    #Spinal epidural abscess POA and already being treated with IV antibiotics with suspected worsening of disease  - S/p thoracic laminectomy and fusion 3/18. [place]  brace   - Surgical culture with staph epi possible contaminant but given clinical presentation on admission, IV Dapto added.   - IV Rocephin 2g HS via PICC line thru 5/2/25  - IV Daptomycin 650mg daily via PICC line thru 5/2/2025  - Pain management: Tylenol PRN, Oxycodone 15mg q4h PRN moderate pain; Oxycontin 15mg BID  - Staples to be removed on POD #14 (4/1/25)  (can be done at rehab if patient is not able to see Dr. Cantu in the office)  - CXR for PICC confirmation on admission     #Mild Rhabdomyolysis Improving  - Associated elevated transaminases  - Suspect from Ansarca/Edema  - Improving with adequate diuresis of edema  - Associated elevated transaminase; relatively stable. Continue to monitor (3/24- AST 38/ALT 57)    #Leukocytosis  - Likely rx to DVT + Discitis.  - No other signs of sepsis , afebrile   - Trend CBC and monitor fever curve (3/25- WBC 8.69)    #Hypothyroidism  - Synthroid 150mcg daily    #CAD  #HTN  #HLD  - Metoprolol 25mg daily   - Crestor 5mg HS    #Anemia/MARLENA  - Trend H/H (3/25- 8.7/27.3)  - Transfuse if Hgb <7 PRN     #Sleep:   - Maintain quiet hours and low stim environment.  - Melatonin 3mg HS PRN to maximize participation in therapy during the day.     #GI/Bowel:  - Senna QHS, Miralax Daily, Lactulose 30ml TID PRN, Maalox PRN   - GI ppx: Pantoprazole 40mg daily     #/Bladder  #BPH  #Urinary retention likely augmented by degree of anasarca  - S/P Ortega; TOV  ->failed. Ortega re-inserted 3/6->fell out without knowing with associated hematuria->recurrent retention 3/7->Ortega re-inserted (3/7)  - Ortega on admission  - TOV when appropriate   - Flomax 0.8mg HS  - Monitor U/O    #Skin/Pressure Injury:   Skin assessment on admission:   - Generalized dry flakey skin  - 19cm posterior cervical incision with 32 staples and 2 steri strips  - Left buttock DTI pressure injury measuring 14 x 9 cm      * appearing as a dark purple discoloration of intact skin, with a central superficial open area measuring 6 x 2 cm.      * at the periphery of the 14 x 9 cm area, the discoloration is somewhat lighter;       * dark red non-blanchable erythema.  - Bilateral lower extremity edema.  - Healed lumbar surgical incision scar  - Right heel blanchable redness  - Bilateral dry cracked heels and plantar surface  - Lac hydrin BID BLE  - Aquaphor BID to generalized dry skin   - Appreciate wound care consult     #Diet/Dysphagia:  - Dysphagia: SLP consult for swallow function evaluation and treatment  - Current Diet: Regular- DASH  - Aspiration Precautions  - Nutrition consult     #Patient Education  - Education provided on the following:      * Admitting diagnosis and functional implications      * Functional goals      * Bladder management      * Bowel management      * Skin care management      * Intensity of service and scheduling of rehab disciplines      * Plan of care and role of interdisciplinary team conference in discharge planning      * Reconciliation of medications from prior institution    #Precautions / PROPHYLAXIS:   - Falls, Spinal  - Ortho: Weight bearing status: FWB;  brace OOB  - DVT ppx: Heparin 5000U TID, TEDs  - Pressure injury/Skin: Turn Q2hrs while in bed, OOB to Chair, PT/OT      ---------------  Code Status: FULL  Emergency Contact:    Outpatient Follow-up (Specialty/Name of physician):    Yue Wiggins  [facility]  [address], [address]  [place] [address]  Phone: [phone]  Fax: [phone]  Established Patient  Follow Up Time: 1 week    Keith Cantu [facility]  Neurosurgery  [address]  [place] [address]  Phone: [phone]  Fax: [phone]  Established Patient  Follow Up Time: 2 weeks    Luis A Brennan  [facility]  CARDIOLOGY [address]  Scheduled Appointment: 04/23/2025        --------------  Goals: Safe discharge to Home*****  Estimated Length of Stay: 10-14 days  Rehab Potential: Good  Medical Prognosis: Good  Estimated Disposition: Home with Home Care  ---------------    PRESCREEN COMPARISON:  I have reviewed the prescreen information and I have found no relevant changes between the preadmission screening and my post admission evaluation.    RATIONALE FOR INPATIENT ADMISSION: Patient demonstrates the following:  [X] Medically appropriate for rehabilitation admission  [X] Has attainable rehab goals with an appropriate initial discharge plan  [X]Has rehabilitation potential (expected to make a significant improvement within a reasonable period of time)  [X] Requires close medical management by a rehab physician, rehab nursing care, Hospitalist and comprehensive interdisciplinary team (including PT, OT)

## 2025-03-29 NOTE — PROGRESS NOTE ADULT - SUBJECTIVE AND OBJECTIVE BOX
HPI:  Mr. Rishabh Shaver is a 74-year-old male patient with past medical history of benign prostatic hyperplasia, diverticulosis, Graves' disease, hemorrhoids, hyperlipidemia, hypothyroidism, osteoarthritis, spinal stenosis, spinal cord injury (2004), and multiple spinal surgeries (three cervical and one lumbar), who presented to [facility] on 3/4/25 from [facility] due to lower extremity swelling and abnormal BUN/creatinine levels for further evaluation and management. The patient was recently admitted to [facility] from 02/18/25 through 02/25/25 for sepsis secondary to epidural abscess. He had a peripherally inserted central catheter line in the right arm and was on nightly Ceftriaxone treatment until 04/16/25. At the ED, he was found to have GOVIND, a left soleal vein, and a left gastrocnemius DVT. He was started on heparin gtt. Hospital course complicated by urinary retention requiring Ortega catheter insertion, rhabdomyolysis, elevated LFTs, anasarca, leukocytosis due to thoracic spine discitis OM and early paraspinal abscess, as well as new onset RUE paresthesias and weakness. He is s/p IVC filter placement on 3/17 and was recommended surgical management. He is s/p posterior thoracic laminectomy and fusion on 3/18/25 with Dr. Keith Cantu. Surgical culture with rare staph epi. Recommendations by ID to continue IV Rocephin and Daptomycin through 5/2/25. Patient was evaluated by PM&R and therapy for functional deficits, gait/ADL impairments and acute rehabilitation was recommended. Patient was cleared for discharge to [facility] IRF on 3/25/25. (25 Mar 2025 17:09)    TDD: 4/22/25 home  ___________________________________________________________________________    SUBJECTIVE/ROS  Patient was seen and evaluated at bedside today  NAD and tolerating therapies. No other acute primary complaints.  Ortega discontinued on admission and remains with retention pattern and undergoing ISC.  On bowel program, which has been effective.  Patient is eager to continue participation on the recommended rehabilitation program.  Denies any CP, SOB, KESSLER, palpitations, fever, chills, body aches, cough, congestion, or any other symptoms at this time.   ___________________________________________________________________________    Vital Signs Last 24 Hrs  T(C): 36.7 (29 Mar 2025 07:45), Max: 36.8 (28 Mar 2025 19:56)  T(F): 98 (29 Mar 2025 07:45), Max: 98.3 (28 Mar 2025 19:56)  HR: 84 (29 Mar 2025 09:18) (78 - 88)  BP: 100/61 (29 Mar 2025 09:18) (95/53 - 107/65)  RR: 16 (29 Mar 2025 07:45) (16 - 16)  SpO2: 94% (29 Mar 2025 07:45) (94% - 95%)    ___________________________________________________________________________    LAB                        8.1    8.30  )-----------( 213      ( 27 Mar 2025 06:23 )             25.3     03-27    137  |  99  |  28[H]  ----------------------------<  106[H]  3.9   |  34[H]  |  1.15    Ca    8.7      27 Mar 2025 06:23    TPro  5.9[L]  /  Alb  2.4[L]  /  TBili  0.7  /  DBili  x   /  AST  42[H]  /  ALT  55[H]  /  AlkPhos  70  03-27    LIVER FUNCTIONS - ( 27 Mar 2025 06:23 )  Alb: 2.4 g/dL / Pro: 5.9 g/dL / ALK PHOS: 70 U/L / ALT: 55 U/L / AST: 42 U/L / GGT: x           ___________________________________________________________________________    MEDICATIONS  (STANDING):  ammonium lactate 12% Lotion 1 Application(s) Topical two times a day  AQUAPHOR (petrolatum Ointment) 1 Application(s) Topical three times a day  bisacodyl Suppository 10 milliGRAM(s) Rectal daily  buMETAnide 1 milliGRAM(s) Oral two times a day  cefTRIAXone   IVPB 2000 milliGRAM(s) IV Intermittent every 24 hours  chlorhexidine 4% Liquid 1 Application(s) Topical <User Schedule>  DAPTOmycin IVPB 650 milliGRAM(s) IV Intermittent every 24 hours  heparin   Injectable 5000 Unit(s) SubCutaneous every 8 hours  levothyroxine 150 MICROGram(s) Oral daily  metoprolol succinate ER 25 milliGRAM(s) Oral daily  oxyCODONE  ER Tablet 15 milliGRAM(s) Oral every 12 hours  pantoprazole    Tablet 40 milliGRAM(s) Oral before breakfast  polyethylene glycol 3350 17 Gram(s) Oral daily  rosuvastatin 5 milliGRAM(s) Oral at bedtime  senna 2 Tablet(s) Oral at bedtime  tamsulosin 0.8 milliGRAM(s) Oral at bedtime    MEDICATIONS  (PRN):  aluminum hydroxide/magnesium hydroxide/simethicone Suspension 30 milliLiter(s) Oral every 4 hours PRN Dyspepsia  lactulose Syrup 20 Gram(s) Oral three times a day PRN for constipation  melatonin 3 milliGRAM(s) Oral at bedtime PRN Insomnia  oxyCODONE    IR 15 milliGRAM(s) Oral every 4 hours PRN Moderate Pain (4 - 6)  sodium chloride 0.9% lock flush 10 milliLiter(s) IV Push every 1 hour PRN Pre/post blood products, medications, blood draw, and to maintain line patency    ___________________________________________________________________________    PHYSICAL EXAM:    Gen - NAD, Comfortable  HEENT -  EOMI,Normal Conjunctivae  Neck - Supple, + limited ROM  Pulm - breathing comfortably  Cardiovascular - warm and well perfused  Abdomen - soft  Extremities - +BLE 3+ pitting edema, +B/L pedal edema, +RUE PICC line   /GI- + diaper  Neuro-     Cognitive - awake, alert, oriented to person, place, date, year, and situation.  Able  to follow command     Communication - Fluent, Comprehensible, No dysarthria, No aphasia      Cranial Nerves -No facial asymmetry, Tongue midline, EOMI, Shoulder shrug intact     Motor -          RIGHT: EF [5/5], WE [5/5], EE [5/5], FF [5/5], FA [5/4], HF [1/5], KE [3/5], ADF [4/5], LTE [4/5], APF [4/5]          LEFT:    EF [5/5], WE [5/5], EE [5/5], FF [4/5], FA [3/5], HF [1/5], KE [2/5], ADF [5/5], LTE [5/5], APF [5/5]     Sensory          LTR: 2/2 throughout          LTL: 1/2 on L2-L3; rest 2/2 throughout          PPR: 1/2 on S4-5; rest 2/2 throughout          PPL: 0/2 on L2; 1/2 on L1, L3, L4, S2, and S3; rest 2/2 throughout          VAC (+); DAP (+); BCR (+)Psychiatric - Mood stable, Affect WNL  Psychiatric - Mood stable, Affect WNL    ___________________________________________________________________________ HPI:  Mr. Rishabh Shaver is a 74-year-old male patient with past medical history of benign prostatic hyperplasia, diverticulosis, Graves' disease, hemorrhoids, hyperlipidemia, hypothyroidism, osteoarthritis, spinal stenosis, spinal cord injury (2004), and multiple spinal surgeries (three cervical and one lumbar), who presented to [facility] on 3/4/25 from [facility] due to lower extremity swelling and abnormal BUN/creatinine levels for further evaluation and management. The patient was recently admitted to [facility] from 02/18/25 through 02/25/25 for sepsis secondary to epidural abscess. He had a peripherally inserted central catheter line in the right arm and was on nightly Ceftriaxone treatment until 04/16/25. At the ED, he was found to have GOVIND, a left soleal vein, and a left gastrocnemius DVT. He was started on heparin gtt. Hospital course complicated by urinary retention requiring Ortega catheter insertion, rhabdomyolysis, elevated LFTs, anasarca, leukocytosis due to thoracic spine discitis OM and early paraspinal abscess, as well as new onset RUE paresthesias and weakness. He is s/p IVC filter placement on 3/17 and was recommended surgical management. He is s/p posterior thoracic laminectomy and fusion on 3/18/25 with Dr. Keith Cantu. Surgical culture with rare staph epi. Recommendations by ID to continue IV Rocephin and Daptomycin through 5/2/25. Patient was evaluated by PM&R and therapy for functional deficits, gait/ADL impairments and acute rehabilitation was recommended. Patient was cleared for discharge to [facility] IRF on 3/25/25. (25 Mar 2025 17:09)    TDD: 4/22/25 home  ___________________________________________________________________________    SUBJECTIVE/ROS  Patient was seen and evaluated at bedside today  NAD and tolerating therapies. No other acute primary complaints.  Ortega discontinued on admission and remains with retention pattern and undergoing ISC.  On bowel program, which has been effective.  Patient is eager to continue participation on the recommended rehabilitation program.  Denies any CP, SOB, KESSLER, palpitations, fever, chills, body aches, cough, congestion, or any other symptoms at this time.   ___________________________________________________________________________    Vital Signs Last 24 Hrs  T(C): 36.7 (29 Mar 2025 07:45), Max: 36.8 (28 Mar 2025 19:56)  T(F): 98 (29 Mar 2025 07:45), Max: 98.3 (28 Mar 2025 19:56)  HR: 84 (29 Mar 2025 09:18) (78 - 88)  BP: 100/61 (29 Mar 2025 09:18) (95/53 - 107/65)  RR: 16 (29 Mar 2025 07:45) (16 - 16)  SpO2: 94% (29 Mar 2025 07:45) (94% - 95%)    ___________________________________________________________________________    LAB                        8.1    8.30  )-----------( 213      ( 27 Mar 2025 06:23 )             25.3     03-27    137  |  99  |  28[H]  ----------------------------<  106[H]  3.9   |  34[H]  |  1.15    Ca    8.7      27 Mar 2025 06:23    TPro  5.9[L]  /  Alb  2.4[L]  /  TBili  0.7  /  DBili  x   /  AST  42[H]  /  ALT  55[H]  /  AlkPhos  70  03-27    LIVER FUNCTIONS - ( 27 Mar 2025 06:23 )  Alb: 2.4 g/dL / Pro: 5.9 g/dL / ALK PHOS: 70 U/L / ALT: 55 U/L / AST: 42 U/L / GGT: x           ___________________________________________________________________________    MEDICATIONS  (STANDING):  ammonium lactate 12% Lotion 1 Application(s) Topical two times a day  AQUAPHOR (petrolatum Ointment) 1 Application(s) Topical three times a day  bisacodyl Suppository 10 milliGRAM(s) Rectal daily  buMETAnide 1 milliGRAM(s) Oral two times a day  cefTRIAXone   IVPB 2000 milliGRAM(s) IV Intermittent every 24 hours  chlorhexidine 4% Liquid 1 Application(s) Topical <User Schedule>  DAPTOmycin IVPB 650 milliGRAM(s) IV Intermittent every 24 hours  heparin   Injectable 5000 Unit(s) SubCutaneous every 8 hours  levothyroxine 150 MICROGram(s) Oral daily  metoprolol succinate ER 25 milliGRAM(s) Oral daily  oxyCODONE  ER Tablet 15 milliGRAM(s) Oral every 12 hours  pantoprazole    Tablet 40 milliGRAM(s) Oral before breakfast  polyethylene glycol 3350 17 Gram(s) Oral daily  rosuvastatin 5 milliGRAM(s) Oral at bedtime  senna 2 Tablet(s) Oral at bedtime  tamsulosin 0.8 milliGRAM(s) Oral at bedtime    MEDICATIONS  (PRN):  aluminum hydroxide/magnesium hydroxide/simethicone Suspension 30 milliLiter(s) Oral every 4 hours PRN Dyspepsia  lactulose Syrup 20 Gram(s) Oral three times a day PRN for constipation  melatonin 3 milliGRAM(s) Oral at bedtime PRN Insomnia  oxyCODONE    IR 15 milliGRAM(s) Oral every 4 hours PRN Moderate Pain (4 - 6)  sodium chloride 0.9% lock flush 10 milliLiter(s) IV Push every 1 hour PRN Pre/post blood products, medications, blood draw, and to maintain line patency    ___________________________________________________________________________    PHYSICAL EXAM:    Gen - NAD, Comfortable  HEENT -  EOMI,Normal Conjunctivae  Neck - Supple, + limited ROM  Pulm - breathing comfortably  Cardiovascular - warm and well perfused  Abdomen - soft  Extremities - +BLE 3+ pitting edema, +B/L pedal edema, +RUE PICC line   /GI- + diaper  Neuro-     Cognitive - awake, alert, oriented to person, place, date, year, and situation.  Able  to follow command     Communication - Fluent, Comprehensible, No dysarthria, No aphasia      Cranial Nerves -No facial asymmetry, Tongue midline, EOMI, Shoulder shrug intact     Motor -          RIGHT: EF [5/5], WE [5/5], EE [5/5], FF [5/5], FA [5/4], HF [1/5], KE [3/5], ADF [4/5], LTE [4/5], APF [4/5]          LEFT:    EF [5/5], WE [5/5], EE [5/5], FF [4/5], FA [3/5], HF [1/5], KE [2/5], ADF [5/5], LTE [5/5], APF [5/5]     Sensory          LTR: 2/2 throughout          LTL: 1/2 on L2-L3; rest 2/2 throughout          PPR: 1/2 on S4-5; rest 2/2 throughout          PPL: 0/2 on L2; 1/2 on L1, L3, L4, S2, and S3; rest 2/2 throughout          VAC (+); DAP (+); BCR (+)  Psychiatric - Mood stable, Affect WNL  Psychiatric - Mood stable, Affect WNL    ___________________________________________________________________________

## 2025-03-29 NOTE — PROGRESS NOTE ADULT - ASSESSMENT
74-year-old male patient with h/o BPH, diverticulosis, Graves' disease, hemorrhoids, HLD, hypothyroidism, osteoarthritis, spinal stenosis, spinal cord injury (2004), and multiple spinal surgeries (three cervical and one lumbar), who presented to [facility] on 3/4/25 from [facility] (formerly [facility]) due to lower extremity swelling and abnormal BUN/creatinine levels for further evaluation and management. The patient was recently admitted to [facility] from 02/18/25 through 02/25/25 for sepsis secondary to epidural abscess. He had a peripherally inserted central catheter line in the right arm and was on nightly Ceftriaxone treatment until 04/16/25. At the ED, he was found to have GOVIND, a left soleal vein, and a left gastrocnemius DVT. He was started on heparin gtt. Hospital course complicated by urinary retention requiring Ortega catheter insertion, rhabdomyolysis, elevated LFTs, anasarca, leukocytosis due to thoracic spine discitis OM and early paraspinal abscess, as well as new onset RUE paresthesias and weakness. He is s/p IVC filter placement on 3/17 and was recommended surgical management. He is s/p posterior thoracic laminectomy and fusion on 3/18/25 with Dr. Keith Cantu. Surgical culture with rare staph epi. Recommendations by ID to continue IV Rocephin and Daptomycin through 5/2/25. Patient was evaluated by PM&R and therapy for functional deficits, gait/ADL impairments and acute rehabilitation was recommended. Patient was cleared for discharge to [facility] IRF on 3/25/25. (25 Mar 2025 17:09)    Debility  Thoracic epidural abscess s/p laminectomy and fusion surgery on 3/18  - Tolerating comprehensive rehab  -  brace  - pain control per primary  - fall, spine precautions  Spinal epidural abscess   - S/p thoracic laminectomy and fusion 3/18.   - IV Rocephin 2g HS via PICC line thru 5/2/25  - IV Daptomycin 650mg daily via PICC line thru 5/2/2025  - [place]  brace   - Surgical culture with staph epi possible contaminant but given clinical presentation on admission, IV Dapto added.   - Staples to be removed on 4/1/25    GOVIND resolved]  LE edema  Hypoalbuminemia  Proteinuria  Anasarca    - c/w Bumex 1 mg BID  - Optimize protein intake.   - mobilize   - Monitor labs closely , stable renal function w good UOP   - No need for further IV fluid at this time  - Serum workup negative but will need follow up of  Serum  TRUDY positive for weak IgG Lambda band.  - urine protein 200 mg only ( not nephrotic)   - I/O's.   - daily standing weights    Rhabdomyolysis   - Improving  - Encouraged PO    Transaminitis  - encouraged PO  - Limit Tylenol use  - avoid hepatotoxins    Leukocytosis  - improved  - Likely rx to DVT + Discitis.  - No signs of sepsis , afebrile   - Trend CBC and Temp    CAD  HTN  HLD  - Metoprolol 25mg daily   - Crestor 5mg HS    Anemia  MARLENA  - s/p 2 units of PRBCs given intra-op  - Trend H/H (3/25- 8.7/27.3)  - Transfuse to keep hb >7    Hypothyroidism  - Synthroid 150mcg daily    sacral Decub stage 3  - wound care cx noted  - Continue turning patient Q 2 hours   - Lac hydrin BID BLE  - Aquaphor BID to generalized dry skin     Constipation:  - Senna QHS, Miralax Daily, Lactulose 30ml TID PRN, Maalox PRN       BPH, chronic  Urinary retention likely augmented by degree of anasarca  - Ortega POA  - failed TOV in [facility]. last Ortega inserted on 3/22.   - Ortega was removed 3/26. on TOV, retention 763 this am 3/27.   - On Flomax 8mg daily  - Monitor U/O    Acute DVT:   -  US doppler + L soleal and gastroc DVT   - s/p IVC filter 3/17 with interventional cards.   - Could not tolerate VQ scan for r/o PE 2/2 pain  - On Heparin 5000 Sq Q8hrs  - Restart AC (Eliquis 5mg BID per neurosurgery, already loaded) 2 weeks post op per neurosx if hgb stable and surgical site stable. (tentative 4/2/2025)    DVT-P: heparin Injectable 5000 Unit(s) SubCutaneous every 8 hours. [ DVT on left]  GI: pantoprazole Tablet 40 milliGRAM(s) Oral before breakfast

## 2025-03-30 PROCEDURE — 99232 SBSQ HOSP IP/OBS MODERATE 35: CPT

## 2025-03-30 PROCEDURE — 99233 SBSQ HOSP IP/OBS HIGH 50: CPT

## 2025-03-30 RX ORDER — BUMETANIDE 1 MG/1
1 TABLET ORAL
Refills: 0 | Status: DISCONTINUED | OUTPATIENT
Start: 2025-03-30 | End: 2025-04-04

## 2025-03-30 RX ADMIN — ROSUVASTATIN CALCIUM 5 MILLIGRAM(S): 20 TABLET, FILM COATED ORAL at 21:41

## 2025-03-30 RX ADMIN — OXYCODONE HYDROCHLORIDE 15 MILLIGRAM(S): 30 TABLET ORAL at 22:40

## 2025-03-30 RX ADMIN — OXYCODONE HYDROCHLORIDE 15 MILLIGRAM(S): 30 TABLET ORAL at 11:10

## 2025-03-30 RX ADMIN — WHITE PETROLATUM 1 APPLICATION(S): 1 OINTMENT TOPICAL at 05:44

## 2025-03-30 RX ADMIN — Medication 2 TABLET(S): at 21:41

## 2025-03-30 RX ADMIN — Medication 3 MILLIGRAM(S): at 21:41

## 2025-03-30 RX ADMIN — OXYCODONE HYDROCHLORIDE 15 MILLIGRAM(S): 30 TABLET ORAL at 13:10

## 2025-03-30 RX ADMIN — METOPROLOL SUCCINATE 25 MILLIGRAM(S): 50 TABLET, EXTENDED RELEASE ORAL at 05:40

## 2025-03-30 RX ADMIN — BUMETANIDE 1 MILLIGRAM(S): 1 TABLET ORAL at 05:41

## 2025-03-30 RX ADMIN — Medication 150 MICROGRAM(S): at 05:40

## 2025-03-30 RX ADMIN — HEPARIN SODIUM 5000 UNIT(S): 1000 INJECTION INTRAVENOUS; SUBCUTANEOUS at 21:41

## 2025-03-30 RX ADMIN — OXYCODONE HYDROCHLORIDE 15 MILLIGRAM(S): 30 TABLET ORAL at 21:41

## 2025-03-30 RX ADMIN — WHITE PETROLATUM 1 APPLICATION(S): 1 OINTMENT TOPICAL at 15:00

## 2025-03-30 RX ADMIN — OXYCODONE HYDROCHLORIDE 15 MILLIGRAM(S): 30 TABLET ORAL at 12:17

## 2025-03-30 RX ADMIN — HEPARIN SODIUM 5000 UNIT(S): 1000 INJECTION INTRAVENOUS; SUBCUTANEOUS at 05:41

## 2025-03-30 RX ADMIN — CEFTRIAXONE 100 MILLIGRAM(S): 500 INJECTION, POWDER, FOR SOLUTION INTRAMUSCULAR; INTRAVENOUS at 21:47

## 2025-03-30 RX ADMIN — POLYETHYLENE GLYCOL 3350 17 GRAM(S): 17 POWDER, FOR SOLUTION ORAL at 05:51

## 2025-03-30 RX ADMIN — OXYCODONE HYDROCHLORIDE 15 MILLIGRAM(S): 30 TABLET ORAL at 05:39

## 2025-03-30 RX ADMIN — Medication 1 APPLICATION(S): at 05:44

## 2025-03-30 RX ADMIN — BUMETANIDE 1 MILLIGRAM(S): 1 TABLET ORAL at 14:58

## 2025-03-30 RX ADMIN — DAPTOMYCIN 126 MILLIGRAM(S): 500 INJECTION, POWDER, LYOPHILIZED, FOR SOLUTION INTRAVENOUS at 10:24

## 2025-03-30 RX ADMIN — OXYCODONE HYDROCHLORIDE 15 MILLIGRAM(S): 30 TABLET ORAL at 10:24

## 2025-03-30 RX ADMIN — Medication 1 APPLICATION(S): at 05:43

## 2025-03-30 RX ADMIN — TAMSULOSIN HYDROCHLORIDE 0.8 MILLIGRAM(S): 0.4 CAPSULE ORAL at 21:40

## 2025-03-30 RX ADMIN — OXYCODONE HYDROCHLORIDE 15 MILLIGRAM(S): 30 TABLET ORAL at 17:24

## 2025-03-30 RX ADMIN — Medication 10 MILLIGRAM(S): at 05:41

## 2025-03-30 RX ADMIN — HEPARIN SODIUM 5000 UNIT(S): 1000 INJECTION INTRAVENOUS; SUBCUTANEOUS at 15:00

## 2025-03-30 RX ADMIN — OXYCODONE HYDROCHLORIDE 15 MILLIGRAM(S): 30 TABLET ORAL at 06:30

## 2025-03-30 RX ADMIN — OXYCODONE HYDROCHLORIDE 15 MILLIGRAM(S): 30 TABLET ORAL at 18:18

## 2025-03-30 RX ADMIN — WHITE PETROLATUM 1 APPLICATION(S): 1 OINTMENT TOPICAL at 21:49

## 2025-03-30 RX ADMIN — Medication 1 APPLICATION(S): at 17:26

## 2025-03-30 RX ADMIN — Medication 40 MILLIGRAM(S): at 05:40

## 2025-03-30 NOTE — PROGRESS NOTE ADULT - SUBJECTIVE AND OBJECTIVE BOX
PROGRESS NOTE:     Patient is a 74y old  Male who presents with a chief complaint of Thoracic epidural abscess s/p laminectomy and fusion surgery (29 Mar 2025 12:24)      SUBJECTIVE / OVERNIGHT EVENTS: pt was seen and evaluated today    ADDITIONAL REVIEW OF SYSTEMS: as per HPI    MEDICATIONS  (STANDING):  ammonium lactate 12% Lotion 1 Application(s) Topical two times a day  AQUAPHOR (petrolatum Ointment) 1 Application(s) Topical three times a day  bisacodyl Suppository 10 milliGRAM(s) Rectal <User Schedule>  buMETAnide 1 milliGRAM(s) Oral two times a day  cefTRIAXone   IVPB 2000 milliGRAM(s) IV Intermittent every 24 hours  chlorhexidine 4% Liquid 1 Application(s) Topical <User Schedule>  DAPTOmycin IVPB 650 milliGRAM(s) IV Intermittent every 24 hours  heparin   Injectable 5000 Unit(s) SubCutaneous every 8 hours  levothyroxine 150 MICROGram(s) Oral daily  metoprolol succinate ER 25 milliGRAM(s) Oral daily  oxyCODONE  ER Tablet 15 milliGRAM(s) Oral every 12 hours  pantoprazole    Tablet 40 milliGRAM(s) Oral before breakfast  polyethylene glycol 3350 17 Gram(s) Oral <User Schedule>  rosuvastatin 5 milliGRAM(s) Oral at bedtime  senna 2 Tablet(s) Oral at bedtime  tamsulosin 0.8 milliGRAM(s) Oral at bedtime    MEDICATIONS  (PRN):  aluminum hydroxide/magnesium hydroxide/simethicone Suspension 30 milliLiter(s) Oral every 4 hours PRN Dyspepsia  lactulose Syrup 20 Gram(s) Oral three times a day PRN for constipation  melatonin 3 milliGRAM(s) Oral at bedtime PRN Insomnia  oxyCODONE    IR 15 milliGRAM(s) Oral every 4 hours PRN Moderate Pain (4 - 6)  sodium chloride 0.9% lock flush 10 milliLiter(s) IV Push every 1 hour PRN Pre/post blood products, medications, blood draw, and to maintain line patency      CAPILLARY BLOOD GLUCOSE        I&O's Summary    29 Mar 2025 07:01  -  30 Mar 2025 07:00  --------------------------------------------------------  IN: 0 mL / OUT: 2350 mL / NET: -2350 mL        PHYSICAL EXAM:  Vital Signs Last 24 Hrs  T(C): 37.1 (30 Mar 2025 07:50), Max: 37.1 (30 Mar 2025 07:50)  T(F): 98.8 (30 Mar 2025 07:50), Max: 98.8 (30 Mar 2025 07:50)  HR: 79 (30 Mar 2025 07:50) (78 - 87)  BP: 98/53 (30 Mar 2025 07:50) (98/53 - 113/69)  BP(mean): --  RR: 16 (30 Mar 2025 07:50) (16 - 16)  SpO2: 95% (30 Mar 2025 07:50) (95% - 95%)    Parameters below as of 30 Mar 2025 07:50  Patient On (Oxygen Delivery Method): room air      PHYSICAL EXAM:  GENERAL: NAD  HEENT: EOMI, PERRLA  Neck: Supple  NERVOUS SYSTEM:  CN grossly intact; follows commands  HEART: s1s2, Regular rate and rhythm; No high grade murmurs, No pitting edema  CHEST/LUNG: Clear to ascultation bilaterally; No high grade adventitious sounds; normal respiratory effort, no intercostal retractions  ABDOMEN: Soft, Nontender, Bowel sounds present, no guarding  MUSCULOSKELETAL/EXTREMITIES:  No clubbing or digital cyanosis  PSYCH: Appropriate affect, Alert      LABS:             8.1    8.30  )-----------( 213      ( 27 Mar 2025 06:23 )             25.3       03-27    137  |  99  |  28  ----------------------------<  106  3.9   |  34  |  1.15    Ca    8.7      27 Mar 2025 06:23    TPro  5.9  /  Alb  2.4  /  TBili  0.7  /  DBili  x   /  AST  42  /  ALT  55  /  AlkPhos  70  03-27                    RADIOLOGY & ADDITIONAL TESTS:  Results Reviewed:   Imaging Personally Reviewed:  Electrocardiogram Personally Reviewed:    COORDINATION OF CARE:  Care Discussed with Consultants/Other Providers [Y/N]:  Prior or Outpatient Records Reviewed [Y/N]:

## 2025-03-30 NOTE — PROGRESS NOTE ADULT - ASSESSMENT
74-year-old male patient with h/o BPH, diverticulosis, Graves' disease, hemorrhoids, HLD, hypothyroidism, osteoarthritis, spinal stenosis, spinal cord injury (2004), and multiple spinal surgeries (three cervical and one lumbar), who presented to [facility] on 3/4/25 from [facility] due to lower extremity swelling and abnormal BUN/creatinine levels for further evaluation and management. The patient was recently admitted to [facility] from 02/18/25 through 02/25/25 for sepsis secondary to epidural abscess. He had a peripherally inserted central catheter line in the right arm and was on nightly Ceftriaxone treatment until 04/16/25. At the ED, he was found to have GOVIND, a left soleal vein, and a left gastrocnemius DVT. He was started on heparin gtt. Hospital course complicated by urinary retention requiring Ortega catheter insertion, rhabdomyolysis, elevated LFTs, anasarca, leukocytosis due to thoracic spine discitis OM and early paraspinal abscess, as well as new onset RUE paresthesias and weakness. He is s/p IVC filter placement on 3/17 and was recommended surgical management. He is s/p posterior thoracic laminectomy and fusion on 3/18/25 with Dr. Keith Cantu. Surgical culture with rare staph epi. Recommendations by ID to continue IV Rocephin and Daptomycin through 5/2/25. Patient was evaluated by PM&R and therapy for functional deficits, gait/ADL impairments and acute rehabilitation was recommended. Patient was cleared for discharge to [facility] IRF on 3/25/25. (25 Mar 2025 17:09)    Debility  Thoracic epidural abscess s/p laminectomy and fusion surgery on 3/18  - Tolerating comprehensive rehab  -  brace  - pain control per primary  - fall, spine precautions  Spinal epidural abscess   - S/p thoracic laminectomy and fusion 3/18.   - IV Rocephin 2g HS via PICC line thru 5/2/25  - IV Daptomycin 650mg daily via PICC line thru 5/2/2025  - [place]  brace   - Surgical culture with staph epi possible contaminant but given clinical presentation on admission, IV Dapto added.   - Staples to be removed on 4/1/25    GOVIND resolved  LE edema  Hypoalbuminemia  Proteinuria  Anasarca    - c/w Bumex 1 mg BID  - Optimize protein intake.   - mobilize   - Monitor labs closely , stable renal function w good UOP   - No need for further IV fluid at this time  - Serum workup negative but will need follow up of  Serum  TRUDY positive for weak IgG Lambda band.  - urine protein 200 mg only ( not nephrotic)   - I/O's.   - daily standing weights    Rhabdomyolysis   - Improving  - Encouraged PO    Transaminitis  - encouraged PO  - Limit Tylenol use  - avoid hepatotoxins    Leukocytosis  - improved  - Likely rx to DVT + Discitis.  - No signs of sepsis , afebrile   - Trend CBC and Temp    CAD  HTN  HLD  - Metoprolol 25mg daily   - Crestor 5mg HS    Anemia  MARLENA  - s/p 2 units of PRBCs given intra-op  - Trend H/H (3/25- 8.7/27.3)  - Transfuse to keep hb >7    Hypothyroidism  - Synthroid 150mcg daily    sacral Decub stage 3  - wound care cx noted  - Continue turning patient Q 2 hours   - Lac hydrin BID BLE  - Aquaphor BID to generalized dry skin     Constipation:  - Senna QHS, Miralax Daily, Lactulose 30ml TID PRN, Maalox PRN       BPH, chronic  Urinary retention likely augmented by degree of anasarca  - Ortega POA  - failed TOV in [facility]. last Ortega inserted on 3/22.   - Ortega was removed 3/26. on TOV, retention 763 this am 3/27.   - On Flomax 8mg daily  - Monitor U/O    Acute DVT:   -  US doppler + L soleal and gastroc DVT   - s/p IVC filter 3/17 with interventional cards.   - Could not tolerate VQ scan for r/o PE 2/2 pain  - On Heparin 5000 Sq Q8hrs  - Restart AC (Eliquis 5mg BID per neurosurgery, already loaded) 2 weeks post op per neurosx if hgb stable and surgical site stable. (tentative 4/2/2025)    DVT-P: heparin Injectable 5000 Unit(s) SubCutaneous every 8 hours. [ DVT on left]  GI: pantoprazole Tablet 40 milliGRAM(s) Oral before breakfast

## 2025-03-30 NOTE — PROGRESS NOTE ADULT - ASSESSMENT
Assessment/Plan:  Mr. Rishabh Shaver is a 74-year-old male patient with past medical history of benign prostatic hyperplasia, diverticulosis, Graves' disease, hemorrhoids, hyperlipidemia, hypothyroidism, osteoarthritis, spinal stenosis, spinal cord injury (2004), and multiple spinal surgeries (three cervical and one lumbar), who is admitted for Acute Inpatient Rehabilitation with a multidisciplinary rehab program at [facility] with functional impairments in ADLs and mobility secondary to a thoracic epidural abscess s/p posterior thoracic laminectomy and fusion on 3/18/25 with Dr. Keith Cantu.       #Thoracic epidural abscess s/p laminectomy and fusion surgery      * C8 AIS D Incomplete paraplegia      *  brace when OOB      * Staples to be removed on POD #14 (4/1/25 - can be done at rehab if patient is not able to see Dr. Cantu in the office)      * Sacral wound assessment by wound/skin team,      * Maintain Ortega for now as pt has had urinary retention with previous trial of void - On Flomax 8mg daily      * Right Venodyne only due to left calf DVT (s/p pre-op IVC filter)   - Activity Limitations: Decreased social, vocational and leisure activities, decreased self care and ADLs, decreased mobility, decreased ability to manage household and finances.   - Comprehensive Multidisciplinary Rehab Program:      * 3 hours a day, 5 days a week.      * PT 1hr/day: Focused on improving strength, endurance, coordination, balance, functional mobility, and transfers      * OT 1hr/day: Focused on improving strength, fine motor skills, coordination, posture and ADLs.      -----------------------------------------------------------------------------------    Concurrent Medical Problems    #GOVIND on CKD  - Resolved  - LE edema  - Hypoalbuminemia  - Proteinuria  - IVF as needed with fluid balance  - Significant debilitating ansarca.   - Trial of albumin lasix started 3/8->improvement.   - IV Laxis/IV albumin  BID(3/9)  - Change to Bumex/Albumin (3/11)-> good effect  - Switched to PO bumex 1mg BID 3/24  - Trend Albumin (3/24- 2.3)  - Trend Protein total (3/24- 5.5 ); Total protein, Random Urine (3/18- 24)  - Trend CMP (3/25- creat 0.94/ BUN 27)    #L gastrocnemius and L soleal DVT  - appears provoked from decrease ambulation from recent spinal abscess  - Could not tolerate VQ scan for r/o PE 2/2 pain  - Switch to Eliquis will be new med on discharge (of note patient completed Eliquis load)   - Therapeutic Lovenox (3/12) in the setting of possible spine intervention.   - Last dose of AC night of 3/16. Restart AC (Eliquis 5mg BID) 2 weeks post op (tentative 4/2/2025) per neurosx if hgb stable and surgical site stable  - S/P IVC filter placement 3/17.    #Spinal epidural abscess POA and already being treated with IV antibiotics with suspected worsening of disease  - S/p thoracic laminectomy and fusion 3/18. [place]  brace   - Surgical culture with staph epi possible contaminant but given clinical presentation on admission, IV Dapto added.   - IV Rocephin 2g HS via PICC line thru 5/2/25  - IV Daptomycin 650mg daily via PICC line thru 5/2/2025  - Pain management: Tylenol PRN, Oxycodone 15mg q4h PRN moderate pain; Oxycontin 15mg BID  - Staples to be removed on POD #14 (4/1/25)  (can be done at rehab if patient is not able to see Dr. Cantu in the office)  - CXR for PICC confirmation on admission     #Mild Rhabdomyolysis Improving  - Associated elevated transaminases  - Suspect from Ansarca/Edema  - Improving with adequate diuresis of edema  - Associated elevated transaminase; relatively stable. Continue to monitor (3/24- AST 38/ALT 57)    #Leukocytosis  - Likely rx to DVT + Discitis.  - No other signs of sepsis , afebrile   - Trend CBC and monitor fever curve (3/25- WBC 8.69)    #Hypothyroidism  - Synthroid 150mcg daily    #CAD  #HTN  #HLD  - Metoprolol 25mg daily   - Crestor 5mg HS    #Anemia/MARLENA  - Trend H/H (3/25- 8.7/27.3)  - Transfuse if Hgb <7 PRN     #Sleep:   - Maintain quiet hours and low stim environment.  - Melatonin 3mg HS PRN to maximize participation in therapy during the day.     #GI/Bowel:  - Senna QHS, Miralax Daily, Lactulose 30ml TID PRN, Maalox PRN   - GI ppx: Pantoprazole 40mg daily     #/Bladder  #BPH  #Urinary retention likely augmented by degree of anasarca  - S/P Ortega; TOV  ->failed. Ortega re-inserted 3/6->fell out without knowing with associated hematuria->recurrent retention 3/7->Ortega re-inserted (3/7)  - Ortega on admission  - TOV when appropriate   - Flomax 0.8mg HS  - Monitor U/O    #Skin/Pressure Injury:   Skin assessment on admission:   - Generalized dry flakey skin  - 19cm posterior cervical incision with 32 staples and 2 steri strips  - Left buttock DTI pressure injury measuring 14 x 9 cm      * appearing as a dark purple discoloration of intact skin, with a central superficial open area measuring 6 x 2 cm.      * at the periphery of the 14 x 9 cm area, the discoloration is somewhat lighter;       * dark red non-blanchable erythema.  - Bilateral lower extremity edema.  - Healed lumbar surgical incision scar  - Right heel blanchable redness  - Bilateral dry cracked heels and plantar surface  - Lac hydrin BID BLE  - Aquaphor BID to generalized dry skin   - Appreciate wound care consult     #Diet/Dysphagia:  - Dysphagia: SLP consult for swallow function evaluation and treatment  - Current Diet: Regular- DASH  - Aspiration Precautions  - Nutrition consult     #Patient Education  - Education provided on the following:      * Admitting diagnosis and functional implications      * Functional goals      * Bladder management      * Bowel management      * Skin care management      * Intensity of service and scheduling of rehab disciplines      * Plan of care and role of interdisciplinary team conference in discharge planning      * Reconciliation of medications from prior institution    #Precautions / PROPHYLAXIS:   - Falls, Spinal  - Ortho: Weight bearing status: FWB;  brace OOB  - DVT ppx: Heparin 5000U TID, TEDs  - Pressure injury/Skin: Turn Q2hrs while in bed, OOB to Chair, PT/OT      ---------------  Code Status: FULL  Emergency Contact:    Outpatient Follow-up (Specialty/Name of physician):    Yue Wiggins  [facility]  [address], [address]  [place] [address]  Phone: [phone]  Fax: [phone]  Established Patient  Follow Up Time: 1 week    Keith Cantu [facility]  Neurosurgery  [address]  [place] [address]  Phone: [phone]  Fax: [phone]  Established Patient  Follow Up Time: 2 weeks    Luis A Brennan  [facility]  CARDIOLOGY [address]  Scheduled Appointment: 04/23/2025        --------------  Goals: Safe discharge to Home*****  Estimated Length of Stay: 10-14 days  Rehab Potential: Good  Medical Prognosis: Good  Estimated Disposition: Home with Home Care  ---------------    PRESCREEN COMPARISON:  I have reviewed the prescreen information and I have found no relevant changes between the preadmission screening and my post admission evaluation.    RATIONALE FOR INPATIENT ADMISSION: Patient demonstrates the following:  [X] Medically appropriate for rehabilitation admission  [X] Has attainable rehab goals with an appropriate initial discharge plan  [X]Has rehabilitation potential (expected to make a significant improvement within a reasonable period of time)  [X] Requires close medical management by a rehab physician, rehab nursing care, Hospitalist and comprehensive interdisciplinary team (including PT, OT)

## 2025-03-30 NOTE — PROGRESS NOTE ADULT - SUBJECTIVE AND OBJECTIVE BOX
HPI:  Mr. Rishabh Shaver is a 74-year-old male patient with past medical history of benign prostatic hyperplasia, diverticulosis, Graves' disease, hemorrhoids, hyperlipidemia, hypothyroidism, osteoarthritis, spinal stenosis, spinal cord injury (2004), and multiple spinal surgeries (three cervical and one lumbar), who presented to [facility] on 3/4/25 from [facility] due to lower extremity swelling and abnormal BUN/creatinine levels for further evaluation and management. The patient was recently admitted to [facility] from 02/18/25 through 02/25/25 for sepsis secondary to epidural abscess. He had a peripherally inserted central catheter line in the right arm and was on nightly Ceftriaxone treatment until 04/16/25. At the ED, he was found to have GOVIND, a left soleal vein, and a left gastrocnemius DVT. He was started on heparin gtt. Hospital course complicated by urinary retention requiring Ortega catheter insertion, rhabdomyolysis, elevated LFTs, anasarca, leukocytosis due to thoracic spine discitis OM and early paraspinal abscess, as well as new onset RUE paresthesias and weakness. He is s/p IVC filter placement on 3/17 and was recommended surgical management. He is s/p posterior thoracic laminectomy and fusion on 3/18/25 with Dr. Keith Cantu. Surgical culture with rare staph epi. Recommendations by ID to continue IV Rocephin and Daptomycin through 5/2/25. Patient was evaluated by PM&R and therapy for functional deficits, gait/ADL impairments and acute rehabilitation was recommended. Patient was cleared for discharge to [facility] on 3/25/25. (25 Mar 2025 17:09)    TDD: 4/22/25 home  ___________________________________________________________________________    SUBJECTIVE/ROS  Patient was seen and evaluated at bedside today alongside RN.  NAD and tolerating therapies. No other acute primary complaints.  Sacral DTI with worsening on open areas. Counseled regarding offloading and will reassess with Wound Care Nurse Specialist tomorrow.   Patient is eager to continue participation on the recommended rehabilitation program.  Denies any CP, SOB, KESSLER, palpitations, fever, chills, body aches, cough, congestion, or any other symptoms at this time.   ___________________________________________________________________________    Vital Signs Last 24 Hrs  T(C): 37.1 (30 Mar 2025 07:50), Max: 37.1 (30 Mar 2025 07:50)  T(F): 98.8 (30 Mar 2025 07:50), Max: 98.8 (30 Mar 2025 07:50)  HR: 79 (30 Mar 2025 07:50) (78 - 87)  BP: 98/53 (30 Mar 2025 07:50) (98/53 - 113/69)  RR: 16 (30 Mar 2025 07:50) (16 - 16)  SpO2: 95% (30 Mar 2025 07:50) (95% - 95%)    Parameters below as of 30 Mar 2025 07:50  Patient On (Oxygen Delivery Method): room air    ___________________________________________________________________________    LAB                        8.1    8.30  )-----------( 213      ( 27 Mar 2025 06:23 )             25.3     03-27    137  |  99  |  28[H]  ----------------------------<  106[H]  3.9   |  34[H]  |  1.15    Ca    8.7      27 Mar 2025 06:23    TPro  5.9[L]  /  Alb  2.4[L]  /  TBili  0.7  /  DBili  x   /  AST  42[H]  /  ALT  55[H]  /  AlkPhos  70  03-27    LIVER FUNCTIONS - ( 27 Mar 2025 06:23 )  Alb: 2.4 g/dL / Pro: 5.9 g/dL / ALK PHOS: 70 U/L / ALT: 55 U/L / AST: 42 U/L / GGT: x           ___________________________________________________________________________    MEDICATIONS  (STANDING):  ammonium lactate 12% Lotion 1 Application(s) Topical two times a day  AQUAPHOR (petrolatum Ointment) 1 Application(s) Topical three times a day  bisacodyl Suppository 10 milliGRAM(s) Rectal daily  buMETAnide 1 milliGRAM(s) Oral two times a day  cefTRIAXone   IVPB 2000 milliGRAM(s) IV Intermittent every 24 hours  chlorhexidine 4% Liquid 1 Application(s) Topical <User Schedule>  DAPTOmycin IVPB 650 milliGRAM(s) IV Intermittent every 24 hours  heparin   Injectable 5000 Unit(s) SubCutaneous every 8 hours  levothyroxine 150 MICROGram(s) Oral daily  metoprolol succinate ER 25 milliGRAM(s) Oral daily  oxyCODONE  ER Tablet 15 milliGRAM(s) Oral every 12 hours  pantoprazole    Tablet 40 milliGRAM(s) Oral before breakfast  polyethylene glycol 3350 17 Gram(s) Oral daily  rosuvastatin 5 milliGRAM(s) Oral at bedtime  senna 2 Tablet(s) Oral at bedtime  tamsulosin 0.8 milliGRAM(s) Oral at bedtime    MEDICATIONS  (PRN):  aluminum hydroxide/magnesium hydroxide/simethicone Suspension 30 milliLiter(s) Oral every 4 hours PRN Dyspepsia  lactulose Syrup 20 Gram(s) Oral three times a day PRN for constipation  melatonin 3 milliGRAM(s) Oral at bedtime PRN Insomnia  oxyCODONE    IR 15 milliGRAM(s) Oral every 4 hours PRN Moderate Pain (4 - 6)  sodium chloride 0.9% lock flush 10 milliLiter(s) IV Push every 1 hour PRN Pre/post blood products, medications, blood draw, and to maintain line patency    ___________________________________________________________________________    PHYSICAL EXAM:    Gen - NAD, Comfortable  HEENT -  EOMI,Normal Conjunctivae  Neck - Supple, + limited ROM  Pulm - breathing comfortably  Cardiovascular - warm and well perfused  Abdomen - soft  Extremities - +BLE 3+ pitting edema, +B/L pedal edema, +RUE PICC line   /GI- + diaper  Neuro-     Cognitive - awake, alert, oriented to person, place, date, year, and situation.  Able  to follow command     Communication - Fluent, Comprehensible, No dysarthria, No aphasia      Cranial Nerves -No facial asymmetry, Tongue midline, EOMI, Shoulder shrug intact     Motor -          RIGHT: EF [5/5], WE [5/5], EE [5/5], FF [5/5], FA [5/4], HF [1/5], KE [3/5], ADF [4/5], LTE [4/5], APF [4/5]          LEFT:    EF [5/5], WE [5/5], EE [5/5], FF [4/5], FA [3/5], HF [1/5], KE [2/5], ADF [5/5], LTE [5/5], APF [5/5]     Sensory          LTR: 2/2 throughout          LTL: 1/2 on L2-L3; rest 2/2 throughout          PPR: 1/2 on S4-5; rest 2/2 throughout          PPL: 0/2 on L2; 1/2 on L1, L3, L4, S2, and S3; rest 2/2 throughout          VAC (+); DAP (+); BCR (+)  Psychiatric - Mood stable, Affect WNL  Psychiatric - Mood stable, Affect WNL    ___________________________________________________________________________

## 2025-03-31 LAB
ALBUMIN SERPL ELPH-MCNC: 2.4 G/DL — LOW (ref 3.3–5)
ALP SERPL-CCNC: 63 U/L — SIGNIFICANT CHANGE UP (ref 40–120)
ALT FLD-CCNC: 48 U/L — HIGH (ref 10–45)
ANION GAP SERPL CALC-SCNC: 7 MMOL/L — SIGNIFICANT CHANGE UP (ref 5–17)
AST SERPL-CCNC: 30 U/L — SIGNIFICANT CHANGE UP (ref 10–40)
BASOPHILS # BLD AUTO: 0.07 K/UL — SIGNIFICANT CHANGE UP (ref 0–0.2)
BASOPHILS NFR BLD AUTO: 1 % — SIGNIFICANT CHANGE UP (ref 0–2)
BILIRUB SERPL-MCNC: 0.5 MG/DL — SIGNIFICANT CHANGE UP (ref 0.2–1.2)
BUN SERPL-MCNC: 37 MG/DL — HIGH (ref 7–23)
CALCIUM SERPL-MCNC: 8.7 MG/DL — SIGNIFICANT CHANGE UP (ref 8.4–10.5)
CHLORIDE SERPL-SCNC: 100 MMOL/L — SIGNIFICANT CHANGE UP (ref 96–108)
CO2 SERPL-SCNC: 34 MMOL/L — HIGH (ref 22–31)
CREAT SERPL-MCNC: 1.04 MG/DL — SIGNIFICANT CHANGE UP (ref 0.5–1.3)
EGFR: 76 ML/MIN/1.73M2 — SIGNIFICANT CHANGE UP
EGFR: 76 ML/MIN/1.73M2 — SIGNIFICANT CHANGE UP
EOSINOPHIL # BLD AUTO: 0.5 K/UL — SIGNIFICANT CHANGE UP (ref 0–0.5)
EOSINOPHIL NFR BLD AUTO: 7.1 % — HIGH (ref 0–6)
GLUCOSE SERPL-MCNC: 111 MG/DL — HIGH (ref 70–99)
HCT VFR BLD CALC: 25.9 % — LOW (ref 39–50)
HGB BLD-MCNC: 8.3 G/DL — LOW (ref 13–17)
IMM GRANULOCYTES NFR BLD AUTO: 0.6 % — SIGNIFICANT CHANGE UP (ref 0–0.9)
LYMPHOCYTES # BLD AUTO: 1.16 K/UL — SIGNIFICANT CHANGE UP (ref 1–3.3)
LYMPHOCYTES # BLD AUTO: 16.4 % — SIGNIFICANT CHANGE UP (ref 13–44)
MCHC RBC-ENTMCNC: 28.3 PG — SIGNIFICANT CHANGE UP (ref 27–34)
MCHC RBC-ENTMCNC: 32 G/DL — SIGNIFICANT CHANGE UP (ref 32–36)
MCV RBC AUTO: 88.4 FL — SIGNIFICANT CHANGE UP (ref 80–100)
MONOCYTES # BLD AUTO: 0.73 K/UL — SIGNIFICANT CHANGE UP (ref 0–0.9)
MONOCYTES NFR BLD AUTO: 10.3 % — SIGNIFICANT CHANGE UP (ref 2–14)
NEUTROPHILS # BLD AUTO: 4.56 K/UL — SIGNIFICANT CHANGE UP (ref 1.8–7.4)
NEUTROPHILS NFR BLD AUTO: 64.6 % — SIGNIFICANT CHANGE UP (ref 43–77)
NRBC BLD AUTO-RTO: 0 /100 WBCS — SIGNIFICANT CHANGE UP (ref 0–0)
PLATELET # BLD AUTO: 217 K/UL — SIGNIFICANT CHANGE UP (ref 150–400)
POTASSIUM SERPL-MCNC: 3.8 MMOL/L — SIGNIFICANT CHANGE UP (ref 3.5–5.3)
POTASSIUM SERPL-SCNC: 3.8 MMOL/L — SIGNIFICANT CHANGE UP (ref 3.5–5.3)
PROT SERPL-MCNC: 5.8 G/DL — LOW (ref 6–8.3)
RBC # BLD: 2.93 M/UL — LOW (ref 4.2–5.8)
RBC # FLD: 14.3 % — SIGNIFICANT CHANGE UP (ref 10.3–14.5)
SODIUM SERPL-SCNC: 141 MMOL/L — SIGNIFICANT CHANGE UP (ref 135–145)
WBC # BLD: 7.06 K/UL — SIGNIFICANT CHANGE UP (ref 3.8–10.5)
WBC # FLD AUTO: 7.06 K/UL — SIGNIFICANT CHANGE UP (ref 3.8–10.5)

## 2025-03-31 PROCEDURE — 99232 SBSQ HOSP IP/OBS MODERATE 35: CPT

## 2025-03-31 PROCEDURE — 99233 SBSQ HOSP IP/OBS HIGH 50: CPT

## 2025-03-31 RX ORDER — CYCLOBENZAPRINE HYDROCHLORIDE 15 MG/1
5 CAPSULE, EXTENDED RELEASE ORAL THREE TIMES A DAY
Refills: 0 | Status: DISCONTINUED | OUTPATIENT
Start: 2025-03-31 | End: 2025-04-30

## 2025-03-31 RX ORDER — OXYCODONE HYDROCHLORIDE 30 MG/1
15 TABLET ORAL EVERY 6 HOURS
Refills: 0 | Status: DISCONTINUED | OUTPATIENT
Start: 2025-03-31 | End: 2025-04-07

## 2025-03-31 RX ORDER — OXYCODONE HYDROCHLORIDE 30 MG/1
15 TABLET ORAL EVERY 12 HOURS
Refills: 0 | Status: DISCONTINUED | OUTPATIENT
Start: 2025-03-31 | End: 2025-04-07

## 2025-03-31 RX ADMIN — Medication 40 MILLIGRAM(S): at 05:39

## 2025-03-31 RX ADMIN — OXYCODONE HYDROCHLORIDE 15 MILLIGRAM(S): 30 TABLET ORAL at 18:23

## 2025-03-31 RX ADMIN — Medication 3 MILLIGRAM(S): at 21:53

## 2025-03-31 RX ADMIN — Medication 1 APPLICATION(S): at 05:51

## 2025-03-31 RX ADMIN — WHITE PETROLATUM 1 APPLICATION(S): 1 OINTMENT TOPICAL at 14:49

## 2025-03-31 RX ADMIN — OXYCODONE HYDROCHLORIDE 15 MILLIGRAM(S): 30 TABLET ORAL at 17:27

## 2025-03-31 RX ADMIN — Medication 2 TABLET(S): at 21:52

## 2025-03-31 RX ADMIN — Medication 1 APPLICATION(S): at 17:28

## 2025-03-31 RX ADMIN — WHITE PETROLATUM 1 APPLICATION(S): 1 OINTMENT TOPICAL at 21:56

## 2025-03-31 RX ADMIN — CEFTRIAXONE 100 MILLIGRAM(S): 500 INJECTION, POWDER, FOR SOLUTION INTRAMUSCULAR; INTRAVENOUS at 21:56

## 2025-03-31 RX ADMIN — OXYCODONE HYDROCHLORIDE 15 MILLIGRAM(S): 30 TABLET ORAL at 17:28

## 2025-03-31 RX ADMIN — Medication 10 MILLIGRAM(S): at 05:38

## 2025-03-31 RX ADMIN — HEPARIN SODIUM 5000 UNIT(S): 1000 INJECTION INTRAVENOUS; SUBCUTANEOUS at 14:47

## 2025-03-31 RX ADMIN — OXYCODONE HYDROCHLORIDE 15 MILLIGRAM(S): 30 TABLET ORAL at 09:43

## 2025-03-31 RX ADMIN — CYCLOBENZAPRINE HYDROCHLORIDE 5 MILLIGRAM(S): 15 CAPSULE, EXTENDED RELEASE ORAL at 21:54

## 2025-03-31 RX ADMIN — WHITE PETROLATUM 1 APPLICATION(S): 1 OINTMENT TOPICAL at 05:51

## 2025-03-31 RX ADMIN — ROSUVASTATIN CALCIUM 5 MILLIGRAM(S): 20 TABLET, FILM COATED ORAL at 21:53

## 2025-03-31 RX ADMIN — HEPARIN SODIUM 5000 UNIT(S): 1000 INJECTION INTRAVENOUS; SUBCUTANEOUS at 21:52

## 2025-03-31 RX ADMIN — CYCLOBENZAPRINE HYDROCHLORIDE 5 MILLIGRAM(S): 15 CAPSULE, EXTENDED RELEASE ORAL at 12:08

## 2025-03-31 RX ADMIN — TAMSULOSIN HYDROCHLORIDE 0.8 MILLIGRAM(S): 0.4 CAPSULE ORAL at 21:52

## 2025-03-31 RX ADMIN — Medication 150 MICROGRAM(S): at 05:38

## 2025-03-31 RX ADMIN — DAPTOMYCIN 126 MILLIGRAM(S): 500 INJECTION, POWDER, LYOPHILIZED, FOR SOLUTION INTRAVENOUS at 09:48

## 2025-03-31 RX ADMIN — OXYCODONE HYDROCHLORIDE 15 MILLIGRAM(S): 30 TABLET ORAL at 10:43

## 2025-03-31 NOTE — PROVIDER CONTACT NOTE (OTHER) - ASSESSMENT
Gentle catheterization performed., extra lube applied to tube. Patient denied any pain or discomfort.

## 2025-03-31 NOTE — PROGRESS NOTE ADULT - SUBJECTIVE AND OBJECTIVE BOX
CC: Patient is a 75y old  Male who presents with a chief complaint of Thoracic epidural abscess s/p laminectomy and fusion surgery (31 Mar 2025 11:22)      Interval History:    No overnight events.  No new complaints.    ALLERGIES:  No Known Allergies    MEDICATIONS  (STANDING):  ammonium lactate 12% Lotion 1 Application(s) Topical two times a day  AQUAPHOR (petrolatum Ointment) 1 Application(s) Topical three times a day  bisacodyl Suppository 10 milliGRAM(s) Rectal <User Schedule>  buMETAnide 1 milliGRAM(s) Oral two times a day  cefTRIAXone   IVPB 2000 milliGRAM(s) IV Intermittent every 24 hours  chlorhexidine 4% Liquid 1 Application(s) Topical <User Schedule>  DAPTOmycin IVPB 650 milliGRAM(s) IV Intermittent every 24 hours  heparin   Injectable 5000 Unit(s) SubCutaneous every 8 hours  levothyroxine 150 MICROGram(s) Oral daily  metoprolol succinate ER 25 milliGRAM(s) Oral daily  oxyCODONE  ER Tablet 15 milliGRAM(s) Oral every 12 hours  pantoprazole    Tablet 40 milliGRAM(s) Oral before breakfast  polyethylene glycol 3350 17 Gram(s) Oral <User Schedule>  rosuvastatin 5 milliGRAM(s) Oral at bedtime  senna 2 Tablet(s) Oral at bedtime  tamsulosin 0.8 milliGRAM(s) Oral at bedtime    MEDICATIONS  (PRN):  aluminum hydroxide/magnesium hydroxide/simethicone Suspension 30 milliLiter(s) Oral every 4 hours PRN Dyspepsia  cyclobenzaprine 5 milliGRAM(s) Oral three times a day PRN Muscle Spasm  lactulose Syrup 20 Gram(s) Oral three times a day PRN for constipation  melatonin 3 milliGRAM(s) Oral at bedtime PRN Insomnia  oxyCODONE    IR 15 milliGRAM(s) Oral every 4 hours PRN Moderate Pain (4 - 6)  sodium chloride 0.9% lock flush 10 milliLiter(s) IV Push every 1 hour PRN Pre/post blood products, medications, blood draw, and to maintain line patency    Vital Signs Last 24 Hrs  T(F): 97.9 (31 Mar 2025 08:40), Max: 98.1 (30 Mar 2025 19:29)  HR: 77 (31 Mar 2025 08:40) (77 - 85)  BP: 101/61 (31 Mar 2025 08:40) (101/58 - 116/72)  RR: 15 (31 Mar 2025 08:40) (15 - 15)  SpO2: 94% (31 Mar 2025 08:40) (94% - 94%)  I&O's Summary    30 Mar 2025 07:01  -  31 Mar 2025 07:00  --------------------------------------------------------  IN: 0 mL / OUT: 2800 mL / NET: -2800 mL          PHYSICAL EXAM:  GENERAL: NAD  CHEST/LUNG: No rales, rhonchi, wheezing; normal respiratory effort  HEART: RRR; No murmurs, rubs, or gallops  ABDOMEN: Soft, Nontender, Nondistended; Bowel sounds present  MSK/EXT:  No peripheral edema, 2+ Peripheral Pulses, No clubbing or digital cyanosis  PSYCH: Appropriate affect, Alert & Oriented    LABS:                        8.3    7.06  )-----------( 217      ( 31 Mar 2025 06:13 )             25.9       03-31    141  |  100  |  37  ----------------------------<  111  3.8   |  34  |  1.04    Ca    8.7      31 Mar 2025 06:13    TPro  5.8  /  Alb  2.4  /  TBili  0.5  /  DBili  x   /  AST  30  /  ALT  48  /  AlkPhos  63  03-31         Urinalysis Basic - ( 31 Mar 2025 06:13 )    Color: x / Appearance: x / SG: x / pH: x  Gluc: 111 mg/dL / Ketone: x  / Bili: x / Urobili: x   Blood: x / Protein: x / Nitrite: x   Leuk Esterase: x / RBC: x / WBC x   Sq Epi: x / Non Sq Epi: x / Bacteria: x        COVID-19 PCR: NotDetec (03-25-25 @ 21:35)      Care Discussed with Consultants/Other Providers: Yes

## 2025-03-31 NOTE — PROGRESS NOTE ADULT - ASSESSMENT
Assessment/Plan:  Mr. Rishabh Shaver is a 74-year-old male patient with past medical history of benign prostatic hyperplasia, diverticulosis, Graves' disease, hemorrhoids, hyperlipidemia, hypothyroidism, osteoarthritis, spinal stenosis, spinal cord injury (2004), and multiple spinal surgeries (three cervical and one lumbar), who is admitted for Acute Inpatient Rehabilitation with a multidisciplinary rehab program at [facility] with functional impairments in ADLs and mobility secondary to a thoracic epidural abscess s/p posterior thoracic laminectomy and fusion on 3/18/25 with Dr. Keith Cantu.       #Thoracic epidural abscess s/p laminectomy and fusion surgery      * C8 AIS D Incomplete paraplegia      *  brace when OOB      * PRN flexiril 5mg for neck soreness      * Staples to be removed on POD #14 (4/1/25 - can be done at rehab if patient is not able to see Dr. Cantu in the office)      * Sacral wound assessment by wound/skin team,      * Maintain Ortega for now as pt has had urinary retention with previous trial of void - On Flomax 8mg daily      * Right Venodyne only due to left calf DVT (s/p pre-op IVC filter)   - Activity Limitations: Decreased social, vocational and leisure activities, decreased self care and ADLs, decreased mobility, decreased ability to manage household and finances.   - Comprehensive Multidisciplinary Rehab Program:      * 3 hours a day, 5 days a week.      * PT 1hr/day: Focused on improving strength, endurance, coordination, balance, functional mobility, and transfers      * OT 1hr/day: Focused on improving strength, fine motor skills, coordination, posture and ADLs.      -----------------------------------------------------------------------------------    Concurrent Medical Problems    #GOVIND on CKD  - Resolved  - LE edema  - Hypoalbuminemia  - Proteinuria  - IVF as needed with fluid balance  - Significant debilitating ansarca.   - Trial of albumin lasix started 3/8->improvement.   - IV Laxis/IV albumin  BID(3/9)  - Change to Bumex/Albumin (3/11)-> good effect  - Switched to PO bumex 1mg BID 3/24  - Trend Albumin (3/24- 2.3)  - Trend Protein total (3/24- 5.5 ); Total protein, Random Urine (3/18- 24)  - Trend CMP (3/25- creat 0.94/ BUN 27)    #L gastrocnemius and L soleal DVT  - appears provoked from decrease ambulation from recent spinal abscess  - Could not tolerate VQ scan for r/o PE 2/2 pain  - Switch to Eliquis will be new med on discharge (of note patient completed Eliquis load)   - Therapeutic Lovenox (3/12) in the setting of possible spine intervention.   - Last dose of AC night of 3/16. Restart AC (Eliquis 5mg BID) 2 weeks post op (tentative 4/2/2025) per neurosx if hgb stable and surgical site stable  - S/P IVC filter placement 3/17.    #Spinal epidural abscess POA and already being treated with IV antibiotics with suspected worsening of disease  - S/p thoracic laminectomy and fusion 3/18. [place]  brace   - Surgical culture with staph epi possible contaminant but given clinical presentation on admission, IV Dapto added.   - IV Rocephin 2g HS via PICC line thru 5/2/25  - IV Daptomycin 650mg daily via PICC line thru 5/2/2025  - Pain management: Tylenol PRN, Oxycodone 15mg q4h PRN moderate pain; Oxycontin 15mg BID  - Staples to be removed on POD #14 (4/1/25)  (can be done at rehab if patient is not able to see Dr. Cantu in the office)  - CXR for PICC confirmation on admission     #Mild Rhabdomyolysis Improving  - Associated elevated transaminases  - Suspect from Ansarca/Edema  - Improving with adequate diuresis of edema  - Associated elevated transaminase; relatively stable. Continue to monitor (3/24- AST 38/ALT 57)    #Leukocytosis  - Likely rx to DVT + Discitis.  - No other signs of sepsis , afebrile   - Trend CBC and monitor fever curve (3/25- WBC 8.69)    #Hypothyroidism  - Synthroid 150mcg daily    #CAD  #HTN  #HLD  - Metoprolol 25mg daily   - Crestor 5mg HS    #Anemia/MARLENA  - Trend H/H (3/25- 8.7/27.3)  - Transfuse if Hgb <7 PRN     #Sleep:   - Maintain quiet hours and low stim environment.  - Melatonin 3mg HS PRN to maximize participation in therapy during the day.     #GI/Bowel:  - Senna QHS, Miralax Daily, Lactulose 30ml TID PRN, Maalox PRN   - GI ppx: Pantoprazole 40mg daily     #/Bladder  #BPH  #Urinary retention likely augmented by degree of anasarca  - S/P Ortega; TOV  ->failed. Ortega re-inserted 3/6->fell out without knowing with associated hematuria->recurrent retention 3/7->Ortega re-inserted (3/7)  - Ortega on admission  - TOV when appropriate   - Flomax 0.8mg HS  - Monitor U/O    #Skin/Pressure Injury:   Skin assessment on admission:   - Generalized dry flakey skin  - 19cm posterior cervical incision with 32 staples and 2 steri strips  - Left buttock DTI pressure injury measuring 14 x 9 cm      * appearing as a dark purple discoloration of intact skin, with a central superficial open area measuring 6 x 2 cm.      * at the periphery of the 14 x 9 cm area, the discoloration is somewhat lighter;       * dark red non-blanchable erythema.  - Bilateral lower extremity edema.  - Healed lumbar surgical incision scar  - Right heel blanchable redness  - Bilateral dry cracked heels and plantar surface  - Lac hydrin BID BLE  - Aquaphor BID to generalized dry skin   - Appreciate wound care consult     #Diet/Dysphagia:  - Dysphagia: SLP consult for swallow function evaluation and treatment  - Current Diet: Regular- DASH  - Aspiration Precautions  - Nutrition consult     #Patient Education  - Education provided on the following:      * Admitting diagnosis and functional implications      * Functional goals      * Bladder management      * Bowel management      * Skin care management      * Intensity of service and scheduling of rehab disciplines      * Plan of care and role of interdisciplinary team conference in discharge planning      * Reconciliation of medications from prior institution    #Precautions / PROPHYLAXIS:   - Falls, Spinal  - Ortho: Weight bearing status: FWB;  brace OOB  - DVT ppx: Heparin 5000U TID, TEDs  - Pressure injury/Skin: Turn Q2hrs while in bed, OOB to Chair, PT/OT      ---------------  Code Status: FULL  Emergency Contact:    Outpatient Follow-up (Specialty/Name of physician):    Yue Wiggins  [facility]  [address], [address]  [place] [address]  Phone: [phone]  Fax: [phone]  Established Patient  Follow Up Time: 1 week    Keith Cantu [facility]  Neurosurgery  [address] [place] [address], [address]  [place] [address]  Phone: [phone]  Fax: [phone]  Established Patient  Follow Up Time: 2 weeks    Luis A Brennan  [facility]  CARDIOLOGY [address]  Scheduled Appointment: 04/23/2025        --------------  Goals: Safe discharge to Home*****  Estimated Length of Stay: 10-14 days  Rehab Potential: Good  Medical Prognosis: Good  Estimated Disposition: Home with Home Care  ---------------    PRESCREEN COMPARISON:  I have reviewed the prescreen information and I have found no relevant changes between the preadmission screening and my post admission evaluation.    RATIONALE FOR INPATIENT ADMISSION: Patient demonstrates the following:  [X] Medically appropriate for rehabilitation admission  [X] Has attainable rehab goals with an appropriate initial discharge plan  [X]Has rehabilitation potential (expected to make a significant improvement within a reasonable period of time)  [X] Requires close medical management by a rehab physician, rehab nursing care, Hospitalist and comprehensive interdisciplinary team (including PT, OT)         Assessment/Plan:  Mr. Rishabh Shaver is a 74-year-old male patient with past medical history of benign prostatic hyperplasia, diverticulosis, Graves' disease, hemorrhoids, hyperlipidemia, hypothyroidism, osteoarthritis, spinal stenosis, spinal cord injury (2004), and multiple spinal surgeries (three cervical and one lumbar), who is admitted for Acute Inpatient Rehabilitation with a multidisciplinary rehab program at [facility] with functional impairments in ADLs and mobility secondary to a thoracic epidural abscess s/p posterior thoracic laminectomy and fusion on 3/18/25 with Dr. Keith Cantu.       #Thoracic epidural abscess s/p laminectomy and fusion surgery      * C8 AIS D Incomplete paraplegia      *  brace when OOB      * PRN Flexeril 5mg for neck soreness      * Staples to be removed on POD #14 (4/1/25 - can be done at rehab if patient is not able to see Dr. Cantu in the office)      * Sacral wound assessment by wound/skin team,      * Maintain Ortega for now as pt has had urinary retention with previous trial of void - On Flomax 8mg daily      * Right Venodyne only due to left calf DVT (s/p pre-op IVC filter)   - Activity Limitations: Decreased social, vocational and leisure activities, decreased self care and ADLs, decreased mobility, decreased ability to manage household and finances.   - Comprehensive Multidisciplinary Rehab Program:      * 3 hours a day, 5 days a week.      * PT 1hr/day: Focused on improving strength, endurance, coordination, balance, functional mobility, and transfers      * OT 1hr/day: Focused on improving strength, fine motor skills, coordination, posture and ADLs.      -----------------------------------------------------------------------------------    Concurrent Medical Problems    #GOVIND on CKD  - Resolved  - LE edema  - Hypoalbuminemia  - Proteinuria  - IVF as needed with fluid balance  - Significant debilitating ansarca.   - Trial of albumin lasix started 3/8->improvement.   - IV Laxis/IV albumin  BID(3/9)  - Change to Bumex/Albumin (3/11)-> good effect  - Switched to PO bumex 1mg BID 3/24  - Trend Albumin (3/24- 2.3)  - Trend Protein total (3/24- 5.5 ); Total protein, Random Urine (3/18- 24)  - Trend CMP (3/25- creat 0.94/ BUN 27)    #L gastrocnemius and L soleal DVT  - appears provoked from decrease ambulation from recent spinal abscess  - Could not tolerate VQ scan for r/o PE 2/2 pain  - Switch to Eliquis will be new med on discharge (of note patient completed Eliquis load)   - Therapeutic Lovenox (3/12) in the setting of possible spine intervention.   - Last dose of AC night of 3/16. Restart AC (Eliquis 5mg BID) 2 weeks post op (tentative 4/2/2025) per neurosx if hgb stable and surgical site stable  - S/P IVC filter placement 3/17.    #Spinal epidural abscess POA and already being treated with IV antibiotics with suspected worsening of disease  - S/p thoracic laminectomy and fusion 3/18. [place]  brace   - Surgical culture with staph epi possible contaminant but given clinical presentation on admission, IV Dapto added.   - IV Rocephin 2g HS via PICC line thru 5/2/25  - IV Daptomycin 650mg daily via PICC line thru 5/2/2025  - Pain management: Tylenol PRN, Oxycodone 15mg q4h PRN moderate pain; Oxycontin 15mg BID  - Staples to be removed on POD #14 (4/1/25)  (can be done at rehab if patient is not able to see Dr. Cantu in the office)  - CXR for PICC confirmation on admission     #Mild Rhabdomyolysis Improving  - Associated elevated transaminases  - Suspect from Ansarca/Edema  - Improving with adequate diuresis of edema  - Associated elevated transaminase; relatively stable. Continue to monitor (3/24- AST 38/ALT 57)    #Leukocytosis  - Likely rx to DVT + Discitis.  - No other signs of sepsis , afebrile   - Trend CBC and monitor fever curve (3/25- WBC 8.69)    #Hypothyroidism  - Synthroid 150mcg daily    #CAD  #HTN  #HLD  - Metoprolol 25mg daily   - Crestor 5mg HS    #Anemia/MARLENA  - Trend H/H (3/25- 8.7/27.3)  - Transfuse if Hgb <7 PRN     #Sleep:   - Maintain quiet hours and low stim environment.  - Melatonin 3mg HS PRN to maximize participation in therapy during the day.     #GI/Bowel:  - Senna QHS, Miralax Daily, Lactulose 30ml TID PRN, Maalox PRN   - GI ppx: Pantoprazole 40mg daily     #/Bladder  #BPH  #Urinary retention likely augmented by degree of anasarca  - S/P Ortega; TOV  ->failed. Ortega re-inserted 3/6->fell out without knowing with associated hematuria->recurrent retention 3/7->Ortega re-inserted (3/7)  - Ortega on admission  - TOV when appropriate   - Flomax 0.8mg HS  - Monitor U/O    #Skin/Pressure Injury:   Skin assessment on admission:   - Generalized dry flakey skin  - 19cm posterior cervical incision with 32 staples and 2 steri strips  - Left buttock DTI pressure injury measuring 14 x 9 cm      * appearing as a dark purple discoloration of intact skin, with a central superficial open area measuring 6 x 2 cm.      * at the periphery of the 14 x 9 cm area, the discoloration is somewhat lighter;       * dark red non-blanchable erythema.  - Bilateral lower extremity edema.  - Healed lumbar surgical incision scar  - Right heel blanchable redness  - Bilateral dry cracked heels and plantar surface  - Lac hydrin BID BLE  - Aquaphor BID to generalized dry skin   - Appreciate wound care consult     #Diet/Dysphagia:  - Dysphagia: SLP consult for swallow function evaluation and treatment  - Current Diet: Regular- DASH  - Aspiration Precautions  - Nutrition consult     #Patient Education  - Education provided on the following:      * Admitting diagnosis and functional implications      * Functional goals      * Bladder management      * Bowel management      * Skin care management      * Intensity of service and scheduling of rehab disciplines      * Plan of care and role of interdisciplinary team conference in discharge planning      * Reconciliation of medications from prior institution    #Precautions / PROPHYLAXIS:   - Falls, Spinal  - Ortho: Weight bearing status: FWB;  brace OOB  - DVT ppx: Heparin 5000U TID, TEDs  - Pressure injury/Skin: Turn Q2hrs while in bed, OOB to Chair, PT/OT      ---------------  Code Status: FULL  Emergency Contact:    Outpatient Follow-up (Specialty/Name of physician):    Yue Wiggins  [facility] Medicine  [address], [address]  [place] [address]  Phone: [phone]  Fax: [phone]  Established Patient  Follow Up Time: 1 week    Keith Cantu [facility]  Neurosurgery  [address] [place] [address], [address]  [place] [address]  Phone: [phone]  Fax: [phone]  Established Patient  Follow Up Time: 2 weeks    Luis A Brennan  [facility]  CARDIOLOGY [address]  Scheduled Appointment: 04/23/2025        --------------  Goals: Safe discharge to Home*****  Estimated Length of Stay: 10-14 days  Rehab Potential: Good  Medical Prognosis: Good  Estimated Disposition: Home with Home Care  ---------------    PRESCREEN COMPARISON:  I have reviewed the prescreen information and I have found no relevant changes between the preadmission screening and my post admission evaluation.    RATIONALE FOR INPATIENT ADMISSION: Patient demonstrates the following:  [X] Medically appropriate for rehabilitation admission  [X] Has attainable rehab goals with an appropriate initial discharge plan  [X]Has rehabilitation potential (expected to make a significant improvement within a reasonable period of time)  [X] Requires close medical management by a rehab physician, rehab nursing care, Hospitalist and comprehensive interdisciplinary team (including PT, OT)

## 2025-03-31 NOTE — PROGRESS NOTE ADULT - SUBJECTIVE AND OBJECTIVE BOX
HPI:  Mr. Rishabh Shaver is a 74-year-old male patient with past medical history of benign prostatic hyperplasia, diverticulosis, Graves' disease, hemorrhoids, hyperlipidemia, hypothyroidism, osteoarthritis, spinal stenosis, spinal cord injury (2004), and multiple spinal surgeries (three cervical and one lumbar), who presented to [facility] on 3/4/25 from [facility] due to lower extremity swelling and abnormal BUN/creatinine levels for further evaluation and management. The patient was recently admitted to [facility] from 02/18/25 through 02/25/25 for sepsis secondary to epidural abscess. He had a peripherally inserted central catheter line in the right arm and was on nightly Ceftriaxone treatment until 04/16/25. At the ED, he was found to have GOVIND, a left soleal vein, and a left gastrocnemius DVT. He was started on heparin gtt. Hospital course complicated by urinary retention requiring Ortega catheter insertion, rhabdomyolysis, elevated LFTs, anasarca, leukocytosis due to thoracic spine discitis OM and early paraspinal abscess, as well as new onset RUE paresthesias and weakness. He is s/p IVC filter placement on 3/17 and was recommended surgical management. He is s/p posterior thoracic laminectomy and fusion on 3/18/25 with Dr. Keith Cantu. Surgical culture with rare staph epi. Recommendations by ID to continue IV Rocephin and Daptomycin through 5/2/25. Patient was evaluated by PM&R and therapy for functional deficits, gait/ADL impairments and acute rehabilitation was recommended. Patient was cleared for discharge to [facility] IRF on 3/25/25. (25 Mar 2025 17:09)    TDD: 4/22/25 home  ___________________________________________________________________________    SUBJECTIVE/ROS  Patient was seen and evaluated at bedside today alongside RN.  NAD and tolerating therapies. No other acute primary complaints.  Reports some soreness in the back of his neck around his cervical collar that comes and goes.   Added muscle relaxer prn.  Patient is eager to continue participation on the recommended rehabilitation program.  Denies any CP, SOB, KESSLER, palpitations, fever, chills, body aches, cough, congestion, or any other symptoms at this time.   ___________________________________________________________________________    Vital Signs Last 24 Hrs  T(C): 36.6 (03-31-25 @ 08:40), Max: 36.7 (03-30-25 @ 19:29)  T(F): 97.9 (03-31-25 @ 08:40), Max: 98.1 (03-30-25 @ 19:29)  HR: 77 (03-31-25 @ 08:40) (77 - 85)  BP: 101/61 (03-31-25 @ 08:40) (101/58 - 116/72)  ABP: --  ABP(mean): --  RR: 15 (03-31-25 @ 08:40) (15 - 15)  SpO2: 94% (03-31-25 @ 08:40) (94% - 94%)    ___________________________________________________________________________    LAB                        8.3    7.06  )-----------( 217      ( 31 Mar 2025 06:13 )             25.9     03-31    141  |  100  |  37[H]  ----------------------------<  111[H]  3.8   |  34[H]  |  1.04    Ca    8.7      31 Mar 2025 06:13    TPro  5.8[L]  /  Alb  2.4[L]  /  TBili  0.5  /  DBili  x   /  AST  30  /  ALT  48[H]  /  AlkPhos  63  03-31    LIVER FUNCTIONS - ( 31 Mar 2025 06:13 )  Alb: 2.4 g/dL / Pro: 5.8 g/dL / ALK PHOS: 63 U/L / ALT: 48 U/L / AST: 30 U/L / GGT: x                 Urinalysis Basic - ( 31 Mar 2025 06:13 )    Color: x / Appearance: x / SG: x / pH: x  Gluc: 111 mg/dL / Ketone: x  / Bili: x / Urobili: x   Blood: x / Protein: x / Nitrite: x   Leuk Esterase: x / RBC: x / WBC x   Sq Epi: x / Non Sq Epi: x / Bacteria: x            ___________________________________________________________________________    MEDICATIONS  (STANDING):  ammonium lactate 12% Lotion 1 Application(s) Topical two times a day  AQUAPHOR (petrolatum Ointment) 1 Application(s) Topical three times a day  bisacodyl Suppository 10 milliGRAM(s) Rectal <User Schedule>  buMETAnide 1 milliGRAM(s) Oral two times a day  cefTRIAXone   IVPB 2000 milliGRAM(s) IV Intermittent every 24 hours  chlorhexidine 4% Liquid 1 Application(s) Topical <User Schedule>  DAPTOmycin IVPB 650 milliGRAM(s) IV Intermittent every 24 hours  heparin   Injectable 5000 Unit(s) SubCutaneous every 8 hours  levothyroxine 150 MICROGram(s) Oral daily  metoprolol succinate ER 25 milliGRAM(s) Oral daily  oxyCODONE  ER Tablet 15 milliGRAM(s) Oral every 12 hours  pantoprazole    Tablet 40 milliGRAM(s) Oral before breakfast  polyethylene glycol 3350 17 Gram(s) Oral <User Schedule>  rosuvastatin 5 milliGRAM(s) Oral at bedtime  senna 2 Tablet(s) Oral at bedtime  tamsulosin 0.8 milliGRAM(s) Oral at bedtime    MEDICATIONS  (PRN):  aluminum hydroxide/magnesium hydroxide/simethicone Suspension 30 milliLiter(s) Oral every 4 hours PRN Dyspepsia  cyclobenzaprine 5 milliGRAM(s) Oral three times a day PRN Muscle Spasm  lactulose Syrup 20 Gram(s) Oral three times a day PRN for constipation  melatonin 3 milliGRAM(s) Oral at bedtime PRN Insomnia  oxyCODONE    IR 15 milliGRAM(s) Oral every 4 hours PRN Moderate Pain (4 - 6)  sodium chloride 0.9% lock flush 10 milliLiter(s) IV Push every 1 hour PRN Pre/post blood products, medications, blood draw, and to maintain line patency      ___________________________________________________________________________    PHYSICAL EXAM:    Gen - NAD, Comfortable  HEENT -  EOMI, Normal Conjunctivae  Neck - Supple, + limited ROM  Pulm - breathing comfortably  Cardiovascular - warm and well perfused  Abdomen - soft  Extremities - +BLE 3+ pitting edema, +B/L pedal edema, +RUE PICC line   /GI- + diaper  Neuro-     Cognitive - awake, alert, oriented to person, place, date, year, and situation.  Able  to follow command     Communication - Fluent, Comprehensible, No dysarthria, No aphasia   Psychiatric - Mood stable, Affect WNL    ___________________________________________________________________________ HPI:  Mr. Rishabh Shaver is a 74-year-old male patient with past medical history of benign prostatic hyperplasia, diverticulosis, Graves' disease, hemorrhoids, hyperlipidemia, hypothyroidism, osteoarthritis, spinal stenosis, spinal cord injury (2004), and multiple spinal surgeries (three cervical and one lumbar), who presented to [facility] on 3/4/25 from [facility] due to lower extremity swelling and abnormal BUN/creatinine levels for further evaluation and management. The patient was recently admitted to [facility] from 02/18/25 through 02/25/25 for sepsis secondary to epidural abscess. He had a peripherally inserted central catheter line in the right arm and was on nightly Ceftriaxone treatment until 04/16/25. At the ED, he was found to have GOVIND, a left soleal vein, and a left gastrocnemius DVT. He was started on heparin gtt. Hospital course complicated by urinary retention requiring Ortega catheter insertion, rhabdomyolysis, elevated LFTs, anasarca, leukocytosis due to thoracic spine discitis OM and early paraspinal abscess, as well as new onset RUE paresthesias and weakness. He is s/p IVC filter placement on 3/17 and was recommended surgical management. He is s/p posterior thoracic laminectomy and fusion on 3/18/25 with Dr. Keith Cantu. Surgical culture with rare staph epi. Recommendations by ID to continue IV Rocephin and Daptomycin through 5/2/25. Patient was evaluated by PM&R and therapy for functional deficits, gait/ADL impairments and acute rehabilitation was recommended. Patient was cleared for discharge to [facility] IRF on 3/25/25. (25 Mar 2025 17:09)    TDD: 4/22/25 home  ___________________________________________________________________________    SUBJECTIVE/ROS  Patient was seen and evaluated at bedside today alongside RN.  NAD and tolerating therapies. No other acute primary complaints.  Reports some soreness in the back of his neck around his cervical collar that comes and goes.   Added muscle relaxer prn.  Had a blood clot in straight cath this morning.  Provided reassurance.   Patient is eager to continue participation on the recommended rehabilitation program.  Denies any CP, SOB, KESSLER, palpitations, fever, chills, body aches, cough, congestion, or any other symptoms at this time.   ___________________________________________________________________________    Vital Signs Last 24 Hrs  T(C): 36.6 (03-31-25 @ 08:40), Max: 36.7 (03-30-25 @ 19:29)  T(F): 97.9 (03-31-25 @ 08:40), Max: 98.1 (03-30-25 @ 19:29)  HR: 77 (03-31-25 @ 08:40) (77 - 85)  BP: 101/61 (03-31-25 @ 08:40) (101/58 - 116/72)  ABP: --  ABP(mean): --  RR: 15 (03-31-25 @ 08:40) (15 - 15)  SpO2: 94% (03-31-25 @ 08:40) (94% - 94%)    ___________________________________________________________________________    LAB                        8.3    7.06  )-----------( 217      ( 31 Mar 2025 06:13 )             25.9     03-31    141  |  100  |  37[H]  ----------------------------<  111[H]  3.8   |  34[H]  |  1.04    Ca    8.7      31 Mar 2025 06:13    TPro  5.8[L]  /  Alb  2.4[L]  /  TBili  0.5  /  DBili  x   /  AST  30  /  ALT  48[H]  /  AlkPhos  63  03-31    LIVER FUNCTIONS - ( 31 Mar 2025 06:13 )  Alb: 2.4 g/dL / Pro: 5.8 g/dL / ALK PHOS: 63 U/L / ALT: 48 U/L / AST: 30 U/L / GGT: x                 Urinalysis Basic - ( 31 Mar 2025 06:13 )    Color: x / Appearance: x / SG: x / pH: x  Gluc: 111 mg/dL / Ketone: x  / Bili: x / Urobili: x   Blood: x / Protein: x / Nitrite: x   Leuk Esterase: x / RBC: x / WBC x   Sq Epi: x / Non Sq Epi: x / Bacteria: x            ___________________________________________________________________________    MEDICATIONS  (STANDING):  ammonium lactate 12% Lotion 1 Application(s) Topical two times a day  AQUAPHOR (petrolatum Ointment) 1 Application(s) Topical three times a day  bisacodyl Suppository 10 milliGRAM(s) Rectal <User Schedule>  buMETAnide 1 milliGRAM(s) Oral two times a day  cefTRIAXone   IVPB 2000 milliGRAM(s) IV Intermittent every 24 hours  chlorhexidine 4% Liquid 1 Application(s) Topical <User Schedule>  DAPTOmycin IVPB 650 milliGRAM(s) IV Intermittent every 24 hours  heparin   Injectable 5000 Unit(s) SubCutaneous every 8 hours  levothyroxine 150 MICROGram(s) Oral daily  metoprolol succinate ER 25 milliGRAM(s) Oral daily  oxyCODONE  ER Tablet 15 milliGRAM(s) Oral every 12 hours  pantoprazole    Tablet 40 milliGRAM(s) Oral before breakfast  polyethylene glycol 3350 17 Gram(s) Oral <User Schedule>  rosuvastatin 5 milliGRAM(s) Oral at bedtime  senna 2 Tablet(s) Oral at bedtime  tamsulosin 0.8 milliGRAM(s) Oral at bedtime    MEDICATIONS  (PRN):  aluminum hydroxide/magnesium hydroxide/simethicone Suspension 30 milliLiter(s) Oral every 4 hours PRN Dyspepsia  cyclobenzaprine 5 milliGRAM(s) Oral three times a day PRN Muscle Spasm  lactulose Syrup 20 Gram(s) Oral three times a day PRN for constipation  melatonin 3 milliGRAM(s) Oral at bedtime PRN Insomnia  oxyCODONE    IR 15 milliGRAM(s) Oral every 4 hours PRN Moderate Pain (4 - 6)  sodium chloride 0.9% lock flush 10 milliLiter(s) IV Push every 1 hour PRN Pre/post blood products, medications, blood draw, and to maintain line patency      ___________________________________________________________________________    PHYSICAL EXAM:    Gen - NAD, Comfortable  HEENT -  EOMI, Normal Conjunctivae  Neck - Supple, + limited ROM  Pulm - breathing comfortably  Cardiovascular - warm and well perfused  Abdomen - soft  Extremities - +BLE 3+ pitting edema, +B/L pedal edema, +RUE PICC line   /GI- + diaper  Neuro-     Cognitive - awake, alert, oriented to person, place, date, year, and situation.  Able  to follow command     Communication - Fluent, Comprehensible, No dysarthria, No aphasia   Psychiatric - Mood stable, Affect WNL    ___________________________________________________________________________ HPI:  Mr. Rishabh Shaver is a 74-year-old male patient with past medical history of benign prostatic hyperplasia, diverticulosis, Graves' disease, hemorrhoids, hyperlipidemia, hypothyroidism, osteoarthritis, spinal stenosis, spinal cord injury (2004), and multiple spinal surgeries (three cervical and one lumbar), who presented to [facility] on 3/4/25 from [facility] due to lower extremity swelling and abnormal BUN/creatinine levels for further evaluation and management. The patient was recently admitted to [facility] from 02/18/25 through 02/25/25 for sepsis secondary to epidural abscess. He had a peripherally inserted central catheter line in the right arm and was on nightly Ceftriaxone treatment until 04/16/25. At the ED, he was found to have GOVIND, a left soleal vein, and a left gastrocnemius DVT. He was started on heparin gtt. Hospital course complicated by urinary retention requiring Ortega catheter insertion, rhabdomyolysis, elevated LFTs, anasarca, leukocytosis due to thoracic spine discitis OM and early paraspinal abscess, as well as new onset RUE paresthesias and weakness. He is s/p IVC filter placement on 3/17 and was recommended surgical management. He is s/p posterior thoracic laminectomy and fusion on 3/18/25 with Dr. Keith Cantu. Surgical culture with rare staph epi. Recommendations by ID to continue IV Rocephin and Daptomycin through 5/2/25. Patient was evaluated by PM&R and therapy for functional deficits, gait/ADL impairments and acute rehabilitation was recommended. Patient was cleared for discharge to [facility] IRF on 3/25/25. (25 Mar 2025 17:09)    TDD: 4/22/25 home  ___________________________________________________________________________    SUBJECTIVE/ROS  Patient was seen and evaluated at bedside today alongside RN.  NAD and tolerating therapies. No other acute primary complaints.  Reports some soreness in the back of his neck around his cervical collar that comes and goes.   Added muscle relaxer prn.  Had a blood clot in straight cath this morning.  Provided reassurance.   Patient is eager to continue participation on the recommended rehabilitation program.  Denies any CP, SOB, KESSLER, palpitations, fever, chills, body aches, cough, congestion, or any other symptoms at this time.   ___________________________________________________________________________    Vital Signs Last 24 Hrs  T(C): 36.6 (03-31-25 @ 08:40), Max: 36.7 (03-30-25 @ 19:29)  T(F): 97.9 (03-31-25 @ 08:40), Max: 98.1 (03-30-25 @ 19:29)  HR: 77 (03-31-25 @ 08:40) (77 - 85)  BP: 101/61 (03-31-25 @ 08:40) (101/58 - 116/72)  RR: 15 (03-31-25 @ 08:40) (15 - 15)  SpO2: 94% (03-31-25 @ 08:40) (94% - 94%)    ___________________________________________________________________________    LAB                        8.3    7.06  )-----------( 217      ( 31 Mar 2025 06:13 )             25.9     03-31    141  |  100  |  37[H]  ----------------------------<  111[H]  3.8   |  34[H]  |  1.04    Ca    8.7      31 Mar 2025 06:13    TPro  5.8[L]  /  Alb  2.4[L]  /  TBili  0.5  /  DBili  x   /  AST  30  /  ALT  48[H]  /  AlkPhos  63  03-31    LIVER FUNCTIONS - ( 31 Mar 2025 06:13 )  Alb: 2.4 g/dL / Pro: 5.8 g/dL / ALK PHOS: 63 U/L / ALT: 48 U/L / AST: 30 U/L / GGT: x              ___________________________________________________________________________    MEDICATIONS  (STANDING):  ammonium lactate 12% Lotion 1 Application(s) Topical two times a day  AQUAPHOR (petrolatum Ointment) 1 Application(s) Topical three times a day  bisacodyl Suppository 10 milliGRAM(s) Rectal <User Schedule>  buMETAnide 1 milliGRAM(s) Oral two times a day  cefTRIAXone   IVPB 2000 milliGRAM(s) IV Intermittent every 24 hours  chlorhexidine 4% Liquid 1 Application(s) Topical <User Schedule>  DAPTOmycin IVPB 650 milliGRAM(s) IV Intermittent every 24 hours  heparin   Injectable 5000 Unit(s) SubCutaneous every 8 hours  levothyroxine 150 MICROGram(s) Oral daily  metoprolol succinate ER 25 milliGRAM(s) Oral daily  oxyCODONE  ER Tablet 15 milliGRAM(s) Oral every 12 hours  pantoprazole    Tablet 40 milliGRAM(s) Oral before breakfast  polyethylene glycol 3350 17 Gram(s) Oral <User Schedule>  rosuvastatin 5 milliGRAM(s) Oral at bedtime  senna 2 Tablet(s) Oral at bedtime  tamsulosin 0.8 milliGRAM(s) Oral at bedtime    MEDICATIONS  (PRN):  aluminum hydroxide/magnesium hydroxide/simethicone Suspension 30 milliLiter(s) Oral every 4 hours PRN Dyspepsia  cyclobenzaprine 5 milliGRAM(s) Oral three times a day PRN Muscle Spasm  lactulose Syrup 20 Gram(s) Oral three times a day PRN for constipation  melatonin 3 milliGRAM(s) Oral at bedtime PRN Insomnia  oxyCODONE    IR 15 milliGRAM(s) Oral every 4 hours PRN Moderate Pain (4 - 6)  sodium chloride 0.9% lock flush 10 milliLiter(s) IV Push every 1 hour PRN Pre/post blood products, medications, blood draw, and to maintain line patency      ___________________________________________________________________________    PHYSICAL EXAM:    Gen - NAD, Comfortable  HEENT -  EOMI, Normal Conjunctivae  Neck - Supple, + limited ROM  Pulm - breathing comfortably  Cardiovascular - warm and well perfused  Abdomen - soft  Extremities - +BLE 3+ pitting edema, +B/L pedal edema, +RUE PICC line   /GI- + diaper  Neuro-     Cognitive - awake, alert, oriented to person, place, date, year, and situation.  Able  to follow command     Communication - Fluent, Comprehensible, No dysarthria, No aphasia   Psychiatric - Mood stable, Affect WNL    ___________________________________________________________________________

## 2025-03-31 NOTE — PROVIDER CONTACT NOTE (OTHER) - SITUATION
Upon observation, scant amount of blood on the patient's brief. Intermittent cath was done, small blood clot found in the catheter.  Patient denied any pain or discomfort  Patient currently on HEPARIN

## 2025-03-31 NOTE — CHART NOTE - NSCHARTNOTEFT_GEN_A_CORE
Nutrition Follow Up Note  Hospital Course   (Per Electronic Medical Record)    Source:  Patient [X]  Medical Record [X]      Diet:   DASH-TLC Diet w/ Thin Liquids (IDDSI Level 0)  Recommend Change Diet to Regular Diet - Recent Low BP  Tolerates Diet Consistency Well  No Chewing/Swallowing Difficulties  No Recent Nausea, Vomiting, Diarrhea or Constipation (as Per Patient)  Consumes % of Meals (as Per Documentation) - States Good PO Intake/Appetite (Per Patient)   on Ensure Max 11oz PO BID (Provides 300kcal & 60grams of Protein) - Declines Nutrition Supplementation   Recommend Change Supplement to Ensure Plus High Protein 8oz PO BID  on Lalito 1 Packet BID (Provides 180kcal & 28grams of Protein) - Patient Takes Nutrition Supplement Well  Education Provided on Proper Nutrition  Obtained Food Preferences from Patient     Enteral/Parenteral Nutrition: Not Applicable    Current Weight: 229.7lb on 3/29  Obtain New Weight  Obtain Weights Weekly     Pertinent Medications: MEDICATIONS  (STANDING):  ammonium lactate 12% Lotion 1 Application(s) Topical two times a day  AQUAPHOR (petrolatum Ointment) 1 Application(s) Topical three times a day  bisacodyl Suppository 10 milliGRAM(s) Rectal <User Schedule>  buMETAnide 1 milliGRAM(s) Oral two times a day  cefTRIAXone   IVPB 2000 milliGRAM(s) IV Intermittent every 24 hours  chlorhexidine 4% Liquid 1 Application(s) Topical <User Schedule>  DAPTOmycin IVPB 650 milliGRAM(s) IV Intermittent every 24 hours  heparin   Injectable 5000 Unit(s) SubCutaneous every 8 hours  levothyroxine 150 MICROGram(s) Oral daily  metoprolol succinate ER 25 milliGRAM(s) Oral daily  oxyCODONE  ER Tablet 15 milliGRAM(s) Oral every 12 hours  pantoprazole    Tablet 40 milliGRAM(s) Oral before breakfast  polyethylene glycol 3350 17 Gram(s) Oral <User Schedule>  rosuvastatin 5 milliGRAM(s) Oral at bedtime  senna 2 Tablet(s) Oral at bedtime  tamsulosin 0.8 milliGRAM(s) Oral at bedtime    MEDICATIONS  (PRN):  aluminum hydroxide/magnesium hydroxide/simethicone Suspension 30 milliLiter(s) Oral every 4 hours PRN Dyspepsia  cyclobenzaprine 5 milliGRAM(s) Oral three times a day PRN Muscle Spasm  lactulose Syrup 20 Gram(s) Oral three times a day PRN for constipation  melatonin 3 milliGRAM(s) Oral at bedtime PRN Insomnia  oxyCODONE    IR 15 milliGRAM(s) Oral every 4 hours PRN Moderate Pain (4 - 6)  sodium chloride 0.9% lock flush 10 milliLiter(s) IV Push every 1 hour PRN Pre/post blood products, medications, blood draw, and to maintain line patency    Pertinent Labs:  03-31 Na141 mmol/L Glu 111 mg/dL[H] K+ 3.8 mmol/L Cr  1.04 mg/dL BUN 37 mg/dL[H] 03-31 Alb 2.4 g/dL[L]    Skin: DTI on Left Buttock     Edema: None Noted Recently (as Per Documentation)     Last Bowel Movement: on 3/30    Estimated Needs:   [X] No Change Since Previous Assessment    Previous Nutrition Diagnosis:   Increased Nutrient Needs - Calories & Protein     Nutrition Diagnosis is [X] Ongoing - Nutrition Education Provided; Recommend Change Supplement     New Nutrition Diagnosis: [X] Not Applicable    Interventions:   1. Recommend Change Ensure Max to Ensure Plus High Protein 8oz BID  2. Recommend Change Diet to Regular Diet  3. Education Provided on Proper Nutrition   4., Recommend Continue Nutrition Plan of Care     Monitoring & Evaluation:   [X] Weights   [X] PO Intake   [X] Skin Integrity   [X] Follow Up (Per Protocol)  [X] Tolerance to Diet Prescription   [X] Other: Labs     Registered Dietitian/Nutritionist Remains Available.  Jarrod Alva RDN, CDN    Phone# [phone]

## 2025-03-31 NOTE — PROGRESS NOTE ADULT - ASSESSMENT
74-year-old male patient with h/o BPH, diverticulosis, Graves' disease, hemorrhoids, HLD, hypothyroidism, osteoarthritis, spinal stenosis, spinal cord injury (2004), and multiple spinal surgeries (three cervical and one lumbar), who presented to [facility] on 3/4/25 from [facility] (formerly [facility]) due to lower extremity swelling and abnormal BUN/creatinine levels for further evaluation and management. The patient was recently admitted to [facility] from 02/18/25 through 02/25/25 for sepsis secondary to epidural abscess. He had a peripherally inserted central catheter line in the right arm and was on nightly Ceftriaxone treatment until 04/16/25. At the ED, he was found to have GOVIND, a left soleal vein, and a left gastrocnemius DVT. He was started on heparin gtt. Hospital course complicated by urinary retention requiring Ortega catheter insertion, rhabdomyolysis, elevated LFTs, anasarca, leukocytosis due to thoracic spine discitis OM and early paraspinal abscess, as well as new onset RUE paresthesias and weakness. He is s/p IVC filter placement on 3/17 and was recommended surgical management. He is s/p posterior thoracic laminectomy and fusion on 3/18/25 with Dr. Keith Cantu. Surgical culture with rare staph epi. Recommendations by ID to continue IV Rocephin and Daptomycin through 5/2/25. Patient was evaluated by PM&R and therapy for functional deficits, gait/ADL impairments and acute rehabilitation was recommended. Patient was cleared for discharge to [facility] IRF on 3/25/25. (25 Mar 2025 17:09)    Debility  Thoracic epidural abscess s/p laminectomy and fusion surgery on 3/18  - Tolerating comprehensive rehab  -  brace  - pain control per primary  - fall, spine precautions  Spinal epidural abscess   - S/p thoracic laminectomy and fusion 3/18.   - IV Rocephin 2g HS via PICC line thru 5/2/25  - IV Daptomycin 650mg daily via PICC line thru 5/2/2025  - [place]  brace   - Surgical culture with staph epi possible contaminant but given clinical presentation on admission, IV Dapto added.   - Staples to be removed on 4/1/25    GOVIND resolved  LE edema  Hypoalbuminemia  Proteinuria  Anasarca    - c/w Bumex 1 mg BID  - Optimize protein intake.   - mobilize   - Monitor labs closely , stable renal function w good UOP   - No need for further IV fluid at this time  - Serum workup negative but will need follow up of  Serum  TRUDY positive for weak IgG Lambda band.  - urine protein 200 mg only ( not nephrotic)   - I/O's.   - daily standing weights    Rhabdomyolysis   - Improving  - Encouraged PO    Transaminitis  - encouraged PO  - Limit Tylenol use  - avoid hepatotoxins    Leukocytosis  - improved  - Likely rx to DVT + Discitis.  - No signs of sepsis , afebrile   - Trend CBC and Temp    CAD  HTN  HLD  - Metoprolol 25mg daily   - Crestor 5mg HS    Anemia  MARLENA  - s/p 2 units of PRBCs given intra-op  - Monitor Hgb (3/31 Hgb 8.3)  - Transfuse to keep hb >7    Hypothyroidism  - Synthroid 150mcg daily    sacral Decub stage 3  - wound care cx noted  - Continue turning patient Q 2 hours   - Lac hydrin BID BLE  - Aquaphor BID to generalized dry skin     Constipation:  - Senna QHS, Miralax Daily, Lactulose 30ml TID PRN, Maalox PRN       BPH, chronic  Urinary retention likely augmented by degree of anasarca  - Ortega POA  - failed TOV in [facility]. last Ortega inserted on 3/22.   - Ortega was removed 3/26. on TOV, retention 763 this am 3/27.   - On Flomax 8mg daily  - Monitor U/O    Acute DVT:   -  US doppler + L soleal and gastroc DVT   - s/p IVC filter 3/17 with interventional cards.   - Could not tolerate VQ scan for r/o PE 2/2 pain  - On Heparin 5000 Sq Q8hrs  - Restart AC (Eliquis 5mg BID per neurosurgery, already loaded) 2 weeks post op per neurosx if hgb stable and surgical site stable. (tentative 4/2/2025)    DVT-P: heparin Injectable 5000 Unit(s) SubCutaneous every 8 hours. [ DVT on left]  GI: pantoprazole Tablet 40 milliGRAM(s) Oral before breakfast    3/31 labs reviewed

## 2025-04-01 PROCEDURE — 99232 SBSQ HOSP IP/OBS MODERATE 35: CPT

## 2025-04-01 PROCEDURE — 99233 SBSQ HOSP IP/OBS HIGH 50: CPT

## 2025-04-01 RX ADMIN — OXYCODONE HYDROCHLORIDE 15 MILLIGRAM(S): 30 TABLET ORAL at 22:15

## 2025-04-01 RX ADMIN — Medication 1 APPLICATION(S): at 06:46

## 2025-04-01 RX ADMIN — Medication 1 APPLICATION(S): at 17:31

## 2025-04-01 RX ADMIN — HEPARIN SODIUM 5000 UNIT(S): 1000 INJECTION INTRAVENOUS; SUBCUTANEOUS at 21:07

## 2025-04-01 RX ADMIN — DAPTOMYCIN 126 MILLIGRAM(S): 500 INJECTION, POWDER, LYOPHILIZED, FOR SOLUTION INTRAVENOUS at 09:51

## 2025-04-01 RX ADMIN — CEFTRIAXONE 100 MILLIGRAM(S): 500 INJECTION, POWDER, FOR SOLUTION INTRAMUSCULAR; INTRAVENOUS at 21:09

## 2025-04-01 RX ADMIN — ROSUVASTATIN CALCIUM 5 MILLIGRAM(S): 20 TABLET, FILM COATED ORAL at 21:06

## 2025-04-01 RX ADMIN — BUMETANIDE 1 MILLIGRAM(S): 1 TABLET ORAL at 14:35

## 2025-04-01 RX ADMIN — TAMSULOSIN HYDROCHLORIDE 0.8 MILLIGRAM(S): 0.4 CAPSULE ORAL at 21:07

## 2025-04-01 RX ADMIN — OXYCODONE HYDROCHLORIDE 15 MILLIGRAM(S): 30 TABLET ORAL at 05:40

## 2025-04-01 RX ADMIN — WHITE PETROLATUM 1 APPLICATION(S): 1 OINTMENT TOPICAL at 05:10

## 2025-04-01 RX ADMIN — OXYCODONE HYDROCHLORIDE 15 MILLIGRAM(S): 30 TABLET ORAL at 04:44

## 2025-04-01 RX ADMIN — OXYCODONE HYDROCHLORIDE 15 MILLIGRAM(S): 30 TABLET ORAL at 06:42

## 2025-04-01 RX ADMIN — OXYCODONE HYDROCHLORIDE 15 MILLIGRAM(S): 30 TABLET ORAL at 12:01

## 2025-04-01 RX ADMIN — OXYCODONE HYDROCHLORIDE 15 MILLIGRAM(S): 30 TABLET ORAL at 21:15

## 2025-04-01 RX ADMIN — OXYCODONE HYDROCHLORIDE 15 MILLIGRAM(S): 30 TABLET ORAL at 07:15

## 2025-04-01 RX ADMIN — WHITE PETROLATUM 1 APPLICATION(S): 1 OINTMENT TOPICAL at 21:17

## 2025-04-01 RX ADMIN — WHITE PETROLATUM 1 APPLICATION(S): 1 OINTMENT TOPICAL at 14:36

## 2025-04-01 RX ADMIN — Medication 150 MICROGRAM(S): at 05:08

## 2025-04-01 RX ADMIN — OXYCODONE HYDROCHLORIDE 15 MILLIGRAM(S): 30 TABLET ORAL at 17:31

## 2025-04-01 RX ADMIN — Medication 2 TABLET(S): at 21:07

## 2025-04-01 RX ADMIN — HEPARIN SODIUM 5000 UNIT(S): 1000 INJECTION INTRAVENOUS; SUBCUTANEOUS at 14:35

## 2025-04-01 RX ADMIN — HEPARIN SODIUM 5000 UNIT(S): 1000 INJECTION INTRAVENOUS; SUBCUTANEOUS at 05:07

## 2025-04-01 RX ADMIN — OXYCODONE HYDROCHLORIDE 15 MILLIGRAM(S): 30 TABLET ORAL at 11:02

## 2025-04-01 RX ADMIN — Medication 1 APPLICATION(S): at 05:09

## 2025-04-01 RX ADMIN — Medication 10 MILLIGRAM(S): at 08:16

## 2025-04-01 RX ADMIN — OXYCODONE HYDROCHLORIDE 15 MILLIGRAM(S): 30 TABLET ORAL at 18:51

## 2025-04-01 RX ADMIN — Medication 40 MILLIGRAM(S): at 05:07

## 2025-04-01 RX ADMIN — Medication 3 MILLIGRAM(S): at 21:07

## 2025-04-01 NOTE — PROGRESS NOTE ADULT - ASSESSMENT
Assessment/Plan:  Mr. Rishabh Shaver is a 74-year-old male patient with past medical history of benign prostatic hyperplasia, diverticulosis, Graves' disease, hemorrhoids, hyperlipidemia, hypothyroidism, osteoarthritis, spinal stenosis, spinal cord injury (2004), and multiple spinal surgeries (three cervical and one lumbar), who is admitted for Acute Inpatient Rehabilitation with a multidisciplinary rehab program at [facility] with functional impairments in ADLs and mobility secondary to a thoracic epidural abscess s/p posterior thoracic laminectomy and fusion on 3/18/25 with Dr. Keith Cantu.       #Thoracic epidural abscess s/p laminectomy and fusion surgery      * C8 AIS D Incomplete paraplegia      *  brace when OOB      * PRN Flexeril 5mg for neck soreness      * Staples to be removed on POD #14 (4/1/25 - can be done at rehab if patient is not able to see Dr. Cantu in the office)      * Sacral wound assessment by wound/skin team,      * Maintain Ortega for now as pt has had urinary retention with previous trial of void - On Flomax 8mg daily      * Right Venodyne only due to left calf DVT (s/p pre-op IVC filter)   - Activity Limitations: Decreased social, vocational and leisure activities, decreased self care and ADLs, decreased mobility, decreased ability to manage household and finances.   - Comprehensive Multidisciplinary Rehab Program:      * 3 hours a day, 5 days a week.      * PT 1hr/day: Focused on improving strength, endurance, coordination, balance, functional mobility, and transfers      * OT 1hr/day: Focused on improving strength, fine motor skills, coordination, posture and ADLs.      -----------------------------------------------------------------------------------    Concurrent Medical Problems    #GOVIND on CKD  - Resolved  - LE edema  - Hypoalbuminemia  - Proteinuria  - IVF as needed with fluid balance  - Significant debilitating ansarca.   - Trial of albumin lasix started 3/8->improvement.   - IV Laxis/IV albumin  BID(3/9)  - Change to Bumex/Albumin (3/11)-> good effect  - Switched to PO bumex 1mg BID 3/24  - Trend Albumin (3/24- 2.3)  - Trend Protein total (3/24- 5.5 ); Total protein, Random Urine (3/18- 24)  - Trend CMP (3/25- creat 0.94/ BUN 27)    #L gastrocnemius and L soleal DVT  - appears provoked from decrease ambulation from recent spinal abscess  - Could not tolerate VQ scan for r/o PE 2/2 pain  - Switch to Eliquis will be new med on discharge (of note patient completed Eliquis load)   - Therapeutic Lovenox (3/12) in the setting of possible spine intervention.   - Last dose of AC night of 3/16. Restart AC (Eliquis 5mg BID) 2 weeks post op (tentative 4/2/2025) per neurosx if hgb stable and surgical site stable  - S/P IVC filter placement 3/17.    #Spinal epidural abscess POA and already being treated with IV antibiotics with suspected worsening of disease  - S/p thoracic laminectomy and fusion 3/18. [place]  brace   - Surgical culture with staph epi possible contaminant but given clinical presentation on admission, IV Dapto added.   - IV Rocephin 2g HS via PICC line thru 5/2/25  - IV Daptomycin 650mg daily via PICC line thru 5/2/2025  - Pain management: Tylenol PRN, Oxycodone 15mg q4h PRN moderate pain; Oxycontin 15mg BID  - Staples to be removed on POD #14 (4/1/25)  (can be done at rehab if patient is not able to see Dr. Cantu in the office)  - CXR for PICC confirmation on admission     #Mild Rhabdomyolysis Improving  - Associated elevated transaminases  - Suspect from Ansarca/Edema  - Improving with adequate diuresis of edema  - Associated elevated transaminase; relatively stable. Continue to monitor (3/24- AST 38/ALT 57)    #Leukocytosis  - Likely rx to DVT + Discitis.  - No other signs of sepsis , afebrile   - Trend CBC and monitor fever curve (3/25- WBC 8.69)    #Hypothyroidism  - Synthroid 150mcg daily    #CAD  #HTN  #HLD  - Metoprolol 25mg daily   - Crestor 5mg HS    #Anemia/MARLENA  - Trend H/H (3/25- 8.7/27.3)  - Transfuse if Hgb <7 PRN     #Sleep:   - Maintain quiet hours and low stim environment.  - Melatonin 3mg HS PRN to maximize participation in therapy during the day.     #GI/Bowel:  - Senna QHS, Miralax Daily, Lactulose 30ml TID PRN, Maalox PRN   - GI ppx: Pantoprazole 40mg daily     #/Bladder  #BPH  #Urinary retention likely augmented by degree of anasarca  - S/P Ortega; TOV  ->failed. Ortega re-inserted 3/6->fell out without knowing with associated hematuria->recurrent retention 3/7->Ortega re-inserted (3/7)  - Ortega on admission  - TOV when appropriate   - Flomax 0.8mg HS  - Monitor U/O    #Skin/Pressure Injury:   Skin assessment on admission:   - Generalized dry flakey skin  - 19cm posterior cervical incision with 32 staples and 2 steri strips  - Left buttock DTI pressure injury measuring 14 x 9 cm      * appearing as a dark purple discoloration of intact skin, with a central superficial open area measuring 6 x 2 cm.      * at the periphery of the 14 x 9 cm area, the discoloration is somewhat lighter;       * dark red non-blanchable erythema.  - Bilateral lower extremity edema.  - Healed lumbar surgical incision scar  - Right heel blanchable redness  - Bilateral dry cracked heels and plantar surface  - Lac hydrin BID BLE  - Aquaphor BID to generalized dry skin   - Appreciate wound care consult     #Diet/Dysphagia:  - Dysphagia: SLP consult for swallow function evaluation and treatment  - Current Diet: Regular- DASH  - Aspiration Precautions  - Nutrition consult     #Patient Education  - Education provided on the following:      * Admitting diagnosis and functional implications      * Functional goals      * Bladder management      * Bowel management      * Skin care management      * Intensity of service and scheduling of rehab disciplines      * Plan of care and role of interdisciplinary team conference in discharge planning      * Reconciliation of medications from prior institution    #Precautions / PROPHYLAXIS:   - Falls, Spinal  - Ortho: Weight bearing status: FWB;  brace OOB  - DVT ppx: Heparin 5000U TID, TEDs  - Pressure injury/Skin: Turn Q2hrs while in bed, OOB to Chair, PT/OT      ---------------  Code Status: FULL  Emergency Contact:    Outpatient Follow-up (Specialty/Name of physician):    Yue Wiggins  [facility]  [address], [address]  [place] [address]  Phone: [phone]  Fax: [phone]  Established Patient  Follow Up Time: 1 week    Keith Cantu [facility]  Neurosurgery  [address] [place] [address], [address]  [place] [address]  Phone: [phone]  Fax: [phone]  Established Patient  Follow Up Time: 2 weeks    Luis A Brennan  [facility]  CARDIOLOGY [address]  Scheduled Appointment: 04/23/2025        --------------  Goals: Safe discharge to Home*****  Estimated Length of Stay: 10-14 days  Rehab Potential: Good  Medical Prognosis: Good  Estimated Disposition: Home with Home Care  ---------------    PRESCREEN COMPARISON:  I have reviewed the prescreen information and I have found no relevant changes between the preadmission screening and my post admission evaluation.    RATIONALE FOR INPATIENT ADMISSION: Patient demonstrates the following:  [X] Medically appropriate for rehabilitation admission  [X] Has attainable rehab goals with an appropriate initial discharge plan  [X]Has rehabilitation potential (expected to make a significant improvement within a reasonable period of time)  [X] Requires close medical management by a rehab physician, rehab nursing care, Hospitalist and comprehensive interdisciplinary team (including PT, OT)

## 2025-04-01 NOTE — PROGRESS NOTE ADULT - SUBJECTIVE AND OBJECTIVE BOX
CC: Patient is a 75y old  Male who presents with a chief complaint of Thoracic epidural abscess s/p laminectomy and fusion surgery (31 Mar 2025 12:01)      Interval History:    No overnight events.  No complaints this morning.    ALLERGIES:  No Known Allergies    MEDICATIONS  (STANDING):  ammonium lactate 12% Lotion 1 Application(s) Topical two times a day  AQUAPHOR (petrolatum Ointment) 1 Application(s) Topical three times a day  bisacodyl Suppository 10 milliGRAM(s) Rectal <User Schedule>  buMETAnide 1 milliGRAM(s) Oral two times a day  cefTRIAXone   IVPB 2000 milliGRAM(s) IV Intermittent every 24 hours  chlorhexidine 4% Liquid 1 Application(s) Topical <User Schedule>  DAPTOmycin IVPB 650 milliGRAM(s) IV Intermittent every 24 hours  heparin   Injectable 5000 Unit(s) SubCutaneous every 8 hours  levothyroxine 150 MICROGram(s) Oral daily  metoprolol succinate ER 25 milliGRAM(s) Oral daily  oxyCODONE  ER Tablet 15 milliGRAM(s) Oral every 12 hours  pantoprazole    Tablet 40 milliGRAM(s) Oral before breakfast  polyethylene glycol 3350 17 Gram(s) Oral <User Schedule>  rosuvastatin 5 milliGRAM(s) Oral at bedtime  senna 2 Tablet(s) Oral at bedtime  tamsulosin 0.8 milliGRAM(s) Oral at bedtime    MEDICATIONS  (PRN):  aluminum hydroxide/magnesium hydroxide/simethicone Suspension 30 milliLiter(s) Oral every 4 hours PRN Dyspepsia  cyclobenzaprine 5 milliGRAM(s) Oral three times a day PRN Muscle Spasm  lactulose Syrup 20 Gram(s) Oral three times a day PRN for constipation  melatonin 3 milliGRAM(s) Oral at bedtime PRN Insomnia  oxyCODONE    IR 15 milliGRAM(s) Oral every 6 hours PRN Moderate Pain (4 - 6)  sodium chloride 0.9% lock flush 10 milliLiter(s) IV Push every 1 hour PRN Pre/post blood products, medications, blood draw, and to maintain line patency    Vital Signs Last 24 Hrs  T(F): 97.8 (01 Apr 2025 07:17), Max: 98 (31 Mar 2025 20:12)  HR: 78 (01 Apr 2025 07:17) (78 - 89)  BP: 101/67 (01 Apr 2025 07:17) (97/65 - 105/62)  RR: 16 (01 Apr 2025 07:17) (16 - 16)  SpO2: 93% (01 Apr 2025 07:17) (93% - 94%)  I&O's Summary    31 Mar 2025 07:01  -  01 Apr 2025 07:00  --------------------------------------------------------  IN: 0 mL / OUT: 2250 mL / NET: -2250 mL          PHYSICAL EXAM:  GENERAL: NAD  NERVOUS SYSTEM:  CN II - XII intact; Sensation intact; follows commands  CHEST/LUNG: No rales, rhonchi, wheezing, or rubs; normal respiratory effort, no intercostal retractions  HEART: RRR; No murmurs, rubs, or gallops  ABDOMEN: Soft, Nontender, Nondistended; Bowel sounds present; No HSM or masses  MSK/EXT:  No peripheral edema, 2+ Peripheral Pulses, No clubbing or digital cyanosis  PSYCH: Appropriate affect, Alert & Oriented x 3, Good Memory; Good insight    LABS:                        8.3    7.06  )-----------( 217      ( 31 Mar 2025 06:13 )             25.9       03-31    141  |  100  |  37  ----------------------------<  111  3.8   |  34  |  1.04    Ca    8.7      31 Mar 2025 06:13    TPro  5.8  /  Alb  2.4  /  TBili  0.5  /  DBili  x   /  AST  30  /  ALT  48  /  AlkPhos  63  03-31              Urinalysis Basic - ( 31 Mar 2025 06:13 )    Color: x / Appearance: x / SG: x / pH: x  Gluc: 111 mg/dL / Ketone: x  / Bili: x / Urobili: x   Blood: x / Protein: x / Nitrite: x   Leuk Esterase: x / RBC: x / WBC x   Sq Epi: x / Non Sq Epi: x / Bacteria: x        COVID-19 PCR: [facility] (03-25-25 @ 21:35)      Care Discussed with Consultants/Other Providers: Yes

## 2025-04-01 NOTE — PROGRESS NOTE ADULT - ASSESSMENT
74-year-old male patient with h/o BPH, diverticulosis, Graves' disease, hemorrhoids, HLD, hypothyroidism, osteoarthritis, spinal stenosis, spinal cord injury (2004), and multiple spinal surgeries (three cervical and one lumbar), who presented to [facility] on 3/4/25 from [facility] due to lower extremity swelling and abnormal BUN/creatinine levels for further evaluation and management. The patient was recently admitted to [facility] from 02/18/25 through 02/25/25 for sepsis secondary to epidural abscess. He had a peripherally inserted central catheter line in the right arm and was on nightly Ceftriaxone treatment until 04/16/25. At the ED, he was found to have GOVIND, a left soleal vein, and a left gastrocnemius DVT. He was started on heparin gtt. Hospital course complicated by urinary retention requiring Ortega catheter insertion, rhabdomyolysis, elevated LFTs, anasarca, leukocytosis due to thoracic spine discitis OM and early paraspinal abscess, as well as new onset RUE paresthesias and weakness. He is s/p IVC filter placement on 3/17 and was recommended surgical management. He is s/p posterior thoracic laminectomy and fusion on 3/18/25 with Dr. Keith Cantu. Surgical culture with rare staph epi. Recommendations by ID to continue IV Rocephin and Daptomycin through 5/2/25. Patient was evaluated by PM&R and therapy for functional deficits, gait/ADL impairments and acute rehabilitation was recommended. Patient was cleared for discharge to [facility] IRF on 3/25/25. (25 Mar 2025 17:09)    Debility  Thoracic epidural abscess s/p laminectomy and fusion surgery on 3/18  - Tolerating comprehensive rehab  -  brace  - pain control per primary  - fall, spine precautions    Spinal epidural abscess   - S/p thoracic laminectomy and fusion 3/18.   - IV Rocephin 2g HS via PICC line thru 5/2/25  - IV Daptomycin 650mg daily via PICC line thru 5/2/2025  - [place]  brace   - Surgical culture with staph epi possible contaminant but given clinical presentation on admission, IV Dapto added.   - Staples to be removed on 4/1/25    GOVIND, resolved  LE edema  Hypoalbuminemia  Proteinuria  Anasarca  - c/w Bumex 1 mg BID  - Optimize protein intake.   - mobilize   - Monitor labs closely , stable renal function w good UOP   - No need for further IV fluid at this time  - Serum workup negative but will need follow up of  Serum  TRUDY positive for weak IgG Lambda band.  - urine protein 200 mg only ( not nephrotic)   - I/O's.   - daily standing weights    Transaminitis  - encouraged PO  - Limit Tylenol use  - avoid hepatotoxins    Leukocytosis, resolved  - Likely rx to DVT + Discitis.  - No signs of sepsis , afebrile   - monitor on routine labs    CAD  HTN  HLD  - Metoprolol 25mg daily   - Crestor 5mg HS    Anemia  MARLENA  - s/p 2 units of PRBCs given intra-op  - Monitor Hgb (3/31 Hgb 8.3)  - Transfuse to keep hb >7    Hypothyroidism  - Synthroid 150mcg daily    sacral Decub stage 3  - wound care cx noted  - Continue turning patient Q 2 hours   - Lac hydrin BID BLE  - Aquaphor BID to generalized dry skin     Constipation:  - Senna QHS, Miralax Daily, Lactulose 30ml TID PRN, Maalox PRN     BPH, chronic  Urinary retention likely augmented by degree of anasarca  - Ortega POA  - failed TOV in [facility]. last Ortega inserted on 3/22.   - Ortega was removed 3/26. on TOV, retention 763 this am 3/27.   - On Flomax 8mg daily  - Monitor U/O    Acute DVT:   -  US doppler + L soleal and gastroc DVT   - s/p IVC filter 3/17 with interventional cards.   - Could not tolerate VQ scan for r/o PE 2/2 pain  - On Heparin 5000 Sq Q8hrs  - Restart AC (Eliquis 5mg BID per neurosurgery, already loaded) 2 weeks post op per neurosx if hgb stable and surgical site stable. (tentative 4/2/2025)    DVT-P: heparin Injectable 5000 Unit(s) SubCutaneous every 8 hours. [ DVT on left]  GI: pantoprazole Tablet 40 milliGRAM(s) Oral before breakfast    3/31 labs reviewed

## 2025-04-01 NOTE — PROGRESS NOTE ADULT - SUBJECTIVE AND OBJECTIVE BOX
HPI:  Mr. Rishabh Shaver is a 74-year-old male patient with past medical history of benign prostatic hyperplasia, diverticulosis, Graves' disease, hemorrhoids, hyperlipidemia, hypothyroidism, osteoarthritis, spinal stenosis, spinal cord injury (2004), and multiple spinal surgeries (three cervical and one lumbar), who presented to [facility] on 3/4/25 from [facility] due to lower extremity swelling and abnormal BUN/creatinine levels for further evaluation and management. The patient was recently admitted to [facility] from 02/18/25 through 02/25/25 for sepsis secondary to epidural abscess. He had a peripherally inserted central catheter line in the right arm and was on nightly Ceftriaxone treatment until 04/16/25. At the ED, he was found to have GOVIND, a left soleal vein, and a left gastrocnemius DVT. He was started on heparin gtt. Hospital course complicated by urinary retention requiring Ortega catheter insertion, rhabdomyolysis, elevated LFTs, anasarca, leukocytosis due to thoracic spine discitis OM and early paraspinal abscess, as well as new onset RUE paresthesias and weakness. He is s/p IVC filter placement on 3/17 and was recommended surgical management. He is s/p posterior thoracic laminectomy and fusion on 3/18/25 with Dr. Keith Cantu. Surgical culture with rare staph epi. Recommendations by ID to continue IV Rocephin and Daptomycin through 5/2/25. Patient was evaluated by PM&R and therapy for functional deficits, gait/ADL impairments and acute rehabilitation was recommended. Patient was cleared for discharge to [facility] IRF on 3/25/25. (25 Mar 2025 17:09)    TDD: 4/22/25 home  ___________________________________________________________________________    SUBJECTIVE/ROS  Patient was seen and evaluated at bedside today alongside RN.  NAD and tolerating therapies. No other acute primary complaints.  Evaluated wound.  Patient is eager to continue participation on the recommended rehabilitation program.  Denies any CP, SOB, KESSLER, palpitations, fever, chills, body aches, cough, congestion, or any other symptoms at this time.   ___________________________________________________________________________    Vital Signs Last 24 Hrs  T(C): 36.6 (04-01-25 @ 07:17), Max: 36.7 (03-31-25 @ 20:12)  T(F): 97.8 (04-01-25 @ 07:17), Max: 98 (03-31-25 @ 20:12)  HR: 78 (04-01-25 @ 07:17) (78 - 89)  BP: 101/67 (04-01-25 @ 07:17) (97/65 - 105/62)  ABP: --  ABP(mean): --  RR: 16 (04-01-25 @ 07:17) (16 - 16)  SpO2: 93% (04-01-25 @ 07:17) (93% - 94%)      ___________________________________________________________________________  LAB                        8.3    7.06  )-----------( 217      ( 31 Mar 2025 06:13 )             25.9     03-31    141  |  100  |  37[H]  ----------------------------<  111[H]  3.8   |  34[H]  |  1.04    Ca    8.7      31 Mar 2025 06:13    TPro  5.8[L]  /  Alb  2.4[L]  /  TBili  0.5  /  DBili  x   /  AST  30  /  ALT  48[H]  /  AlkPhos  63  03-31    LIVER FUNCTIONS - ( 31 Mar 2025 06:13 )  Alb: 2.4 g/dL / Pro: 5.8 g/dL / ALK PHOS: 63 U/L / ALT: 48 U/L / AST: 30 U/L / GGT: x                 Urinalysis Basic - ( 31 Mar 2025 06:13 )    Color: x / Appearance: x / SG: x / pH: x  Gluc: 111 mg/dL / Ketone: x  / Bili: x / Urobili: x   Blood: x / Protein: x / Nitrite: x   Leuk Esterase: x / RBC: x / WBC x   Sq Epi: x / Non Sq Epi: x / Bacteria: x                ___________________________________________________________________________    MEDICATIONS  (STANDING):  ammonium lactate 12% Lotion 1 Application(s) Topical two times a day  AQUAPHOR (petrolatum Ointment) 1 Application(s) Topical three times a day  bisacodyl Suppository 10 milliGRAM(s) Rectal <User Schedule>  buMETAnide 1 milliGRAM(s) Oral two times a day  cefTRIAXone   IVPB 2000 milliGRAM(s) IV Intermittent every 24 hours  chlorhexidine 4% Liquid 1 Application(s) Topical <User Schedule>  DAPTOmycin IVPB 650 milliGRAM(s) IV Intermittent every 24 hours  heparin   Injectable 5000 Unit(s) SubCutaneous every 8 hours  levothyroxine 150 MICROGram(s) Oral daily  metoprolol succinate ER 25 milliGRAM(s) Oral daily  oxyCODONE  ER Tablet 15 milliGRAM(s) Oral every 12 hours  pantoprazole    Tablet 40 milliGRAM(s) Oral before breakfast  polyethylene glycol 3350 17 Gram(s) Oral <User Schedule>  rosuvastatin 5 milliGRAM(s) Oral at bedtime  senna 2 Tablet(s) Oral at bedtime  tamsulosin 0.8 milliGRAM(s) Oral at bedtime    MEDICATIONS  (PRN):  aluminum hydroxide/magnesium hydroxide/simethicone Suspension 30 milliLiter(s) Oral every 4 hours PRN Dyspepsia  cyclobenzaprine 5 milliGRAM(s) Oral three times a day PRN Muscle Spasm  lactulose Syrup 20 Gram(s) Oral three times a day PRN for constipation  melatonin 3 milliGRAM(s) Oral at bedtime PRN Insomnia  oxyCODONE    IR 15 milliGRAM(s) Oral every 6 hours PRN Moderate Pain (4 - 6)  sodium chloride 0.9% lock flush 10 milliLiter(s) IV Push every 1 hour PRN Pre/post blood products, medications, blood draw, and to maintain line patency      ___________________________________________________________________________    PHYSICAL EXAM:    Gen - NAD, Comfortable  HEENT -  EOMI, Normal Conjunctivae  Neck - Supple, + limited ROM  Pulm - breathing comfortably  Cardiovascular - warm and well perfused  Abdomen - soft  Extremities - +BLE 3+ pitting edema, +B/L pedal edema, +RUE PICC line   /GI- + diaper  Neuro-     Cognitive - awake, alert, oriented to person, place, date, year, and situation.  Able  to follow command     Communication - Fluent, Comprehensible, No dysarthria, No aphasia   Psychiatric - Mood stable, Affect WNL  Skin- DTI on left buttock healing appropriately     ___________________________________________________________________________ HPI:  Mr. Rishabh Shaver is a 74-year-old male patient with past medical history of benign prostatic hyperplasia, diverticulosis, Graves' disease, hemorrhoids, hyperlipidemia, hypothyroidism, osteoarthritis, spinal stenosis, spinal cord injury (2004), and multiple spinal surgeries (three cervical and one lumbar), who presented to [facility] on 3/4/25 from [facility] due to lower extremity swelling and abnormal BUN/creatinine levels for further evaluation and management. The patient was recently admitted to [facility] from 02/18/25 through 02/25/25 for sepsis secondary to epidural abscess. He had a peripherally inserted central catheter line in the right arm and was on nightly Ceftriaxone treatment until 04/16/25. At the ED, he was found to have GOVIND, a left soleal vein, and a left gastrocnemius DVT. He was started on heparin gtt. Hospital course complicated by urinary retention requiring Ortega catheter insertion, rhabdomyolysis, elevated LFTs, anasarca, leukocytosis due to thoracic spine discitis OM and early paraspinal abscess, as well as new onset RUE paresthesias and weakness. He is s/p IVC filter placement on 3/17 and was recommended surgical management. He is s/p posterior thoracic laminectomy and fusion on 3/18/25 with Dr. Keith Cantu. Surgical culture with rare staph epi. Recommendations by ID to continue IV Rocephin and Daptomycin through 5/2/25. Patient was evaluated by PM&R and therapy for functional deficits, gait/ADL impairments and acute rehabilitation was recommended. Patient was cleared for discharge to [facility] IRF on 3/25/25. (25 Mar 2025 17:09)    TDD: 4/22/25 home  ___________________________________________________________________________    SUBJECTIVE/ROS  Patient was seen and evaluated at bedside today alongside RN.  NAD and tolerating therapies. No other acute primary complaints.  Evaluated wound with better appearance.  Patient is eager to continue participation on the recommended rehabilitation program.  Denies any CP, SOB, KESSLER, palpitations, fever, chills, body aches, cough, congestion, or any other symptoms at this time.   ___________________________________________________________________________    Vital Signs Last 24 Hrs  T(C): 36.6 (04-01-25 @ 07:17), Max: 36.7 (03-31-25 @ 20:12)  T(F): 97.8 (04-01-25 @ 07:17), Max: 98 (03-31-25 @ 20:12)  HR: 78 (04-01-25 @ 07:17) (78 - 89)  BP: 101/67 (04-01-25 @ 07:17) (97/65 - 105/62)  RR: 16 (04-01-25 @ 07:17) (16 - 16)  SpO2: 93% (04-01-25 @ 07:17) (93% - 94%)  ___________________________________________________________________________    LAB                        8.3    7.06  )-----------( 217      ( 31 Mar 2025 06:13 )             25.9     03-31    141  |  100  |  37[H]  ----------------------------<  111[H]  3.8   |  34[H]  |  1.04    Ca    8.7      31 Mar 2025 06:13    TPro  5.8[L]  /  Alb  2.4[L]  /  TBili  0.5  /  DBili  x   /  AST  30  /  ALT  48[H]  /  AlkPhos  63  03-31    LIVER FUNCTIONS - ( 31 Mar 2025 06:13 )  Alb: 2.4 g/dL / Pro: 5.8 g/dL / ALK PHOS: 63 U/L / ALT: 48 U/L / AST: 30 U/L / GGT: x           ___________________________________________________________________________    MEDICATIONS  (STANDING):  ammonium lactate 12% Lotion 1 Application(s) Topical two times a day  AQUAPHOR (petrolatum Ointment) 1 Application(s) Topical three times a day  bisacodyl Suppository 10 milliGRAM(s) Rectal <User Schedule>  buMETAnide 1 milliGRAM(s) Oral two times a day  cefTRIAXone   IVPB 2000 milliGRAM(s) IV Intermittent every 24 hours  chlorhexidine 4% Liquid 1 Application(s) Topical <User Schedule>  DAPTOmycin IVPB 650 milliGRAM(s) IV Intermittent every 24 hours  heparin   Injectable 5000 Unit(s) SubCutaneous every 8 hours  levothyroxine 150 MICROGram(s) Oral daily  metoprolol succinate ER 25 milliGRAM(s) Oral daily  oxyCODONE  ER Tablet 15 milliGRAM(s) Oral every 12 hours  pantoprazole    Tablet 40 milliGRAM(s) Oral before breakfast  polyethylene glycol 3350 17 Gram(s) Oral <User Schedule>  rosuvastatin 5 milliGRAM(s) Oral at bedtime  senna 2 Tablet(s) Oral at bedtime  tamsulosin 0.8 milliGRAM(s) Oral at bedtime    MEDICATIONS  (PRN):  aluminum hydroxide/magnesium hydroxide/simethicone Suspension 30 milliLiter(s) Oral every 4 hours PRN Dyspepsia  cyclobenzaprine 5 milliGRAM(s) Oral three times a day PRN Muscle Spasm  lactulose Syrup 20 Gram(s) Oral three times a day PRN for constipation  melatonin 3 milliGRAM(s) Oral at bedtime PRN Insomnia  oxyCODONE    IR 15 milliGRAM(s) Oral every 6 hours PRN Moderate Pain (4 - 6)  sodium chloride 0.9% lock flush 10 milliLiter(s) IV Push every 1 hour PRN Pre/post blood products, medications, blood draw, and to maintain line patency      ___________________________________________________________________________    PHYSICAL EXAM:    Gen - NAD, Comfortable  HEENT -  EOMI, Normal Conjunctivae  Neck - Supple, + limited ROM  Pulm - breathing comfortably  Cardiovascular - warm and well perfused  Abdomen - soft  Extremities - +BLE 3+ pitting edema, +B/L pedal edema, +RUE PICC line   /GI- + diaper  Neuro-     Cognitive - awake, alert, oriented to person, place, date, year, and situation.  Able  to follow command     Communication - Fluent, Comprehensible, No dysarthria, No aphasia   Psychiatric - Mood stable, Affect WNL  Skin- DTI on left buttock healing appropriately     ___________________________________________________________________________

## 2025-04-02 PROCEDURE — 99232 SBSQ HOSP IP/OBS MODERATE 35: CPT

## 2025-04-02 PROCEDURE — 74018 RADEX ABDOMEN 1 VIEW: CPT | Mod: 26

## 2025-04-02 PROCEDURE — 99233 SBSQ HOSP IP/OBS HIGH 50: CPT

## 2025-04-02 RX ORDER — DOCUSATE SODIUM 100 MG
283 CAPSULE ORAL EVERY 24 HOURS
Refills: 0 | Status: DISCONTINUED | OUTPATIENT
Start: 2025-04-03 | End: 2025-04-30

## 2025-04-02 RX ORDER — SALINE 7; 19 G/118ML; G/118ML
1 ENEMA RECTAL ONCE
Refills: 0 | Status: COMPLETED | OUTPATIENT
Start: 2025-04-02 | End: 2025-04-02

## 2025-04-02 RX ADMIN — Medication 3 MILLIGRAM(S): at 21:21

## 2025-04-02 RX ADMIN — TAMSULOSIN HYDROCHLORIDE 0.8 MILLIGRAM(S): 0.4 CAPSULE ORAL at 21:20

## 2025-04-02 RX ADMIN — BUMETANIDE 1 MILLIGRAM(S): 1 TABLET ORAL at 14:02

## 2025-04-02 RX ADMIN — OXYCODONE HYDROCHLORIDE 15 MILLIGRAM(S): 30 TABLET ORAL at 18:25

## 2025-04-02 RX ADMIN — HEPARIN SODIUM 5000 UNIT(S): 1000 INJECTION INTRAVENOUS; SUBCUTANEOUS at 14:03

## 2025-04-02 RX ADMIN — WHITE PETROLATUM 1 APPLICATION(S): 1 OINTMENT TOPICAL at 05:19

## 2025-04-02 RX ADMIN — Medication 1 APPLICATION(S): at 06:00

## 2025-04-02 RX ADMIN — Medication 1 APPLICATION(S): at 05:19

## 2025-04-02 RX ADMIN — OXYCODONE HYDROCHLORIDE 15 MILLIGRAM(S): 30 TABLET ORAL at 17:25

## 2025-04-02 RX ADMIN — SALINE 1 ENEMA: 7; 19 ENEMA RECTAL at 14:02

## 2025-04-02 RX ADMIN — Medication 40 MILLIGRAM(S): at 05:17

## 2025-04-02 RX ADMIN — WHITE PETROLATUM 1 APPLICATION(S): 1 OINTMENT TOPICAL at 21:23

## 2025-04-02 RX ADMIN — Medication 2 TABLET(S): at 21:20

## 2025-04-02 RX ADMIN — OXYCODONE HYDROCHLORIDE 15 MILLIGRAM(S): 30 TABLET ORAL at 06:15

## 2025-04-02 RX ADMIN — Medication 150 MICROGRAM(S): at 05:18

## 2025-04-02 RX ADMIN — HEPARIN SODIUM 5000 UNIT(S): 1000 INJECTION INTRAVENOUS; SUBCUTANEOUS at 21:20

## 2025-04-02 RX ADMIN — HEPARIN SODIUM 5000 UNIT(S): 1000 INJECTION INTRAVENOUS; SUBCUTANEOUS at 05:18

## 2025-04-02 RX ADMIN — Medication 10 MILLIGRAM(S): at 08:28

## 2025-04-02 RX ADMIN — OXYCODONE HYDROCHLORIDE 15 MILLIGRAM(S): 30 TABLET ORAL at 22:50

## 2025-04-02 RX ADMIN — OXYCODONE HYDROCHLORIDE 15 MILLIGRAM(S): 30 TABLET ORAL at 21:53

## 2025-04-02 RX ADMIN — ROSUVASTATIN CALCIUM 5 MILLIGRAM(S): 20 TABLET, FILM COATED ORAL at 21:21

## 2025-04-02 RX ADMIN — OXYCODONE HYDROCHLORIDE 15 MILLIGRAM(S): 30 TABLET ORAL at 05:16

## 2025-04-02 RX ADMIN — DAPTOMYCIN 126 MILLIGRAM(S): 500 INJECTION, POWDER, LYOPHILIZED, FOR SOLUTION INTRAVENOUS at 09:42

## 2025-04-02 RX ADMIN — OXYCODONE HYDROCHLORIDE 15 MILLIGRAM(S): 30 TABLET ORAL at 15:49

## 2025-04-02 RX ADMIN — Medication 1 APPLICATION(S): at 17:29

## 2025-04-02 RX ADMIN — CEFTRIAXONE 100 MILLIGRAM(S): 500 INJECTION, POWDER, FOR SOLUTION INTRAMUSCULAR; INTRAVENOUS at 21:22

## 2025-04-02 RX ADMIN — WHITE PETROLATUM 1 APPLICATION(S): 1 OINTMENT TOPICAL at 14:15

## 2025-04-02 RX ADMIN — OXYCODONE HYDROCHLORIDE 15 MILLIGRAM(S): 30 TABLET ORAL at 16:49

## 2025-04-02 NOTE — PROGRESS NOTE ADULT - ASSESSMENT
74-year-old male patient with h/o BPH, diverticulosis, Graves' disease, hemorrhoids, HLD, hypothyroidism, osteoarthritis, spinal stenosis, spinal cord injury (2004), and multiple spinal surgeries (three cervical and one lumbar), who presented to [facility] on 3/4/25 from [facility] due to lower extremity swelling and abnormal BUN/creatinine levels for further evaluation and management. The patient was recently admitted to [facility] from 02/18/25 through 02/25/25 for sepsis secondary to epidural abscess. He had a peripherally inserted central catheter line in the right arm and was on nightly Ceftriaxone treatment until 04/16/25. At the ED, he was found to have GOVIND, a left soleal vein, and a left gastrocnemius DVT. He was started on heparin gtt. Hospital course complicated by urinary retention requiring Ortega catheter insertion, rhabdomyolysis, elevated LFTs, anasarca, leukocytosis due to thoracic spine discitis OM and early paraspinal abscess, as well as new onset RUE paresthesias and weakness. He is s/p IVC filter placement on 3/17 and was recommended surgical management. He is s/p posterior thoracic laminectomy and fusion on 3/18/25 with Dr. Keith Cantu. Surgical culture with rare staph epi. Recommendations by ID to continue IV Rocephin and Daptomycin through 5/2/25. Patient was evaluated by PM&R and therapy for functional deficits, gait/ADL impairments and acute rehabilitation was recommended. Patient was cleared for discharge to [facility] IRF on 3/25/25. (25 Mar 2025 17:09)    Debility  Thoracic epidural abscess s/p laminectomy and fusion surgery on 3/18  - Tolerating comprehensive rehab  -  brace  - pain control per primary  - fall, spine precautions    Spinal epidural abscess   - S/p thoracic laminectomy and fusion 3/18.   - IV Rocephin 2g HS via PICC line thru 5/2/25  - IV Daptomycin 650mg daily via PICC line thru 5/2/2025  - [place]  brace   - Surgical culture with staph epi possible contaminant but given clinical presentation on admission, IV Dapto added.     GOVIND, resolved  LE edema  Hypoalbuminemia  Proteinuria  Anasarca  - c/w Bumex 1 mg BID  - Optimize protein intake.   - mobilize, encourage out of bed to chair often.   - Monitor labs closely , stable renal function w good UOP   - Serum workup negative but will need follow up of  Serum  TRUDY positive for weak IgG Lambda band.  - urine protein 200 mg only ( not nephrotic)   - daily standing weights    Transaminitis  - encouraged PO  - Limit Tylenol use  - avoid hepatotoxins    Leukocytosis, resolved  - Likely rx to DVT + Discitis.  - No signs of sepsis , afebrile   - monitor on routine labs    CAD  HTN  HLD  - Metoprolol 25mg daily   - Crestor 5mg HS    Anemia  MARLENA  - s/p 2 units of PRBCs given intra-op  - Monitor Hgb (3/31 Hgb 8.3)  - Transfuse to keep hb >7    Hypothyroidism  - Synthroid 150mcg daily    sacral Decub stage 3  - wound care cx noted  - Continue turning patient Q 2 hours   - Lac hydrin BID BLE  - Aquaphor BID to generalized dry skin     Constipation:  - Senna QHS, Miralax Daily, Lactulose 30ml TID PRN, Maalox PRN     BPH, chronic  Urinary retention likely augmented by degree of anasarca  - Ortega POA  - failed TOV in [facility]. last Ortega inserted on 3/22.   - Ortega was removed 3/26. on TOV, retention 763 this am 3/27.   - On Flomax 8mg daily  - Monitor U/O    Acute DVT:   -  US doppler + L soleal and gastroc DVT   - s/p IVC filter 3/17 with interventional cards.   - Could not tolerate VQ scan for r/o PE 2/2 pain  - On Heparin 5000 Sq Q8hrs  - Restart AC (Eliquis 5mg BID per neurosurgery, already loaded) 2 weeks post op per neurosx if hgb stable and surgical site stable. (tentative 4/2/2025)    DVT-P: heparin Injectable 5000 Unit(s) SubCutaneous every 8 hours. [ DVT on left]  GI: pantoprazole Tablet 40 milliGRAM(s) Oral before breakfast    3/31 labs reviewed

## 2025-04-02 NOTE — PROGRESS NOTE ADULT - SUBJECTIVE AND OBJECTIVE BOX
CC: Patient is a 75y old  Male who presents with a chief complaint of Thoracic epidural abscess s/p laminectomy and fusion surgery (02 Apr 2025 09:15)      Interval History:    Patient seen and examined at bedside.  No overnight events.  No new complaints.    ALLERGIES:  No Known Allergies    MEDICATIONS  (STANDING):  ammonium lactate 12% Lotion 1 Application(s) Topical two times a day  AQUAPHOR (petrolatum Ointment) 1 Application(s) Topical three times a day  bisacodyl Suppository 10 milliGRAM(s) Rectal <User Schedule>  buMETAnide 1 milliGRAM(s) Oral two times a day  cefTRIAXone   IVPB 2000 milliGRAM(s) IV Intermittent every 24 hours  chlorhexidine 4% Liquid 1 Application(s) Topical <User Schedule>  DAPTOmycin IVPB 650 milliGRAM(s) IV Intermittent every 24 hours  heparin   Injectable 5000 Unit(s) SubCutaneous every 8 hours  levothyroxine 150 MICROGram(s) Oral daily  metoprolol succinate ER 25 milliGRAM(s) Oral daily  oxyCODONE  ER Tablet 15 milliGRAM(s) Oral every 12 hours  pantoprazole    Tablet 40 milliGRAM(s) Oral before breakfast  polyethylene glycol 3350 17 Gram(s) Oral <User Schedule>  rosuvastatin 5 milliGRAM(s) Oral at bedtime  saline laxative (FLEET) Rectal Enema 1 Enema Rectal once  senna 2 Tablet(s) Oral at bedtime  tamsulosin 0.8 milliGRAM(s) Oral at bedtime    MEDICATIONS  (PRN):  aluminum hydroxide/magnesium hydroxide/simethicone Suspension 30 milliLiter(s) Oral every 4 hours PRN Dyspepsia  cyclobenzaprine 5 milliGRAM(s) Oral three times a day PRN Muscle Spasm  lactulose Syrup 20 Gram(s) Oral three times a day PRN for constipation  melatonin 3 milliGRAM(s) Oral at bedtime PRN Insomnia  oxyCODONE    IR 15 milliGRAM(s) Oral every 6 hours PRN Moderate Pain (4 - 6)  sodium chloride 0.9% lock flush 10 milliLiter(s) IV Push every 1 hour PRN Pre/post blood products, medications, blood draw, and to maintain line patency    Vital Signs Last 24 Hrs  T(F): 98.2 (02 Apr 2025 08:00), Max: 98.4 (01 Apr 2025 19:20)  HR: 80 (02 Apr 2025 08:00) (80 - 101)  BP: 106/63 (02 Apr 2025 08:00) (94/55 - 147/72)  RR: 15 (02 Apr 2025 08:00) (15 - 16)  SpO2: 95% (02 Apr 2025 08:00) (94% - 95%)  I&O's Summary    01 Apr 2025 07:01  -  02 Apr 2025 07:00  --------------------------------------------------------  IN: 0 mL / OUT: 2625 mL / NET: -2625 mL          PHYSICAL EXAM:  GENERAL: NAD  CHEST/LUNG: No rales, rhonchi, wheezing; normal respiratory effort  HEART: RRR; No murmurs, rubs, or gallops  ABDOMEN: Soft, Nontender, Nondistended; Bowel sounds present  MSK/EXT:  No peripheral edema, 2+ Peripheral Pulses, No clubbing or digital cyanosis  PSYCH: Appropriate affect, Alert & Oriented    LABS:                        8.3    7.06  )-----------( 217      ( 31 Mar 2025 06:13 )             25.9       03-31    141  |  100  |  37  ----------------------------<  111  3.8   |  34  |  1.04    Ca    8.7      31 Mar 2025 06:13    TPro  5.8  /  Alb  2.4  /  TBili  0.5  /  DBili  x   /  AST  30  /  ALT  48  /  AlkPhos  63  03-31         Urinalysis Basic - ( 31 Mar 2025 06:13 )    Color: x / Appearance: x / SG: x / pH: x  Gluc: 111 mg/dL / Ketone: x  / Bili: x / Urobili: x   Blood: x / Protein: x / Nitrite: x   Leuk Esterase: x / RBC: x / WBC x   Sq Epi: x / Non Sq Epi: x / Bacteria: x        COVID-19 PCR: NotDetec (03-25-25 @ 21:35)      Care Discussed with Consultants/Other Providers: Yes

## 2025-04-02 NOTE — PROGRESS NOTE ADULT - ASSESSMENT
Assessment/Plan:  Mr. Rishabh Shaver is a 74-year-old male patient with past medical history of benign prostatic hyperplasia, diverticulosis, Graves' disease, hemorrhoids, hyperlipidemia, hypothyroidism, osteoarthritis, spinal stenosis, spinal cord injury (2004), and multiple spinal surgeries (three cervical and one lumbar), who is admitted for Acute Inpatient Rehabilitation with a multidisciplinary rehab program at [facility] with functional impairments in ADLs and mobility secondary to a thoracic epidural abscess s/p posterior thoracic laminectomy and fusion on 3/18/25 with Dr. Keith Cantu.       #Thoracic epidural abscess s/p laminectomy and fusion surgery      * C8 AIS D Incomplete paraplegia      *  brace when OOB      * PRN Flexeril 5mg for neck soreness      * Staples to be removed on POD #14 (4/1/25 - can be done at rehab if patient is not able to see Dr. Cantu in the office)      * Sacral wound assessment by wound/skin team,      * Maintain Ortega for now as pt has had urinary retention with previous trial of void - On Flomax 8mg daily      * Right Venodyne only due to left calf DVT (s/p pre-op IVC filter)   - Activity Limitations: Decreased social, vocational and leisure activities, decreased self care and ADLs, decreased mobility, decreased ability to manage household and finances.   - Comprehensive Multidisciplinary Rehab Program:      * 3 hours a day, 5 days a week.      * PT 1hr/day: Focused on improving strength, endurance, coordination, balance, functional mobility, and transfers      * OT 1hr/day: Focused on improving strength, fine motor skills, coordination, posture and ADLs.      -----------------------------------------------------------------------------------    Concurrent Medical Problems    #GOVIND on CKD  - Resolved  - LE edema  - Hypoalbuminemia  - Proteinuria  - IVF as needed with fluid balance  - Significant debilitating ansarca.   - Trial of albumin lasix started 3/8->improvement.   - IV Laxis/IV albumin  BID(3/9)  - Change to Bumex/Albumin (3/11)-> good effect  - Switched to PO bumex 1mg BID 3/24  - Trend Albumin (3/24- 2.3)  - Trend Protein total (3/24- 5.5 ); Total protein, Random Urine (3/18- 24)  - Trend CMP (3/25- creat 0.94/ BUN 27)    #L gastrocnemius and L soleal DVT  - appears provoked from decrease ambulation from recent spinal abscess  - Could not tolerate VQ scan for r/o PE 2/2 pain  - Switch to Eliquis will be new med on discharge (of note patient completed Eliquis load)   - Therapeutic Lovenox (3/12) in the setting of possible spine intervention.   - Last dose of AC night of 3/16. Restart AC (Eliquis 5mg BID) 2 weeks post op (tentative 4/2/2025) per neurosx if hgb stable and surgical site stable  - S/P IVC filter placement 3/17.    #Spinal epidural abscess POA and already being treated with IV antibiotics with suspected worsening of disease  - S/p thoracic laminectomy and fusion 3/18. [place]  brace   - Surgical culture with staph epi possible contaminant but given clinical presentation on admission, IV Dapto added.   - IV Rocephin 2g HS via PICC line thru 5/2/25  - IV Daptomycin 650mg daily via PICC line thru 5/2/2025  - Pain management: Tylenol PRN, Oxycodone 15mg q4h PRN moderate pain; Oxycontin 15mg BID  - Staples to be removed on POD #14 (4/1/25)  (can be done at rehab if patient is not able to see Dr. Cantu in the office)  - CXR for PICC confirmation on admission     #Mild Rhabdomyolysis Improving  - Associated elevated transaminases  - Suspect from Ansarca/Edema  - Improving with adequate diuresis of edema  - Associated elevated transaminase; relatively stable. Continue to monitor (3/24- AST 38/ALT 57)    #Leukocytosis  - Likely rx to DVT + Discitis.  - No other signs of sepsis , afebrile   - Trend CBC and monitor fever curve (3/25- WBC 8.69)    #Hypothyroidism  - Synthroid 150mcg daily    #CAD  #HTN  #HLD  - Metoprolol 25mg daily   - Crestor 5mg HS    #Anemia/MARLENA  - Trend H/H (3/25- 8.7/27.3)  - Transfuse if Hgb <7 PRN     #Sleep:   - Maintain quiet hours and low stim environment.  - Melatonin 3mg HS PRN to maximize participation in therapy during the day.     #GI/Bowel:  - Senna QHS, Miralax Daily, Lactulose 30ml TID PRN, Maalox PRN   - GI ppx: Pantoprazole 40mg daily     #/Bladder  #BPH  #Urinary retention likely augmented by degree of anasarca  - S/P Ortega; TOV  ->failed. Ortega re-inserted 3/6->fell out without knowing with associated hematuria->recurrent retention 3/7->Ortega re-inserted (3/7)  - Ortega on admission  - TOV when appropriate   - Flomax 0.8mg HS  - Monitor U/O    #Skin/Pressure Injury:   Skin assessment on admission:   - Generalized dry flakey skin  - 19cm posterior cervical incision with 32 staples and 2 steri strips  - Left buttock DTI pressure injury measuring 14 x 9 cm      * appearing as a dark purple discoloration of intact skin, with a central superficial open area measuring 6 x 2 cm.      * at the periphery of the 14 x 9 cm area, the discoloration is somewhat lighter;       * dark red non-blanchable erythema.  - Bilateral lower extremity edema.  - Healed lumbar surgical incision scar  - Right heel blanchable redness  - Bilateral dry cracked heels and plantar surface  - Lac hydrin BID BLE  - Aquaphor BID to generalized dry skin   - Appreciate wound care consult     #Diet/Dysphagia:  - Dysphagia: SLP consult for swallow function evaluation and treatment  - Current Diet: Regular- DASH  - Aspiration Precautions  - Nutrition consult     #Patient Education  - Education provided on the following:      * Admitting diagnosis and functional implications      * Functional goals      * Bladder management      * Bowel management      * Skin care management      * Intensity of service and scheduling of rehab disciplines      * Plan of care and role of interdisciplinary team conference in discharge planning      * Reconciliation of medications from prior institution    #Precautions / PROPHYLAXIS:   - Falls, Spinal  - Ortho: Weight bearing status: FWB;  brace OOB  - DVT ppx: Heparin 5000U TID, TEDs  - Pressure injury/Skin: Turn Q2hrs while in bed, OOB to Chair, PT/OT      ---------------  Code Status: FULL  Emergency Contact:    Outpatient Follow-up (Specialty/Name of physician):    Yue Wiggins  [facility]  [address], [address]  [place] [address]  Phone: [phone]  Fax: [phone]  Established Patient  Follow Up Time: 1 week    Keith Cantu [facility]  Neurosurgery  [address] [place] [address], [address]  [place] [address]  Phone: [phone]  Fax: [phone]  Established Patient  Follow Up Time: 2 weeks    Luis A Brennan  [facility]  CARDIOLOGY [address]  Scheduled Appointment: 04/23/2025        --------------  Goals: Safe discharge to Home*****  Estimated Length of Stay: 10-14 days  Rehab Potential: Good  Medical Prognosis: Good  Estimated Disposition: Home with Home Care  ---------------    PRESCREEN COMPARISON:  I have reviewed the prescreen information and I have found no relevant changes between the preadmission screening and my post admission evaluation.    RATIONALE FOR INPATIENT ADMISSION: Patient demonstrates the following:  [X] Medically appropriate for rehabilitation admission  [X] Has attainable rehab goals with an appropriate initial discharge plan  [X]Has rehabilitation potential (expected to make a significant improvement within a reasonable period of time)  [X] Requires close medical management by a rehab physician, rehab nursing care, Hospitalist and comprehensive interdisciplinary team (including PT, OT)

## 2025-04-02 NOTE — PROGRESS NOTE ADULT - SUBJECTIVE AND OBJECTIVE BOX
HPI:  Mr. Rishabh Shaver is a 74-year-old male patient with past medical history of benign prostatic hyperplasia, diverticulosis, Graves' disease, hemorrhoids, hyperlipidemia, hypothyroidism, osteoarthritis, spinal stenosis, spinal cord injury (2004), and multiple spinal surgeries (three cervical and one lumbar), who presented to [facility] on 3/4/25 from [facility] due to lower extremity swelling and abnormal BUN/creatinine levels for further evaluation and management. The patient was recently admitted to [facility] from 02/18/25 through 02/25/25 for sepsis secondary to epidural abscess. He had a peripherally inserted central catheter line in the right arm and was on nightly Ceftriaxone treatment until 04/16/25. At the ED, he was found to have GOVIND, a left soleal vein, and a left gastrocnemius DVT. He was started on heparin gtt. Hospital course complicated by urinary retention requiring Ortega catheter insertion, rhabdomyolysis, elevated LFTs, anasarca, leukocytosis due to thoracic spine discitis OM and early paraspinal abscess, as well as new onset RUE paresthesias and weakness. He is s/p IVC filter placement on 3/17 and was recommended surgical management. He is s/p posterior thoracic laminectomy and fusion on 3/18/25 with Dr. Keith Cantu. Surgical culture with rare staph epi. Recommendations by ID to continue IV Rocephin and Daptomycin through 5/2/25. Patient was evaluated by PM&R and therapy for functional deficits, gait/ADL impairments and acute rehabilitation was recommended. Patient was cleared for discharge to [facility] IRF on 3/25/25. (25 Mar 2025 17:09)    TDD: 4/22/25 home  ___________________________________________________________________________    SUBJECTIVE/ROS  Patient was seen and evaluated at bedside today.  Reported no overnight events and is in no acute distress.  Evaluated wound with no new changes.  Eager to participate on the recommended rehabilitation program.  Denies any CP, SOB, KESSLER, palpitations, fever, chills, body aches, cough, congestion, or any other symptoms at this time.   ___________________________________________________________________________    Vital Signs Last 24 Hrs  T(C): 36.8 (02 Apr 2025 08:00), Max: 36.9 (01 Apr 2025 19:20)  T(F): 98.2 (02 Apr 2025 08:00), Max: 98.4 (01 Apr 2025 19:20)  HR: 80 (02 Apr 2025 08:00) (80 - 101)  BP: 106/63 (02 Apr 2025 08:00) (94/55 - 147/72)  RR: 15 (02 Apr 2025 08:00) (15 - 16)  SpO2: 95% (02 Apr 2025 08:00) (94% - 95%)    Parameters below as of 02 Apr 2025 08:00  Patient On (Oxygen Delivery Method): room air    ___________________________________________________________________________    LAB                        8.3    7.06  )-----------( 217      ( 31 Mar 2025 06:13 )             25.9     03-31    141  |  100  |  37[H]  ----------------------------<  111[H]  3.8   |  34[H]  |  1.04    Ca    8.7      31 Mar 2025 06:13    TPro  5.8[L]  /  Alb  2.4[L]  /  TBili  0.5  /  DBili  x   /  AST  30  /  ALT  48[H]  /  AlkPhos  63  03-31    LIVER FUNCTIONS - ( 31 Mar 2025 06:13 )  Alb: 2.4 g/dL / Pro: 5.8 g/dL / ALK PHOS: 63 U/L / ALT: 48 U/L / AST: 30 U/L / GGT: x           ___________________________________________________________________________    MEDICATIONS  (STANDING):  ammonium lactate 12% Lotion 1 Application(s) Topical two times a day  AQUAPHOR (petrolatum Ointment) 1 Application(s) Topical three times a day  bisacodyl Suppository 10 milliGRAM(s) Rectal <User Schedule>  buMETAnide 1 milliGRAM(s) Oral two times a day  cefTRIAXone   IVPB 2000 milliGRAM(s) IV Intermittent every 24 hours  chlorhexidine 4% Liquid 1 Application(s) Topical <User Schedule>  DAPTOmycin IVPB 650 milliGRAM(s) IV Intermittent every 24 hours  heparin   Injectable 5000 Unit(s) SubCutaneous every 8 hours  levothyroxine 150 MICROGram(s) Oral daily  metoprolol succinate ER 25 milliGRAM(s) Oral daily  oxyCODONE  ER Tablet 15 milliGRAM(s) Oral every 12 hours  pantoprazole    Tablet 40 milliGRAM(s) Oral before breakfast  polyethylene glycol 3350 17 Gram(s) Oral <User Schedule>  rosuvastatin 5 milliGRAM(s) Oral at bedtime  senna 2 Tablet(s) Oral at bedtime  tamsulosin 0.8 milliGRAM(s) Oral at bedtime    MEDICATIONS  (PRN):  aluminum hydroxide/magnesium hydroxide/simethicone Suspension 30 milliLiter(s) Oral every 4 hours PRN Dyspepsia  cyclobenzaprine 5 milliGRAM(s) Oral three times a day PRN Muscle Spasm  lactulose Syrup 20 Gram(s) Oral three times a day PRN for constipation  melatonin 3 milliGRAM(s) Oral at bedtime PRN Insomnia  oxyCODONE    IR 15 milliGRAM(s) Oral every 6 hours PRN Moderate Pain (4 - 6)  sodium chloride 0.9% lock flush 10 milliLiter(s) IV Push every 1 hour PRN Pre/post blood products, medications, blood draw, and to maintain line patency      ___________________________________________________________________________    PHYSICAL EXAM:    Gen - NAD, Comfortable  HEENT -  EOMI, Normal Conjunctivae  Neck - Supple, + limited ROM  Pulm - breathing comfortably  Cardiovascular - warm and well perfused  Abdomen - soft  Extremities - +BLE 3+ pitting edema, +B/L pedal edema, +RUE PICC line   /GI- + diaper  Neuro-     Cognitive - awake, alert, oriented to person, place, date, year, and situation.  Able  to follow command     Communication - Fluent, Comprehensible, No dysarthria, No aphasia   Psychiatric - Mood stable, Affect WNL  Skin- DTI on left buttock healing appropriately     ___________________________________________________________________________ HPI:  Mr. Rishabh Shaver is a 74-year-old male patient with past medical history of benign prostatic hyperplasia, diverticulosis, Graves' disease, hemorrhoids, hyperlipidemia, hypothyroidism, osteoarthritis, spinal stenosis, spinal cord injury (2004), and multiple spinal surgeries (three cervical and one lumbar), who presented to [facility] on 3/4/25 from [facility] due to lower extremity swelling and abnormal BUN/creatinine levels for further evaluation and management. The patient was recently admitted to [facility] from 02/18/25 through 02/25/25 for sepsis secondary to epidural abscess. He had a peripherally inserted central catheter line in the right arm and was on nightly Ceftriaxone treatment until 04/16/25. At the ED, he was found to have GOVIND, a left soleal vein, and a left gastrocnemius DVT. He was started on heparin gtt. Hospital course complicated by urinary retention requiring Ortega catheter insertion, rhabdomyolysis, elevated LFTs, anasarca, leukocytosis due to thoracic spine discitis OM and early paraspinal abscess, as well as new onset RUE paresthesias and weakness. He is s/p IVC filter placement on 3/17 and was recommended surgical management. He is s/p posterior thoracic laminectomy and fusion on 3/18/25 with Dr. Keith Cantu. Surgical culture with rare staph epi. Recommendations by ID to continue IV Rocephin and Daptomycin through 5/2/25. Patient was evaluated by PM&R and therapy for functional deficits, gait/ADL impairments and acute rehabilitation was recommended. Patient was cleared for discharge to [facility] IRF on 3/25/25. (25 Mar 2025 17:09)    TDD: 4/22/25 home  ___________________________________________________________________________    SUBJECTIVE/ROS  Patient was seen and evaluated at bedside today.  Reported no overnight events and is in no acute distress.  Evaluated wound with evidence of bloody oozing. Kaltostat added to wound care.  Suppository changed to mini-enema today due to poor efficacy  Eager to participate on the recommended rehabilitation program.  Denies any CP, SOB, KESSLER, palpitations, fever, chills, body aches, cough, congestion, or any other symptoms at this time.   ___________________________________________________________________________    Vital Signs Last 24 Hrs  T(C): 36.8 (02 Apr 2025 08:00), Max: 36.9 (01 Apr 2025 19:20)  T(F): 98.2 (02 Apr 2025 08:00), Max: 98.4 (01 Apr 2025 19:20)  HR: 80 (02 Apr 2025 08:00) (80 - 101)  BP: 106/63 (02 Apr 2025 08:00) (94/55 - 147/72)  RR: 15 (02 Apr 2025 08:00) (15 - 16)  SpO2: 95% (02 Apr 2025 08:00) (94% - 95%)    Parameters below as of 02 Apr 2025 08:00  Patient On (Oxygen Delivery Method): room air    ___________________________________________________________________________    LAB                        8.3    7.06  )-----------( 217      ( 31 Mar 2025 06:13 )             25.9     03-31    141  |  100  |  37[H]  ----------------------------<  111[H]  3.8   |  34[H]  |  1.04    Ca    8.7      31 Mar 2025 06:13    TPro  5.8[L]  /  Alb  2.4[L]  /  TBili  0.5  /  DBili  x   /  AST  30  /  ALT  48[H]  /  AlkPhos  63  03-31    LIVER FUNCTIONS - ( 31 Mar 2025 06:13 )  Alb: 2.4 g/dL / Pro: 5.8 g/dL / ALK PHOS: 63 U/L / ALT: 48 U/L / AST: 30 U/L / GGT: x           ___________________________________________________________________________    MEDICATIONS  (STANDING):  ammonium lactate 12% Lotion 1 Application(s) Topical two times a day  AQUAPHOR (petrolatum Ointment) 1 Application(s) Topical three times a day  buMETAnide 1 milliGRAM(s) Oral two times a day  cefTRIAXone   IVPB 2000 milliGRAM(s) IV Intermittent every 24 hours  chlorhexidine 4% Liquid 1 Application(s) Topical <User Schedule>  DAPTOmycin IVPB 650 milliGRAM(s) IV Intermittent every 24 hours  docusate sodium Enema 283 milliGRAM(s) Rectal daily  heparin   Injectable 5000 Unit(s) SubCutaneous every 8 hours  levothyroxine 150 MICROGram(s) Oral daily  metoprolol succinate ER 25 milliGRAM(s) Oral daily  oxyCODONE  ER Tablet 15 milliGRAM(s) Oral every 12 hours  pantoprazole    Tablet 40 milliGRAM(s) Oral before breakfast  polyethylene glycol 3350 17 Gram(s) Oral <User Schedule>  rosuvastatin 5 milliGRAM(s) Oral at bedtime  senna 2 Tablet(s) Oral at bedtime  tamsulosin 0.8 milliGRAM(s) Oral at bedtime    MEDICATIONS  (PRN):  aluminum hydroxide/magnesium hydroxide/simethicone Suspension 30 milliLiter(s) Oral every 4 hours PRN Dyspepsia  cyclobenzaprine 5 milliGRAM(s) Oral three times a day PRN Muscle Spasm  lactulose Syrup 20 Gram(s) Oral three times a day PRN for constipation  melatonin 3 milliGRAM(s) Oral at bedtime PRN Insomnia  oxyCODONE    IR 15 milliGRAM(s) Oral every 6 hours PRN Moderate Pain (4 - 6)  sodium chloride 0.9% lock flush 10 milliLiter(s) IV Push every 1 hour PRN Pre/post blood products, medications, blood draw, and to maintain line patency    ___________________________________________________________________________    PHYSICAL EXAM:    Gen - NAD, Comfortable  HEENT -  EOMI, Normal Conjunctivae  Neck - Supple, + limited ROM  Pulm - breathing comfortably  Cardiovascular - warm and well perfused  Abdomen - soft  Extremities - +BLE 3+ pitting edema, +B/L pedal edema, +RUE PICC line   /GI- + diaper  Neuro-     Cognitive - awake, alert, oriented to person, place, date, year, and situation.  Able  to follow command     Communication - Fluent, Comprehensible, No dysarthria, No aphasia   Psychiatric - Mood stable, Affect WNL  Skin- DTI on left buttock healing appropriately     ___________________________________________________________________________

## 2025-04-03 LAB
ALBUMIN SERPL ELPH-MCNC: 2.6 G/DL — LOW (ref 3.3–5)
ALP SERPL-CCNC: 66 U/L — SIGNIFICANT CHANGE UP (ref 40–120)
ALT FLD-CCNC: 41 U/L — SIGNIFICANT CHANGE UP (ref 10–45)
ANION GAP SERPL CALC-SCNC: 5 MMOL/L — SIGNIFICANT CHANGE UP (ref 5–17)
AST SERPL-CCNC: 29 U/L — SIGNIFICANT CHANGE UP (ref 10–40)
BASOPHILS # BLD AUTO: 0.05 K/UL — SIGNIFICANT CHANGE UP (ref 0–0.2)
BASOPHILS NFR BLD AUTO: 0.6 % — SIGNIFICANT CHANGE UP (ref 0–2)
BILIRUB SERPL-MCNC: 0.5 MG/DL — SIGNIFICANT CHANGE UP (ref 0.2–1.2)
BUN SERPL-MCNC: 32 MG/DL — HIGH (ref 7–23)
CALCIUM SERPL-MCNC: 9.1 MG/DL — SIGNIFICANT CHANGE UP (ref 8.4–10.5)
CHLORIDE SERPL-SCNC: 99 MMOL/L — SIGNIFICANT CHANGE UP (ref 96–108)
CO2 SERPL-SCNC: 35 MMOL/L — HIGH (ref 22–31)
CREAT SERPL-MCNC: 1.02 MG/DL — SIGNIFICANT CHANGE UP (ref 0.5–1.3)
EGFR: 78 ML/MIN/1.73M2 — SIGNIFICANT CHANGE UP
EGFR: 78 ML/MIN/1.73M2 — SIGNIFICANT CHANGE UP
EOSINOPHIL # BLD AUTO: 0.44 K/UL — SIGNIFICANT CHANGE UP (ref 0–0.5)
EOSINOPHIL NFR BLD AUTO: 5.1 % — SIGNIFICANT CHANGE UP (ref 0–6)
FLUAV AG NPH QL: SIGNIFICANT CHANGE UP
FLUBV AG NPH QL: SIGNIFICANT CHANGE UP
GLUCOSE SERPL-MCNC: 117 MG/DL — HIGH (ref 70–99)
HCT VFR BLD CALC: 27.6 % — LOW (ref 39–50)
HGB BLD-MCNC: 8.8 G/DL — LOW (ref 13–17)
IMM GRANULOCYTES NFR BLD AUTO: 0.8 % — SIGNIFICANT CHANGE UP (ref 0–0.9)
LYMPHOCYTES # BLD AUTO: 0.8 K/UL — LOW (ref 1–3.3)
LYMPHOCYTES # BLD AUTO: 9.3 % — LOW (ref 13–44)
MCHC RBC-ENTMCNC: 28.4 PG — SIGNIFICANT CHANGE UP (ref 27–34)
MCHC RBC-ENTMCNC: 31.9 G/DL — LOW (ref 32–36)
MCV RBC AUTO: 89 FL — SIGNIFICANT CHANGE UP (ref 80–100)
MONOCYTES # BLD AUTO: 0.85 K/UL — SIGNIFICANT CHANGE UP (ref 0–0.9)
MONOCYTES NFR BLD AUTO: 9.9 % — SIGNIFICANT CHANGE UP (ref 2–14)
NEUTROPHILS # BLD AUTO: 6.4 K/UL — SIGNIFICANT CHANGE UP (ref 1.8–7.4)
NEUTROPHILS NFR BLD AUTO: 74.3 % — SIGNIFICANT CHANGE UP (ref 43–77)
NRBC BLD AUTO-RTO: 0 /100 WBCS — SIGNIFICANT CHANGE UP (ref 0–0)
PLATELET # BLD AUTO: 244 K/UL — SIGNIFICANT CHANGE UP (ref 150–400)
POTASSIUM SERPL-MCNC: 4.1 MMOL/L — SIGNIFICANT CHANGE UP (ref 3.5–5.3)
POTASSIUM SERPL-SCNC: 4.1 MMOL/L — SIGNIFICANT CHANGE UP (ref 3.5–5.3)
PROT SERPL-MCNC: 6 G/DL — SIGNIFICANT CHANGE UP (ref 6–8.3)
RBC # BLD: 3.1 M/UL — LOW (ref 4.2–5.8)
RBC # FLD: 14.6 % — HIGH (ref 10.3–14.5)
RSV RNA NPH QL NAA+NON-PROBE: SIGNIFICANT CHANGE UP
SARS-COV-2 RNA SPEC QL NAA+PROBE: SIGNIFICANT CHANGE UP
SODIUM SERPL-SCNC: 139 MMOL/L — SIGNIFICANT CHANGE UP (ref 135–145)
SOURCE RESPIRATORY: SIGNIFICANT CHANGE UP
WBC # BLD: 8.61 K/UL — SIGNIFICANT CHANGE UP (ref 3.8–10.5)
WBC # FLD AUTO: 8.61 K/UL — SIGNIFICANT CHANGE UP (ref 3.8–10.5)

## 2025-04-03 PROCEDURE — 99233 SBSQ HOSP IP/OBS HIGH 50: CPT

## 2025-04-03 PROCEDURE — 99232 SBSQ HOSP IP/OBS MODERATE 35: CPT

## 2025-04-03 RX ADMIN — Medication 40 MILLIGRAM(S): at 05:41

## 2025-04-03 RX ADMIN — TAMSULOSIN HYDROCHLORIDE 0.8 MILLIGRAM(S): 0.4 CAPSULE ORAL at 21:07

## 2025-04-03 RX ADMIN — OXYCODONE HYDROCHLORIDE 15 MILLIGRAM(S): 30 TABLET ORAL at 06:40

## 2025-04-03 RX ADMIN — Medication 1 APPLICATION(S): at 05:43

## 2025-04-03 RX ADMIN — Medication 2 TABLET(S): at 21:07

## 2025-04-03 RX ADMIN — HEPARIN SODIUM 5000 UNIT(S): 1000 INJECTION INTRAVENOUS; SUBCUTANEOUS at 13:22

## 2025-04-03 RX ADMIN — CEFTRIAXONE 100 MILLIGRAM(S): 500 INJECTION, POWDER, FOR SOLUTION INTRAMUSCULAR; INTRAVENOUS at 21:07

## 2025-04-03 RX ADMIN — DAPTOMYCIN 126 MILLIGRAM(S): 500 INJECTION, POWDER, LYOPHILIZED, FOR SOLUTION INTRAVENOUS at 11:33

## 2025-04-03 RX ADMIN — WHITE PETROLATUM 1 APPLICATION(S): 1 OINTMENT TOPICAL at 05:43

## 2025-04-03 RX ADMIN — Medication 283 MILLIGRAM(S): at 06:47

## 2025-04-03 RX ADMIN — OXYCODONE HYDROCHLORIDE 15 MILLIGRAM(S): 30 TABLET ORAL at 17:09

## 2025-04-03 RX ADMIN — Medication 150 MICROGRAM(S): at 05:00

## 2025-04-03 RX ADMIN — OXYCODONE HYDROCHLORIDE 15 MILLIGRAM(S): 30 TABLET ORAL at 05:41

## 2025-04-03 RX ADMIN — OXYCODONE HYDROCHLORIDE 15 MILLIGRAM(S): 30 TABLET ORAL at 22:00

## 2025-04-03 RX ADMIN — Medication 1 APPLICATION(S): at 17:09

## 2025-04-03 RX ADMIN — Medication 1 APPLICATION(S): at 05:46

## 2025-04-03 RX ADMIN — HEPARIN SODIUM 5000 UNIT(S): 1000 INJECTION INTRAVENOUS; SUBCUTANEOUS at 05:41

## 2025-04-03 RX ADMIN — WHITE PETROLATUM 1 APPLICATION(S): 1 OINTMENT TOPICAL at 14:19

## 2025-04-03 RX ADMIN — WHITE PETROLATUM 1 APPLICATION(S): 1 OINTMENT TOPICAL at 21:09

## 2025-04-03 RX ADMIN — Medication 3 MILLIGRAM(S): at 21:07

## 2025-04-03 RX ADMIN — ROSUVASTATIN CALCIUM 5 MILLIGRAM(S): 20 TABLET, FILM COATED ORAL at 21:06

## 2025-04-03 RX ADMIN — OXYCODONE HYDROCHLORIDE 15 MILLIGRAM(S): 30 TABLET ORAL at 21:06

## 2025-04-03 RX ADMIN — HEPARIN SODIUM 5000 UNIT(S): 1000 INJECTION INTRAVENOUS; SUBCUTANEOUS at 21:06

## 2025-04-03 NOTE — PROGRESS NOTE ADULT - SUBJECTIVE AND OBJECTIVE BOX
CC: Patient is a 75y old  Male who presents with a chief complaint of Thoracic epidural abscess s/p laminectomy and fusion surgery (03 Apr 2025 09:05)      Interval History:    Patient seen and examined at bedside.  No overnight events.  No new complaints.    ALLERGIES:  No Known Allergies    MEDICATIONS  (STANDING):  ammonium lactate 12% Lotion 1 Application(s) Topical two times a day  AQUAPHOR (petrolatum Ointment) 1 Application(s) Topical three times a day  buMETAnide 1 milliGRAM(s) Oral two times a day  cefTRIAXone   IVPB 2000 milliGRAM(s) IV Intermittent every 24 hours  chlorhexidine 4% Liquid 1 Application(s) Topical <User Schedule>  DAPTOmycin IVPB 650 milliGRAM(s) IV Intermittent every 24 hours  docusate sodium Enema 283 milliGRAM(s) Rectal every 24 hours  heparin   Injectable 5000 Unit(s) SubCutaneous every 8 hours  levothyroxine 150 MICROGram(s) Oral daily  metoprolol succinate ER 25 milliGRAM(s) Oral daily  oxyCODONE  ER Tablet 15 milliGRAM(s) Oral every 12 hours  pantoprazole    Tablet 40 milliGRAM(s) Oral before breakfast  polyethylene glycol 3350 17 Gram(s) Oral <User Schedule>  rosuvastatin 5 milliGRAM(s) Oral at bedtime  senna 2 Tablet(s) Oral at bedtime  tamsulosin 0.8 milliGRAM(s) Oral at bedtime    MEDICATIONS  (PRN):  aluminum hydroxide/magnesium hydroxide/simethicone Suspension 30 milliLiter(s) Oral every 4 hours PRN Dyspepsia  cyclobenzaprine 5 milliGRAM(s) Oral three times a day PRN Muscle Spasm  lactulose Syrup 20 Gram(s) Oral three times a day PRN for constipation  melatonin 3 milliGRAM(s) Oral at bedtime PRN Insomnia  oxyCODONE    IR 15 milliGRAM(s) Oral every 6 hours PRN Moderate Pain (4 - 6)  sodium chloride 0.9% lock flush 10 milliLiter(s) IV Push every 1 hour PRN Pre/post blood products, medications, blood draw, and to maintain line patency    Vital Signs Last 24 Hrs  T(F): 98.6 (03 Apr 2025 08:58), Max: 98.6 (03 Apr 2025 08:58)  HR: 80 (03 Apr 2025 08:58) (80 - 88)  BP: 123/69 (03 Apr 2025 08:58) (106/64 - 123/69)  RR: 15 (03 Apr 2025 08:58) (15 - 16)  SpO2: 93% (03 Apr 2025 08:58) (93% - 95%)  I&O's Summary    02 Apr 2025 07:01  -  03 Apr 2025 07:00  --------------------------------------------------------  IN: 0 mL / OUT: 2600 mL / NET: -2600 mL    03 Apr 2025 07:01  -  03 Apr 2025 11:03  --------------------------------------------------------  IN: 0 mL / OUT: 500 mL / NET: -500 mL          PHYSICAL EXAM:  GENERAL: NAD  CHEST/LUNG: No rales, rhonchi, wheezing; normal respiratory effort  HEART: RRR; No murmurs, rubs, or gallops  ABDOMEN: Soft, Nontender, Nondistended; Bowel sounds present  MSK/EXT:  No peripheral edema, 2+ Peripheral Pulses, No clubbing or digital cyanosis  PSYCH: Appropriate affect, Alert & Oriented    LABS:                        8.8    8.61  )-----------( 244      ( 03 Apr 2025 06:13 )             27.6       04-03    139  |  99  |  32  ----------------------------<  117  4.1   |  35  |  1.02    Ca    9.1      03 Apr 2025 06:13    TPro  6.0  /  Alb  2.6  /  TBili  0.5  /  DBili  x   /  AST  29  /  ALT  41  /  AlkPhos  66  04-03        Urinalysis Basic - ( 03 Apr 2025 06:13 )    Color: x / Appearance: x / SG: x / pH: x  Gluc: 117 mg/dL / Ketone: x  / Bili: x / Urobili: x   Blood: x / Protein: x / Nitrite: x   Leuk Esterase: x / RBC: x / WBC x   Sq Epi: x / Non Sq Epi: x / Bacteria: x      COVID-19 PCR: NotDetec (03-25-25 @ 21:35)      Care Discussed with Consultants/Other Providers: Yes

## 2025-04-03 NOTE — PROGRESS NOTE ADULT - SUBJECTIVE AND OBJECTIVE BOX
HPI:  Mr. Rishabh Shaver is a 74-year-old male patient with past medical history of benign prostatic hyperplasia, diverticulosis, Graves' disease, hemorrhoids, hyperlipidemia, hypothyroidism, osteoarthritis, spinal stenosis, spinal cord injury (2004), and multiple spinal surgeries (three cervical and one lumbar), who presented to [facility] on 3/4/25 from [facility] due to lower extremity swelling and abnormal BUN/creatinine levels for further evaluation and management. The patient was recently admitted to [facility] from 02/18/25 through 02/25/25 for sepsis secondary to epidural abscess. He had a peripherally inserted central catheter line in the right arm and was on nightly Ceftriaxone treatment until 04/16/25. At the ED, he was found to have GOVIND, a left soleal vein, and a left gastrocnemius DVT. He was started on heparin gtt. Hospital course complicated by urinary retention requiring Ortega catheter insertion, rhabdomyolysis, elevated LFTs, anasarca, leukocytosis due to thoracic spine discitis OM and early paraspinal abscess, as well as new onset RUE paresthesias and weakness. He is s/p IVC filter placement on 3/17 and was recommended surgical management. He is s/p posterior thoracic laminectomy and fusion on 3/18/25 with Dr. Keith Cantu. Surgical culture with rare staph epi. Recommendations by ID to continue IV Rocephin and Daptomycin through 5/2/25. Patient was evaluated by PM&R and therapy for functional deficits, gait/ADL impairments and acute rehabilitation was recommended. Patient was cleared for discharge to [facility] IRF on 3/25/25. (25 Mar 2025 17:09)    TDD: 4/22/25 home  ___________________________________________________________________________    SUBJECTIVE/ROS  Patient was seen and evaluated at bedside today.  Reported no overnight events and is in no acute distress.  Monitoring wound status with no new evidence of bloody oozing.   Mini-enema effective in bowel regimen.  Eager to participate on the recommended rehabilitation program.  Denies any CP, SOB, KESSLER, palpitations, fever, chills, body aches, cough, congestion, or any other symptoms at this time.   ___________________________________________________________________________    Vital Signs Last 24 Hrs  T(C): 37 (03 Apr 2025 08:58), Max: 37 (03 Apr 2025 08:58)  T(F): 98.6 (03 Apr 2025 08:58), Max: 98.6 (03 Apr 2025 08:58)  HR: 80 (03 Apr 2025 08:58) (80 - 88)  BP: 123/69 (03 Apr 2025 08:58) (106/64 - 123/69)  RR: 15 (03 Apr 2025 08:58) (15 - 16)  SpO2: 93% (03 Apr 2025 08:58) (93% - 95%)    Parameters below as of 03 Apr 2025 08:58  Patient On (Oxygen Delivery Method): room air  ___________________________________________________________________________    LAB                        8.8    8.61  )-----------( 244      ( 03 Apr 2025 06:13 )             27.6                           8.3    7.06  )-----------( 217      ( 31 Mar 2025 06:13 )             25.9     03-31    141  |  100  |  37[H]  ----------------------------<  111[H]  3.8   |  34[H]  |  1.04    Ca    8.7      31 Mar 2025 06:13    TPro  5.8[L]  /  Alb  2.4[L]  /  TBili  0.5  /  DBili  x   /  AST  30  /  ALT  48[H]  /  AlkPhos  63  03-31    LIVER FUNCTIONS - ( 31 Mar 2025 06:13 )  Alb: 2.4 g/dL / Pro: 5.8 g/dL / ALK PHOS: 63 U/L / ALT: 48 U/L / AST: 30 U/L / GGT: x           ___________________________________________________________________________    MEDICATIONS  (STANDING):  ammonium lactate 12% Lotion 1 Application(s) Topical two times a day  AQUAPHOR (petrolatum Ointment) 1 Application(s) Topical three times a day  buMETAnide 1 milliGRAM(s) Oral two times a day  cefTRIAXone   IVPB 2000 milliGRAM(s) IV Intermittent every 24 hours  chlorhexidine 4% Liquid 1 Application(s) Topical <User Schedule>  DAPTOmycin IVPB 650 milliGRAM(s) IV Intermittent every 24 hours  docusate sodium Enema 283 milliGRAM(s) Rectal every 24 hours  heparin   Injectable 5000 Unit(s) SubCutaneous every 8 hours  levothyroxine 150 MICROGram(s) Oral daily  metoprolol succinate ER 25 milliGRAM(s) Oral daily  oxyCODONE  ER Tablet 15 milliGRAM(s) Oral every 12 hours  pantoprazole    Tablet 40 milliGRAM(s) Oral before breakfast  polyethylene glycol 3350 17 Gram(s) Oral <User Schedule>  rosuvastatin 5 milliGRAM(s) Oral at bedtime  senna 2 Tablet(s) Oral at bedtime  tamsulosin 0.8 milliGRAM(s) Oral at bedtime    MEDICATIONS  (PRN):  aluminum hydroxide/magnesium hydroxide/simethicone Suspension 30 milliLiter(s) Oral every 4 hours PRN Dyspepsia  cyclobenzaprine 5 milliGRAM(s) Oral three times a day PRN Muscle Spasm  lactulose Syrup 20 Gram(s) Oral three times a day PRN for constipation  melatonin 3 milliGRAM(s) Oral at bedtime PRN Insomnia  oxyCODONE    IR 15 milliGRAM(s) Oral every 6 hours PRN Moderate Pain (4 - 6)  sodium chloride 0.9% lock flush 10 milliLiter(s) IV Push every 1 hour PRN Pre/post blood products, medications, blood draw, and to maintain line patency    ___________________________________________________________________________    PHYSICAL EXAM:    Gen - NAD, Comfortable  HEENT -  EOMI, Normal Conjunctivae  Neck - Supple, + limited ROM  Pulm - breathing comfortably  Cardiovascular - warm and well perfused  Abdomen - soft  Extremities - +BLE 3+ pitting edema, +B/L pedal edema, +RUE PICC line   /GI- + diaper  Neuro-     Cognitive - awake, alert, oriented to person, place, date, year, and situation.  Able  to follow command     Communication - Fluent, Comprehensible, No dysarthria, No aphasia   Psychiatric - Mood stable, Affect WNL  Skin- DTI on left buttock healing appropriately     ___________________________________________________________________________

## 2025-04-03 NOTE — PROGRESS NOTE ADULT - ASSESSMENT
Assessment/Plan:  Mr. Rishabh Shaver is a 74-year-old male patient with past medical history of benign prostatic hyperplasia, diverticulosis, Graves' disease, hemorrhoids, hyperlipidemia, hypothyroidism, osteoarthritis, spinal stenosis, spinal cord injury (2004), and multiple spinal surgeries (three cervical and one lumbar), who is admitted for Acute Inpatient Rehabilitation with a multidisciplinary rehab program at [facility] with functional impairments in ADLs and mobility secondary to a thoracic epidural abscess s/p posterior thoracic laminectomy and fusion on 3/18/25 with Dr. Keith Cantu.       #Thoracic epidural abscess s/p laminectomy and fusion surgery      * C8 AIS D Incomplete paraplegia      *  brace when OOB      * PRN Flexeril 5mg for neck soreness      * Staples to be removed on POD #14 (4/1/25 - can be done at rehab if patient is not able to see Dr. Cantu in the office)      * Sacral wound assessment by wound/skin team,      * Maintain Ortega for now as pt has had urinary retention with previous trial of void - On Flomax 8mg daily      * Right Venodyne only due to left calf DVT (s/p pre-op IVC filter)   - Activity Limitations: Decreased social, vocational and leisure activities, decreased self care and ADLs, decreased mobility, decreased ability to manage household and finances.   - Comprehensive Multidisciplinary Rehab Program:      * 3 hours a day, 5 days a week.      * PT 1hr/day: Focused on improving strength, endurance, coordination, balance, functional mobility, and transfers      * OT 1hr/day: Focused on improving strength, fine motor skills, coordination, posture and ADLs.      -----------------------------------------------------------------------------------    Concurrent Medical Problems    #GOVIND on CKD  - Resolved  - LE edema  - Hypoalbuminemia  - Proteinuria  - IVF as needed with fluid balance  - Significant debilitating ansarca.   - Trial of albumin lasix started 3/8->improvement.   - IV Laxis/IV albumin  BID(3/9)  - Change to Bumex/Albumin (3/11)-> good effect  - Switched to PO bumex 1mg BID 3/24  - Trend Albumin (3/24- 2.3)  - Trend Protein total (3/24- 5.5 ); Total protein, Random Urine (3/18- 24)  - Trend CMP (3/25- creat 0.94/ BUN 27)    #L gastrocnemius and L soleal DVT  - appears provoked from decrease ambulation from recent spinal abscess  - Could not tolerate VQ scan for r/o PE 2/2 pain  - Switch to Eliquis will be new med on discharge (of note patient completed Eliquis load)   - Therapeutic Lovenox (3/12) in the setting of possible spine intervention.   - Last dose of AC night of 3/16. Restart AC (Eliquis 5mg BID) 2 weeks post op (tentative 4/2/2025) per neurosx if hgb stable and surgical site stable  - S/P IVC filter placement 3/17.    #Spinal epidural abscess POA and already being treated with IV antibiotics with suspected worsening of disease  - S/p thoracic laminectomy and fusion 3/18. [place]  brace   - Surgical culture with staph epi possible contaminant but given clinical presentation on admission, IV Dapto added.   - IV Rocephin 2g HS via PICC line thru 5/2/25  - IV Daptomycin 650mg daily via PICC line thru 5/2/2025  - Pain management: Tylenol PRN, Oxycodone 15mg q4h PRN moderate pain; Oxycontin 15mg BID  - Staples to be removed on POD #14 (4/1/25)  (can be done at rehab if patient is not able to see Dr. Cantu in the office)  - CXR for PICC confirmation on admission     #Mild Rhabdomyolysis Improving  - Associated elevated transaminases  - Suspect from Ansarca/Edema  - Improving with adequate diuresis of edema  - Associated elevated transaminase; relatively stable. Continue to monitor (3/24- AST 38/ALT 57)    #Leukocytosis  - Likely rx to DVT + Discitis.  - No other signs of sepsis , afebrile   - Trend CBC and monitor fever curve (3/25- WBC 8.69)    #Hypothyroidism  - Synthroid 150mcg daily    #CAD  #HTN  #HLD  - Metoprolol 25mg daily   - Crestor 5mg HS    #Anemia/MARLENA  - Trend H/H (3/25- 8.7/27.3)  - Transfuse if Hgb <7 PRN     #Sleep:   - Maintain quiet hours and low stim environment.  - Melatonin 3mg HS PRN to maximize participation in therapy during the day.     #GI/Bowel:  - Senna QHS, Miralax Daily, Lactulose 30ml TID PRN, Maalox PRN   - GI ppx: Pantoprazole 40mg daily     #/Bladder  #BPH  #Urinary retention likely augmented by degree of anasarca  - S/P Ortega; TOV  ->failed. Ortega re-inserted 3/6->fell out without knowing with associated hematuria->recurrent retention 3/7->Ortega re-inserted (3/7)  - Ortega on admission  - TOV when appropriate   - Flomax 0.8mg HS  - Monitor U/O    #Skin/Pressure Injury:   Skin assessment on admission:   - Generalized dry flakey skin  - 19cm posterior cervical incision with 32 staples and 2 steri strips  - Left buttock DTI pressure injury measuring 14 x 9 cm      * appearing as a dark purple discoloration of intact skin, with a central superficial open area measuring 6 x 2 cm.      * at the periphery of the 14 x 9 cm area, the discoloration is somewhat lighter;       * dark red non-blanchable erythema.  - Bilateral lower extremity edema.  - Healed lumbar surgical incision scar  - Right heel blanchable redness  - Bilateral dry cracked heels and plantar surface  - Lac hydrin BID BLE  - Aquaphor BID to generalized dry skin   - Appreciate wound care consult     #Diet/Dysphagia:  - Dysphagia: SLP consult for swallow function evaluation and treatment  - Current Diet: Regular- DASH  - Aspiration Precautions  - Nutrition consult     #Patient Education  - Education provided on the following:      * Admitting diagnosis and functional implications      * Functional goals      * Bladder management      * Bowel management      * Skin care management      * Intensity of service and scheduling of rehab disciplines      * Plan of care and role of interdisciplinary team conference in discharge planning      * Reconciliation of medications from prior institution    #Precautions / PROPHYLAXIS:   - Falls, Spinal  - Ortho: Weight bearing status: FWB;  brace OOB  - DVT ppx: Heparin 5000U TID, TEDs  - Pressure injury/Skin: Turn Q2hrs while in bed, OOB to Chair, PT/OT      ---------------  Code Status: FULL  Emergency Contact:    Outpatient Follow-up (Specialty/Name of physician):    Yue Wiggins  [facility]  [address], [address]  [place] [address]  Phone: [phone]  Fax: [phone]  Established Patient  Follow Up Time: 1 week    Keith Cantu [facility]  Neurosurgery  [address] [place] [address], [address]  [place] [address]  Phone: [phone]  Fax: [phone]  Established Patient  Follow Up Time: 2 weeks    Luis A Brennan  [facility]  CARDIOLOGY [address]  Scheduled Appointment: 04/23/2025        --------------  Goals: Safe discharge to Home*****  Estimated Length of Stay: 10-14 days  Rehab Potential: Good  Medical Prognosis: Good  Estimated Disposition: Home with Home Care  ---------------    PRESCREEN COMPARISON:  I have reviewed the prescreen information and I have found no relevant changes between the preadmission screening and my post admission evaluation.    RATIONALE FOR INPATIENT ADMISSION: Patient demonstrates the following:  [X] Medically appropriate for rehabilitation admission  [X] Has attainable rehab goals with an appropriate initial discharge plan  [X]Has rehabilitation potential (expected to make a significant improvement within a reasonable period of time)  [X] Requires close medical management by a rehab physician, rehab nursing care, Hospitalist and comprehensive interdisciplinary team (including PT, OT)

## 2025-04-03 NOTE — PROGRESS NOTE ADULT - ASSESSMENT
74-year-old male patient with h/o BPH, diverticulosis, Graves' disease, hemorrhoids, HLD, hypothyroidism, osteoarthritis, spinal stenosis, spinal cord injury (2004), and multiple spinal surgeries (three cervical and one lumbar), who presented to [facility] on 3/4/25 from [facility] due to lower extremity swelling and abnormal BUN/creatinine levels for further evaluation and management. The patient was recently admitted to [facility] from 02/18/25 through 02/25/25 for sepsis secondary to epidural abscess. He had a peripherally inserted central catheter line in the right arm and was on nightly Ceftriaxone treatment until 04/16/25. At the ED, he was found to have GOVIND, a left soleal vein, and a left gastrocnemius DVT. He was started on heparin gtt. Hospital course complicated by urinary retention requiring Ortega catheter insertion, rhabdomyolysis, elevated LFTs, anasarca, leukocytosis due to thoracic spine discitis OM and early paraspinal abscess, as well as new onset RUE paresthesias and weakness. He is s/p IVC filter placement on 3/17 and was recommended surgical management. He is s/p posterior thoracic laminectomy and fusion on 3/18/25 with Dr. Keith Cantu. Surgical culture with rare staph epi. Recommendations by ID to continue IV Rocephin and Daptomycin through 5/2/25. Patient was evaluated by PM&R and therapy for functional deficits, gait/ADL impairments and acute rehabilitation was recommended. Patient was cleared for discharge to [facility] IRF on 3/25/25. (25 Mar 2025 17:09)    Debility  Thoracic epidural abscess s/p laminectomy and fusion surgery on 3/18  - Tolerating comprehensive rehab  -  brace  - pain control per primary  - fall, spine precautions    Spinal epidural abscess   - S/p thoracic laminectomy and fusion 3/18.   - IV Rocephin 2g HS via PICC line thru 5/2/25  - IV Daptomycin 650mg daily via PICC line thru 5/2/2025  - [place]  brace   - Surgical culture with staph epi possible contaminant but given clinical presentation on admission, IV Dapto added.     GOVIND, resolved  LE edema  Hypoalbuminemia  Proteinuria  Anasarca  - c/w Bumex 1 mg BID  - Optimize protein intake.   - mobilize, encourage out of bed to chair often.   - Monitor labs closely , stable renal function w good UOP   - Serum workup negative but will need follow up of  Serum  TRUDY positive for weak IgG Lambda band.  - urine protein 200 mg only ( not nephrotic)   - daily standing weights    Transaminitis, resolved  - encouraged PO  - Limit Tylenol use  - avoid hepatotoxins  - monitor on routine labs    Leukocytosis, resolved  - Likely rx to DVT + Discitis.  - No signs of sepsis , afebrile   - monitor on routine labs    CAD  HTN  HLD  - Metoprolol 25mg daily   - Crestor 5mg HS    Anemia  MARLENA  - s/p 2 units of PRBCs given intra-op  - Monitor Hgb (3/31 Hgb 8.3)  - Transfuse to keep hb >7    Hypothyroidism  - Synthroid 150mcg daily    sacral Decub stage 3  - wound care cx noted  - Continue turning patient Q 2 hours   - Lac hydrin BID BLE  - Aquaphor BID to generalized dry skin     Constipation:  - Senna QHS, Miralax Daily, Lactulose 30ml TID PRN, Maalox PRN     BPH, chronic  Urinary retention likely augmented by degree of anasarca  - Ortega POA  - failed TOV in [facility]. last Ortega inserted on 3/22.   - Ortega was removed 3/26. on TOV, retention 763 this am 3/27.   - On Flomax 8mg daily  - Monitor U/O    Acute DVT:   -  US doppler + L soleal and gastroc DVT   - s/p IVC filter 3/17 with interventional cards.   - Could not tolerate VQ scan for r/o PE 2/2 pain  - On Heparin 5000 Sq Q8hrs  - Restart AC (Eliquis 5mg BID per neurosurgery, already loaded) 2 weeks post op per neurosx if hgb stable and surgical site stable. (tentative 4/2/2025)    DVT-P: heparin Injectable 5000 Unit(s) SubCutaneous every 8 hours. [ DVT on left]  GI: pantoprazole Tablet 40 milliGRAM(s) Oral before breakfast    4/3 labs reviewed

## 2025-04-04 PROCEDURE — 99232 SBSQ HOSP IP/OBS MODERATE 35: CPT | Mod: GC

## 2025-04-04 PROCEDURE — 99232 SBSQ HOSP IP/OBS MODERATE 35: CPT

## 2025-04-04 RX ORDER — BETHANECHOL CHLORIDE 50 MG
5 TABLET ORAL THREE TIMES A DAY
Refills: 0 | Status: DISCONTINUED | OUTPATIENT
Start: 2025-04-04 | End: 2025-04-06

## 2025-04-04 RX ORDER — BUMETANIDE 1 MG/1
1 TABLET ORAL
Refills: 0 | Status: DISCONTINUED | OUTPATIENT
Start: 2025-04-04 | End: 2025-04-26

## 2025-04-04 RX ADMIN — HEPARIN SODIUM 5000 UNIT(S): 1000 INJECTION INTRAVENOUS; SUBCUTANEOUS at 14:03

## 2025-04-04 RX ADMIN — Medication 1 APPLICATION(S): at 17:35

## 2025-04-04 RX ADMIN — Medication 3 MILLIGRAM(S): at 23:56

## 2025-04-04 RX ADMIN — OXYCODONE HYDROCHLORIDE 15 MILLIGRAM(S): 30 TABLET ORAL at 18:34

## 2025-04-04 RX ADMIN — Medication 283 MILLIGRAM(S): at 08:36

## 2025-04-04 RX ADMIN — OXYCODONE HYDROCHLORIDE 15 MILLIGRAM(S): 30 TABLET ORAL at 06:30

## 2025-04-04 RX ADMIN — OXYCODONE HYDROCHLORIDE 15 MILLIGRAM(S): 30 TABLET ORAL at 05:30

## 2025-04-04 RX ADMIN — TAMSULOSIN HYDROCHLORIDE 0.8 MILLIGRAM(S): 0.4 CAPSULE ORAL at 22:15

## 2025-04-04 RX ADMIN — HEPARIN SODIUM 5000 UNIT(S): 1000 INJECTION INTRAVENOUS; SUBCUTANEOUS at 05:30

## 2025-04-04 RX ADMIN — Medication 5 MILLIGRAM(S): at 14:02

## 2025-04-04 RX ADMIN — Medication 40 MILLIGRAM(S): at 05:30

## 2025-04-04 RX ADMIN — BUMETANIDE 1 MILLIGRAM(S): 1 TABLET ORAL at 14:02

## 2025-04-04 RX ADMIN — HEPARIN SODIUM 5000 UNIT(S): 1000 INJECTION INTRAVENOUS; SUBCUTANEOUS at 22:15

## 2025-04-04 RX ADMIN — Medication 150 MICROGRAM(S): at 05:30

## 2025-04-04 RX ADMIN — DAPTOMYCIN 126 MILLIGRAM(S): 500 INJECTION, POWDER, LYOPHILIZED, FOR SOLUTION INTRAVENOUS at 10:32

## 2025-04-04 RX ADMIN — WHITE PETROLATUM 1 APPLICATION(S): 1 OINTMENT TOPICAL at 05:31

## 2025-04-04 RX ADMIN — OXYCODONE HYDROCHLORIDE 15 MILLIGRAM(S): 30 TABLET ORAL at 17:34

## 2025-04-04 RX ADMIN — ROSUVASTATIN CALCIUM 5 MILLIGRAM(S): 20 TABLET, FILM COATED ORAL at 22:15

## 2025-04-04 RX ADMIN — CEFTRIAXONE 100 MILLIGRAM(S): 500 INJECTION, POWDER, FOR SOLUTION INTRAMUSCULAR; INTRAVENOUS at 22:10

## 2025-04-04 RX ADMIN — Medication 1 APPLICATION(S): at 05:36

## 2025-04-04 RX ADMIN — Medication 5 MILLIGRAM(S): at 22:15

## 2025-04-04 RX ADMIN — OXYCODONE HYDROCHLORIDE 15 MILLIGRAM(S): 30 TABLET ORAL at 23:56

## 2025-04-04 RX ADMIN — CYCLOBENZAPRINE HYDROCHLORIDE 5 MILLIGRAM(S): 15 CAPSULE, EXTENDED RELEASE ORAL at 11:59

## 2025-04-04 RX ADMIN — Medication 1 APPLICATION(S): at 05:31

## 2025-04-04 NOTE — PROGRESS NOTE ADULT - ASSESSMENT
74-year-old male patient with h/o BPH, diverticulosis, Graves' disease, hemorrhoids, HLD, hypothyroidism, osteoarthritis, spinal stenosis, spinal cord injury (2004), and multiple spinal surgeries (three cervical and one lumbar), who presented to [facility] on 3/4/25 from [facility] due to lower extremity swelling and abnormal BUN/creatinine levels for further evaluation and management. The patient was recently admitted to [facility] from 02/18/25 through 02/25/25 for sepsis secondary to epidural abscess. He had a peripherally inserted central catheter line in the right arm and was on nightly Ceftriaxone treatment until 04/16/25. At the ED, he was found to have GOVIND, a left soleal vein, and a left gastrocnemius DVT. He was started on heparin gtt. Hospital course complicated by urinary retention requiring Ortega catheter insertion, rhabdomyolysis, elevated LFTs, anasarca, leukocytosis due to thoracic spine discitis OM and early paraspinal abscess, as well as new onset RUE paresthesias and weakness. He is s/p IVC filter placement on 3/17 and was recommended surgical management. He is s/p posterior thoracic laminectomy and fusion on 3/18/25 with Dr. Keith Cantu. Surgical culture with rare staph epi. Recommendations by ID to continue IV Rocephin and Daptomycin through 5/2/25. Patient was evaluated by PM&R and therapy for functional deficits, gait/ADL impairments and acute rehabilitation was recommended. Patient was cleared for discharge to [facility] IRF on 3/25/25. (25 Mar 2025 17:09)    Debility  Thoracic epidural abscess s/p laminectomy and fusion surgery on 3/18  - Tolerating comprehensive rehab  -  brace  - pain control per primary  - fall, spine precautions    Spinal epidural abscess   - S/p thoracic laminectomy and fusion 3/18.   - IV Rocephin 2g HS via PICC line thru 5/2/25  - IV Daptomycin 650mg daily via PICC line thru 5/2/2025  - [place]  brace   - Surgical culture with staph epi possible contaminant but given clinical presentation on admission, IV Dapto added.     GOVIND, resolved  LE edema  Hypoalbuminemia  Proteinuria  Anasarca  - c/w Bumex 1 mg BID (4/4 missed the past few doses due to hold parameters.  reordered with hold for SBP <100 instructions).   - Optimize protein intake.   - mobilize, encourage out of bed to chair often.   - Monitor labs closely , stable renal function w good UOP   - Serum workup negative but will need follow up of  Serum  TRUDY positive for weak IgG Lambda band.  - urine protein 200 mg only ( not nephrotic)   - daily standing weights    Transaminitis, resolved  - encouraged PO  - Limit Tylenol use  - avoid hepatotoxins  - monitor on routine labs    Leukocytosis, resolved  - Likely rx to DVT + Discitis.  - No signs of sepsis , afebrile   - monitor on routine labs    CAD  HTN  HLD  - Metoprolol 25mg daily   - Crestor 5mg HS    Anemia  MARLENA  - s/p 2 units of PRBCs given intra-op  - Monitor Hgb (3/31 Hgb 8.3)  - Transfuse to keep hb >7    Hypothyroidism  - Synthroid 150mcg daily    sacral Decub stage 3  - wound care cx noted  - Continue turning patient Q 2 hours   - Lac hydrin BID BLE  - Aquaphor BID to generalized dry skin     Constipation:  - Senna QHS, Miralax Daily, Lactulose 30ml TID PRN, Maalox PRN     BPH, chronic  Urinary retention likely augmented by degree of anasarca  - Ortega POA  - failed TOV in [facility]. last Ortega inserted on 3/22.   - Ortega was removed 3/26. on TOV, still with regular urianry retention.   - On Flomax 8mg daily. d/w PMR on rounds, will be adding bethanechol 4/4.  - Monitor U/O    Acute DVT:   -  US doppler + L soleal and gastroc DVT   - s/p IVC filter 3/17 with interventional cards.   - Could not tolerate VQ scan for r/o PE 2/2 pain  - On Heparin 5000 Sq Q8hrs  - Restart AC (Eliquis 5mg BID per neurosurgery, already loaded) 2 weeks post op per neurosx if hgb stable and surgical site stable. (tentative 4/2/2025)    DVT-P: heparin Injectable 5000 Unit(s) SubCutaneous every 8 hours. [ DVT on left]  GI: pantoprazole Tablet 40 milliGRAM(s) Oral before breakfast    4/3 labs reviewed

## 2025-04-04 NOTE — PROGRESS NOTE ADULT - ASSESSMENT
Assessment/Plan:  Mr. Rishabh Shaver is a 74-year-old male patient with past medical history of benign prostatic hyperplasia, diverticulosis, Graves' disease, hemorrhoids, hyperlipidemia, hypothyroidism, osteoarthritis, spinal stenosis, spinal cord injury (2004), and multiple spinal surgeries (three cervical and one lumbar), who is admitted for Acute Inpatient Rehabilitation with a multidisciplinary rehab program at [facility] with functional impairments in ADLs and mobility secondary to a thoracic epidural abscess s/p posterior thoracic laminectomy and fusion on 3/18/25 with Dr. Keith Cantu.       #Thoracic epidural abscess s/p laminectomy and fusion surgery      * C8 AIS D Incomplete paraplegia      *  brace when OOB      * PRN Flexeril 5mg for neck soreness      * Staples to be removed on POD #14 (4/1/25 - can be done at rehab if patient is not able to see Dr. Cantu in the office)      * Sacral wound assessment by wound/skin team,      * Maintain Ortega for now as pt has had urinary retention with previous trial of void - On Flomax 8mg daily      * Right Venodyne only due to left calf DVT (s/p pre-op IVC filter)   - Activity Limitations: Decreased social, vocational and leisure activities, decreased self care and ADLs, decreased mobility, decreased ability to manage household and finances.   - Comprehensive Multidisciplinary Rehab Program:      * 3 hours a day, 5 days a week.      * PT 1hr/day: Focused on improving strength, endurance, coordination, balance, functional mobility, and transfers      * OT 1hr/day: Focused on improving strength, fine motor skills, coordination, posture and ADLs.      -----------------------------------------------------------------------------------    Concurrent Medical Problems    #GOVIND on CKD  - Resolved  - LE edema  - Hypoalbuminemia  - Proteinuria  - IVF as needed with fluid balance  - Significant debilitating ansarca.   - Trial of albumin lasix started 3/8->improvement.   - IV Laxis/IV albumin  BID(3/9)  - Change to Bumex/Albumin (3/11)-> good effect  - Switched to PO bumex 1mg BID 3/24  - Trend Albumin (3/24- 2.3)  - Trend Protein total (3/24- 5.5 ); Total protein, Random Urine (3/18- 24)  - Trend CMP (3/25- creat 0.94/ BUN 27)    #L gastrocnemius and L soleal DVT  - appears provoked from decrease ambulation from recent spinal abscess  - Could not tolerate VQ scan for r/o PE 2/2 pain  - Switch to Eliquis will be new med on discharge (of note patient completed Eliquis load)   - Therapeutic Lovenox (3/12) in the setting of possible spine intervention.   - Last dose of AC night of 3/16. Restart AC (Eliquis 5mg BID) 2 weeks post op (tentative 4/2/2025) per neurosx if hgb stable and surgical site stable  - S/P IVC filter placement 3/17.    #Spinal epidural abscess POA and already being treated with IV antibiotics with suspected worsening of disease  - S/p thoracic laminectomy and fusion 3/18. [place]  brace   - Surgical culture with staph epi possible contaminant but given clinical presentation on admission, IV Dapto added.   - IV Rocephin 2g HS via PICC line thru 5/2/25  - IV Daptomycin 650mg daily via PICC line thru 5/2/2025  - Pain management: Tylenol PRN, Oxycodone 15mg q4h PRN moderate pain; Oxycontin 15mg BID  - Staples to be removed on POD #14 (4/1/25)  (can be done at rehab if patient is not able to see Dr. Cantu in the office)  - CXR for PICC confirmation on admission     #Mild Rhabdomyolysis Improving  - Associated elevated transaminases  - Suspect from Ansarca/Edema  - Improving with adequate diuresis of edema  - Associated elevated transaminase; relatively stable. Continue to monitor (3/24- AST 38/ALT 57)    #Leukocytosis  - Likely rx to DVT + Discitis.  - No other signs of sepsis , afebrile   - Trend CBC and monitor fever curve (3/25- WBC 8.69)    #Hypothyroidism  - Synthroid 150mcg daily    #CAD  #HTN  #HLD  - Metoprolol 25mg daily   - Crestor 5mg HS    #Anemia/MARLENA  - Trend H/H (3/25- 8.7/27.3)  - Transfuse if Hgb <7 PRN     #Sleep:   - Maintain quiet hours and low stim environment.  - Melatonin 3mg HS PRN to maximize participation in therapy during the day.     #GI/Bowel:  - Senna QHS, Miralax Daily, Lactulose 30ml TID PRN, Maalox PRN   - GI ppx: Pantoprazole 40mg daily     #/Bladder  #BPH  #Urinary retention likely augmented by degree of anasarca  - S/P Ortega; TOV  ->failed. Ortega re-inserted 3/6->fell out without knowing with associated hematuria->recurrent retention 3/7->Ortega re-inserted (3/7)  - Ortega on admission  - TOV when appropriate   - Flomax 0.8mg HS  - starting bethanechol 5 tid  - Monitor U/O    #Skin/Pressure Injury:   Skin assessment on admission:   - Generalized dry flakey skin  - 19cm posterior cervical incision with 32 staples and 2 steri strips  - Left buttock DTI pressure injury measuring 14 x 9 cm      * appearing as a dark purple discoloration of intact skin, with a central superficial open area measuring 6 x 2 cm.      * at the periphery of the 14 x 9 cm area, the discoloration is somewhat lighter;       * dark red non-blanchable erythema.  - Bilateral lower extremity edema.  - Healed lumbar surgical incision scar  - Right heel blanchable redness  - Bilateral dry cracked heels and plantar surface  - Lac hydrin BID BLE  - Aquaphor BID to generalized dry skin   - Appreciate wound care consult     #Diet/Dysphagia:  - Dysphagia: SLP consult for swallow function evaluation and treatment  - Current Diet: Regular- DASH  - Aspiration Precautions  - Nutrition consult     #Patient Education  - Education provided on the following:      * Admitting diagnosis and functional implications      * Functional goals      * Bladder management      * Bowel management      * Skin care management      * Intensity of service and scheduling of rehab disciplines      * Plan of care and role of interdisciplinary team conference in discharge planning      * Reconciliation of medications from prior institution    #Precautions / PROPHYLAXIS:   - Falls, Spinal  - Ortho: Weight bearing status: FWB;  brace OOB  - DVT ppx: Heparin 5000U TID, TEDs  - Pressure injury/Skin: Turn Q2hrs while in bed, OOB to Chair, PT/OT      ---------------  Code Status: FULL  Emergency Contact:    Outpatient Follow-up (Specialty/Name of physician):    Yue Wiggins  [facility]  [address] [facility], [address]  [place] [address]  Phone: [phone]  Fax: [phone]  Established Patient  Follow Up Time: 1 week    Keith Cantu Chi  Neurosurgery  [address] [place] [address], [address]  [place] [address]  Phone: [phone]  Fax: [phone]  Established Patient  Follow Up Time: 2 weeks    Luis A Brennan  [facility]  CARDIOLOGY [address]  Scheduled Appointment: 04/23/2025        --------------  Goals: Safe discharge to Home*****  Estimated Length of Stay: 10-14 days  Rehab Potential: Good  Medical Prognosis: Good  Estimated Disposition: Home with Home Care  ---------------    PRESCREEN COMPARISON:  I have reviewed the prescreen information and I have found no relevant changes between the preadmission screening and my post admission evaluation.    RATIONALE FOR INPATIENT ADMISSION: Patient demonstrates the following:  [X] Medically appropriate for rehabilitation admission  [X] Has attainable rehab goals with an appropriate initial discharge plan  [X]Has rehabilitation potential (expected to make a significant improvement within a reasonable period of time)  [X] Requires close medical management by a rehab physician, rehab nursing care, Hospitalist and comprehensive interdisciplinary team (including PT, OT)         Assessment/Plan:  Mr. Rishabh Shaver is a 74-year-old male patient with past medical history of benign prostatic hyperplasia, diverticulosis, Graves' disease, hemorrhoids, hyperlipidemia, hypothyroidism, osteoarthritis, spinal stenosis, spinal cord injury (2004), and multiple spinal surgeries (three cervical and one lumbar), who is admitted for Acute Inpatient Rehabilitation with a multidisciplinary rehab program at [facility] with functional impairments in ADLs and mobility secondary to a thoracic epidural abscess s/p posterior thoracic laminectomy and fusion on 3/18/25 with Dr. Keith Cantu.       #Thoracic epidural abscess s/p laminectomy and fusion surgery      * C8 AIS D Incomplete paraplegia      *  brace when OOB      * PRN Flexeril 5mg for neck soreness      * Staples to be removed on POD #14 (4/1/25 - can be done at rehab if patient is not able to see Dr. Cantu in the office)      * Sacral wound assessment by wound/skin team,      * Right Venodyne only due to left calf DVT (s/p pre-op IVC filter)   - Activity Limitations: Decreased social, vocational and leisure activities, decreased self care and ADLs, decreased mobility, decreased ability to manage household and finances.   - Comprehensive Multidisciplinary Rehab Program:      * 3 hours a day, 5 days a week.      * PT 1hr/day: Focused on improving strength, endurance, coordination, balance, functional mobility, and transfers      * OT 1hr/day: Focused on improving strength, fine motor skills, coordination, posture and ADLs.      -----------------------------------------------------------------------------------    Concurrent Medical Problems    #GOVIND on CKD  - Resolved  - LE edema  - Hypoalbuminemia  - Proteinuria  - IVF as needed with fluid balance  - Significant debilitating ansarca.   - Trial of albumin lasix started 3/8->improvement.   - IV Laxis/IV albumin  BID(3/9)  - Change to Bumex/Albumin (3/11)-> good effect  - Switched to PO bumex 1mg BID 3/24  - Trend Albumin (3/24- 2.3)  - Trend Protein total (3/24- 5.5 ); Total protein, Random Urine (3/18- 24)  - Trend CMP (3/25- creat 0.94/ BUN 27)    #L gastrocnemius and L soleal DVT  - appears provoked from decrease ambulation from recent spinal abscess  - Could not tolerate VQ scan for r/o PE 2/2 pain  - Switch to Eliquis will be new med on discharge (of note patient completed Eliquis load)   - Therapeutic Lovenox (3/12) in the setting of possible spine intervention.   - Last dose of AC night of 3/16. Restart AC (Eliquis 5mg BID) 2 weeks post op (tentative 4/2/2025) per neurosx if hgb stable and surgical site stable  - S/P IVC filter placement 3/17.    #Spinal epidural abscess POA and already being treated with IV antibiotics with suspected worsening of disease  - S/p thoracic laminectomy and fusion 3/18. [place]  brace   - Surgical culture with staph epi possible contaminant but given clinical presentation on admission, IV Dapto added.   - IV Rocephin 2g HS via PICC line thru 5/2/25  - IV Daptomycin 650mg daily via PICC line thru 5/2/2025  - Pain management: Tylenol PRN, Oxycodone 15mg q4h PRN moderate pain; Oxycontin 15mg BID  - Staples to be removed on POD #14 (4/1/25)  (can be done at rehab if patient is not able to see Dr. Cantu in the office)  - CXR for PICC confirmation on admission     #Mild Rhabdomyolysis Improving  - Associated elevated transaminases  - Suspect from Ansarca/Edema  - Improving with adequate diuresis of edema  - Associated elevated transaminase; relatively stable. Continue to monitor (3/24- AST 38/ALT 57)    #Leukocytosis  - Likely rx to DVT + Discitis.  - No other signs of sepsis , afebrile   - Trend CBC and monitor fever curve (3/25- WBC 8.69)    #Hypothyroidism  - Synthroid 150mcg daily    #CAD  #HTN  #HLD  - Metoprolol 25mg daily   - Crestor 5mg HS    #Anemia/MARLENA  - Trend H/H (3/25- 8.7/27.3)  - Transfuse if Hgb <7 PRN     #Sleep:   - Maintain quiet hours and low stim environment.  - Melatonin 3mg HS PRN to maximize participation in therapy during the day.     #GI/Bowel:  - Senna QHS, Miralax Daily, Lactulose 30ml TID PRN, Maalox PRN   - GI ppx: Pantoprazole 40mg daily     #/Bladder  #BPH  #Urinary retention likely augmented by degree of anasarca  - S/P Ortega; TOV  ->failed. Ortega re-inserted 3/6->fell out without knowing with associated hematuria->recurrent retention 3/7->Ortega re-inserted (3/7)  - Ortega on admission  - TOV when appropriate   - Flomax 0.8mg HS  - starting bethanechol 5 tid  - Monitor U/O    #Skin/Pressure Injury:   Skin assessment on admission:   - Generalized dry flakey skin  - 19cm posterior cervical incision with 32 staples and 2 steri strips  - Left buttock DTI pressure injury measuring 14 x 9 cm      * appearing as a dark purple discoloration of intact skin, with a central superficial open area measuring 6 x 2 cm.      * at the periphery of the 14 x 9 cm area, the discoloration is somewhat lighter;       * dark red non-blanchable erythema.  - Bilateral lower extremity edema.  - Healed lumbar surgical incision scar  - Right heel blanchable redness  - Bilateral dry cracked heels and plantar surface  - Lac hydrin BID BLE  - Aquaphor BID to generalized dry skin   - Appreciate wound care consult     #Diet/Dysphagia:  - Dysphagia: SLP consult for swallow function evaluation and treatment  - Current Diet: Regular- DASH  - Aspiration Precautions  - Nutrition consult     #Patient Education  - Education provided on the following:      * Admitting diagnosis and functional implications      * Functional goals      * Bladder management      * Bowel management      * Skin care management      * Intensity of service and scheduling of rehab disciplines      * Plan of care and role of interdisciplinary team conference in discharge planning      * Reconciliation of medications from prior institution    #Precautions / PROPHYLAXIS:   - Falls, Spinal  - Ortho: Weight bearing status: FWB;  brace OOB  - DVT ppx: Heparin 5000U TID, TEDs  - Pressure injury/Skin: Turn Q2hrs while in bed, OOB to Chair, PT/OT      ---------------  Code Status: FULL  Emergency Contact:    Outpatient Follow-up (Specialty/Name of physician):    Yue Wiggins  [facility]  [address], [address]  [place] [address]  Phone: [phone]  Fax: [phone]  Established Patient  Follow Up Time: 1 week    Keith Cantu [facility]  Neurosurgery  [address] [facility], [address]  [place] [address]  Phone: [phone]  Fax: [phone]  Established Patient  Follow Up Time: 2 weeks    Luis A Brennan  [facility]  CARDIOLOGY [address]  Scheduled Appointment: 04/23/2025        --------------  Goals: Safe discharge to Home*****  Estimated Length of Stay: 10-14 days  Rehab Potential: Good  Medical Prognosis: Good  Estimated Disposition: Home with Home Care  ---------------    PRESCREEN COMPARISON:  I have reviewed the prescreen information and I have found no relevant changes between the preadmission screening and my post admission evaluation.    RATIONALE FOR INPATIENT ADMISSION: Patient demonstrates the following:  [X] Medically appropriate for rehabilitation admission  [X] Has attainable rehab goals with an appropriate initial discharge plan  [X]Has rehabilitation potential (expected to make a significant improvement within a reasonable period of time)  [X] Requires close medical management by a rehab physician, rehab nursing care, Hospitalist and comprehensive interdisciplinary team (including PT, OT)

## 2025-04-04 NOTE — PROGRESS NOTE ADULT - SUBJECTIVE AND OBJECTIVE BOX
HPI:  Mr. Rishabh Shaver is a 74-year-old male patient with past medical history of benign prostatic hyperplasia, diverticulosis, Graves' disease, hemorrhoids, hyperlipidemia, hypothyroidism, osteoarthritis, spinal stenosis, spinal cord injury (2004), and multiple spinal surgeries (three cervical and one lumbar), who presented to [facility] on 3/4/25 from [facility] due to lower extremity swelling and abnormal BUN/creatinine levels for further evaluation and management. The patient was recently admitted to [facility] from 02/18/25 through 02/25/25 for sepsis secondary to epidural abscess. He had a peripherally inserted central catheter line in the right arm and was on nightly Ceftriaxone treatment until 04/16/25. At the ED, he was found to have GOVIND, a left soleal vein, and a left gastrocnemius DVT. He was started on heparin gtt. Hospital course complicated by urinary retention requiring Ortega catheter insertion, rhabdomyolysis, elevated LFTs, anasarca, leukocytosis due to thoracic spine discitis OM and early paraspinal abscess, as well as new onset RUE paresthesias and weakness. He is s/p IVC filter placement on 3/17 and was recommended surgical management. He is s/p posterior thoracic laminectomy and fusion on 3/18/25 with Dr. Keith Cantu. Surgical culture with rare staph epi. Recommendations by ID to continue IV Rocephin and Daptomycin through 5/2/25. Patient was evaluated by PM&R and therapy for functional deficits, gait/ADL impairments and acute rehabilitation was recommended. Patient was cleared for discharge to [facility] IRF on 3/25/25. (25 Mar 2025 17:09)    TDD: 4/22/25 home  ___________________________________________________________________________    SUBJECTIVE/ROS  Patient was seen and evaluated at bedside today.  Reported no overnight events and is in no acute distress.  Mini-enema effective in bowel regimen.  Spoke about starting new medication to help promote urination- patient agreeable  Patient has hx of BPH but says that with medication he was able to urinate prior to injury  Eager to participate on the recommended rehabilitation program.  Denies any CP, SOB, KESSLER, palpitations, fever, chills, body aches, cough, congestion, or any other symptoms at this time.   ___________________________________________________________________________    Vital Signs Last 24 Hrs  T(C): 36.7 (04-04-25 @ 08:40), Max: 37 (04-03-25 @ 19:08)  T(F): 98.1 (04-04-25 @ 08:40), Max: 98.6 (04-03-25 @ 19:08)  HR: 86 (04-04-25 @ 08:40) (77 - 86)  BP: 110/67 (04-04-25 @ 08:40) (99/63 - 110/67)  ABP: --  ABP(mean): --  RR: 16 (04-04-25 @ 08:40) (16 - 16)  SpO2: 93% (04-04-25 @ 08:40) (93% - 95%)    _________________________________________________________________________    LAB                        8.8    8.61  )-----------( 244      ( 03 Apr 2025 06:13 )             27.6     04-03    139  |  99  |  32[H]  ----------------------------<  117[H]  4.1   |  35[H]  |  1.02    Ca    9.1      03 Apr 2025 06:13    TPro  6.0  /  Alb  2.6[L]  /  TBili  0.5  /  DBili  x   /  AST  29  /  ALT  41  /  AlkPhos  66  04-03    LIVER FUNCTIONS - ( 03 Apr 2025 06:13 )  Alb: 2.6 g/dL / Pro: 6.0 g/dL / ALK PHOS: 66 U/L / ALT: 41 U/L / AST: 29 U/L / GGT: x                 Urinalysis Basic - ( 03 Apr 2025 06:13 )    Color: x / Appearance: x / SG: x / pH: x  Gluc: 117 mg/dL / Ketone: x  / Bili: x / Urobili: x   Blood: x / Protein: x / Nitrite: x   Leuk Esterase: x / RBC: x / WBC x   Sq Epi: x / Non Sq Epi: x / Bacteria: x        ___________________________________________________________________________    MEDICATIONS  (STANDING):  ammonium lactate 12% Lotion 1 Application(s) Topical two times a day  AQUAPHOR (petrolatum Ointment) 1 Application(s) Topical three times a day  bethanechol 5 milliGRAM(s) Oral three times a day  buMETAnide 1 milliGRAM(s) Oral two times a day  cefTRIAXone   IVPB 2000 milliGRAM(s) IV Intermittent every 24 hours  chlorhexidine 4% Liquid 1 Application(s) Topical <User Schedule>  DAPTOmycin IVPB 650 milliGRAM(s) IV Intermittent every 24 hours  docusate sodium Enema 283 milliGRAM(s) Rectal every 24 hours  heparin   Injectable 5000 Unit(s) SubCutaneous every 8 hours  levothyroxine 150 MICROGram(s) Oral daily  metoprolol succinate ER 25 milliGRAM(s) Oral daily  oxyCODONE  ER Tablet 15 milliGRAM(s) Oral every 12 hours  pantoprazole    Tablet 40 milliGRAM(s) Oral before breakfast  polyethylene glycol 3350 17 Gram(s) Oral <User Schedule>  rosuvastatin 5 milliGRAM(s) Oral at bedtime  senna 2 Tablet(s) Oral at bedtime  tamsulosin 0.8 milliGRAM(s) Oral at bedtime    MEDICATIONS  (PRN):  aluminum hydroxide/magnesium hydroxide/simethicone Suspension 30 milliLiter(s) Oral every 4 hours PRN Dyspepsia  cyclobenzaprine 5 milliGRAM(s) Oral three times a day PRN Muscle Spasm  lactulose Syrup 20 Gram(s) Oral three times a day PRN for constipation  melatonin 3 milliGRAM(s) Oral at bedtime PRN Insomnia  oxyCODONE    IR 15 milliGRAM(s) Oral every 6 hours PRN Moderate Pain (4 - 6)  sodium chloride 0.9% lock flush 10 milliLiter(s) IV Push every 1 hour PRN Pre/post blood products, medications, blood draw, and to maintain line patency    ___________________________________________________________________________    PHYSICAL EXAM:    Gen - NAD, Comfortable  HEENT -  EOMI, Normal Conjunctivae  Neck - Supple  Pulm - breathing comfortably  Cardiovascular - warm and well perfused  Abdomen - soft  Extremities - +BLE 3+ pitting edema, +B/L pedal edema, +RUE PICC line   /GI- + diaper  Neuro-     Cognitive - awake, alert, oriented to person, place, date, year, and situation.  Able  to follow command     Communication - Fluent, Comprehensible, No dysarthria, No aphasia      Motor:          RIGHT: EF [5/5], WE [5/5], EE [5/5], FF [5/5], HF [1/5], KE 2/5], ADF [4/5], APF [4/5]          LEFT:    EF [5/5], WE [5/5], EE [5/5], FF [4/5], HF [1/5], KE [2/5], ADF [5/5],  APF [5/5]  Psychiatric - Mood stable, Affect WNL    ___________________________________________________________________________ HPI:  Mr. Rishabh Shaver is a 74-year-old male patient with past medical history of benign prostatic hyperplasia, diverticulosis, Graves' disease, hemorrhoids, hyperlipidemia, hypothyroidism, osteoarthritis, spinal stenosis, spinal cord injury (2004), and multiple spinal surgeries (three cervical and one lumbar), who presented to [facility] on 3/4/25 from [facility] due to lower extremity swelling and abnormal BUN/creatinine levels for further evaluation and management. The patient was recently admitted to [facility] from 02/18/25 through 02/25/25 for sepsis secondary to epidural abscess. He had a peripherally inserted central catheter line in the right arm and was on nightly Ceftriaxone treatment until 04/16/25. At the ED, he was found to have GOVIND, a left soleal vein, and a left gastrocnemius DVT. He was started on heparin gtt. Hospital course complicated by urinary retention requiring Ortega catheter insertion, rhabdomyolysis, elevated LFTs, anasarca, leukocytosis due to thoracic spine discitis OM and early paraspinal abscess, as well as new onset RUE paresthesias and weakness. He is s/p IVC filter placement on 3/17 and was recommended surgical management. He is s/p posterior thoracic laminectomy and fusion on 3/18/25 with Dr. Keith Cantu. Surgical culture with rare staph epi. Recommendations by ID to continue IV Rocephin and Daptomycin through 5/2/25. Patient was evaluated by PM&R and therapy for functional deficits, gait/ADL impairments and acute rehabilitation was recommended. Patient was cleared for discharge to [facility] IRF on 3/25/25. (25 Mar 2025 17:09)    TDD: 4/22/25 home  ___________________________________________________________________________    SUBJECTIVE/ROS  Patient was seen and evaluated at bedside today.  Reported no overnight events and is in no acute distress.  Mini-enema effective in bowel regimen.  Spoke about starting new medication to help promote urination- patient agreeable  Patient has hx of BPH but says that with medication he was able to urinate prior to injury  Eager to participate on the recommended rehabilitation program.  Denies any CP, SOB, KESSLER, palpitations, fever, chills, body aches, cough, congestion, or any other symptoms at this time.   ___________________________________________________________________________    Vital Signs Last 24 Hrs  T(C): 36.7 (04-04-25 @ 08:40), Max: 37 (04-03-25 @ 19:08)  T(F): 98.1 (04-04-25 @ 08:40), Max: 98.6 (04-03-25 @ 19:08)  HR: 86 (04-04-25 @ 08:40) (77 - 86)  BP: 110/67 (04-04-25 @ 08:40) (99/63 - 110/67)  RR: 16 (04-04-25 @ 08:40) (16 - 16)  SpO2: 93% (04-04-25 @ 08:40) (93% - 95%)    _________________________________________________________________________    LAB                      8.8    8.61  )-----------( 244      ( 03 Apr 2025 06:13 )             27.6     04-03    139  |  99  |  32[H]  ----------------------------<  117[H]  4.1   |  35[H]  |  1.02    Ca    9.1      03 Apr 2025 06:13    TPro  6.0  /  Alb  2.6[L]  /  TBili  0.5  /  DBili  x   /  AST  29  /  ALT  41  /  AlkPhos  66  04-03    LIVER FUNCTIONS - ( 03 Apr 2025 06:13 )  Alb: 2.6 g/dL / Pro: 6.0 g/dL / ALK PHOS: 66 U/L / ALT: 41 U/L / AST: 29 U/L / GGT: x           ___________________________________________________________________________    MEDICATIONS  (STANDING):  ammonium lactate 12% Lotion 1 Application(s) Topical two times a day  AQUAPHOR (petrolatum Ointment) 1 Application(s) Topical three times a day  bethanechol 5 milliGRAM(s) Oral three times a day  buMETAnide 1 milliGRAM(s) Oral two times a day  cefTRIAXone   IVPB 2000 milliGRAM(s) IV Intermittent every 24 hours  chlorhexidine 4% Liquid 1 Application(s) Topical <User Schedule>  DAPTOmycin IVPB 650 milliGRAM(s) IV Intermittent every 24 hours  docusate sodium Enema 283 milliGRAM(s) Rectal every 24 hours  heparin   Injectable 5000 Unit(s) SubCutaneous every 8 hours  levothyroxine 150 MICROGram(s) Oral daily  metoprolol succinate ER 25 milliGRAM(s) Oral daily  oxyCODONE  ER Tablet 15 milliGRAM(s) Oral every 12 hours  pantoprazole    Tablet 40 milliGRAM(s) Oral before breakfast  polyethylene glycol 3350 17 Gram(s) Oral <User Schedule>  rosuvastatin 5 milliGRAM(s) Oral at bedtime  senna 2 Tablet(s) Oral at bedtime  tamsulosin 0.8 milliGRAM(s) Oral at bedtime    MEDICATIONS  (PRN):  aluminum hydroxide/magnesium hydroxide/simethicone Suspension 30 milliLiter(s) Oral every 4 hours PRN Dyspepsia  cyclobenzaprine 5 milliGRAM(s) Oral three times a day PRN Muscle Spasm  lactulose Syrup 20 Gram(s) Oral three times a day PRN for constipation  melatonin 3 milliGRAM(s) Oral at bedtime PRN Insomnia  oxyCODONE    IR 15 milliGRAM(s) Oral every 6 hours PRN Moderate Pain (4 - 6)  sodium chloride 0.9% lock flush 10 milliLiter(s) IV Push every 1 hour PRN Pre/post blood products, medications, blood draw, and to maintain line patency    ___________________________________________________________________________    PHYSICAL EXAM:  Gen - NAD, Comfortable  HEENT -  EOMI, Normal Conjunctivae  Neck - Supple  Pulm - breathing comfortably  Cardiovascular - warm and well perfused  Abdomen - soft  Extremities - +BLE 3+ pitting edema, +B/L pedal edema, +RUE PICC line   /GI- + diaper  Neuro-     Cognitive - awake, alert, oriented to person, place, date, year, and situation.  Able  to follow command     Communication - Fluent, Comprehensible, No dysarthria, No aphasia      Motor:          RIGHT: EF [5/5], WE [5/5], EE [5/5], FF [5/5], HF [1/5], KE 2/5], ADF [4/5], APF [4/5]          LEFT:    EF [5/5], WE [5/5], EE [5/5], FF [4/5], HF [1/5], KE [2/5], ADF [5/5],  APF [5/5]  Psychiatric - Mood stable, Affect WNL    ___________________________________________________________________________

## 2025-04-05 PROCEDURE — 99232 SBSQ HOSP IP/OBS MODERATE 35: CPT

## 2025-04-05 RX ORDER — GABAPENTIN 400 MG/1
100 CAPSULE ORAL THREE TIMES A DAY
Refills: 0 | Status: DISCONTINUED | OUTPATIENT
Start: 2025-04-11 | End: 2025-04-30

## 2025-04-05 RX ORDER — GABAPENTIN 400 MG/1
100 CAPSULE ORAL AT BEDTIME
Refills: 0 | Status: COMPLETED | OUTPATIENT
Start: 2025-04-05 | End: 2025-04-07

## 2025-04-05 RX ORDER — GABAPENTIN 400 MG/1
CAPSULE ORAL
Refills: 0 | Status: DISCONTINUED | OUTPATIENT
Start: 2025-04-05 | End: 2025-04-30

## 2025-04-05 RX ORDER — GABAPENTIN 400 MG/1
100 CAPSULE ORAL
Refills: 0 | Status: COMPLETED | OUTPATIENT
Start: 2025-04-08 | End: 2025-04-11

## 2025-04-05 RX ORDER — APIXABAN 2.5 MG/1
5 TABLET, FILM COATED ORAL EVERY 12 HOURS
Refills: 0 | Status: DISCONTINUED | OUTPATIENT
Start: 2025-04-05 | End: 2025-04-30

## 2025-04-05 RX ADMIN — TAMSULOSIN HYDROCHLORIDE 0.8 MILLIGRAM(S): 0.4 CAPSULE ORAL at 22:03

## 2025-04-05 RX ADMIN — APIXABAN 5 MILLIGRAM(S): 2.5 TABLET, FILM COATED ORAL at 14:02

## 2025-04-05 RX ADMIN — Medication 1 APPLICATION(S): at 05:36

## 2025-04-05 RX ADMIN — OXYCODONE HYDROCHLORIDE 15 MILLIGRAM(S): 30 TABLET ORAL at 23:46

## 2025-04-05 RX ADMIN — WHITE PETROLATUM 1 APPLICATION(S): 1 OINTMENT TOPICAL at 14:02

## 2025-04-05 RX ADMIN — DAPTOMYCIN 126 MILLIGRAM(S): 500 INJECTION, POWDER, LYOPHILIZED, FOR SOLUTION INTRAVENOUS at 10:02

## 2025-04-05 RX ADMIN — Medication 40 MILLIGRAM(S): at 05:28

## 2025-04-05 RX ADMIN — METOPROLOL SUCCINATE 25 MILLIGRAM(S): 50 TABLET, EXTENDED RELEASE ORAL at 05:28

## 2025-04-05 RX ADMIN — Medication 283 MILLIGRAM(S): at 09:04

## 2025-04-05 RX ADMIN — WHITE PETROLATUM 1 APPLICATION(S): 1 OINTMENT TOPICAL at 05:30

## 2025-04-05 RX ADMIN — Medication 5 MILLIGRAM(S): at 22:03

## 2025-04-05 RX ADMIN — Medication 1 APPLICATION(S): at 17:11

## 2025-04-05 RX ADMIN — OXYCODONE HYDROCHLORIDE 15 MILLIGRAM(S): 30 TABLET ORAL at 17:11

## 2025-04-05 RX ADMIN — OXYCODONE HYDROCHLORIDE 15 MILLIGRAM(S): 30 TABLET ORAL at 22:04

## 2025-04-05 RX ADMIN — Medication 1 APPLICATION(S): at 05:29

## 2025-04-05 RX ADMIN — OXYCODONE HYDROCHLORIDE 15 MILLIGRAM(S): 30 TABLET ORAL at 05:58

## 2025-04-05 RX ADMIN — Medication 150 MICROGRAM(S): at 05:28

## 2025-04-05 RX ADMIN — POLYETHYLENE GLYCOL 3350 17 GRAM(S): 17 POWDER, FOR SOLUTION ORAL at 05:29

## 2025-04-05 RX ADMIN — Medication 5 MILLIGRAM(S): at 14:02

## 2025-04-05 RX ADMIN — OXYCODONE HYDROCHLORIDE 15 MILLIGRAM(S): 30 TABLET ORAL at 05:28

## 2025-04-05 RX ADMIN — BUMETANIDE 1 MILLIGRAM(S): 1 TABLET ORAL at 05:28

## 2025-04-05 RX ADMIN — Medication 2 TABLET(S): at 22:03

## 2025-04-05 RX ADMIN — BUMETANIDE 1 MILLIGRAM(S): 1 TABLET ORAL at 14:02

## 2025-04-05 RX ADMIN — GABAPENTIN 100 MILLIGRAM(S): 400 CAPSULE ORAL at 22:02

## 2025-04-05 RX ADMIN — CEFTRIAXONE 100 MILLIGRAM(S): 500 INJECTION, POWDER, FOR SOLUTION INTRAMUSCULAR; INTRAVENOUS at 22:04

## 2025-04-05 RX ADMIN — HEPARIN SODIUM 5000 UNIT(S): 1000 INJECTION INTRAVENOUS; SUBCUTANEOUS at 05:28

## 2025-04-05 RX ADMIN — WHITE PETROLATUM 1 APPLICATION(S): 1 OINTMENT TOPICAL at 22:09

## 2025-04-05 RX ADMIN — Medication 5 MILLIGRAM(S): at 05:29

## 2025-04-05 RX ADMIN — ROSUVASTATIN CALCIUM 5 MILLIGRAM(S): 20 TABLET, FILM COATED ORAL at 22:03

## 2025-04-05 RX ADMIN — OXYCODONE HYDROCHLORIDE 15 MILLIGRAM(S): 30 TABLET ORAL at 00:26

## 2025-04-05 NOTE — PROGRESS NOTE ADULT - SUBJECTIVE AND OBJECTIVE BOX
Cc: Impaired mobility, function, and ADLs secondary to Thoracic epidural abscess s/p laminectomy and fusion surgery    HPI: Patient seen and examined at bedside. No acute events overnight.   Patient reports improvement in left hip flexion, although still a level of 1/5 - 2/5.   Pt inquired about removal of the sutures.  Pt also reports persistent swelling of left calf and thigh  Pt also c/o persistent Left upper extremity numbness and tingling. He reports that he used to be on gabapentin for this.   no chest pain, no N/V, no Fevers/Chills. No other new ROS  Has been tolerating rehabilitation program.    Medications:  MEDICATIONS  (STANDING):  ammonium lactate 12% Lotion 1 Application(s) Topical two times a day  apixaban 5 milliGRAM(s) Oral every 12 hours  AQUAPHOR (petrolatum Ointment) 1 Application(s) Topical three times a day  bethanechol 5 milliGRAM(s) Oral three times a day  buMETAnide 1 milliGRAM(s) Oral two times a day  cefTRIAXone   IVPB 2000 milliGRAM(s) IV Intermittent every 24 hours  chlorhexidine 4% Liquid 1 Application(s) Topical <User Schedule>  DAPTOmycin IVPB 650 milliGRAM(s) IV Intermittent every 24 hours  docusate sodium Enema 283 milliGRAM(s) Rectal every 24 hours  gabapentin 100 milliGRAM(s) Oral at bedtime  gabapentin   Oral   levothyroxine 150 MICROGram(s) Oral daily  metoprolol succinate ER 25 milliGRAM(s) Oral daily  oxyCODONE  ER Tablet 15 milliGRAM(s) Oral every 12 hours  pantoprazole    Tablet 40 milliGRAM(s) Oral before breakfast  polyethylene glycol 3350 17 Gram(s) Oral <User Schedule>  rosuvastatin 5 milliGRAM(s) Oral at bedtime  senna 2 Tablet(s) Oral at bedtime  tamsulosin 0.8 milliGRAM(s) Oral at bedtime    MEDICATIONS  (PRN):  aluminum hydroxide/magnesium hydroxide/simethicone Suspension 30 milliLiter(s) Oral every 4 hours PRN Dyspepsia  cyclobenzaprine 5 milliGRAM(s) Oral three times a day PRN Muscle Spasm  lactulose Syrup 20 Gram(s) Oral three times a day PRN for constipation  melatonin 3 milliGRAM(s) Oral at bedtime PRN Insomnia  oxyCODONE    IR 15 milliGRAM(s) Oral every 6 hours PRN Moderate Pain (4 - 6)  sodium chloride 0.9% lock flush 10 milliLiter(s) IV Push every 1 hour PRN Pre/post blood products, medications, blood draw, and to maintain line patency      Vital Signs:  T(C): 36.5 (04-05-25 @ 08:50), Max: 36.8 (04-04-25 @ 19:44)  HR: 81 (04-05-25 @ 08:50) (80 - 88)  BP: 104/67 (04-05-25 @ 08:50) (104/67 - 127/76)  RR: 15 (04-05-25 @ 08:50) (15 - 16)  SpO2: 94% (04-05-25 @ 08:50) (93% - 94%)    PHYSICAL EXAM:    GEN - In NAD  HEENT- EOMI  Heart- RRR, S1S2  Lungs- CTA bl.  Abd- + BS, NT  Ext-  persistent swelling of left calf and thigh  Neuro/MSK- Exam unchanged    Trended Labs Reviewed:  04-03    139  |  99  |  32[H]  ----------------------------<  117[H]  4.1   |  35[H]  |  1.02    Ca    9.1      03 Apr 2025 06:13    TPro  6.0  /  Alb  2.6[L]  /  TBili  0.5  /  DBili  x   /  AST  29  /  ALT  41  /  AlkPhos  66  04-03                                              8.8    8.61  )-----------( 244      ( 03 Apr 2025 06:13 )             27.6     CAPILLARY BLOOD GLUCOSE              I&O's Detail:    04-04-25 @ 07:01  -  04-05-25 @ 07:00  --------------------------------------------------------  IN: 0 mL / OUT: 2600 mL / NET: -2600 mL    04-05-25 @ 07:01  -  04-05-25 @ 17:45  --------------------------------------------------------  IN: 0 mL / OUT: 1450 mL / NET: -1450 mL          Images Reviewed:  < from: US Duplex Venous Lower Ext Complete, Bilateral (03.14.25 @ 09:55) >  PROCEDURE DATE:  03/14/2025          INTERPRETATION:  CLINICAL INFORMATION: Reevaluation of DVT.    COMPARISON: 3/6/2025    TECHNIQUE: Duplex sonography of the BILATERAL LOWER extremity veins with   color and spectral Doppler, with and without compression.    FINDINGS:    RIGHT:  Normal compressibility of the RIGHT common femoral, femoral and popliteal   veins.  Doppler examination shows normal spontaneous and phasic flow.  No RIGHT calf vein thrombosis is detected.    LEFT:  Normal compressibility of the LEFT common femoral, femoral and popliteal   veins.  Doppler examination shows normal spontaneous and phasic flow.  Posterior tibial and peroneal veins arecompressible, without evidence of   thrombus. Redemonstrated gastrocnemius vein thrombus cannot nonocclusive.   Previously present soleal vein clot is no longer seen.    IMPRESSION:  Acute deep venous thrombosis: below the knee.    Left calf vein deep venous thrombosis improved compared to prior   examination 3/6/2025    < end of copied text >      My impression: persistent Left DVT of the gastrocnemius vein

## 2025-04-05 NOTE — PROGRESS NOTE ADULT - ASSESSMENT
74-year-old male patient with h/o BPH, diverticulosis, Graves' disease, hemorrhoids, HLD, hypothyroidism, osteoarthritis, spinal stenosis, spinal cord injury (2004), and multiple spinal surgeries (three cervical and one lumbar), who presented to [facility] on 3/4/25 from [facility] due to lower extremity swelling and abnormal BUN/creatinine levels for further evaluation and management. The patient was recently admitted to [facility] from 02/18/25 through 02/25/25 for sepsis secondary to epidural abscess. He had a peripherally inserted central catheter line in the right arm and was on nightly Ceftriaxone treatment until 04/16/25. At the ED, he was found to have GOVIND, a left soleal vein, and a left gastrocnemius DVT. He was started on heparin gtt. Hospital course complicated by urinary retention requiring Ortega catheter insertion, rhabdomyolysis, elevated LFTs, anasarca, leukocytosis due to thoracic spine discitis OM and early paraspinal abscess, as well as new onset RUE paresthesias and weakness. He is s/p IVC filter placement on 3/17 and was recommended surgical management. He is s/p posterior thoracic laminectomy and fusion on 3/18/25 with Dr. Keith Cantu. Surgical culture with rare staph epi. Recommendations by ID to continue IV Rocephin and Daptomycin through 5/2/25. Patient was evaluated by PM&R and therapy for functional deficits, gait/ADL impairments and acute rehabilitation was recommended. Patient was cleared for discharge to [facility] IRF on 3/25/25. (25 Mar 2025 17:09)    Debility  Thoracic epidural abscess s/p laminectomy and fusion surgery on 3/18  - Tolerating comprehensive rehab  -  brace  - pain control per primary  - fall, spine precautions    Spinal epidural abscess   - S/p thoracic laminectomy and fusion 3/18.   - IV Rocephin 2g HS via PICC line thru 5/2/25  - IV Daptomycin 650mg daily via PICC line thru 5/2/2025  - [place]  brace   - Surgical culture with staph epi possible contaminant but given clinical presentation on admission, IV Dapto added.     GOVIND, resolved  LE edema  Hypoalbuminemia  Proteinuria  Anasarca  - c/w Bumex 1 mg BID (4/4 missed the past few doses due to hold parameters.  reordered with hold for SBP <100 instructions).   - Optimize protein intake.   - mobilize, encourage out of bed to chair often.   - Monitor labs closely , stable renal function w good UOP   - Serum workup negative but will need follow up of  Serum  TRUDY positive for weak IgG Lambda band.  - urine protein 200 mg only ( not nephrotic)   - daily standing weights    Transaminitis, resolved  - encouraged PO  - Limit Tylenol use  - avoid hepatotoxins  - monitor on routine labs    CAD  HTN  HLD  - Metoprolol 25mg daily   - Crestor 5mg HS    Anemia  MARLENA  - s/p 2 units of PRBCs given intra-op  - Monitor Hgb (3/31 Hgb 8.3)  - Transfuse to keep hb >7    Hypothyroidism  - Synthroid 150mcg daily    sacral Decub stage 3  - wound care cx noted  - Continue turning patient Q 2 hours   - Lac hydrin BID BLE  - Aquaphor BID to generalized dry skin       BPH, chronic  Urinary retention likely augmented by degree of anasarca  - Flomax 8mg daily  - Monitor U/O    Acute DVT:   -  US doppler + L soleal and gastroc DVT   - s/p IVC filter 3/17 with interventional cards.   - Could not tolerate VQ scan for r/o PE 2/2 pain  - On Heparin 5000 Sq Q8hrs  - Restart AC (Eliquis 5mg BID per neurosurgery, already loaded) 2 weeks post op per neurosx if hgb stable and surgical site stable. (tentative 4/2/2025)    DVT-P: heparin Injectable 5000 Unit(s) SubCutaneous every 8 hours. [ DVT on left]  GI: pantoprazole Tablet 40 milliGRAM(s) Oral before breakfast

## 2025-04-05 NOTE — PROGRESS NOTE ADULT - SUBJECTIVE AND OBJECTIVE BOX
Patient is a 75y old  Male who presents with a chief complaint of Thoracic epidural abscess s/p laminectomy and fusion surgery (04 Apr 2025 12:51)      SUBJECTIVE / OVERNIGHT EVENTS:  Pt seen and examined at bedside. No acute events overnight.  Pt denies cp, palpitations, sob, abd pain, N/V, fever, chills.    ROS:  All other review of systems negative    Allergies    No Known Allergies    Intolerances        MEDICATIONS  (STANDING):  ammonium lactate 12% Lotion 1 Application(s) Topical two times a day  apixaban 5 milliGRAM(s) Oral every 12 hours  AQUAPHOR (petrolatum Ointment) 1 Application(s) Topical three times a day  bethanechol 5 milliGRAM(s) Oral three times a day  buMETAnide 1 milliGRAM(s) Oral two times a day  cefTRIAXone   IVPB 2000 milliGRAM(s) IV Intermittent every 24 hours  chlorhexidine 4% Liquid 1 Application(s) Topical <User Schedule>  DAPTOmycin IVPB 650 milliGRAM(s) IV Intermittent every 24 hours  docusate sodium Enema 283 milliGRAM(s) Rectal every 24 hours  gabapentin   Oral   levothyroxine 150 MICROGram(s) Oral daily  metoprolol succinate ER 25 milliGRAM(s) Oral daily  oxyCODONE  ER Tablet 15 milliGRAM(s) Oral every 12 hours  pantoprazole    Tablet 40 milliGRAM(s) Oral before breakfast  polyethylene glycol 3350 17 Gram(s) Oral <User Schedule>  rosuvastatin 5 milliGRAM(s) Oral at bedtime  senna 2 Tablet(s) Oral at bedtime  tamsulosin 0.8 milliGRAM(s) Oral at bedtime    MEDICATIONS  (PRN):  aluminum hydroxide/magnesium hydroxide/simethicone Suspension 30 milliLiter(s) Oral every 4 hours PRN Dyspepsia  cyclobenzaprine 5 milliGRAM(s) Oral three times a day PRN Muscle Spasm  lactulose Syrup 20 Gram(s) Oral three times a day PRN for constipation  melatonin 3 milliGRAM(s) Oral at bedtime PRN Insomnia  oxyCODONE    IR 15 milliGRAM(s) Oral every 6 hours PRN Moderate Pain (4 - 6)  sodium chloride 0.9% lock flush 10 milliLiter(s) IV Push every 1 hour PRN Pre/post blood products, medications, blood draw, and to maintain line patency      Vital Signs Last 24 Hrs  T(C): 36.5 (05 Apr 2025 08:50), Max: 36.8 (04 Apr 2025 19:44)  T(F): 97.7 (05 Apr 2025 08:50), Max: 98.3 (04 Apr 2025 19:44)  HR: 81 (05 Apr 2025 08:50) (80 - 92)  BP: 104/67 (05 Apr 2025 08:50) (104/67 - 129/76)  BP(mean): --  RR: 15 (05 Apr 2025 08:50) (15 - 16)  SpO2: 94% (05 Apr 2025 08:50) (93% - 94%)    Parameters below as of 05 Apr 2025 08:50  Patient On (Oxygen Delivery Method): room air      CAPILLARY BLOOD GLUCOSE        I&O's Summary    04 Apr 2025 07:01  -  05 Apr 2025 07:00  --------------------------------------------------------  IN: 0 mL / OUT: 2600 mL / NET: -2600 mL    05 Apr 2025 07:01  -  05 Apr 2025 10:28  --------------------------------------------------------  IN: 0 mL / OUT: 1050 mL / NET: -1050 mL        PHYSICAL EXAM:  GENERAL: NAD  HEAD:  Atraumatic, Normocephalic  NECK: Supple, No JVD  CHEST/LUNG: Clear to auscultation bilaterally; No wheeze, nonlabored breathing  HEART: Regular rate and rhythm; No murmurs, rubs, or gallops  ABDOMEN: Soft, Nontender, Nondistended; Bowel sounds present  EXTREMITIES: No clubbing, cyanosis, or edema  PSYCH: calm, appropriate mood      LABS:                    RADIOLOGY & ADDITIONAL TESTS:  Results Reviewed:   Imaging Personally Reviewed:  Electrocardiogram Personally Reviewed:    COORDINATION OF CARE:  Care Discussed with Consultants/Other Providers [Y/N]:  Prior or Outpatient Records Reviewed [Y/N]:

## 2025-04-05 NOTE — PROGRESS NOTE ADULT - ASSESSMENT
Imp: Patient with diagnosis of  Thoracic epidural abscess s/p laminectomy and fusion surgery    admitted for comprehensive acute rehabilitation.    Plan:  - Continue current multidisciplinary rehab program  - DVT prophylaxis  - Skin- Turn q2h, check skin daily  - Continue current medications; patient medically stable.   -Active issues-     ##s/p Thoracic epidural abscess s/p laminectomy and fusion surgery - pt reports itchy site and inquires on when the sutures or stables can be removed. Checked note, and plan was remove on 4/1 if pt not discharged from rehab by then. We will remove the sutures today 4/5    ## LLE DVT - persistent swelling in the setting of known left distal DVT. Per note, pt cleared to restart eliquis on POD 15 (4/2/25). We will restart eliquis today 4/5    ##Neuropathic pain - possibly secondary to cervical radiculopathy - was previously on gabapentin. He does have a mildly elevated BUN so will start w/ smaller dose.  I ordered gabapentin 100mg w/ taper schedule to TID.   - Patient is stable to continue current rehabilitation program.

## 2025-04-06 PROCEDURE — 99232 SBSQ HOSP IP/OBS MODERATE 35: CPT

## 2025-04-06 RX ORDER — BETHANECHOL CHLORIDE 50 MG
10 TABLET ORAL
Refills: 0 | Status: DISCONTINUED | OUTPATIENT
Start: 2025-04-06 | End: 2025-04-30

## 2025-04-06 RX ORDER — OXYCODONE HYDROCHLORIDE 30 MG/1
15 TABLET ORAL EVERY 6 HOURS
Refills: 0 | Status: DISCONTINUED | OUTPATIENT
Start: 2025-04-08 | End: 2025-04-11

## 2025-04-06 RX ADMIN — Medication 1 APPLICATION(S): at 05:41

## 2025-04-06 RX ADMIN — CEFTRIAXONE 100 MILLIGRAM(S): 500 INJECTION, POWDER, FOR SOLUTION INTRAMUSCULAR; INTRAVENOUS at 21:58

## 2025-04-06 RX ADMIN — OXYCODONE HYDROCHLORIDE 15 MILLIGRAM(S): 30 TABLET ORAL at 17:34

## 2025-04-06 RX ADMIN — Medication 2 TABLET(S): at 21:57

## 2025-04-06 RX ADMIN — METOPROLOL SUCCINATE 25 MILLIGRAM(S): 50 TABLET, EXTENDED RELEASE ORAL at 05:36

## 2025-04-06 RX ADMIN — Medication 5 MILLIGRAM(S): at 05:36

## 2025-04-06 RX ADMIN — TAMSULOSIN HYDROCHLORIDE 0.8 MILLIGRAM(S): 0.4 CAPSULE ORAL at 21:57

## 2025-04-06 RX ADMIN — GABAPENTIN 100 MILLIGRAM(S): 400 CAPSULE ORAL at 21:58

## 2025-04-06 RX ADMIN — WHITE PETROLATUM 1 APPLICATION(S): 1 OINTMENT TOPICAL at 05:40

## 2025-04-06 RX ADMIN — BUMETANIDE 1 MILLIGRAM(S): 1 TABLET ORAL at 05:36

## 2025-04-06 RX ADMIN — Medication 10 MILLIGRAM(S): at 17:23

## 2025-04-06 RX ADMIN — DAPTOMYCIN 126 MILLIGRAM(S): 500 INJECTION, POWDER, LYOPHILIZED, FOR SOLUTION INTRAVENOUS at 10:57

## 2025-04-06 RX ADMIN — WHITE PETROLATUM 1 APPLICATION(S): 1 OINTMENT TOPICAL at 21:58

## 2025-04-06 RX ADMIN — OXYCODONE HYDROCHLORIDE 15 MILLIGRAM(S): 30 TABLET ORAL at 05:36

## 2025-04-06 RX ADMIN — Medication 283 MILLIGRAM(S): at 08:31

## 2025-04-06 RX ADMIN — Medication 150 MICROGRAM(S): at 05:36

## 2025-04-06 RX ADMIN — APIXABAN 5 MILLIGRAM(S): 2.5 TABLET, FILM COATED ORAL at 13:05

## 2025-04-06 RX ADMIN — ROSUVASTATIN CALCIUM 5 MILLIGRAM(S): 20 TABLET, FILM COATED ORAL at 21:57

## 2025-04-06 RX ADMIN — Medication 40 MILLIGRAM(S): at 05:37

## 2025-04-06 RX ADMIN — CYCLOBENZAPRINE HYDROCHLORIDE 5 MILLIGRAM(S): 15 CAPSULE, EXTENDED RELEASE ORAL at 13:10

## 2025-04-06 RX ADMIN — Medication 1 APPLICATION(S): at 05:40

## 2025-04-06 RX ADMIN — POLYETHYLENE GLYCOL 3350 17 GRAM(S): 17 POWDER, FOR SOLUTION ORAL at 05:35

## 2025-04-06 RX ADMIN — OXYCODONE HYDROCHLORIDE 15 MILLIGRAM(S): 30 TABLET ORAL at 06:50

## 2025-04-06 RX ADMIN — Medication 1 APPLICATION(S): at 20:45

## 2025-04-06 RX ADMIN — APIXABAN 5 MILLIGRAM(S): 2.5 TABLET, FILM COATED ORAL at 02:16

## 2025-04-06 RX ADMIN — BUMETANIDE 1 MILLIGRAM(S): 1 TABLET ORAL at 13:05

## 2025-04-06 NOTE — PROGRESS NOTE ADULT - ASSESSMENT
Imp: Patient with diagnosis of  Thoracic epidural abscess s/p laminectomy and fusion surgery    admitted for comprehensive acute rehabilitation.    Plan:  - Continue current multidisciplinary rehab program  - DVT prophylaxis  - Skin- Turn q2h, check skin daily  - Continue current medications; patient medically stable.   -Active issues-     ##s/p Thoracic epidural abscess s/p laminectomy and fusion surgery - staples removed 4/5    ## LLE DVT - persistent swelling in the setting of known left distal DVT. Per note, pt cleared to restart eliquis on POD 15 (4/2/25). Restarted eliquis 4/5    ##Neuropathic pain - possibly secondary to cervical radiculopathy - was previously on gabapentin. He does have a mildly elevated BUN so will start w/ smaller dose.  I ordered gabapentin 100mg w/ taper schedule to TID no 4/5. As of right now, tolerating the medication, no feeling of overly sedation. We will continue on current taper shcedule as planned  - Patient is stable to continue current rehabilitation program.

## 2025-04-06 NOTE — PROGRESS NOTE ADULT - SUBJECTIVE AND OBJECTIVE BOX
Patient is a 75y old  Male who presents with a chief complaint of Thoracic epidural abscess s/p laminectomy and fusion surgery (05 Apr 2025 17:45)      SUBJECTIVE / OVERNIGHT EVENTS:  Pt seen and examined at bedside. No acute events overnight.  Pt denies cp, palpitations, sob, abd pain, N/V, fever, chills.    ROS:  All other review of systems negative    Allergies    No Known Allergies    Intolerances        MEDICATIONS  (STANDING):  ammonium lactate 12% Lotion 1 Application(s) Topical two times a day  apixaban 5 milliGRAM(s) Oral every 12 hours  AQUAPHOR (petrolatum Ointment) 1 Application(s) Topical three times a day  bethanechol 5 milliGRAM(s) Oral three times a day  buMETAnide 1 milliGRAM(s) Oral two times a day  cefTRIAXone   IVPB 2000 milliGRAM(s) IV Intermittent every 24 hours  chlorhexidine 4% Liquid 1 Application(s) Topical <User Schedule>  DAPTOmycin IVPB 650 milliGRAM(s) IV Intermittent every 24 hours  docusate sodium Enema 283 milliGRAM(s) Rectal every 24 hours  gabapentin 100 milliGRAM(s) Oral at bedtime  gabapentin   Oral   levothyroxine 150 MICROGram(s) Oral daily  metoprolol succinate ER 25 milliGRAM(s) Oral daily  oxyCODONE  ER Tablet 15 milliGRAM(s) Oral every 12 hours  pantoprazole    Tablet 40 milliGRAM(s) Oral before breakfast  polyethylene glycol 3350 17 Gram(s) Oral <User Schedule>  rosuvastatin 5 milliGRAM(s) Oral at bedtime  senna 2 Tablet(s) Oral at bedtime  tamsulosin 0.8 milliGRAM(s) Oral at bedtime    MEDICATIONS  (PRN):  aluminum hydroxide/magnesium hydroxide/simethicone Suspension 30 milliLiter(s) Oral every 4 hours PRN Dyspepsia  cyclobenzaprine 5 milliGRAM(s) Oral three times a day PRN Muscle Spasm  lactulose Syrup 20 Gram(s) Oral three times a day PRN for constipation  melatonin 3 milliGRAM(s) Oral at bedtime PRN Insomnia  oxyCODONE    IR 15 milliGRAM(s) Oral every 6 hours PRN Moderate Pain (4 - 6)  sodium chloride 0.9% lock flush 10 milliLiter(s) IV Push every 1 hour PRN Pre/post blood products, medications, blood draw, and to maintain line patency      Vital Signs Last 24 Hrs  T(C): 37.1 (06 Apr 2025 07:52), Max: 37.1 (06 Apr 2025 07:52)  T(F): 98.7 (06 Apr 2025 07:52), Max: 98.7 (06 Apr 2025 07:52)  HR: 76 (06 Apr 2025 07:52) (76 - 82)  BP: 116/75 (06 Apr 2025 07:52) (104/67 - 120/70)  BP(mean): --  RR: 16 (06 Apr 2025 07:52) (15 - 18)  SpO2: 93% (06 Apr 2025 07:52) (93% - 96%)    Parameters below as of 06 Apr 2025 07:52  Patient On (Oxygen Delivery Method): room air      CAPILLARY BLOOD GLUCOSE        I&O's Summary    05 Apr 2025 07:01  -  06 Apr 2025 07:00  --------------------------------------------------------  IN: 0 mL / OUT: 3630 mL / NET: -3630 mL        PHYSICAL EXAM:  GENERAL: NAD  HEAD:  Atraumatic, Normocephalic  NECK: Supple, No JVD  CHEST/LUNG: Clear to auscultation bilaterally; No wheeze, nonlabored breathing  HEART: Regular rate and rhythm; No murmurs, rubs, or gallops  ABDOMEN: Soft, Nontender, Nondistended; Bowel sounds present  EXTREMITIES: No clubbing, cyanosis, or edema  PSYCH: calm, appropriate mood    LABS:                    RADIOLOGY & ADDITIONAL TESTS:  Results Reviewed:   Imaging Personally Reviewed:  Electrocardiogram Personally Reviewed:    COORDINATION OF CARE:  Care Discussed with Consultants/Other Providers [Y/N]:  Prior or Outpatient Records Reviewed [Y/N]:

## 2025-04-06 NOTE — PROGRESS NOTE ADULT - NS ATTEST RISK PROBLEM GEN_ALL_CORE FT
Moderate number or complexity of illness, moderate amount/complexity of data reviewed, and moderate risk of complications/morbidity from additional treatment.
Moderate number or complexity of illness, moderate amount/complexity of data reviewed, and moderate risk of complications/morbidity from additional treatment.

## 2025-04-06 NOTE — PROGRESS NOTE ADULT - SUBJECTIVE AND OBJECTIVE BOX
INTERVAL HPI:   Patient seen and examined at bedside. No acute events overnight.   Patient reports that sutures were removed and he's more comfortable now.  Pain controlled, no chest pain, no N/V, no Fevers/Chills. No other new ROS  Has been tolerating rehabilitation program.    Medications:  MEDICATIONS  (STANDING):  ammonium lactate 12% Lotion 1 Application(s) Topical two times a day  apixaban 5 milliGRAM(s) Oral every 12 hours  AQUAPHOR (petrolatum Ointment) 1 Application(s) Topical three times a day  bethanechol 10 milliGRAM(s) Oral two times a day  buMETAnide 1 milliGRAM(s) Oral two times a day  cefTRIAXone   IVPB 2000 milliGRAM(s) IV Intermittent every 24 hours  chlorhexidine 4% Liquid 1 Application(s) Topical <User Schedule>  DAPTOmycin IVPB 650 milliGRAM(s) IV Intermittent every 24 hours  docusate sodium Enema 283 milliGRAM(s) Rectal every 24 hours  gabapentin 100 milliGRAM(s) Oral at bedtime  gabapentin   Oral   levothyroxine 150 MICROGram(s) Oral daily  metoprolol succinate ER 25 milliGRAM(s) Oral daily  oxyCODONE  ER Tablet 15 milliGRAM(s) Oral every 12 hours  pantoprazole    Tablet 40 milliGRAM(s) Oral before breakfast  polyethylene glycol 3350 17 Gram(s) Oral <User Schedule>  rosuvastatin 5 milliGRAM(s) Oral at bedtime  senna 2 Tablet(s) Oral at bedtime  tamsulosin 0.8 milliGRAM(s) Oral at bedtime    MEDICATIONS  (PRN):  aluminum hydroxide/magnesium hydroxide/simethicone Suspension 30 milliLiter(s) Oral every 4 hours PRN Dyspepsia  cyclobenzaprine 5 milliGRAM(s) Oral three times a day PRN Muscle Spasm  lactulose Syrup 20 Gram(s) Oral three times a day PRN for constipation  melatonin 3 milliGRAM(s) Oral at bedtime PRN Insomnia  oxyCODONE    IR 15 milliGRAM(s) Oral every 6 hours PRN Moderate Pain (4 - 6)  sodium chloride 0.9% lock flush 10 milliLiter(s) IV Push every 1 hour PRN Pre/post blood products, medications, blood draw, and to maintain line patency      Vital Signs:  T(C): 37.1 (04-06-25 @ 07:52), Max: 37.1 (04-06-25 @ 07:52)  HR: 76 (04-06-25 @ 07:52) (76 - 82)  BP: 116/75 (04-06-25 @ 07:52) (113/69 - 120/70)  RR: 16 (04-06-25 @ 07:52) (16 - 18)  SpO2: 93% (04-06-25 @ 07:52) (93% - 96%)    PHYSICAL EXAM:    GEN - In NAD  HEENT- EOMI  Heart- S1S2, appears well perfused  Lungs- breathing comfortably in room air, no audible wheezing, no accessory muscle use.  Abd- + BS, NT  Ext- LLE swelling  Neuro/MSK- Exam unchanged    Trended Labs Reviewed:                              CAPILLARY BLOOD GLUCOSE              I&O's Detail:    04-05-25 @ 07:01  -  04-06-25 @ 07:00  --------------------------------------------------------  IN: 0 mL / OUT: 3630 mL / NET: -3630 mL          Images Reviewed:      Imp: Patient with diagnosis of          admitted for comprehensive acute rehabilitation.    Plan:  - Continue current multidisciplinary rehab program  - DVT prophylaxis  - Skin- Turn q2h, check skin daily  - Continue current medications; patient medically stable.   -Active issues-     ##    - Patient is stable to continue current rehabilitation program.

## 2025-04-06 NOTE — PROGRESS NOTE ADULT - ASSESSMENT
74-year-old male patient with h/o BPH, diverticulosis, Graves' disease, hemorrhoids, HLD, hypothyroidism, osteoarthritis, spinal stenosis, spinal cord injury (2004), and multiple spinal surgeries (three cervical and one lumbar), who presented to [facility] on 3/4/25 from [facility] due to lower extremity swelling and abnormal BUN/creatinine levels for further evaluation and management. The patient was recently admitted to [facility] from 02/18/25 through 02/25/25 for sepsis secondary to epidural abscess. He had a peripherally inserted central catheter line in the right arm and was on nightly Ceftriaxone treatment until 04/16/25. At the ED, he was found to have GOVIND, a left soleal vein, and a left gastrocnemius DVT. He was started on heparin gtt. Hospital course complicated by urinary retention requiring Ortega catheter insertion, rhabdomyolysis, elevated LFTs, anasarca, leukocytosis due to thoracic spine discitis OM and early paraspinal abscess, as well as new onset RUE paresthesias and weakness. He is s/p IVC filter placement on 3/17 and was recommended surgical management. He is s/p posterior thoracic laminectomy and fusion on 3/18/25 with Dr. Keith Cantu. Surgical culture with rare staph epi. Recommendations by ID to continue IV Rocephin and Daptomycin through 5/2/25. Patient was evaluated by PM&R and therapy for functional deficits, gait/ADL impairments and acute rehabilitation was recommended. Patient was cleared for discharge to [facility] on 3/25/25. (25 Mar 2025 17:09)    Debility  Thoracic epidural abscess s/p laminectomy and fusion surgery on 3/18  -  brace  - pain control per primary  - fall, spine precautions    Spinal epidural abscess   - S/p thoracic laminectomy and fusion 3/18.   - IV Rocephin 2g HS via PICC line thru 5/2/25  - IV Daptomycin 650mg daily via PICC line thru 5/2/2025  - Buford  brace   - Surgical culture with staph epi possible contaminant but given clinical presentation on admission, IV Dapto added.     GOVIND, resolved  LE edema  Hypoalbuminemia  Proteinuria  Anasarca  - c/w Bumex 1 mg BID (4/4 missed the past few doses due to hold parameters.  reordered with hold for SBP <100 instructions).   - Optimize protein intake.   - mobilize, encourage out of bed to chair often.   - Monitor labs closely , stable renal function w good UOP   - Serum workup negative but will need follow up of  Serum  TRUDY positive for weak IgG Lambda band.  - urine protein 200 mg only ( not nephrotic)   - daily standing weights    Transaminitis, resolved  - encouraged PO  - Limit Tylenol use  - avoid hepatotoxins  - monitor on routine labs    CAD  HTN  HLD  - Metoprolol 25mg daily   - Crestor 5mg HS    Anemia  MARLENA  - s/p 2 units of PRBCs given intra-op  - Monitor Hgb (3/31 Hgb 8.3)  - Transfuse to keep hb >7    Hypothyroidism  - Synthroid 150mcg daily    sacral Decub stage 3  - wound care cx noted  - Continue turning patient Q 2 hours   - Lac hydrin BID BLE  - Aquaphor BID to generalized dry skin     BPH, chronic  Urinary retention likely augmented by degree of anasarca  - Flomax 8mg daily  - Monitor U/O    Acute DVT:   -  US doppler + L soleal and gastroc DVT   - s/p IVC filter 3/17 with interventional cards.   - Could not tolerate VQ scan for r/o PE 2/2 pain  - On Heparin 5000 Sq Q8hrs  - Restart AC (Eliquis 5mg BID per neurosurgery, already loaded) 2 weeks post op per neurosx if hgb stable and surgical site stable. (tentative 4/2/2025)    DVT-P: heparin Injectable 5000 Unit(s) SubCutaneous every 8 hours. [ DVT on left]

## 2025-04-07 LAB
ALBUMIN SERPL ELPH-MCNC: 2.7 G/DL — LOW (ref 3.3–5)
ALP SERPL-CCNC: 66 U/L — SIGNIFICANT CHANGE UP (ref 40–120)
ALT FLD-CCNC: 44 U/L — SIGNIFICANT CHANGE UP (ref 10–45)
ANION GAP SERPL CALC-SCNC: 3 MMOL/L — LOW (ref 5–17)
AST SERPL-CCNC: 41 U/L — HIGH (ref 10–40)
BASOPHILS # BLD AUTO: 0.07 K/UL — SIGNIFICANT CHANGE UP (ref 0–0.2)
BASOPHILS NFR BLD AUTO: 0.8 % — SIGNIFICANT CHANGE UP (ref 0–2)
BILIRUB SERPL-MCNC: 0.4 MG/DL — SIGNIFICANT CHANGE UP (ref 0.2–1.2)
BUN SERPL-MCNC: 31 MG/DL — HIGH (ref 7–23)
CALCIUM SERPL-MCNC: 8.9 MG/DL — SIGNIFICANT CHANGE UP (ref 8.4–10.5)
CHLORIDE SERPL-SCNC: 99 MMOL/L — SIGNIFICANT CHANGE UP (ref 96–108)
CO2 SERPL-SCNC: 37 MMOL/L — HIGH (ref 22–31)
CREAT SERPL-MCNC: 1.12 MG/DL — SIGNIFICANT CHANGE UP (ref 0.5–1.3)
EGFR: 69 ML/MIN/1.73M2 — SIGNIFICANT CHANGE UP
EGFR: 69 ML/MIN/1.73M2 — SIGNIFICANT CHANGE UP
EOSINOPHIL # BLD AUTO: 0.46 K/UL — SIGNIFICANT CHANGE UP (ref 0–0.5)
EOSINOPHIL NFR BLD AUTO: 5.2 % — SIGNIFICANT CHANGE UP (ref 0–6)
GLUCOSE SERPL-MCNC: 101 MG/DL — HIGH (ref 70–99)
HCT VFR BLD CALC: 29.5 % — LOW (ref 39–50)
HGB BLD-MCNC: 9.5 G/DL — LOW (ref 13–17)
IMM GRANULOCYTES NFR BLD AUTO: 0.7 % — SIGNIFICANT CHANGE UP (ref 0–0.9)
LYMPHOCYTES # BLD AUTO: 1.47 K/UL — SIGNIFICANT CHANGE UP (ref 1–3.3)
LYMPHOCYTES # BLD AUTO: 16.6 % — SIGNIFICANT CHANGE UP (ref 13–44)
MCHC RBC-ENTMCNC: 28.5 PG — SIGNIFICANT CHANGE UP (ref 27–34)
MCHC RBC-ENTMCNC: 32.2 G/DL — SIGNIFICANT CHANGE UP (ref 32–36)
MCV RBC AUTO: 88.6 FL — SIGNIFICANT CHANGE UP (ref 80–100)
MONOCYTES # BLD AUTO: 0.93 K/UL — HIGH (ref 0–0.9)
MONOCYTES NFR BLD AUTO: 10.5 % — SIGNIFICANT CHANGE UP (ref 2–14)
NEUTROPHILS # BLD AUTO: 5.86 K/UL — SIGNIFICANT CHANGE UP (ref 1.8–7.4)
NEUTROPHILS NFR BLD AUTO: 66.2 % — SIGNIFICANT CHANGE UP (ref 43–77)
NRBC BLD AUTO-RTO: 0 /100 WBCS — SIGNIFICANT CHANGE UP (ref 0–0)
PLATELET # BLD AUTO: 282 K/UL — SIGNIFICANT CHANGE UP (ref 150–400)
POTASSIUM SERPL-MCNC: 4.1 MMOL/L — SIGNIFICANT CHANGE UP (ref 3.5–5.3)
POTASSIUM SERPL-SCNC: 4.1 MMOL/L — SIGNIFICANT CHANGE UP (ref 3.5–5.3)
PROT SERPL-MCNC: 6.1 G/DL — SIGNIFICANT CHANGE UP (ref 6–8.3)
RBC # BLD: 3.33 M/UL — LOW (ref 4.2–5.8)
RBC # FLD: 14.6 % — HIGH (ref 10.3–14.5)
SODIUM SERPL-SCNC: 139 MMOL/L — SIGNIFICANT CHANGE UP (ref 135–145)
WBC # BLD: 8.85 K/UL — SIGNIFICANT CHANGE UP (ref 3.8–10.5)
WBC # FLD AUTO: 8.85 K/UL — SIGNIFICANT CHANGE UP (ref 3.8–10.5)

## 2025-04-07 PROCEDURE — 99233 SBSQ HOSP IP/OBS HIGH 50: CPT

## 2025-04-07 PROCEDURE — 99232 SBSQ HOSP IP/OBS MODERATE 35: CPT

## 2025-04-07 RX ORDER — OXYCODONE HYDROCHLORIDE 30 MG/1
10 TABLET ORAL EVERY 12 HOURS
Refills: 0 | Status: DISCONTINUED | OUTPATIENT
Start: 2025-04-07 | End: 2025-04-11

## 2025-04-07 RX ORDER — OXYCODONE HYDROCHLORIDE 30 MG/1
15 TABLET ORAL ONCE
Refills: 0 | Status: DISCONTINUED | OUTPATIENT
Start: 2025-04-07 | End: 2025-04-07

## 2025-04-07 RX ADMIN — OXYCODONE HYDROCHLORIDE 15 MILLIGRAM(S): 30 TABLET ORAL at 13:40

## 2025-04-07 RX ADMIN — Medication 1 APPLICATION(S): at 19:09

## 2025-04-07 RX ADMIN — APIXABAN 5 MILLIGRAM(S): 2.5 TABLET, FILM COATED ORAL at 13:20

## 2025-04-07 RX ADMIN — ROSUVASTATIN CALCIUM 5 MILLIGRAM(S): 20 TABLET, FILM COATED ORAL at 21:37

## 2025-04-07 RX ADMIN — APIXABAN 5 MILLIGRAM(S): 2.5 TABLET, FILM COATED ORAL at 03:03

## 2025-04-07 RX ADMIN — OXYCODONE HYDROCHLORIDE 10 MILLIGRAM(S): 30 TABLET ORAL at 20:54

## 2025-04-07 RX ADMIN — WHITE PETROLATUM 1 APPLICATION(S): 1 OINTMENT TOPICAL at 21:48

## 2025-04-07 RX ADMIN — Medication 40 MILLIGRAM(S): at 06:24

## 2025-04-07 RX ADMIN — CEFTRIAXONE 100 MILLIGRAM(S): 500 INJECTION, POWDER, FOR SOLUTION INTRAMUSCULAR; INTRAVENOUS at 21:37

## 2025-04-07 RX ADMIN — Medication 10 MILLIGRAM(S): at 17:20

## 2025-04-07 RX ADMIN — Medication 10 MILLIGRAM(S): at 06:24

## 2025-04-07 RX ADMIN — TAMSULOSIN HYDROCHLORIDE 0.8 MILLIGRAM(S): 0.4 CAPSULE ORAL at 21:37

## 2025-04-07 RX ADMIN — OXYCODONE HYDROCHLORIDE 10 MILLIGRAM(S): 30 TABLET ORAL at 19:17

## 2025-04-07 RX ADMIN — OXYCODONE HYDROCHLORIDE 15 MILLIGRAM(S): 30 TABLET ORAL at 21:37

## 2025-04-07 RX ADMIN — OXYCODONE HYDROCHLORIDE 15 MILLIGRAM(S): 30 TABLET ORAL at 07:07

## 2025-04-07 RX ADMIN — Medication 283 MILLIGRAM(S): at 08:33

## 2025-04-07 RX ADMIN — DAPTOMYCIN 126 MILLIGRAM(S): 500 INJECTION, POWDER, LYOPHILIZED, FOR SOLUTION INTRAVENOUS at 11:42

## 2025-04-07 RX ADMIN — BUMETANIDE 1 MILLIGRAM(S): 1 TABLET ORAL at 06:24

## 2025-04-07 RX ADMIN — Medication 1 APPLICATION(S): at 06:25

## 2025-04-07 RX ADMIN — GABAPENTIN 100 MILLIGRAM(S): 400 CAPSULE ORAL at 21:47

## 2025-04-07 RX ADMIN — OXYCODONE HYDROCHLORIDE 15 MILLIGRAM(S): 30 TABLET ORAL at 06:29

## 2025-04-07 RX ADMIN — Medication 2 TABLET(S): at 21:37

## 2025-04-07 RX ADMIN — BUMETANIDE 1 MILLIGRAM(S): 1 TABLET ORAL at 13:20

## 2025-04-07 RX ADMIN — Medication 150 MICROGRAM(S): at 06:24

## 2025-04-07 RX ADMIN — OXYCODONE HYDROCHLORIDE 15 MILLIGRAM(S): 30 TABLET ORAL at 22:57

## 2025-04-07 RX ADMIN — WHITE PETROLATUM 1 APPLICATION(S): 1 OINTMENT TOPICAL at 06:26

## 2025-04-07 RX ADMIN — WHITE PETROLATUM 1 APPLICATION(S): 1 OINTMENT TOPICAL at 13:30

## 2025-04-07 NOTE — PROGRESS NOTE ADULT - SUBJECTIVE AND OBJECTIVE BOX
HPI:  Mr. Rishabh Shaver is a 74-year-old male patient with past medical history of benign prostatic hyperplasia, diverticulosis, Graves' disease, hemorrhoids, hyperlipidemia, hypothyroidism, osteoarthritis, spinal stenosis, spinal cord injury (2004), and multiple spinal surgeries (three cervical and one lumbar), who presented to [facility] on 3/4/25 from [facility] due to lower extremity swelling and abnormal BUN/creatinine levels for further evaluation and management. The patient was recently admitted to [facility] from 02/18/25 through 02/25/25 for sepsis secondary to epidural abscess. He had a peripherally inserted central catheter line in the right arm and was on nightly Ceftriaxone treatment until 04/16/25. At the ED, he was found to have GOVIND, a left soleal vein, and a left gastrocnemius DVT. He was started on heparin gtt. Hospital course complicated by urinary retention requiring Ortega catheter insertion, rhabdomyolysis, elevated LFTs, anasarca, leukocytosis due to thoracic spine discitis OM and early paraspinal abscess, as well as new onset RUE paresthesias and weakness. He is s/p IVC filter placement on 3/17 and was recommended surgical management. He is s/p posterior thoracic laminectomy and fusion on 3/18/25 with Dr. Keith Cantu. Surgical culture with rare staph epi. Recommendations by ID to continue IV Rocephin and Daptomycin through 5/2/25. Patient was evaluated by PM&R and therapy for functional deficits, gait/ADL impairments and acute rehabilitation was recommended. Patient was cleared for discharge to [facility] IRF on 3/25/25. (25 Mar 2025 17:09)    TDD: 4/22/25 home  ___________________________________________________________________________    SUBJECTIVE/ROS  Patient was seen and evaluated at bedside today.  Reported no overnight events and is in no acute distress.  Monitoring wound status with no new evidence of bloody oozing.   Mini-enema effective in bowel regimen.  Eager to participate on the recommended rehabilitation program.  Denies any CP, SOB, KESSLER, palpitations, fever, chills, body aches, cough, congestion, or any other symptoms at this time.   ___________________________________________________________________________    Vital Signs Last 24 Hrs  T(C): 36.8 (06 Apr 2025 20:46), Max: 37.1 (06 Apr 2025 07:52)  T(F): 98.2 (06 Apr 2025 20:46), Max: 98.7 (06 Apr 2025 07:52)  HR: 80 (07 Apr 2025 06:21) (76 - 85)  BP: 108/68 (07 Apr 2025 06:21) (108/68 - 116/75)  RR: 17 (06 Apr 2025 20:46) (16 - 17)  SpO2: 94% (06 Apr 2025 20:46) (93% - 94%)    Parameters below as of 06 Apr 2025 20:46  Patient On (Oxygen Delivery Method): room air    ___________________________________________________________________________    LAB                          8.8    8.61  )-----------( 244      ( 03 Apr 2025 06:13 )             27.6                           8.3    7.06  )-----------( 217      ( 31 Mar 2025 06:13 )             25.9     03-31    141  |  100  |  37[H]  ----------------------------<  111[H]  3.8   |  34[H]  |  1.04    Ca    8.7      31 Mar 2025 06:13    TPro  5.8[L]  /  Alb  2.4[L]  /  TBili  0.5  /  DBili  x   /  AST  30  /  ALT  48[H]  /  AlkPhos  63  03-31    LIVER FUNCTIONS - ( 31 Mar 2025 06:13 )  Alb: 2.4 g/dL / Pro: 5.8 g/dL / ALK PHOS: 63 U/L / ALT: 48 U/L / AST: 30 U/L / GGT: x           ___________________________________________________________________________    MEDICATIONS  (STANDING):  ammonium lactate 12% Lotion 1 Application(s) Topical two times a day  apixaban 5 milliGRAM(s) Oral every 12 hours  AQUAPHOR (petrolatum Ointment) 1 Application(s) Topical three times a day  bethanechol 10 milliGRAM(s) Oral two times a day  buMETAnide 1 milliGRAM(s) Oral two times a day  cefTRIAXone   IVPB 2000 milliGRAM(s) IV Intermittent every 24 hours  chlorhexidine 4% Liquid 1 Application(s) Topical <User Schedule>  DAPTOmycin IVPB 650 milliGRAM(s) IV Intermittent every 24 hours  docusate sodium Enema 283 milliGRAM(s) Rectal every 24 hours  gabapentin 100 milliGRAM(s) Oral at bedtime  gabapentin   Oral   levothyroxine 150 MICROGram(s) Oral daily  metoprolol succinate ER 25 milliGRAM(s) Oral daily  oxyCODONE  ER Tablet 15 milliGRAM(s) Oral every 12 hours  pantoprazole    Tablet 40 milliGRAM(s) Oral before breakfast  polyethylene glycol 3350 17 Gram(s) Oral <User Schedule>  rosuvastatin 5 milliGRAM(s) Oral at bedtime  senna 2 Tablet(s) Oral at bedtime  tamsulosin 0.8 milliGRAM(s) Oral at bedtime    MEDICATIONS  (PRN):  aluminum hydroxide/magnesium hydroxide/simethicone Suspension 30 milliLiter(s) Oral every 4 hours PRN Dyspepsia  cyclobenzaprine 5 milliGRAM(s) Oral three times a day PRN Muscle Spasm  lactulose Syrup 20 Gram(s) Oral three times a day PRN for constipation  melatonin 3 milliGRAM(s) Oral at bedtime PRN Insomnia  oxyCODONE    IR 15 milliGRAM(s) Oral every 6 hours PRN Moderate Pain (4 - 6)  sodium chloride 0.9% lock flush 10 milliLiter(s) IV Push every 1 hour PRN Pre/post blood products, medications, blood draw, and to maintain line patency    ___________________________________________________________________________    PHYSICAL EXAM:    Gen - NAD, Comfortable  HEENT -  EOMI, Normal Conjunctivae  Neck - Supple, + limited ROM  Pulm - breathing comfortably  Cardiovascular - warm and well perfused  Abdomen - soft  Extremities - +BLE 3+ pitting edema, +B/L pedal edema, +RUE PICC line   /GI- + diaper  Neuro-     Cognitive - awake, alert, oriented to person, place, date, year, and situation.  Able  to follow command     Communication - Fluent, Comprehensible, No dysarthria, No aphasia   Psychiatric - Mood stable, Affect WNL  Skin- DTI on left buttock healing appropriately     ___________________________________________________________________________ HPI:  Mr. Rishabh Shaver is a 74-year-old male patient with past medical history of benign prostatic hyperplasia, diverticulosis, Graves' disease, hemorrhoids, hyperlipidemia, hypothyroidism, osteoarthritis, spinal stenosis, spinal cord injury (2004), and multiple spinal surgeries (three cervical and one lumbar), who presented to [facility] on 3/4/25 from [facility] due to lower extremity swelling and abnormal BUN/creatinine levels for further evaluation and management. The patient was recently admitted to [facility] from 02/18/25 through 02/25/25 for sepsis secondary to epidural abscess. He had a peripherally inserted central catheter line in the right arm and was on nightly Ceftriaxone treatment until 04/16/25. At the ED, he was found to have GOVIND, a left soleal vein, and a left gastrocnemius DVT. He was started on heparin gtt. Hospital course complicated by urinary retention requiring Ortega catheter insertion, rhabdomyolysis, elevated LFTs, anasarca, leukocytosis due to thoracic spine discitis OM and early paraspinal abscess, as well as new onset RUE paresthesias and weakness. He is s/p IVC filter placement on 3/17 and was recommended surgical management. He is s/p posterior thoracic laminectomy and fusion on 3/18/25 with Dr. Keith Cantu. Surgical culture with rare staph epi. Recommendations by ID to continue IV Rocephin and Daptomycin through 5/2/25. Patient was evaluated by PM&R and therapy for functional deficits, gait/ADL impairments and acute rehabilitation was recommended. Patient was cleared for discharge to [facility] IRF on 3/25/25. (25 Mar 2025 17:09)    TDD: 4/22/25 home  ___________________________________________________________________________    SUBJECTIVE/ROS  Patient was seen and evaluated at bedside today.  Reported no overnight events and is in no acute distress.  Mini-enema continues to be effective in bowel regimen.  Will review wound alongside RN.  Eager to participate on the recommended rehabilitation program.  Denies any CP, SOB, KESSLER, palpitations, fever, chills, body aches, cough, congestion, or any other symptoms at this time.   ___________________________________________________________________________    Vital Signs Last 24 Hrs  T(C): 36.8 (07 Apr 2025 07:46), Max: 36.8 (06 Apr 2025 20:46)  T(F): 98.3 (07 Apr 2025 07:46), Max: 98.3 (07 Apr 2025 07:46)  HR: 86 (07 Apr 2025 07:46) (80 - 86)  BP: 121/73 (07 Apr 2025 07:46) (108/68 - 121/73)  RR: 16 (07 Apr 2025 07:46) (16 - 17)  SpO2: 94% (07 Apr 2025 07:46) (94% - 94%)    Parameters below as of 07 Apr 2025 07:46  Patient On (Oxygen Delivery Method): room air    ___________________________________________________________________________    LAB                        9.5    8.85  )-----------( 282      ( 07 Apr 2025 06:01 )             29.5     04-07    139  |  99  |  31[H]  ----------------------------<  101[H]  4.1   |  37[H]  |  1.12    Ca    8.9      07 Apr 2025 06:01    TPro  6.1  /  Alb  2.7[L]  /  TBili  0.4  /  DBili  x   /  AST  41[H]  /  ALT  44  /  AlkPhos  66  04-07    LIVER FUNCTIONS - ( 07 Apr 2025 06:01 )  Alb: 2.7 g/dL / Pro: 6.1 g/dL / ALK PHOS: 66 U/L / ALT: 44 U/L / AST: 41 U/L / GGT: x           ___________________________________________________________________________    MEDICATIONS  (STANDING):  ammonium lactate 12% Lotion 1 Application(s) Topical two times a day  apixaban 5 milliGRAM(s) Oral every 12 hours  AQUAPHOR (petrolatum Ointment) 1 Application(s) Topical three times a day  bethanechol 10 milliGRAM(s) Oral two times a day  buMETAnide 1 milliGRAM(s) Oral two times a day  cefTRIAXone   IVPB 2000 milliGRAM(s) IV Intermittent every 24 hours  chlorhexidine 4% Liquid 1 Application(s) Topical <User Schedule>  DAPTOmycin IVPB 650 milliGRAM(s) IV Intermittent every 24 hours  docusate sodium Enema 283 milliGRAM(s) Rectal every 24 hours  gabapentin 100 milliGRAM(s) Oral at bedtime  gabapentin   Oral   levothyroxine 150 MICROGram(s) Oral daily  metoprolol succinate ER 25 milliGRAM(s) Oral daily  oxyCODONE  ER Tablet 15 milliGRAM(s) Oral every 12 hours  pantoprazole    Tablet 40 milliGRAM(s) Oral before breakfast  polyethylene glycol 3350 17 Gram(s) Oral <User Schedule>  rosuvastatin 5 milliGRAM(s) Oral at bedtime  senna 2 Tablet(s) Oral at bedtime  tamsulosin 0.8 milliGRAM(s) Oral at bedtime    MEDICATIONS  (PRN):  aluminum hydroxide/magnesium hydroxide/simethicone Suspension 30 milliLiter(s) Oral every 4 hours PRN Dyspepsia  cyclobenzaprine 5 milliGRAM(s) Oral three times a day PRN Muscle Spasm  lactulose Syrup 20 Gram(s) Oral three times a day PRN for constipation  melatonin 3 milliGRAM(s) Oral at bedtime PRN Insomnia  oxyCODONE    IR 15 milliGRAM(s) Oral every 6 hours PRN Moderate Pain (4 - 6)  sodium chloride 0.9% lock flush 10 milliLiter(s) IV Push every 1 hour PRN Pre/post blood products, medications, blood draw, and to maintain line patency    ___________________________________________________________________________    PHYSICAL EXAM:    Gen - NAD, Comfortable  HEENT -  EOMI, Normal Conjunctivae  Neck - Supple, + limited ROM  Pulm - breathing comfortably  Cardiovascular - warm and well perfused  Abdomen - soft  Extremities - +BLE 3+ pitting edema, +B/L pedal edema, +RUE PICC line   /GI- + diaper  Neuro-     Cognitive - awake, alert, oriented to person, place, date, year, and situation.  Able  to follow command     Communication - Fluent, Comprehensible, No dysarthria, No aphasia   Psychiatric - Mood stable, Affect WNL  Skin- DTI on left buttock healing appropriately     ___________________________________________________________________________

## 2025-04-07 NOTE — PROGRESS NOTE ADULT - ASSESSMENT
74-year-old male patient with h/o BPH, diverticulosis, Graves' disease, hemorrhoids, HLD, hypothyroidism, osteoarthritis, spinal stenosis, spinal cord injury (2004), and multiple spinal surgeries (three cervical and one lumbar), who presented to [facility] on 3/4/25 from [facility] due to lower extremity swelling and abnormal BUN/creatinine levels for further evaluation and management. The patient was recently admitted to [facility] from 02/18/25 through 02/25/25 for sepsis secondary to epidural abscess. He had a peripherally inserted central catheter line in the right arm and was on nightly Ceftriaxone treatment until 04/16/25. At the ED, he was found to have GOVIND, a left soleal vein, and a left gastrocnemius DVT. He was started on heparin gtt. Hospital course complicated by urinary retention requiring Ortega catheter insertion, rhabdomyolysis, elevated LFTs, anasarca, leukocytosis due to thoracic spine discitis OM and early paraspinal abscess, as well as new onset RUE paresthesias and weakness. He is s/p IVC filter placement on 3/17 and was recommended surgical management. He is s/p posterior thoracic laminectomy and fusion on 3/18/25 with Dr. Keith Cantu. Surgical culture with rare staph epi. Recommendations by ID to continue IV Rocephin and Daptomycin through 5/2/25. Patient was evaluated by PM&R and therapy for functional deficits, gait/ADL impairments and acute rehabilitation was recommended. Patient was cleared for discharge to [facility] IRF on 3/25/25. (25 Mar 2025 17:09)    Debility  Thoracic epidural abscess s/p laminectomy and fusion surgery on 3/18  -  brace  - pain control per primary. Recommend Decreasing Oxycontin 15mg to 10mg Q12hrs   - fall, spine precautions    Spinal epidural abscess   - S/p thoracic laminectomy and fusion 3/18.   - IV Rocephin 2g HS via PICC line thru 5/2/25  - IV Daptomycin 650mg daily via PICC line thru 5/2/2025  - [employer]  brace   - Surgical culture with staph epi possible contaminant but given clinical presentation on admission, IV Dapto added.     GOVIND, resolved  LE edema  Hypoalbuminemia  Proteinuria  Anasarca  - c/w Bumex 1 mg BID (4/4 missed the past few doses due to hold parameters.  reordered with hold for SBP <100 instructions).   - Optimize protein intake.   - mobilize, encourage out of bed to chair often.   - Monitor labs closely , stable renal function w good UOP   - Serum workup negative but will need follow up of  Serum  TRUDY positive for weak IgG Lambda band.  - urine protein 200 mg only ( not nephrotic)   - daily standing weights    Transaminitis, resolved  - encouraged PO  - Limit Tylenol use  - avoid hepatotoxins  - monitor on routine labs    CAD  HTN  HLD  - Metoprolol 25mg daily   - Crestor 5mg HS    Anemia  MARLENA  - s/p 2 units of PRBCs given intra-op  - Monitor Hgb (3/31 Hgb 8.3)  - Transfuse to keep hb >7    Hypothyroidism  - Synthroid 150mcg daily    sacral Decub stage 3  - wound care cx noted  - Continue turning patient Q 2 hours   - Lac hydrin BID BLE  - Aquaphor BID to generalized dry skin     BPH, chronic  Urinary retention likely augmented by degree of anasarca  - Flomax 8mg daily  - Monitor U/O    Acute DVT:   -  US doppler + L soleal and gastroc DVT   - s/p IVC filter 3/17 with interventional cards.   - Could not tolerate VQ scan for r/o PE 2/2 pain  - Restarted Eliquis 5mg BID per neurosurgery    DVT-P: heparin Injectable 5000 Unit(s) SubCutaneous every 8 hours. [ DVT on left]

## 2025-04-07 NOTE — CHART NOTE - NSCHARTNOTEFT_GEN_A_CORE
Nutrition Follow Up Note  Hospital Course   (Per Electronic Medical Record)    Source:  Patient [X]  Medical Record [X]      Diet:   Regular Diet (IDDSI Level 7) w/ Thin Liquids (IDDSI Level 0)  Tolerates Diet Consistency Well  No Chewing/Swallowing Difficulties  No Recent Nausea, Vomiting, Diarrhea or Constipation (as Per Patient)  Consumes % of Meals Usually (as Per Documentation) - States Good PO Intake/Appetite (Per Patient)   on Ensure Plus High Protein 8oz PO BID (Provides 700kcal & 40grams of Protein)  on Lalito 1 Packet BID (Provides 180kcal & 28grams of Protein)  Patient Takes Nutrition Supplements Well  Education Provided on Need for Supplementation   Obtained Food Preferences from Patient    Enteral/Parenteral Nutrition: Not Applicable    Current Weight: 229.7lb on 3/29  Obtain New Weight to Confirm  Obtain Weights Weekly     Pertinent Medications: MEDICATIONS  (STANDING):  ammonium lactate 12% Lotion 1 Application(s) Topical two times a day  apixaban 5 milliGRAM(s) Oral every 12 hours  AQUAPHOR (petrolatum Ointment) 1 Application(s) Topical three times a day  bethanechol 10 milliGRAM(s) Oral two times a day  buMETAnide 1 milliGRAM(s) Oral two times a day  cefTRIAXone   IVPB 2000 milliGRAM(s) IV Intermittent every 24 hours  chlorhexidine 4% Liquid 1 Application(s) Topical <User Schedule>  DAPTOmycin IVPB 650 milliGRAM(s) IV Intermittent every 24 hours  docusate sodium Enema 283 milliGRAM(s) Rectal every 24 hours  gabapentin 100 milliGRAM(s) Oral at bedtime  gabapentin   Oral   levothyroxine 150 MICROGram(s) Oral daily  metoprolol succinate ER 25 milliGRAM(s) Oral daily  oxyCODONE  ER Tablet 10 milliGRAM(s) Oral every 12 hours  oxyCODONE  ER Tablet 15 milliGRAM(s) Oral every 12 hours  pantoprazole    Tablet 40 milliGRAM(s) Oral before breakfast  polyethylene glycol 3350 17 Gram(s) Oral <User Schedule>  rosuvastatin 5 milliGRAM(s) Oral at bedtime  senna 2 Tablet(s) Oral at bedtime  tamsulosin 0.8 milliGRAM(s) Oral at bedtime    MEDICATIONS  (PRN):  aluminum hydroxide/magnesium hydroxide/simethicone Suspension 30 milliLiter(s) Oral every 4 hours PRN Dyspepsia  cyclobenzaprine 5 milliGRAM(s) Oral three times a day PRN Muscle Spasm  lactulose Syrup 20 Gram(s) Oral three times a day PRN for constipation  melatonin 3 milliGRAM(s) Oral at bedtime PRN Insomnia  oxyCODONE    IR 15 milliGRAM(s) Oral every 6 hours PRN Moderate Pain (4 - 6)  sodium chloride 0.9% lock flush 10 milliLiter(s) IV Push every 1 hour PRN Pre/post blood products, medications, blood draw, and to maintain line patency    Pertinent Labs:  04-07 Na139 mmol/L Glu 101 mg/dL[H] K+ 4.1 mmol/L Cr  1.12 mg/dL BUN 31 mg/dL[H] 04-07 Alb 2.7 g/dL[L]    Skin: DTI on Left Buttock   Surgical Incision on T-Spine  (as Per Nursing Flow Sheet)     Edema: None Noted Recently (as Per Documentation)     Last Bowel Movement: on 4/6    Estimated Needs:   [X] No Change Since Previous Assessment    Previous Nutrition Diagnosis:   Increased Nutrient Needs - Calories & Protein    Nutrition Diagnosis is [X] Ongoing - Patient Continues on Nutrition Supplement, Patient Takes Nutrition Supplement & Nutrition Education Provided on Need for Supplementation     New Nutrition Diagnosis: [X] Not Applicable    Interventions:   1. Education Provided on Need for Supplementation   2. Recommend Continue Nutrition Plan of Care     Monitoring & Evaluation:   [X] Weights   [X] PO Intake   [X] Skin Integrity   [X] Follow Up (Per Protocol)  [X] Tolerance to Diet Prescription   [X] Other: Labs     Registered Dietitian/Nutritionist Remains Available.  Jarrod Alva, CHERIN, CDN    Phone# [phone]

## 2025-04-07 NOTE — PROGRESS NOTE ADULT - ASSESSMENT
Assessment/Plan:  Mr. Rishabh Shaver is a 74-year-old male patient with past medical history of benign prostatic hyperplasia, diverticulosis, Graves' disease, hemorrhoids, hyperlipidemia, hypothyroidism, osteoarthritis, spinal stenosis, spinal cord injury (2004), and multiple spinal surgeries (three cervical and one lumbar), who is admitted for Acute Inpatient Rehabilitation with a multidisciplinary rehab program at [facility] with functional impairments in ADLs and mobility secondary to a thoracic epidural abscess s/p posterior thoracic laminectomy and fusion on 3/18/25 with Dr. Keith Cantu.       #Thoracic epidural abscess s/p laminectomy and fusion surgery      * C8 AIS D Incomplete paraplegia      *  brace when OOB      * PRN Flexeril 5mg for neck soreness      * Staples to be removed on POD #14 (4/1/25 - can be done at rehab if patient is not able to see Dr. Cantu in the office)      * Sacral wound assessment by wound/skin team,      * Maintain Ortega for now as pt has had urinary retention with previous trial of void - On Flomax 8mg daily      * Right Venodyne only due to left calf DVT (s/p pre-op IVC filter)   - Activity Limitations: Decreased social, vocational and leisure activities, decreased self care and ADLs, decreased mobility, decreased ability to manage household and finances.   - Comprehensive Multidisciplinary Rehab Program:      * 3 hours a day, 5 days a week.      * PT 1hr/day: Focused on improving strength, endurance, coordination, balance, functional mobility, and transfers      * OT 1hr/day: Focused on improving strength, fine motor skills, coordination, posture and ADLs.      -----------------------------------------------------------------------------------    Concurrent Medical Problems    #GOVIND on CKD  - Resolved  - LE edema  - Hypoalbuminemia  - Proteinuria  - IVF as needed with fluid balance  - Significant debilitating ansarca.   - Trial of albumin lasix started 3/8->improvement.   - IV Laxis/IV albumin  BID(3/9)  - Change to Bumex/Albumin (3/11)-> good effect  - Switched to PO bumex 1mg BID 3/24  - Trend Albumin (3/24- 2.3)  - Trend Protein total (3/24- 5.5 ); Total protein, Random Urine (3/18- 24)  - Trend CMP (3/25- creat 0.94/ BUN 27)    #L gastrocnemius and L soleal DVT  - appears provoked from decrease ambulation from recent spinal abscess  - Could not tolerate VQ scan for r/o PE 2/2 pain  - Switch to Eliquis will be new med on discharge (of note patient completed Eliquis load)   - Therapeutic Lovenox (3/12) in the setting of possible spine intervention.   - Last dose of AC night of 3/16. Restart AC (Eliquis 5mg BID) 2 weeks post op (tentative 4/2/2025) per neurosx if hgb stable and surgical site stable  - S/P IVC filter placement 3/17.    #Spinal epidural abscess POA and already being treated with IV antibiotics with suspected worsening of disease  - S/p thoracic laminectomy and fusion 3/18. [place]  brace   - Surgical culture with staph epi possible contaminant but given clinical presentation on admission, IV Dapto added.   - IV Rocephin 2g HS via PICC line thru 5/2/25  - IV Daptomycin 650mg daily via PICC line thru 5/2/2025  - Pain management: Tylenol PRN, Oxycodone 15mg q4h PRN moderate pain; Oxycontin 15mg BID  - Staples to be removed on POD #14 (4/1/25)  (can be done at rehab if patient is not able to see Dr. Cantu in the office)  - CXR for PICC confirmation on admission     #Mild Rhabdomyolysis Improving  - Associated elevated transaminases  - Suspect from Ansarca/Edema  - Improving with adequate diuresis of edema  - Associated elevated transaminase; relatively stable. Continue to monitor (3/24- AST 38/ALT 57)    #Leukocytosis  - Likely rx to DVT + Discitis.  - No other signs of sepsis , afebrile   - Trend CBC and monitor fever curve (3/25- WBC 8.69)    #Hypothyroidism  - Synthroid 150mcg daily    #CAD  #HTN  #HLD  - Metoprolol 25mg daily   - Crestor 5mg HS    #Anemia/MARLENA  - Trend H/H (3/25- 8.7/27.3)  - Transfuse if Hgb <7 PRN     #Sleep:   - Maintain quiet hours and low stim environment.  - Melatonin 3mg HS PRN to maximize participation in therapy during the day.     #GI/Bowel:  - Senna QHS, Miralax Daily, Lactulose 30ml TID PRN, Maalox PRN   - GI ppx: Pantoprazole 40mg daily     #/Bladder  #BPH  #Urinary retention likely augmented by degree of anasarca  - S/P Ortega; TOV  ->failed. Ortega re-inserted 3/6->fell out without knowing with associated hematuria->recurrent retention 3/7->Ortega re-inserted (3/7)  - Ortega on admission  - TOV when appropriate   - Flomax 0.8mg HS  - Monitor U/O    #Skin/Pressure Injury:   Skin assessment on admission:   - Generalized dry flakey skin  - 19cm posterior cervical incision with 32 staples and 2 steri strips  - Left buttock DTI pressure injury measuring 14 x 9 cm      * appearing as a dark purple discoloration of intact skin, with a central superficial open area measuring 6 x 2 cm.      * at the periphery of the 14 x 9 cm area, the discoloration is somewhat lighter;       * dark red non-blanchable erythema.  - Bilateral lower extremity edema.  - Healed lumbar surgical incision scar  - Right heel blanchable redness  - Bilateral dry cracked heels and plantar surface  - Lac hydrin BID BLE  - Aquaphor BID to generalized dry skin   - Appreciate wound care consult     #Diet/Dysphagia:  - Dysphagia: SLP consult for swallow function evaluation and treatment  - Current Diet: Regular- DASH  - Aspiration Precautions  - Nutrition consult     #Patient Education  - Education provided on the following:      * Admitting diagnosis and functional implications      * Functional goals      * Bladder management      * Bowel management      * Skin care management      * Intensity of service and scheduling of rehab disciplines      * Plan of care and role of interdisciplinary team conference in discharge planning      * Reconciliation of medications from prior institution    #Precautions / PROPHYLAXIS:   - Falls, Spinal  - Ortho: Weight bearing status: FWB;  brace OOB  - DVT ppx: Heparin 5000U TID, TEDs  - Pressure injury/Skin: Turn Q2hrs while in bed, OOB to Chair, PT/OT      ---------------  Code Status: FULL  Emergency Contact:    Outpatient Follow-up (Specialty/Name of physician):    Yue Wiggins  [facility]  [address], [address]  [place] [address]  Phone: [phone]  Fax: [phone]  Established Patient  Follow Up Time: 1 week    Keith Cantu [facility]  Neurosurgery  [address]  [place] [address]  Phone: [phone]  Fax: [phone]  Established Patient  Follow Up Time: 2 weeks    Luis A Brennan  [facility] Physician [facility]  CARDIOLOGY [address]  Scheduled Appointment: 04/23/2025    --------------

## 2025-04-07 NOTE — PROGRESS NOTE ADULT - SUBJECTIVE AND OBJECTIVE BOX
Patient is a 75y old  Male who presents with a chief complaint of Thoracic epidural abscess s/p laminectomy and fusion surgery (07 Apr 2025 06:36)      SUBJECTIVE / OVERNIGHT EVENTS:  Pt seen and examined at bedside. No acute events overnight. States his pain is much better and wants to see if he can tapered down in the dose/frequency of his Oxy  Pt denies cp, palpitations, sob, abd pain, N/V, fever, chills.    ROS:  All other review of systems negative    Allergies    No Known Allergies    Intolerances        MEDICATIONS  (STANDING):  ammonium lactate 12% Lotion 1 Application(s) Topical two times a day  apixaban 5 milliGRAM(s) Oral every 12 hours  AQUAPHOR (petrolatum Ointment) 1 Application(s) Topical three times a day  bethanechol 10 milliGRAM(s) Oral two times a day  buMETAnide 1 milliGRAM(s) Oral two times a day  cefTRIAXone   IVPB 2000 milliGRAM(s) IV Intermittent every 24 hours  chlorhexidine 4% Liquid 1 Application(s) Topical <User Schedule>  DAPTOmycin IVPB 650 milliGRAM(s) IV Intermittent every 24 hours  docusate sodium Enema 283 milliGRAM(s) Rectal every 24 hours  gabapentin 100 milliGRAM(s) Oral at bedtime  gabapentin   Oral   levothyroxine 150 MICROGram(s) Oral daily  metoprolol succinate ER 25 milliGRAM(s) Oral daily  oxyCODONE  ER Tablet 15 milliGRAM(s) Oral every 12 hours  pantoprazole    Tablet 40 milliGRAM(s) Oral before breakfast  polyethylene glycol 3350 17 Gram(s) Oral <User Schedule>  rosuvastatin 5 milliGRAM(s) Oral at bedtime  senna 2 Tablet(s) Oral at bedtime  tamsulosin 0.8 milliGRAM(s) Oral at bedtime    MEDICATIONS  (PRN):  aluminum hydroxide/magnesium hydroxide/simethicone Suspension 30 milliLiter(s) Oral every 4 hours PRN Dyspepsia  cyclobenzaprine 5 milliGRAM(s) Oral three times a day PRN Muscle Spasm  lactulose Syrup 20 Gram(s) Oral three times a day PRN for constipation  melatonin 3 milliGRAM(s) Oral at bedtime PRN Insomnia  oxyCODONE    IR 15 milliGRAM(s) Oral every 6 hours PRN Moderate Pain (4 - 6)  sodium chloride 0.9% lock flush 10 milliLiter(s) IV Push every 1 hour PRN Pre/post blood products, medications, blood draw, and to maintain line patency      Vital Signs Last 24 Hrs  T(C): 36.8 (06 Apr 2025 20:46), Max: 36.8 (06 Apr 2025 20:46)  T(F): 98.2 (06 Apr 2025 20:46), Max: 98.2 (06 Apr 2025 20:46)  HR: 80 (07 Apr 2025 06:21) (80 - 85)  BP: 108/68 (07 Apr 2025 06:21) (108/68 - 116/68)  BP(mean): --  RR: 17 (06 Apr 2025 20:46) (17 - 17)  SpO2: 94% (06 Apr 2025 20:46) (94% - 94%)    Parameters below as of 06 Apr 2025 20:46  Patient On (Oxygen Delivery Method): room air      CAPILLARY BLOOD GLUCOSE        I&O's Summary    06 Apr 2025 07:01  -  07 Apr 2025 07:00  --------------------------------------------------------  IN: 0 mL / OUT: 3675 mL / NET: -3675 mL        PHYSICAL EXAM:  GENERAL: NAD overweight AAOx3 male   HEAD:  Atraumatic, Normocephalic  NECK: Supple, No JVD  CHEST/LUNG: Clear to auscultation bilaterally; No wheeze, nonlabored breathing  HEART: Regular rate and rhythm; No murmurs, rubs, or gallops  ABDOMEN: Soft, Nontender, Nondistended; Bowel sounds present  EXTREMITIES: No clubbing, cyanosis, or edema  PSYCH: calm, appropriate mood    LABS:                        9.5    8.85  )-----------( 282      ( 07 Apr 2025 06:01 )             29.5     04-07    139  |  99  |  31[H]  ----------------------------<  101[H]  4.1   |  37[H]  |  1.12    Ca    8.9      07 Apr 2025 06:01    TPro  6.1  /  Alb  2.7[L]  /  TBili  0.4  /  DBili  x   /  AST  41[H]  /  ALT  44  /  AlkPhos  66  04-07          Urinalysis Basic - ( 07 Apr 2025 06:01 )    Color: x / Appearance: x / SG: x / pH: x  Gluc: 101 mg/dL / Ketone: x  / Bili: x / Urobili: x   Blood: x / Protein: x / Nitrite: x   Leuk Esterase: x / RBC: x / WBC x   Sq Epi: x / Non Sq Epi: x / Bacteria: x        RADIOLOGY & ADDITIONAL TESTS:  Results Reviewed:   Imaging Personally Reviewed:  Electrocardiogram Personally Reviewed:    COORDINATION OF CARE:  Care Discussed with Consultants/Other Providers [Y/N]:  Prior or Outpatient Records Reviewed [Y/N]:

## 2025-04-08 PROCEDURE — 99232 SBSQ HOSP IP/OBS MODERATE 35: CPT

## 2025-04-08 PROCEDURE — 99233 SBSQ HOSP IP/OBS HIGH 50: CPT

## 2025-04-08 PROCEDURE — 93970 EXTREMITY STUDY: CPT | Mod: 26

## 2025-04-08 RX ORDER — COLLAGENASE CLOSTRIDIUM HIST. 250 UNIT/G
1 OINTMENT (GRAM) TOPICAL DAILY
Refills: 0 | Status: DISCONTINUED | OUTPATIENT
Start: 2025-04-08 | End: 2025-04-30

## 2025-04-08 RX ADMIN — APIXABAN 5 MILLIGRAM(S): 2.5 TABLET, FILM COATED ORAL at 12:05

## 2025-04-08 RX ADMIN — WHITE PETROLATUM 1 APPLICATION(S): 1 OINTMENT TOPICAL at 22:07

## 2025-04-08 RX ADMIN — BUMETANIDE 1 MILLIGRAM(S): 1 TABLET ORAL at 06:37

## 2025-04-08 RX ADMIN — METOPROLOL SUCCINATE 25 MILLIGRAM(S): 50 TABLET, EXTENDED RELEASE ORAL at 08:25

## 2025-04-08 RX ADMIN — Medication 2 TABLET(S): at 21:34

## 2025-04-08 RX ADMIN — WHITE PETROLATUM 1 APPLICATION(S): 1 OINTMENT TOPICAL at 06:40

## 2025-04-08 RX ADMIN — Medication 10 MILLIGRAM(S): at 06:37

## 2025-04-08 RX ADMIN — WHITE PETROLATUM 1 APPLICATION(S): 1 OINTMENT TOPICAL at 14:53

## 2025-04-08 RX ADMIN — Medication 1 APPLICATION(S): at 21:35

## 2025-04-08 RX ADMIN — Medication 3 MILLIGRAM(S): at 21:35

## 2025-04-08 RX ADMIN — ROSUVASTATIN CALCIUM 5 MILLIGRAM(S): 20 TABLET, FILM COATED ORAL at 21:34

## 2025-04-08 RX ADMIN — Medication 40 MILLIGRAM(S): at 06:39

## 2025-04-08 RX ADMIN — CEFTRIAXONE 100 MILLIGRAM(S): 500 INJECTION, POWDER, FOR SOLUTION INTRAMUSCULAR; INTRAVENOUS at 21:25

## 2025-04-08 RX ADMIN — OXYCODONE HYDROCHLORIDE 10 MILLIGRAM(S): 30 TABLET ORAL at 07:15

## 2025-04-08 RX ADMIN — Medication 10 MILLIGRAM(S): at 17:17

## 2025-04-08 RX ADMIN — Medication 1 APPLICATION(S): at 17:17

## 2025-04-08 RX ADMIN — Medication 1 APPLICATION(S): at 06:39

## 2025-04-08 RX ADMIN — OXYCODONE HYDROCHLORIDE 10 MILLIGRAM(S): 30 TABLET ORAL at 06:37

## 2025-04-08 RX ADMIN — GABAPENTIN 100 MILLIGRAM(S): 400 CAPSULE ORAL at 17:17

## 2025-04-08 RX ADMIN — APIXABAN 5 MILLIGRAM(S): 2.5 TABLET, FILM COATED ORAL at 00:26

## 2025-04-08 RX ADMIN — Medication 150 MICROGRAM(S): at 06:37

## 2025-04-08 RX ADMIN — OXYCODONE HYDROCHLORIDE 10 MILLIGRAM(S): 30 TABLET ORAL at 17:17

## 2025-04-08 RX ADMIN — Medication 283 MILLIGRAM(S): at 08:25

## 2025-04-08 RX ADMIN — DAPTOMYCIN 126 MILLIGRAM(S): 500 INJECTION, POWDER, LYOPHILIZED, FOR SOLUTION INTRAVENOUS at 12:58

## 2025-04-08 RX ADMIN — BUMETANIDE 1 MILLIGRAM(S): 1 TABLET ORAL at 14:53

## 2025-04-08 RX ADMIN — TAMSULOSIN HYDROCHLORIDE 0.8 MILLIGRAM(S): 0.4 CAPSULE ORAL at 21:34

## 2025-04-08 NOTE — PROGRESS NOTE ADULT - ASSESSMENT
Assessment/Plan:  Mr. Rishabh Shaver is a 74-year-old man with past medical history of benign prostatic hyperplasia, diverticulosis, Graves' disease, hemorrhoids, hyperlipidemia, hypothyroidism, osteoarthritis, spinal stenosis, spinal cord injury (2004), and multiple spinal surgeries (three cervical and one lumbar), who is admitted for Acute Inpatient Rehabilitation with a multidisciplinary rehab program at [facility] with functional impairments in ADLs and mobility secondary to a thoracic epidural abscess s/p posterior thoracic laminectomy and fusion on 3/18/25 with Dr. Keith Cantu.       #Thoracic epidural abscess s/p laminectomy and fusion surgery      * C8 AIS D Incomplete paraplegia      *  brace when OOB      * Flexeril 5mg PO TID PRN for neck soreness      * Staples removed on POD #14 (4/1/25)      * Sacral wound assessment by wound/skin team      * s/p Ortega with successful TOV - c/w Flomax 0.8mg PO daily      * Right Venodyne only due to left calf DVT (s/p pre-op IVC filter)   - Activity Limitations: Decreased social, vocational and leisure activities, decreased self care and ADLs, decreased mobility, decreased ability to manage household and finances  - Comprehensive Multidisciplinary Rehab Program:      * 3 hours a day, 5 days a week      * PT 1hr/day: Focused on improving strength, endurance, coordination, balance, functional mobility, and transfers      * OT 1hr/day: Focused on improving strength, fine motor skills, coordination, posture and ADLs    -----------------------------------------------------------------------------------    Concurrent Medical Problems    #GOVIND on CKD (resolved)  - LE edema  - Hypoalbuminemia  - Proteinuria  - IVF as needed with fluid balance  - Significant debilitating anasarca   - Trial of albumin Lasix started 3/8->improvement  - s/p IV Lasix/IV albumin BID (3/9)  - Change to Bumex/Albumin (3/11)-> good effect  - Switched to PO Bumex 1mg BID 3/24  - Trend Albumin (4/7 - 2.7)  - Trend Protein total (4/7 - 6.1 ); Total protein, Random Urine (3/18- 24)  - Trend CMP (4/7 - Cr 1.12/ BUN 31)    #L gastrocnemius and L soleal DVT  - appears provoked from decrease ambulation from recent spinal abscess  - Could not tolerate VQ scan for r/o PE 2/2 pain  - Eliquis 5mg PO q12h - will be new med on discharge (of note patient completed Eliquis load)   - Therapeutic Lovenox (3/12) in the setting of possible spine intervention.   - Last dose of AC night of 3/16. Restarted Eliquis 5mg BID 4/5/25  - S/P IVC filter placement 3/17    #Spinal epidural abscess POA and already being treated with IV antibiotics with suspected worsening of disease  - S/p thoracic laminectomy and fusion 3/18. [place]  brace   - Surgical culture with staph epi possible contaminant but given clinical presentation on admission, IV Dapto added  - IV Rocephin 2g daily via PICC line thru 5/2/25  - IV Daptomycin 650mg daily via PICC line thru 5/2/25  - start gabapentin 100mg PO BID x 3 days (4/8-4/11), then TID starting 4/11  - reduce long-acting Oxycontin to 10mg PO q12h 4/7  - oxycodone 15mg PO q6h PRN moderate pain  - Flexeril 5mg PO TID PRN for muscle spasm  - Staples d/teodoro in rehab    #Mild Rhabdomyolysis Improving  - Associated elevated transaminases  - Suspect from Ansarca/Edema  - Improving with adequate diuresis of edema  - Associated elevated transaminase; relatively stable. Continue to monitor (4/7- AST 41/ALT 44)    #Leukocytosis (resolved)  - Likely rx to DVT + Discitis  - No other signs of sepsis , afebrile   - Trend CBC and monitor fever curve (34/7- WBC 8.85)    #Hypothyroidism  - Synthroid 150mcg PO daily    #CAD  #HTN  #HLD  - metoprolol succinate 25mg PO daily   - rosuvastatin 5mg PO qhs    #Anemia/MARLENA  - trend H/H (4/7- 9.5/29.5)  - transfuse if Hgb <7 PRN     #Sleep:  - melatonin 3mg PO qhs PRN for sleep    #GI/Bowel:  - senna 2 tablets PO qhs  - Miralax 17g PO daily  - daily mini-enema  - lactulose 30mL PO TID PRN for constipation  - Maalox 30mL PO q4h PRN for dyspepsia  - GI ppx: pantoprazole 40mg PO daily     #BPH  #Urinary retention likely augmented by degree of anasarca  - S/P Ortega; TOV  ->failed. Ortega re-inserted 3/6->fell out without knowing with associated hematuria->recurrent retention 3/7->Ortega re-inserted (3/7) --> successful TOV 3/25  - Flomax 0.8mg PO qhs  - started bethanechol 10mg PO BID 4/6  - Monitor U/O    #Skin/Pressure Injury:   Skin assessment on admission:   - Generalized dry flaky skin  - 19cm posterior cervical incision with 32 staples and 2 steri strips  - Left buttock DTI pressure injury measuring 14 x 9 cm      * appearing as a dark purple discoloration of intact skin, with a central superficial open area measuring 6 x 2 cm      * at the periphery of the 14 x 9 cm area, the discoloration is somewhat lighter      * dark red non-blanchable erythema  - Bilateral lower extremity edema  - Healed lumbar surgical incision scar  - Right heel blanchable redness  - Bilateral dry cracked heels and plantar surface  - Lac hydrin BID BLE  - Aquaphor BID to generalized dry skin   - Appreciate wound care consult     #Diet/Dysphagia:  - Dysphagia: SLP consult for swallow function evaluation and treatment  - Current Diet: Regular  - Aspiration Precautions  - Nutrition consult     #Patient Education  - Education provided on the following:      * Admitting diagnosis and functional implications      * Functional goals      * Bladder management      * Bowel management      * Skin care management      * Intensity of service and scheduling of rehab disciplines      * Plan of care and role of interdisciplinary team conference in discharge planning      * Reconciliation of medications from prior institution    #Precautions / PROPHYLAXIS:   - Falls, Spinal  - Ortho: Weight bearing status: FWB;  brace OOB  - DVT ppx: Eliquis 5mg PO BID, TEDs  - Pressure injury/Skin: Turn Q2hrs while in bed, OOB to Chair, PT/OT      ---------------  Code Status: FULL  Emergency Contact:    Outpatient Follow-up:    Crawford, Yue Paige  [facility]  [address], [address]  [place] [address]  Phone: [phone]  Fax: [phone]  Established Patient  Follow Up Time: 1 week    Keith Cantu [facility]  Neurosurgery  [address]  [place] [address]  Phone: [phone]  Fax: [phone]  Established Patient  Follow Up Time: 2 weeks    Luis A Brennan  Health system Physician [facility]  CARDIOLOGY [address]  Scheduled Appointment: 04/23/2025    --------------

## 2025-04-08 NOTE — PROGRESS NOTE ADULT - ASSESSMENT
74-year-old male patient with h/o BPH, diverticulosis, Graves' disease, hemorrhoids, HLD, hypothyroidism, osteoarthritis, spinal stenosis, spinal cord injury (2004), and multiple spinal surgeries (three cervical and one lumbar), who presented to [facility] on 3/4/25 from [facility] due to lower extremity swelling and abnormal BUN/creatinine levels for further evaluation and management. The patient was recently admitted to [facility] from 02/18/25 through 02/25/25 for sepsis secondary to epidural abscess. He had a peripherally inserted central catheter line in the right arm and was on nightly Ceftriaxone treatment until 04/16/25. At the ED, he was found to have GOVIND, a left soleal vein, and a left gastrocnemius DVT. He was started on heparin gtt. Hospital course complicated by urinary retention requiring Ortega catheter insertion, rhabdomyolysis, elevated LFTs, anasarca, leukocytosis due to thoracic spine discitis OM and early paraspinal abscess, as well as new onset RUE paresthesias and weakness. He is s/p IVC filter placement on 3/17 and was recommended surgical management. He is s/p posterior thoracic laminectomy and fusion on 3/18/25 with Dr. Keith Cantu. Surgical culture with rare staph epi. Recommendations by ID to continue IV Rocephin and Daptomycin through 5/2/25. Patient was evaluated by PM&R and therapy for functional deficits, gait/ADL impairments and acute rehabilitation was recommended. Patient was cleared for discharge to [facility] on 3/25/25. (25 Mar 2025 17:09)    Debility  Thoracic epidural abscess s/p laminectomy and fusion surgery on 3/18  -  brace  - pain control per primary  - fall, spine precautions    Spinal epidural abscess   - S/p thoracic laminectomy and fusion 3/18.   - IV Rocephin 2g HS via PICC line thru 5/2/25  - IV Daptomycin 650mg daily via PICC line thru 5/2/2025  - [place]  brace   - Surgical culture with staph epi possible contaminant but given clinical presentation on admission, IV Dapto added.     GOVIND, resolved  LE edema  Hypoalbuminemia  Proteinuria  Anasarca  - c/w Bumex 1 mg BID (4/4 missed the past few doses due to hold parameters.  reordered with hold for SBP <100 instructions).   - Optimize protein intake.   - mobilize, encourage out of bed to chair often.   - Monitor labs closely , stable renal function w good UOP   - Serum workup negative but will need follow up of  Serum  TRUDY positive for weak IgG Lambda band.  - urine protein 200 mg only ( not nephrotic)   - daily standing weights    Transaminitis, resolved  - encouraged PO  - Limit Tylenol use  - avoid hepatotoxins  - monitor on routine labs    CAD  HTN  HLD  - Metoprolol 25mg daily   - Crestor 5mg HS    Anemia  MARLENA  - s/p 2 units of PRBCs given intra-op  - Monitor Hgb (3/31 Hgb 8.3)  - Transfuse to keep hb >7    Hypothyroidism  - Synthroid 150mcg daily    sacral Decub stage 3  - wound care cx noted  - Continue turning patient Q 2 hours   - Lac hydrin BID BLE  - Aquaphor BID to generalized dry skin     BPH, chronic  Urinary retention likely augmented by degree of anasarca  - Flomax 8mg daily  - Monitor U/O    Acute DVT:   -  US doppler + L soleal and gastroc DVT   - s/p IVC filter 3/17 with interventional cards.   - Could not tolerate VQ scan for r/o PE 2/2 pain  - Restarted Eliquis 5mg BID per neurosurgery    DVT-P: heparin Injectable 5000 Unit(s) SubCutaneous every 8 hours. [ DVT on left]

## 2025-04-08 NOTE — PROGRESS NOTE ADULT - SUBJECTIVE AND OBJECTIVE BOX
Patient is a 75y old  Male who presents with a chief complaint of Thoracic epidural abscess s/p laminectomy and fusion surgery (07 Apr 2025 08:20)      SUBJECTIVE / OVERNIGHT EVENTS:  Pt seen and examined at bedside. No acute events overnight.  Pt denies cp, palpitations, sob, abd pain, N/V, fever, chills.    ROS:  All other review of systems negative    Allergies    No Known Allergies    Intolerances        MEDICATIONS  (STANDING):  ammonium lactate 12% Lotion 1 Application(s) Topical two times a day  apixaban 5 milliGRAM(s) Oral every 12 hours  AQUAPHOR (petrolatum Ointment) 1 Application(s) Topical three times a day  bethanechol 10 milliGRAM(s) Oral two times a day  buMETAnide 1 milliGRAM(s) Oral two times a day  cefTRIAXone   IVPB 2000 milliGRAM(s) IV Intermittent every 24 hours  chlorhexidine 4% Liquid 1 Application(s) Topical <User Schedule>  DAPTOmycin IVPB 650 milliGRAM(s) IV Intermittent every 24 hours  docusate sodium Enema 283 milliGRAM(s) Rectal every 24 hours  gabapentin   Oral   gabapentin 100 milliGRAM(s) Oral two times a day  levothyroxine 150 MICROGram(s) Oral daily  metoprolol succinate ER 25 milliGRAM(s) Oral daily  oxyCODONE  ER Tablet 10 milliGRAM(s) Oral every 12 hours  pantoprazole    Tablet 40 milliGRAM(s) Oral before breakfast  polyethylene glycol 3350 17 Gram(s) Oral <User Schedule>  rosuvastatin 5 milliGRAM(s) Oral at bedtime  senna 2 Tablet(s) Oral at bedtime  tamsulosin 0.8 milliGRAM(s) Oral at bedtime    MEDICATIONS  (PRN):  aluminum hydroxide/magnesium hydroxide/simethicone Suspension 30 milliLiter(s) Oral every 4 hours PRN Dyspepsia  cyclobenzaprine 5 milliGRAM(s) Oral three times a day PRN Muscle Spasm  lactulose Syrup 20 Gram(s) Oral three times a day PRN for constipation  melatonin 3 milliGRAM(s) Oral at bedtime PRN Insomnia  oxyCODONE    IR 15 milliGRAM(s) Oral every 6 hours PRN Moderate Pain (4 - 6)  sodium chloride 0.9% lock flush 10 milliLiter(s) IV Push every 1 hour PRN Pre/post blood products, medications, blood draw, and to maintain line patency      Vital Signs Last 24 Hrs  T(C): 36.6 (08 Apr 2025 08:55), Max: 36.6 (07 Apr 2025 21:35)  T(F): 97.9 (08 Apr 2025 08:55), Max: 97.9 (07 Apr 2025 21:35)  HR: 85 (08 Apr 2025 08:55) (79 - 87)  BP: 123/74 (08 Apr 2025 08:55) (107/73 - 123/74)  BP(mean): --  RR: 16 (08 Apr 2025 08:55) (16 - 17)  SpO2: 96% (08 Apr 2025 08:55) (96% - 96%)    Parameters below as of 08 Apr 2025 08:55  Patient On (Oxygen Delivery Method): room air      CAPILLARY BLOOD GLUCOSE        I&O's Summary    07 Apr 2025 07:01  -  08 Apr 2025 07:00  --------------------------------------------------------  IN: 0 mL / OUT: 2700 mL / NET: -2700 mL        PHYSICAL EXAM:  GENERAL: NAD overweight AAOx3 male   HEAD:  Atraumatic, Normocephalic  NECK: Supple, No JVD  CHEST/LUNG: Clear to auscultation bilaterally; No wheeze, nonlabored breathing  HEART: Regular rate and rhythm; No murmurs, rubs, or gallops  ABDOMEN: Soft, Nontender, Nondistended; Bowel sounds present  EXTREMITIES: No clubbing, cyanosis, or edema  PSYCH: calm, appropriate mood    LABS:                        9.5    8.85  )-----------( 282      ( 07 Apr 2025 06:01 )             29.5     04-07    139  |  99  |  31[H]  ----------------------------<  101[H]  4.1   |  37[H]  |  1.12    Ca    8.9      07 Apr 2025 06:01    TPro  6.1  /  Alb  2.7[L]  /  TBili  0.4  /  DBili  x   /  AST  41[H]  /  ALT  44  /  AlkPhos  66  04-07          Urinalysis Basic - ( 07 Apr 2025 06:01 )    Color: x / Appearance: x / SG: x / pH: x  Gluc: 101 mg/dL / Ketone: x  / Bili: x / Urobili: x   Blood: x / Protein: x / Nitrite: x   Leuk Esterase: x / RBC: x / WBC x   Sq Epi: x / Non Sq Epi: x / Bacteria: x        RADIOLOGY & ADDITIONAL TESTS:  Results Reviewed:   Imaging Personally Reviewed:  Electrocardiogram Personally Reviewed:    COORDINATION OF CARE:  Care Discussed with Consultants/Other Providers [Y/N]:  Prior or Outpatient Records Reviewed [Y/N]:

## 2025-04-08 NOTE — PROGRESS NOTE ADULT - SUBJECTIVE AND OBJECTIVE BOX
HPI:  Mr. Rishabh Shaver is a 74-year-old male patient with past medical history of benign prostatic hyperplasia, diverticulosis, Graves' disease, hemorrhoids, hyperlipidemia, hypothyroidism, osteoarthritis, spinal stenosis, spinal cord injury (2004), and multiple spinal surgeries (three cervical and one lumbar), who presented to [facility] on 3/4/25 from [facility] due to lower extremity swelling and abnormal BUN/creatinine levels for further evaluation and management. The patient was recently admitted to [facility] from 02/18/25 through 02/25/25 for sepsis secondary to epidural abscess. He had a peripherally inserted central catheter line in the right arm and was on nightly Ceftriaxone treatment until 04/16/25. At the ED, he was found to have GOVIND, a left soleal vein, and a left gastrocnemius DVT. He was started on heparin gtt. Hospital course complicated by urinary retention requiring Ortega catheter insertion, rhabdomyolysis, elevated LFTs, anasarca, leukocytosis due to thoracic spine discitis OM and early paraspinal abscess, as well as new onset RUE paresthesias and weakness. He is s/p IVC filter placement on 3/17 and was recommended surgical management. He is s/p posterior thoracic laminectomy and fusion on 3/18/25 with Dr. Keith Cantu. Surgical culture with rare staph epi. Recommendations by ID to continue IV Rocephin and Daptomycin through 5/2/25. Patient was evaluated by PM&R and therapy for functional deficits, gait/ADL impairments and acute rehabilitation was recommended. Patient was cleared for discharge to [facility] IRF on 3/25/25. (25 Mar 2025 17:09)    TDD 4/22 home  ___________________________________________________________________________    SUBJECTIVE/ROS  Patient was seen and evaluated in PT today.  Reported no overnight events and is in no acute distress.  Denies any CP, SOB, palpitations, cough, or any other symptoms at this time.   ___________________________________________________________________________    LABS             9.5    8.85  )-----------( 282      ( 07 Apr 2025 06:01 )             29.5     139  |  99  |  31[H]  ----------------------------<  101[H]  4.1   |  37[H]  |  1.12    Ca    8.9      07 Apr 2025 06:01  TPro  6.1  /  Alb  2.7[L]  /  TBili  0.4  /  DBili  x   /  AST  41[H]  /  ALT  44  /  AlkPhos  66  04-07    ___________________________________________________________________________    MEDICATIONS  (STANDING):  ammonium lactate 12% Lotion 1 Application(s) Topical two times a day  apixaban 5 milliGRAM(s) Oral every 12 hours  AQUAPHOR (petrolatum Ointment) 1 Application(s) Topical three times a day  bethanechol 10 milliGRAM(s) Oral two times a day  buMETAnide 1 milliGRAM(s) Oral two times a day  cefTRIAXone   IVPB 2000 milliGRAM(s) IV Intermittent every 24 hours  chlorhexidine 4% Liquid 1 Application(s) Topical <User Schedule>  DAPTOmycin IVPB 650 milliGRAM(s) IV Intermittent every 24 hours  docusate sodium Enema 283 milliGRAM(s) Rectal every 24 hours  gabapentin   Oral   gabapentin 100 milliGRAM(s) Oral two times a day  levothyroxine 150 MICROGram(s) Oral daily  metoprolol succinate ER 25 milliGRAM(s) Oral daily  oxyCODONE  ER Tablet 10 milliGRAM(s) Oral every 12 hours  pantoprazole    Tablet 40 milliGRAM(s) Oral before breakfast  polyethylene glycol 3350 17 Gram(s) Oral <User Schedule>  rosuvastatin 5 milliGRAM(s) Oral at bedtime  senna 2 Tablet(s) Oral at bedtime  tamsulosin 0.8 milliGRAM(s) Oral at bedtime    MEDICATIONS  (PRN):  aluminum hydroxide/magnesium hydroxide/simethicone Suspension 30 milliLiter(s) Oral every 4 hours PRN Dyspepsia  cyclobenzaprine 5 milliGRAM(s) Oral three times a day PRN Muscle Spasm  lactulose Syrup 20 Gram(s) Oral three times a day PRN for constipation  melatonin 3 milliGRAM(s) Oral at bedtime PRN Insomnia  oxyCODONE    IR 15 milliGRAM(s) Oral every 6 hours PRN Moderate Pain (4 - 6)  sodium chloride 0.9% lock flush 10 milliLiter(s) IV Push every 1 hour PRN Pre/post blood products, medications, blood draw, and to maintain line patency    ___________________________________________________________________________    PHYSICAL EXAM:    VITALS  T(C): 36.6 (04-08-25 @ 08:55), Max: 36.6 (04-07-25 @ 21:35)  HR: 85 (04-08-25 @ 08:55) (79 - 87)  BP: 123/74 (04-08-25 @ 08:55) (107/73 - 123/74)  RR: 16 (04-08-25 @ 08:55) (16 - 17)  SpO2: 96% (04-08-25 @ 08:55) (96% - 96%)    Constitutional - NAD, Comfortable  Chest - good chest expansion, good respiratory effort  Cardio - warm and well perfused  Neurologic Exam:                    Speech - Fluent, Comprehensible, No dysarthria, No aphasia      Motor -  observed walking in PT with walker, 2 person assist, and WC follow; 50ft x3,  brace in place  Psychiatric - Mood stable, Affect WNL  ___________________________________________________________________________ HPI:  Mr. Rishabh Shaver is a 74-year-old male patient with past medical history of benign prostatic hyperplasia, diverticulosis, Graves' disease, hemorrhoids, hyperlipidemia, hypothyroidism, osteoarthritis, spinal stenosis, spinal cord injury (2004), and multiple spinal surgeries (three cervical and one lumbar), who presented to [facility] on 3/4/25 from [facility] due to lower extremity swelling and abnormal BUN/creatinine levels for further evaluation and management. The patient was recently admitted to [facility] from 02/18/25 through 02/25/25 for sepsis secondary to epidural abscess. He had a peripherally inserted central catheter line in the right arm and was on nightly Ceftriaxone treatment until 04/16/25. At the ED, he was found to have GOVIND, a left soleal vein, and a left gastrocnemius DVT. He was started on heparin gtt. Hospital course complicated by urinary retention requiring Ortega catheter insertion, rhabdomyolysis, elevated LFTs, anasarca, leukocytosis due to thoracic spine discitis OM and early paraspinal abscess, as well as new onset RUE paresthesias and weakness. He is s/p IVC filter placement on 3/17 and was recommended surgical management. He is s/p posterior thoracic laminectomy and fusion on 3/18/25 with Dr. Keith Cantu. Surgical culture with rare staph epi. Recommendations by ID to continue IV Rocephin and Daptomycin through 5/2/25. Patient was evaluated by PM&R and therapy for functional deficits, gait/ADL impairments and acute rehabilitation was recommended. Patient was cleared for discharge to [facility] IRF on 3/25/25. (25 Mar 2025 17:09)    TDD 4/22 home  ___________________________________________________________________________    SUBJECTIVE/ROS  Patient was seen and evaluated in PT today.  Reported no overnight events and is in no acute distress.  Santyl added to eschar in DTI after reviewing alongside RN.  Denies any CP, SOB, palpitations, cough, or any other symptoms at this time.   ___________________________________________________________________________    LABS             9.5    8.85  )-----------( 282      ( 07 Apr 2025 06:01 )             29.5     139  |  99  |  31[H]  ----------------------------<  101[H]  4.1   |  37[H]  |  1.12    Ca    8.9      07 Apr 2025 06:01  TPro  6.1  /  Alb  2.7[L]  /  TBili  0.4  /  DBili  x   /  AST  41[H]  /  ALT  44  /  AlkPhos  66  04-07    ___________________________________________________________________________    MEDICATIONS  (STANDING):  ammonium lactate 12% Lotion 1 Application(s) Topical two times a day  apixaban 5 milliGRAM(s) Oral every 12 hours  AQUAPHOR (petrolatum Ointment) 1 Application(s) Topical three times a day  bethanechol 10 milliGRAM(s) Oral two times a day  buMETAnide 1 milliGRAM(s) Oral two times a day  cefTRIAXone   IVPB 2000 milliGRAM(s) IV Intermittent every 24 hours  chlorhexidine 4% Liquid 1 Application(s) Topical <User Schedule>  DAPTOmycin IVPB 650 milliGRAM(s) IV Intermittent every 24 hours  docusate sodium Enema 283 milliGRAM(s) Rectal every 24 hours  gabapentin   Oral   gabapentin 100 milliGRAM(s) Oral two times a day  levothyroxine 150 MICROGram(s) Oral daily  metoprolol succinate ER 25 milliGRAM(s) Oral daily  oxyCODONE  ER Tablet 10 milliGRAM(s) Oral every 12 hours  pantoprazole    Tablet 40 milliGRAM(s) Oral before breakfast  polyethylene glycol 3350 17 Gram(s) Oral <User Schedule>  rosuvastatin 5 milliGRAM(s) Oral at bedtime  senna 2 Tablet(s) Oral at bedtime  tamsulosin 0.8 milliGRAM(s) Oral at bedtime    MEDICATIONS  (PRN):  aluminum hydroxide/magnesium hydroxide/simethicone Suspension 30 milliLiter(s) Oral every 4 hours PRN Dyspepsia  cyclobenzaprine 5 milliGRAM(s) Oral three times a day PRN Muscle Spasm  lactulose Syrup 20 Gram(s) Oral three times a day PRN for constipation  melatonin 3 milliGRAM(s) Oral at bedtime PRN Insomnia  oxyCODONE    IR 15 milliGRAM(s) Oral every 6 hours PRN Moderate Pain (4 - 6)  sodium chloride 0.9% lock flush 10 milliLiter(s) IV Push every 1 hour PRN Pre/post blood products, medications, blood draw, and to maintain line patency    ___________________________________________________________________________    PHYSICAL EXAM:    VITALS  T(C): 36.6 (04-08-25 @ 08:55), Max: 36.6 (04-07-25 @ 21:35)  HR: 85 (04-08-25 @ 08:55) (79 - 87)  BP: 123/74 (04-08-25 @ 08:55) (107/73 - 123/74)  RR: 16 (04-08-25 @ 08:55) (16 - 17)  SpO2: 96% (04-08-25 @ 08:55) (96% - 96%)    Constitutional - NAD, Comfortable  Chest - good chest expansion, good respiratory effort  Cardio - warm and well perfused  Neurologic Exam:                    Speech - Fluent, Comprehensible, No dysarthria, No aphasia      Motor -  observed walking in PT with walker, 2 person assist, and WC follow; 50ft x3,  brace in place  Psychiatric - Mood stable, Affect WNL  ___________________________________________________________________________

## 2025-04-09 PROCEDURE — 99232 SBSQ HOSP IP/OBS MODERATE 35: CPT

## 2025-04-09 PROCEDURE — 99233 SBSQ HOSP IP/OBS HIGH 50: CPT

## 2025-04-09 RX ADMIN — OXYCODONE HYDROCHLORIDE 10 MILLIGRAM(S): 30 TABLET ORAL at 17:46

## 2025-04-09 RX ADMIN — OXYCODONE HYDROCHLORIDE 10 MILLIGRAM(S): 30 TABLET ORAL at 05:54

## 2025-04-09 RX ADMIN — Medication 283 MILLIGRAM(S): at 05:39

## 2025-04-09 RX ADMIN — CEFTRIAXONE 100 MILLIGRAM(S): 500 INJECTION, POWDER, FOR SOLUTION INTRAMUSCULAR; INTRAVENOUS at 21:13

## 2025-04-09 RX ADMIN — ROSUVASTATIN CALCIUM 5 MILLIGRAM(S): 20 TABLET, FILM COATED ORAL at 21:13

## 2025-04-09 RX ADMIN — Medication 1 APPLICATION(S): at 06:04

## 2025-04-09 RX ADMIN — APIXABAN 5 MILLIGRAM(S): 2.5 TABLET, FILM COATED ORAL at 11:36

## 2025-04-09 RX ADMIN — Medication 10 MILLIGRAM(S): at 05:54

## 2025-04-09 RX ADMIN — BUMETANIDE 1 MILLIGRAM(S): 1 TABLET ORAL at 05:54

## 2025-04-09 RX ADMIN — WHITE PETROLATUM 1 APPLICATION(S): 1 OINTMENT TOPICAL at 14:30

## 2025-04-09 RX ADMIN — BUMETANIDE 1 MILLIGRAM(S): 1 TABLET ORAL at 14:27

## 2025-04-09 RX ADMIN — DAPTOMYCIN 126 MILLIGRAM(S): 500 INJECTION, POWDER, LYOPHILIZED, FOR SOLUTION INTRAVENOUS at 11:41

## 2025-04-09 RX ADMIN — TAMSULOSIN HYDROCHLORIDE 0.8 MILLIGRAM(S): 0.4 CAPSULE ORAL at 21:13

## 2025-04-09 RX ADMIN — Medication 2 TABLET(S): at 21:13

## 2025-04-09 RX ADMIN — Medication 40 MILLIGRAM(S): at 05:55

## 2025-04-09 RX ADMIN — Medication 10 MILLIGRAM(S): at 17:43

## 2025-04-09 RX ADMIN — Medication 1 APPLICATION(S): at 05:49

## 2025-04-09 RX ADMIN — GABAPENTIN 100 MILLIGRAM(S): 400 CAPSULE ORAL at 17:46

## 2025-04-09 RX ADMIN — OXYCODONE HYDROCHLORIDE 10 MILLIGRAM(S): 30 TABLET ORAL at 17:53

## 2025-04-09 RX ADMIN — APIXABAN 5 MILLIGRAM(S): 2.5 TABLET, FILM COATED ORAL at 00:11

## 2025-04-09 RX ADMIN — GABAPENTIN 100 MILLIGRAM(S): 400 CAPSULE ORAL at 05:54

## 2025-04-09 RX ADMIN — METOPROLOL SUCCINATE 25 MILLIGRAM(S): 50 TABLET, EXTENDED RELEASE ORAL at 05:54

## 2025-04-09 RX ADMIN — WHITE PETROLATUM 1 APPLICATION(S): 1 OINTMENT TOPICAL at 05:54

## 2025-04-09 RX ADMIN — Medication 1 APPLICATION(S): at 11:49

## 2025-04-09 RX ADMIN — Medication 150 MICROGRAM(S): at 05:39

## 2025-04-09 RX ADMIN — POLYETHYLENE GLYCOL 3350 17 GRAM(S): 17 POWDER, FOR SOLUTION ORAL at 05:39

## 2025-04-09 NOTE — PROGRESS NOTE ADULT - ASSESSMENT
Assessment/Plan:  Mr. Rishabh Shaver is a 74-year-old man with past medical history of benign prostatic hyperplasia, diverticulosis, Graves' disease, hemorrhoids, hyperlipidemia, hypothyroidism, osteoarthritis, spinal stenosis, spinal cord injury (2004), and multiple spinal surgeries (three cervical and one lumbar), who is admitted for Acute Inpatient Rehabilitation with a multidisciplinary rehab program at [facility] with functional impairments in ADLs and mobility secondary to a thoracic epidural abscess s/p posterior thoracic laminectomy and fusion on 3/18/25 with Dr. Keith Cantu.       #Thoracic epidural abscess s/p laminectomy and fusion surgery      * C8 AIS D Incomplete paraplegia      *  brace when OOB      * Flexeril 5mg PO TID PRN for neck soreness      * Staples removed on POD #14 (4/1/25)      * Sacral wound assessment by wound/skin team      * s/p Ortega with successful TOV - c/w Flomax 0.8mg PO daily      * Right Venodyne only due to left calf DVT (s/p pre-op IVC filter)   - Activity Limitations: Decreased social, vocational and leisure activities, decreased self care and ADLs, decreased mobility, decreased ability to manage household and finances  - Comprehensive Multidisciplinary Rehab Program:      * 3 hours a day, 5 days a week      * PT 1hr/day: Focused on improving strength, endurance, coordination, balance, functional mobility, and transfers      * OT 1hr/day: Focused on improving strength, fine motor skills, coordination, posture and ADLs    -----------------------------------------------------------------------------------    Concurrent Medical Problems    #GOVIND on CKD (resolved)  - LE edema  - Hypoalbuminemia  - Proteinuria  - IVF as needed with fluid balance  - Significant debilitating anasarca   - Trial of albumin Lasix started 3/8->improvement  - s/p IV Lasix/IV albumin BID (3/9)  - Change to Bumex/Albumin (3/11)-> good effect  - Switched to PO Bumex 1mg BID 3/24  - Trend Albumin (4/7 - 2.7)  - Trend Protein total (4/7 - 6.1 ); Total protein, Random Urine (3/18- 24)  - Trend CMP (4/7 - Cr 1.12/ BUN 31)    #L gastrocnemius and L soleal DVT  - appears provoked from decrease ambulation from recent spinal abscess  - Could not tolerate VQ scan for r/o PE 2/2 pain  - Eliquis 5mg PO q12h - will be new med on discharge (of note patient completed Eliquis load)   - Therapeutic Lovenox (3/12) in the setting of possible spine intervention.   - Last dose of AC night of 3/16. Restarted Eliquis 5mg BID 4/5/25  - S/P IVC filter placement 3/17    #Spinal epidural abscess POA and already being treated with IV antibiotics with suspected worsening of disease  - S/p thoracic laminectomy and fusion 3/18. [place]  brace   - Surgical culture with staph epi possible contaminant but given clinical presentation on admission, IV Dapto added  - IV Rocephin 2g daily via PICC line thru 5/2/25  - IV Daptomycin 650mg daily via PICC line thru 5/2/25  - start gabapentin 100mg PO BID x 3 days (4/8-4/11), then TID starting 4/11  - reduce long-acting Oxycontin to 10mg PO q12h 4/7  - oxycodone 15mg PO q6h PRN moderate pain  - Flexeril 5mg PO TID PRN for muscle spasm  - Staples d/teodoro in rehab    #Mild Rhabdomyolysis Improving  - Associated elevated transaminases  - Suspect from Ansarca/Edema  - Improving with adequate diuresis of edema  - Associated elevated transaminase; relatively stable. Continue to monitor (4/7- AST 41/ALT 44)    #Leukocytosis (resolved)  - Likely rx to DVT + Discitis  - No other signs of sepsis , afebrile   - Trend CBC and monitor fever curve (34/7- WBC 8.85)    #Hypothyroidism  - Synthroid 150mcg PO daily    #CAD  #HTN  #HLD  - metoprolol succinate 25mg PO daily   - rosuvastatin 5mg PO qhs    #Anemia/MARLENA  - trend H/H (4/7- 9.5/29.5)  - transfuse if Hgb <7 PRN     #Sleep:  - melatonin 3mg PO qhs PRN for sleep    #GI/Bowel:  - senna 2 tablets PO qhs  - Miralax 17g PO daily  - daily mini-enema  - lactulose 30mL PO TID PRN for constipation  - Maalox 30mL PO q4h PRN for dyspepsia  - GI ppx: pantoprazole 40mg PO daily     #BPH  #Urinary retention likely augmented by degree of anasarca  - S/P Ortega; TOV  ->failed. Ortega re-inserted 3/6->fell out without knowing with associated hematuria->recurrent retention 3/7->Ortega re-inserted (3/7) --> successful TOV 3/25  - Flomax 0.8mg PO qhs  - started bethanechol 10mg PO BID 4/6  - Monitor U/O    #Skin/Pressure Injury:   Skin assessment on admission:   - Generalized dry flaky skin  - 19cm posterior cervical incision with 32 staples and 2 steri strips  - Left buttock DTI pressure injury measuring 14 x 9 cm      * appearing as a dark purple discoloration of intact skin, with a central superficial open area measuring 6 x 2 cm      * at the periphery of the 14 x 9 cm area, the discoloration is somewhat lighter      * dark red non-blanchable erythema  - Bilateral lower extremity edema  - Healed lumbar surgical incision scar  - Right heel blanchable redness  - Bilateral dry cracked heels and plantar surface  - Lac hydrin BID BLE  - Aquaphor BID to generalized dry skin   - Appreciate wound care consult     #Diet/Dysphagia:  - Dysphagia: SLP consult for swallow function evaluation and treatment  - Current Diet: Regular  - Aspiration Precautions  - Nutrition consult     #Patient Education  - Education provided on the following:      * Admitting diagnosis and functional implications      * Functional goals      * Bladder management      * Bowel management      * Skin care management      * Intensity of service and scheduling of rehab disciplines      * Plan of care and role of interdisciplinary team conference in discharge planning      * Reconciliation of medications from prior institution    #Precautions / PROPHYLAXIS:   - Falls, Spinal  - Ortho: Weight bearing status: FWB;  brace OOB  - DVT ppx: Eliquis 5mg PO BID, TEDs  - Pressure injury/Skin: Turn Q2hrs while in bed, OOB to Chair, PT/OT      ---------------  Code Status: FULL  Emergency Contact:    Outpatient Follow-up:    Nicholas, Yue Paige  [facility]  [address], [address]  [place] [address]  Phone: [phone]  Fax: [phone]  Established Patient  Follow Up Time: 1 week    Keith Cantu [facility]  Neurosurgery  [address]  [place] [address]  Phone: [phone]  Fax: [phone]  Established Patient  Follow Up Time: 2 weeks    Luis A Brennan  [facility] Physician [facility]  CARDIOLOGY [address]  Scheduled Appointment: 04/23/2025    --------------

## 2025-04-09 NOTE — PROGRESS NOTE ADULT - SUBJECTIVE AND OBJECTIVE BOX
HPI:  Mr. Rishabh Shaver is a 74-year-old male patient with past medical history of benign prostatic hyperplasia, diverticulosis, Graves' disease, hemorrhoids, hyperlipidemia, hypothyroidism, osteoarthritis, spinal stenosis, spinal cord injury (2004), and multiple spinal surgeries (three cervical and one lumbar), who presented to [facility] on 3/4/25 from [facility] due to lower extremity swelling and abnormal BUN/creatinine levels for further evaluation and management. The patient was recently admitted to [facility] from 02/18/25 through 02/25/25 for sepsis secondary to epidural abscess. He had a peripherally inserted central catheter line in the right arm and was on nightly Ceftriaxone treatment until 04/16/25. At the ED, he was found to have GOVIND, a left soleal vein, and a left gastrocnemius DVT. He was started on heparin gtt. Hospital course complicated by urinary retention requiring Ortega catheter insertion, rhabdomyolysis, elevated LFTs, anasarca, leukocytosis due to thoracic spine discitis OM and early paraspinal abscess, as well as new onset RUE paresthesias and weakness. He is s/p IVC filter placement on 3/17 and was recommended surgical management. He is s/p posterior thoracic laminectomy and fusion on 3/18/25 with Dr. Keith Cantu. Surgical culture with rare staph epi. Recommendations by ID to continue IV Rocephin and Daptomycin through 5/2/25. Patient was evaluated by PM&R and therapy for functional deficits, gait/ADL impairments and acute rehabilitation was recommended. Patient was cleared for discharge to [facility] IRF on 3/25/25. (25 Mar 2025 17:09)    TDD 4/22/25 home  ___________________________________________________________________________    SUBJECTIVE/ROS  Patient was seen and evaluated at beside today.  Reported no overnight events and is in no acute distress.  Family meeting scheduled for tomorrow.  Case to be reevaluated at Interdisciplinary Team meeting tomorrow.  Will review sacrum dressing alongside RN in AM.  Denies any CP, SOB, palpitations, cough, or any other symptoms at this time.   ___________________________________________________________________________    Vital Signs Last 24 Hrs  T(C): 37.1 (09 Apr 2025 20:13), Max: 37.1 (09 Apr 2025 20:13)  T(F): 98.7 (09 Apr 2025 20:13), Max: 98.7 (09 Apr 2025 20:13)  HR: 76 (09 Apr 2025 20:13) (76 - 83)  BP: 137/74 (09 Apr 2025 20:13) (102/62 - 137/74)  RR: 17 (09 Apr 2025 08:22) (16 - 17)  SpO2: 95% (09 Apr 2025 08:22) (94% - 95%)    Parameters below as of 09 Apr 2025 08:22  Patient On (Oxygen Delivery Method): room air    ___________________________________________________________________________    LABS             9.5    8.85  )-----------( 282      ( 07 Apr 2025 06:01 )             29.5     139  |  99  |  31[H]  ----------------------------<  101[H]  4.1   |  37[H]  |  1.12    Ca    8.9      07 Apr 2025 06:01  TPro  6.1  /  Alb  2.7[L]  /  TBili  0.4  /  DBili  x   /  AST  41[H]  /  ALT  44  /  AlkPhos  66  04-07    ___________________________________________________________________________    MEDICATIONS  (STANDING):  ammonium lactate 12% Lotion 1 Application(s) Topical two times a day  apixaban 5 milliGRAM(s) Oral every 12 hours  AQUAPHOR (petrolatum Ointment) 1 Application(s) Topical three times a day  bethanechol 10 milliGRAM(s) Oral two times a day  buMETAnide 1 milliGRAM(s) Oral two times a day  cefTRIAXone   IVPB 2000 milliGRAM(s) IV Intermittent every 24 hours  chlorhexidine 4% Liquid 1 Application(s) Topical <User Schedule>  DAPTOmycin IVPB 650 milliGRAM(s) IV Intermittent every 24 hours  docusate sodium Enema 283 milliGRAM(s) Rectal every 24 hours  gabapentin   Oral   gabapentin 100 milliGRAM(s) Oral two times a day  levothyroxine 150 MICROGram(s) Oral daily  metoprolol succinate ER 25 milliGRAM(s) Oral daily  oxyCODONE  ER Tablet 10 milliGRAM(s) Oral every 12 hours  pantoprazole    Tablet 40 milliGRAM(s) Oral before breakfast  polyethylene glycol 3350 17 Gram(s) Oral <User Schedule>  rosuvastatin 5 milliGRAM(s) Oral at bedtime  senna 2 Tablet(s) Oral at bedtime  tamsulosin 0.8 milliGRAM(s) Oral at bedtime    MEDICATIONS  (PRN):  aluminum hydroxide/magnesium hydroxide/simethicone Suspension 30 milliLiter(s) Oral every 4 hours PRN Dyspepsia  cyclobenzaprine 5 milliGRAM(s) Oral three times a day PRN Muscle Spasm  lactulose Syrup 20 Gram(s) Oral three times a day PRN for constipation  melatonin 3 milliGRAM(s) Oral at bedtime PRN Insomnia  oxyCODONE    IR 15 milliGRAM(s) Oral every 6 hours PRN Moderate Pain (4 - 6)  sodium chloride 0.9% lock flush 10 milliLiter(s) IV Push every 1 hour PRN Pre/post blood products, medications, blood draw, and to maintain line patency    ___________________________________________________________________________    PHYSICAL EXAM:    Gen - NAD, Comfortable  HEENT -  EOMI,Normal Conjunctivae  Neck - Supple, + limited ROM  Pulm - breathing comfortably  Cardiovascular - warm and well perfused  Abdomen - soft  Extremities - +BLE 3+ pitting edema, +B/L pedal edema, +RUE PICC line   /GI- + diaper  Neuro-     Cognitive - awake, alert, oriented to person, place, date, year, and situation.  Able  to follow command     Communication - Fluent, Comprehensible, No dysarthria, No aphasia      Cranial Nerves -No facial asymmetry, Tongue midline, EOMI, Shoulder shrug intact     Motor -          RIGHT: EF [5/5], WE [5/5], EE [5/5], FF [5/5], FA [5/4], HF [1/5], KE [3/5], ADF [4/5], LTE [4/5], APF [4/5]          LEFT:    EF [5/5], WE [5/5], EE [5/5], FF [4/5], FA [3/5], HF [1/5], KE [2/5], ADF [5/5], LTE [5/5], APF [5/5]     Sensory          LTR: 2/2 throughout          LTL: 1/2 on L2-L3; rest 2/2 throughout          PPR: 1/2 on S4-5; rest 2/2 throughout          PPL: 0/2 on L2; 1/2 on L1, L3, L4, S2, and S3; rest 2/2 throughout  Psychiatric - Mood stable, Affect WNL  ___________________________________________________________________________

## 2025-04-09 NOTE — PROGRESS NOTE ADULT - SUBJECTIVE AND OBJECTIVE BOX
Patient is a 75y old  Male who presents with a chief complaint of Thoracic epidural abscess s/p laminectomy and fusion surgery (07 Apr 2025 08:20)      SUBJECTIVE / OVERNIGHT EVENTS:  Pt seen and examined at bedside. No acute events overnight.  No new complaints.       Allergies    No Known Allergies    MEDICATIONS  (STANDING):  ammonium lactate 12% Lotion 1 Application(s) Topical two times a day  apixaban 5 milliGRAM(s) Oral every 12 hours  AQUAPHOR (petrolatum Ointment) 1 Application(s) Topical three times a day  bethanechol 10 milliGRAM(s) Oral two times a day  buMETAnide 1 milliGRAM(s) Oral two times a day  cefTRIAXone   IVPB 2000 milliGRAM(s) IV Intermittent every 24 hours  chlorhexidine 4% Liquid 1 Application(s) Topical <User Schedule>  collagenase Ointment 1 Application(s) Topical daily  DAPTOmycin IVPB 650 milliGRAM(s) IV Intermittent every 24 hours  docusate sodium Enema 283 milliGRAM(s) Rectal <User Schedule>  gabapentin   Oral   gabapentin 100 milliGRAM(s) Oral two times a day  levothyroxine 150 MICROGram(s) Oral daily  metoprolol succinate ER 25 milliGRAM(s) Oral daily  oxyCODONE  ER Tablet 10 milliGRAM(s) Oral every 12 hours  pantoprazole    Tablet 40 milliGRAM(s) Oral before breakfast  polyethylene glycol 3350 17 Gram(s) Oral <User Schedule>  rosuvastatin 5 milliGRAM(s) Oral at bedtime  senna 2 Tablet(s) Oral at bedtime  tamsulosin 0.8 milliGRAM(s) Oral at bedtime    MEDICATIONS  (PRN):  aluminum hydroxide/magnesium hydroxide/simethicone Suspension 30 milliLiter(s) Oral every 4 hours PRN Dyspepsia  cyclobenzaprine 5 milliGRAM(s) Oral three times a day PRN Muscle Spasm  lactulose Syrup 20 Gram(s) Oral three times a day PRN for constipation  melatonin 3 milliGRAM(s) Oral at bedtime PRN Insomnia  oxyCODONE    IR 15 milliGRAM(s) Oral every 6 hours PRN Moderate Pain (4 - 6)  sodium chloride 0.9% lock flush 10 milliLiter(s) IV Push every 1 hour PRN Pre/post blood products, medications, blood draw, and to maintain line patency    T(C): 36.6 (04-09-25 @ 08:22), Max: 37.2 (04-08-25 @ 20:25)  T(F): 97.9 (04-09-25 @ 08:22), Max: 99 (04-08-25 @ 20:25)  HR: 79 (04-09-25 @ 08:22) (79 - 83)  BP: 102/62 (04-09-25 @ 08:22) (102/62 - 150/89)  ABP: --  ABP(mean): --  RR: 17 (04-09-25 @ 08:22) (16 - 17)  SpO2: 95% (04-09-25 @ 08:22) (94% - 95%)    PHYSICAL EXAM:  GENERAL: NAD overweight AAOx3 male   HEAD:  Atraumatic, Normocephalic  NECK: Supple, No JVD  CHEST/LUNG: Clear to auscultation bilaterally; No wheeze, nonlabored breathing  HEART: Regular rate and rhythm; No murmurs, rubs, or gallops  ABDOMEN: Soft, Nontender, Nondistended; Bowel sounds present  EXTREMITIES: No clubbing, cyanosis, or edema  PSYCH: calm, appropriate mood    LABS:                        9.5    8.85  )-----------( 282      ( 07 Apr 2025 06:01 )             29.5     04-07    139  |  99  |  31[H]  ----------------------------<  101[H]  4.1   |  37[H]  |  1.12    Ca    8.9      07 Apr 2025 06:01    TPro  6.1  /  Alb  2.7[L]  /  TBili  0.4  /  DBili  x   /  AST  41[H]  /  ALT  44  /  AlkPhos  66  04-07          Urinalysis Basic - ( 07 Apr 2025 06:01 )    Color: x / Appearance: x / SG: x / pH: x  Gluc: 101 mg/dL / Ketone: x  / Bili: x / Urobili: x   Blood: x / Protein: x / Nitrite: x   Leuk Esterase: x / RBC: x / WBC x   Sq Epi: x / Non Sq Epi: x / Bacteria: x        RADIOLOGY & ADDITIONAL TESTS:  Results Reviewed:   Imaging Personally Reviewed:  Electrocardiogram Personally Reviewed:    COORDINATION OF CARE:  Care Discussed with Consultants/Other Providers [Y/N]:  Prior or Outpatient Records Reviewed [Y/N]:

## 2025-04-09 NOTE — PROGRESS NOTE ADULT - ASSESSMENT
74-year-old male patient with h/o BPH, diverticulosis, Graves' disease, hemorrhoids, HLD, hypothyroidism, osteoarthritis, spinal stenosis, spinal cord injury (2004), and multiple spinal surgeries (three cervical and one lumbar), who presented to [facility] on 3/4/25 from [facility] ([facility] due to lower extremity swelling and abnormal BUN/creatinine levels for further evaluation and management. The patient was recently admitted to [facility] from 02/18/25 through 02/25/25 for sepsis secondary to epidural abscess. He had a peripherally inserted central catheter line in the right arm and was on nightly Ceftriaxone treatment until 04/16/25. At the ED, he was found to have GOVIND, a left soleal vein, and a left gastrocnemius DVT. He was started on heparin gtt. Hospital course complicated by urinary retention requiring Ortega catheter insertion, rhabdomyolysis, elevated LFTs, anasarca, leukocytosis due to thoracic spine discitis OM and early paraspinal abscess, as well as new onset RUE paresthesias and weakness. He is s/p IVC filter placement on 3/17 and was recommended surgical management. He is s/p posterior thoracic laminectomy and fusion on 3/18/25 with Dr. Keith Cantu. Surgical culture with rare staph epi. Recommendations by ID to continue IV Rocephin and Daptomycin through 5/2/25. Patient was evaluated by PM&R and therapy for functional deficits, gait/ADL impairments and acute rehabilitation was recommended. Patient was cleared for discharge to [facility] IRF on 3/25/25. (25 Mar 2025 17:09)    Debility  Thoracic epidural abscess s/p laminectomy and fusion surgery on 3/18  -  brace  - pain control per primary  - fall, spine precautions    Spinal epidural abscess   - S/p thoracic laminectomy and fusion 3/18.   - IV Rocephin 2g HS via PICC line thru 5/2/25  - IV Daptomycin 650mg daily via PICC line thru 5/2/2025  - [place]  brace   - Surgical culture with staph epi possible contaminant but given clinical presentation on admission, IV Dapto added.     GOVIND, resolved  LE edema  Hypoalbuminemia  Proteinuria  Anasarca  - c/w Bumex 1 mg BID (4/4 missed the past few doses due to hold parameters.  reordered with hold for SBP <100 instructions).   - Optimize protein intake.   - mobilize, encourage out of bed to chair often.   - Monitor labs closely , stable renal function w good UOP   - Serum workup negative but will need follow up of  Serum  TRUDY positive for weak IgG Lambda band.  - urine protein 200 mg only ( not nephrotic)   - daily standing weights    Transaminitis, resolved  - encouraged PO  - Limit Tylenol use  - avoid hepatotoxins  - monitor on routine labs    CAD  HTN  HLD  - Metoprolol 25mg daily   - Crestor 5mg HS    Anemia  MARLENA  - s/p 2 units of PRBCs given intra-op  - Monitor Hgb (3/31 Hgb 8.3)  - Transfuse to keep hb >7    Hypothyroidism  - Synthroid 150mcg daily    sacral Decub stage 3  - wound care cx noted  - Continue turning patient Q 2 hours   - Lac hydrin BID BLE  - Aquaphor BID to generalized dry skin     BPH, chronic  Urinary retention likely augmented by degree of anasarca  - Flomax 8mg daily  - Monitor U/O    Acute DVT:   -  US doppler + L soleal and gastroc DVT   - s/p IVC filter 3/17 with interventional cards.   - Could not tolerate VQ scan for r/o PE 2/2 pain  - Restarted Eliquis 5mg BID per neurosurgery    DVT-P: heparin Injectable 5000 Unit(s) SubCutaneous every 8 hours. [ DVT on left]    will follow, d/w rehab team

## 2025-04-10 LAB
ALBUMIN SERPL ELPH-MCNC: 2.8 G/DL — LOW (ref 3.3–5)
ALP SERPL-CCNC: 67 U/L — SIGNIFICANT CHANGE UP (ref 40–120)
ALT FLD-CCNC: 41 U/L — SIGNIFICANT CHANGE UP (ref 10–45)
ANION GAP SERPL CALC-SCNC: 7 MMOL/L — SIGNIFICANT CHANGE UP (ref 5–17)
AST SERPL-CCNC: 47 U/L — HIGH (ref 10–40)
BASOPHILS # BLD AUTO: 0.09 K/UL — SIGNIFICANT CHANGE UP (ref 0–0.2)
BASOPHILS NFR BLD AUTO: 1 % — SIGNIFICANT CHANGE UP (ref 0–2)
BILIRUB SERPL-MCNC: 0.5 MG/DL — SIGNIFICANT CHANGE UP (ref 0.2–1.2)
BUN SERPL-MCNC: 33 MG/DL — HIGH (ref 7–23)
CALCIUM SERPL-MCNC: 9.3 MG/DL — SIGNIFICANT CHANGE UP (ref 8.4–10.5)
CHLORIDE SERPL-SCNC: 100 MMOL/L — SIGNIFICANT CHANGE UP (ref 96–108)
CO2 SERPL-SCNC: 34 MMOL/L — HIGH (ref 22–31)
CREAT SERPL-MCNC: 0.99 MG/DL — SIGNIFICANT CHANGE UP (ref 0.5–1.3)
EGFR: 81 ML/MIN/1.73M2 — SIGNIFICANT CHANGE UP
EGFR: 81 ML/MIN/1.73M2 — SIGNIFICANT CHANGE UP
EOSINOPHIL # BLD AUTO: 0.54 K/UL — HIGH (ref 0–0.5)
EOSINOPHIL NFR BLD AUTO: 6 % — SIGNIFICANT CHANGE UP (ref 0–6)
GLUCOSE SERPL-MCNC: 105 MG/DL — HIGH (ref 70–99)
HCT VFR BLD CALC: 29.9 % — LOW (ref 39–50)
HGB BLD-MCNC: 9.8 G/DL — LOW (ref 13–17)
IMM GRANULOCYTES NFR BLD AUTO: 0.4 % — SIGNIFICANT CHANGE UP (ref 0–0.9)
LYMPHOCYTES # BLD AUTO: 1.09 K/UL — SIGNIFICANT CHANGE UP (ref 1–3.3)
LYMPHOCYTES # BLD AUTO: 12.1 % — LOW (ref 13–44)
MCHC RBC-ENTMCNC: 28.7 PG — SIGNIFICANT CHANGE UP (ref 27–34)
MCHC RBC-ENTMCNC: 32.8 G/DL — SIGNIFICANT CHANGE UP (ref 32–36)
MCV RBC AUTO: 87.4 FL — SIGNIFICANT CHANGE UP (ref 80–100)
MONOCYTES # BLD AUTO: 1 K/UL — HIGH (ref 0–0.9)
MONOCYTES NFR BLD AUTO: 11.1 % — SIGNIFICANT CHANGE UP (ref 2–14)
NEUTROPHILS # BLD AUTO: 6.27 K/UL — SIGNIFICANT CHANGE UP (ref 1.8–7.4)
NEUTROPHILS NFR BLD AUTO: 69.4 % — SIGNIFICANT CHANGE UP (ref 43–77)
NRBC BLD AUTO-RTO: 0 /100 WBCS — SIGNIFICANT CHANGE UP (ref 0–0)
PLATELET # BLD AUTO: 267 K/UL — SIGNIFICANT CHANGE UP (ref 150–400)
POTASSIUM SERPL-MCNC: 3.5 MMOL/L — SIGNIFICANT CHANGE UP (ref 3.5–5.3)
POTASSIUM SERPL-SCNC: 3.5 MMOL/L — SIGNIFICANT CHANGE UP (ref 3.5–5.3)
PROT SERPL-MCNC: 6.4 G/DL — SIGNIFICANT CHANGE UP (ref 6–8.3)
RBC # BLD: 3.42 M/UL — LOW (ref 4.2–5.8)
RBC # FLD: 14.6 % — HIGH (ref 10.3–14.5)
SODIUM SERPL-SCNC: 141 MMOL/L — SIGNIFICANT CHANGE UP (ref 135–145)
WBC # BLD: 9.03 K/UL — SIGNIFICANT CHANGE UP (ref 3.8–10.5)
WBC # FLD AUTO: 9.03 K/UL — SIGNIFICANT CHANGE UP (ref 3.8–10.5)

## 2025-04-10 PROCEDURE — 99233 SBSQ HOSP IP/OBS HIGH 50: CPT

## 2025-04-10 RX ADMIN — Medication 40 MILLIGRAM(S): at 05:32

## 2025-04-10 RX ADMIN — BUMETANIDE 1 MILLIGRAM(S): 1 TABLET ORAL at 05:32

## 2025-04-10 RX ADMIN — ROSUVASTATIN CALCIUM 5 MILLIGRAM(S): 20 TABLET, FILM COATED ORAL at 22:57

## 2025-04-10 RX ADMIN — Medication 150 MICROGRAM(S): at 05:32

## 2025-04-10 RX ADMIN — Medication 2 TABLET(S): at 22:58

## 2025-04-10 RX ADMIN — WHITE PETROLATUM 1 APPLICATION(S): 1 OINTMENT TOPICAL at 22:42

## 2025-04-10 RX ADMIN — APIXABAN 5 MILLIGRAM(S): 2.5 TABLET, FILM COATED ORAL at 23:40

## 2025-04-10 RX ADMIN — WHITE PETROLATUM 1 APPLICATION(S): 1 OINTMENT TOPICAL at 14:54

## 2025-04-10 RX ADMIN — Medication 283 MILLIGRAM(S): at 05:37

## 2025-04-10 RX ADMIN — GABAPENTIN 100 MILLIGRAM(S): 400 CAPSULE ORAL at 05:35

## 2025-04-10 RX ADMIN — DAPTOMYCIN 126 MILLIGRAM(S): 500 INJECTION, POWDER, LYOPHILIZED, FOR SOLUTION INTRAVENOUS at 12:44

## 2025-04-10 RX ADMIN — GABAPENTIN 100 MILLIGRAM(S): 400 CAPSULE ORAL at 18:12

## 2025-04-10 RX ADMIN — APIXABAN 5 MILLIGRAM(S): 2.5 TABLET, FILM COATED ORAL at 12:23

## 2025-04-10 RX ADMIN — OXYCODONE HYDROCHLORIDE 10 MILLIGRAM(S): 30 TABLET ORAL at 18:12

## 2025-04-10 RX ADMIN — Medication 10 MILLIGRAM(S): at 05:32

## 2025-04-10 RX ADMIN — Medication 1 APPLICATION(S): at 12:22

## 2025-04-10 RX ADMIN — CEFTRIAXONE 100 MILLIGRAM(S): 500 INJECTION, POWDER, FOR SOLUTION INTRAMUSCULAR; INTRAVENOUS at 22:57

## 2025-04-10 RX ADMIN — WHITE PETROLATUM 1 APPLICATION(S): 1 OINTMENT TOPICAL at 05:42

## 2025-04-10 RX ADMIN — BUMETANIDE 1 MILLIGRAM(S): 1 TABLET ORAL at 14:02

## 2025-04-10 RX ADMIN — OXYCODONE HYDROCHLORIDE 10 MILLIGRAM(S): 30 TABLET ORAL at 05:54

## 2025-04-10 RX ADMIN — OXYCODONE HYDROCHLORIDE 10 MILLIGRAM(S): 30 TABLET ORAL at 19:35

## 2025-04-10 RX ADMIN — Medication 1 APPLICATION(S): at 20:41

## 2025-04-10 RX ADMIN — Medication 1 APPLICATION(S): at 05:42

## 2025-04-10 RX ADMIN — METOPROLOL SUCCINATE 25 MILLIGRAM(S): 50 TABLET, EXTENDED RELEASE ORAL at 05:32

## 2025-04-10 RX ADMIN — TAMSULOSIN HYDROCHLORIDE 0.8 MILLIGRAM(S): 0.4 CAPSULE ORAL at 22:58

## 2025-04-10 RX ADMIN — OXYCODONE HYDROCHLORIDE 10 MILLIGRAM(S): 30 TABLET ORAL at 05:35

## 2025-04-10 RX ADMIN — LACTULOSE 20 GRAM(S): 10 SOLUTION ORAL at 05:37

## 2025-04-10 RX ADMIN — Medication 10 MILLIGRAM(S): at 18:13

## 2025-04-10 NOTE — PROGRESS NOTE ADULT - ASSESSMENT
Assessment/Plan:  Mr. Rishabh Shaver is a 74-year-old man with past medical history of benign prostatic hyperplasia, diverticulosis, Graves' disease, hemorrhoids, hyperlipidemia, hypothyroidism, osteoarthritis, spinal stenosis, spinal cord injury (2004), and multiple spinal surgeries (three cervical and one lumbar), who is admitted for Acute Inpatient Rehabilitation with a multidisciplinary rehab program at [facility] with functional impairments in ADLs and mobility secondary to a thoracic epidural abscess s/p posterior thoracic laminectomy and fusion on 3/18/25 with Dr. Keith Cantu.       #Thoracic epidural abscess s/p laminectomy and fusion surgery      * C8 AIS D Incomplete paraplegia      *  brace when OOB      * Flexeril 5mg PO TID PRN for neck soreness      * Staples removed on POD #14 (4/1/25)      * Sacral wound assessment by wound/skin team      * s/p Ortega with successful TOV - c/w Flomax 0.8mg PO daily      * Right Venodyne only due to left calf DVT (s/p pre-op IVC filter)   - Activity Limitations: Decreased social, vocational and leisure activities, decreased self care and ADLs, decreased mobility, decreased ability to manage household and finances  - Comprehensive Multidisciplinary Rehab Program:      * 3 hours a day, 5 days a week      * PT 1hr/day: Focused on improving strength, endurance, coordination, balance, functional mobility, and transfers      * OT 1hr/day: Focused on improving strength, fine motor skills, coordination, posture and ADLs    -----------------------------------------------------------------------------------    Concurrent Medical Problems    #GOVIND on CKD (resolved)  - LE edema  - Hypoalbuminemia  - Proteinuria  - IVF as needed with fluid balance  - Significant debilitating anasarca   - Trial of albumin Lasix started 3/8->improvement  - s/p IV Lasix/IV albumin BID (3/9)  - Change to Bumex/Albumin (3/11)-> good effect  - Switched to PO Bumex 1mg BID 3/24  - Trend Albumin (4/7 - 2.7)  - Trend Protein total (4/7 - 6.1 ); Total protein, Random Urine (3/18- 24)  - Trend CMP (4/7 - Cr 1.12/ BUN 31)    #L gastrocnemius and L soleal DVT  - appears provoked from decrease ambulation from recent spinal abscess  - Could not tolerate VQ scan for r/o PE 2/2 pain  - Eliquis 5mg PO q12h - will be new med on discharge (of note patient completed Eliquis load)   - Therapeutic Lovenox (3/12) in the setting of possible spine intervention.   - Last dose of AC night of 3/16. Restarted Eliquis 5mg BID 4/5/25  - S/P IVC filter placement 3/17    #Spinal epidural abscess POA and already being treated with IV antibiotics with suspected worsening of disease  - S/p thoracic laminectomy and fusion 3/18. [place]  brace   - Surgical culture with staph epi possible contaminant but given clinical presentation on admission, IV Dapto added  - IV Rocephin 2g daily via PICC line thru 5/2/25  - IV Daptomycin 650mg daily via PICC line thru 5/2/25  - start gabapentin 100mg PO BID x 3 days (4/8-4/11), then TID starting 4/11  - reduce long-acting Oxycontin to 10mg PO q12h 4/7  - oxycodone 15mg PO q6h PRN moderate pain  - Flexeril 5mg PO TID PRN for muscle spasm  - Staples d/teodoro in rehab    #Mild Rhabdomyolysis Improving  - Associated elevated transaminases  - Suspect from Ansarca/Edema  - Improving with adequate diuresis of edema  - Associated elevated transaminase; relatively stable. Continue to monitor (4/7- AST 41/ALT 44)    #Leukocytosis (resolved)  - Likely rx to DVT + Discitis  - No other signs of sepsis , afebrile   - Trend CBC and monitor fever curve (34/7- WBC 8.85)    #Hypothyroidism  - Synthroid 150mcg PO daily    #CAD  #HTN  #HLD  - metoprolol succinate 25mg PO daily   - rosuvastatin 5mg PO qhs    #Anemia/MARLENA  - trend H/H (4/7- 9.5/29.5)  - transfuse if Hgb <7 PRN     #Sleep:  - melatonin 3mg PO qhs PRN for sleep    #GI/Bowel:  - senna 2 tablets PO qhs  - Miralax 17g PO daily  - daily mini-enema  - lactulose 30mL PO TID PRN for constipation  - Maalox 30mL PO q4h PRN for dyspepsia  - GI ppx: pantoprazole 40mg PO daily     #BPH  #Urinary retention likely augmented by degree of anasarca  - S/P Ortega; TOV  ->failed. Ortega re-inserted 3/6->fell out without knowing with associated hematuria->recurrent retention 3/7->Ortega re-inserted (3/7) --> successful TOV 3/25  - Flomax 0.8mg PO qhs  - started bethanechol 10mg PO BID 4/6  - Monitor U/O    #Skin/Pressure Injury:   Skin assessment on admission:   - Generalized dry flaky skin  - 19cm posterior cervical incision with 32 staples and 2 steri strips  - Left buttock DTI pressure injury measuring 14 x 9 cm      * appearing as a dark purple discoloration of intact skin, with a central superficial open area measuring 6 x 2 cm      * at the periphery of the 14 x 9 cm area, the discoloration is somewhat lighter      * dark red non-blanchable erythema  - Bilateral lower extremity edema  - Healed lumbar surgical incision scar  - Right heel blanchable redness  - Bilateral dry cracked heels and plantar surface  - Lac hydrin BID BLE  - Aquaphor BID to generalized dry skin   - Appreciate wound care consult     #Diet/Dysphagia:  - Dysphagia: SLP consult for swallow function evaluation and treatment  - Current Diet: Regular  - Aspiration Precautions  - Nutrition consult     #Patient Education  - Education provided on the following:      * Admitting diagnosis and functional implications      * Functional goals      * Bladder management      * Bowel management      * Skin care management      * Intensity of service and scheduling of rehab disciplines      * Plan of care and role of interdisciplinary team conference in discharge planning      * Reconciliation of medications from prior institution    #Precautions / PROPHYLAXIS:   - Falls, Spinal  - Ortho: Weight bearing status: FWB;  brace OOB  - DVT ppx: Eliquis 5mg PO BID, TEDs  - Pressure injury/Skin: Turn Q2hrs while in bed, OOB to Chair, PT/OT      ---------------  Code Status: FULL  Emergency Contact:    Outpatient Follow-up:    Houghton, Yue Paige  [facility]  [address], [address]  [place] [address]  Phone: [phone]  Fax: [phone]  Established Patient  Follow Up Time: 1 week    Keith Cantu [facility]  Neurosurgery  [address]  [place] [address]  Phone: [phone]  Fax: [phone]  Established Patient  Follow Up Time: 2 weeks    Luis A Brennan  [facility] Physician [facility]  CARDIOLOGY [address]  Scheduled Appointment: 04/23/2025    --------------

## 2025-04-10 NOTE — PROGRESS NOTE ADULT - SUBJECTIVE AND OBJECTIVE BOX
HPI:  Mr. Rishabh Shaver is a 74-year-old male patient with past medical history of benign prostatic hyperplasia, diverticulosis, Graves' disease, hemorrhoids, hyperlipidemia, hypothyroidism, osteoarthritis, spinal stenosis, spinal cord injury (2004), and multiple spinal surgeries (three cervical and one lumbar), who presented to [facility] on 3/4/25 from [facility] due to lower extremity swelling and abnormal BUN/creatinine levels for further evaluation and management. The patient was recently admitted to [facility] from 02/18/25 through 02/25/25 for sepsis secondary to epidural abscess. He had a peripherally inserted central catheter line in the right arm and was on nightly Ceftriaxone treatment until 04/16/25. At the ED, he was found to have GOVIND, a left soleal vein, and a left gastrocnemius DVT. He was started on heparin gtt. Hospital course complicated by urinary retention requiring Ortega catheter insertion, rhabdomyolysis, elevated LFTs, anasarca, leukocytosis due to thoracic spine discitis OM and early paraspinal abscess, as well as new onset RUE paresthesias and weakness. He is s/p IVC filter placement on 3/17 and was recommended surgical management. He is s/p posterior thoracic laminectomy and fusion on 3/18/25 with Dr. Keith Cantu. Surgical culture with rare staph epi. Recommendations by ID to continue IV Rocephin and Daptomycin through 5/2/25. Patient was evaluated by PM&R and therapy for functional deficits, gait/ADL impairments and acute rehabilitation was recommended. Patient was cleared for discharge to [facility] IRF on 3/25/25. (25 Mar 2025 17:09)    TDD 4/22/25 home  ___________________________________________________________________________    SUBJECTIVE/ROS  Patient was seen and evaluated at beside today.  Reported no overnight events and is in no acute distress.  Family meeting scheduled for tomorrow.  Case to be reevaluated at Interdisciplinary Team meeting tomorrow.  Will review sacrum dressing alongside RN in AM.  Denies any CP, SOB, palpitations, cough, or any other symptoms at this time.   ___________________________________________________________________________    Vital Signs Last 24 Hrs  T(C): 37.1 (09 Apr 2025 20:13), Max: 37.1 (09 Apr 2025 20:13)  T(F): 98.7 (09 Apr 2025 20:13), Max: 98.7 (09 Apr 2025 20:13)  HR: 76 (09 Apr 2025 20:13) (76 - 83)  BP: 137/74 (09 Apr 2025 20:13) (102/62 - 137/74)  RR: 17 (09 Apr 2025 08:22) (16 - 17)  SpO2: 95% (09 Apr 2025 08:22) (94% - 95%)    Parameters below as of 09 Apr 2025 08:22  Patient On (Oxygen Delivery Method): room air    ___________________________________________________________________________    LABS             9.5    8.85  )-----------( 282      ( 07 Apr 2025 06:01 )             29.5     139  |  99  |  31[H]  ----------------------------<  101[H]  4.1   |  37[H]  |  1.12    Ca    8.9      07 Apr 2025 06:01  TPro  6.1  /  Alb  2.7[L]  /  TBili  0.4  /  DBili  x   /  AST  41[H]  /  ALT  44  /  AlkPhos  66  04-07    ___________________________________________________________________________    MEDICATIONS  (STANDING):  ammonium lactate 12% Lotion 1 Application(s) Topical two times a day  apixaban 5 milliGRAM(s) Oral every 12 hours  AQUAPHOR (petrolatum Ointment) 1 Application(s) Topical three times a day  bethanechol 10 milliGRAM(s) Oral two times a day  buMETAnide 1 milliGRAM(s) Oral two times a day  cefTRIAXone   IVPB 2000 milliGRAM(s) IV Intermittent every 24 hours  chlorhexidine 4% Liquid 1 Application(s) Topical <User Schedule>  DAPTOmycin IVPB 650 milliGRAM(s) IV Intermittent every 24 hours  docusate sodium Enema 283 milliGRAM(s) Rectal every 24 hours  gabapentin   Oral   gabapentin 100 milliGRAM(s) Oral two times a day  levothyroxine 150 MICROGram(s) Oral daily  metoprolol succinate ER 25 milliGRAM(s) Oral daily  oxyCODONE  ER Tablet 10 milliGRAM(s) Oral every 12 hours  pantoprazole    Tablet 40 milliGRAM(s) Oral before breakfast  polyethylene glycol 3350 17 Gram(s) Oral <User Schedule>  rosuvastatin 5 milliGRAM(s) Oral at bedtime  senna 2 Tablet(s) Oral at bedtime  tamsulosin 0.8 milliGRAM(s) Oral at bedtime    MEDICATIONS  (PRN):  aluminum hydroxide/magnesium hydroxide/simethicone Suspension 30 milliLiter(s) Oral every 4 hours PRN Dyspepsia  cyclobenzaprine 5 milliGRAM(s) Oral three times a day PRN Muscle Spasm  lactulose Syrup 20 Gram(s) Oral three times a day PRN for constipation  melatonin 3 milliGRAM(s) Oral at bedtime PRN Insomnia  oxyCODONE    IR 15 milliGRAM(s) Oral every 6 hours PRN Moderate Pain (4 - 6)  sodium chloride 0.9% lock flush 10 milliLiter(s) IV Push every 1 hour PRN Pre/post blood products, medications, blood draw, and to maintain line patency    ___________________________________________________________________________    PHYSICAL EXAM:    Gen - NAD, Comfortable  HEENT -  EOMI,Normal Conjunctivae  Neck - Supple, + limited ROM  Pulm - breathing comfortably  Cardiovascular - warm and well perfused  Abdomen - soft  Extremities - +BLE 3+ pitting edema, +B/L pedal edema, +RUE PICC line   /GI- + diaper  Neuro-     Cognitive - awake, alert, oriented to person, place, date, year, and situation.  Able  to follow command     Communication - Fluent, Comprehensible, No dysarthria, No aphasia      Cranial Nerves -No facial asymmetry, Tongue midline, EOMI, Shoulder shrug intact     Motor -          RIGHT: EF [5/5], WE [5/5], EE [5/5], FF [5/5] (), FA [5/4], HF [2/5], KE [4/5], ADF [5/5], LTE [5/5], APF [5/5]          LEFT:    EF [5/5], WE [5/5], EE [5/5], FF [4/5], FA [5/5], HF [1/5], KE [2/5], ADF [5/5], LTE [5/5], APF [5/5]     Sensory          LTR: 2/2 throughout          LTL: 1/2 on L2-L3; rest 2/2 throughout          PPR: 1/2 on S4-5; rest 2/2 throughout          PPL: 0/2 on L2; 1/2 on L1, L3, L4, S2, and S3; rest 2/2 throughout  Psychiatric - Mood stable, Affect WNL  ___________________________________________________________________________ HPI:  Mr. Rishabh Shaver is a 74-year-old male patient with past medical history of benign prostatic hyperplasia, diverticulosis, Graves' disease, hemorrhoids, hyperlipidemia, hypothyroidism, osteoarthritis, spinal stenosis, spinal cord injury (2004), and multiple spinal surgeries (three cervical and one lumbar), who presented to [facility] on 3/4/25 from [facility] due to lower extremity swelling and abnormal BUN/creatinine levels for further evaluation and management. The patient was recently admitted to [facility] from 02/18/25 through 02/25/25 for sepsis secondary to epidural abscess. He had a peripherally inserted central catheter line in the right arm and was on nightly Ceftriaxone treatment until 04/16/25. At the ED, he was found to have GOVIND, a left soleal vein, and a left gastrocnemius DVT. He was started on heparin gtt. Hospital course complicated by urinary retention requiring Ortega catheter insertion, rhabdomyolysis, elevated LFTs, anasarca, leukocytosis due to thoracic spine discitis OM and early paraspinal abscess, as well as new onset RUE paresthesias and weakness. He is s/p IVC filter placement on 3/17 and was recommended surgical management. He is s/p posterior thoracic laminectomy and fusion on 3/18/25 with Dr. Keith Cantu. Surgical culture with rare staph epi. Recommendations by ID to continue IV Rocephin and Daptomycin through 5/2/25. Patient was evaluated by PM&R and therapy for functional deficits, gait/ADL impairments and acute rehabilitation was recommended. Patient was cleared for discharge to [facility] IRF on 3/25/25. (25 Mar 2025 17:09)    TDD 4/22/25 home  ___________________________________________________________________________    SUBJECTIVE/ROS  Patient was seen and evaluated at beside today.  Reported no overnight events and is in no acute distress.  Family meeting performed today.  Case was discussed at Interdisciplinary Team meeting today.  No changes to the tentative discharge date outlined above.  Patient is eager to continue participation on the recommended rehabilitation program.  Sacrum dressing replaced alongside RN.  Denies any CP, SOB, palpitations, cough, or any other symptoms at this time.   ___________________________________________________________________________    Vital Signs Last 24 Hrs  T(C): 36.7 (10 Apr 2025 07:29), Max: 37.1 (09 Apr 2025 20:13)  T(F): 98.1 (10 Apr 2025 07:29), Max: 98.7 (09 Apr 2025 20:13)  HR: 80 (10 Apr 2025 07:29) (76 - 80)  BP: 106/66 (10 Apr 2025 07:29) (106/66 - 140/78)  RR: 16 (10 Apr 2025 07:29) (16 - 16)  SpO2: 94% (10 Apr 2025 07:29) (94% - 94%)    Parameters below as of 10 Apr 2025 07:29  Patient On (Oxygen Delivery Method): room air    ___________________________________________________________________________    LAB                        9.8    9.03  )-----------( 267      ( 10 Apr 2025 06:52 )             29.9                  9.5    8.85  )-----------( 282      ( 07 Apr 2025 06:01 )             29.5       04-10    141  |  100  |  33[H]  ----------------------------<  105[H]  3.5   |  34[H]  |  0.99    Ca    9.3      10 Apr 2025 06:52    TPro  6.4  /  Alb  2.8[L]  /  TBili  0.5  /  DBili  x   /  AST  47[H]  /  ALT  41  /  AlkPhos  67  04-10    LIVER FUNCTIONS - ( 10 Apr 2025 06:52 )  Alb: 2.8 g/dL / Pro: 6.4 g/dL / ALK PHOS: 67 U/L / ALT: 41 U/L / AST: 47 U/L / GGT: x             139  |  99  |  31[H]  ----------------------------<  101[H]  4.1   |  37[H]  |  1.12    Ca    8.9      07 Apr 2025 06:01  TPro  6.1  /  Alb  2.7[L]  /  TBili  0.4  /  DBili  x   /  AST  41[H]  /  ALT  44  /  AlkPhos  66  04-07    ___________________________________________________________________________    MEDICATIONS  (STANDING):  ammonium lactate 12% Lotion 1 Application(s) Topical two times a day  apixaban 5 milliGRAM(s) Oral every 12 hours  AQUAPHOR (petrolatum Ointment) 1 Application(s) Topical three times a day  bethanechol 10 milliGRAM(s) Oral two times a day  buMETAnide 1 milliGRAM(s) Oral two times a day  cefTRIAXone   IVPB 2000 milliGRAM(s) IV Intermittent every 24 hours  chlorhexidine 4% Liquid 1 Application(s) Topical <User Schedule>  DAPTOmycin IVPB 650 milliGRAM(s) IV Intermittent every 24 hours  docusate sodium Enema 283 milliGRAM(s) Rectal every 24 hours  gabapentin   Oral   gabapentin 100 milliGRAM(s) Oral two times a day  levothyroxine 150 MICROGram(s) Oral daily  metoprolol succinate ER 25 milliGRAM(s) Oral daily  oxyCODONE  ER Tablet 10 milliGRAM(s) Oral every 12 hours  pantoprazole    Tablet 40 milliGRAM(s) Oral before breakfast  polyethylene glycol 3350 17 Gram(s) Oral <User Schedule>  rosuvastatin 5 milliGRAM(s) Oral at bedtime  senna 2 Tablet(s) Oral at bedtime  tamsulosin 0.8 milliGRAM(s) Oral at bedtime    MEDICATIONS  (PRN):  aluminum hydroxide/magnesium hydroxide/simethicone Suspension 30 milliLiter(s) Oral every 4 hours PRN Dyspepsia  cyclobenzaprine 5 milliGRAM(s) Oral three times a day PRN Muscle Spasm  lactulose Syrup 20 Gram(s) Oral three times a day PRN for constipation  melatonin 3 milliGRAM(s) Oral at bedtime PRN Insomnia  oxyCODONE    IR 15 milliGRAM(s) Oral every 6 hours PRN Moderate Pain (4 - 6)  sodium chloride 0.9% lock flush 10 milliLiter(s) IV Push every 1 hour PRN Pre/post blood products, medications, blood draw, and to maintain line patency    ___________________________________________________________________________    PHYSICAL EXAM:    Gen - NAD, Comfortable  HEENT -  EOMI,Normal Conjunctivae  Neck - Supple, + limited ROM  Pulm - breathing comfortably  Cardiovascular - warm and well perfused  Abdomen - soft  Extremities - +BLE 3+ pitting edema, +B/L pedal edema, +RUE PICC line   /GI- + diaper  Neuro-     Cognitive - awake, alert, oriented     Motor -          RIGHT: EF [5/5], WE [5/5], EE [5/5], FF [5/5] (), FA [5/4], HF [2/5], KE [4/5], ADF [5/5], LTE [5/5], APF [5/5]          LEFT:    EF [5/5], WE [5/5], EE [5/5], FF [4/5], FA [5/5], HF [1/5], KE [2/5], ADF [5/5], LTE [5/5], APF [5/5]     Sensory          LTR: 2/2 throughout          LTL: 1/2 on L2-L3; rest 2/2 throughout          PPR: 1/2 on S4-5; rest 2/2 throughout          PPL: 0/2 on L2; 1/2 on L1, L3, L4, S2, and S3; rest 2/2 throughout  Psychiatric - Mood stable, Affect WNL  ___________________________________________________________________________

## 2025-04-11 PROCEDURE — 99232 SBSQ HOSP IP/OBS MODERATE 35: CPT

## 2025-04-11 RX ORDER — OXYCODONE HYDROCHLORIDE 30 MG/1
15 TABLET ORAL EVERY 6 HOURS
Refills: 0 | Status: DISCONTINUED | OUTPATIENT
Start: 2025-04-11 | End: 2025-04-17

## 2025-04-11 RX ORDER — OXYCODONE HYDROCHLORIDE 30 MG/1
10 TABLET ORAL EVERY 12 HOURS
Refills: 0 | Status: DISCONTINUED | OUTPATIENT
Start: 2025-04-11 | End: 2025-04-12

## 2025-04-11 RX ORDER — DAPTOMYCIN 500 MG/10ML
65 INJECTION, POWDER, LYOPHILIZED, FOR SOLUTION INTRAVENOUS
Qty: 5 | Refills: 0
Start: 2025-04-11 | End: 2025-04-17

## 2025-04-11 RX ORDER — CEFTRIAXONE 500 MG/1
2 INJECTION, POWDER, FOR SOLUTION INTRAMUSCULAR; INTRAVENOUS
Qty: 7 | Refills: 0
Start: 2025-04-11 | End: 2025-04-17

## 2025-04-11 RX ADMIN — Medication 10 MILLIGRAM(S): at 10:32

## 2025-04-11 RX ADMIN — DAPTOMYCIN 126 MILLIGRAM(S): 500 INJECTION, POWDER, LYOPHILIZED, FOR SOLUTION INTRAVENOUS at 12:30

## 2025-04-11 RX ADMIN — ROSUVASTATIN CALCIUM 5 MILLIGRAM(S): 20 TABLET, FILM COATED ORAL at 22:56

## 2025-04-11 RX ADMIN — Medication 1 APPLICATION(S): at 06:21

## 2025-04-11 RX ADMIN — APIXABAN 5 MILLIGRAM(S): 2.5 TABLET, FILM COATED ORAL at 12:15

## 2025-04-11 RX ADMIN — Medication 40 MILLIGRAM(S): at 06:19

## 2025-04-11 RX ADMIN — POLYETHYLENE GLYCOL 3350 17 GRAM(S): 17 POWDER, FOR SOLUTION ORAL at 06:20

## 2025-04-11 RX ADMIN — BUMETANIDE 1 MILLIGRAM(S): 1 TABLET ORAL at 06:20

## 2025-04-11 RX ADMIN — GABAPENTIN 100 MILLIGRAM(S): 400 CAPSULE ORAL at 22:56

## 2025-04-11 RX ADMIN — APIXABAN 5 MILLIGRAM(S): 2.5 TABLET, FILM COATED ORAL at 23:39

## 2025-04-11 RX ADMIN — GABAPENTIN 100 MILLIGRAM(S): 400 CAPSULE ORAL at 06:19

## 2025-04-11 RX ADMIN — Medication 2 TABLET(S): at 22:56

## 2025-04-11 RX ADMIN — Medication 1 APPLICATION(S): at 17:30

## 2025-04-11 RX ADMIN — BUMETANIDE 1 MILLIGRAM(S): 1 TABLET ORAL at 17:31

## 2025-04-11 RX ADMIN — Medication 1 APPLICATION(S): at 12:16

## 2025-04-11 RX ADMIN — Medication 10 MILLIGRAM(S): at 17:31

## 2025-04-11 RX ADMIN — CEFTRIAXONE 100 MILLIGRAM(S): 500 INJECTION, POWDER, FOR SOLUTION INTRAMUSCULAR; INTRAVENOUS at 22:55

## 2025-04-11 RX ADMIN — METOPROLOL SUCCINATE 25 MILLIGRAM(S): 50 TABLET, EXTENDED RELEASE ORAL at 06:19

## 2025-04-11 RX ADMIN — TAMSULOSIN HYDROCHLORIDE 0.8 MILLIGRAM(S): 0.4 CAPSULE ORAL at 22:56

## 2025-04-11 RX ADMIN — Medication 1 APPLICATION(S): at 06:47

## 2025-04-11 RX ADMIN — WHITE PETROLATUM 1 APPLICATION(S): 1 OINTMENT TOPICAL at 06:21

## 2025-04-11 RX ADMIN — Medication 150 MICROGRAM(S): at 06:20

## 2025-04-11 RX ADMIN — Medication 283 MILLIGRAM(S): at 06:20

## 2025-04-11 RX ADMIN — WHITE PETROLATUM 1 APPLICATION(S): 1 OINTMENT TOPICAL at 23:04

## 2025-04-11 RX ADMIN — WHITE PETROLATUM 1 APPLICATION(S): 1 OINTMENT TOPICAL at 14:29

## 2025-04-11 RX ADMIN — GABAPENTIN 100 MILLIGRAM(S): 400 CAPSULE ORAL at 13:04

## 2025-04-11 NOTE — PROGRESS NOTE ADULT - ASSESSMENT
74-year-old male patient with h/o BPH, diverticulosis, Graves' disease, hemorrhoids, HLD, hypothyroidism, osteoarthritis, spinal stenosis, spinal cord injury (2004), and multiple spinal surgeries (three cervical and one lumbar), who presented to [facility] on 3/4/25 from [facility] due to lower extremity swelling and abnormal BUN/creatinine levels for further evaluation and management. The patient was recently admitted to [facility] from 02/18/25 through 02/25/25 for sepsis secondary to epidural abscess. He had a peripherally inserted central catheter line in the right arm and was on nightly Ceftriaxone treatment until 04/16/25. At the ED, he was found to have GOVIND, a left soleal vein, and a left gastrocnemius DVT. He was started on heparin gtt. Hospital course complicated by urinary retention requiring Ortega catheter insertion, rhabdomyolysis, elevated LFTs, anasarca, leukocytosis due to thoracic spine discitis OM and early paraspinal abscess, as well as new onset RUE paresthesias and weakness. He is s/p IVC filter placement on 3/17 and was recommended surgical management. He is s/p posterior thoracic laminectomy and fusion on 3/18/25 with Dr. Keith Cantu. Surgical culture with rare staph epi. Recommendations by ID to continue IV Rocephin and Daptomycin through 5/2/25. Patient was evaluated by PM&R and therapy for functional deficits, gait/ADL impairments and acute rehabilitation was recommended. Patient was cleared for discharge to [facility] IRF on 3/25/25. (25 Mar 2025 17:09)    Debility  Thoracic epidural abscess s/p laminectomy and fusion surgery on 3/18  -  brace  - pain control per primary  - fall, spine precautions    Spinal epidural abscess   - S/p thoracic laminectomy and fusion 3/18.   - IV Rocephin 2g HS via PICC line thru 5/2/25  - IV Daptomycin 650mg daily via PICC line thru 5/2/2025  - [place]  brace   - Surgical culture with staph epi possible contaminant but given clinical presentation on admission, IV Dapto added.     GOVIND, resolved  LE edema  Hypoalbuminemia  Proteinuria  Anasarca  - c/w Bumex 1 mg BID (4/4 missed the past few doses due to hold parameters.  reordered with hold for SBP <100 instructions).   - Optimize protein intake.   - mobilize, encourage out of bed to chair often.   - Monitor labs closely , stable renal function w good UOP   - Serum workup negative but will need follow up of  Serum  TRUDY positive for weak IgG Lambda band.  - urine protein 200 mg only ( not nephrotic)   - daily standing weights    Transaminitis, resolved  - encouraged PO  - Limit Tylenol use  - avoid hepatotoxins  - monitor on routine labs    CAD  HTN  HLD  - Metoprolol 25mg daily   - Crestor 5mg HS    Anemia  MARLENA  - s/p 2 units of PRBCs given intra-op  - Monitor Hgb (3/31 Hgb 8.3)  - Transfuse to keep hb >7    Hypothyroidism  - Synthroid 150mcg daily    sacral Decub stage 3  - wound care cx noted  - Continue turning patient Q 2 hours   - Lac hydrin BID BLE  - Aquaphor BID to generalized dry skin     BPH, chronic  Urinary retention likely augmented by degree of anasarca  - Flomax 8mg daily  - Monitor U/O    Acute DVT:   -  US doppler + L soleal and gastroc DVT   - s/p IVC filter 3/17 with interventional cards.   - Could not tolerate VQ scan for r/o PE 2/2 pain  - Restarted Eliquis 5mg BID per neurosurgery    DVT-P: heparin Injectable 5000 Unit(s) SubCutaneous every 8 hours. [ DVT on left]    will follow, d/w rehab team

## 2025-04-11 NOTE — PROGRESS NOTE ADULT - SUBJECTIVE AND OBJECTIVE BOX
HPI:  Mr. Rishabh Shaver is a 74-year-old male patient with past medical history of benign prostatic hyperplasia, diverticulosis, Graves' disease, hemorrhoids, hyperlipidemia, hypothyroidism, osteoarthritis, spinal stenosis, spinal cord injury (2004), and multiple spinal surgeries (three cervical and one lumbar), who presented to [facility] on 3/4/25 from [facility] due to lower extremity swelling and abnormal BUN/creatinine levels for further evaluation and management. The patient was recently admitted to [facility] from 02/18/25 through 02/25/25 for sepsis secondary to epidural abscess. He had a peripherally inserted central catheter line in the right arm and was on nightly Ceftriaxone treatment until 04/16/25. At the ED, he was found to have GOVIND, a left soleal vein, and a left gastrocnemius DVT. He was started on heparin gtt. Hospital course complicated by urinary retention requiring Ortega catheter insertion, rhabdomyolysis, elevated LFTs, anasarca, leukocytosis due to thoracic spine discitis OM and early paraspinal abscess, as well as new onset RUE paresthesias and weakness. He is s/p IVC filter placement on 3/17 and was recommended surgical management. He is s/p posterior thoracic laminectomy and fusion on 3/18/25 with Dr. Keith Cantu. Surgical culture with rare staph epi. Recommendations by ID to continue IV Rocephin and Daptomycin through 5/2/25. Patient was evaluated by PM&R and therapy for functional deficits, gait/ADL impairments and acute rehabilitation was recommended. Patient was cleared for discharge to [facility] IRF on 3/25/25. (25 Mar 2025 17:09)    TDD 4/22/25 home  ___________________________________________________________________________    SUBJECTIVE/ROS  Patient was seen and evaluated at bedside today.  Reported no overnight events and is in no acute distress.  He reports no pain currently and reports he had a good bowel movement this morning following mini enema. Discussed that pt does not need to have a bowel movement every day as long as he is not having accidents between bowel movements. Pt reports that prior to current injury, he did not have a bowel movement every day.     Later messaged by PT with update that patient was unable to heel strike on right side and had no issues with heel strike prior to today. Reports patient is almost dragging his toes.  ___________________________________________________________________________    LABS             9.8    9.03  )-----------( 267      ( 10 Apr 2025 06:52 )             29.9     141  |  100  |  33[H]  ----------------------------<  105[H]  3.5   |  34[H]  |  0.99    Ca    9.3      10 Apr 2025 06:52  TPro  6.4  /  Alb  2.8[L]  /  TBili  0.5  /  DBili  x   /  AST  47[H]  /  ALT  41  /  AlkPhos  67  04-10    ___________________________________________________________________________    MEDICATIONS  (STANDING):  ammonium lactate 12% Lotion 1 Application(s) Topical two times a day  apixaban 5 milliGRAM(s) Oral every 12 hours  AQUAPHOR (petrolatum Ointment) 1 Application(s) Topical three times a day  bethanechol 10 milliGRAM(s) Oral two times a day  buMETAnide 1 milliGRAM(s) Oral two times a day  cefTRIAXone   IVPB 2000 milliGRAM(s) IV Intermittent every 24 hours  chlorhexidine 4% Liquid 1 Application(s) Topical <User Schedule>  collagenase Ointment 1 Application(s) Topical daily  DAPTOmycin IVPB 650 milliGRAM(s) IV Intermittent every 24 hours  docusate sodium Enema 283 milliGRAM(s) Rectal <User Schedule>  gabapentin   Oral   gabapentin 100 milliGRAM(s) Oral three times a day  levothyroxine 150 MICROGram(s) Oral daily  metoprolol succinate ER 25 milliGRAM(s) Oral daily  oxyCODONE  ER Tablet 10 milliGRAM(s) Oral every 12 hours  pantoprazole    Tablet 40 milliGRAM(s) Oral before breakfast  polyethylene glycol 3350 17 Gram(s) Oral <User Schedule>  rosuvastatin 5 milliGRAM(s) Oral at bedtime  senna 2 Tablet(s) Oral at bedtime  tamsulosin 0.8 milliGRAM(s) Oral at bedtime    MEDICATIONS  (PRN):  aluminum hydroxide/magnesium hydroxide/simethicone Suspension 30 milliLiter(s) Oral every 4 hours PRN Dyspepsia  cyclobenzaprine 5 milliGRAM(s) Oral three times a day PRN Muscle Spasm  lactulose Syrup 20 Gram(s) Oral three times a day PRN for constipation  melatonin 3 milliGRAM(s) Oral at bedtime PRN Insomnia  oxyCODONE    IR 15 milliGRAM(s) Oral every 6 hours PRN Moderate Pain (4 - 6)  sodium chloride 0.9% lock flush 10 milliLiter(s) IV Push every 1 hour PRN Pre/post blood products, medications, blood draw, and to maintain line patency    ___________________________________________________________________________    PHYSICAL EXAM:    VITALS  T(C): 36.8 (04-11-25 @ 07:34), Max: 36.8 (04-11-25 @ 07:34)  HR: 78 (04-11-25 @ 07:34) (78 - 79)  BP: 103/66 (04-11-25 @ 07:34) (103/66 - 112/68)  RR: 14 (04-11-25 @ 07:34) (14 - 16)  SpO2: 94% (04-11-25 @ 07:34) (94% - 94%)    Constitutional - NAD, Comfortable  Chest - good chest expansion, good respiratory effort  Cardio - warm and well perfused  Neurologic Exam:                           Orientation: Awake, A&O to self, place, date, year, situation     Speech - Fluent, Comprehensible, No dysarthria, No aphasia      Motor -  observed at bedside in PT transferring from edge of bed to standing with EasyStand. Upon standing, pt's left knee has difficulty fully straightening. Pt's right leg is hyperextended, but patient is able to briefly correct. Discussed with PT; consider Swedish knee cage to prevent RLE genu recurvatum   Psychiatric - Mood stable, Affect WNL  Skin: DTI to sacrum    ___________________________________________________________________________ HPI:  Mr. Rishabh Shaver is a 74-year-old male patient with past medical history of benign prostatic hyperplasia, diverticulosis, Graves' disease, hemorrhoids, hyperlipidemia, hypothyroidism, osteoarthritis, spinal stenosis, spinal cord injury (2004), and multiple spinal surgeries (three cervical and one lumbar), who presented to [facility] on 3/4/25 from [facility] due to lower extremity swelling and abnormal BUN/creatinine levels for further evaluation and management. The patient was recently admitted to [facility] from 02/18/25 through 02/25/25 for sepsis secondary to epidural abscess. He had a peripherally inserted central catheter line in the right arm and was on nightly Ceftriaxone treatment until 04/16/25. At the ED, he was found to have GOVIND, a left soleal vein, and a left gastrocnemius DVT. He was started on heparin gtt. Hospital course complicated by urinary retention requiring Ortega catheter insertion, rhabdomyolysis, elevated LFTs, anasarca, leukocytosis due to thoracic spine discitis OM and early paraspinal abscess, as well as new onset RUE paresthesias and weakness. He is s/p IVC filter placement on 3/17 and was recommended surgical management. He is s/p posterior thoracic laminectomy and fusion on 3/18/25 with Dr. Keith Cantu. Surgical culture with rare staph epi. Recommendations by ID to continue IV Rocephin and Daptomycin through 5/2/25. Patient was evaluated by PM&R and therapy for functional deficits, gait/ADL impairments and acute rehabilitation was recommended. Patient was cleared for discharge to Health system IRF on 3/25/25. (25 Mar 2025 17:09)    TDD 4/22/25 home  ___________________________________________________________________________    SUBJECTIVE/ROS  Patient was seen and evaluated at bedside today.  Reported no overnight events and is in no acute distress.  He reports no pain currently and reports he had a good bowel movement this morning following mini enema. Discussed that pt does not need to have a bowel movement every day as long as he is not having accidents between bowel movements. Pt reports that prior to current injury, he did not have a bowel movement every day.   Later messaged by PT with update that patient was unable to heel strike on right side and had no issues with heel strike prior to today. Reports patient is almost dragging his toes.  ___________________________________________________________________________    Vital Signs Last 24 Hrs  T(C): 36.8 (11 Apr 2025 07:34), Max: 36.8 (11 Apr 2025 07:34)  T(F): 98.3 (11 Apr 2025 07:34), Max: 98.3 (11 Apr 2025 07:34)  HR: 78 (11 Apr 2025 07:34) (78 - 79)  BP: 103/66 (11 Apr 2025 07:34) (103/66 - 112/68)  RR: 14 (11 Apr 2025 07:34) (14 - 16)  SpO2: 94% (11 Apr 2025 07:34) (94% - 94%)    ___________________________________________________________________________    LABS             9.8    9.03  )-----------( 267      ( 10 Apr 2025 06:52 )             29.9     141  |  100  |  33[H]  ----------------------------<  105[H]  3.5   |  34[H]  |  0.99    Ca    9.3      10 Apr 2025 06:52  TPro  6.4  /  Alb  2.8[L]  /  TBili  0.5  /  DBili  x   /  AST  47[H]  /  ALT  41  /  AlkPhos  67  04-10    ___________________________________________________________________________    MEDICATIONS  (STANDING):  ammonium lactate 12% Lotion 1 Application(s) Topical two times a day  apixaban 5 milliGRAM(s) Oral every 12 hours  AQUAPHOR (petrolatum Ointment) 1 Application(s) Topical three times a day  bethanechol 10 milliGRAM(s) Oral two times a day  buMETAnide 1 milliGRAM(s) Oral two times a day  cefTRIAXone   IVPB 2000 milliGRAM(s) IV Intermittent every 24 hours  chlorhexidine 4% Liquid 1 Application(s) Topical <User Schedule>  collagenase Ointment 1 Application(s) Topical daily  DAPTOmycin IVPB 650 milliGRAM(s) IV Intermittent every 24 hours  docusate sodium Enema 283 milliGRAM(s) Rectal <User Schedule>  gabapentin   Oral   gabapentin 100 milliGRAM(s) Oral three times a day  levothyroxine 150 MICROGram(s) Oral daily  metoprolol succinate ER 25 milliGRAM(s) Oral daily  oxyCODONE  ER Tablet 10 milliGRAM(s) Oral every 12 hours  pantoprazole    Tablet 40 milliGRAM(s) Oral before breakfast  polyethylene glycol 3350 17 Gram(s) Oral <User Schedule>  rosuvastatin 5 milliGRAM(s) Oral at bedtime  senna 2 Tablet(s) Oral at bedtime  tamsulosin 0.8 milliGRAM(s) Oral at bedtime    MEDICATIONS  (PRN):  aluminum hydroxide/magnesium hydroxide/simethicone Suspension 30 milliLiter(s) Oral every 4 hours PRN Dyspepsia  cyclobenzaprine 5 milliGRAM(s) Oral three times a day PRN Muscle Spasm  lactulose Syrup 20 Gram(s) Oral three times a day PRN for constipation  melatonin 3 milliGRAM(s) Oral at bedtime PRN Insomnia  oxyCODONE    IR 15 milliGRAM(s) Oral every 6 hours PRN Moderate Pain (4 - 6)  sodium chloride 0.9% lock flush 10 milliLiter(s) IV Push every 1 hour PRN Pre/post blood products, medications, blood draw, and to maintain line patency    ___________________________________________________________________________    PHYSICAL EXAM:    Constitutional - NAD, Comfortable  Chest - good chest expansion, good respiratory effort  Cardio - warm and well perfused  Neurologic Exam:                           Orientation: Awake, A&O to self, place, date, year, situation     Speech - Fluent, Comprehensible, No dysarthria, No aphasia      Motor -  observed at bedside in PT transferring from edge of bed to standing with EasyStand. Upon standing, pt's left knee has difficulty fully straightening. Pt's right leg is hyperextended, but patient is able to briefly correct. Discussed with PT; consider Swedish knee cage to prevent RLE genu recurvatum   Psychiatric - Mood stable, Affect WNL  Skin: DTI to sacrum    ___________________________________________________________________________ HPI:  Mr. Rishabh Shaver is a 74-year-old male patient with past medical history of benign prostatic hyperplasia, diverticulosis, Graves' disease, hemorrhoids, hyperlipidemia, hypothyroidism, osteoarthritis, spinal stenosis, spinal cord injury (2004), and multiple spinal surgeries (three cervical and one lumbar), who presented to Health system on 3/4/25 from [facility] due to lower extremity swelling and abnormal BUN/creatinine levels for further evaluation and management. The patient was recently admitted to Health system from 02/18/25 through 02/25/25 for sepsis secondary to epidural abscess. He had a peripherally inserted central catheter line in the right arm and was on nightly Ceftriaxone treatment until 04/16/25. At the ED, he was found to have GOVIND, a left soleal vein, and a left gastrocnemius DVT. He was started on heparin gtt. Hospital course complicated by urinary retention requiring Ortega catheter insertion, rhabdomyolysis, elevated LFTs, anasarca, leukocytosis due to thoracic spine discitis OM and early paraspinal abscess, as well as new onset RUE paresthesias and weakness. He is s/p IVC filter placement on 3/17 and was recommended surgical management. He is s/p posterior thoracic laminectomy and fusion on 3/18/25 with Dr. Keith Cantu. Surgical culture with rare staph epi. Recommendations by ID to continue IV Rocephin and Daptomycin through 5/2/25. Patient was evaluated by PM&R and therapy for functional deficits, gait/ADL impairments and acute rehabilitation was recommended. Patient was cleared for discharge to [facility] IRF on 3/25/25. (25 Mar 2025 17:09)    TDD 4/25/25 home  ___________________________________________________________________________    SUBJECTIVE/ROS  Patient was seen and evaluated at bedside today.  Reported no overnight events and is in no acute distress.  He reports no pain currently and reports he had a good bowel movement this morning following mini enema.   Discussed that pt does not need to have a bowel movement every day as long as he is not having accidents between bowel movements.   Pt reports that prior to current injury, he did not have a bowel movement every day.   Case was discussed at family meeting yesterday.  Tentative discharge date updated as outlined above.  Patient is eager to continue participation on the recommended rehabilitation program.  Later messaged by PT with update that patient was unable to heel strike on right side and had no issues with heel strike prior to today. Reports patient is almost dragging his toes.  ___________________________________________________________________________    Vital Signs Last 24 Hrs  T(C): 36.8 (11 Apr 2025 07:34), Max: 36.8 (11 Apr 2025 07:34)  T(F): 98.3 (11 Apr 2025 07:34), Max: 98.3 (11 Apr 2025 07:34)  HR: 78 (11 Apr 2025 07:34) (78 - 79)  BP: 103/66 (11 Apr 2025 07:34) (103/66 - 112/68)  RR: 14 (11 Apr 2025 07:34) (14 - 16)  SpO2: 94% (11 Apr 2025 07:34) (94% - 94%)    ___________________________________________________________________________    LABS             9.8    9.03  )-----------( 267      ( 10 Apr 2025 06:52 )             29.9     141  |  100  |  33[H]  ----------------------------<  105[H]  3.5   |  34[H]  |  0.99    Ca    9.3      10 Apr 2025 06:52  TPro  6.4  /  Alb  2.8[L]  /  TBili  0.5  /  DBili  x   /  AST  47[H]  /  ALT  41  /  AlkPhos  67  04-10    ___________________________________________________________________________    MEDICATIONS  (STANDING):  ammonium lactate 12% Lotion 1 Application(s) Topical two times a day  apixaban 5 milliGRAM(s) Oral every 12 hours  AQUAPHOR (petrolatum Ointment) 1 Application(s) Topical three times a day  bethanechol 10 milliGRAM(s) Oral two times a day  buMETAnide 1 milliGRAM(s) Oral two times a day  cefTRIAXone   IVPB 2000 milliGRAM(s) IV Intermittent every 24 hours  chlorhexidine 4% Liquid 1 Application(s) Topical <User Schedule>  collagenase Ointment 1 Application(s) Topical daily  DAPTOmycin IVPB 650 milliGRAM(s) IV Intermittent every 24 hours  docusate sodium Enema 283 milliGRAM(s) Rectal <User Schedule>  gabapentin   Oral   gabapentin 100 milliGRAM(s) Oral three times a day  levothyroxine 150 MICROGram(s) Oral daily  metoprolol succinate ER 25 milliGRAM(s) Oral daily  oxyCODONE  ER Tablet 10 milliGRAM(s) Oral every 12 hours  pantoprazole    Tablet 40 milliGRAM(s) Oral before breakfast  polyethylene glycol 3350 17 Gram(s) Oral <User Schedule>  rosuvastatin 5 milliGRAM(s) Oral at bedtime  senna 2 Tablet(s) Oral at bedtime  tamsulosin 0.8 milliGRAM(s) Oral at bedtime    MEDICATIONS  (PRN):  aluminum hydroxide/magnesium hydroxide/simethicone Suspension 30 milliLiter(s) Oral every 4 hours PRN Dyspepsia  cyclobenzaprine 5 milliGRAM(s) Oral three times a day PRN Muscle Spasm  lactulose Syrup 20 Gram(s) Oral three times a day PRN for constipation  melatonin 3 milliGRAM(s) Oral at bedtime PRN Insomnia  oxyCODONE    IR 15 milliGRAM(s) Oral every 6 hours PRN Moderate Pain (4 - 6)  sodium chloride 0.9% lock flush 10 milliLiter(s) IV Push every 1 hour PRN Pre/post blood products, medications, blood draw, and to maintain line patency    ___________________________________________________________________________    PHYSICAL EXAM:    Constitutional - NAD, Comfortable  Chest - good chest expansion, good respiratory effort  Cardio - warm and well perfused  Neurologic Exam:                           Orientation: Awake, A&O to self, place, date, year, situation     Speech - Fluent, Comprehensible, No dysarthria, No aphasia      Motor -  observed at bedside in PT transferring from edge of bed to standing with EasyStand. Upon standing, pt's left knee has difficulty fully straightening. Pt's right leg is hyperextended, but patient is able to briefly correct. Discussed with PT; consider Swedish knee cage to prevent RLE genu recurvatum   Psychiatric - Mood stable, Affect WNL  Skin: DTI to sacrum    ___________________________________________________________________________

## 2025-04-11 NOTE — PROGRESS NOTE ADULT - ASSESSMENT
Assessment/Plan:  Mr. Rishabh Shaver is a 74-year-old man with past medical history of benign prostatic hyperplasia, diverticulosis, Graves' disease, hemorrhoids, hyperlipidemia, hypothyroidism, osteoarthritis, spinal stenosis, spinal cord injury (2004), and multiple spinal surgeries (three cervical and one lumbar), who is admitted for Acute Inpatient Rehabilitation with a multidisciplinary rehab program at [facility] with functional impairments in ADLs and mobility secondary to a thoracic epidural abscess s/p posterior thoracic laminectomy and fusion on 3/18/25 with Dr. Keith Cantu.       #Thoracic epidural abscess s/p laminectomy and fusion surgery      * C8 AIS D Incomplete paraplegia      *  brace when OOB      * Flexeril 5mg PO TID PRN for neck soreness      * Staples removed on POD #14 (4/1/25)      * Sacral wound assessment by wound/skin team      * s/p Ortega with successful TOV - c/w Flomax 0.8mg PO daily      * Right Venodyne only due to left calf DVT (s/p pre-op IVC filter)   - Activity Limitations: Decreased social, vocational and leisure activities, decreased self care and ADLs, decreased mobility, decreased ability to manage household and finances  - Comprehensive Multidisciplinary Rehab Program:      * 3 hours a day, 5 days a week      * PT 1hr/day: Focused on improving strength, endurance, coordination, balance, functional mobility, and transfers      * OT 1hr/day: Focused on improving strength, fine motor skills, coordination, posture and ADLs    -----------------------------------------------------------------------------------    Concurrent Medical Problems    #GOVIND on CKD (resolved)  - LE edema  - Hypoalbuminemia  - Proteinuria  - IVF as needed with fluid balance  - Significant debilitating anasarca   - Trial of albumin Lasix started 3/8->improvement  - s/p IV Lasix/IV albumin BID (3/9)  - Change to Bumex/Albumin (3/11)-> good effect  - Switched to PO Bumex 1mg BID 3/24  - Trend Albumin (4/10 - 2.8)  - Trend Protein total (4/10 - 6.4 ); Total protein, Random Urine (3/18- 24)  - Trend CMP (4/10 - Cr 0.99/ BUN 33)    #L gastrocnemius and L soleal DVT  - appears provoked from decrease ambulation from recent spinal abscess  - Could not tolerate VQ scan for r/o PE 2/2 pain  - Eliquis 5mg PO q12h - will be new med on discharge (of note patient completed Eliquis load)   - Therapeutic Lovenox (3/12) in the setting of possible spine intervention  - Last dose of AC night of 3/16. Restarted Eliquis 5mg BID 4/5/25  - S/P IVC filter placement 3/17  - repeat US 4/8 with stable L soleal DVT and no proprogation     #Spinal epidural abscess POA and already being treated with IV antibiotics with suspected worsening of disease  - S/p thoracic laminectomy and fusion 3/18. [place]  brace   - Surgical culture with staph epi possible contaminant but given clinical presentation on admission, IV Dapto added  - IV Rocephin 2g daily via PICC line thru 5/2/25  - IV Daptomycin 650mg daily via PICC line thru 5/2/25  - gabapentin 100mg PO BID x 3 days (4/8-4/11), then TID starting  today 4/11  - reduce long-acting Oxycontin to 10mg PO q12h 4/7  - oxycodone 15mg PO q6h PRN moderate pain  - Flexeril 5mg PO TID PRN for muscle spasm  - Staples d/teodoro in rehab    #Mild Rhabdomyolysis Improving  - Associated elevated transaminases  - Suspect from Ansarca/Edema  - Improving with adequate diuresis of edema  - Associated elevated transaminase; relatively stable. Continue to monitor (4/10- AST 47/ALT 41)    #Leukocytosis (resolved)  - Likely rx to DVT + Discitis  - No other signs of sepsis , afebrile   - Trend CBC and monitor fever curve (4/10- WBC 9.03)    #Hypothyroidism  - Synthroid 150mcg PO daily    #CAD  #HTN  #HLD  - metoprolol succinate 25mg PO daily   - rosuvastatin 5mg PO qhs    #Anemia/MARLENA  - trend H/H (4/10- 9.8/29.9)  - transfuse if Hgb <7 PRN     #Sleep:  - melatonin 3mg PO qhs PRN for sleep    #GI/Bowel:  - senna 2 tablets PO qhs  - Miralax 17g PO daily  - daily mini-enema  - lactulose 30mL PO TID PRN for constipation  - Maalox 30mL PO q4h PRN for dyspepsia  - GI ppx: pantoprazole 40mg PO daily     #BPH  #Urinary retention likely augmented by degree of anasarca  - S/P Ortega; TOV  ->failed. Ortega re-inserted 3/6->fell out without knowing with associated hematuria->recurrent retention 3/7->Ortega re-inserted (3/7) --> successful TOV 3/25  - Flomax 0.8mg PO qhs  - started bethanechol 10mg PO BID 4/6  - Monitor U/O    #Skin/Pressure Injury:   Skin assessment on admission:   - Generalized dry flaky skin  - 19cm posterior cervical incision with 32 staples and 2 steri strips  - Left buttock DTI pressure injury measuring 14 x 9 cm      * appearing as a dark purple discoloration of intact skin, with a central superficial open area measuring 6 x 2 cm      * at the periphery of the 14 x 9 cm area, the discoloration is somewhat lighter      * dark red non-blanchable erythema  - Bilateral lower extremity edema  - Healed lumbar surgical incision scar  - Right heel blanchable redness  - Bilateral dry cracked heels and plantar surface  - Lac hydrin BID BLE  - Aquaphor BID to generalized dry skin   - Appreciate wound care consult     #Diet/Dysphagia:  - Dysphagia: SLP consult for swallow function evaluation and treatment  - Current Diet: Regular  - Aspiration Precautions  - Nutrition consult     #Patient Education  - Education provided on the following:      * Admitting diagnosis and functional implications      * Functional goals      * Bladder management      * Bowel management      * Skin care management      * Intensity of service and scheduling of rehab disciplines      * Plan of care and role of interdisciplinary team conference in discharge planning      * Reconciliation of medications from prior institution    #Precautions / PROPHYLAXIS:   - Falls, Spinal  - Ortho: Weight bearing status: FWB;  brace OOB  - DVT ppx: Eliquis 5mg PO BID, TEDs  - Pressure injury/Skin: Turn Q2hrs while in bed, OOB to Chair, PT/OT      ---------------  Code Status: FULL  Emergency Contact:    Outpatient Follow-up:    Yue Wiggins  [facility]  [address], [address]  [place] [address]  Phone: [phone]  Fax: [phone]  Established Patient  Follow Up Time: 1 week    [facility]  Neurosurgery  [address] [place] [address], [address]  [place] [address]  Phone: [phone]  Fax: [phone]  Established Patient  Follow Up Time: 2 weeks    Luis A Brennan  [facility] Physician [facility]  CARDIOLOGY [address]  Scheduled Appointment: 04/23/2025    --------------   Assessment/Plan:  Mr. Rishabh Shaver is a 74-year-old man with past medical history of benign prostatic hyperplasia, diverticulosis, Graves' disease, hemorrhoids, hyperlipidemia, hypothyroidism, osteoarthritis, spinal stenosis, spinal cord injury (2004), and multiple spinal surgeries (three cervical and one lumbar), who is admitted for Acute Inpatient Rehabilitation with a multidisciplinary rehab program at [facility] with functional impairments in ADLs and mobility secondary to a thoracic epidural abscess s/p posterior thoracic laminectomy and fusion on 3/18/25 with Dr. Keith Cantu.       #Thoracic epidural abscess s/p laminectomy and fusion surgery      * C8 AIS D Incomplete paraplegia      *  brace when OOB      * Flexeril 5mg PO TID PRN for neck soreness      * Staples removed on POD #14 (4/1/25)      * Sacral wound assessment by wound/skin team      * s/p Ortega with successful TOV - c/w Flomax 0.8mg PO daily      * Right Venodyne only due to left calf DVT (s/p pre-op IVC filter)   - Activity Limitations: Decreased social, vocational and leisure activities, decreased self care and ADLs, decreased mobility, decreased ability to manage household and finances  - Comprehensive Multidisciplinary Rehab Program:      * 3 hours a day, 5 days a week      * PT 1hr/day: Focused on improving strength, endurance, coordination, balance, functional mobility, and transfers      * OT 1hr/day: Focused on improving strength, fine motor skills, coordination, posture and ADLs    -----------------------------------------------------------------------------------    Concurrent Medical Problems    #GOVIND on CKD (resolved)  - LE edema  - Hypoalbuminemia  - Proteinuria  - IVF as needed with fluid balance  - Significant debilitating anasarca   - Trial of albumin Lasix started 3/8->improvement  - s/p IV Lasix/IV albumin BID (3/9)  - Change to Bumex/Albumin (3/11)-> good effect  - Switched to PO Bumex 1mg BID 3/24  - Trend Albumin (4/10 - 2.8)  - Trend Protein total (4/10 - 6.4 ); Total protein, Random Urine (3/18- 24)  - Trend CMP (4/10 - Cr 0.99/ BUN 33)    #L gastrocnemius and L soleal DVT  - appears provoked from decrease ambulation from recent spinal abscess  - Could not tolerate VQ scan for r/o PE 2/2 pain  - Eliquis 5mg PO q12h - will be new med on discharge (of note patient completed Eliquis load)   - Therapeutic Lovenox (3/12) in the setting of possible spine intervention  - Last dose of AC night of 3/16. Restarted Eliquis 5mg BID 4/5/25  - S/P IVC filter placement 3/17  - repeat US 4/8 with stable L soleal DVT and no proprogation     #Spinal epidural abscess POA and already being treated with IV antibiotics with suspected worsening of disease  - S/p thoracic laminectomy and fusion 3/18. [place]  brace   - Surgical culture with staph epi possible contaminant but given clinical presentation on admission, IV Dapto added  - IV Rocephin 2g daily via PICC line thru 5/2/25  - IV Daptomycin 650mg daily via PICC line thru 5/2/25  - gabapentin 100mg PO BID x 3 days (4/8-4/11), then TID starting  today 4/11  - reduce long-acting Oxycontin to 10mg PO q12h 4/7  - oxycodone 15mg PO q6h PRN moderate pain  - Flexeril 5mg PO TID PRN for muscle spasm  - Staples d/teodoro in rehab    #Mild Rhabdomyolysis Improving  - Associated elevated transaminases  - Suspect from Ansarca/Edema  - Improving with adequate diuresis of edema  - Associated elevated transaminase; relatively stable. Continue to monitor (4/10- AST 47/ALT 41)    #Leukocytosis (resolved)  - Likely rx to DVT + Discitis  - No other signs of sepsis , afebrile   - Trend CBC and monitor fever curve (4/10- WBC 9.03)    #Hypothyroidism  - Synthroid 150mcg PO daily    #CAD  #HTN  #HLD  - metoprolol succinate 25mg PO daily   - rosuvastatin 5mg PO qhs    #Anemia/MARLENA  - trend H/H (4/10- 9.8/29.9)  - transfuse if Hgb <7 PRN     #Sleep:  - melatonin 3mg PO qhs PRN for sleep    #GI/Bowel:  - senna 2 tablets PO qhs  - Miralax 17g PO daily  - daily mini-enema at 6AM  - lactulose 30mL PO TID PRN for constipation  - Maalox 30mL PO q4h PRN for dyspepsia  - GI ppx: pantoprazole 40mg PO daily     #BPH  #Urinary retention likely augmented by degree of anasarca  - S/P Ortega; TOV  ->failed. Ortega re-inserted 3/6->fell out without knowing with associated hematuria->recurrent retention 3/7->Ortega re-inserted (3/7) --> successful TOV 3/25  - Flomax 0.8mg PO qhs  - started bethanechol 10mg PO BID 4/6  - Monitor U/O    #Skin/Pressure Injury:   Skin assessment on admission:   - Generalized dry flaky skin  - 19cm posterior cervical incision with 32 staples and 2 steri strips  - Left buttock DTI pressure injury measuring 14 x 9 cm      * appearing as a dark purple discoloration of intact skin, with a central superficial open area measuring 6 x 2 cm      * at the periphery of the 14 x 9 cm area, the discoloration is somewhat lighter      * dark red non-blanchable erythema  - Bilateral lower extremity edema  - Healed lumbar surgical incision scar  - Right heel blanchable redness  - Bilateral dry cracked heels and plantar surface  - Lac hydrin BID BLE  - Aquaphor BID to generalized dry skin   - Appreciate wound care consult     #Diet/Dysphagia:  - Dysphagia: SLP consult for swallow function evaluation and treatment  - Current Diet: Regular  - Aspiration Precautions  - Nutrition consult     #Patient Education  - Education provided on the following:      * Admitting diagnosis and functional implications      * Functional goals      * Bladder management      * Bowel management      * Skin care management      * Intensity of service and scheduling of rehab disciplines      * Plan of care and role of interdisciplinary team conference in discharge planning      * Reconciliation of medications from prior institution    #Precautions / PROPHYLAXIS:   - Falls, Spinal  - Ortho: Weight bearing status: FWB;  brace OOB  - DVT ppx: Eliquis 5mg PO BID, TEDs  - Pressure injury/Skin: Turn Q2hrs while in bed, OOB to Chair, PT/OT      ---------------  Code Status: FULL  Emergency Contact:    Outpatient Follow-up:    Yue Wiggins  [facility]  [address], [address]  [place] [address]  Phone: [phone]  Fax: [phone]  Established Patient  Follow Up Time: 1 week    Keith Cantu [facility]  Neurosurgery  [address], [address]  [place] [address]  Phone: [phone]  Fax: [phone]  Established Patient  Follow Up Time: 2 weeks    Luis A Brennan  [facility] Physician [facility]  CARDIOLOGY [address]  Scheduled Appointment: 04/23/2025    --------------

## 2025-04-11 NOTE — PROGRESS NOTE ADULT - SUBJECTIVE AND OBJECTIVE BOX
Patient is a 75y old  Male who presents with a chief complaint of Thoracic epidural abscess s/p laminectomy and fusion surgery (07 Apr 2025 08:20)      SUBJECTIVE / OVERNIGHT EVENTS:  Pt seen and examined at bedside. No acute events overnight.  No new complaints.       Allergies    No Known Allergies    MEDICATIONS  (STANDING):  ammonium lactate 12% Lotion 1 Application(s) Topical two times a day  apixaban 5 milliGRAM(s) Oral every 12 hours  AQUAPHOR (petrolatum Ointment) 1 Application(s) Topical three times a day  bethanechol 10 milliGRAM(s) Oral two times a day  buMETAnide 1 milliGRAM(s) Oral two times a day  cefTRIAXone   IVPB 2000 milliGRAM(s) IV Intermittent every 24 hours  chlorhexidine 4% Liquid 1 Application(s) Topical <User Schedule>  collagenase Ointment 1 Application(s) Topical daily  DAPTOmycin IVPB 650 milliGRAM(s) IV Intermittent every 24 hours  docusate sodium Enema 283 milliGRAM(s) Rectal <User Schedule>  gabapentin   Oral   gabapentin 100 milliGRAM(s) Oral three times a day  levothyroxine 150 MICROGram(s) Oral daily  metoprolol succinate ER 25 milliGRAM(s) Oral daily  oxyCODONE  ER Tablet 10 milliGRAM(s) Oral every 12 hours  pantoprazole    Tablet 40 milliGRAM(s) Oral before breakfast  polyethylene glycol 3350 17 Gram(s) Oral <User Schedule>  rosuvastatin 5 milliGRAM(s) Oral at bedtime  senna 2 Tablet(s) Oral at bedtime  tamsulosin 0.8 milliGRAM(s) Oral at bedtime    MEDICATIONS  (PRN):  aluminum hydroxide/magnesium hydroxide/simethicone Suspension 30 milliLiter(s) Oral every 4 hours PRN Dyspepsia  cyclobenzaprine 5 milliGRAM(s) Oral three times a day PRN Muscle Spasm  lactulose Syrup 20 Gram(s) Oral three times a day PRN for constipation  melatonin 3 milliGRAM(s) Oral at bedtime PRN Insomnia  oxyCODONE    IR 15 milliGRAM(s) Oral every 6 hours PRN Moderate Pain (4 - 6)  sodium chloride 0.9% lock flush 10 milliLiter(s) IV Push every 1 hour PRN Pre/post blood products, medications, blood draw, and to maintain line patency    T(C): 36.8 (04-11-25 @ 07:34), Max: 36.8 (04-11-25 @ 07:34)  T(F): 98.3 (04-11-25 @ 07:34), Max: 98.3 (04-11-25 @ 07:34)  HR: 78 (04-11-25 @ 07:34) (78 - 79)  BP: 103/66 (04-11-25 @ 07:34) (103/66 - 112/68)  ABP: --  ABP(mean): --  RR: 14 (04-11-25 @ 07:34) (14 - 16)  SpO2: 94% (04-11-25 @ 07:34) (94% - 94%)    PHYSICAL EXAM:  GENERAL: NAD overweight AAOx3 male   HEAD:  Atraumatic, Normocephalic  NECK: Supple, No JVD  CHEST/LUNG: Clear to auscultation bilaterally; No wheeze, nonlabored breathing  HEART: Regular rate and rhythm; No murmurs, rubs, or gallops  ABDOMEN: Soft, Nontender, Nondistended; Bowel sounds present  EXTREMITIES: No clubbing, cyanosis, or edema  PSYCH: calm, appropriate mood    LABS:                        9.8    9.03  )-----------( 267      ( 10 Apr 2025 06:52 )             29.9     04-10    141  |  100  |  33[H]  ----------------------------<  105[H]  3.5   |  34[H]  |  0.99    Ca    9.3      10 Apr 2025 06:52    TPro  6.4  /  Alb  2.8[L]  /  TBili  0.5  /  DBili  x   /  AST  47[H]  /  ALT  41  /  AlkPhos  67  04-10      Urinalysis Basic - ( 10 Apr 2025 06:52 )    Color: x / Appearance: x / SG: x / pH: x  Gluc: 105 mg/dL / Ketone: x  / Bili: x / Urobili: x   Blood: x / Protein: x / Nitrite: x   Leuk Esterase: x / RBC: x / WBC x   Sq Epi: x / Non Sq Epi: x / Bacteria: x       CAPILLARY BLOOD GLUCOSE            Urinalysis Basic - ( 10 Apr 2025 06:52 )    Color: x / Appearance: x / SG: x / pH: x  Gluc: 105 mg/dL / Ketone: x  / Bili: x / Urobili: x   Blood: x / Protein: x / Nitrite: x   Leuk Esterase: x / RBC: x / WBC x   Sq Epi: x / Non Sq Epi: x / Bacteria: x        RADIOLOGY & ADDITIONAL TESTS:    Consultant(s) Notes Reviewed:  [x ] YES  [ ] NO  Care Discussed with Consultants/Other Providers [ x] YES  [ ] NO  Imaging Personally Reviewed:  [x ] YES  [ ] NO

## 2025-04-12 PROCEDURE — 99231 SBSQ HOSP IP/OBS SF/LOW 25: CPT

## 2025-04-12 RX ADMIN — Medication 150 MICROGRAM(S): at 05:23

## 2025-04-12 RX ADMIN — WHITE PETROLATUM 1 APPLICATION(S): 1 OINTMENT TOPICAL at 13:35

## 2025-04-12 RX ADMIN — Medication 10 MILLIGRAM(S): at 05:23

## 2025-04-12 RX ADMIN — GABAPENTIN 100 MILLIGRAM(S): 400 CAPSULE ORAL at 22:08

## 2025-04-12 RX ADMIN — ROSUVASTATIN CALCIUM 5 MILLIGRAM(S): 20 TABLET, FILM COATED ORAL at 22:03

## 2025-04-12 RX ADMIN — Medication 10 MILLIGRAM(S): at 17:02

## 2025-04-12 RX ADMIN — BUMETANIDE 1 MILLIGRAM(S): 1 TABLET ORAL at 05:23

## 2025-04-12 RX ADMIN — CEFTRIAXONE 100 MILLIGRAM(S): 500 INJECTION, POWDER, FOR SOLUTION INTRAMUSCULAR; INTRAVENOUS at 22:03

## 2025-04-12 RX ADMIN — Medication 1 APPLICATION(S): at 06:41

## 2025-04-12 RX ADMIN — Medication 1 APPLICATION(S): at 05:28

## 2025-04-12 RX ADMIN — METOPROLOL SUCCINATE 25 MILLIGRAM(S): 50 TABLET, EXTENDED RELEASE ORAL at 05:23

## 2025-04-12 RX ADMIN — TAMSULOSIN HYDROCHLORIDE 0.8 MILLIGRAM(S): 0.4 CAPSULE ORAL at 22:02

## 2025-04-12 RX ADMIN — GABAPENTIN 100 MILLIGRAM(S): 400 CAPSULE ORAL at 13:34

## 2025-04-12 RX ADMIN — Medication 2 TABLET(S): at 22:03

## 2025-04-12 RX ADMIN — APIXABAN 5 MILLIGRAM(S): 2.5 TABLET, FILM COATED ORAL at 11:12

## 2025-04-12 RX ADMIN — WHITE PETROLATUM 1 APPLICATION(S): 1 OINTMENT TOPICAL at 22:08

## 2025-04-12 RX ADMIN — BUMETANIDE 1 MILLIGRAM(S): 1 TABLET ORAL at 13:35

## 2025-04-12 RX ADMIN — DAPTOMYCIN 126 MILLIGRAM(S): 500 INJECTION, POWDER, LYOPHILIZED, FOR SOLUTION INTRAVENOUS at 12:43

## 2025-04-12 RX ADMIN — WHITE PETROLATUM 1 APPLICATION(S): 1 OINTMENT TOPICAL at 05:27

## 2025-04-12 RX ADMIN — Medication 3 MILLIGRAM(S): at 22:03

## 2025-04-12 RX ADMIN — APIXABAN 5 MILLIGRAM(S): 2.5 TABLET, FILM COATED ORAL at 23:00

## 2025-04-12 RX ADMIN — Medication 283 MILLIGRAM(S): at 05:27

## 2025-04-12 RX ADMIN — GABAPENTIN 100 MILLIGRAM(S): 400 CAPSULE ORAL at 05:27

## 2025-04-12 RX ADMIN — Medication 40 MILLIGRAM(S): at 05:23

## 2025-04-12 NOTE — PROGRESS NOTE ADULT - ASSESSMENT
Assessment/Plan:  Mr. Rishabh Shaver is a 74-year-old man with past medical history of benign prostatic hyperplasia, diverticulosis, Graves' disease, hemorrhoids, hyperlipidemia, hypothyroidism, osteoarthritis, spinal stenosis, spinal cord injury (2004), and multiple spinal surgeries (three cervical and one lumbar), who is admitted for Acute Inpatient Rehabilitation with a multidisciplinary rehab program at [facility] with functional impairments in ADLs and mobility secondary to a thoracic epidural abscess s/p posterior thoracic laminectomy and fusion on 3/18/25 with Dr. Keith Cantu.       #Thoracic epidural abscess s/p laminectomy and fusion surgery      * C8 AIS D Incomplete paraplegia      *  brace when OOB      * Flexeril 5mg PO TID PRN for neck soreness      * Staples removed on POD #14 (4/1/25)      * Sacral wound assessment by wound/skin team      * s/p Ortega with successful TOV - c/w Flomax 0.8mg PO daily      * Right Venodyne only due to left calf DVT (s/p pre-op IVC filter)   - Activity Limitations: Decreased social, vocational and leisure activities, decreased self care and ADLs, decreased mobility, decreased ability to manage household and finances  - Comprehensive Multidisciplinary Rehab Program:      * 3 hours a day, 5 days a week      * PT 1hr/day: Focused on improving strength, endurance, coordination, balance, functional mobility, and transfers      * OT 1hr/day: Focused on improving strength, fine motor skills, coordination, posture and ADLs    -----------------------------------------------------------------------------------    Concurrent Medical Problems    #GOVIND on CKD (resolved)  - LE edema  - Hypoalbuminemia  - Proteinuria  - IVF as needed with fluid balance  - Significant debilitating anasarca   - Trial of albumin Lasix started 3/8->improvement  - s/p IV Lasix/IV albumin BID (3/9)  - Change to Bumex/Albumin (3/11)-> good effect  - Switched to PO Bumex 1mg BID 3/24  - Trend Albumin (4/10 - 2.8)  - Trend Protein total (4/10 - 6.4 ); Total protein, Random Urine (3/18- 24)  - Trend CMP (4/10 - Cr 0.99/ BUN 33)    #L gastrocnemius and L soleal DVT  - appears provoked from decrease ambulation from recent spinal abscess  - Could not tolerate VQ scan for r/o PE 2/2 pain  - Eliquis 5mg PO q12h - will be new med on discharge (of note patient completed Eliquis load)   - Therapeutic Lovenox (3/12) in the setting of possible spine intervention  - Last dose of AC night of 3/16. Restarted Eliquis 5mg BID 4/5/25  - S/P IVC filter placement 3/17  - repeat US 4/8 with stable L soleal DVT and no proprogation     #Spinal epidural abscess POA and already being treated with IV antibiotics with suspected worsening of disease  - S/p thoracic laminectomy and fusion 3/18. [place]  brace   - Surgical culture with staph epi possible contaminant but given clinical presentation on admission, IV Dapto added  - IV Rocephin 2g daily via PICC line thru 5/2/25  - IV Daptomycin 650mg daily via PICC line thru 5/2/25  - gabapentin 100mg PO BID x 3 days (4/8-4/11), then TID starting  today 4/11  - reduce long-acting Oxycontin to 10mg PO q12h 4/7  - oxycodone 15mg PO q6h PRN moderate pain  - Flexeril 5mg PO TID PRN for muscle spasm  - Staples d/teodoro in rehab    #Mild Rhabdomyolysis Improving  - Associated elevated transaminases  - Suspect from Ansarca/Edema  - Improving with adequate diuresis of edema  - Associated elevated transaminase; relatively stable. Continue to monitor (4/10- AST 47/ALT 41)    #Leukocytosis (resolved)  - Likely rx to DVT + Discitis  - No other signs of sepsis , afebrile   - Trend CBC and monitor fever curve (4/10- WBC 9.03)    #Hypothyroidism  - Synthroid 150mcg PO daily    #CAD  #HTN  #HLD  - metoprolol succinate 25mg PO daily   - rosuvastatin 5mg PO qhs  - BP: 109/64 (12 Apr 2025 13:24) (109/64 - 122/74)    #Anemia/MARLENA  - trend H/H (4/10- 9.8/29.9)  - transfuse if Hgb <7 PRN     #Sleep:  - melatonin 3mg PO qhs PRN for sleep    #GI/Bowel:  - senna 2 tablets PO qhs  - Miralax 17g PO daily  - daily mini-enema at 6AM  - lactulose 30mL PO TID PRN for constipation  - Maalox 30mL PO q4h PRN for dyspepsia  - GI ppx: pantoprazole 40mg PO daily   -+BM 4/12    #BPH  #Urinary retention likely augmented by degree of anasarca  - S/P Ortega; TOV  ->failed. Ortega re-inserted 3/6->fell out without knowing with associated hematuria->recurrent retention 3/7->Ortega re-inserted (3/7) --> successful TOV 3/25  - Flomax 0.8mg PO qhs  - started bethanechol 10mg PO BID 4/6  - Monitor U/O    #Skin/Pressure Injury:   Skin assessment on admission:   - Generalized dry flaky skin  - 19cm posterior cervical incision with 32 staples and 2 steri strips  - Left buttock DTI pressure injury measuring 14 x 9 cm      * appearing as a dark purple discoloration of intact skin, with a central superficial open area measuring 6 x 2 cm      * at the periphery of the 14 x 9 cm area, the discoloration is somewhat lighter      * dark red non-blanchable erythema  - Bilateral lower extremity edema  - Healed lumbar surgical incision scar  - Right heel blanchable redness  - Bilateral dry cracked heels and plantar surface  - Lac hydrin BID BLE  - Aquaphor BID to generalized dry skin   - Appreciate wound care consult     #Diet/Dysphagia:  - Dysphagia: SLP consult for swallow function evaluation and treatment  - Current Diet: Regular  - Aspiration Precautions  - Nutrition consult

## 2025-04-12 NOTE — PROGRESS NOTE ADULT - SUBJECTIVE AND OBJECTIVE BOX
HPI:  Mr. Rishabh Shaver is a 74-year-old male patient with past medical history of benign prostatic hyperplasia, diverticulosis, Graves' disease, hemorrhoids, hyperlipidemia, hypothyroidism, osteoarthritis, spinal stenosis, spinal cord injury (2004), and multiple spinal surgeries (three cervical and one lumbar), who presented to [facility] on 3/4/25 from [facility] due to lower extremity swelling and abnormal BUN/creatinine levels for further evaluation and management. The patient was recently admitted to [facility] from 02/18/25 through 02/25/25 for sepsis secondary to epidural abscess. He had a peripherally inserted central catheter line in the right arm and was on nightly Ceftriaxone treatment until 04/16/25. At the ED, he was found to have GOVIND, a left soleal vein, and a left gastrocnemius DVT. He was started on heparin gtt. Hospital course complicated by urinary retention requiring Ortega catheter insertion, rhabdomyolysis, elevated LFTs, anasarca, leukocytosis due to thoracic spine discitis OM and early paraspinal abscess, as well as new onset RUE paresthesias and weakness. He is s/p IVC filter placement on 3/17 and was recommended surgical management. He is s/p posterior thoracic laminectomy and fusion on 3/18/25 with Dr. Keith Cantu. Surgical culture with rare staph epi. Recommendations by ID to continue IV Rocephin and Daptomycin through 5/2/25. Patient was evaluated by PM&R and therapy for functional deficits, gait/ADL impairments and acute rehabilitation was recommended. Patient was cleared for discharge to [facility] IRF on 3/25/25. (25 Mar 2025 17:09)    TDD 4/25/25 home  ___________________________________________________________________________    SUBJECTIVE/ROS  Patient was seen and evaluated at bedside today.  Reported no overnight events and is in no acute distress.  Patient struggles but is able to push himself up in bed  ___________________________________________________________________________    Vital Signs Last 24 Hrs  T(C): 36.7 (12 Apr 2025 07:57), Max: 36.7 (12 Apr 2025 07:57)  T(F): 98 (12 Apr 2025 07:57), Max: 98 (12 Apr 2025 07:57)  HR: 83 (12 Apr 2025 13:24) (77 - 84)  BP: 109/64 (12 Apr 2025 13:24) (109/64 - 122/74)  RR: 16 (12 Apr 2025 07:57) (16 - 16)  SpO2: 94% (12 Apr 2025 07:57) (94% - 94%)  ___________________________________________________________________________    LABS             9.8    9.03  )-----------( 267      ( 10 Apr 2025 06:52 )             29.9     141  |  100  |  33[H]  ----------------------------<  105[H]  3.5   |  34[H]  |  0.99    Ca    9.3      10 Apr 2025 06:52  TPro  6.4  /  Alb  2.8[L]  /  TBili  0.5  /  DBili  x   /  AST  47[H]  /  ALT  41  /  AlkPhos  67  04-10    ___________________________________________________________________________    MEDICATIONS  (STANDING):  ammonium lactate 12% Lotion 1 Application(s) Topical two times a day  apixaban 5 milliGRAM(s) Oral every 12 hours  AQUAPHOR (petrolatum Ointment) 1 Application(s) Topical three times a day  bethanechol 10 milliGRAM(s) Oral two times a day  buMETAnide 1 milliGRAM(s) Oral two times a day  cefTRIAXone   IVPB 2000 milliGRAM(s) IV Intermittent every 24 hours  chlorhexidine 4% Liquid 1 Application(s) Topical <User Schedule>  collagenase Ointment 1 Application(s) Topical daily  DAPTOmycin IVPB 650 milliGRAM(s) IV Intermittent every 24 hours  docusate sodium Enema 283 milliGRAM(s) Rectal <User Schedule>  gabapentin   Oral   gabapentin 100 milliGRAM(s) Oral three times a day  levothyroxine 150 MICROGram(s) Oral daily  metoprolol succinate ER 25 milliGRAM(s) Oral daily  oxyCODONE  ER Tablet 10 milliGRAM(s) Oral every 12 hours  pantoprazole    Tablet 40 milliGRAM(s) Oral before breakfast  polyethylene glycol 3350 17 Gram(s) Oral <User Schedule>  rosuvastatin 5 milliGRAM(s) Oral at bedtime  senna 2 Tablet(s) Oral at bedtime  tamsulosin 0.8 milliGRAM(s) Oral at bedtime    MEDICATIONS  (PRN):  aluminum hydroxide/magnesium hydroxide/simethicone Suspension 30 milliLiter(s) Oral every 4 hours PRN Dyspepsia  cyclobenzaprine 5 milliGRAM(s) Oral three times a day PRN Muscle Spasm  lactulose Syrup 20 Gram(s) Oral three times a day PRN for constipation  melatonin 3 milliGRAM(s) Oral at bedtime PRN Insomnia  oxyCODONE    IR 15 milliGRAM(s) Oral every 6 hours PRN Moderate Pain (4 - 6)  sodium chloride 0.9% lock flush 10 milliLiter(s) IV Push every 1 hour PRN Pre/post blood products, medications, blood draw, and to maintain line patency    ___________________________________________________________________________    PHYSICAL EXAM:    Constitutional - NAD, Comfortable  Neck: Suture line is C/D/I  Chest - good chest expansion, good respiratory effort  Cardio - warm and well perfused  Neurologic Exam:                           Orientation: Awake, A&O to self, place, date, year, situation     Speech - Fluent, Comprehensible, No dysarthria, No aphasia      Motor -  observed at bedside in PT transferring from edge of bed to standing with EasyStand. Upon standing, pt's left knee has difficulty fully straightening. Pt's right leg is hyperextended, but patient is able to briefly correct. Discussed with PT; consider Swedish knee cage to prevent RLE genu recurvatum   Psychiatric - Mood stable, Affect WNL  Skin: DTI to sacrum    ___________________________________________________________________________

## 2025-04-13 PROCEDURE — 99231 SBSQ HOSP IP/OBS SF/LOW 25: CPT

## 2025-04-13 RX ADMIN — Medication 1 APPLICATION(S): at 06:13

## 2025-04-13 RX ADMIN — WHITE PETROLATUM 1 APPLICATION(S): 1 OINTMENT TOPICAL at 05:29

## 2025-04-13 RX ADMIN — Medication 10 MILLIGRAM(S): at 17:45

## 2025-04-13 RX ADMIN — APIXABAN 5 MILLIGRAM(S): 2.5 TABLET, FILM COATED ORAL at 23:04

## 2025-04-13 RX ADMIN — DAPTOMYCIN 126 MILLIGRAM(S): 500 INJECTION, POWDER, LYOPHILIZED, FOR SOLUTION INTRAVENOUS at 12:36

## 2025-04-13 RX ADMIN — GABAPENTIN 100 MILLIGRAM(S): 400 CAPSULE ORAL at 21:50

## 2025-04-13 RX ADMIN — CEFTRIAXONE 100 MILLIGRAM(S): 500 INJECTION, POWDER, FOR SOLUTION INTRAMUSCULAR; INTRAVENOUS at 21:51

## 2025-04-13 RX ADMIN — Medication 1 APPLICATION(S): at 05:29

## 2025-04-13 RX ADMIN — Medication 150 MICROGRAM(S): at 05:26

## 2025-04-13 RX ADMIN — BUMETANIDE 1 MILLIGRAM(S): 1 TABLET ORAL at 14:32

## 2025-04-13 RX ADMIN — ROSUVASTATIN CALCIUM 5 MILLIGRAM(S): 20 TABLET, FILM COATED ORAL at 21:51

## 2025-04-13 RX ADMIN — GABAPENTIN 100 MILLIGRAM(S): 400 CAPSULE ORAL at 14:32

## 2025-04-13 RX ADMIN — Medication 1 APPLICATION(S): at 17:45

## 2025-04-13 RX ADMIN — GABAPENTIN 100 MILLIGRAM(S): 400 CAPSULE ORAL at 05:26

## 2025-04-13 RX ADMIN — TAMSULOSIN HYDROCHLORIDE 0.8 MILLIGRAM(S): 0.4 CAPSULE ORAL at 21:51

## 2025-04-13 RX ADMIN — Medication 40 MILLIGRAM(S): at 05:26

## 2025-04-13 RX ADMIN — BUMETANIDE 1 MILLIGRAM(S): 1 TABLET ORAL at 05:29

## 2025-04-13 RX ADMIN — APIXABAN 5 MILLIGRAM(S): 2.5 TABLET, FILM COATED ORAL at 12:36

## 2025-04-13 RX ADMIN — Medication 10 MILLIGRAM(S): at 05:26

## 2025-04-13 RX ADMIN — METOPROLOL SUCCINATE 25 MILLIGRAM(S): 50 TABLET, EXTENDED RELEASE ORAL at 05:29

## 2025-04-13 RX ADMIN — Medication 3 MILLIGRAM(S): at 21:51

## 2025-04-13 RX ADMIN — Medication 283 MILLIGRAM(S): at 05:29

## 2025-04-13 RX ADMIN — Medication 2 TABLET(S): at 21:51

## 2025-04-13 NOTE — PROGRESS NOTE ADULT - ASSESSMENT
Assessment/Plan:  Mr. Rishabh Shaver is a 74-year-old man with past medical history of benign prostatic hyperplasia, diverticulosis, Graves' disease, hemorrhoids, hyperlipidemia, hypothyroidism, osteoarthritis, spinal stenosis, spinal cord injury (2004), and multiple spinal surgeries (three cervical and one lumbar), who is admitted for Acute Inpatient Rehabilitation with a multidisciplinary rehab program at [facility] with functional impairments in ADLs and mobility secondary to a thoracic epidural abscess s/p posterior thoracic laminectomy and fusion on 3/18/25 with Dr. Keith Cantu.     #Thoracic epidural abscess s/p laminectomy and fusion surgery      * C8 AIS D Incomplete paraplegia      *  brace when OOB      * Flexeril 5mg PO TID PRN for neck soreness      * Staples removed on POD #14 (4/1/25)      * Sacral wound assessment by wound/skin team      * s/p Ortega with successful TOV - c/w Flomax 0.8mg PO daily      * Right Venodyne only due to left calf DVT (s/p pre-op IVC filter)   - Activity Limitations: Decreased social, vocational and leisure activities, decreased self care and ADLs, decreased mobility, decreased ability to manage household and finances  - Comprehensive Multidisciplinary Rehab Program:      * 3 hours a day, 5 days a week      * PT 1hr/day: Focused on improving strength, endurance, coordination, balance, functional mobility, and transfers      * OT 1hr/day: Focused on improving strength, fine motor skills, coordination, posture and ADLs    -----------------------------------------------------------------------------------    Concurrent Medical Problems    #GOVIND on CKD (resolved)  - LE edema  - Hypoalbuminemia  - Proteinuria  - IVF as needed with fluid balance  - Significant debilitating anasarca   - Trial of albumin Lasix started 3/8->improvement  - s/p IV Lasix/IV albumin BID (3/9)  - Change to Bumex/Albumin (3/11)-> good effect  - Switched to PO Bumex 1mg BID 3/24  - Trend Albumin (4/10 - 2.8)  - Trend Protein total (4/10 - 6.4 ); Total protein, Random Urine (3/18- 24)  - Trend CMP (4/10 - Cr 0.99/ BUN 33)    #L gastrocnemius and L soleal DVT  - appears provoked from decrease ambulation from recent spinal abscess  - Could not tolerate VQ scan for r/o PE 2/2 pain  - Eliquis 5mg PO q12h - will be new med on discharge (of note patient completed Eliquis load)   - Therapeutic Lovenox (3/12) in the setting of possible spine intervention  - Last dose of AC night of 3/16. Restarted Eliquis 5mg BID 4/5/25  - S/P IVC filter placement 3/17  - repeat US 4/8 with stable L soleal DVT and no proprogation     #Spinal epidural abscess POA and already being treated with IV antibiotics with suspected worsening of disease  - S/p thoracic laminectomy and fusion 3/18. [place]  brace   - Surgical culture with staph epi possible contaminant but given clinical presentation on admission, IV Dapto added  - IV Rocephin 2g daily via PICC line thru 5/2/25  - IV Daptomycin 650mg daily via PICC line thru 5/2/25  - gabapentin 100mg PO BID x 3 days (4/8-4/11), then TID starting  today 4/11  - reduce long-acting Oxycontin to 10mg PO q12h 4/7  - oxycodone 15mg PO q6h PRN moderate pain  - Flexeril 5mg PO TID PRN for muscle spasm  - Staples d/teodoro in rehab    #Mild Rhabdomyolysis Improving  - Associated elevated transaminases  - Suspect from Ansarca/Edema  - Improving with adequate diuresis of edema  - Associated elevated transaminase; relatively stable. Continue to monitor (4/10- AST 47/ALT 41)    #Leukocytosis (resolved)  - Likely rx to DVT + Discitis  - No other signs of sepsis , afebrile   - Trend CBC and monitor fever curve (4/10- WBC 9.03)    #Hypothyroidism  - Synthroid 150mcg PO daily    #CAD  #HTN  #HLD  - metoprolol succinate 25mg PO daily   - rosuvastatin 5mg PO qhs  - BP: 109/64 (12 Apr 2025 13:24) (109/64 - 122/74)  - BP: 133/80 (13 Apr 2025 14:28) (121/72 - 133/80)    #Anemia/MARLENA  - trend H/H (4/10- 9.8/29.9)  - transfuse if Hgb <7 PRN     #Sleep:  - melatonin 3mg PO qhs PRN for sleep    #GI/Bowel:  - senna 2 tablets PO qhs  - Miralax 17g PO daily  - daily mini-enema at 6AM  - lactulose 30mL PO TID PRN for constipation  - Maalox 30mL PO q4h PRN for dyspepsia  - GI ppx: pantoprazole 40mg PO daily   -+BM 4/12    #BPH  #Urinary retention likely augmented by degree of anasarca  - S/P Ortega; TOV  ->failed. Ortega re-inserted 3/6->fell out without knowing with associated hematuria->recurrent retention 3/7->Ortega re-inserted (3/7) --> successful TOV 3/25  - Flomax 0.8mg PO qhs  - started bethanechol 10mg PO BID 4/6  - Continue Bladder scan. SC as needed    #Skin/Pressure Injury:   Skin assessment on admission:   - Generalized dry flaky skin  - 19cm posterior cervical incision with 32 staples and 2 steri strips  - Left buttock DTI pressure injury measuring 14 x 9 cm      * appearing as a dark purple discoloration of intact skin, with a central superficial open area measuring 6 x 2 cm      * at the periphery of the 14 x 9 cm area, the discoloration is somewhat lighter      * dark red non-blanchable erythema  - Bilateral lower extremity edema  - Healed lumbar surgical incision scar  - Right heel blanchable redness  - Bilateral dry cracked heels and plantar surface  - Lac hydrin BID BLE  - Aquaphor BID to generalized dry skin   - Appreciate wound care consult     #Diet/Dysphagia:  - Dysphagia: SLP consult for swallow function evaluation and treatment  - Current Diet: Regular  - Aspiration Precautions  - Nutrition consult

## 2025-04-13 NOTE — PROGRESS NOTE ADULT - SUBJECTIVE AND OBJECTIVE BOX
HPI:  Mr. Rishabh Shaver is a 74-year-old male patient with past medical history of benign prostatic hyperplasia, diverticulosis, Graves' disease, hemorrhoids, hyperlipidemia, hypothyroidism, osteoarthritis, spinal stenosis, spinal cord injury (2004), and multiple spinal surgeries (three cervical and one lumbar), who presented to [facility] on 3/4/25 from [facility] due to lower extremity swelling and abnormal BUN/creatinine levels for further evaluation and management. The patient was recently admitted to [facility] from 02/18/25 through 02/25/25 for sepsis secondary to epidural abscess. He had a peripherally inserted central catheter line in the right arm and was on nightly Ceftriaxone treatment until 04/16/25. At the ED, he was found to have GOVIND, a left soleal vein, and a left gastrocnemius DVT. He was started on heparin gtt. Hospital course complicated by urinary retention requiring Ortega catheter insertion, rhabdomyolysis, elevated LFTs, anasarca, leukocytosis due to thoracic spine discitis OM and early paraspinal abscess, as well as new onset RUE paresthesias and weakness. He is s/p IVC filter placement on 3/17 and was recommended surgical management. He is s/p posterior thoracic laminectomy and fusion on 3/18/25 with Dr. Keith Cantu. Surgical culture with rare staph epi. Recommendations by ID to continue IV Rocephin and Daptomycin through 5/2/25. Patient was evaluated by PM&R and therapy for functional deficits, gait/ADL impairments and acute rehabilitation was recommended. Patient was cleared for discharge to [facility] IRF on 3/25/25. (25 Mar 2025 17:09)    TDD 4/25/25 home  ___________________________________________________________________________    SUBJECTIVE/ROS  Patient was seen and evaluated at bedside today.  Reported no overnight events and is in no acute distress.  Slept well  ___________________________________________________________________________    Vital Signs Last 24 Hrs  T(C): 36.6 (13 Apr 2025 08:38), Max: 36.9 (12 Apr 2025 22:00)  T(F): 97.9 (13 Apr 2025 08:38), Max: 98.5 (12 Apr 2025 22:00)  HR: 76 (13 Apr 2025 14:28) (75 - 80)  BP: 133/80 (13 Apr 2025 14:28) (121/72 - 133/80)  RR: 16 (13 Apr 2025 08:38) (16 - 16)  SpO2: 95% (13 Apr 2025 08:38) (94% - 95%)  ___________________________________________________________________________    LABS             9.8    9.03  )-----------( 267      ( 10 Apr 2025 06:52 )             29.9     141  |  100  |  33[H]  ----------------------------<  105[H]  3.5   |  34[H]  |  0.99    Ca    9.3      10 Apr 2025 06:52  TPro  6.4  /  Alb  2.8[L]  /  TBili  0.5  /  DBili  x   /  AST  47[H]  /  ALT  41  /  AlkPhos  67  04-10    ___________________________________________________________________________    MEDICATIONS  (STANDING):  ammonium lactate 12% Lotion 1 Application(s) Topical two times a day  apixaban 5 milliGRAM(s) Oral every 12 hours  AQUAPHOR (petrolatum Ointment) 1 Application(s) Topical three times a day  bethanechol 10 milliGRAM(s) Oral two times a day  buMETAnide 1 milliGRAM(s) Oral two times a day  cefTRIAXone   IVPB 2000 milliGRAM(s) IV Intermittent every 24 hours  chlorhexidine 4% Liquid 1 Application(s) Topical <User Schedule>  collagenase Ointment 1 Application(s) Topical daily  DAPTOmycin IVPB 650 milliGRAM(s) IV Intermittent every 24 hours  docusate sodium Enema 283 milliGRAM(s) Rectal <User Schedule>  gabapentin   Oral   gabapentin 100 milliGRAM(s) Oral three times a day  levothyroxine 150 MICROGram(s) Oral daily  metoprolol succinate ER 25 milliGRAM(s) Oral daily  oxyCODONE  ER Tablet 10 milliGRAM(s) Oral every 12 hours  pantoprazole    Tablet 40 milliGRAM(s) Oral before breakfast  polyethylene glycol 3350 17 Gram(s) Oral <User Schedule>  rosuvastatin 5 milliGRAM(s) Oral at bedtime  senna 2 Tablet(s) Oral at bedtime  tamsulosin 0.8 milliGRAM(s) Oral at bedtime    MEDICATIONS  (PRN):  aluminum hydroxide/magnesium hydroxide/simethicone Suspension 30 milliLiter(s) Oral every 4 hours PRN Dyspepsia  cyclobenzaprine 5 milliGRAM(s) Oral three times a day PRN Muscle Spasm  lactulose Syrup 20 Gram(s) Oral three times a day PRN for constipation  melatonin 3 milliGRAM(s) Oral at bedtime PRN Insomnia  oxyCODONE    IR 15 milliGRAM(s) Oral every 6 hours PRN Moderate Pain (4 - 6)  sodium chloride 0.9% lock flush 10 milliLiter(s) IV Push every 1 hour PRN Pre/post blood products, medications, blood draw, and to maintain line patency    ___________________________________________________________________________    PHYSICAL EXAM:    Constitutional - NAD, Comfortable  Neck: Suture line is C/D/I  Chest - good chest expansion, good respiratory effort  Cardio - warm and well perfused  Neurologic Exam:                           Orientation: Awake, A&O to self, place, date, year, situation     Speech - Fluent, Comprehensible, No dysarthria, No aphasia      Motor -  observed at bedside in PT transferring from edge of bed to standing with EasyStand. Upon standing, pt's left knee has difficulty fully straightening. Pt's right leg is hyperextended, but patient is able to briefly correct. Discussed with PT; consider Swedish knee cage to prevent RLE genu recurvatum   Psychiatric - Mood stable, Affect WNL  Skin: DTI to sacrum    ___________________________________________________________________________

## 2025-04-14 LAB
ALBUMIN SERPL ELPH-MCNC: 2.9 G/DL — LOW (ref 3.3–5)
ALP SERPL-CCNC: 75 U/L — SIGNIFICANT CHANGE UP (ref 40–120)
ALT FLD-CCNC: 43 U/L — SIGNIFICANT CHANGE UP (ref 10–45)
ANION GAP SERPL CALC-SCNC: 5 MMOL/L — SIGNIFICANT CHANGE UP (ref 5–17)
AST SERPL-CCNC: 32 U/L — SIGNIFICANT CHANGE UP (ref 10–40)
BASOPHILS # BLD AUTO: 0.11 K/UL — SIGNIFICANT CHANGE UP (ref 0–0.2)
BASOPHILS NFR BLD AUTO: 1.2 % — SIGNIFICANT CHANGE UP (ref 0–2)
BILIRUB SERPL-MCNC: 0.4 MG/DL — SIGNIFICANT CHANGE UP (ref 0.2–1.2)
BUN SERPL-MCNC: 29 MG/DL — HIGH (ref 7–23)
CALCIUM SERPL-MCNC: 9.2 MG/DL — SIGNIFICANT CHANGE UP (ref 8.4–10.5)
CHLORIDE SERPL-SCNC: 102 MMOL/L — SIGNIFICANT CHANGE UP (ref 96–108)
CO2 SERPL-SCNC: 34 MMOL/L — HIGH (ref 22–31)
CREAT SERPL-MCNC: 1.03 MG/DL — SIGNIFICANT CHANGE UP (ref 0.5–1.3)
EGFR: 77 ML/MIN/1.73M2 — SIGNIFICANT CHANGE UP
EGFR: 77 ML/MIN/1.73M2 — SIGNIFICANT CHANGE UP
EOSINOPHIL # BLD AUTO: 0.61 K/UL — HIGH (ref 0–0.5)
EOSINOPHIL NFR BLD AUTO: 6.4 % — HIGH (ref 0–6)
GLUCOSE SERPL-MCNC: 118 MG/DL — HIGH (ref 70–99)
HCT VFR BLD CALC: 33.1 % — LOW (ref 39–50)
HGB BLD-MCNC: 10.5 G/DL — LOW (ref 13–17)
IMM GRANULOCYTES NFR BLD AUTO: 0.4 % — SIGNIFICANT CHANGE UP (ref 0–0.9)
LYMPHOCYTES # BLD AUTO: 1.36 K/UL — SIGNIFICANT CHANGE UP (ref 1–3.3)
LYMPHOCYTES # BLD AUTO: 14.3 % — SIGNIFICANT CHANGE UP (ref 13–44)
MCHC RBC-ENTMCNC: 27.9 PG — SIGNIFICANT CHANGE UP (ref 27–34)
MCHC RBC-ENTMCNC: 31.7 G/DL — LOW (ref 32–36)
MCV RBC AUTO: 87.8 FL — SIGNIFICANT CHANGE UP (ref 80–100)
MONOCYTES # BLD AUTO: 0.92 K/UL — HIGH (ref 0–0.9)
MONOCYTES NFR BLD AUTO: 9.6 % — SIGNIFICANT CHANGE UP (ref 2–14)
NEUTROPHILS # BLD AUTO: 6.5 K/UL — SIGNIFICANT CHANGE UP (ref 1.8–7.4)
NEUTROPHILS NFR BLD AUTO: 68.1 % — SIGNIFICANT CHANGE UP (ref 43–77)
NRBC BLD AUTO-RTO: 0 /100 WBCS — SIGNIFICANT CHANGE UP (ref 0–0)
PLATELET # BLD AUTO: 288 K/UL — SIGNIFICANT CHANGE UP (ref 150–400)
POTASSIUM SERPL-MCNC: 3.7 MMOL/L — SIGNIFICANT CHANGE UP (ref 3.5–5.3)
POTASSIUM SERPL-SCNC: 3.7 MMOL/L — SIGNIFICANT CHANGE UP (ref 3.5–5.3)
PROT SERPL-MCNC: 6.5 G/DL — SIGNIFICANT CHANGE UP (ref 6–8.3)
RBC # BLD: 3.77 M/UL — LOW (ref 4.2–5.8)
RBC # FLD: 14.5 % — SIGNIFICANT CHANGE UP (ref 10.3–14.5)
SODIUM SERPL-SCNC: 141 MMOL/L — SIGNIFICANT CHANGE UP (ref 135–145)
WBC # BLD: 9.54 K/UL — SIGNIFICANT CHANGE UP (ref 3.8–10.5)
WBC # FLD AUTO: 9.54 K/UL — SIGNIFICANT CHANGE UP (ref 3.8–10.5)

## 2025-04-14 PROCEDURE — 99232 SBSQ HOSP IP/OBS MODERATE 35: CPT

## 2025-04-14 RX ORDER — SIMETHICONE 80 MG
80 TABLET,CHEWABLE ORAL THREE TIMES A DAY
Refills: 0 | Status: DISCONTINUED | OUTPATIENT
Start: 2025-04-14 | End: 2025-04-30

## 2025-04-14 RX ADMIN — BUMETANIDE 1 MILLIGRAM(S): 1 TABLET ORAL at 05:50

## 2025-04-14 RX ADMIN — Medication 10 MILLIGRAM(S): at 05:51

## 2025-04-14 RX ADMIN — Medication 3 MILLIGRAM(S): at 22:08

## 2025-04-14 RX ADMIN — Medication 80 MILLIGRAM(S): at 22:07

## 2025-04-14 RX ADMIN — METOPROLOL SUCCINATE 25 MILLIGRAM(S): 50 TABLET, EXTENDED RELEASE ORAL at 05:50

## 2025-04-14 RX ADMIN — GABAPENTIN 100 MILLIGRAM(S): 400 CAPSULE ORAL at 13:44

## 2025-04-14 RX ADMIN — TAMSULOSIN HYDROCHLORIDE 0.8 MILLIGRAM(S): 0.4 CAPSULE ORAL at 22:08

## 2025-04-14 RX ADMIN — Medication 1 APPLICATION(S): at 05:59

## 2025-04-14 RX ADMIN — Medication 283 MILLIGRAM(S): at 05:51

## 2025-04-14 RX ADMIN — CEFTRIAXONE 100 MILLIGRAM(S): 500 INJECTION, POWDER, FOR SOLUTION INTRAMUSCULAR; INTRAVENOUS at 22:08

## 2025-04-14 RX ADMIN — GABAPENTIN 100 MILLIGRAM(S): 400 CAPSULE ORAL at 05:50

## 2025-04-14 RX ADMIN — Medication 40 MILLIGRAM(S): at 05:50

## 2025-04-14 RX ADMIN — ROSUVASTATIN CALCIUM 5 MILLIGRAM(S): 20 TABLET, FILM COATED ORAL at 22:08

## 2025-04-14 RX ADMIN — Medication 1 APPLICATION(S): at 05:51

## 2025-04-14 RX ADMIN — APIXABAN 5 MILLIGRAM(S): 2.5 TABLET, FILM COATED ORAL at 12:36

## 2025-04-14 RX ADMIN — APIXABAN 5 MILLIGRAM(S): 2.5 TABLET, FILM COATED ORAL at 23:07

## 2025-04-14 RX ADMIN — GABAPENTIN 100 MILLIGRAM(S): 400 CAPSULE ORAL at 22:08

## 2025-04-14 RX ADMIN — Medication 150 MICROGRAM(S): at 05:51

## 2025-04-14 RX ADMIN — Medication 1 APPLICATION(S): at 17:38

## 2025-04-14 RX ADMIN — WHITE PETROLATUM 1 APPLICATION(S): 1 OINTMENT TOPICAL at 13:48

## 2025-04-14 RX ADMIN — BUMETANIDE 1 MILLIGRAM(S): 1 TABLET ORAL at 13:47

## 2025-04-14 RX ADMIN — WHITE PETROLATUM 1 APPLICATION(S): 1 OINTMENT TOPICAL at 05:58

## 2025-04-14 RX ADMIN — Medication 10 MILLIGRAM(S): at 17:36

## 2025-04-14 RX ADMIN — DAPTOMYCIN 126 MILLIGRAM(S): 500 INJECTION, POWDER, LYOPHILIZED, FOR SOLUTION INTRAVENOUS at 12:45

## 2025-04-14 NOTE — PROGRESS NOTE ADULT - ASSESSMENT
Assessment/Plan:  Mr. Rishabh Shaver is a 74-year-old man with past medical history of benign prostatic hyperplasia, diverticulosis, Graves' disease, hemorrhoids, hyperlipidemia, hypothyroidism, osteoarthritis, spinal stenosis, spinal cord injury (2004), and multiple spinal surgeries (three cervical and one lumbar), who is admitted for Acute Inpatient Rehabilitation with a multidisciplinary rehab program at [facility] with functional impairments in ADLs and mobility secondary to a thoracic epidural abscess s/p posterior thoracic laminectomy and fusion on 3/18/25 with Dr. Keith Cantu.       #Thoracic epidural abscess s/p laminectomy and fusion surgery      * C8 AIS D Incomplete paraplegia      *  brace when OOB      * Flexeril 5mg PO TID PRN for neck soreness      * Staples removed on POD #14 (4/1/25)      * Sacral wound assessment by wound/skin team      * s/p Ortega with successful TOV - c/w Flomax 0.8mg PO daily      * Right Venodyne only due to left calf DVT (s/p pre-op IVC filter)   - Activity Limitations: Decreased social, vocational and leisure activities, decreased self care and ADLs, decreased mobility, decreased ability to manage household and finances  - Comprehensive Multidisciplinary Rehab Program:      * 3 hours a day, 5 days a week      * PT 1hr/day: Focused on improving strength, endurance, coordination, balance, functional mobility, and transfers      * OT 1hr/day: Focused on improving strength, fine motor skills, coordination, posture and ADLs    -----------------------------------------------------------------------------------    Concurrent Medical Problems    #GOVIND on CKD (resolved)  #LE edema  #Hypoalbuminemia  #Proteinuria  - IVF as needed with fluid balance  - Significant debilitating anasarca   - Trial of albumin Lasix started 3/8->improvement  - s/p IV Lasix/IV albumin BID (3/9)  - Change to Bumex/Albumin (3/11)-> good effect  - Switched to PO Bumex 1mg BID 3/24  - Trend Albumin (4/14 - 2.9)  - Trend Protein total (4/14 - 6.5); Total protein, Random Urine (3/18- 24)  - Trend CMP (4/14 - Cr 1.03/ BUN 29)    #L gastrocnemius and L soleal DVT  - appears provoked from decrease ambulation from recent spinal abscess  - Could not tolerate VQ scan for r/o PE 2/2 pain  - Eliquis 5mg PO q12h - will be new med on discharge (of note patient completed Eliquis load)   - Therapeutic Lovenox (3/12) in the setting of possible spine intervention  - Last dose of AC night of 3/16. Restarted Eliquis 5mg BID 4/5/25  - S/P IVC filter placement 3/17  - repeat US 4/8 with stable L soleal DVT and no propagation     #Spinal epidural abscess POA and already being treated with IV antibiotics with suspected worsening of disease  - S/p thoracic laminectomy and fusion 3/18. [place]  brace   - Surgical culture with staph epi possible contaminant but given clinical presentation on admission, IV Dapto added  - IV Rocephin 2g daily via PICC line thru 5/2/25  - IV Daptomycin 650mg daily via PICC line thru 5/2/25  - gabapentin 100mg PO TID  - d/c long-acting Oxycontin 4/11  - oxycodone 15mg PO q6h PRN moderate pain  - Flexeril 5mg PO TID PRN for muscle spasm  - staples d/teodoro in rehab    #Mild Rhabdomyolysis Improving  - associated elevated transaminases (resolved)  - suspect from anasarca/edema   - improving with adequate diuresis of edema  - associated elevated transaminase; now resolved (4/14- AST 32/ALT 43)    #Leukocytosis (resolved)  - likely rx to DVT + Discitis  - no other signs of sepsis, afebrile   - trend CBC and monitor fever curve (4/14- WBC 9.54)    #Hypothyroidism  - Synthroid 150mcg PO daily    #CAD  #HTN  #HLD  - metoprolol succinate 25mg PO daily   - rosuvastatin 5mg PO qhs    #Anemia/MARLENA  - trend H/H (4/14- 10.5/33.1)  - transfuse if Hgb <7 PRN     #Sleep:  - melatonin 3mg PO qhs PRN for sleep    #GI/Bowel:  - senna 2 tablets PO qhs  - Miralax 17g PO daily  - daily mini-enema at 6AM  - lactulose 20g PO TID PRN for constipation  - Maalox 30mL PO q4h PRN for dyspepsia  - GI ppx: pantoprazole 40mg PO daily   - add simethicone 80mg PO TID PRN for gas 4/14    #BPH  #Urinary retention likely augmented by degree of anasarca  - S/P Ortega; TOV  ->failed. Ortega re-inserted 3/6->fell out without knowing with associated hematuria->recurrent retention 3/7->Ortega re-inserted (3/7) --> successful TOV 3/25  - Flomax 0.8mg PO qhs  - started bethanechol 10mg PO BID 4/6  - Monitor U/O    #Skin/Pressure Injury:   Skin assessment on admission:   - Generalized dry flaky skin  - 19cm posterior cervical incision with 32 staples and 2 steri strips  - Left buttock DTI pressure injury measuring 14 x 9 cm      * appearing as a dark purple discoloration of intact skin, with a central superficial open area measuring 6 x 2 cm      * at the periphery of the 14 x 9 cm area, the discoloration is somewhat lighter      * dark red non-blanchable erythema     **Santyl to eschar of sacrum daily**  - Bilateral lower extremity edema  - Healed lumbar surgical incision scar  - Right heel blanchable redness  - Bilateral dry cracked heels and plantar surface  - Lac hydrin BID BLE  - Aquaphor BID to generalized dry skin   - Appreciate wound care consult     #Diet/Dysphagia:  - Dysphagia: SLP consult for swallow function evaluation and treatment  - Current Diet: Regular  - Aspiration Precautions  - Nutrition consult     #Patient Education  - Education provided on the following:      * Admitting diagnosis and functional implications      * Functional goals      * Bladder management      * Bowel management      * Skin care management      * Intensity of service and scheduling of rehab disciplines      * Plan of care and role of interdisciplinary team conference in discharge planning      * Reconciliation of medications from prior institution    #Precautions / PROPHYLAXIS:   - Falls, Spinal  - Ortho: Weight bearing status: FWB;  brace OOB  - DVT ppx: Eliquis 5mg PO BID, TEDs  - Pressure injury/Skin: Turn Q2hrs while in bed, OOB to Chair, PT/OT      ---------------  Outpatient Follow-up:    Yue Wiggins  [facility]  [address], [address]  [place] [address]  Phone: [phone]  Fax: [phone]  Established Patient  Follow Up Time: 1 week    Keith Cantu [facility]  Neurosurgery  [address], [address]  [place] [address]  Phone: [phone]  Fax: [phone]  Established Patient  Follow Up Time: 2 weeks    Luis A Brennan  [facility]  CARDIOLOGY [address]  Scheduled Appointment: 04/23/2025    --------------   Assessment/Plan:  Mr. Rishabh Shaver is a 74-year-old man with past medical history of benign prostatic hyperplasia, diverticulosis, Graves' disease, hemorrhoids, hyperlipidemia, hypothyroidism, osteoarthritis, spinal stenosis, spinal cord injury (2004), and multiple spinal surgeries (three cervical and one lumbar), who is admitted for Acute Inpatient Rehabilitation with a multidisciplinary rehab program at [facility] with functional impairments in ADLs and mobility secondary to a thoracic epidural abscess s/p posterior thoracic laminectomy and fusion on 3/18/25 with Dr. Keith Cantu.     * Reports feeling of excessive gas in stomach and abdominal bloating  * Dyspepsia with constipation --commence simethicone and get Xray abdomen  * Labs unremarkable   * Weak left upper extremity--c/w targeted exercises--hand compression foam/balls  * Continue Abx and brace when OOB     #Thoracic epidural abscess s/p laminectomy and fusion surgery      * C8 AIS D Incomplete paraplegia      *  brace when OOB      * Flexeril 5mg PO TID PRN for neck soreness      * Staples removed on POD #14 (4/1/25)      * Sacral wound assessment by wound/skin team      * urinary retention, now voiding,  c/w Flomax 0.8mg PO daily      * Right Venodyne only due to left calf DVT (s/p pre-op IVC filter)   - Activity Limitations: Decreased social, vocational and leisure activities, decreased self care and ADLs, decreased mobility, decreased ability to manage household and finances  - Comprehensive Multidisciplinary Rehab Program:      * 3 hours a day, 5 days a week      * PT 1hr/day: Focused on improving strength, endurance, coordination, balance, functional mobility, and transfers      * OT 1hr/day: Focused on improving strength, fine motor skills, coordination, posture and ADLs    -----------------------------------------------------------------------------------    Concurrent Medical Problems    #GOVIND on CKD (resolved)  #LE edema  #Hypoalbuminemia  #Proteinuria  - IVF as needed with fluid balance  - Significant debilitating anasarca   - Trial of albumin Lasix started 3/8->improvement  - s/p IV Lasix/IV albumin BID (3/9)  - Change to Bumex/Albumin (3/11)-> good effect  - Switched to PO Bumex 1mg BID 3/24  - Trend Albumin (4/14 - 2.9)  - Trend Protein total (4/14 - 6.5); Total protein, Random Urine (3/18- 24)  - Trend CMP (4/14 - Cr 1.03/ BUN 29)    #L gastrocnemius and L soleal DVT  - appears provoked from decrease ambulation from recent spinal abscess  - Could not tolerate VQ scan for r/o PE 2/2 pain  - Eliquis 5mg PO q12h - will be new med on discharge (of note patient completed Eliquis load)   - Therapeutic Lovenox (3/12) in the setting of possible spine intervention  - Last dose of AC night of 3/16. Restarted Eliquis 5mg BID 4/5/25  - S/P IVC filter placement 3/17  - repeat US 4/8 with stable L soleal DVT and no propagation     #Spinal epidural abscess POA and already being treated with IV antibiotics with suspected worsening of disease  - S/p thoracic laminectomy and fusion 3/18. [place]  brace   - Surgical culture with staph epi possible contaminant but given clinical presentation on admission, IV Dapto added  - IV Rocephin 2g daily via PICC line thru 5/2/25  - IV Daptomycin 650mg daily via PICC line thru 5/2/25  - gabapentin 100mg PO TID  - d/c long-acting Oxycontin 4/11  - oxycodone 15mg PO q6h PRN moderate pain  - Flexeril 5mg PO TID PRN for muscle spasm  - staples d/teodoro in rehab    #Mild Rhabdomyolysis Improving  - associated elevated transaminases (resolved)  - suspect from anasarca/edema   - improving with adequate diuresis of edema  - associated elevated transaminase; now resolved (4/14- AST 32/ALT 43)    #Leukocytosis (resolved)  - likely rx to DVT + Discitis  - no other signs of sepsis, afebrile   - trend CBC and monitor fever curve (4/14- WBC 9.54)    #Hypothyroidism  - Synthroid 150mcg PO daily    #CAD  #HTN  #HLD  - metoprolol succinate 25mg PO daily   - rosuvastatin 5mg PO qhs    #Anemia/MARLENA  - trend H/H (4/14- 10.5/33.1)  - transfuse if Hgb <7 PRN     #Sleep:  - melatonin 3mg PO qhs PRN for sleep    #GI/Bowel:  - senna 2 tablets PO qhs  - Miralax 17g PO daily  - daily mini-enema at 6AM  - lactulose 20g PO TID PRN for constipation  - Maalox 30mL PO q4h PRN for dyspepsia  - GI ppx: pantoprazole 40mg PO daily   - add simethicone 80mg PO TID PRN for gas 4/14    #BPH  #Urinary retention likely augmented by degree of anasarca  - S/P Ortega; TOV  ->failed. Ortega re-inserted 3/6->fell out without knowing with associated hematuria->recurrent retention 3/7->Ortega re-inserted (3/7) --> successful TOV 3/25  - Flomax 0.8mg PO qhs  - started bethanechol 10mg PO BID 4/6  - Monitor U/O    #Skin/Pressure Injury:   Skin assessment on admission:   - Generalized dry flaky skin  - 19cm posterior cervical incision with 32 staples and 2 steri strips  - Left buttock DTI pressure injury measuring 14 x 9 cm      * appearing as a dark purple discoloration of intact skin, with a central superficial open area measuring 6 x 2 cm      * at the periphery of the 14 x 9 cm area, the discoloration is somewhat lighter      * dark red non-blanchable erythema     **Santyl to eschar of sacrum daily**  - Bilateral lower extremity edema  - Healed lumbar surgical incision scar  - Right heel blanchable redness  - Bilateral dry cracked heels and plantar surface  - Lac hydrin BID BLE  - Aquaphor BID to generalized dry skin   - Appreciate wound care consult     #Diet/Dysphagia:  - Dysphagia: SLP consult for swallow function evaluation and treatment  - Current Diet: Regular  - Aspiration Precautions  - Nutrition consult     #Patient Education  - Education provided on the following:      * Admitting diagnosis and functional implications      * Functional goals      * Bladder management      * Bowel management      * Skin care management      * Intensity of service and scheduling of rehab disciplines      * Plan of care and role of interdisciplinary team conference in discharge planning      * Reconciliation of medications from prior institution    #Precautions / PROPHYLAXIS:   - Falls, Spinal  - Ortho: Weight bearing status: FWB;  brace OOB  - DVT ppx: Eliquis 5mg PO BID, TEDs  - Pressure injury/Skin: Turn Q2hrs while in bed, OOB to Chair, PT/OT      ---------------  Outpatient Follow-up:    Yue Wiggins  [facility]  [address], [address]  [place] [address]  Phone: [phone]  Fax: [phone]  Established Patient  Follow Up Time: 1 week    Keith Cantu [facility]  Neurosurgery  [address], [address]  [place] [address]  Phone: [phone]  Fax: [phone]  Established Patient  Follow Up Time: 2 weeks    Luis A Brennan Physician [facility]  CARDIOLOGY [address]  Scheduled Appointment: 04/23/2025    --------------

## 2025-04-14 NOTE — PROGRESS NOTE ADULT - REASON FOR ADMISSION
Thoracic epidural abscess s/p laminectomy and fusion surgery Thoracic epidural abscess s/p laminectomy and fusion

## 2025-04-14 NOTE — PROGRESS NOTE ADULT - SUBJECTIVE AND OBJECTIVE BOX
HPI:  Mr. Rishabh Shaver is a 74-year-old male patient with past medical history of benign prostatic hyperplasia, diverticulosis, Graves' disease, hemorrhoids, hyperlipidemia, hypothyroidism, osteoarthritis, spinal stenosis, spinal cord injury (2004), and multiple spinal surgeries (three cervical and one lumbar), who presented to [facility] on 3/4/25 from [facility] [place] due to lower extremity swelling and abnormal BUN/creatinine levels for further evaluation and management. The patient was recently admitted to [facility] from 02/18/25 through 02/25/25 for sepsis secondary to epidural abscess. He had a peripherally inserted central catheter line in the right arm and was on nightly Ceftriaxone treatment until 04/16/25. At the ED, he was found to have GOVIND, a left soleal vein, and a left gastrocnemius DVT. He was started on heparin gtt. Hospital course complicated by urinary retention requiring Ortega catheter insertion, rhabdomyolysis, elevated LFTs, anasarca, leukocytosis due to thoracic spine discitis OM and early paraspinal abscess, as well as new onset RUE paresthesias and weakness. He is s/p IVC filter placement on 3/17 and was recommended surgical management. He is s/p posterior thoracic laminectomy and fusion on 3/18/25 with Dr. Keith Cantu. Surgical culture with rare staph epi. Recommendations by ID to continue IV Rocephin and Daptomycin through 5/2/25. Patient was evaluated by PM&R and therapy for functional deficits, gait/ADL impairments and acute rehabilitation was recommended. Patient was cleared for discharge to [facility] IRF on 3/25/25. (25 Mar 2025 17:09)    TDD 4/25/25 home  ___________________________________________________________________________    SUBJECTIVE/ROS  Patient was seen and evaluated in PT today.  Reported no overnight events and is in no acute distress.  PT Regan reports that pt is still hyperextending the right knee, but is starting to improve regarding dragging the right toes that he had started doing last week. Reports he walked well today with RW and that it is likely the best option for him.   Patient reports no pain, no urinary issues, last bowel movement today without issues, and sleeping well.   Pt reports excess gas; discussed starting PRN simethicone to which patient is amenable.  ___________________________________________________________________________    LABS             10.5   9.54  )-----------( 288      ( 14 Apr 2025 07:05 )             33.1     141  |  102  |  29[H]  ----------------------------<  118[H]  3.7   |  34[H]  |  1.03    Ca    9.2      14 Apr 2025 07:05  TPro  6.5  /  Alb  2.9[L]  /  TBili  0.4  /  DBili  x   /  AST  32  /  ALT  43  /  AlkPhos  75  04-14    ___________________________________________________________________________    MEDICATIONS  (STANDING):  ammonium lactate 12% Lotion 1 Application(s) Topical two times a day  apixaban 5 milliGRAM(s) Oral every 12 hours  AQUAPHOR (petrolatum Ointment) 1 Application(s) Topical three times a day  bethanechol 10 milliGRAM(s) Oral two times a day  buMETAnide 1 milliGRAM(s) Oral two times a day  cefTRIAXone   IVPB 2000 milliGRAM(s) IV Intermittent every 24 hours  chlorhexidine 4% Liquid 1 Application(s) Topical <User Schedule>  collagenase Ointment 1 Application(s) Topical daily  DAPTOmycin IVPB 650 milliGRAM(s) IV Intermittent every 24 hours  docusate sodium Enema 283 milliGRAM(s) Rectal <User Schedule>  gabapentin   Oral   gabapentin 100 milliGRAM(s) Oral three times a day  levothyroxine 150 MICROGram(s) Oral daily  metoprolol succinate ER 25 milliGRAM(s) Oral daily  pantoprazole    Tablet 40 milliGRAM(s) Oral before breakfast  polyethylene glycol 3350 17 Gram(s) Oral <User Schedule>  rosuvastatin 5 milliGRAM(s) Oral at bedtime  senna 2 Tablet(s) Oral at bedtime  tamsulosin 0.8 milliGRAM(s) Oral at bedtime    MEDICATIONS  (PRN):  aluminum hydroxide/magnesium hydroxide/simethicone Suspension 30 milliLiter(s) Oral every 4 hours PRN Dyspepsia  cyclobenzaprine 5 milliGRAM(s) Oral three times a day PRN Muscle Spasm  lactulose Syrup 20 Gram(s) Oral three times a day PRN for constipation  melatonin 3 milliGRAM(s) Oral at bedtime PRN Insomnia  oxyCODONE    IR 15 milliGRAM(s) Oral every 6 hours PRN Moderate Pain (4 - 6)  sodium chloride 0.9% lock flush 10 milliLiter(s) IV Push every 1 hour PRN Pre/post blood products, medications, blood draw, and to maintain line patency    ___________________________________________________________________________    PHYSICAL EXAM:    VITALS  T(C): 36.6 (04-14-25 @ 09:01), Max: 36.7 (04-13-25 @ 20:16)  HR: 83 (04-14-25 @ 13:46) (76 - 96)  BP: 133/70 (04-14-25 @ 13:46) (117/71 - 133/70)  RR: 16 (04-14-25 @ 09:01) (15 - 16)  SpO2: 95% (04-14-25 @ 09:01) (95% - 96%)    Constitutional - NAD, Comfortable  Chest - good chest expansion, good respiratory effort  Cardio - warm and well perfused   Neurologic Exam:                           Orientation: Awake, A&O to self, place, date, year, situation     Speech - Fluent, Comprehensible, No dysarthria, No aphasia      Motor -                      LEFT    LE - DF 5/5, PF 5/5                    RIGHT LE - DF 4/5 (strength good, more limited ROM), PF 5/5        Sensory - Intact to LT bilateral UE and LE  Psychiatric - Mood cheerful, Affect WNL  Skin: DTI to sacrum    ___________________________________________________________________________ HPI:  Mr. Rishabh Shaver is a 74-year-old male patient with past medical history of benign prostatic hyperplasia, diverticulosis, Graves' disease, hemorrhoids, hyperlipidemia, hypothyroidism, osteoarthritis, spinal stenosis, spinal cord injury (2004), and multiple spinal surgeries (three cervical and one lumbar), who presented to [facility] on 3/4/25 from [facility] ([facility] due to lower extremity swelling and abnormal BUN/creatinine levels for further evaluation and management. The patient was recently admitted to [facility] from 02/18/25 through 02/25/25 for sepsis secondary to epidural abscess. He had a peripherally inserted central catheter line in the right arm and was on nightly Ceftriaxone treatment until 04/16/25. At the ED, he was found to have GOVIND, a left soleal vein, and a left gastrocnemius DVT. He was started on heparin gtt. Hospital course complicated by urinary retention requiring Ortega catheter insertion, rhabdomyolysis, elevated LFTs, anasarca, leukocytosis due to thoracic spine discitis OM and early paraspinal abscess, as well as new onset RUE paresthesias and weakness. He is s/p IVC filter placement on 3/17 and was recommended surgical management. He is s/p posterior thoracic laminectomy and fusion on 3/18/25 with Dr. Keith Cantu. Surgical culture with rare staph epi. Recommendations by ID to continue IV Rocephin and Daptomycin through 5/2/25. Patient was evaluated by PM&R and therapy for functional deficits, gait/ADL impairments and acute rehabilitation was recommended. Patient was cleared for discharge to [facility] IRF on 3/25/25. (25 Mar 2025 17:09)    TDD 4/25/25 home  ___________________________________________________________________________    SUBJECTIVE/ROS  Patient was seen and evaluated in PT today.  Reported no overnight events and is in no acute distress.  PT Regan reports that pt is still hyperextending the right knee, but is starting to improve regarding dragging the right toes that he had started doing last week. Reports he walked well today with RW and that it is likely the best option for him.   Patient reports no pain, no urinary issues, last bowel movement today without issues, and sleeping well.   Pt reports excess gas; discussed starting PRN simethicone to which patient is amenable.  ___________________________________________________________________________    Function   Ambulates 75ft x 3 w  rw min assist x1  and cs of another, verbal cues for  safety on turns, increased time   4 steps x 2 attempts w min/mod x 2 for upright posture and left knee extension,  descending backwards      LABS             10.5   9.54  )-----------( 288      ( 14 Apr 2025 07:05 )             33.1     141  |  102  |  29[H]  ----------------------------<  118[H]  3.7   |  34[H]  |  1.03    Ca    9.2      14 Apr 2025 07:05  TPro  6.5  /  Alb  2.9[L]  /  TBili  0.4  /  DBili  x   /  AST  32  /  ALT  43  /  AlkPhos  75  04-14    ___________________________________________________________________________    MEDICATIONS  (STANDING):  ammonium lactate 12% Lotion 1 Application(s) Topical two times a day  apixaban 5 milliGRAM(s) Oral every 12 hours  AQUAPHOR (petrolatum Ointment) 1 Application(s) Topical three times a day  bethanechol 10 milliGRAM(s) Oral two times a day  buMETAnide 1 milliGRAM(s) Oral two times a day  cefTRIAXone   IVPB 2000 milliGRAM(s) IV Intermittent every 24 hours  chlorhexidine 4% Liquid 1 Application(s) Topical <User Schedule>  collagenase Ointment 1 Application(s) Topical daily  DAPTOmycin IVPB 650 milliGRAM(s) IV Intermittent every 24 hours  docusate sodium Enema 283 milliGRAM(s) Rectal <User Schedule>  gabapentin   Oral   gabapentin 100 milliGRAM(s) Oral three times a day  levothyroxine 150 MICROGram(s) Oral daily  metoprolol succinate ER 25 milliGRAM(s) Oral daily  pantoprazole    Tablet 40 milliGRAM(s) Oral before breakfast  polyethylene glycol 3350 17 Gram(s) Oral <User Schedule>  rosuvastatin 5 milliGRAM(s) Oral at bedtime  senna 2 Tablet(s) Oral at bedtime  tamsulosin 0.8 milliGRAM(s) Oral at bedtime    MEDICATIONS  (PRN):  aluminum hydroxide/magnesium hydroxide/simethicone Suspension 30 milliLiter(s) Oral every 4 hours PRN Dyspepsia  cyclobenzaprine 5 milliGRAM(s) Oral three times a day PRN Muscle Spasm  lactulose Syrup 20 Gram(s) Oral three times a day PRN for constipation  melatonin 3 milliGRAM(s) Oral at bedtime PRN Insomnia  oxyCODONE    IR 15 milliGRAM(s) Oral every 6 hours PRN Moderate Pain (4 - 6)  sodium chloride 0.9% lock flush 10 milliLiter(s) IV Push every 1 hour PRN Pre/post blood products, medications, blood draw, and to maintain line patency    ___________________________________________________________________________    PHYSICAL EXAM:    VITALS  T(C): 36.6 (04-14-25 @ 09:01), Max: 36.7 (04-13-25 @ 20:16)  HR: 83 (04-14-25 @ 13:46) (76 - 96)  BP: 133/70 (04-14-25 @ 13:46) (117/71 - 133/70)  RR: 16 (04-14-25 @ 09:01) (15 - 16)  SpO2: 95% (04-14-25 @ 09:01) (95% - 96%)    Constitutional - NAD, Comfortable  Chest - good chest expansion, good respiratory effort  Cardio - warm and well perfused   Neurologic Exam:                           Orientation: Awake, A&O to self, place, date, year, situation     Speech - Fluent, Comprehensible, No dysarthria, No aphasia      Motor -  Left upper extremity, shoulder and elbow 3/5, wrist and fingers 2/5, left index finger in extension .Rt UE 4/5                    LEFT    LE - DF 5/5, PF 5/5                    RIGHT LE - DF 4/5 (strength good, more limited ROM), PF 5/5        Sensory - Intact to LT bilateral UE and LE  Psychiatric - Mood cheerful, Affect WNL  Skin: DTI to sacrum    ___________________________________________________________________________

## 2025-04-14 NOTE — CHART NOTE - NSCHARTNOTEFT_GEN_A_CORE
Nutrition Follow Up Note  Hospital Course   (Per Electronic Medical Record)    Source:  Patient [X]  Medical Record [X]      Diet:   Regular Diet (IDDSI Level 7) w/ Thin Liquids (IDDSI Level 0)  Tolerates Diet Consistency Well  No Chewing/Swallowing Difficulties  No Recent Nausea, Vomiting, Diarrhea or Constipation (as Per Patient)  Consumes % of Meals (as Per Documentation) - States Good PO Intake/Appetite (Per Patient)  on Ensure Plus High Protein 8oz PO BID (Provides 700kcal & 40grams of Protein)  on Lalito 1 Packet BID (Provides 180kcal & 28grams of Protein)  Patient Takes Nutrition Supplement Well  Education Provided on Proper Nutrition    Enteral/Parenteral Nutrition: Not Applicable    Current Weight: 220.4lb on 4/14  Obtain New Weight to Confirm  Obtain Weights Weekly     Pertinent Medications: MEDICATIONS  (STANDING):  ammonium lactate 12% Lotion 1 Application(s) Topical two times a day  apixaban 5 milliGRAM(s) Oral every 12 hours  AQUAPHOR (petrolatum Ointment) 1 Application(s) Topical three times a day  bethanechol 10 milliGRAM(s) Oral two times a day  buMETAnide 1 milliGRAM(s) Oral two times a day  cefTRIAXone   IVPB 2000 milliGRAM(s) IV Intermittent every 24 hours  chlorhexidine 4% Liquid 1 Application(s) Topical <User Schedule>  collagenase Ointment 1 Application(s) Topical daily  DAPTOmycin IVPB 650 milliGRAM(s) IV Intermittent every 24 hours  docusate sodium Enema 283 milliGRAM(s) Rectal <User Schedule>  gabapentin   Oral   gabapentin 100 milliGRAM(s) Oral three times a day  levothyroxine 150 MICROGram(s) Oral daily  metoprolol succinate ER 25 milliGRAM(s) Oral daily  pantoprazole    Tablet 40 milliGRAM(s) Oral before breakfast  polyethylene glycol 3350 17 Gram(s) Oral <User Schedule>  rosuvastatin 5 milliGRAM(s) Oral at bedtime  senna 2 Tablet(s) Oral at bedtime  tamsulosin 0.8 milliGRAM(s) Oral at bedtime    MEDICATIONS  (PRN):  aluminum hydroxide/magnesium hydroxide/simethicone Suspension 30 milliLiter(s) Oral every 4 hours PRN Dyspepsia  cyclobenzaprine 5 milliGRAM(s) Oral three times a day PRN Muscle Spasm  lactulose Syrup 20 Gram(s) Oral three times a day PRN for constipation  melatonin 3 milliGRAM(s) Oral at bedtime PRN Insomnia  oxyCODONE    IR 15 milliGRAM(s) Oral every 6 hours PRN Moderate Pain (4 - 6)  sodium chloride 0.9% lock flush 10 milliLiter(s) IV Push every 1 hour PRN Pre/post blood products, medications, blood draw, and to maintain line patency    Pertinent Labs:  04-14 Na141 mmol/L Glu 118 mg/dL[H] K+ 3.7 mmol/L Cr  1.03 mg/dL BUN 29 mg/dL[H] 04-14 Alb 2.9 g/dL[L]    Skin: DTI Pressure Ulcer on Left Buttock     Edema: None Noted Recently (as Per Documentation)     Last Bowel Movement: on 4/14    Estimated Needs:   [X] No Change Since Previous Assessment    Previous Nutrition Diagnosis:   Increased Nutrient Needs - Calories & Protein     Nutrition Diagnosis is [X] Ongoing - Continues on Nutrition Supplement & Patient Takes Nutrition Supplement     New Nutrition Diagnosis: [X] Not Applicable    Interventions:   1. Education Provided on Proper Nutrition   2. Recommend Continue Nutrition Plan of Care     Monitoring & Evaluation:   [X] Weights   [X] PO Intake   [X] Skin Integrity   [X] Follow Up (Per Protocol)  [X] Tolerance to Diet Prescription   [X] Other: Labs     Registered Dietitian/Nutritionist Remains Available.  Jarrod Alva RDN, CDN  Senior Dietitian    Phone# [phone]

## 2025-04-15 PROCEDURE — 99232 SBSQ HOSP IP/OBS MODERATE 35: CPT

## 2025-04-15 PROCEDURE — 74019 RADEX ABDOMEN 2 VIEWS: CPT | Mod: 26

## 2025-04-15 PROCEDURE — 99233 SBSQ HOSP IP/OBS HIGH 50: CPT

## 2025-04-15 RX ORDER — POLYETHYLENE GLYCOL 3350 17 G/17G
17 POWDER, FOR SOLUTION ORAL ONCE
Refills: 0 | Status: COMPLETED | OUTPATIENT
Start: 2025-04-15 | End: 2025-04-15

## 2025-04-15 RX ORDER — MAGNESIUM HYDROXIDE 400 MG/5ML
30 SUSPENSION ORAL AT BEDTIME
Refills: 0 | Status: DISCONTINUED | OUTPATIENT
Start: 2025-04-15 | End: 2025-04-30

## 2025-04-15 RX ORDER — POLYETHYLENE GLYCOL 3350 17 G/17G
17 POWDER, FOR SOLUTION ORAL
Refills: 0 | Status: DISCONTINUED | OUTPATIENT
Start: 2025-04-15 | End: 2025-04-30

## 2025-04-15 RX ADMIN — POLYETHYLENE GLYCOL 3350 17 GRAM(S): 17 POWDER, FOR SOLUTION ORAL at 14:35

## 2025-04-15 RX ADMIN — Medication 150 MICROGRAM(S): at 05:43

## 2025-04-15 RX ADMIN — Medication 1 APPLICATION(S): at 05:44

## 2025-04-15 RX ADMIN — APIXABAN 5 MILLIGRAM(S): 2.5 TABLET, FILM COATED ORAL at 11:43

## 2025-04-15 RX ADMIN — APIXABAN 5 MILLIGRAM(S): 2.5 TABLET, FILM COATED ORAL at 23:03

## 2025-04-15 RX ADMIN — Medication 10 MILLIGRAM(S): at 17:24

## 2025-04-15 RX ADMIN — TAMSULOSIN HYDROCHLORIDE 0.8 MILLIGRAM(S): 0.4 CAPSULE ORAL at 21:21

## 2025-04-15 RX ADMIN — GABAPENTIN 100 MILLIGRAM(S): 400 CAPSULE ORAL at 21:20

## 2025-04-15 RX ADMIN — Medication 2 TABLET(S): at 21:20

## 2025-04-15 RX ADMIN — WHITE PETROLATUM 1 APPLICATION(S): 1 OINTMENT TOPICAL at 05:44

## 2025-04-15 RX ADMIN — GABAPENTIN 100 MILLIGRAM(S): 400 CAPSULE ORAL at 14:35

## 2025-04-15 RX ADMIN — Medication 80 MILLIGRAM(S): at 17:24

## 2025-04-15 RX ADMIN — Medication 1 APPLICATION(S): at 17:25

## 2025-04-15 RX ADMIN — BUMETANIDE 1 MILLIGRAM(S): 1 TABLET ORAL at 05:43

## 2025-04-15 RX ADMIN — WHITE PETROLATUM 1 APPLICATION(S): 1 OINTMENT TOPICAL at 21:21

## 2025-04-15 RX ADMIN — CEFTRIAXONE 100 MILLIGRAM(S): 500 INJECTION, POWDER, FOR SOLUTION INTRAMUSCULAR; INTRAVENOUS at 21:20

## 2025-04-15 RX ADMIN — BUMETANIDE 1 MILLIGRAM(S): 1 TABLET ORAL at 14:35

## 2025-04-15 RX ADMIN — Medication 80 MILLIGRAM(S): at 09:18

## 2025-04-15 RX ADMIN — GABAPENTIN 100 MILLIGRAM(S): 400 CAPSULE ORAL at 05:43

## 2025-04-15 RX ADMIN — Medication 10 MILLIGRAM(S): at 05:43

## 2025-04-15 RX ADMIN — DAPTOMYCIN 126 MILLIGRAM(S): 500 INJECTION, POWDER, LYOPHILIZED, FOR SOLUTION INTRAVENOUS at 11:44

## 2025-04-15 RX ADMIN — Medication 283 MILLIGRAM(S): at 08:27

## 2025-04-15 RX ADMIN — Medication 1 APPLICATION(S): at 05:31

## 2025-04-15 RX ADMIN — ROSUVASTATIN CALCIUM 5 MILLIGRAM(S): 20 TABLET, FILM COATED ORAL at 21:21

## 2025-04-15 RX ADMIN — METOPROLOL SUCCINATE 25 MILLIGRAM(S): 50 TABLET, EXTENDED RELEASE ORAL at 05:43

## 2025-04-15 RX ADMIN — Medication 40 MILLIGRAM(S): at 05:43

## 2025-04-15 RX ADMIN — MAGNESIUM HYDROXIDE 30 MILLILITER(S): 400 SUSPENSION ORAL at 21:20

## 2025-04-15 NOTE — PROGRESS NOTE ADULT - ASSESSMENT
Assessment/Plan:  Mr. Rishabh Shaver is a 74-year-old man with past medical history of benign prostatic hyperplasia, diverticulosis, Graves' disease, hemorrhoids, hyperlipidemia, hypothyroidism, osteoarthritis, spinal stenosis, spinal cord injury (2004), and multiple spinal surgeries (three cervical and one lumbar), who is admitted for Acute Inpatient Rehabilitation with a multidisciplinary rehab program at [facility] with functional impairments in ADLs and mobility secondary to a thoracic epidural abscess s/p posterior thoracic laminectomy and fusion on 3/18/25 with Dr. Keith Cantu.       #Thoracic epidural abscess s/p laminectomy and fusion surgery      * C8 AIS D Incomplete paraplegia      *  brace when OOB      * Flexeril 5mg PO TID PRN for neck soreness      * Staples removed on POD #14 (4/1/25)      * Sacral wound assessment by wound/skin team      * urinary retention, now voiding,  c/w Flomax 0.8mg PO daily      * Right Venodyne only due to left calf DVT (s/p pre-op IVC filter)   - Activity Limitations: Decreased social, vocational and leisure activities, decreased self care and ADLs, decreased mobility, decreased ability to manage household and finances  - Comprehensive Multidisciplinary Rehab Program:      * 3 hours a day, 5 days a week      * PT 1hr/day: Focused on improving strength, endurance, coordination, balance, functional mobility, and transfers      * OT 1hr/day: Focused on improving strength, fine motor skills, coordination, posture and ADLs  - orthotist Arya to see patient 4/17 for AFO for right foot drop    -----------------------------------------------------------------------------------    Concurrent Medical Problems    #GOVIND on CKD (resolved)  #LE edema  #Hypoalbuminemia  #Proteinuria  - IVF as needed with fluid balance  - Significant debilitating anasarca   - Trial of albumin Lasix started 3/8->improvement  - s/p IV Lasix/IV albumin BID (3/9)  - Change to Bumex/Albumin (3/11)-> good effect  - Switched to PO Bumex 1mg BID 3/24  - Trend Albumin (4/14 - 2.9)  - Trend Protein total (4/14 - 6.5); Total protein, Random Urine (3/18- 24)  - Trend CMP (4/14 - Cr 1.03/ BUN 29)    #L gastrocnemius and L soleal DVT  - appears provoked from decrease ambulation from recent spinal abscess  - Could not tolerate VQ scan for r/o PE 2/2 pain  - Eliquis 5mg PO q12h - will be new med on discharge (of note patient completed Eliquis load)   - Therapeutic Lovenox (3/12) in the setting of possible spine intervention  - Last dose of AC night of 3/16. Restarted Eliquis 5mg BID 4/5/25  - S/P IVC filter placement 3/17  - repeat US 4/8 with stable L soleal DVT and no propagation     #Spinal epidural abscess POA and already being treated with IV antibiotics with suspected worsening of disease  - S/p thoracic laminectomy and fusion 3/18. [place]  brace   - Surgical culture with staph epi possible contaminant but given clinical presentation on admission, IV Dapto added  - IV Rocephin 2g daily via PICC line thru 5/2/25  - IV Daptomycin 650mg daily via PICC line thru 5/2/25  - gabapentin 100mg PO TID  - d/c long-acting Oxycontin 4/11  - oxycodone 15mg PO q6h PRN moderate pain  - Flexeril 5mg PO TID PRN for muscle spasm  - staples d/teodoro in rehab    #Abdominal discomfort and bloating  - s/p large loose bowel movement after Docusol enema today  - GI consulted; will f/u tomorrow if symptoms remain  - KUB 4/15 with worsened stool burden (however was taken prior to BM this afternoon)  - hospitalist increased Miralax to BID and added milk of magnesia 30mL PO qhs 4/15    #Mild Rhabdomyolysis Improving  - associated elevated transaminases (resolved)  - suspect from anasarca/edema   - improving with adequate diuresis of edema  - associated elevated transaminase; now resolved (4/14- AST 32/ALT 43)    #Leukocytosis (resolved)  - likely rx to DVT + Discitis  - no other signs of sepsis, afebrile   - trend CBC and monitor fever curve (4/14- WBC 9.54)    #Hypothyroidism  - Synthroid 150mcg PO daily    #CAD  #HTN  #HLD  - metoprolol succinate 25mg PO daily   - rosuvastatin 5mg PO qhs    #Anemia/MARLENA  - trend H/H (4/14- 10.5/33.1)  - transfuse if Hgb <7 PRN     #Sleep:  - melatonin 3mg PO qhs PRN for sleep    #GI/Bowel:  - senna 2 tablets PO qhs  - Miralax 17g PO daily  - daily mini-enema at 6AM  - lactulose 20g PO TID PRN for constipation  - Maalox 30mL PO q4h PRN for dyspepsia  - GI ppx: pantoprazole 40mg PO daily   - added simethicone 80mg PO TID PRN for gas 4/14    #BPH  #Urinary retention likely augmented by degree of anasarca  - S/P Ortega; TOV  ->failed. Ortega re-inserted 3/6->fell out without knowing with associated hematuria->recurrent retention 3/7->Ortega re-inserted (3/7) --> successful TOV 3/25  - Flomax 0.8mg PO qhs  - started bethanechol 10mg PO BID 4/6  - Monitor U/O    #Skin/Pressure Injury:   Skin assessment on admission:   - Generalized dry flaky skin  - 19cm posterior cervical incision with 32 staples and 2 steri strips  - Left buttock DTI pressure injury measuring 14 x 9 cm      * appearing as a dark purple discoloration of intact skin, with a central superficial open area measuring 6 x 2 cm      * at the periphery of the 14 x 9 cm area, the discoloration is somewhat lighter      * dark red non-blanchable erythema     **Santyl to eschar of sacrum daily**  - Bilateral lower extremity edema  - Healed lumbar surgical incision scar  - Right heel blanchable redness  - Bilateral dry cracked heels and plantar surface  - Lac hydrin BID BLE  - Aquaphor BID to generalized dry skin   - Appreciate wound care consult     #Diet/Dysphagia:  - Dysphagia: SLP consult for swallow function evaluation and treatment  - Current Diet: Regular  - Aspiration Precautions  - Nutrition consult     #Patient Education  - Education provided on the following:      * Admitting diagnosis and functional implications      * Functional goals      * Bladder management      * Bowel management      * Skin care management      * Intensity of service and scheduling of rehab disciplines      * Plan of care and role of interdisciplinary team conference in discharge planning      * Reconciliation of medications from prior institution    #Precautions / PROPHYLAXIS:   - Falls, Spinal  - Ortho: Weight bearing status: FWB;  brace OOB  - DVT ppx: Eliquis 5mg PO BID, TEDs  - Pressure injury/Skin: Turn Q2hrs while in bed, OOB to Chair, PT/OT      ---------------  Outpatient Follow-up:    Yue Wiggins  [facility]  [address], [address]  [place] [address]  Phone: [phone]  Fax: [phone]  Established Patient  Follow Up Time: 1 week    Keith Cantu [facility]  Neurosurgery  [address], [address]  [place] [address]  Phone: [phone]  Fax: [phone]  Established Patient  Follow Up Time: 2 weeks    Luis A Brennan  [facility] Physician [facility]  CARDIOLOGY [address]  Scheduled Appointment: 04/23/2025    --------------   Assessment/Plan:  Mr. Rishabh Shaver is a 74-year-old man with past medical history of benign prostatic hyperplasia, diverticulosis, Graves' disease, hemorrhoids, hyperlipidemia, hypothyroidism, osteoarthritis, spinal stenosis, spinal cord injury (2004), and multiple spinal surgeries (three cervical and one lumbar), who is admitted for Acute Inpatient Rehabilitation with a multidisciplinary rehab program at [facility] with functional impairments in ADLs and mobility secondary to a thoracic epidural abscess s/p posterior thoracic laminectomy and fusion on 3/18/25 with Dr. Keith Cantu.     * Orthotics evaluation for Rt foot drop  * Constipation--mild fecal loading, monitor and follow GI recs  * Upper ext weakness L>R mostly distally, continue targeted exercise regiment     #Thoracic epidural abscess s/p laminectomy and fusion surgery      * C8 AIS D Incomplete paraplegia      *  brace when OOB      * Flexeril 5mg PO TID PRN for neck soreness      * Staples removed on POD #14 (4/1/25)      * Sacral wound assessment by wound/skin team      * urinary retention, now voiding,  c/w Flomax 0.8mg PO daily      * Right Venodyne only due to left calf DVT (s/p pre-op IVC filter)   - Activity Limitations: Decreased social, vocational and leisure activities, decreased self care and ADLs, decreased mobility, decreased ability to manage household and finances  - Comprehensive Multidisciplinary Rehab Program:      * 3 hours a day, 5 days a week      * PT 1hr/day: Focused on improving strength, endurance, coordination, balance, functional mobility, and transfers      * OT 1hr/day: Focused on improving strength, fine motor skills, coordination, posture and ADLs  - orthotist Arya to see patient 4/17 for AFO for right foot drop    -----------------------------------------------------------------------------------    Concurrent Medical Problems    #GOVIND on CKD (resolved)  #LE edema  #Hypoalbuminemia  #Proteinuria  - IVF as needed with fluid balance  - Significant debilitating anasarca   - Trial of albumin Lasix started 3/8->improvement  - s/p IV Lasix/IV albumin BID (3/9)  - Change to Bumex/Albumin (3/11)-> good effect  - Switched to PO Bumex 1mg BID 3/24  - Trend Albumin (4/14 - 2.9)  - Trend Protein total (4/14 - 6.5); Total protein, Random Urine (3/18- 24)  - Trend CMP (4/14 - Cr 1.03/ BUN 29)    #L gastrocnemius and L soleal DVT  - appears provoked from decrease ambulation from recent spinal abscess  - Could not tolerate VQ scan for r/o PE 2/2 pain  - Eliquis 5mg PO q12h - will be new med on discharge (of note patient completed Eliquis load)   - Therapeutic Lovenox (3/12) in the setting of possible spine intervention  - Last dose of AC night of 3/16. Restarted Eliquis 5mg BID 4/5/25  - S/P IVC filter placement 3/17  - repeat US 4/8 with stable L soleal DVT and no propagation     #Spinal epidural abscess POA and already being treated with IV antibiotics with suspected worsening of disease  - S/p thoracic laminectomy and fusion 3/18. [place]  brace   - Surgical culture with staph epi possible contaminant but given clinical presentation on admission, IV Dapto added  - IV Rocephin 2g daily via PICC line thru 5/2/25  - IV Daptomycin 650mg daily via PICC line thru 5/2/25  - gabapentin 100mg PO TID  - d/c long-acting Oxycontin 4/11  - oxycodone 15mg PO q6h PRN moderate pain  - Flexeril 5mg PO TID PRN for muscle spasm  - staples d/teodoro in rehab    #Abdominal discomfort and bloating  - s/p large loose bowel movement after Docusol enema today  - GI consulted; will f/u tomorrow if symptoms remain  - KUB 4/15 with worsened stool burden (however was taken prior to BM this afternoon)  - hospitalist increased Miralax to BID and added milk of magnesia 30mL PO qhs 4/15    #Mild Rhabdomyolysis Improving  - associated elevated transaminases (resolved)  - suspect from anasarca/edema   - improving with adequate diuresis of edema  - associated elevated transaminase; now resolved (4/14- AST 32/ALT 43)    #Leukocytosis (resolved)  - likely rx to DVT + Discitis  - no other signs of sepsis, afebrile   - trend CBC and monitor fever curve (4/14- WBC 9.54)    #Hypothyroidism  - Synthroid 150mcg PO daily    #CAD  #HTN  #HLD  - metoprolol succinate 25mg PO daily   - rosuvastatin 5mg PO qhs    #Anemia/MARLENA  - trend H/H (4/14- 10.5/33.1)  - transfuse if Hgb <7 PRN     #Sleep:  - melatonin 3mg PO qhs PRN for sleep    #GI/Bowel:  - senna 2 tablets PO qhs  - Miralax 17g PO daily  - daily mini-enema at 6AM  - lactulose 20g PO TID PRN for constipation  - Maalox 30mL PO q4h PRN for dyspepsia  - GI ppx: pantoprazole 40mg PO daily   - added simethicone 80mg PO TID PRN for gas 4/14    #BPH  #Urinary retention likely augmented by degree of anasarca  - S/P Ortega; TOV  ->failed. Ortega re-inserted 3/6->fell out without knowing with associated hematuria->recurrent retention 3/7->Ortega re-inserted (3/7) --> successful TOV 3/25  - Flomax 0.8mg PO qhs  - started bethanechol 10mg PO BID 4/6  - Monitor U/O    #Skin/Pressure Injury:   Skin assessment on admission:   - Generalized dry flaky skin  - 19cm posterior cervical incision with 32 staples and 2 steri strips  - Left buttock DTI pressure injury measuring 14 x 9 cm      * appearing as a dark purple discoloration of intact skin, with a central superficial open area measuring 6 x 2 cm      * at the periphery of the 14 x 9 cm area, the discoloration is somewhat lighter      * dark red non-blanchable erythema     **Santyl to eschar of sacrum daily**  - Bilateral lower extremity edema  - Healed lumbar surgical incision scar  - Right heel blanchable redness  - Bilateral dry cracked heels and plantar surface  - Lac hydrin BID BLE  - Aquaphor BID to generalized dry skin   - Appreciate wound care consult     #Diet/Dysphagia:  - Dysphagia: SLP consult for swallow function evaluation and treatment  - Current Diet: Regular  - Aspiration Precautions  - Nutrition consult     #Patient Education  - Education provided on the following:      * Admitting diagnosis and functional implications      * Functional goals      * Bladder management      * Bowel management      * Skin care management      * Intensity of service and scheduling of rehab disciplines      * Plan of care and role of interdisciplinary team conference in discharge planning      * Reconciliation of medications from prior institution    #Precautions / PROPHYLAXIS:   - Falls, Spinal  - Ortho: Weight bearing status: FWB;  brace OOB  - DVT ppx: Eliquis 5mg PO BID, TEDs  - Pressure injury/Skin: Turn Q2hrs while in bed, OOB to Chair, PT/OT      ---------------  Outpatient Follow-up:    Yue Wiggins Paige  [facility]  [address], [address]  [place] [address]  Phone: [phone]  Fax: [phone]  Established Patient  Follow Up Time: 1 week    Keith Cantu [facility]  Neurosurgery  [address]  [place] [address]  Phone: [phone]  Fax: [phone]  Established Patient  Follow Up Time: 2 weeks    Luis A Brennan  [facility] Physician [facility]  CARDIOLOGY [address]  Scheduled Appointment: 04/23/2025    --------------

## 2025-04-15 NOTE — PROGRESS NOTE ADULT - ASSESSMENT
74-year-old male patient with h/o BPH, diverticulosis, Graves' disease, hemorrhoids, HLD, hypothyroidism, osteoarthritis, spinal stenosis, spinal cord injury (2004), and multiple spinal surgeries (three cervical and one lumbar), who presented to [facility] on 3/4/25 from [facility] due to lower extremity swelling and abnormal BUN/creatinine levels for further evaluation and management. The patient was recently admitted to [facility] from 02/18/25 through 02/25/25 for sepsis secondary to epidural abscess. He had a peripherally inserted central catheter line in the right arm and was on nightly Ceftriaxone treatment until 04/16/25. At the ED, he was found to have GOVIND, a left soleal vein, and a left gastrocnemius DVT. He was started on heparin gtt. Hospital course complicated by urinary retention requiring Ortega catheter insertion, rhabdomyolysis, elevated LFTs, anasarca, leukocytosis due to thoracic spine discitis OM and early paraspinal abscess, as well as new onset RUE paresthesias and weakness. He is s/p IVC filter placement on 3/17 and was recommended surgical management. He is s/p posterior thoracic laminectomy and fusion on 3/18/25 with Dr. Keith Cantu. Surgical culture with rare staph epi. Recommendations by ID to continue IV Rocephin and Daptomycin through 5/2/25. Patient was evaluated by PM&R and therapy for functional deficits, gait/ADL impairments and acute rehabilitation was recommended. Patient was cleared for discharge to [facility] IRF on 3/25/25. (25 Mar 2025 17:09)    Constipation abdominal  distention and dyspepsia  - on senna, Docusol enema daily since april 2.   - abd xray on 4/2: Moderate to large stool burden. No bowel obstruction.  - Abd xray today 4/15: Reviewed by me. looks like worse stool burden without obstruction. f/u final result  - added Miralax BID. milk of magnesia at h/s. . conitnue senna at h.s  - consider to change Ducusol enema daily to mineral oil enema daily at h/s  - c/w maalox PRN, simethicone  - Dced lactulose TID PRN as it can cause bloating  - GI eval called. informed Dr. Borges and ACP  - may need linzess/golytelly    Debility  Thoracic epidural abscess s/p laminectomy and fusion surgery on 3/18  -  brace  - pain control per primary. avoid opioids for constipation  - fall, spine precautions    Spinal epidural abscess   - S/p thoracic laminectomy and fusion 3/18.   - IV Rocephin 2g HS via PICC line thru 5/2/25  - IV Daptomycin 650mg daily via PICC line thru 5/2/2025  - [place]  brace   - Surgical culture with staph epi possible contaminant but given clinical presentation on admission, IV Dapto added.     GOVIND, resolved  LE edema  Hypoalbuminemia  Proteinuria  Anasarca  - c/w Bumex 1 mg BID (4/4 missed the past few doses due to hold parameters.  reordered with hold for SBP <100 instructions).   - Optimize protein intake.   - mobilize, encourage out of bed to chair often.   - Monitor labs closely , stable renal function w good UOP   - Serum workup negative but will need follow up of  Serum  TRUDY positive for weak IgG Lambda band.  - urine protein 200 mg only ( not nephrotic)   - daily standing weights    Transaminitis, resolved  - encouraged PO  - Limit Tylenol use  - avoid hepatotoxins  - monitor on routine labs    CAD  HTN  HLD  - Metoprolol 25mg daily   - Crestor 5mg HS    Anemia  MARLENA  - s/p 2 units of PRBCs given intra-op  - Monitor Hgb (3/31 Hgb 8.3)  - Transfuse to keep hb >7    Hypothyroidism  - Synthroid 150mcg daily    sacral Decub stage 3  - wound care cx noted  - Continue turning patient Q 2 hours   - Lac hydrin BID BLE  - Aquaphor BID to generalized dry skin     BPH, chronic  Urinary retention likely augmented by degree of anasarca  - Flomax 8mg daily  - Monitor U/O  - Treat constipation  - encourage PO hydration    Acute DVT:   -  US doppler + L soleal and gastroc DVT   - s/p IVC filter 3/17 with interventional cards.   - Could not tolerate VQ scan for r/o PE 2/2 pain  - Restarted Eliquis 5mg BID per neurosurgery    DVT-P: Eliquis    will follow,     d/w rehab  team

## 2025-04-15 NOTE — PROGRESS NOTE ADULT - SUBJECTIVE AND OBJECTIVE BOX
Medicine Progress Note    Patient is a 75y old  Male who presents with a chief complaint of Thoracic epidural abscess s/p laminectomy and fusion (14 Apr 2025 14:51)      SUBJECTIVE / OVERNIGHT EVENTS:  seen and examined  Chart reviewed  No overnight events  Limb weakness improving with therapy  reported no BM, mostly gas. + abdominal distension, No pain  urinary retention needed SC. no dysuria    ROS:  denied fever/chills/CP/SOB/cough/palpitation/dizziness/abdominal pian/nausea/vomiting/diarrhoea/constipation/dysuria/leg or calf pain/headaches.all other ROS neg    MEDICATIONS  (STANDING):  ammonium lactate 12% Lotion 1 Application(s) Topical two times a day  apixaban 5 milliGRAM(s) Oral every 12 hours  AQUAPHOR (petrolatum Ointment) 1 Application(s) Topical three times a day  bethanechol 10 milliGRAM(s) Oral two times a day  buMETAnide 1 milliGRAM(s) Oral two times a day  cefTRIAXone   IVPB 2000 milliGRAM(s) IV Intermittent every 24 hours  chlorhexidine 4% Liquid 1 Application(s) Topical <User Schedule>  collagenase Ointment 1 Application(s) Topical daily  DAPTOmycin IVPB 650 milliGRAM(s) IV Intermittent every 24 hours  docusate sodium Enema 283 milliGRAM(s) Rectal <User Schedule>  gabapentin   Oral   gabapentin 100 milliGRAM(s) Oral three times a day  levothyroxine 150 MICROGram(s) Oral daily  magnesium hydroxide Suspension 30 milliLiter(s) Oral at bedtime  metoprolol succinate ER 25 milliGRAM(s) Oral daily  pantoprazole    Tablet 40 milliGRAM(s) Oral before breakfast  polyethylene glycol 3350 17 Gram(s) Oral two times a day  polyethylene glycol 3350 17 Gram(s) Oral once  rosuvastatin 5 milliGRAM(s) Oral at bedtime  senna 2 Tablet(s) Oral at bedtime  tamsulosin 0.8 milliGRAM(s) Oral at bedtime    MEDICATIONS  (PRN):  aluminum hydroxide/magnesium hydroxide/simethicone Suspension 30 milliLiter(s) Oral every 4 hours PRN Dyspepsia  cyclobenzaprine 5 milliGRAM(s) Oral three times a day PRN Muscle Spasm  lactulose Syrup 20 Gram(s) Oral three times a day PRN for constipation  melatonin 3 milliGRAM(s) Oral at bedtime PRN Insomnia  oxyCODONE    IR 15 milliGRAM(s) Oral every 6 hours PRN Moderate Pain (4 - 6)  simethicone 80 milliGRAM(s) Chew three times a day PRN Gas  sodium chloride 0.9% lock flush 10 milliLiter(s) IV Push every 1 hour PRN Pre/post blood products, medications, blood draw, and to maintain line patency    CAPILLARY BLOOD GLUCOSE        I&O's Summary    14 Apr 2025 07:01  -  15 Apr 2025 07:00  --------------------------------------------------------  IN: 0 mL / OUT: 3300 mL / NET: -3300 mL        PHYSICAL EXAM:  Vital Signs Last 24 Hrs  T(C): 36.6 (15 Apr 2025 07:49), Max: 36.6 (15 Apr 2025 07:49)  T(F): 97.9 (15 Apr 2025 07:49), Max: 97.9 (15 Apr 2025 07:49)  HR: 77 (15 Apr 2025 07:49) (77 - 83)  BP: 118/70 (15 Apr 2025 07:49) (115/63 - 133/70)  BP(mean): --  RR: 18 (15 Apr 2025 07:49) (16 - 18)  SpO2: 93% (15 Apr 2025 07:49) (93% - 95%)    Parameters below as of 15 Apr 2025 07:49  Patient On (Oxygen Delivery Method): room air    GENERAL: Not in distress. Alert    HEENT: clear conjuctiva, MMM. no pallor or icterus  CARDIOVASCULAR: RRR S1, S2. No murmur/rubs/gallop  LUNGS: BLAE+, no rales, no wheezing, no rhonchi.    ABDOMEN: distended, form, bloated with gas, tympanic. NT, no guarding / rebound / rigidity. BS normoactive  BACK: No spine tenderness.  EXTREMITIES: b/l LE edema. no leg or calf TP.  SKIN: warm and dry  PSYCHIATRIC: Calm.  No agitation.  CNS: AAO*3. moves limbs, follows commands. b/l UE and LE weakness    LABS:                        10.5   9.54  )-----------( 288      ( 14 Apr 2025 07:05 )             33.1     04-14    141  |  102  |  29[H]  ----------------------------<  118[H]  3.7   |  34[H]  |  1.03    Ca    9.2      14 Apr 2025 07:05    TPro  6.5  /  Alb  2.9[L]  /  TBili  0.4  /  DBili  x   /  AST  32  /  ALT  43  /  AlkPhos  75  04-14          Urinalysis Basic - ( 14 Apr 2025 07:05 )    Color: x / Appearance: x / SG: x / pH: x  Gluc: 118 mg/dL / Ketone: x  / Bili: x / Urobili: x   Blood: x / Protein: x / Nitrite: x   Leuk Esterase: x / RBC: x / WBC x   Sq Epi: x / Non Sq Epi: x / Bacteria: x        COVID-19 PCR: [place] (25 Mar 2025 21:35)  SARS-CoV-2: [place] (17 Feb 2025 21:11)      RADIOLOGY & ADDITIONAL TESTS:  Imaging from Last 24 Hours:    Electrocardiogram/QTc Interval:    COORDINATION OF CARE:  Care Discussed with Consultants/Other Providers:

## 2025-04-15 NOTE — CHART NOTE - NSCHARTNOTEFT_GEN_A_CORE
I went to see patient he is in PT/OT section until 2:30 PM. As per RN he had large loose BM after the Docusol enema. If his symptoms continue will see his tomorrow.

## 2025-04-15 NOTE — PROGRESS NOTE ADULT - SUBJECTIVE AND OBJECTIVE BOX
HPI:  Mr. Rishabh Shaver is a 74-year-old male patient with past medical history of benign prostatic hyperplasia, diverticulosis, Graves' disease, hemorrhoids, hyperlipidemia, hypothyroidism, osteoarthritis, spinal stenosis, spinal cord injury (2004), and multiple spinal surgeries (three cervical and one lumbar), who presented to [facility] on 3/4/25 from [facility] due to lower extremity swelling and abnormal BUN/creatinine levels for further evaluation and management. The patient was recently admitted to [facility] from 02/18/25 through 02/25/25 for sepsis secondary to epidural abscess. He had a peripherally inserted central catheter line in the right arm and was on nightly Ceftriaxone treatment until 04/16/25. At the ED, he was found to have GOVIND, a left soleal vein, and a left gastrocnemius DVT. He was started on heparin gtt. Hospital course complicated by urinary retention requiring Ortega catheter insertion, rhabdomyolysis, elevated LFTs, anasarca, leukocytosis due to thoracic spine discitis OM and early paraspinal abscess, as well as new onset RUE paresthesias and weakness. He is s/p IVC filter placement on 3/17 and was recommended surgical management. He is s/p posterior thoracic laminectomy and fusion on 3/18/25 with Dr. Keith Cantu. Surgical culture with rare staph epi. Recommendations by ID to continue IV Rocephin and Daptomycin through 5/2/25. Patient was evaluated by PM&R and therapy for functional deficits, gait/ADL impairments and acute rehabilitation was recommended. Patient was cleared for discharge to [facility] IRF on 3/25/25. (25 Mar 2025 17:09)    TDD 4/25/25 to home  ___________________________________________________________________________    SUBJECTIVE/ROS  Patient was seen and evaluated at bedside and in PT today.  Reported no overnight events and is in no acute distress.  He had a bowel movement this morning and this afternoon, though still feels gassy and bloated. GI came to evaluate him shortly after he had a large loose BM following Docusol enema. Will follow symptoms and let GI know if they continue tomorrow.  Patient reports no issues with pain or urinary issues.  He walked with RW yesterday and today in therapy and is emotional about the impressive progress he has made and continues to make. He is doing very very well.  He still has right foot drop for which orthotist Arya will see him Thursday.   ___________________________________________________________________________    LABS             10.5   9.54  )-----------( 288      ( 14 Apr 2025 07:05 )             33.1     141  |  102  |  29[H]  ----------------------------<  118[H]  3.7   |  34[H]  |  1.03    Ca    9.2      14 Apr 2025 07:05  TPro  6.5  /  Alb  2.9[L]  /  TBili  0.4  /  DBili  x   /  AST  32  /  ALT  43  /  AlkPhos  75  04-14    ___________________________________________________________________________    MEDICATIONS  (STANDING):  ammonium lactate 12% Lotion 1 Application(s) Topical two times a day  apixaban 5 milliGRAM(s) Oral every 12 hours  AQUAPHOR (petrolatum Ointment) 1 Application(s) Topical three times a day  bethanechol 10 milliGRAM(s) Oral two times a day  buMETAnide 1 milliGRAM(s) Oral two times a day  cefTRIAXone   IVPB 2000 milliGRAM(s) IV Intermittent every 24 hours  chlorhexidine 4% Liquid 1 Application(s) Topical <User Schedule>  collagenase Ointment 1 Application(s) Topical daily  DAPTOmycin IVPB 650 milliGRAM(s) IV Intermittent every 24 hours  docusate sodium Enema 283 milliGRAM(s) Rectal <User Schedule>  gabapentin   Oral   gabapentin 100 milliGRAM(s) Oral three times a day  levothyroxine 150 MICROGram(s) Oral daily  magnesium hydroxide Suspension 30 milliLiter(s) Oral at bedtime  metoprolol succinate ER 25 milliGRAM(s) Oral daily  pantoprazole    Tablet 40 milliGRAM(s) Oral before breakfast  polyethylene glycol 3350 17 Gram(s) Oral two times a day  rosuvastatin 5 milliGRAM(s) Oral at bedtime  senna 2 Tablet(s) Oral at bedtime  tamsulosin 0.8 milliGRAM(s) Oral at bedtime    MEDICATIONS  (PRN):  aluminum hydroxide/magnesium hydroxide/simethicone Suspension 30 milliLiter(s) Oral every 4 hours PRN Dyspepsia  cyclobenzaprine 5 milliGRAM(s) Oral three times a day PRN Muscle Spasm  melatonin 3 milliGRAM(s) Oral at bedtime PRN Insomnia  oxyCODONE    IR 15 milliGRAM(s) Oral every 6 hours PRN Moderate Pain (4 - 6)  simethicone 80 milliGRAM(s) Chew three times a day PRN Gas  sodium chloride 0.9% lock flush 10 milliLiter(s) IV Push every 1 hour PRN Pre/post blood products, medications, blood draw, and to maintain line patency    ___________________________________________________________________________    PHYSICAL EXAM:    VITALS  T(C): 36.6 (04-15-25 @ 07:49), Max: 36.6 (04-15-25 @ 07:49)  HR: 77 (04-15-25 @ 07:49) (77 - 80)  BP: 118/70 (04-15-25 @ 07:49) (115/63 - 120/63)  RR: 18 (04-15-25 @ 07:49) (16 - 18)  SpO2: 93% (04-15-25 @ 07:49) (93% - 95%)    Constitutional - NAD, Comfortable  Chest - good chest expansion, good respiratory effort  Cardio - warm and well perfused  Abdomen - distended, nontender  Neurologic Exam:                           Orientation: Awake, A&O to self, place, date, year, situation     Speech - Fluent, Comprehensible, No dysarthria, No aphasia      Motor -  patient seen walking with RW and no WC follow in PT multiple times. Still with hyperextension of R knee and no heel strike on right side, but walking well and with good endurance  Psychiatric - Mood emotionally happy    ___________________________________________________________________________ HPI:  Mr. Rishabh Shaver is a 74-year-old male patient with past medical history of benign prostatic hyperplasia, diverticulosis, Graves' disease, hemorrhoids, hyperlipidemia, hypothyroidism, osteoarthritis, spinal stenosis, spinal cord injury (2004), and multiple spinal surgeries (three cervical and one lumbar), who presented to [facility] on 3/4/25 from [facility] due to lower extremity swelling and abnormal BUN/creatinine levels for further evaluation and management. The patient was recently admitted to [facility] from 02/18/25 through 02/25/25 for sepsis secondary to epidural abscess. He had a peripherally inserted central catheter line in the right arm and was on nightly Ceftriaxone treatment until 04/16/25. At the ED, he was found to have GOVIND, a left soleal vein, and a left gastrocnemius DVT. He was started on heparin gtt. Hospital course complicated by urinary retention requiring Ortega catheter insertion, rhabdomyolysis, elevated LFTs, anasarca, leukocytosis due to thoracic spine discitis OM and early paraspinal abscess, as well as new onset RUE paresthesias and weakness. He is s/p IVC filter placement on 3/17 and was recommended surgical management. He is s/p posterior thoracic laminectomy and fusion on 3/18/25 with Dr. Keith Canut. Surgical culture with rare staph epi. Recommendations by ID to continue IV Rocephin and Daptomycin through 5/2/25. Patient was evaluated by PM&R and therapy for functional deficits, gait/ADL impairments and acute rehabilitation was recommended. Patient was cleared for discharge to [facility] IRF on 3/25/25. (25 Mar 2025 17:09)    TDD 4/25/25 to home  ___________________________________________________________________________    SUBJECTIVE/ROS  Patient was seen and evaluated at bedside and in PT today.  Reported no overnight events and is in no acute distress.  He had a bowel movement this morning and this afternoon, though still feels gassy and bloated. GI came to evaluate him shortly after he had a large loose BM following Docusol enema. Will follow symptoms and let GI know if they continue tomorrow.  Patient reports no issues with pain or urinary issues.  He walked with RW yesterday and today in therapy and is emotional about the impressive progress he has made and continues to make. He is doing very very well.  He still has right foot drop for which orthotist Arya will see him Thursday.   ___________________________________________________________________________    LABS             10.5   9.54  )-----------( 288      ( 14 Apr 2025 07:05 )             33.1     141  |  102  |  29[H]  ----------------------------<  118[H]  3.7   |  34[H]  |  1.03    Ca    9.2      14 Apr 2025 07:05  TPro  6.5  /  Alb  2.9[L]  /  TBili  0.4  /  DBili  x   /  AST  32  /  ALT  43  /  AlkPhos  75  04-14    ___________________________________________________________________________    MEDICATIONS  (STANDING):  ammonium lactate 12% Lotion 1 Application(s) Topical two times a day  apixaban 5 milliGRAM(s) Oral every 12 hours  AQUAPHOR (petrolatum Ointment) 1 Application(s) Topical three times a day  bethanechol 10 milliGRAM(s) Oral two times a day  buMETAnide 1 milliGRAM(s) Oral two times a day  cefTRIAXone   IVPB 2000 milliGRAM(s) IV Intermittent every 24 hours  chlorhexidine 4% Liquid 1 Application(s) Topical <User Schedule>  collagenase Ointment 1 Application(s) Topical daily  DAPTOmycin IVPB 650 milliGRAM(s) IV Intermittent every 24 hours  docusate sodium Enema 283 milliGRAM(s) Rectal <User Schedule>  gabapentin   Oral   gabapentin 100 milliGRAM(s) Oral three times a day  levothyroxine 150 MICROGram(s) Oral daily  magnesium hydroxide Suspension 30 milliLiter(s) Oral at bedtime  metoprolol succinate ER 25 milliGRAM(s) Oral daily  pantoprazole    Tablet 40 milliGRAM(s) Oral before breakfast  polyethylene glycol 3350 17 Gram(s) Oral two times a day  rosuvastatin 5 milliGRAM(s) Oral at bedtime  senna 2 Tablet(s) Oral at bedtime  tamsulosin 0.8 milliGRAM(s) Oral at bedtime    MEDICATIONS  (PRN):  aluminum hydroxide/magnesium hydroxide/simethicone Suspension 30 milliLiter(s) Oral every 4 hours PRN Dyspepsia  cyclobenzaprine 5 milliGRAM(s) Oral three times a day PRN Muscle Spasm  melatonin 3 milliGRAM(s) Oral at bedtime PRN Insomnia  oxyCODONE    IR 15 milliGRAM(s) Oral every 6 hours PRN Moderate Pain (4 - 6)  simethicone 80 milliGRAM(s) Chew three times a day PRN Gas  sodium chloride 0.9% lock flush 10 milliLiter(s) IV Push every 1 hour PRN Pre/post blood products, medications, blood draw, and to maintain line patency    ___________________________________________________________________________    PHYSICAL EXAM:    VITALS  T(C): 36.6 (04-15-25 @ 07:49), Max: 36.6 (04-15-25 @ 07:49)  HR: 77 (04-15-25 @ 07:49) (77 - 80)  BP: 118/70 (04-15-25 @ 07:49) (115/63 - 120/63)  RR: 18 (04-15-25 @ 07:49) (16 - 18)  SpO2: 93% (04-15-25 @ 07:49) (93% - 95%)    Constitutional - NAD, Comfortable  Chest - good chest expansion, good respiratory effort  Cardio - warm and well perfused  Abdomen - distended, nontender  Neurologic Exam:                           Orientation: Awake, A&O to self, place, date, year, situation     Speech - Fluent, Comprehensible, No dysarthria, No aphasia      Motor -  patient seen walking with RW and no WC follow in PT multiple times. Still with hyperextension of R knee and no heel strike on right side, but walking well and with good endurance  Psychiatric - Mood emotionally happy      Function  ambulates 90 ft x 2 w  rw min assist x1  and cs of another, verbal cues for  safety on turns, increased time   1 step w handrail and lofstrand mod x2.  ___________________________________________________________________________

## 2025-04-16 LAB
CULTURE RESULTS: SIGNIFICANT CHANGE UP
CULTURE RESULTS: SIGNIFICANT CHANGE UP
SPECIMEN SOURCE: SIGNIFICANT CHANGE UP
SPECIMEN SOURCE: SIGNIFICANT CHANGE UP

## 2025-04-16 PROCEDURE — 99232 SBSQ HOSP IP/OBS MODERATE 35: CPT

## 2025-04-16 RX ADMIN — APIXABAN 5 MILLIGRAM(S): 2.5 TABLET, FILM COATED ORAL at 11:50

## 2025-04-16 RX ADMIN — BUMETANIDE 1 MILLIGRAM(S): 1 TABLET ORAL at 15:41

## 2025-04-16 RX ADMIN — Medication 80 MILLIGRAM(S): at 08:13

## 2025-04-16 RX ADMIN — Medication 150 MICROGRAM(S): at 06:39

## 2025-04-16 RX ADMIN — Medication 283 MILLIGRAM(S): at 08:13

## 2025-04-16 RX ADMIN — Medication 1 APPLICATION(S): at 06:38

## 2025-04-16 RX ADMIN — ROSUVASTATIN CALCIUM 5 MILLIGRAM(S): 20 TABLET, FILM COATED ORAL at 23:02

## 2025-04-16 RX ADMIN — APIXABAN 5 MILLIGRAM(S): 2.5 TABLET, FILM COATED ORAL at 23:01

## 2025-04-16 RX ADMIN — WHITE PETROLATUM 1 APPLICATION(S): 1 OINTMENT TOPICAL at 23:01

## 2025-04-16 RX ADMIN — Medication 10 MILLIGRAM(S): at 17:29

## 2025-04-16 RX ADMIN — Medication 1 APPLICATION(S): at 06:43

## 2025-04-16 RX ADMIN — GABAPENTIN 100 MILLIGRAM(S): 400 CAPSULE ORAL at 23:02

## 2025-04-16 RX ADMIN — Medication 40 MILLIGRAM(S): at 06:38

## 2025-04-16 RX ADMIN — Medication 80 MILLIGRAM(S): at 17:31

## 2025-04-16 RX ADMIN — WHITE PETROLATUM 1 APPLICATION(S): 1 OINTMENT TOPICAL at 06:38

## 2025-04-16 RX ADMIN — POLYETHYLENE GLYCOL 3350 17 GRAM(S): 17 POWDER, FOR SOLUTION ORAL at 17:30

## 2025-04-16 RX ADMIN — Medication 10 MILLIGRAM(S): at 06:39

## 2025-04-16 RX ADMIN — Medication 2 TABLET(S): at 23:03

## 2025-04-16 RX ADMIN — DAPTOMYCIN 126 MILLIGRAM(S): 500 INJECTION, POWDER, LYOPHILIZED, FOR SOLUTION INTRAVENOUS at 11:53

## 2025-04-16 RX ADMIN — MAGNESIUM HYDROXIDE 30 MILLILITER(S): 400 SUSPENSION ORAL at 23:02

## 2025-04-16 RX ADMIN — BUMETANIDE 1 MILLIGRAM(S): 1 TABLET ORAL at 06:39

## 2025-04-16 RX ADMIN — CEFTRIAXONE 100 MILLIGRAM(S): 500 INJECTION, POWDER, FOR SOLUTION INTRAMUSCULAR; INTRAVENOUS at 23:02

## 2025-04-16 RX ADMIN — GABAPENTIN 100 MILLIGRAM(S): 400 CAPSULE ORAL at 15:41

## 2025-04-16 RX ADMIN — Medication 1 APPLICATION(S): at 17:31

## 2025-04-16 RX ADMIN — TAMSULOSIN HYDROCHLORIDE 0.8 MILLIGRAM(S): 0.4 CAPSULE ORAL at 23:02

## 2025-04-16 RX ADMIN — GABAPENTIN 100 MILLIGRAM(S): 400 CAPSULE ORAL at 06:38

## 2025-04-16 NOTE — PROGRESS NOTE ADULT - ASSESSMENT
Assessment/Plan:  Mr. Rishabh Shaver is a 74-year-old man with past medical history of benign prostatic hyperplasia, diverticulosis, Graves' disease, hemorrhoids, hyperlipidemia, hypothyroidism, osteoarthritis, spinal stenosis, spinal cord injury (2004), and multiple spinal surgeries (three cervical and one lumbar), who is admitted for Acute Inpatient Rehabilitation with a multidisciplinary rehab program at [facility] with functional impairments in ADLs and mobility secondary to a thoracic epidural abscess s/p posterior thoracic laminectomy and fusion on 3/18/25 with Dr. Keith Cantu.     * Functional progress noted, ambulating 90ft with walker  * Orthotics evaluation for Rt foot drop  * Constipation--slight fecal loading persisting, f/u GI recs for bowel regimen (d/c lactulose, c/w mg citrate, senna) and GI to be informed tomorrow if constipation if persisting  * Upper ext weakness L>R mostly distally, continue targeted exercise regiment     #Thoracic epidural abscess s/p laminectomy and fusion surgery      * C8 AIS D Incomplete paraplegia      *  brace when OOB      * Flexeril 5mg PO TID PRN for neck soreness      * Staples removed on POD #14 (4/1/25)      * Sacral wound assessment by wound/skin team      * urinary retention, now voiding,  c/w Flomax 0.8mg PO daily      * Right Venodyne only due to left calf DVT (s/p pre-op IVC filter)   - Activity Limitations: Decreased social, vocational and leisure activities, decreased self care and ADLs, decreased mobility, decreased ability to manage household and finances  - Comprehensive Multidisciplinary Rehab Program:      * 3 hours a day, 5 days a week      * PT 1hr/day: Focused on improving strength, endurance, coordination, balance, functional mobility, and transfers      * OT 1hr/day: Focused on improving strength, fine motor skills, coordination, posture and ADLs  - orthotist Arya to see patient 4/17 for AFO for right foot drop    -----------------------------------------------------------------------------------    Concurrent Medical Problems    #GOVIND on CKD (resolved)  #LE edema  #Hypoalbuminemia  #Proteinuria  - IVF as needed with fluid balance  - Significant debilitating anasarca   - Trial of albumin Lasix started 3/8->improvement  - s/p IV Lasix/IV albumin BID (3/9)  - Change to Bumex/Albumin (3/11)-> good effect  - Switched to PO Bumex 1mg BID 3/24  - Trend Albumin (4/14 - 2.9)  - Trend Protein total (4/14 - 6.5); Total protein, Random Urine (3/18- 24)  - Trend CMP (4/14 - Cr 1.03/ BUN 29)    #L gastrocnemius and L soleal DVT  - appears provoked from decrease ambulation from recent spinal abscess  - Could not tolerate VQ scan for r/o PE 2/2 pain  - Eliquis 5mg PO q12h - will be new med on discharge (of note patient completed Eliquis load)   - Therapeutic Lovenox (3/12) in the setting of possible spine intervention  - Last dose of AC night of 3/16. Restarted Eliquis 5mg BID 4/5/25  - S/P IVC filter placement 3/17  - repeat US 4/8 with stable L soleal DVT and no propagation     #Spinal epidural abscess POA and already being treated with IV antibiotics with suspected worsening of disease  - S/p thoracic laminectomy and fusion 3/18. Holcomb  brace   - Surgical culture with staph epi possible contaminant but given clinical presentation on admission, IV Dapto added  - IV Rocephin 2g daily via PICC line thru 5/2/25  - IV Daptomycin 650mg daily via PICC line thru 5/2/25  - gabapentin 100mg PO TID  - d/c long-acting Oxycontin 4/11  - oxycodone 15mg PO q6h PRN moderate pain  - Flexeril 5mg PO TID PRN for muscle spasm  - staples d/teodoro in rehab    #Abdominal discomfort and bloating  Abd xray 4/15--persisting but mild fecal loading  GI recs being implemented, they asked for update tomorrow, and will review patient if constipation persists    #Mild Rhabdomyolysis Improving  - associated elevated transaminases (resolved)  - suspect from anasarca/edema   - improving with adequate diuresis of edema  - associated elevated transaminase; now resolved (4/14- AST 32/ALT 43)    #Leukocytosis (resolved)  - likely rx to DVT + Discitis  - no other signs of sepsis, afebrile   - trend CBC and monitor fever curve (4/14- WBC 9.54)    #Hypothyroidism  - Synthroid 150mcg PO daily    #CAD  #HTN  #HLD  - metoprolol succinate 25mg PO daily   - rosuvastatin 5mg PO qhs    #Anemia/MARLENA  - trend H/H (4/14- 10.5/33.1)  - transfuse if Hgb <7 PRN     #Sleep:  - melatonin 3mg PO qhs PRN for sleep    #GI/Bowel:  - senna 2 tablets PO qhs  - Miralax 17g PO daily  - daily mini-enema at 6AM  - lactulose 20g PO TID PRN for constipation  - Maalox 30mL PO q4h PRN for dyspepsia  - GI ppx: pantoprazole 40mg PO daily   - added simethicone 80mg PO TID PRN for gas 4/14    #BPH  #Urinary retention likely augmented by degree of anasarca  - S/P Ortega; TOV  ->failed. Ortega re-inserted 3/6->fell out without knowing with associated hematuria->recurrent retention 3/7->Ortega re-inserted (3/7) --> successful TOV 3/25  - Flomax 0.8mg PO qhs  - started bethanechol 10mg PO BID 4/6  - Monitor U/O    #Skin/Pressure Injury:   Skin assessment on admission:   - Generalized dry flaky skin  - 19cm posterior cervical incision with 32 staples and 2 steri strips  - Left buttock DTI pressure injury measuring 14 x 9 cm      * appearing as a dark purple discoloration of intact skin, with a central superficial open area measuring 6 x 2 cm      * at the periphery of the 14 x 9 cm area, the discoloration is somewhat lighter      * dark red non-blanchable erythema     **Santyl to eschar of sacrum daily**  - Bilateral lower extremity edema  - Healed lumbar surgical incision scar  - Right heel blanchable redness  - Bilateral dry cracked heels and plantar surface  - Lac hydrin BID BLE  - Aquaphor BID to generalized dry skin   - Appreciate wound care consult     #Diet/Dysphagia:  - Dysphagia: SLP consult for swallow function evaluation and treatment  - Current Diet: Regular  - Aspiration Precautions  - Nutrition consult     #Patient Education  - Education provided on the following:      * Admitting diagnosis and functional implications      * Functional goals      * Bladder management      * Bowel management      * Skin care management      * Intensity of service and scheduling of rehab disciplines      * Plan of care and role of interdisciplinary team conference in discharge planning      * Reconciliation of medications from prior institution    #Precautions / PROPHYLAXIS:   - Falls, Spinal  - Ortho: Weight bearing status: FWB;  brace OOB  - DVT ppx: Eliquis 5mg PO BID, TEDs  - Pressure injury/Skin: Turn Q2hrs while in bed, OOB to Chair, PT/OT      ---------------  Outpatient Follow-up:    Yue Wiggins Paige  [facility]  [address], [address]  [place] [address]  Phone: [phone]  Fax: [phone]  Established Patient  Follow Up Time: 1 week    Ketih Cantu Chi  Neurosurgery  [address]  [place] [address]  Phone: [phone]  Fax: [phone]  Established Patient  Follow Up Time: 2 weeks    Luis A Brennan  [facility] Physician [facility]  CARDIOLOGY [address]  Scheduled Appointment: 04/23/2025    --------------

## 2025-04-16 NOTE — PROGRESS NOTE ADULT - SUBJECTIVE AND OBJECTIVE BOX
Medicine Progress Note    Patient is a 75y old  Male who presents with a chief complaint of Thoracic epidural abscess s/p laminectomy and fusion surgery (16 Apr 2025 12:41)      SUBJECTIVE / OVERNIGHT EVENTS:  no complaints except gas. moved bowel today and yesterday. still feeling full of stool and gas. laxative dose was adjusted by primary in consultation with GI      ROS:  denied fever/chills/CP/SOB/cough/palpitation/dizziness/abdominal pian/nausea/vomiting/diarrhoea/constipation/dysuria/leg or calf pain/headaches.all other ROS neg    MEDICATIONS  (STANDING):  ammonium lactate 12% Lotion 1 Application(s) Topical two times a day  apixaban 5 milliGRAM(s) Oral every 12 hours  AQUAPHOR (petrolatum Ointment) 1 Application(s) Topical three times a day  bethanechol 10 milliGRAM(s) Oral two times a day  buMETAnide 1 milliGRAM(s) Oral two times a day  cefTRIAXone   IVPB 2000 milliGRAM(s) IV Intermittent every 24 hours  chlorhexidine 4% Liquid 1 Application(s) Topical <User Schedule>  collagenase Ointment 1 Application(s) Topical daily  DAPTOmycin IVPB 650 milliGRAM(s) IV Intermittent every 24 hours  docusate sodium Enema 283 milliGRAM(s) Rectal <User Schedule>  gabapentin   Oral   gabapentin 100 milliGRAM(s) Oral three times a day  levothyroxine 150 MICROGram(s) Oral daily  magnesium hydroxide Suspension 30 milliLiter(s) Oral at bedtime  metoprolol succinate ER 25 milliGRAM(s) Oral daily  pantoprazole    Tablet 40 milliGRAM(s) Oral before breakfast  polyethylene glycol 3350 17 Gram(s) Oral two times a day  rosuvastatin 5 milliGRAM(s) Oral at bedtime  senna 2 Tablet(s) Oral at bedtime  tamsulosin 0.8 milliGRAM(s) Oral at bedtime    MEDICATIONS  (PRN):  aluminum hydroxide/magnesium hydroxide/simethicone Suspension 30 milliLiter(s) Oral every 4 hours PRN Dyspepsia  cyclobenzaprine 5 milliGRAM(s) Oral three times a day PRN Muscle Spasm  melatonin 3 milliGRAM(s) Oral at bedtime PRN Insomnia  oxyCODONE    IR 15 milliGRAM(s) Oral every 6 hours PRN Moderate Pain (4 - 6)  simethicone 80 milliGRAM(s) Chew three times a day PRN Gas  sodium chloride 0.9% lock flush 10 milliLiter(s) IV Push every 1 hour PRN Pre/post blood products, medications, blood draw, and to maintain line patency    CAPILLARY BLOOD GLUCOSE        I&O's Summary    15 Apr 2025 07:01  -  16 Apr 2025 07:00  --------------------------------------------------------  IN: 0 mL / OUT: 2380 mL / NET: -2380 mL    16 Apr 2025 07:01  -  16 Apr 2025 13:56  --------------------------------------------------------  IN: 0 mL / OUT: 700 mL / NET: -700 mL        PHYSICAL EXAM:  Vital Signs Last 24 Hrs  T(C): 36.4 (16 Apr 2025 07:32), Max: 36.4 (16 Apr 2025 07:32)  T(F): 97.6 (16 Apr 2025 07:32), Max: 97.6 (16 Apr 2025 07:32)  HR: 79 (16 Apr 2025 07:32) (79 - 80)  BP: 109/65 (16 Apr 2025 07:32) (105/62 - 109/65)  BP(mean): --  RR: 17 (16 Apr 2025 07:32) (17 - 17)  SpO2: 94% (16 Apr 2025 07:32) (94% - 95%)    Parameters below as of 16 Apr 2025 07:32  Patient On (Oxygen Delivery Method): room air      GENERAL: Not in distress. Alert    HEENT: clear conjuctiva, MMM. no pallor or icterus  CARDIOVASCULAR: RRR S1, S2. No murmur/rubs/gallop  LUNGS: BLAE+, no rales, no wheezing, no rhonchi.    ABDOMEN: distended, form, bloated with gas, tympanic. NT, no guarding / rebound / rigidity. BS normoactive  BACK: No spine tenderness.  EXTREMITIES: b/l LE edema. no leg or calf TP.  SKIN: warm and dry  PSYCHIATRIC: Calm.  No agitation.  CNS: AAO*3. moves limbs, follows commands. b/l UE and LE weakness      LABS:              < from: Xray Abdomen 2 Views (04.15.25 @ 12:31) >  PRESSION: Improved fecal lobe with residual.    < end of copied text >        COVID-19 PCR: NotDetec (25 Mar 2025 21:35)  SARS-CoV-2: NotDetec (17 Feb 2025 21:11)      RADIOLOGY & ADDITIONAL TESTS:  Imaging from Last 24 Hours:    Electrocardiogram/QTc Interval:    COORDINATION OF CARE:  Care Discussed with Consultants/Other Providers:

## 2025-04-16 NOTE — PROGRESS NOTE ADULT - SUBJECTIVE AND OBJECTIVE BOX
HPI:  Mr. Rishabh Shaver is a 74-year-old male patient with past medical history of benign prostatic hyperplasia, diverticulosis, Graves' disease, hemorrhoids, hyperlipidemia, hypothyroidism, osteoarthritis, spinal stenosis, spinal cord injury (2004), and multiple spinal surgeries (three cervical and one lumbar), who presented to [facility] on 3/4/25 from [facility] due to lower extremity swelling and abnormal BUN/creatinine levels for further evaluation and management. The patient was recently admitted to [facility] from 02/18/25 through 02/25/25 for sepsis secondary to epidural abscess. He had a peripherally inserted central catheter line in the right arm and was on nightly Ceftriaxone treatment until 04/16/25. At the ED, he was found to have GOVIND, a left soleal vein, and a left gastrocnemius DVT. He was started on heparin gtt. Hospital course complicated by urinary retention requiring Ortega catheter insertion, rhabdomyolysis, elevated LFTs, anasarca, leukocytosis due to thoracic spine discitis OM and early paraspinal abscess, as well as new onset RUE paresthesias and weakness. He is s/p IVC filter placement on 3/17 and was recommended surgical management. He is s/p posterior thoracic laminectomy and fusion on 3/18/25 with Dr. Keith Cantu. Surgical culture with rare staph epi. Recommendations by ID to continue IV Rocephin and Daptomycin through 5/2/25. Patient was evaluated by PM&R and therapy for functional deficits, gait/ADL impairments and acute rehabilitation was recommended. Patient was cleared for discharge to [facility] IRF on 3/25/25. (25 Mar 2025 17:09)    TDD 4/25/25 to home  ___________________________________________________________________________    SUBJECTIVE/ROS  Patient was seen and evaluated at bedside and in PT today.  Reported no overnight events and is in no acute distress.  Reports one BM after revised bowel regimen with lactulose, mg citrate and senna  Feels some relief, but still had feelings of abd bloating, with frequent flatus  Patient reports no issues with pain or urinary issues.  He still has right foot drop for which orthotist Arya will see him Thursday.   ___________________________________________________________________________    LABS             10.5   9.54  )-----------( 288      ( 14 Apr 2025 07:05 )             33.1     141  |  102  |  29[H]  ----------------------------<  118[H]  3.7   |  34[H]  |  1.03    Ca    9.2      14 Apr 2025 07:05  TPro  6.5  /  Alb  2.9[L]  /  TBili  0.4  /  DBili  x   /  AST  32  /  ALT  43  /  AlkPhos  75  04-14      MEDICATIONS  (STANDING):  ammonium lactate 12% Lotion 1 Application(s) Topical two times a day  apixaban 5 milliGRAM(s) Oral every 12 hours  AQUAPHOR (petrolatum Ointment) 1 Application(s) Topical three times a day  bethanechol 10 milliGRAM(s) Oral two times a day  buMETAnide 1 milliGRAM(s) Oral two times a day  cefTRIAXone   IVPB 2000 milliGRAM(s) IV Intermittent every 24 hours  chlorhexidine 4% Liquid 1 Application(s) Topical <User Schedule>  collagenase Ointment 1 Application(s) Topical daily  DAPTOmycin IVPB 650 milliGRAM(s) IV Intermittent every 24 hours  docusate sodium Enema 283 milliGRAM(s) Rectal <User Schedule>  gabapentin   Oral   gabapentin 100 milliGRAM(s) Oral three times a day  levothyroxine 150 MICROGram(s) Oral daily  magnesium hydroxide Suspension 30 milliLiter(s) Oral at bedtime  metoprolol succinate ER 25 milliGRAM(s) Oral daily  pantoprazole    Tablet 40 milliGRAM(s) Oral before breakfast  polyethylene glycol 3350 17 Gram(s) Oral two times a day  rosuvastatin 5 milliGRAM(s) Oral at bedtime  senna 2 Tablet(s) Oral at bedtime  tamsulosin 0.8 milliGRAM(s) Oral at bedtime    MEDICATIONS  (PRN):  aluminum hydroxide/magnesium hydroxide/simethicone Suspension 30 milliLiter(s) Oral every 4 hours PRN Dyspepsia  cyclobenzaprine 5 milliGRAM(s) Oral three times a day PRN Muscle Spasm  melatonin 3 milliGRAM(s) Oral at bedtime PRN Insomnia  oxyCODONE    IR 15 milliGRAM(s) Oral every 6 hours PRN Moderate Pain (4 - 6)  simethicone 80 milliGRAM(s) Chew three times a day PRN Gas  sodium chloride 0.9% lock flush 10 milliLiter(s) IV Push every 1 hour PRN Pre/post blood products, medications, blood draw, and to maintain line patency     Vital Signs Last 24 Hrs  T(C): 36.4 (16 Apr 2025 07:32), Max: 36.4 (16 Apr 2025 07:32)  T(F): 97.6 (16 Apr 2025 07:32), Max: 97.6 (16 Apr 2025 07:32)  HR: 79 (16 Apr 2025 07:32) (79 - 80)  BP: 109/65 (16 Apr 2025 07:32) (105/62 - 109/65)  RR: 17 (16 Apr 2025 07:32) (17 - 17)  SpO2: 94% (16 Apr 2025 07:32) (94% - 95%)  O2 Parameters below as of 16 Apr 2025 07:32  Patient On (Oxygen Delivery Method): room air      EXAM  Constitutional - Comfortable  Chest - good chest expansion, good respiratory effort  Cardio - warm and well perfused  Abdomen - distended, nontender, tympanic, no guardind    Neurologic Exam:                 AAO X 3, Speech is normal  Motor -  Some improvement of motor strength but remains weak left arm, fingers/wrist 3/5, left index finger in extension  Still with hyperextension of R knee and no heel strike on right side, but walking well and with good endurance  Right foot drop  Psychiatric - Mood emotionally happy      Function  Ambulates 90 ft x 2 w  rw min assist x1  and cs of another, verbal cues for  safety on turns, increased time   1 step w handrail and lofstrand mod x2.    < from: Xray Abdomen 2 Views (04.15.25 @ 12:31) >        INTERPRETATION:  Abdominal study. 5 images. Patient has constipation.   Patient had arthrodesis.    ICD Seefeldt unusual position noted.    Posterior L4-5 hardware and disc implant noted.    There is scattered mild to moderate lumbar spondylitic change.    There is mildly increased fecal content in the colon but no sign of bowel   obstruction.    Appearance is similar to April 2 but shows improvement.    IMPRESSION: Improved fecal lobe with residual.    < end of copied text >    ___________________________________________________________________________

## 2025-04-16 NOTE — PROGRESS NOTE ADULT - ASSESSMENT
74-year-old male patient with h/o BPH, diverticulosis, Graves' disease, hemorrhoids, HLD, hypothyroidism, osteoarthritis, spinal stenosis, spinal cord injury (2004), and multiple spinal surgeries (three cervical and one lumbar), who presented to [facility] on 3/4/25 from [facility] due to lower extremity swelling and abnormal BUN/creatinine levels for further evaluation and management. The patient was recently admitted to [facility] from 02/18/25 through 02/25/25 for sepsis secondary to epidural abscess. He had a peripherally inserted central catheter line in the right arm and was on nightly Ceftriaxone treatment until 04/16/25. At the ED, he was found to have GOVIND, a left soleal vein, and a left gastrocnemius DVT. He was started on heparin gtt. Hospital course complicated by urinary retention requiring Ortega catheter insertion, rhabdomyolysis, elevated LFTs, anasarca, leukocytosis due to thoracic spine discitis OM and early paraspinal abscess, as well as new onset RUE paresthesias and weakness. He is s/p IVC filter placement on 3/17 and was recommended surgical management. He is s/p posterior thoracic laminectomy and fusion on 3/18/25 with Dr. Keith Cantu. Surgical culture with rare staph epi. Recommendations by ID to continue IV Rocephin and Daptomycin through 5/2/25. Patient was evaluated by PM&R and therapy for functional deficits, gait/ADL impairments and acute rehabilitation was recommended. Patient was cleared for discharge to  IRF on 3/25/25. (25 Mar 2025 17:09)    Constipation abdominal  distention and dyspepsia  - on senna, Docusol enema daily since april 2.   - abd xray on 4/2: Moderate to large stool burden. No bowel obstruction.  - Abd xray today 4/15: improvement with residual stool burden  - Miralax BID. Milk of magnesia at h/s. . senna at h.s  - consider to change Ducusol enema daily to mineral oil enema daily at h/s  - c/w maalox PRN, simethicone  - Dced lactulose TID PRN as it can cause bloating  - Discussed with GI team. suggested to continue above. GI will se patient if no improvement.     Debility  Thoracic epidural abscess s/p laminectomy and fusion surgery on 3/18  -  brace  - pain control per primary. avoid opioids for constipation  - fall, spine precautions    Spinal epidural abscess   - S/p thoracic laminectomy and fusion 3/18.   - IV Rocephin 2g HS via PICC line thru 5/2/25  - IV Daptomycin 650mg daily via PICC line thru 5/2/2025  - [place]  brace   - Surgical culture with staph epi possible contaminant but given clinical presentation on admission, IV Dapto added.     GOVIND, resolved  LE edema  Hypoalbuminemia  Proteinuria  Anasarca  - c/w Bumex 1 mg BID (4/4 missed the past few doses due to hold parameters.  reordered with hold for SBP <100 instructions).   - Optimize protein intake.   - mobilize, encourage out of bed to chair often.   - Monitor labs closely , stable renal function w good UOP   - Serum workup negative but will need follow up of  Serum  TRUDY positive for weak IgG Lambda band.  - urine protein 200 mg only ( not nephrotic)   - daily standing weights    Transaminitis, resolved  - encouraged PO  - Limit Tylenol use  - avoid hepatotoxins  - monitor on routine labs    CAD  HTN  HLD  - Metoprolol 25mg daily   - Crestor 5mg HS    Anemia  MARLENA  - s/p 2 units of PRBCs given intra-op  - Monitor Hgb (3/31 Hgb 8.3)  - Transfuse to keep hb >7    Hypothyroidism  - Synthroid 150mcg daily    sacral Decub stage 3  - wound care cx noted  - Continue turning patient Q 2 hours   - Lac hydrin BID BLE  - Aquaphor BID to generalized dry skin     BPH, chronic  Urinary retention likely augmented by degree of anasarca  - Flomax 8mg daily  - Monitor U/O  - Treat constipation  - encourage PO hydration    Acute DVT:   -  US doppler + L soleal and gastroc DVT   - s/p IVC filter 3/17 with interventional cards.   - Could not tolerate VQ scan for r/o PE 2/2 pain  - Restarted Eliquis 5mg BID per neurosurgery    DVT-P: Eliquis    will follow,     d/w Dr. Brambila [ rehab MD],

## 2025-04-17 LAB
ALBUMIN SERPL ELPH-MCNC: 3.1 G/DL — LOW (ref 3.3–5)
ALP SERPL-CCNC: 77 U/L — SIGNIFICANT CHANGE UP (ref 40–120)
ALT FLD-CCNC: 38 U/L — SIGNIFICANT CHANGE UP (ref 10–45)
ANION GAP SERPL CALC-SCNC: 7 MMOL/L — SIGNIFICANT CHANGE UP (ref 5–17)
AST SERPL-CCNC: 30 U/L — SIGNIFICANT CHANGE UP (ref 10–40)
BASOPHILS # BLD AUTO: 0.11 K/UL — SIGNIFICANT CHANGE UP (ref 0–0.2)
BASOPHILS NFR BLD AUTO: 1.3 % — SIGNIFICANT CHANGE UP (ref 0–2)
BILIRUB SERPL-MCNC: 0.6 MG/DL — SIGNIFICANT CHANGE UP (ref 0.2–1.2)
BUN SERPL-MCNC: 30 MG/DL — HIGH (ref 7–23)
CALCIUM SERPL-MCNC: 9.4 MG/DL — SIGNIFICANT CHANGE UP (ref 8.4–10.5)
CHLORIDE SERPL-SCNC: 101 MMOL/L — SIGNIFICANT CHANGE UP (ref 96–108)
CO2 SERPL-SCNC: 33 MMOL/L — HIGH (ref 22–31)
CREAT SERPL-MCNC: 1.09 MG/DL — SIGNIFICANT CHANGE UP (ref 0.5–1.3)
EGFR: 72 ML/MIN/1.73M2 — SIGNIFICANT CHANGE UP
EGFR: 72 ML/MIN/1.73M2 — SIGNIFICANT CHANGE UP
EOSINOPHIL # BLD AUTO: 0.6 K/UL — HIGH (ref 0–0.5)
EOSINOPHIL NFR BLD AUTO: 7.3 % — HIGH (ref 0–6)
GLUCOSE SERPL-MCNC: 100 MG/DL — HIGH (ref 70–99)
HCT VFR BLD CALC: 33.1 % — LOW (ref 39–50)
HGB BLD-MCNC: 10.5 G/DL — LOW (ref 13–17)
IMM GRANULOCYTES NFR BLD AUTO: 0.2 % — SIGNIFICANT CHANGE UP (ref 0–0.9)
LYMPHOCYTES # BLD AUTO: 1.42 K/UL — SIGNIFICANT CHANGE UP (ref 1–3.3)
LYMPHOCYTES # BLD AUTO: 17.3 % — SIGNIFICANT CHANGE UP (ref 13–44)
MCHC RBC-ENTMCNC: 28 PG — SIGNIFICANT CHANGE UP (ref 27–34)
MCHC RBC-ENTMCNC: 31.7 G/DL — LOW (ref 32–36)
MCV RBC AUTO: 88.3 FL — SIGNIFICANT CHANGE UP (ref 80–100)
MONOCYTES # BLD AUTO: 0.8 K/UL — SIGNIFICANT CHANGE UP (ref 0–0.9)
MONOCYTES NFR BLD AUTO: 9.7 % — SIGNIFICANT CHANGE UP (ref 2–14)
NEUTROPHILS # BLD AUTO: 5.27 K/UL — SIGNIFICANT CHANGE UP (ref 1.8–7.4)
NEUTROPHILS NFR BLD AUTO: 64.2 % — SIGNIFICANT CHANGE UP (ref 43–77)
NRBC BLD AUTO-RTO: 0 /100 WBCS — SIGNIFICANT CHANGE UP (ref 0–0)
PLATELET # BLD AUTO: 247 K/UL — SIGNIFICANT CHANGE UP (ref 150–400)
POTASSIUM SERPL-MCNC: 3.6 MMOL/L — SIGNIFICANT CHANGE UP (ref 3.5–5.3)
POTASSIUM SERPL-SCNC: 3.6 MMOL/L — SIGNIFICANT CHANGE UP (ref 3.5–5.3)
PROT SERPL-MCNC: 6.8 G/DL — SIGNIFICANT CHANGE UP (ref 6–8.3)
RBC # BLD: 3.75 M/UL — LOW (ref 4.2–5.8)
RBC # FLD: 14.6 % — HIGH (ref 10.3–14.5)
SODIUM SERPL-SCNC: 141 MMOL/L — SIGNIFICANT CHANGE UP (ref 135–145)
WBC # BLD: 8.22 K/UL — SIGNIFICANT CHANGE UP (ref 3.8–10.5)
WBC # FLD AUTO: 8.22 K/UL — SIGNIFICANT CHANGE UP (ref 3.8–10.5)

## 2025-04-17 PROCEDURE — 99232 SBSQ HOSP IP/OBS MODERATE 35: CPT

## 2025-04-17 RX ORDER — OXYCODONE HYDROCHLORIDE 30 MG/1
15 TABLET ORAL EVERY 6 HOURS
Refills: 0 | Status: DISCONTINUED | OUTPATIENT
Start: 2025-04-17 | End: 2025-04-21

## 2025-04-17 RX ADMIN — Medication 283 MILLIGRAM(S): at 08:44

## 2025-04-17 RX ADMIN — Medication 80 MILLIGRAM(S): at 05:42

## 2025-04-17 RX ADMIN — Medication 10 MILLIGRAM(S): at 05:43

## 2025-04-17 RX ADMIN — Medication 80 MILLIGRAM(S): at 14:46

## 2025-04-17 RX ADMIN — WHITE PETROLATUM 1 APPLICATION(S): 1 OINTMENT TOPICAL at 21:13

## 2025-04-17 RX ADMIN — Medication 1 APPLICATION(S): at 05:43

## 2025-04-17 RX ADMIN — METOPROLOL SUCCINATE 25 MILLIGRAM(S): 50 TABLET, EXTENDED RELEASE ORAL at 05:43

## 2025-04-17 RX ADMIN — GABAPENTIN 100 MILLIGRAM(S): 400 CAPSULE ORAL at 05:43

## 2025-04-17 RX ADMIN — Medication 2 TABLET(S): at 23:19

## 2025-04-17 RX ADMIN — Medication 1 APPLICATION(S): at 05:44

## 2025-04-17 RX ADMIN — Medication 80 MILLIGRAM(S): at 23:20

## 2025-04-17 RX ADMIN — CEFTRIAXONE 100 MILLIGRAM(S): 500 INJECTION, POWDER, FOR SOLUTION INTRAMUSCULAR; INTRAVENOUS at 23:19

## 2025-04-17 RX ADMIN — TAMSULOSIN HYDROCHLORIDE 0.8 MILLIGRAM(S): 0.4 CAPSULE ORAL at 23:19

## 2025-04-17 RX ADMIN — BUMETANIDE 1 MILLIGRAM(S): 1 TABLET ORAL at 15:32

## 2025-04-17 RX ADMIN — BUMETANIDE 1 MILLIGRAM(S): 1 TABLET ORAL at 05:43

## 2025-04-17 RX ADMIN — Medication 150 MICROGRAM(S): at 05:42

## 2025-04-17 RX ADMIN — Medication 10 MILLIGRAM(S): at 18:03

## 2025-04-17 RX ADMIN — GABAPENTIN 100 MILLIGRAM(S): 400 CAPSULE ORAL at 15:32

## 2025-04-17 RX ADMIN — Medication 1 APPLICATION(S): at 18:20

## 2025-04-17 RX ADMIN — APIXABAN 5 MILLIGRAM(S): 2.5 TABLET, FILM COATED ORAL at 12:53

## 2025-04-17 RX ADMIN — ROSUVASTATIN CALCIUM 5 MILLIGRAM(S): 20 TABLET, FILM COATED ORAL at 23:18

## 2025-04-17 RX ADMIN — GABAPENTIN 100 MILLIGRAM(S): 400 CAPSULE ORAL at 23:18

## 2025-04-17 RX ADMIN — Medication 40 MILLIGRAM(S): at 05:42

## 2025-04-17 RX ADMIN — WHITE PETROLATUM 1 APPLICATION(S): 1 OINTMENT TOPICAL at 13:56

## 2025-04-17 RX ADMIN — WHITE PETROLATUM 1 APPLICATION(S): 1 OINTMENT TOPICAL at 05:30

## 2025-04-17 RX ADMIN — DAPTOMYCIN 126 MILLIGRAM(S): 500 INJECTION, POWDER, LYOPHILIZED, FOR SOLUTION INTRAVENOUS at 12:53

## 2025-04-17 RX ADMIN — APIXABAN 5 MILLIGRAM(S): 2.5 TABLET, FILM COATED ORAL at 23:19

## 2025-04-17 NOTE — PROGRESS NOTE ADULT - ASSESSMENT
Assessment/Plan:  Mr. Rishabh Shaver is a 74-year-old man with past medical history of benign prostatic hyperplasia, diverticulosis, Graves' disease, hemorrhoids, hyperlipidemia, hypothyroidism, osteoarthritis, spinal stenosis, spinal cord injury (2004), and multiple spinal surgeries (three cervical and one lumbar), who is admitted for Acute Inpatient Rehabilitation with a multidisciplinary rehab program at [facility] with functional impairments in ADLs and mobility secondary to a thoracic epidural abscess s/p posterior thoracic laminectomy and fusion on 3/18/25 with Dr. Keith Cantu.       #Thoracic epidural abscess s/p laminectomy and fusion surgery      * C8 AIS D Incomplete paraplegia      *  brace when OOB      * Flexeril 5mg PO TID PRN for neck soreness      * Staples removed on POD #14 (4/1/25)      * Sacral wound assessment by wound/skin team      * urinary retention, now voiding,  c/w Flomax 0.8mg PO daily      * Right Venodyne only due to left calf DVT (s/p pre-op IVC filter)   - Activity Limitations: Decreased social, vocational and leisure activities, decreased self care and ADLs, decreased mobility, decreased ability to manage household and finances  - Comprehensive Multidisciplinary Rehab Program:      * 3 hours a day, 5 days a week      * PT 1hr/day: Focused on improving strength, endurance, coordination, balance, functional mobility, and transfers      * OT 1hr/day: Focused on improving strength, fine motor skills, coordination, posture and ADLs  - orthotist Arya saw patient 4/17 for AFO for right foot drop and recommends b/l AFOs hinged on R and semi solid on L - order placed    -----------------------------------------------------------------------------------    Concurrent Medical Problems    #GOVIND on CKD (resolved)  #LE edema  #Hypoalbuminemia  #Proteinuria  - IVF as needed with fluid balance  - Significant debilitating anasarca   - Trial of albumin Lasix started 3/8->improvement  - s/p IV Lasix/IV albumin BID (3/9)  - Change to Bumex/Albumin (3/11)-> good effect  - Switched to PO Bumex 1mg BID 3/24  - Trend Albumin (4/17 - 3.1)  - Trend Protein total (4/17 - 6.8); Total protein, Random Urine (3/18- 24)  - Trend CMP (4/17 - Cr 1.09/ BUN 33)    #L gastrocnemius and L soleal DVT  - appears provoked from decrease ambulation from recent spinal abscess  - Could not tolerate VQ scan for r/o PE 2/2 pain  - Eliquis 5mg PO q12h - will be new med on discharge (of note patient completed Eliquis load)   - Therapeutic Lovenox (3/12) in the setting of possible spine intervention  - Last dose of AC night of 3/16. Restarted Eliquis 5mg BID 4/5/25  - S/P IVC filter placement 3/17  - repeat US 4/8 with stable L soleal DVT and no propagation     #Spinal epidural abscess POA and already being treated with IV antibiotics with suspected worsening of disease  - S/p thoracic laminectomy and fusion 3/18. [place]  brace   - Surgical culture with staph epi possible contaminant but given clinical presentation on admission, IV Dapto added  - IV Rocephin 2g daily via PICC line thru 5/2/25  - IV Daptomycin 650mg daily via PICC line thru 5/2/25  - gabapentin 100mg PO TID  - d/c long-acting Oxycontin 4/11  - oxycodone 15mg PO q6h PRN moderate pain  - Flexeril 5mg PO TID PRN for muscle spasm  - staples d/teodoro in rehab    #Abdominal discomfort and bloating  - Abd xray 4/15--as above  - reconsulted GI today 4/17    #Mild Rhabdomyolysis (resolved)  - associated elevated transaminases (resolved)  - suspect from anasarca/edema   - improving with adequate diuresis of edema  - associated elevated transaminase; now resolved (4/17- AST 30/ALT 38)    #Leukocytosis (resolved)  - likely rx to DVT + Discitis  - no other signs of sepsis, afebrile   - trend CBC and monitor fever curve (4/17- WBC 8.22)    #Hypothyroidism  - Synthroid 150mcg PO daily    #CAD  #HTN  #HLD  - metoprolol succinate 25mg PO daily   - rosuvastatin 5mg PO qhs    #Anemia/MARLENA  - trend H/H (4/17- 10.5/33.1)  - transfuse if Hgb <7 PRN     #Sleep:  - melatonin 3mg PO qhs PRN for sleep    #GI/Bowel:  - senna 2 tablets PO qhs  - Miralax 17g PO daily  - daily mini-enema at 6AM  - lactulose 20g PO TID PRN for constipation  - Maalox 30mL PO q4h PRN for dyspepsia  - GI ppx: pantoprazole 40mg PO daily   - added simethicone 80mg PO TID PRN for gas 4/14    #BPH  #Urinary retention likely augmented by degree of anasarca  - S/P Ortega; TOV  ->failed. Ortega re-inserted 3/6->fell out without knowing with associated hematuria->recurrent retention 3/7->Ortega re-inserted (3/7) --> successful TOV 3/25  - Flomax 0.8mg PO qhs  - started bethanechol 10mg PO BID 4/6  - Monitor U/O    #Skin/Pressure Injury:   Skin assessment on admission:   - Generalized dry flaky skin  - 19cm posterior cervical incision with 32 staples and 2 steri strips  - Left buttock DTI pressure injury measuring 14 x 9 cm      * appearing as a dark purple discoloration of intact skin, with a central superficial open area measuring 6 x 2 cm      * at the periphery of the 14 x 9 cm area, the discoloration is somewhat lighter      * dark red non-blanchable erythema      * eschar resolved     **Santyl to slough of sacrum daily**  - Bilateral lower extremity edema  - Healed lumbar surgical incision scar  - Right heel blanchable redness  - Bilateral dry cracked heels and plantar surface  - Lac hydrin BID BLE  - Aquaphor BID to generalized dry skin   - Appreciate wound care consult     #Diet/Dysphagia:  - Dysphagia: SLP consult for swallow function evaluation and treatment  - Current Diet: Regular  - Aspiration Precautions  - Nutrition consult     #Patient Education  - Education provided on the following:      * Admitting diagnosis and functional implications      * Functional goals      * Bladder management      * Bowel management      * Skin care management      * Intensity of service and scheduling of rehab disciplines      * Plan of care and role of interdisciplinary team conference in discharge planning      * Reconciliation of medications from prior institution    #Precautions / PROPHYLAXIS:   - Falls, Spinal  - Ortho: Weight bearing status: FWB;  brace OOB  - DVT ppx: Eliquis 5mg PO BID, TEDs  - Pressure injury/Skin: Turn Q2hrs while in bed, OOB to Chair, PT/OT      ---------------  Outpatient Follow-up:    Yue Wiggins  [facility]  [address], [address]  [place] [address]  Phone: [phone]  Fax: [phone]  Established Patient  Follow Up Time: 1 week    Keith Cantu [facility]  Neurosurgery  [address]  [place] [address]  Phone: [phone]  Fax: [phone]  Established Patient  Follow Up Time: 2 weeks    Luis A Brennan Physician [facility]  CARDIOLOGY [address]  Scheduled Appointment: 04/23/2025    --------------

## 2025-04-17 NOTE — PROGRESS NOTE ADULT - SUBJECTIVE AND OBJECTIVE BOX
HPI:  Mr. Rishabh Shaver is a 74-year-old male patient with past medical history of benign prostatic hyperplasia, diverticulosis, Graves' disease, hemorrhoids, hyperlipidemia, hypothyroidism, osteoarthritis, spinal stenosis, spinal cord injury (2004), and multiple spinal surgeries (three cervical and one lumbar), who presented to [facility] on 3/4/25 from [facility] due to lower extremity swelling and abnormal BUN/creatinine levels for further evaluation and management. The patient was recently admitted to [facility] from 02/18/25 through 02/25/25 for sepsis secondary to epidural abscess. He had a peripherally inserted central catheter line in the right arm and was on nightly Ceftriaxone treatment until 04/16/25. At the ED, he was found to have GOVIND, a left soleal vein, and a left gastrocnemius DVT. He was started on heparin gtt. Hospital course complicated by urinary retention requiring Ortega catheter insertion, rhabdomyolysis, elevated LFTs, anasarca, leukocytosis due to thoracic spine discitis OM and early paraspinal abscess, as well as new onset RUE paresthesias and weakness. He is s/p IVC filter placement on 3/17 and was recommended surgical management. He is s/p posterior thoracic laminectomy and fusion on 3/18/25 with Dr. Keith Cantu. Surgical culture with rare staph epi. Recommendations by ID to continue IV Rocephin and Daptomycin through 5/2/25. Patient was evaluated by PM&R and therapy for functional deficits, gait/ADL impairments and acute rehabilitation was recommended. Patient was cleared for discharge to [facility] IRF on 3/25/25. (25 Mar 2025 17:09)    TDD 4/25/25 home   ___________________________________________________________________________    SUBJECTIVE/ROS  Patient was seen and evaluated in PT today.  Reported no overnight events and is in no acute distress.  He is happy with his progress and feels good in therapy.  He reports he is now able to urinate and is happy with that.  Pain is well controlled, though he still reports fullness/bloating/gas - GI reconsulted for ongoing concerns.  ___________________________________________________________________________    LABS             10.5   8.22  )-----------( 247      ( 17 Apr 2025 07:35 )             33.1     141  |  101  |  30[H]  ----------------------------<  100[H]  3.6   |  33[H]  |  1.09    Ca    9.4      17 Apr 2025 07:35  TPro  6.8  /  Alb  3.1[L]  /  TBili  0.6  /  DBili  x   /  AST  30  /  ALT  38  /  AlkPhos  77  04-17    ___________________________________________________________________________    MEDICATIONS  (STANDING):  ammonium lactate 12% Lotion 1 Application(s) Topical two times a day  apixaban 5 milliGRAM(s) Oral every 12 hours  AQUAPHOR (petrolatum Ointment) 1 Application(s) Topical three times a day  bethanechol 10 milliGRAM(s) Oral two times a day  buMETAnide 1 milliGRAM(s) Oral two times a day  cefTRIAXone   IVPB 2000 milliGRAM(s) IV Intermittent every 24 hours  chlorhexidine 4% Liquid 1 Application(s) Topical <User Schedule>  collagenase Ointment 1 Application(s) Topical daily  DAPTOmycin IVPB 650 milliGRAM(s) IV Intermittent every 24 hours  docusate sodium Enema 283 milliGRAM(s) Rectal <User Schedule>  gabapentin 100 milliGRAM(s) Oral three times a day  gabapentin   Oral   levothyroxine 150 MICROGram(s) Oral daily  magnesium hydroxide Suspension 30 milliLiter(s) Oral at bedtime  metoprolol succinate ER 25 milliGRAM(s) Oral daily  pantoprazole    Tablet 40 milliGRAM(s) Oral before breakfast  polyethylene glycol 3350 17 Gram(s) Oral two times a day  rosuvastatin 5 milliGRAM(s) Oral at bedtime  senna 2 Tablet(s) Oral at bedtime  tamsulosin 0.8 milliGRAM(s) Oral at bedtime    MEDICATIONS  (PRN):  aluminum hydroxide/magnesium hydroxide/simethicone Suspension 30 milliLiter(s) Oral every 4 hours PRN Dyspepsia  cyclobenzaprine 5 milliGRAM(s) Oral three times a day PRN Muscle Spasm  melatonin 3 milliGRAM(s) Oral at bedtime PRN Insomnia  oxyCODONE    IR 15 milliGRAM(s) Oral every 6 hours PRN Moderate Pain (4 - 6)  simethicone 80 milliGRAM(s) Chew three times a day PRN Gas  sodium chloride 0.9% lock flush 10 milliLiter(s) IV Push every 1 hour PRN Pre/post blood products, medications, blood draw, and to maintain line patency    ___________________________________________________________________________    PHYSICAL EXAM:    VITALS  T(C): 36.3 (04-17-25 @ 08:03), Max: 36.6 (04-16-25 @ 22:55)  HR: 80 (04-17-25 @ 08:03) (80 - 87)  BP: 123/72 (04-17-25 @ 08:03) (114/68 - 137/73)  RR: 14 (04-17-25 @ 08:03) (14 - 16)  SpO2: 94% (04-17-25 @ 08:03) (94% - 95%)    Constitutional - NAD, Comfortable  Chest - good chest expansion, good respiratory effort  Cardio - warm and well perfused  Neurologic Exam:                           Orientation: Awake, A&O to self, place, date, year, situation     Speech - Fluent, Comprehensible, No dysarthria, No aphasia      Motor -  Seen navigating stairs with 2-3 person assist for downstairs in PT; upstairs more independent (but still with assist)                  Still with R foot drop; evaled by orthotist today for AFO  Psychiatric - Mood stable, Affect WNL    ___________________________________________________________________________    Function  ambulates 90 ft x 4 w  rw cg assist , verbal cues for safety on turns, increased  time    nustep 12 min level 3 LE only  heel slides in wc 3 x10  ___________________________________________________________________________    IMAGING     Xray Abdomen 2 Views (04.15.25 @ 12:31)    INTERPRETATION:  Abdominal study. 5 images. Patient has constipation.   Patient had arthrodesis.  ICD Seefeldt unusual position noted.  Posterior L4-5 hardware and disc implant noted.  There is scattered mild to moderate lumbar spondylitic change.  There is mildly increased fecal content in the colon but no sign of bowel obstruction.  Appearance is similar to April 2 but shows improvement.    IMPRESSION: Improved fecal lobe with residual.

## 2025-04-18 DIAGNOSIS — R14.0 ABDOMINAL DISTENSION (GASEOUS): ICD-10-CM

## 2025-04-18 LAB — TSH SERPL-MCNC: 5.96 UIU/ML — HIGH (ref 0.36–3.74)

## 2025-04-18 PROCEDURE — 99232 SBSQ HOSP IP/OBS MODERATE 35: CPT | Mod: GC

## 2025-04-18 PROCEDURE — 99223 1ST HOSP IP/OBS HIGH 75: CPT

## 2025-04-18 PROCEDURE — 99232 SBSQ HOSP IP/OBS MODERATE 35: CPT

## 2025-04-18 RX ADMIN — Medication 80 MILLIGRAM(S): at 06:22

## 2025-04-18 RX ADMIN — Medication 2 TABLET(S): at 23:20

## 2025-04-18 RX ADMIN — MAGNESIUM HYDROXIDE 30 MILLILITER(S): 400 SUSPENSION ORAL at 23:24

## 2025-04-18 RX ADMIN — WHITE PETROLATUM 1 APPLICATION(S): 1 OINTMENT TOPICAL at 23:21

## 2025-04-18 RX ADMIN — Medication 1 APPLICATION(S): at 06:30

## 2025-04-18 RX ADMIN — Medication 1 APPLICATION(S): at 18:21

## 2025-04-18 RX ADMIN — TAMSULOSIN HYDROCHLORIDE 0.8 MILLIGRAM(S): 0.4 CAPSULE ORAL at 23:21

## 2025-04-18 RX ADMIN — GABAPENTIN 100 MILLIGRAM(S): 400 CAPSULE ORAL at 13:03

## 2025-04-18 RX ADMIN — CEFTRIAXONE 100 MILLIGRAM(S): 500 INJECTION, POWDER, FOR SOLUTION INTRAMUSCULAR; INTRAVENOUS at 23:20

## 2025-04-18 RX ADMIN — APIXABAN 5 MILLIGRAM(S): 2.5 TABLET, FILM COATED ORAL at 23:20

## 2025-04-18 RX ADMIN — Medication 40 MILLIGRAM(S): at 06:23

## 2025-04-18 RX ADMIN — BUMETANIDE 1 MILLIGRAM(S): 1 TABLET ORAL at 13:03

## 2025-04-18 RX ADMIN — APIXABAN 5 MILLIGRAM(S): 2.5 TABLET, FILM COATED ORAL at 12:24

## 2025-04-18 RX ADMIN — GABAPENTIN 100 MILLIGRAM(S): 400 CAPSULE ORAL at 06:23

## 2025-04-18 RX ADMIN — Medication 1 APPLICATION(S): at 06:27

## 2025-04-18 RX ADMIN — ROSUVASTATIN CALCIUM 5 MILLIGRAM(S): 20 TABLET, FILM COATED ORAL at 23:20

## 2025-04-18 RX ADMIN — POLYETHYLENE GLYCOL 3350 17 GRAM(S): 17 POWDER, FOR SOLUTION ORAL at 18:41

## 2025-04-18 RX ADMIN — Medication 10 MILLIGRAM(S): at 18:41

## 2025-04-18 RX ADMIN — GABAPENTIN 100 MILLIGRAM(S): 400 CAPSULE ORAL at 23:24

## 2025-04-18 RX ADMIN — Medication 150 MICROGRAM(S): at 06:23

## 2025-04-18 RX ADMIN — BUMETANIDE 1 MILLIGRAM(S): 1 TABLET ORAL at 06:23

## 2025-04-18 RX ADMIN — WHITE PETROLATUM 1 APPLICATION(S): 1 OINTMENT TOPICAL at 06:27

## 2025-04-18 RX ADMIN — Medication 80 MILLIGRAM(S): at 23:31

## 2025-04-18 RX ADMIN — Medication 283 MILLIGRAM(S): at 08:21

## 2025-04-18 RX ADMIN — DAPTOMYCIN 126 MILLIGRAM(S): 500 INJECTION, POWDER, LYOPHILIZED, FOR SOLUTION INTRAVENOUS at 12:01

## 2025-04-18 RX ADMIN — WHITE PETROLATUM 1 APPLICATION(S): 1 OINTMENT TOPICAL at 13:01

## 2025-04-18 RX ADMIN — Medication 10 MILLIGRAM(S): at 06:22

## 2025-04-18 RX ADMIN — Medication 80 MILLIGRAM(S): at 18:40

## 2025-04-18 NOTE — PROGRESS NOTE ADULT - ASSESSMENT
Assessment/Plan:  Mr. Rishabh Shaver is a 74-year-old man with past medical history of benign prostatic hyperplasia, diverticulosis, Graves' disease, hemorrhoids, hyperlipidemia, hypothyroidism, osteoarthritis, spinal stenosis, spinal cord injury (2004), and multiple spinal surgeries (three cervical and one lumbar), who is admitted for Acute Inpatient Rehabilitation with a multidisciplinary rehab program at [facility] with functional impairments in ADLs and mobility secondary to a thoracic epidural abscess s/p posterior thoracic laminectomy and fusion on 3/18/25 with Dr. Keith Cantu.       #Thoracic epidural abscess s/p laminectomy and fusion surgery      * C8 AIS D Incomplete paraplegia      *  brace when OOB      * Flexeril 5mg PO TID PRN for neck soreness      * Staples removed on POD #14 (4/1/25)      * Sacral wound assessment by wound/skin team      * urinary retention, now voiding,  c/w Flomax 0.8mg PO daily      * Right Venodyne only due to left calf DVT (s/p pre-op IVC filter)   - Activity Limitations: Decreased social, vocational and leisure activities, decreased self care and ADLs, decreased mobility, decreased ability to manage household and finances  - Comprehensive Multidisciplinary Rehab Program:      * 3 hours a day, 5 days a week      * PT 2hr/day: Focused on improving strength, endurance, coordination, balance, functional mobility, and transfers      * OT 1hr/day: Focused on improving strength, fine motor skills, coordination, posture and ADLs  - orthotist Arya saw patient 4/17 for AFO for right foot drop and recommends b/l AFOs hinged on R and semi solid on L - order placed    -----------------------------------------------------------------------------------    Concurrent Medical Problems    #GOVIND on CKD (resolved)  #LE edema  #Hypoalbuminemia  #Proteinuria  - IVF as needed with fluid balance  - Significant debilitating anasarca   - Trial of albumin Lasix started 3/8->improvement  - s/p IV Lasix/IV albumin BID (3/9)  - Change to Bumex/Albumin (3/11)-> good effect  - Switched to PO Bumex 1mg BID 3/24  - Trend Albumin (4/17 - 3.1)  - Trend Protein total (4/17 - 6.8); Total protein, Random Urine (3/18- 24)  - Trend CMP (4/17 - Cr 1.09/ BUN 33)    #L gastrocnemius and L soleal DVT  - appears provoked from decrease ambulation from recent spinal abscess  - Could not tolerate VQ scan for r/o PE 2/2 pain  - Eliquis 5mg PO q12h - will be new med on discharge (of note patient completed Eliquis load)   - Therapeutic Lovenox (3/12) in the setting of possible spine intervention  - Last dose of AC night of 3/16. Restarted Eliquis 5mg BID 4/5/25  - S/P IVC filter placement 3/17  - repeat US 4/8 with stable L soleal DVT and no propagation     #Spinal epidural abscess POA and already being treated with IV antibiotics with suspected worsening of disease  - S/p thoracic laminectomy and fusion 3/18. [place]  brace   - Surgical culture with staph epi possible contaminant but given clinical presentation on admission, IV Dapto added  - IV Rocephin 2g daily via PICC line thru 5/2/25  - IV Daptomycin 650mg daily via PICC line thru 5/2/25  - gabapentin 100mg PO TID  - d/c long-acting Oxycontin 4/11  - oxycodone 15mg PO q6h PRN moderate pain  - Flexeril 5mg PO TID PRN for muscle spasm  - staples d/teodoro in rehab    #Abdominal discomfort and bloating  - Abd xray 4/15--as above  - reconsulted GI 4/17 awaiting recs    #Mild Rhabdomyolysis (resolved)  - associated elevated transaminases (resolved)  - suspect from anasarca/edema   - improving with adequate diuresis of edema  - associated elevated transaminase; now resolved (4/17- AST 30/ALT 38)    #Leukocytosis (resolved)  - likely rx to DVT + Discitis  - no other signs of sepsis, afebrile   - trend CBC and monitor fever curve (4/17- WBC 8.22)    #Hypothyroidism  - Synthroid 150mcg PO daily    #CAD  #HTN  #HLD  - metoprolol succinate 25mg PO daily   - rosuvastatin 5mg PO qhs    #Anemia/MARLENA  - trend H/H (4/17- 10.5/33.1)  - transfuse if Hgb <7 PRN     #Sleep:  - melatonin 3mg PO qhs PRN for sleep    #GI/Bowel:  - senna 2 tablets PO qhs  - Miralax 17g PO daily  - daily mini-enema at 6AM  - lactulose 20g PO TID PRN for constipation  - Maalox 30mL PO q4h PRN for dyspepsia  - GI ppx: pantoprazole 40mg PO daily   - added simethicone 80mg PO TID PRN for gas 4/14    #BPH  #Urinary retention likely augmented by degree of anasarca  - S/P Ortega; TOV  ->failed. Ortega re-inserted 3/6->fell out without knowing with associated hematuria->recurrent retention 3/7->Ortega re-inserted (3/7) --> successful TOV 3/25  - Flomax 0.8mg PO qhs  - started bethanechol 10mg PO BID 4/6  - Monitor U/O    #Skin/Pressure Injury:   Skin assessment on admission:   - Generalized dry flaky skin  - 19cm posterior cervical incision with 32 staples and 2 steri strips  - Left buttock DTI pressure injury measuring 14 x 9 cm      * appearing as a dark purple discoloration of intact skin, with a central superficial open area measuring 6 x 2 cm      * at the periphery of the 14 x 9 cm area, the discoloration is somewhat lighter      * dark red non-blanchable erythema      * eschar resolved     **Santyl to slough of sacrum daily**  - Bilateral lower extremity edema  - Healed lumbar surgical incision scar  - Right heel blanchable redness  - Bilateral dry cracked heels and plantar surface  - Lac hydrin BID BLE  - Aquaphor BID to generalized dry skin   - Appreciate wound care consult     #Diet/Dysphagia:  - Dysphagia: SLP consult for swallow function evaluation and treatment  - Current Diet: Regular  - Aspiration Precautions  - Nutrition consult     #Patient Education  - Education provided on the following:      * Admitting diagnosis and functional implications      * Functional goals      * Bladder management      * Bowel management      * Skin care management      * Intensity of service and scheduling of rehab disciplines      * Plan of care and role of interdisciplinary team conference in discharge planning      * Reconciliation of medications from prior institution    #Precautions / PROPHYLAXIS:   - Falls, Spinal  - Ortho: Weight bearing status: FWB;  brace OOB  - DVT ppx: Eliquis 5mg PO BID, TEDs  - Pressure injury/Skin: Turn Q2hrs while in bed, OOB to Chair, PT/OT      ---------------  Outpatient Follow-up:    Yue Wiggins  [facility]  [address], [address]  [place] [address]  Phone: [phone]  Fax: [phone]  Established Patient  Follow Up Time: 1 week    [facility] Cantu [facility]  Neurosurgery  [address], [address]  [place] [address]  Phone: [phone]  Fax: [phone]  Established Patient  Follow Up Time: 2 weeks    Luis A Brennan  [facility] Physician [facility]  CARDIOLOGY [address]  Scheduled Appointment: 04/23/2025    --------------

## 2025-04-18 NOTE — CONSULT NOTE ADULT - SUBJECTIVE AND OBJECTIVE BOX
Gi Consult:  HPI:  Mr. Rishabh Shaver is a 74-year-old male patient with past medical history of benign prostatic hyperplasia, diverticulosis, Graves' disease, hemorrhoids, hyperlipidemia, hypothyroidism, osteoarthritis, spinal stenosis, spinal cord injury (2004), and multiple spinal surgeries (three cervical and one lumbar), who presented to [facility] on 3/4/25 from [facility] due to lower extremity swelling and abnormal BUN/creatinine levels for further evaluation and management. The patient was recently admitted to [facility] from 02/18/25 through 02/25/25 for sepsis secondary to epidural abscess. He had a peripherally inserted central catheter line in the right arm and was on nightly Ceftriaxone treatment until 04/16/25. At the ED, he was found to have GOVIND, a left soleal vein, and a left gastrocnemius DVT. He was started on heparin gtt. Hospital course complicated by urinary retention requiring Ortega catheter insertion, rhabdomyolysis, elevated LFTs, anasarca, leukocytosis due to thoracic spine discitis OM and early paraspinal abscess, as well as new onset RUE paresthesias and weakness. He is s/p IVC filter placement on 3/17 and was recommended surgical management. He is s/p posterior thoracic laminectomy and fusion on 3/18/25 with Dr. Keith Cantu. Surgical culture with rare staph epi. Recommendations by ID to continue IV Rocephin and Daptomycin through 5/2/25. Patient was evaluated by PM&R and therapy for functional deficits, gait/ADL impairments and acute rehabilitation was recommended. Patient was cleared for discharge to [facility] on 3/25/25. (25 Mar 2025 17:09)    MEDICATIONS  (STANDING):  ammonium lactate 12% Lotion 1 Application(s) Topical two times a day  apixaban 5 milliGRAM(s) Oral every 12 hours  AQUAPHOR (petrolatum Ointment) 1 Application(s) Topical three times a day  bethanechol 10 milliGRAM(s) Oral two times a day  buMETAnide 1 milliGRAM(s) Oral two times a day  cefTRIAXone   IVPB 2000 milliGRAM(s) IV Intermittent every 24 hours  chlorhexidine 4% Liquid 1 Application(s) Topical <User Schedule>  collagenase Ointment 1 Application(s) Topical daily  DAPTOmycin IVPB 650 milliGRAM(s) IV Intermittent every 24 hours  docusate sodium Enema 283 milliGRAM(s) Rectal <User Schedule>  gabapentin 100 milliGRAM(s) Oral three times a day  gabapentin   Oral   levothyroxine 150 MICROGram(s) Oral daily  magnesium hydroxide Suspension 30 milliLiter(s) Oral at bedtime  metoprolol succinate ER 25 milliGRAM(s) Oral daily  pantoprazole    Tablet 40 milliGRAM(s) Oral before breakfast  polyethylene glycol 3350 17 Gram(s) Oral two times a day  rosuvastatin 5 milliGRAM(s) Oral at bedtime  senna 2 Tablet(s) Oral at bedtime  tamsulosin 0.8 milliGRAM(s) Oral at bedtime    MEDICATIONS  (PRN):  aluminum hydroxide/magnesium hydroxide/simethicone Suspension 30 milliLiter(s) Oral every 4 hours PRN Dyspepsia  cyclobenzaprine 5 milliGRAM(s) Oral three times a day PRN Muscle Spasm  melatonin 3 milliGRAM(s) Oral at bedtime PRN Insomnia  oxyCODONE    IR 15 milliGRAM(s) Oral every 6 hours PRN Moderate Pain (4 - 6)  simethicone 80 milliGRAM(s) Chew three times a day PRN Gas  sodium chloride 0.9% lock flush 10 milliLiter(s) IV Push every 1 hour PRN Pre/post blood products, medications, blood draw, and to maintain line patency      Allergies    No Known Allergies    Intolerances        PAST MEDICAL & SURGICAL HISTORY:  Hyperlipidemia, unspecified hyperlipidemia type      Diverticulosis of large intestine without hemorrhage      History of colon polyps      Hemorrhoids, unspecified hemorrhoid type      Benign prostatic hyperplasia without lower urinary tract symptoms, unspecified morphology      Osteoarthritis of knee, unilateral  left      Spinal cord injury at C5-C7 level without injury of spinal bone, subsequent encounter      Spastic      Weakness  to right side      Myelopathy      Spinal stenosis, unspecified spinal region      Hypothyroidism, unspecified type      Graves disease      Alcoholic  recovering alcoholic x 40years      History of cervical discectomy      S/P laminectomy  Cervical      H/O lumbar discectomy  with laminectomy      H/O arthroscopy of knee  Left x 2. Unsure of years      H/O colonoscopy with polypectomy  2014          Social History:  SOCIAL HISTORY  Smoking - denies  EtOH - denies  Drugs - denies    Occupation:     FUNCTIONAL HISTORY  Patient was at home in a  with his wife in a split level house with 7 steps to each floor. Pt has a tub/shower combo +curtain, standard toilet, RHD.   Prior Level of Function: Independent in ADLs and ambulation without AD    CURRENT FUNCTIONAL STATUS  - Bed Mobility: Max A; 1PA; bed rails  - Transfers: Mod A; 2PA; FWB; non powered sit to stand device    - ADLs:  -Upper Body Dressing: Max A  -Lower Body Dressing: Dependent  -Toileting: Dependent   -Grooming: Mod A  -Eating- Min A (25 Mar 2025 17:09)      FAMILY HISTORY:  Family history of colon cancer in mother (Mother)    Family history of liver disease (Father)  father    Family history of cancer (Sibling)  SIster - bile duct        REVIEW OF SYSTEMS      General:	    Skin/Breast:  	  Ophthalmologic:  	  ENMT:	    Respiratory and Thorax:  	  Cardiovascular:	    Gastrointestinal:	    Genitourinary:	    Musculoskeletal:	    Neurological:	    Psychiatric:	    Hematology/Lymphatics:	    Endocrine:	    Allergic/Immunologic:	       PHYSICAL EXAM:   Vital Signs:  Vital Signs Last 24 Hrs  T(C): 36.3 (18 Apr 2025 07:29), Max: 36.6 (17 Apr 2025 23:00)  T(F): 97.4 (18 Apr 2025 07:29), Max: 97.9 (17 Apr 2025 23:00)  HR: 77 (18 Apr 2025 07:29) (73 - 87)  BP: 100/62 (18 Apr 2025 07:29) (100/62 - 138/72)  BP(mean): --  RR: 14 (18 Apr 2025 07:29) (14 - 16)  SpO2: 95% (18 Apr 2025 07:29) (94% - 95%)    Parameters below as of 18 Apr 2025 07:29  Patient On (Oxygen Delivery Method): room air      Daily     Daily I&O's Summary    17 Apr 2025 07:01  -  18 Apr 2025 07:00  --------------------------------------------------------  IN: 0 mL / OUT: 600 mL / NET: -600 mL        GENERAL:  Appears stated age, well-groomed, well-nourished, no distress  HEENT:  Moist and clear sclera and conjuctivae  CHEST:  Full & symmetric excursion, no increased effort, breath sounds clear  HEART:  Regular rhythm, S1, S2  ABDOMEN:  Soft, non-tender, non-distended, normoactive bowel sounds,  no masses   EXTREMITIES:  no edema  SKIN:  No rash  NEURO:  Alert, oriented, no tremor, no encephalopathy      LABS:                          10.5   8.22  )-----------( 247      ( 17 Apr 2025 07:35 )             33.1       04-17    141  |  101  |  30[H]  ----------------------------<  100[H]  3.6   |  33[H]  |  1.09    Ca    9.4      17 Apr 2025 07:35    TPro  6.8  /  Alb  3.1[L]  /  TBili  0.6  /  DBili  x   /  AST  30  /  ALT  38  /  AlkPhos  77  04-17          amylase     lipase    RADIOLOGY & ADDITIONAL TESTS:   Gi Consult:  HPI:  Mr. Rishabh Shaver, a 74-year-old male with a significant past medical history including benign prostatic hyperplasia, diverticulosis, Graves' disease, hemorrhoids, hyperlipidemia, hypothyroidism, osteoarthritis, spinal stenosis, spinal cord injury (2004), and multiple spinal surgeries (three cervical and one lumbar), presented to [facility] with primary concerns were lower extremity swelling and abnormal blood urea nitrogen/creatinine levels, necessitating further evaluation and management. The patient had a recent hospitalization at [facility] for sepsis secondary to an epidural abscess.     Patient started complaining of abdominal distention and constipation a few days ago, patient was on a bowel regimen including  lactulose PRN. Patient denies NDV, abdominal discomfort or pain, and he admits the abdominal distention has improved last bm was soft on 4/18 morning, and passing gas. Patient is not off pain medications. Patient is tolerating diet and therapies well.   Patient refuses Miralax at times because is worries he will have an accident during therapy.          MEDICATIONS  (STANDING):  ammonium lactate 12% Lotion 1 Application(s) Topical two times a day  apixaban 5 milliGRAM(s) Oral every 12 hours  AQUAPHOR (petrolatum Ointment) 1 Application(s) Topical three times a day  bethanechol 10 milliGRAM(s) Oral two times a day  buMETAnide 1 milliGRAM(s) Oral two times a day  cefTRIAXone   IVPB 2000 milliGRAM(s) IV Intermittent every 24 hours  chlorhexidine 4% Liquid 1 Application(s) Topical <User Schedule>  collagenase Ointment 1 Application(s) Topical daily  DAPTOmycin IVPB 650 milliGRAM(s) IV Intermittent every 24 hours  docusate sodium Enema 283 milliGRAM(s) Rectal <User Schedule>  gabapentin 100 milliGRAM(s) Oral three times a day  gabapentin   Oral   levothyroxine 150 MICROGram(s) Oral daily  magnesium hydroxide Suspension 30 milliLiter(s) Oral at bedtime  metoprolol succinate ER 25 milliGRAM(s) Oral daily  pantoprazole    Tablet 40 milliGRAM(s) Oral before breakfast  polyethylene glycol 3350 17 Gram(s) Oral two times a day  rosuvastatin 5 milliGRAM(s) Oral at bedtime  senna 2 Tablet(s) Oral at bedtime  tamsulosin 0.8 milliGRAM(s) Oral at bedtime    MEDICATIONS  (PRN):  aluminum hydroxide/magnesium hydroxide/simethicone Suspension 30 milliLiter(s) Oral every 4 hours PRN Dyspepsia  cyclobenzaprine 5 milliGRAM(s) Oral three times a day PRN Muscle Spasm  melatonin 3 milliGRAM(s) Oral at bedtime PRN Insomnia  oxyCODONE    IR 15 milliGRAM(s) Oral every 6 hours PRN Moderate Pain (4 - 6)  simethicone 80 milliGRAM(s) Chew three times a day PRN Gas  sodium chloride 0.9% lock flush 10 milliLiter(s) IV Push every 1 hour PRN Pre/post blood products, medications, blood draw, and to maintain line patency      Allergies    No Known Allergies    Intolerances        PAST MEDICAL & SURGICAL HISTORY:  Hyperlipidemia, unspecified hyperlipidemia type      Diverticulosis of large intestine without hemorrhage      History of colon polyps      Hemorrhoids, unspecified hemorrhoid type      Benign prostatic hyperplasia without lower urinary tract symptoms, unspecified morphology      Osteoarthritis of knee, unilateral  left      Spinal cord injury at C5-C7 level without injury of spinal bone, subsequent encounter      Spastic      Weakness  to right side      Myelopathy      Spinal stenosis, unspecified spinal region      Hypothyroidism, unspecified type      Graves disease      Alcoholic  recovering alcoholic x 40years      History of cervical discectomy      S/P laminectomy  Cervical      H/O lumbar discectomy  with laminectomy      H/O arthroscopy of knee  Left x 2. Unsure of years      H/O colonoscopy with polypectomy  2014          Social History:  SOCIAL HISTORY  Smoking - denies  EtOH - denies  Drugs - denies    Occupation:     FUNCTIONAL HISTORY  Patient was at home in a PH with his wife in a split level house with 7 steps to each floor. Pt has a tub/shower combo +curtain, standard toilet, RHD.   Prior Level of Function: Independent in ADLs and ambulation without AD    CURRENT FUNCTIONAL STATUS  - Bed Mobility: Max A; 1PA; bed rails  - Transfers: Mod A; 2PA; FWB; non powered sit to stand device    - ADLs:  -Upper Body Dressing: Max A  -Lower Body Dressing: Dependent  -Toileting: Dependent   -Grooming: Mod A  -Eating- Min A (25 Mar 2025 17:09)      FAMILY HISTORY:  Family history of colon cancer in mother (Mother)    Family history of liver disease (Father)  father    Family history of cancer (Sibling)  SIster - bile duct    REVIEW OF SYSTEMS  General: Reports fatigue and generalized weakness. No recent weight loss or fever.  Skin: No rashes or lesions noted. Reports swelling in lower extremities.  HEENT: No headaches, vision changes, or hearing loss. No nasal congestion or sore throat.  Cardiovascular: No chest pain or palpitations.   Respiratory: No shortness of breath, cough, or wheezing.  Gastrointestinal: No nausea, vomiting, or abdominal pain.  Genitourinary: Reports urinary retention requiring Ortega catheter insertion. No dysuria or hematuria.  Musculoskeletal: Reports new onset right upper extremity paresthesias and weakness.   Neurological: No recent seizures or loss of consciousness. Reports new right upper extremity paresthesias and weakness, likely related to thoracic spine discitis and early paraspinal abscess.  Endocrine: History of Graves' disease and hypothyroidism. No recent changes in symptoms.  Hematologic/Lymphatic: No easy bruising or bleeding.  Psychiatric: No depression or anxiety reported. No recent changes in mood or behavior.    PHYSICAL EXAM:   Vital Signs:  Vital Signs Last 24 Hrs  T(C): 36.3 (18 Apr 2025 07:29), Max: 36.6 (17 Apr 2025 23:00)  T(F): 97.4 (18 Apr 2025 07:29), Max: 97.9 (17 Apr 2025 23:00)  HR: 77 (18 Apr 2025 07:29) (73 - 87)  BP: 100/62 (18 Apr 2025 07:29) (100/62 - 138/72)  BP(mean): --  RR: 14 (18 Apr 2025 07:29) (14 - 16)  SpO2: 95% (18 Apr 2025 07:29) (94% - 95%)    Parameters below as of 18 Apr 2025 07:29  Patient On (Oxygen Delivery Method): room air      Daily     Daily I&O's Summary    17 Apr 2025 07:01  -  18 Apr 2025 07:00  --------------------------------------------------------  IN: 0 mL / OUT: 600 mL / NET: -600 mL        GENERAL:  Appears stated age, well-groomed, well-nourished, no distress  HEENT:  Moist and clear sclera and conjuctivae  CHEST:  Full & symmetric excursion, no increased effort, breath sounds clear, + brace noted,  HEART:  Regular rhythm, S1, S2  ABDOMEN:  Soft, non-tender, slightly distended, normoactive bowel sounds,  no masses   EXTREMITIES:  no edema  SKIN:  No rash  NEURO:  Alert, oriented, no tremor, no encephalopathy      LABS:                          10.5   8.22  )-----------( 247      ( 17 Apr 2025 07:35 )             33.1       04-17    141  |  101  |  30[H]  ----------------------------<  100[H]  3.6   |  33[H]  |  1.09    Ca    9.4      17 Apr 2025 07:35    TPro  6.8  /  Alb  3.1[L]  /  TBili  0.6  /  DBili  x   /  AST  30  /  ALT  38  /  AlkPhos  77  04-17          amylase     lipase    RADIOLOGY & ADDITIONAL TESTS:   Gi Consult:  HPI:  Mr. Rishabh Shaver, a 74-year-old male with a significant past medical history including benign prostatic hyperplasia, diverticulosis, Graves' disease, hemorrhoids, hyperlipidemia, hypothyroidism, osteoarthritis, spinal stenosis, spinal cord injury (2004), and multiple spinal surgeries (three cervical and one lumbar), presented to [facility] with primary concerns were lower extremity swelling and abnormal blood urea nitrogen/creatinine levels, necessitating further evaluation and management. The patient had a recent hospitalization at [facility] for sepsis secondary to an epidural abscess.     Patient started complaining of abdominal distention and constipation a few days ago, patient was on a bowel regimen including  lactulose PRN. Patient denies NDV, abdominal discomfort or pain, and he admits the abdominal distention has improved last bm was soft on 4/18 morning, and passing gas. Patient is not off pain medications. Patient is tolerating diet and therapies well.   Patient refuses Miralax at times because is worries he will have an involuntary bowel movement during therapy.          MEDICATIONS  (STANDING):  ammonium lactate 12% Lotion 1 Application(s) Topical two times a day  apixaban 5 milliGRAM(s) Oral every 12 hours  AQUAPHOR (petrolatum Ointment) 1 Application(s) Topical three times a day  bethanechol 10 milliGRAM(s) Oral two times a day  buMETAnide 1 milliGRAM(s) Oral two times a day  cefTRIAXone   IVPB 2000 milliGRAM(s) IV Intermittent every 24 hours  chlorhexidine 4% Liquid 1 Application(s) Topical <User Schedule>  collagenase Ointment 1 Application(s) Topical daily  DAPTOmycin IVPB 650 milliGRAM(s) IV Intermittent every 24 hours  docusate sodium Enema 283 milliGRAM(s) Rectal <User Schedule>  gabapentin 100 milliGRAM(s) Oral three times a day  gabapentin   Oral   levothyroxine 150 MICROGram(s) Oral daily  magnesium hydroxide Suspension 30 milliLiter(s) Oral at bedtime  metoprolol succinate ER 25 milliGRAM(s) Oral daily  pantoprazole    Tablet 40 milliGRAM(s) Oral before breakfast  polyethylene glycol 3350 17 Gram(s) Oral two times a day  rosuvastatin 5 milliGRAM(s) Oral at bedtime  senna 2 Tablet(s) Oral at bedtime  tamsulosin 0.8 milliGRAM(s) Oral at bedtime    MEDICATIONS  (PRN):  aluminum hydroxide/magnesium hydroxide/simethicone Suspension 30 milliLiter(s) Oral every 4 hours PRN Dyspepsia  cyclobenzaprine 5 milliGRAM(s) Oral three times a day PRN Muscle Spasm  melatonin 3 milliGRAM(s) Oral at bedtime PRN Insomnia  oxyCODONE    IR 15 milliGRAM(s) Oral every 6 hours PRN Moderate Pain (4 - 6)  simethicone 80 milliGRAM(s) Chew three times a day PRN Gas  sodium chloride 0.9% lock flush 10 milliLiter(s) IV Push every 1 hour PRN Pre/post blood products, medications, blood draw, and to maintain line patency      Allergies    No Known Allergies    Intolerances        PAST MEDICAL & SURGICAL HISTORY:  Hyperlipidemia, unspecified hyperlipidemia type      Diverticulosis of large intestine without hemorrhage      History of colon polyps      Hemorrhoids, unspecified hemorrhoid type      Benign prostatic hyperplasia without lower urinary tract symptoms, unspecified morphology      Osteoarthritis of knee, unilateral  left      Spinal cord injury at C5-C7 level without injury of spinal bone, subsequent encounter      Spastic      Weakness  to right side      Myelopathy      Spinal stenosis, unspecified spinal region      Hypothyroidism, unspecified type      Graves disease      Alcoholic  recovering alcoholic x 40years      History of cervical discectomy      S/P laminectomy  Cervical      H/O lumbar discectomy  with laminectomy      H/O arthroscopy of knee  Left x 2. Unsure of years      H/O colonoscopy with polypectomy  2014          Social History:  SOCIAL HISTORY  Smoking - denies  EtOH - denies  Drugs - denies    Occupation:           FAMILY HISTORY:  Family history of colon cancer in mother (Mother)    Family history of liver disease (Father)  father    Family history of cancer (Sibling)  SIster - bile duct    REVIEW OF SYSTEMS  General: Reports fatigue and generalized weakness.  Skin: No rashes or lesions noted. Reports swelling in lower extremities.  HEENT: No headaches, vision changes, or hearing loss. No nasal congestion or sore throat.  Cardiovascular: No chest pain or palpitations.   Respiratory: No shortness of breath, cough, or wheezing.  Gastrointestinal: No nausea, vomiting, or abdominal pain.  Genitourinary: Reports urinary retention requiring Ortega catheter insertion. No dysuria or hematuria.  Musculoskeletal: Reports new onset right upper extremity paresthesias and weakness.   Neurological: No recent seizures or loss of consciousness.   Hematologic/Lymphatic: No easy bruising or bleeding.  Psychiatric: No depression or anxiety.        PHYSICAL EXAM:   Vital Signs:  Vital Signs Last 24 Hrs  T(C): 36.3 (18 Apr 2025 07:29), Max: 36.6 (17 Apr 2025 23:00)  T(F): 97.4 (18 Apr 2025 07:29), Max: 97.9 (17 Apr 2025 23:00)  HR: 77 (18 Apr 2025 07:29) (73 - 87)  BP: 100/62 (18 Apr 2025 07:29) (100/62 - 138/72)  BP(mean): --  RR: 14 (18 Apr 2025 07:29) (14 - 16)  SpO2: 95% (18 Apr 2025 07:29) (94% - 95%)    Parameters below as of 18 Apr 2025 07:29  Patient On (Oxygen Delivery Method): room air      Daily     Daily I&O's Summary    17 Apr 2025 07:01  -  18 Apr 2025 07:00  --------------------------------------------------------  IN: 0 mL / OUT: 600 mL / NET: -600 mL        GENERAL:  NAD  HEENT:  anicteric sclerae, clear conjunctivae  CHEST:   clear, + brace noted,  HEART:  Regular rhythm, S1, S2  ABDOMEN:  Soft, non-tender, slightly distended, normoactive bowel sounds,  no masses   EXTREMITIES:  no edema  SKIN:  No rash  NEURO:  Alert, oriented, no tremor, no encephalopathy      LABS:                          10.5   8.22  )-----------( 247      ( 17 Apr 2025 07:35 )             33.1       04-17    141  |  101  |  30[H]  ----------------------------<  100[H]  3.6   |  33[H]  |  1.09    Ca    9.4      17 Apr 2025 07:35    TPro  6.8  /  Alb  3.1[L]  /  TBili  0.6  /  DBili  x   /  AST  30  /  ALT  38  /  AlkPhos  77  04-17

## 2025-04-18 NOTE — PROGRESS NOTE ADULT - ASSESSMENT
74-year-old male patient with h/o BPH, diverticulosis, Graves' disease, hemorrhoids, HLD, hypothyroidism, osteoarthritis, spinal stenosis, spinal cord injury (2004), and multiple spinal surgeries (three cervical and one lumbar), who presented to [facility] on 3/4/25 from [facility] due to lower extremity swelling and abnormal BUN/creatinine levels for further evaluation and management. The patient was recently admitted to [facility] from 02/18/25 through 02/25/25 for sepsis secondary to epidural abscess. He had a peripherally inserted central catheter line in the right arm and was on nightly Ceftriaxone treatment until 04/16/25. At the ED, he was found to have GOVIND, a left soleal vein, and a left gastrocnemius DVT. He was started on heparin gtt. Hospital course complicated by urinary retention requiring Ortega catheter insertion, rhabdomyolysis, elevated LFTs, anasarca, leukocytosis due to thoracic spine discitis OM and early paraspinal abscess, as well as new onset RUE paresthesias and weakness. He is s/p IVC filter placement on 3/17 and was recommended surgical management. He is s/p posterior thoracic laminectomy and fusion on 3/18/25 with Dr. Keith Cantu. Surgical culture with rare staph epi. Recommendations by ID to continue IV Rocephin and Daptomycin through 5/2/25. Patient was evaluated by PM&R and therapy for functional deficits, gait/ADL impairments and acute rehabilitation was recommended. Patient was cleared for discharge to [facility] IRF on 3/25/25. (25 Mar 2025 17:09)    Constipation abdominal  distention and dyspepsia  - on senna, Docusol enema daily since april 2.   - abd xray on 4/2: Moderate to large stool burden. No bowel obstruction.  - Abd xray today 4/15: improvement with residual stool burden  - Miralax BID. Milk of magnesia at h/s. . senna at h.s  - consider to change Ducusol enema daily to mineral oil enema daily at h/s  - c/w maalox PRN, simethicone  - Dced lactulose TID PRN as it can cause bloating  - GI saw this am 4/18. f/u rec  - mobilize  - f/u TSH, BMP, Mg in am    Debility  Thoracic epidural abscess s/p laminectomy and fusion surgery on 3/18  -  brace  - pain control per primary. avoid opioids for constipation  - fall, spine precautions    Spinal epidural abscess   - S/p thoracic laminectomy and fusion 3/18.   - IV Rocephin 2g HS via PICC line thru 5/2/25  - IV Daptomycin 650mg daily via PICC line thru 5/2/2025  - [place]  brace   - Surgical culture with staph epi possible contaminant but given clinical presentation on admission, IV Dapto added.     GOVIND, resolved  LE edema  Hypoalbuminemia  Proteinuria  Anasarca  - c/w Bumex 1 mg BID (4/4 missed the past few doses due to hold parameters.  reordered with hold for SBP <100 instructions).   - Optimize protein intake.   - mobilize, encourage out of bed to chair often.   - Monitor labs closely , stable renal function w good UOP   - Serum workup negative but will need follow up of  Serum  TRUDY positive for weak IgG Lambda band.  - urine protein 200 mg only ( not nephrotic)   - daily standing weights    Transaminitis, resolved  - encouraged PO  - Limit Tylenol use  - avoid hepatotoxins  - monitor on routine labs    CAD  HTN  HLD  - Metoprolol 25mg daily   - Crestor 5mg HS    Anemia  MARLENA  - s/p 2 units of PRBCs given intra-op  - Monitor Hgb (3/31 Hgb 8.3)  - Transfuse to keep hb >7    Hypothyroidism  - Synthroid 150mcg daily  - ordered TSH in am for constipation    sacral Decub stage 3  - wound care cx noted  - Continue turning patient Q 2 hours   - Lac hydrin BID BLE  - Aquaphor BID to generalized dry skin     BPH, chronic  Urinary retention likely augmented by degree of anasarca  - Flomax 8mg daily  - Monitor U/O  - Treat constipation  - encourage PO hydration    Acute DVT:   -  US doppler + L soleal and gastroc DVT   - s/p IVC filter 3/17 with interventional cards.   - Could not tolerate VQ scan for r/o PE 2/2 pain  - Restarted Eliquis 5mg BID per neurosurgery    DVT-P: Eliquis    will follow,

## 2025-04-18 NOTE — CONSULT NOTE ADULT - PROBLEM SELECTOR RECOMMENDATION 9
- Last abdominal Xray showed mildly increased fecal content in the colon but no sign of bowel obstruction.  - Promote motility by encouraging turn and position while in bed   - Miralax BID  - No lactulose - gas producing  - Encourage patient to pass flatus   - Consider non gas producing foods  - Avoid opioids for pain management - Last abdominal Xray showed mildly increased fecal content in the colon but no sign of bowel obstruction.  - Promote motility by encouraging turn and position while in bed   - Miralax BID  - Avoid lactulose - gas producing  - Encourage patient to pass flatus   - Consider non gas producing foods  - Avoid opioids for pain management

## 2025-04-18 NOTE — CONSULT NOTE ADULT - ASSESSMENT
74 year old seen OOB to chair in Rehab with complains of abdominal distention and constipation a few days ago despite bowel regimen on [place. Las t BM< shot on 4/18, passing gas and improve distention.    Educated patient on Miralax  mechanism of action, patient refuses it at time worried he will have an accident during therapy   Patient agree he will take Miralax possible after therapy  Patient worries when passing gas he will have an accident   Please do not add Lactulose to bowel regime, due to gas [roducing possibly contributing to increase abdominal distention 74 year old seen OOB to chair in Rehab with complains of abdominal distention and constipation a few days ago despite bowel regimen on [place. Las t BM< shot on 4/18, passing gas and improve distention.    Educated patient on Miralax  mechanism of action, patient refuses it at time worried he will have an accident during therapy   Patient agree he will take Miralax possible after therapy  Patient worries when passing gas he will have an involuntary bowel movement  Please do not add Lactulose to bowel regimen, due to gas producing possibly contributing to increase abdominal distention

## 2025-04-18 NOTE — PROGRESS NOTE ADULT - SUBJECTIVE AND OBJECTIVE BOX
Medicine Progress Note    Patient is a 75y old  Male who presents with a chief complaint of Thoracic epidural abscess s/p laminectomy and fusion surgery (18 Apr 2025 11:42)      SUBJECTIVE / OVERNIGHT EVENTS:  no complaints except gas and bloating. moved BM once yesterday and was ready for enema when i saw him  GI saw this am  per RN retention is improving but still getting SC intermittently      ROS:  denied fever/chills/CP/SOB/cough/palpitation/dizziness/abdominal pian/nausea/vomiting/diarrhoea/constipation/dysuria/leg or calf pain/headaches.all other ROS neg    MEDICATIONS  (STANDING):  ammonium lactate 12% Lotion 1 Application(s) Topical two times a day  apixaban 5 milliGRAM(s) Oral every 12 hours  AQUAPHOR (petrolatum Ointment) 1 Application(s) Topical three times a day  bethanechol 10 milliGRAM(s) Oral two times a day  buMETAnide 1 milliGRAM(s) Oral two times a day  cefTRIAXone   IVPB 2000 milliGRAM(s) IV Intermittent every 24 hours  chlorhexidine 4% Liquid 1 Application(s) Topical <User Schedule>  collagenase Ointment 1 Application(s) Topical daily  DAPTOmycin IVPB 650 milliGRAM(s) IV Intermittent every 24 hours  docusate sodium Enema 283 milliGRAM(s) Rectal <User Schedule>  gabapentin 100 milliGRAM(s) Oral three times a day  gabapentin   Oral   levothyroxine 150 MICROGram(s) Oral daily  magnesium hydroxide Suspension 30 milliLiter(s) Oral at bedtime  metoprolol succinate ER 25 milliGRAM(s) Oral daily  pantoprazole    Tablet 40 milliGRAM(s) Oral before breakfast  polyethylene glycol 3350 17 Gram(s) Oral two times a day  rosuvastatin 5 milliGRAM(s) Oral at bedtime  senna 2 Tablet(s) Oral at bedtime  tamsulosin 0.8 milliGRAM(s) Oral at bedtime    MEDICATIONS  (PRN):  aluminum hydroxide/magnesium hydroxide/simethicone Suspension 30 milliLiter(s) Oral every 4 hours PRN Dyspepsia  cyclobenzaprine 5 milliGRAM(s) Oral three times a day PRN Muscle Spasm  melatonin 3 milliGRAM(s) Oral at bedtime PRN Insomnia  oxyCODONE    IR 15 milliGRAM(s) Oral every 6 hours PRN Moderate Pain (4 - 6)  simethicone 80 milliGRAM(s) Chew three times a day PRN Gas  sodium chloride 0.9% lock flush 10 milliLiter(s) IV Push every 1 hour PRN Pre/post blood products, medications, blood draw, and to maintain line patency    CAPILLARY BLOOD GLUCOSE        I&O's Summary    17 Apr 2025 07:01  -  18 Apr 2025 07:00  --------------------------------------------------------  IN: 0 mL / OUT: 600 mL / NET: -600 mL        PHYSICAL EXAM:  Vital Signs Last 24 Hrs  T(C): 36.3 (18 Apr 2025 07:29), Max: 36.6 (17 Apr 2025 23:00)  T(F): 97.4 (18 Apr 2025 07:29), Max: 97.9 (17 Apr 2025 23:00)  HR: 77 (18 Apr 2025 07:29) (73 - 87)  BP: 100/62 (18 Apr 2025 07:29) (100/62 - 138/72)  BP(mean): --  RR: 14 (18 Apr 2025 07:29) (14 - 16)  SpO2: 95% (18 Apr 2025 07:29) (94% - 95%)    Parameters below as of 18 Apr 2025 07:29  Patient On (Oxygen Delivery Method): room air      GENERAL: Not in distress. Alert    HEENT: clear conjuctiva, MMM. no pallor or icterus  CARDIOVASCULAR: RRR S1, S2. No murmur/rubs/gallop  LUNGS: BLAE+, no rales, no wheezing, no rhonchi.    ABDOMEN: distended, form, bloated with gas, tympanic. NT, no guarding / rebound / rigidity. BS normoactive  BACK: No spine tenderness.  EXTREMITIES: b/l LE edema. no leg or calf TP.  SKIN: warm and dry  PSYCHIATRIC: Calm.  No agitation.  CNS: AAO*3. moves limbs, follows commands. b/l UE and LE weakness        LABS:                        10.5   8.22  )-----------( 247      ( 17 Apr 2025 07:35 )             33.1     04-17    141  |  101  |  30[H]  ----------------------------<  100[H]  3.6   |  33[H]  |  1.09    Ca    9.4      17 Apr 2025 07:35    TPro  6.8  /  Alb  3.1[L]  /  TBili  0.6  /  DBili  x   /  AST  30  /  ALT  38  /  AlkPhos  77  04-17    Urinalysis Basic - ( 17 Apr 2025 07:35 )    Color: x / Appearance: x / SG: x / pH: x  Gluc: 100 mg/dL / Ketone: x  / Bili: x / Urobili: x   Blood: x / Protein: x / Nitrite: x   Leuk Esterase: x / RBC: x / WBC x   Sq Epi: x / Non Sq Epi: x / Bacteria: x      COVID-19 PCR: [place] (25 Mar 2025 21:35)  SARS-CoV-2: [place] (17 Feb 2025 21:11)      RADIOLOGY & ADDITIONAL TESTS:  Imaging from Last 24 Hours:    Electrocardiogram/QTc Interval:    COORDINATION OF CARE:  Care Discussed with Consultants/Other Providers:

## 2025-04-18 NOTE — CONSULT NOTE ADULT - NS ATTEND AMEND GEN_ALL_CORE FT
Agree with the assessment and plan of DAVE Chavez.    I certify that the total time spent on this consultation encounter includes reviewing medical records, obtaining history, performing examination, ordering tests, counseling, and documentation. Time spent = 75 minutes.

## 2025-04-18 NOTE — PROGRESS NOTE ADULT - SUBJECTIVE AND OBJECTIVE BOX
HPI:  Mr. Rishabh Shaver is a 74-year-old male patient with past medical history of benign prostatic hyperplasia, diverticulosis, Graves' disease, hemorrhoids, hyperlipidemia, hypothyroidism, osteoarthritis, spinal stenosis, spinal cord injury (2004), and multiple spinal surgeries (three cervical and one lumbar), who presented to [facility] on 3/4/25 from [facility] due to lower extremity swelling and abnormal BUN/creatinine levels for further evaluation and management. The patient was recently admitted to [facility] from 02/18/25 through 02/25/25 for sepsis secondary to epidural abscess. He had a peripherally inserted central catheter line in the right arm and was on nightly Ceftriaxone treatment until 04/16/25. At the ED, he was found to have GOVIND, a left soleal vein, and a left gastrocnemius DVT. He was started on heparin gtt. Hospital course complicated by urinary retention requiring Ortega catheter insertion, rhabdomyolysis, elevated LFTs, anasarca, leukocytosis due to thoracic spine discitis OM and early paraspinal abscess, as well as new onset RUE paresthesias and weakness. He is s/p IVC filter placement on 3/17 and was recommended surgical management. He is s/p posterior thoracic laminectomy and fusion on 3/18/25 with Dr. Keith Cantu. Surgical culture with rare staph epi. Recommendations by ID to continue IV Rocephin and Daptomycin through 5/2/25. Patient was evaluated by PM&R and therapy for functional deficits, gait/ADL impairments and acute rehabilitation was recommended. Patient was cleared for discharge to [facility] IRF on 3/25/25. (25 Mar 2025 17:09)    TDD 4/25/25 home  ___________________________________________________________________________    SUBJECTIVE/ROS  Patient was seen and evaluated at bedside today.  Reported no overnight events and is in no acute distress.  He reports mild improvement in his bloating/gas, but still has abdomen discomfort. GI has not been by to see him again yet.  Currently getting a mini enema; LBM 2 days ago.  Urinating well but did require 1 straight cath this morning for PVR ~400; still with some mild hematuria but no pain or symptoms.  He inquires about Gemtesa, which he had been taking PTA, discussed possible resumption down the line but currently would not be indicated given urinary retention.  Reports he slept well. No pain.  Underwent casting for b/l AFOs yesterday with Arya.   ___________________________________________________________________________    LABS             10.5   8.22  )-----------( 247      ( 17 Apr 2025 07:35 )             33.1     141  |  101  |  30[H]  ----------------------------<  100[H]  3.6   |  33[H]  |  1.09    Ca    9.4      17 Apr 2025 07:35  TPro  6.8  /  Alb  3.1[L]  /  TBili  0.6  /  DBili  x   /  AST  30  /  ALT  38  /  AlkPhos  77  04-17    ___________________________________________________________________________    MEDICATIONS  (STANDING):  ammonium lactate 12% Lotion 1 Application(s) Topical two times a day  apixaban 5 milliGRAM(s) Oral every 12 hours  AQUAPHOR (petrolatum Ointment) 1 Application(s) Topical three times a day  bethanechol 10 milliGRAM(s) Oral two times a day  buMETAnide 1 milliGRAM(s) Oral two times a day  cefTRIAXone   IVPB 2000 milliGRAM(s) IV Intermittent every 24 hours  chlorhexidine 4% Liquid 1 Application(s) Topical <User Schedule>  collagenase Ointment 1 Application(s) Topical daily  DAPTOmycin IVPB 650 milliGRAM(s) IV Intermittent every 24 hours  docusate sodium Enema 283 milliGRAM(s) Rectal <User Schedule>  gabapentin 100 milliGRAM(s) Oral three times a day  gabapentin   Oral   levothyroxine 150 MICROGram(s) Oral daily  magnesium hydroxide Suspension 30 milliLiter(s) Oral at bedtime  metoprolol succinate ER 25 milliGRAM(s) Oral daily  pantoprazole    Tablet 40 milliGRAM(s) Oral before breakfast  polyethylene glycol 3350 17 Gram(s) Oral two times a day  rosuvastatin 5 milliGRAM(s) Oral at bedtime  senna 2 Tablet(s) Oral at bedtime  tamsulosin 0.8 milliGRAM(s) Oral at bedtime    MEDICATIONS  (PRN):  aluminum hydroxide/magnesium hydroxide/simethicone Suspension 30 milliLiter(s) Oral every 4 hours PRN Dyspepsia  cyclobenzaprine 5 milliGRAM(s) Oral three times a day PRN Muscle Spasm  melatonin 3 milliGRAM(s) Oral at bedtime PRN Insomnia  oxyCODONE    IR 15 milliGRAM(s) Oral every 6 hours PRN Moderate Pain (4 - 6)  simethicone 80 milliGRAM(s) Chew three times a day PRN Gas  sodium chloride 0.9% lock flush 10 milliLiter(s) IV Push every 1 hour PRN Pre/post blood products, medications, blood draw, and to maintain line patency    ___________________________________________________________________________    PHYSICAL EXAM:    VITALS  T(C): 36.3 (04-18-25 @ 07:29), Max: 36.6 (04-17-25 @ 23:00)  HR: 77 (04-18-25 @ 07:29) (73 - 87)  BP: 100/62 (04-18-25 @ 07:29) (100/62 - 138/72)  RR: 14 (04-18-25 @ 07:29) (14 - 16)  SpO2: 95% (04-18-25 @ 07:29) (94% - 95%)    Constitutional - NAD, Comfortable  Chest - good chest expansion, good respiratory effort  Cardio - warm and well perfused  Abdomen -  distended, nontender, softer than previous days  Neurologic Exam:                           Orientation: Awake, A&O to self, place, date, year, situation     Speech - Fluent, Comprehensible, No dysarthria, No aphasia      Motor -  stable exam  Psychiatric - Mood stable, Affect WNL    ___________________________________________________________________________ HPI:  Mr. Rishabh Shaver is a 74-year-old male patient with past medical history of benign prostatic hyperplasia, diverticulosis, Graves' disease, hemorrhoids, hyperlipidemia, hypothyroidism, osteoarthritis, spinal stenosis, spinal cord injury (2004), and multiple spinal surgeries (three cervical and one lumbar), who presented to [facility] on 3/4/25 from [facility] ([facility] due to lower extremity swelling and abnormal BUN/creatinine levels for further evaluation and management. The patient was recently admitted to [facility] from 02/18/25 through 02/25/25 for sepsis secondary to epidural abscess. He had a peripherally inserted central catheter line in the right arm and was on nightly Ceftriaxone treatment until 04/16/25. At the ED, he was found to have GOVIND, a left soleal vein, and a left gastrocnemius DVT. He was started on heparin gtt. Hospital course complicated by urinary retention requiring Ortega catheter insertion, rhabdomyolysis, elevated LFTs, anasarca, leukocytosis due to thoracic spine discitis OM and early paraspinal abscess, as well as new onset RUE paresthesias and weakness. He is s/p IVC filter placement on 3/17 and was recommended surgical management. He is s/p posterior thoracic laminectomy and fusion on 3/18/25 with Dr. Keith Cantu. Surgical culture with rare staph epi. Recommendations by ID to continue IV Rocephin and Daptomycin through 5/2/25. Patient was evaluated by PM&R and therapy for functional deficits, gait/ADL impairments and acute rehabilitation was recommended. Patient was cleared for discharge to [facility] IRF on 3/25/25.     TDD 4/25/25 home  ___________________________________________________________________________    SUBJECTIVE/ROS  Patient was seen and evaluated at bedside today.  Reported no overnight events and is in no acute distress.  He reports mild improvement in his bloating/gas, but still has abdomen discomfort. GI has not been by to see him again yet.  Currently getting a mini enema; LBM 2 days ago.  Urinating well but did require 1 straight cath this morning for PVR ~400; still with some mild hematuria but no pain or symptoms.  He inquires about Gemtesa, which he had been taking PTA, discussed possible resumption down the line but currently would not be indicated given urinary retention.  Reports he slept well. No pain.  Underwent casting for b/l AFOs yesterday with Arya.   ___________________________________________________________________________    LABS               10.5   8.22  )-----------( 247      ( 17 Apr 2025 07:35 )             33.1     141  |  101  |  30[H]  ----------------------------<  100[H]  3.6   |  33[H]  |  1.09    Ca    9.4      17 Apr 2025 07:35  TPro  6.8  /  Alb  3.1[L]  /  TBili  0.6  /  DBili  x   /  AST  30  /  ALT  38  /  AlkPhos  77  04-17    ___________________________________________________________________________    MEDICATIONS  (STANDING):  ammonium lactate 12% Lotion 1 Application(s) Topical two times a day  apixaban 5 milliGRAM(s) Oral every 12 hours  AQUAPHOR (petrolatum Ointment) 1 Application(s) Topical three times a day  bethanechol 10 milliGRAM(s) Oral two times a day  buMETAnide 1 milliGRAM(s) Oral two times a day  cefTRIAXone   IVPB 2000 milliGRAM(s) IV Intermittent every 24 hours  chlorhexidine 4% Liquid 1 Application(s) Topical <User Schedule>  collagenase Ointment 1 Application(s) Topical daily  DAPTOmycin IVPB 650 milliGRAM(s) IV Intermittent every 24 hours  docusate sodium Enema 283 milliGRAM(s) Rectal <User Schedule>  gabapentin 100 milliGRAM(s) Oral three times a day  gabapentin   Oral   levothyroxine 150 MICROGram(s) Oral daily  magnesium hydroxide Suspension 30 milliLiter(s) Oral at bedtime  metoprolol succinate ER 25 milliGRAM(s) Oral daily  pantoprazole    Tablet 40 milliGRAM(s) Oral before breakfast  polyethylene glycol 3350 17 Gram(s) Oral two times a day  rosuvastatin 5 milliGRAM(s) Oral at bedtime  senna 2 Tablet(s) Oral at bedtime  tamsulosin 0.8 milliGRAM(s) Oral at bedtime    MEDICATIONS  (PRN):  aluminum hydroxide/magnesium hydroxide/simethicone Suspension 30 milliLiter(s) Oral every 4 hours PRN Dyspepsia  cyclobenzaprine 5 milliGRAM(s) Oral three times a day PRN Muscle Spasm  melatonin 3 milliGRAM(s) Oral at bedtime PRN Insomnia  oxyCODONE    IR 15 milliGRAM(s) Oral every 6 hours PRN Moderate Pain (4 - 6)  simethicone 80 milliGRAM(s) Chew three times a day PRN Gas  sodium chloride 0.9% lock flush 10 milliLiter(s) IV Push every 1 hour PRN Pre/post blood products, medications, blood draw, and to maintain line patency    ___________________________________________________________________________    PHYSICAL EXAM:  VITALS  T(C): 36.3 (04-18-25 @ 07:29), Max: 36.6 (04-17-25 @ 23:00)  HR: 77 (04-18-25 @ 07:29) (73 - 87)  BP: 100/62 (04-18-25 @ 07:29) (100/62 - 138/72)  RR: 14 (04-18-25 @ 07:29) (14 - 16)  SpO2: 95% (04-18-25 @ 07:29) (94% - 95%)    Constitutional - NAD, Comfortable  Chest - good chest expansion, good respiratory effort  Cardio - warm and well perfused  Abdomen -  distended, nontender, softer than previous days  Neurologic Exam:                           Orientation: Awake, A&O to self, place, date, year, situation     Speech - Fluent, Comprehensible, No dysarthria, No aphasia      Motor -  stable exam  Psychiatric - Mood stable, Affect WNL    ___________________________________________________________________________

## 2025-04-19 LAB
ANION GAP SERPL CALC-SCNC: 7 MMOL/L — SIGNIFICANT CHANGE UP (ref 5–17)
BUN SERPL-MCNC: 30 MG/DL — HIGH (ref 7–23)
CALCIUM SERPL-MCNC: 9.1 MG/DL — SIGNIFICANT CHANGE UP (ref 8.4–10.5)
CHLORIDE SERPL-SCNC: 101 MMOL/L — SIGNIFICANT CHANGE UP (ref 96–108)
CO2 SERPL-SCNC: 33 MMOL/L — HIGH (ref 22–31)
CREAT SERPL-MCNC: 1 MG/DL — SIGNIFICANT CHANGE UP (ref 0.5–1.3)
EGFR: 79 ML/MIN/1.73M2 — SIGNIFICANT CHANGE UP
EGFR: 79 ML/MIN/1.73M2 — SIGNIFICANT CHANGE UP
GLUCOSE SERPL-MCNC: 106 MG/DL — HIGH (ref 70–99)
MAGNESIUM SERPL-MCNC: 2 MG/DL — SIGNIFICANT CHANGE UP (ref 1.6–2.6)
POTASSIUM SERPL-MCNC: 3.7 MMOL/L — SIGNIFICANT CHANGE UP (ref 3.5–5.3)
POTASSIUM SERPL-SCNC: 3.7 MMOL/L — SIGNIFICANT CHANGE UP (ref 3.5–5.3)
SODIUM SERPL-SCNC: 141 MMOL/L — SIGNIFICANT CHANGE UP (ref 135–145)
TSH SERPL-MCNC: 6.86 UIU/ML — HIGH (ref 0.36–3.74)

## 2025-04-19 PROCEDURE — 99232 SBSQ HOSP IP/OBS MODERATE 35: CPT | Mod: GC

## 2025-04-19 PROCEDURE — 99232 SBSQ HOSP IP/OBS MODERATE 35: CPT

## 2025-04-19 RX ADMIN — BUMETANIDE 1 MILLIGRAM(S): 1 TABLET ORAL at 13:46

## 2025-04-19 RX ADMIN — Medication 1 APPLICATION(S): at 06:38

## 2025-04-19 RX ADMIN — Medication 10 MILLIGRAM(S): at 06:30

## 2025-04-19 RX ADMIN — Medication 150 MICROGRAM(S): at 06:30

## 2025-04-19 RX ADMIN — POLYETHYLENE GLYCOL 3350 17 GRAM(S): 17 POWDER, FOR SOLUTION ORAL at 06:31

## 2025-04-19 RX ADMIN — GABAPENTIN 100 MILLIGRAM(S): 400 CAPSULE ORAL at 13:46

## 2025-04-19 RX ADMIN — METOPROLOL SUCCINATE 25 MILLIGRAM(S): 50 TABLET, EXTENDED RELEASE ORAL at 06:29

## 2025-04-19 RX ADMIN — APIXABAN 5 MILLIGRAM(S): 2.5 TABLET, FILM COATED ORAL at 12:04

## 2025-04-19 RX ADMIN — ROSUVASTATIN CALCIUM 5 MILLIGRAM(S): 20 TABLET, FILM COATED ORAL at 22:50

## 2025-04-19 RX ADMIN — Medication 10 MILLIGRAM(S): at 17:49

## 2025-04-19 RX ADMIN — APIXABAN 5 MILLIGRAM(S): 2.5 TABLET, FILM COATED ORAL at 22:50

## 2025-04-19 RX ADMIN — GABAPENTIN 100 MILLIGRAM(S): 400 CAPSULE ORAL at 22:50

## 2025-04-19 RX ADMIN — Medication 1 APPLICATION(S): at 06:31

## 2025-04-19 RX ADMIN — CEFTRIAXONE 100 MILLIGRAM(S): 500 INJECTION, POWDER, FOR SOLUTION INTRAMUSCULAR; INTRAVENOUS at 22:51

## 2025-04-19 RX ADMIN — Medication 1 APPLICATION(S): at 06:46

## 2025-04-19 RX ADMIN — GABAPENTIN 100 MILLIGRAM(S): 400 CAPSULE ORAL at 06:29

## 2025-04-19 RX ADMIN — WHITE PETROLATUM 1 APPLICATION(S): 1 OINTMENT TOPICAL at 13:47

## 2025-04-19 RX ADMIN — Medication 283 MILLIGRAM(S): at 09:48

## 2025-04-19 RX ADMIN — TAMSULOSIN HYDROCHLORIDE 0.8 MILLIGRAM(S): 0.4 CAPSULE ORAL at 22:50

## 2025-04-19 RX ADMIN — Medication 1 APPLICATION(S): at 17:50

## 2025-04-19 RX ADMIN — MAGNESIUM HYDROXIDE 30 MILLILITER(S): 400 SUSPENSION ORAL at 22:51

## 2025-04-19 RX ADMIN — DAPTOMYCIN 126 MILLIGRAM(S): 500 INJECTION, POWDER, LYOPHILIZED, FOR SOLUTION INTRAVENOUS at 12:04

## 2025-04-19 RX ADMIN — BUMETANIDE 1 MILLIGRAM(S): 1 TABLET ORAL at 06:30

## 2025-04-19 RX ADMIN — WHITE PETROLATUM 1 APPLICATION(S): 1 OINTMENT TOPICAL at 06:31

## 2025-04-19 RX ADMIN — Medication 2 TABLET(S): at 22:50

## 2025-04-19 RX ADMIN — Medication 40 MILLIGRAM(S): at 06:30

## 2025-04-19 RX ADMIN — WHITE PETROLATUM 1 APPLICATION(S): 1 OINTMENT TOPICAL at 22:51

## 2025-04-19 NOTE — PROGRESS NOTE ADULT - SUBJECTIVE AND OBJECTIVE BOX
HPI:  Mr. Rishabh Shaver is a 74-year-old male patient with past medical history of benign prostatic hyperplasia, diverticulosis, Graves' disease, hemorrhoids, hyperlipidemia, hypothyroidism, osteoarthritis, spinal stenosis, spinal cord injury (2004), and multiple spinal surgeries (three cervical and one lumbar), who presented to [facility] on 3/4/25 from [facility] due to lower extremity swelling and abnormal BUN/creatinine levels for further evaluation and management. The patient was recently admitted to [facility] from 02/18/25 through 02/25/25 for sepsis secondary to epidural abscess. He had a peripherally inserted central catheter line in the right arm and was on nightly Ceftriaxone treatment until 04/16/25. At the ED, he was found to have GOVIND, a left soleal vein, and a left gastrocnemius DVT. He was started on heparin gtt. Hospital course complicated by urinary retention requiring Ortega catheter insertion, rhabdomyolysis, elevated LFTs, anasarca, leukocytosis due to thoracic spine discitis OM and early paraspinal abscess, as well as new onset RUE paresthesias and weakness. He is s/p IVC filter placement on 3/17 and was recommended surgical management. He is s/p posterior thoracic laminectomy and fusion on 3/18/25 with Dr. Keith Cantu. Surgical culture with rare staph epi. Recommendations by ID to continue IV Rocephin and Daptomycin through 5/2/25. Patient was evaluated by PM&R and therapy for functional deficits, gait/ADL impairments and acute rehabilitation was recommended. Patient was cleared for discharge to [facility] on 3/25/25.     TDD 4/25/25 home  ___________________________________________________________________________    SUBJECTIVE/ROS  Patient was seen and evaluated at bedside, NAD  Reported no overnight events   Sleeping well at night  Bloating is better after the BM   Currently getting a mini enema; LBM (04/18)  Urinating well but did require 1 straight cath this morning for PVR ~400; still with some mild hematuria but no pain or symptoms.  He inquires about Gemtesa, which he had been taking PTA, discussed possible resumption down the line but currently would not be indicated given urinary retention.  No pain.  Underwent casting for b/l AFOs yesterday with Arya.   ___________________________________________________________________________    LABS               10.5   8.22  )-----------( 247      ( 17 Apr 2025 07:35 )             33.1     141  |  101  |  30[H]  ----------------------------<  100[H]  3.6   |  33[H]  |  1.09    Ca    9.4      17 Apr 2025 07:35  TPro  6.8  /  Alb  3.1[L]  /  TBili  0.6  /  DBili  x   /  AST  30  /  ALT  38  /  AlkPhos  77  04-17    ___________________________________________________________________________    MEDICATIONS  (STANDING):  ammonium lactate 12% Lotion 1 Application(s) Topical two times a day  apixaban 5 milliGRAM(s) Oral every 12 hours  AQUAPHOR (petrolatum Ointment) 1 Application(s) Topical three times a day  bethanechol 10 milliGRAM(s) Oral two times a day  buMETAnide 1 milliGRAM(s) Oral two times a day  cefTRIAXone   IVPB 2000 milliGRAM(s) IV Intermittent every 24 hours  chlorhexidine 4% Liquid 1 Application(s) Topical <User Schedule>  collagenase Ointment 1 Application(s) Topical daily  DAPTOmycin IVPB 650 milliGRAM(s) IV Intermittent every 24 hours  docusate sodium Enema 283 milliGRAM(s) Rectal <User Schedule>  gabapentin 100 milliGRAM(s) Oral three times a day  gabapentin   Oral   levothyroxine 150 MICROGram(s) Oral daily  magnesium hydroxide Suspension 30 milliLiter(s) Oral at bedtime  metoprolol succinate ER 25 milliGRAM(s) Oral daily  pantoprazole    Tablet 40 milliGRAM(s) Oral before breakfast  polyethylene glycol 3350 17 Gram(s) Oral two times a day  rosuvastatin 5 milliGRAM(s) Oral at bedtime  senna 2 Tablet(s) Oral at bedtime  tamsulosin 0.8 milliGRAM(s) Oral at bedtime    MEDICATIONS  (PRN):  aluminum hydroxide/magnesium hydroxide/simethicone Suspension 30 milliLiter(s) Oral every 4 hours PRN Dyspepsia  cyclobenzaprine 5 milliGRAM(s) Oral three times a day PRN Muscle Spasm  melatonin 3 milliGRAM(s) Oral at bedtime PRN Insomnia  oxyCODONE    IR 15 milliGRAM(s) Oral every 6 hours PRN Moderate Pain (4 - 6)  simethicone 80 milliGRAM(s) Chew three times a day PRN Gas  sodium chloride 0.9% lock flush 10 milliLiter(s) IV Push every 1 hour PRN Pre/post blood products, medications, blood draw, and to maintain line patency    ___________________________________________________________________________    PHYSICAL EXAM:  Vital Signs Last 24 Hrs  T(C): 36.8 (19 Apr 2025 08:05), Max: 36.9 (18 Apr 2025 23:15)  T(F): 98.2 (19 Apr 2025 08:05), Max: 98.4 (18 Apr 2025 23:15)  HR: 81 (19 Apr 2025 08:05) (79 - 86)  BP: 105/67 (19 Apr 2025 08:05) (105/67 - 130/71)  RR: 16 (19 Apr 2025 08:05) (16 - 16)  SpO2: 94% (19 Apr 2025 08:05) (94% - 95%)  Parameters below as of 19 Apr 2025 08:05  Patient On (Oxygen Delivery Method): room air        Constitutional - NAD, Comfortable  Chest - good chest expansion, good respiratory effort  Cardio - warm and well perfused  Abdomen -  distended, nontender, softer than previous days  Neurologic Exam:                           Orientation: Awake, A&O to self, place, date, year, situation     Speech - Fluent, Comprehensible, No dysarthria, No aphasia      Motor -  stable exam  Psychiatric - Mood stable, Affect WNL    ___________________________________________________________________________

## 2025-04-19 NOTE — PROGRESS NOTE ADULT - ASSESSMENT
Assessment/Plan:  Mr. Rishabh Shaver is a 74-year-old man with past medical history of benign prostatic hyperplasia, diverticulosis, Graves' disease, hemorrhoids, hyperlipidemia, hypothyroidism, osteoarthritis, spinal stenosis, spinal cord injury (2004), and multiple spinal surgeries (three cervical and one lumbar), who is admitted for Acute Inpatient Rehabilitation with a multidisciplinary rehab program at [facility] with functional impairments in ADLs and mobility secondary to a thoracic epidural abscess s/p posterior thoracic laminectomy and fusion on 3/18/25 with Dr. Keith Cantu.       #Thoracic epidural abscess s/p laminectomy and fusion surgery      * C8 AIS D Incomplete paraplegia      *  brace when OOB      * Flexeril 5mg PO TID PRN for neck soreness      * Staples removed on POD #14 (4/1/25)      * Sacral wound assessment by wound/skin team      * urinary retention, now voiding,  c/w Flomax 0.8mg PO daily      * Right Venodyne only due to left calf DVT (s/p pre-op IVC filter)   - Activity Limitations: Decreased social, vocational and leisure activities, decreased self care and ADLs, decreased mobility, decreased ability to manage household and finances  - Comprehensive Multidisciplinary Rehab Program:      * 3 hours a day, 5 days a week      * PT 2hr/day: Focused on improving strength, endurance, coordination, balance, functional mobility, and transfers      * OT 1hr/day: Focused on improving strength, fine motor skills, coordination, posture and ADLs  - orthotist Arya saw patient 4/17 for AFO for right foot drop and recommends b/l AFOs hinged on R and semi solid on L - order placed    -----------------------------------------------------------------------------------    Concurrent Medical Problems    #GOVIND on CKD (resolved)  #LE edema  #Hypoalbuminemia  #Proteinuria  - IVF as needed with fluid balance  - Significant debilitating anasarca   - Trial of albumin Lasix started 3/8->improvement  - s/p IV Lasix/IV albumin BID (3/9)  - Change to Bumex/Albumin (3/11)-> good effect  - Switched to PO Bumex 1mg BID 3/24  - Trend Albumin (4/17 - 3.1)  - Trend Protein total (4/17 - 6.8); Total protein, Random Urine (3/18- 24)  - Trend CMP (4/17 - Cr 1.09/ BUN 33)    #L gastrocnemius and L soleal DVT  - appears provoked from decrease ambulation from recent spinal abscess  - Could not tolerate VQ scan for r/o PE 2/2 pain  - Eliquis 5mg PO q12h - will be new med on discharge (of note patient completed Eliquis load)   - Therapeutic Lovenox (3/12) in the setting of possible spine intervention  - Last dose of AC night of 3/16. Restarted Eliquis 5mg BID 4/5/25  - S/P IVC filter placement 3/17  - repeat US 4/8 with stable L soleal DVT and no propagation     #Spinal epidural abscess POA and already being treated with IV antibiotics with suspected worsening of disease  - S/p thoracic laminectomy and fusion 3/18. Benkelman  brace   - Surgical culture with staph epi possible contaminant but given clinical presentation on admission, IV Dapto added  - IV Rocephin 2g daily via PICC line thru 5/2/25  - IV Daptomycin 650mg daily via PICC line thru 5/2/25  - gabapentin 100mg PO TID  - d/c long-acting Oxycontin 4/11  - oxycodone 15mg PO q6h PRN moderate pain  - Flexeril 5mg PO TID PRN for muscle spasm  - staples d/teodoro in rehab    #Abdominal discomfort and bloating  - Abd xray 4/15--as above  - Reconsulted GI (04/18) completed:  ·  Problem: Abdominal distention.   ·  Recommendation: - Last abdominal Xray showed mildly increased fecal content in the colon but no sign of bowel obstruction.  - Promote motility by encouraging turn and position while in bed   - Miralax BID  - Avoid lactulose - gas producing  - Encourage patient to pass flatus   - Consider non gas producing foods  - Avoid opioids for pain management.    #Mild Rhabdomyolysis (resolved)  - associated elevated transaminases (resolved)  - suspect from anasarca/edema   - improving with adequate diuresis of edema  - associated elevated transaminase; now resolved (4/17- AST 30/ALT 38)    #Leukocytosis (resolved)  - likely rx to DVT + Discitis  - no other signs of sepsis, afebrile   - trend CBC and monitor fever curve (4/17- WBC 8.22)    #Hypothyroidism  - Synthroid 150mcg PO daily    #CAD  #HTN  #HLD  - metoprolol succinate 25mg PO daily   - rosuvastatin 5mg PO qhs    #Anemia/MARLENA  - trend H/H (4/17- 10.5/33.1)  - transfuse if Hgb <7 PRN     #Sleep:  - melatonin 3mg PO qhs PRN for sleep    #GI/Bowel:  - senna 2 tablets PO qhs  - Miralax 17g PO daily--> (04/18) changed to BID   - daily mini-enema at 6AM  - lactulose 20g PO TID PRN for constipation--> D/c (04/19)   - Maalox 30mL PO q4h PRN for dyspepsia  - GI ppx: pantoprazole 40mg PO daily   - added simethicone 80mg PO TID PRN for gas 4/14    #BPH  #Urinary retention likely augmented by degree of anasarca  - S/P Ortega; TOV  ->failed. Ortega re-inserted 3/6->fell out without knowing with associated hematuria->recurrent retention 3/7->Ortega re-inserted (3/7) --> successful TOV 3/25  - Flomax 0.8mg PO qhs  - started bethanechol 10mg PO BID 4/6  - Monitor U/O    #Skin/Pressure Injury:   Skin assessment on admission:   - Generalized dry flaky skin  - 19cm posterior cervical incision with 32 staples and 2 steri strips  - Left buttock DTI pressure injury measuring 14 x 9 cm      * appearing as a dark purple discoloration of intact skin, with a central superficial open area measuring 6 x 2 cm      * at the periphery of the 14 x 9 cm area, the discoloration is somewhat lighter      * dark red non-blanchable erythema      * eschar resolved     **Santyl to slough of sacrum daily**  - Bilateral lower extremity edema  - Healed lumbar surgical incision scar  - Right heel blanchable redness  - Bilateral dry cracked heels and plantar surface  - Lac hydrin BID BLE  - Aquaphor BID to generalized dry skin   - Appreciate wound care consult     #Diet:  - Current Diet: Regular  - Aspiration Precautions  - Nutrition consult     #Patient Education  - Education provided on the following:      * Admitting diagnosis and functional implications      * Functional goals      * Bladder management      * Bowel management      * Skin care management      * Intensity of service and scheduling of rehab disciplines      * Plan of care and role of interdisciplinary team conference in discharge planning      * Reconciliation of medications from prior institution    #Precautions / PROPHYLAXIS:   - Falls, Spinal  - Ortho: Weight bearing status: FWB;  brace OOB  - DVT ppx: Eliquis 5mg PO BID, TEDs  - Pressure injury/Skin: Turn Q2hrs while in bed, OOB to Chair, PT/OT      ---------------  Outpatient Follow-up:    Yue Wiggins  [facility]  [address], [address]  [place] [address]  Phone: [phone]  Fax: [phone]  Established Patient  Follow Up Time: 1 week    Keith Cantu Chi  Neurosurgery  [address] [place] [address], [address]  [place] [address]  Phone: [phone]  Fax: [phone]  Established Patient  Follow Up Time: 2 weeks    Luis A Brennan  [facility]  CARDIOLOGY [address]  Scheduled Appointment: 04/23/2025    --------------

## 2025-04-19 NOTE — PROGRESS NOTE ADULT - SUBJECTIVE AND OBJECTIVE BOX
Patient seen and examined at bedside. persistent numbness in left thigh. ongoing since hospitalization.    ALLERGIES:  No Known Allergies    MEDICATIONS  (STANDING):  ammonium lactate 12% Lotion 1 Application(s) Topical two times a day  apixaban 5 milliGRAM(s) Oral every 12 hours  AQUAPHOR (petrolatum Ointment) 1 Application(s) Topical three times a day  bethanechol 10 milliGRAM(s) Oral two times a day  buMETAnide 1 milliGRAM(s) Oral two times a day  cefTRIAXone   IVPB 2000 milliGRAM(s) IV Intermittent every 24 hours  chlorhexidine 4% Liquid 1 Application(s) Topical <User Schedule>  collagenase Ointment 1 Application(s) Topical daily  DAPTOmycin IVPB 650 milliGRAM(s) IV Intermittent every 24 hours  docusate sodium Enema 283 milliGRAM(s) Rectal <User Schedule>  gabapentin 100 milliGRAM(s) Oral three times a day  gabapentin   Oral   levothyroxine 150 MICROGram(s) Oral daily  magnesium hydroxide Suspension 30 milliLiter(s) Oral at bedtime  metoprolol succinate ER 25 milliGRAM(s) Oral daily  pantoprazole    Tablet 40 milliGRAM(s) Oral before breakfast  polyethylene glycol 3350 17 Gram(s) Oral two times a day  rosuvastatin 5 milliGRAM(s) Oral at bedtime  senna 2 Tablet(s) Oral at bedtime  tamsulosin 0.8 milliGRAM(s) Oral at bedtime    MEDICATIONS  (PRN):  aluminum hydroxide/magnesium hydroxide/simethicone Suspension 30 milliLiter(s) Oral every 4 hours PRN Dyspepsia  cyclobenzaprine 5 milliGRAM(s) Oral three times a day PRN Muscle Spasm  melatonin 3 milliGRAM(s) Oral at bedtime PRN Insomnia  oxyCODONE    IR 15 milliGRAM(s) Oral every 6 hours PRN Moderate Pain (4 - 6)  simethicone 80 milliGRAM(s) Chew three times a day PRN Gas  sodium chloride 0.9% lock flush 10 milliLiter(s) IV Push every 1 hour PRN Pre/post blood products, medications, blood draw, and to maintain line patency    Vital Signs Last 24 Hrs  T(F): 98.2 (19 Apr 2025 08:05), Max: 98.4 (18 Apr 2025 23:15)  HR: 81 (19 Apr 2025 08:05) (79 - 86)  BP: 105/67 (19 Apr 2025 08:05) (105/67 - 130/71)  RR: 16 (19 Apr 2025 08:05) (16 - 16)  SpO2: 94% (19 Apr 2025 08:05) (94% - 95%)  I&O's Summary    18 Apr 2025 07:01  -  19 Apr 2025 07:00  --------------------------------------------------------  IN: 0 mL / OUT: 350 mL / NET: -350 mL    19 Apr 2025 07:01  -  19 Apr 2025 10:47  --------------------------------------------------------  IN: 0 mL / OUT: 650 mL / NET: -650 mL        PHYSICAL EXAM:    Gen: nad, resting in bed  Neuro: aaox3, left thigh reduced sensation to light touch and pain (chronic)  Heent: no jvd, no oral exudates  Pulm: cta b/l, no w/r/r  CV: +s1s2, no m/r/g  Ab: soft, nt/nd, normoactive bs x 4  Extrem: no edema, pulses intact and equal  Skin: no rashes  Psych: normal    LABS:                        10.5   8.22  )-----------( 247      ( 17 Apr 2025 07:35 )             33.1       04-19    141  |  101  |  30  ----------------------------<  106  3.7   |  33  |  1.00    Ca    9.1      19 Apr 2025 07:05  Mg     2.0     04-19    TPro  6.8  /  Alb  3.1  /  TBili  0.6  /  DBili  x   /  AST  30  /  ALT  38  /  AlkPhos  77  04-17                  TSH 6.857   TSH with FT4 reflex --  Total T3 --                  Urinalysis Basic - ( 19 Apr 2025 07:05 )    Color: x / Appearance: x / SG: x / pH: x  Gluc: 106 mg/dL / Ketone: x  / Bili: x / Urobili: x   Blood: x / Protein: x / Nitrite: x   Leuk Esterase: x / RBC: x / WBC x   Sq Epi: x / Non Sq Epi: x / Bacteria: x        COVID-19 PCR: NotDetec (03-25-25 @ 21:35)      RADIOLOGY & ADDITIONAL TESTS:    Care Discussed with Consultants/Other Providers:

## 2025-04-19 NOTE — PROGRESS NOTE ADULT - ASSESSMENT
74-year-old male patient with h/o BPH, diverticulosis, Graves' disease, hemorrhoids, HLD, hypothyroidism, osteoarthritis, spinal stenosis, spinal cord injury (2004), and multiple spinal surgeries (three cervical and one lumbar), who presented to [facility] on 3/4/25 from [facility] due to lower extremity swelling and abnormal BUN/creatinine levels for further evaluation and management. The patient was recently admitted to [facility] from 02/18/25 through 02/25/25 for sepsis secondary to epidural abscess. He had a peripherally inserted central catheter line in the right arm and was on nightly Ceftriaxone treatment until 04/16/25. At the ED, he was found to have GOVIND, a left soleal vein, and a left gastrocnemius DVT. He was started on heparin gtt. Hospital course complicated by urinary retention requiring Ortega catheter insertion, rhabdomyolysis, elevated LFTs, anasarca, leukocytosis due to thoracic spine discitis OM and early paraspinal abscess, as well as new onset RUE paresthesias and weakness. He is s/p IVC filter placement on 3/17 and was recommended surgical management. He is s/p posterior thoracic laminectomy and fusion on 3/18/25 with Dr. Keith Cantu. Surgical culture with rare staph epi. Recommendations by ID to continue IV Rocephin and Daptomycin through 5/2/25. Patient was evaluated by PM&R and therapy for functional deficits, gait/ADL impairments and acute rehabilitation was recommended. Patient was cleared for discharge to [facility] IRF on 3/25/25. (25 Mar 2025 17:09)    Constipation abdominal  distention and dyspepsia  - on senna, Docusol enema daily since april 2.   - abd xray on 4/2: Moderate to large stool burden. No bowel obstruction.  - Abd xray today 4/15: improvement with residual stool burden  - Miralax BID. Milk of magnesia at h/s. . senna at h.s  - consider to change Ducusol enema daily to mineral oil enema daily at h/s  - c/w maalox PRN, simethicone  - Dced lactulose TID PRN as it can cause bloating  - GI recs appreciated: Miralax BID  - Avoid lactulose - gas producing  - Encourage patient to pass flatus   - Consider non gas producing foods  - Avoid opioids for pain management.  - mobilize      Debility  Thoracic epidural abscess s/p laminectomy and fusion surgery on 3/18  -  brace  - pain control per primary. avoid opioids for constipation  - fall, spine precautions    Spinal epidural abscess   - S/p thoracic laminectomy and fusion 3/18.   - IV Rocephin 2g HS via PICC line thru 5/2/25  - IV Daptomycin 650mg daily via PICC line thru 5/2/2025  - [place]  brace   - Surgical culture with staph epi possible contaminant but given clinical presentation on admission, IV Dapto added.     GOVIND, resolved  LE edema  Hypoalbuminemia  Proteinuria  Anasarca  - c/w Bumex 1 mg BID (4/4 missed the past few doses due to hold parameters.  reordered with hold for SBP <100 instructions).   - Optimize protein intake.   - mobilize, encourage out of bed to chair often.   - Monitor labs closely , stable renal function w good UOP   - Serum workup negative but will need follow up of  Serum  TRUDY positive for weak IgG Lambda band.  - urine protein 200 mg only ( not nephrotic)   - daily standing weights    Transaminitis, resolved  - encouraged PO  - Limit Tylenol use  - avoid hepatotoxins  - monitor on routine labs    CAD  HTN  HLD  - Metoprolol 25mg daily   - Crestor 5mg HS    Anemia  MARLENA  - s/p 2 units of PRBCs given intra-op  - Monitor Hgb (3/31 Hgb 8.3)  - Transfuse to keep hb >7    Hypothyroidism  - Synthroid 150mcg daily  - ordered TSH in am for constipation    sacral Decub stage 3  - wound care cx noted  - Continue turning patient Q 2 hours   - Lac hydrin BID BLE  - Aquaphor BID to generalized dry skin     BPH, chronic  Urinary retention likely augmented by degree of anasarca  - Flomax 8mg daily  - Monitor U/O  - Treat constipation  - encourage PO hydration    Acute DVT:   -  US doppler + L soleal and gastroc DVT   - s/p IVC filter 3/17 with interventional cards.   - Could not tolerate VQ scan for r/o PE 2/2 pain  - Restarted Eliquis 5mg BID per neurosurgery    DVT-P: Eliquis    will follow,      74-year-old male patient with h/o BPH, diverticulosis, Graves' disease, hemorrhoids, HLD, hypothyroidism, osteoarthritis, spinal stenosis, spinal cord injury (2004), and multiple spinal surgeries (three cervical and one lumbar), who presented to [facility] on 3/4/25 from [facility] due to lower extremity swelling and abnormal BUN/creatinine levels for further evaluation and management. The patient was recently admitted to [facility] from 02/18/25 through 02/25/25 for sepsis secondary to epidural abscess. He had a peripherally inserted central catheter line in the right arm and was on nightly Ceftriaxone treatment until 04/16/25. At the ED, he was found to have GOVIND, a left soleal vein, and a left gastrocnemius DVT. He was started on heparin gtt. Hospital course complicated by urinary retention requiring Ortega catheter insertion, rhabdomyolysis, elevated LFTs, anasarca, leukocytosis due to thoracic spine discitis OM and early paraspinal abscess, as well as new onset RUE paresthesias and weakness. He is s/p IVC filter placement on 3/17 and was recommended surgical management. He is s/p posterior thoracic laminectomy and fusion on 3/18/25 with Dr. Keith Cantu. Surgical culture with rare staph epi. Recommendations by ID to continue IV Rocephin and Daptomycin through 5/2/25. Patient was evaluated by PM&R and therapy for functional deficits, gait/ADL impairments and acute rehabilitation was recommended. Patient was cleared for discharge to [facility] IRF on 3/25/25. (25 Mar 2025 17:09)    Constipation abdominal  distention and dyspepsia  - on senna, Docusol enema daily since april 2.   - abd xray on 4/2: Moderate to large stool burden. No bowel obstruction.  - Abd xray today 4/15: improvement with residual stool burden  - Miralax BID. Milk of magnesia at h/s. . senna at h.s  - consider to change Ducusol enema daily to mineral oil enema daily at h/s  - c/w maalox PRN, simethicone  - Dced lactulose TID PRN as it can cause bloating  - GI recs appreciated: Miralax BID, Avoid lactulose - gas producing; Encourage patient to pass flatus; Consider non gas producing foods; Avoid opioids for pain management; mobilize    Debility  Thoracic epidural abscess s/p laminectomy and fusion surgery on 3/18  -  brace  - pain control per primary. avoid opioids for constipation  - fall, spine precautions    Spinal epidural abscess   - S/p thoracic laminectomy and fusion 3/18.   - IV Rocephin 2g HS via PICC line thru 5/2/25  - IV Daptomycin 650mg daily via PICC line thru 5/2/2025  - [place]  brace   - Surgical culture with staph epi possible contaminant but given clinical presentation on admission, IV Dapto added.   - check CPK on 4/20    GOVIND, resolved  LE edema  Hypoalbuminemia  Proteinuria  Anasarca  - c/w Bumex 1 mg BID (4/4 missed the past few doses due to hold parameters.  reordered with hold for SBP <100 instructions).   - Optimize protein intake.   - mobilize, encourage out of bed to chair often.   - Monitor labs closely , stable renal function w good UOP   - Serum workup negative but will need follow up of  Serum  TRUDY positive for weak IgG Lambda band.  - urine protein 200 mg only ( not nephrotic)   - daily standing weights    Transaminitis, resolved  - encouraged PO  - Limit Tylenol use  - avoid hepatotoxins  - monitor on routine labs    CAD  HTN  HLD  - Metoprolol 25mg daily   - Crestor 5mg HS    Anemia  MARLENA  - s/p 2 units of PRBCs given intra-op  - Monitor Hgb (3/31 Hgb 8.3)  - Transfuse to keep hb >7    Hypothyroidism  - Synthroid 150mcg daily  - TSH mildly elevated    sacral Decub stage 3  - wound care cx noted  - Continue turning patient Q 2 hours   - Lac hydrin BID BLE  - Aquaphor BID to generalized dry skin     BPH, chronic  Urinary retention likely augmented by degree of anasarca  - Flomax 8mg daily  - Monitor U/O  - Treat constipation  - encourage PO hydration    Acute DVT:   - US doppler + L soleal and gastroc DVT   - s/p IVC filter 3/17 with interventional cards.   - Could not tolerate VQ scan for r/o PE 2/2 pain  - Restarted Eliquis 5mg BID per neurosurgery    DVT-P: Eliquis

## 2025-04-20 LAB — CK SERPL-CCNC: 135 U/L — SIGNIFICANT CHANGE UP (ref 30–200)

## 2025-04-20 PROCEDURE — 99232 SBSQ HOSP IP/OBS MODERATE 35: CPT

## 2025-04-20 PROCEDURE — 99232 SBSQ HOSP IP/OBS MODERATE 35: CPT | Mod: GC

## 2025-04-20 RX ADMIN — Medication 1 APPLICATION(S): at 07:06

## 2025-04-20 RX ADMIN — APIXABAN 5 MILLIGRAM(S): 2.5 TABLET, FILM COATED ORAL at 11:48

## 2025-04-20 RX ADMIN — Medication 10 MILLIGRAM(S): at 17:14

## 2025-04-20 RX ADMIN — GABAPENTIN 100 MILLIGRAM(S): 400 CAPSULE ORAL at 07:11

## 2025-04-20 RX ADMIN — CEFTRIAXONE 100 MILLIGRAM(S): 500 INJECTION, POWDER, FOR SOLUTION INTRAMUSCULAR; INTRAVENOUS at 22:52

## 2025-04-20 RX ADMIN — WHITE PETROLATUM 1 APPLICATION(S): 1 OINTMENT TOPICAL at 22:52

## 2025-04-20 RX ADMIN — GABAPENTIN 100 MILLIGRAM(S): 400 CAPSULE ORAL at 13:10

## 2025-04-20 RX ADMIN — APIXABAN 5 MILLIGRAM(S): 2.5 TABLET, FILM COATED ORAL at 23:36

## 2025-04-20 RX ADMIN — Medication 283 MILLIGRAM(S): at 09:41

## 2025-04-20 RX ADMIN — GABAPENTIN 100 MILLIGRAM(S): 400 CAPSULE ORAL at 22:58

## 2025-04-20 RX ADMIN — POLYETHYLENE GLYCOL 3350 17 GRAM(S): 17 POWDER, FOR SOLUTION ORAL at 07:12

## 2025-04-20 RX ADMIN — METOPROLOL SUCCINATE 25 MILLIGRAM(S): 50 TABLET, EXTENDED RELEASE ORAL at 07:11

## 2025-04-20 RX ADMIN — ROSUVASTATIN CALCIUM 5 MILLIGRAM(S): 20 TABLET, FILM COATED ORAL at 22:55

## 2025-04-20 RX ADMIN — WHITE PETROLATUM 1 APPLICATION(S): 1 OINTMENT TOPICAL at 13:10

## 2025-04-20 RX ADMIN — BUMETANIDE 1 MILLIGRAM(S): 1 TABLET ORAL at 07:11

## 2025-04-20 RX ADMIN — BUMETANIDE 1 MILLIGRAM(S): 1 TABLET ORAL at 13:10

## 2025-04-20 RX ADMIN — Medication 150 MICROGRAM(S): at 07:10

## 2025-04-20 RX ADMIN — Medication 40 MILLIGRAM(S): at 07:10

## 2025-04-20 RX ADMIN — Medication 1 APPLICATION(S): at 17:15

## 2025-04-20 RX ADMIN — DAPTOMYCIN 126 MILLIGRAM(S): 500 INJECTION, POWDER, LYOPHILIZED, FOR SOLUTION INTRAVENOUS at 11:49

## 2025-04-20 RX ADMIN — WHITE PETROLATUM 1 APPLICATION(S): 1 OINTMENT TOPICAL at 07:05

## 2025-04-20 RX ADMIN — TAMSULOSIN HYDROCHLORIDE 0.8 MILLIGRAM(S): 0.4 CAPSULE ORAL at 22:56

## 2025-04-20 RX ADMIN — Medication 1 APPLICATION(S): at 07:05

## 2025-04-20 RX ADMIN — Medication 10 MILLIGRAM(S): at 07:11

## 2025-04-20 RX ADMIN — Medication 2 TABLET(S): at 22:54

## 2025-04-20 RX ADMIN — Medication 80 MILLIGRAM(S): at 07:11

## 2025-04-20 NOTE — PROGRESS NOTE ADULT - ASSESSMENT
Assessment/Plan:  Mr. Rishabh Shaver is a 74-year-old man with past medical history of benign prostatic hyperplasia, diverticulosis, Graves' disease, hemorrhoids, hyperlipidemia, hypothyroidism, osteoarthritis, spinal stenosis, spinal cord injury (2004), and multiple spinal surgeries (three cervical and one lumbar), who is admitted for Acute Inpatient Rehabilitation with a multidisciplinary rehab program at [facility] with functional impairments in ADLs and mobility secondary to a thoracic epidural abscess s/p posterior thoracic laminectomy and fusion on 3/18/25 with Dr. Keith Cantu.       #Thoracic epidural abscess s/p laminectomy and fusion surgery      * C8 AIS D Incomplete paraplegia      *  brace when OOB      * Flexeril 5mg PO TID PRN for neck soreness      * Staples removed on POD #14 (4/1/25)      * Sacral wound assessment by wound/skin team      * urinary retention, now voiding,  c/w Flomax 0.8mg PO daily      * Right Venodyne only due to left calf DVT (s/p pre-op IVC filter)   - Activity Limitations: Decreased social, vocational and leisure activities, decreased self care and ADLs, decreased mobility, decreased ability to manage household and finances  - Comprehensive Multidisciplinary Rehab Program:      * 3 hours a day, 5 days a week      * PT 2hr/day: Focused on improving strength, endurance, coordination, balance, functional mobility, and transfers      * OT 1hr/day: Focused on improving strength, fine motor skills, coordination, posture and ADLs  - orthotist Arya saw patient 4/17 for AFO for right foot drop and recommends b/l AFOs hinged on R and semi solid on L - order placed    -----------------------------------------------------------------------------------    Concurrent Medical Problems    #GOVIND on CKD (resolved)  #LE edema  #Hypoalbuminemia  #Proteinuria  - IVF as needed with fluid balance  - Significant debilitating anasarca   - Trial of albumin Lasix started 3/8->improvement  - s/p IV Lasix/IV albumin BID (3/9)  - Change to Bumex/Albumin (3/11)-> good effect  - Switched to PO Bumex 1mg BID 3/24  - Trend Albumin (4/17 - 3.1)  - Trend Protein total (4/17 - 6.8); Total protein, Random Urine (3/18- 24)  - Trend CMP (4/17 - Cr 1.09/ BUN 33)    #L gastrocnemius and L soleal DVT  - appears provoked from decrease ambulation from recent spinal abscess  - Could not tolerate VQ scan for r/o PE 2/2 pain  - Eliquis 5mg PO q12h - will be new med on discharge (of note patient completed Eliquis load)   - Therapeutic Lovenox (3/12) in the setting of possible spine intervention  - Last dose of AC night of 3/16. Restarted Eliquis 5mg BID 4/5/25  - S/P IVC filter placement 3/17  - repeat US 4/8 with stable L soleal DVT and no propagation     #Spinal epidural abscess POA and already being treated with IV antibiotics with suspected worsening of disease  - S/p thoracic laminectomy and fusion 3/18. [place]  brace   - Surgical culture with staph epi possible contaminant but given clinical presentation on admission, IV Dapto added  - IV Rocephin 2g daily via PICC line thru 5/2/25  - IV Daptomycin 650mg daily via PICC line thru 5/2/25  - gabapentin 100mg PO TID  - d/c long-acting Oxycontin 4/11  - oxycodone 15mg PO q6h PRN moderate pain  - Flexeril 5mg PO TID PRN for muscle spasm  - staples d/teodoro in rehab    #Abdominal discomfort and bloating  - Abd xray 4/15--as above  - Reconsulted GI (04/18) completed:  ·  Problem: Abdominal distention.   ·  Recommendation: - Last abdominal Xray showed mildly increased fecal content in the colon but no sign of bowel obstruction.  - Promote motility by encouraging turn and position while in bed   - Miralax BID  - Avoid lactulose - gas producing  - Encourage patient to pass flatus   - Consider non gas producing foods  - Avoid opioids for pain management.    #Mild Rhabdomyolysis (resolved)  - associated elevated transaminases (resolved)  - suspect from anasarca/edema   - improving with adequate diuresis of edema  - associated elevated transaminase; now resolved (4/17- AST 30/ALT 38)    #Leukocytosis (resolved)  - likely rx to DVT + Discitis  - no other signs of sepsis, afebrile   - trend CBC and monitor fever curve (4/17- WBC 8.22)    #Hypothyroidism  - Synthroid 150mcg PO daily    #CAD  #HTN  #HLD  - metoprolol succinate 25mg PO daily   - rosuvastatin 5mg PO qhs    #Anemia/MARLENA  - trend H/H (4/17- 10.5/33.1)  - transfuse if Hgb <7 PRN     #Sleep:  - melatonin 3mg PO qhs PRN for sleep    #GI/Bowel:  - senna 2 tablets PO qhs  - Miralax 17g PO daily--> (04/18) changed to BID   - daily mini-enema at 6AM  - lactulose 20g PO TID PRN for constipation--> D/c (04/19)   - Maalox 30mL PO q4h PRN for dyspepsia  - GI ppx: pantoprazole 40mg PO daily   - added simethicone 80mg PO TID PRN for gas 4/14    #BPH  #Urinary retention likely augmented by degree of anasarca  - S/P Ortega; TOV  ->failed. Ortega re-inserted 3/6->fell out without knowing with associated hematuria->recurrent retention 3/7->Ortega re-inserted (3/7) --> successful TOV 3/25  - Flomax 0.8mg PO qhs  - started bethanechol 10mg PO BID 4/6  - Monitor U/O    #Skin/Pressure Injury:   Skin assessment on admission:   - Generalized dry flaky skin  - 19cm posterior cervical incision with 32 staples and 2 steri strips  - Left buttock DTI pressure injury measuring 14 x 9 cm      * appearing as a dark purple discoloration of intact skin, with a central superficial open area measuring 6 x 2 cm      * at the periphery of the 14 x 9 cm area, the discoloration is somewhat lighter      * dark red non-blanchable erythema      * eschar resolved     **Santyl to slough of sacrum daily**  - Bilateral lower extremity edema  - Healed lumbar surgical incision scar  - Right heel blanchable redness  - Bilateral dry cracked heels and plantar surface  - Lac hydrin BID BLE  - Aquaphor BID to generalized dry skin   - Appreciate wound care consult     #Diet:  - Current Diet: Regular  - Aspiration Precautions  - Nutrition consult     #Patient Education  - Education provided on the following:      * Admitting diagnosis and functional implications      * Functional goals      * Bladder management      * Bowel management      * Skin care management      * Intensity of service and scheduling of rehab disciplines      * Plan of care and role of interdisciplinary team conference in discharge planning      * Reconciliation of medications from prior institution    #Precautions / PROPHYLAXIS:   - Falls, Spinal  - Ortho: Weight bearing status: FWB;  brace OOB  - DVT ppx: Eliquis 5mg PO BID, TEDs  - Pressure injury/Skin: Turn Q2hrs while in bed, OOB to Chair, PT/OT      ---------------  Outpatient Follow-up:    Yue Wiggins  [facility]  [address], [address]  [place] [address]  Phone: [phone]  Fax: [phone]  Established Patient  Follow Up Time: 1 week    Keith Cantu Chi  Neurosurgery  [address] [place] [address], [address]  [place] [address]  Phone: [phone]  Fax: [phone]  Established Patient  Follow Up Time: 2 weeks    Luis A Brennan  [facility] Physician [facility]  CARDIOLOGY [address]  Scheduled Appointment: 04/23/2025    --------------

## 2025-04-20 NOTE — PROGRESS NOTE ADULT - SUBJECTIVE AND OBJECTIVE BOX
HPI:  Mr. Rishabh Shaver is a 74-year-old male patient with past medical history of benign prostatic hyperplasia, diverticulosis, Graves' disease, hemorrhoids, hyperlipidemia, hypothyroidism, osteoarthritis, spinal stenosis, spinal cord injury (2004), and multiple spinal surgeries (three cervical and one lumbar), who presented to [facility] on 3/4/25 from [facility] due to lower extremity swelling and abnormal BUN/creatinine levels for further evaluation and management. The patient was recently admitted to [facility] from 02/18/25 through 02/25/25 for sepsis secondary to epidural abscess. He had a peripherally inserted central catheter line in the right arm and was on nightly Ceftriaxone treatment until 04/16/25. At the ED, he was found to have GOVIND, a left soleal vein, and a left gastrocnemius DVT. He was started on heparin gtt. Hospital course complicated by urinary retention requiring Ortega catheter insertion, rhabdomyolysis, elevated LFTs, anasarca, leukocytosis due to thoracic spine discitis OM and early paraspinal abscess, as well as new onset RUE paresthesias and weakness. He is s/p IVC filter placement on 3/17 and was recommended surgical management. He is s/p posterior thoracic laminectomy and fusion on 3/18/25 with Dr. Keith Cantu. Surgical culture with rare staph epi. Recommendations by ID to continue IV Rocephin and Daptomycin through 5/2/25. Patient was evaluated by PM&R and therapy for functional deficits, gait/ADL impairments and acute rehabilitation was recommended. Patient was cleared for discharge to [facility] IRF on 3/25/25.     TDD 4/25/25 home  ___________________________________________________________________________    SUBJECTIVE/ROS  Patient was seen and evaluated at bedside, NAD  Reported no overnight events   Sleeping well at night  Currently getting a mini enema; LBM (04/18)  Urinating well  with PVRs of 22/35.175, did not need a SC  He inquires about Gemtesa, which he had been taking PTA, discussed possible resumption down the line but currently would not be indicated given urinary retention.  No pain.  Underwent casting for b/l AFOs yesterday with Arya.   ___________________________________________________________________________    LABS               10.5   8.22  )-----------( 247      ( 17 Apr 2025 07:35 )             33.1     141  |  101  |  30[H]  ----------------------------<  100[H]  3.6   |  33[H]  |  1.09    Ca    9.4      17 Apr 2025 07:35  TPro  6.8  /  Alb  3.1[L]  /  TBili  0.6  /  DBili  x   /  AST  30  /  ALT  38  /  AlkPhos  77  04-17    ___________________________________________________________________________    MEDICATIONS  (STANDING):  ammonium lactate 12% Lotion 1 Application(s) Topical two times a day  apixaban 5 milliGRAM(s) Oral every 12 hours  AQUAPHOR (petrolatum Ointment) 1 Application(s) Topical three times a day  bethanechol 10 milliGRAM(s) Oral two times a day  buMETAnide 1 milliGRAM(s) Oral two times a day  cefTRIAXone   IVPB 2000 milliGRAM(s) IV Intermittent every 24 hours  chlorhexidine 4% Liquid 1 Application(s) Topical <User Schedule>  collagenase Ointment 1 Application(s) Topical daily  DAPTOmycin IVPB 650 milliGRAM(s) IV Intermittent every 24 hours  docusate sodium Enema 283 milliGRAM(s) Rectal <User Schedule>  gabapentin   Oral   gabapentin 100 milliGRAM(s) Oral three times a day  levothyroxine 150 MICROGram(s) Oral daily  magnesium hydroxide Suspension 30 milliLiter(s) Oral at bedtime  metoprolol succinate ER 25 milliGRAM(s) Oral daily  pantoprazole    Tablet 40 milliGRAM(s) Oral before breakfast  polyethylene glycol 3350 17 Gram(s) Oral two times a day  rosuvastatin 5 milliGRAM(s) Oral at bedtime  senna 2 Tablet(s) Oral at bedtime  tamsulosin 0.8 milliGRAM(s) Oral at bedtime    MEDICATIONS  (PRN):  aluminum hydroxide/magnesium hydroxide/simethicone Suspension 30 milliLiter(s) Oral every 4 hours PRN Dyspepsia  cyclobenzaprine 5 milliGRAM(s) Oral three times a day PRN Muscle Spasm  melatonin 3 milliGRAM(s) Oral at bedtime PRN Insomnia  oxyCODONE    IR 15 milliGRAM(s) Oral every 6 hours PRN Moderate Pain (4 - 6)  simethicone 80 milliGRAM(s) Chew three times a day PRN Gas  sodium chloride 0.9% lock flush 10 milliLiter(s) IV Push every 1 hour PRN Pre/post blood products, medications, blood draw, and to maintain line patency    ___________________________________________________________________________    PHYSICAL EXAM:  Vital Signs Last 24 Hrs  T(C): 36.7 (20 Apr 2025 08:28), Max: 36.7 (19 Apr 2025 22:45)  T(F): 98.1 (20 Apr 2025 08:28), Max: 98.1 (20 Apr 2025 08:28)  HR: 75 (20 Apr 2025 08:28) (69 - 86)  BP: 118/70 (20 Apr 2025 08:28) (111/69 - 123/73)  RR: 18 (20 Apr 2025 08:28) (16 - 18)  SpO2: 97% (20 Apr 2025 08:28) (93% - 97%)  Parameters below as of 20 Apr 2025 08:28  Patient On (Oxygen Delivery Method): room air      Constitutional - NAD, Comfortable  Chest - good chest expansion, good respiratory effort  Cardio - warm and well perfused  Abdomen -  distended, nontender, softer than previous days  Neurologic Exam:                           Orientation: Awake, A&O to self, place, date, year, situation     Speech - Fluent, Comprehensible, No dysarthria, No aphasia      Motor -  stable exam     Sensory - Intact to LT bilateral UE and LE  Psychiatric - Mood cheerful, Affect WNL  Skin: DTI to sacrum    ___________________________________________________________________________

## 2025-04-20 NOTE — PROGRESS NOTE ADULT - NSPROGADDITIONALINFOA_GEN_ALL_CORE
Spent 35 minutes on face-face visit, evaluate, review labs, examine and treat
Spent 35 minutes on face-face visit, evaluate, review labs, examine and treat
Spent 35 minutes on face-face visit, evaluate, examine and treat, excluding teaching time
Spent 35 minutes on face-face visit, evaluate, examine and treat, excluding teaching time

## 2025-04-20 NOTE — PROGRESS NOTE ADULT - ASSESSMENT
74-year-old male patient with h/o BPH, diverticulosis, Graves' disease, hemorrhoids, HLD, hypothyroidism, osteoarthritis, spinal stenosis, spinal cord injury (2004), and multiple spinal surgeries (three cervical and one lumbar), who presented to [facility] on 3/4/25 from [facility] due to lower extremity swelling and abnormal BUN/creatinine levels for further evaluation and management. The patient was recently admitted to [facility] from 02/18/25 through 02/25/25 for sepsis secondary to epidural abscess. He had a peripherally inserted central catheter line in the right arm and was on nightly Ceftriaxone treatment until 04/16/25. At the ED, he was found to have GOVIND, a left soleal vein, and a left gastrocnemius DVT. He was started on heparin gtt. Hospital course complicated by urinary retention requiring Ortega catheter insertion, rhabdomyolysis, elevated LFTs, anasarca, leukocytosis due to thoracic spine discitis OM and early paraspinal abscess, as well as new onset RUE paresthesias and weakness. He is s/p IVC filter placement on 3/17 and was recommended surgical management. He is s/p posterior thoracic laminectomy and fusion on 3/18/25 with Dr. Keith Cantu. Surgical culture with rare staph epi. Recommendations by ID to continue IV Rocephin and Daptomycin through 5/2/25. Patient was evaluated by PM&R and therapy for functional deficits, gait/ADL impairments and acute rehabilitation was recommended. Patient was cleared for discharge to [facility]F on 3/25/25. (25 Mar 2025 17:09)    Constipation abdominal  distention and dyspepsia  - on senna, Docusol enema daily since april 2.   - abd xray on 4/2: Moderate to large stool burden. No bowel obstruction.  - Abd xray today 4/15: improvement with residual stool burden  - Miralax BID. Milk of magnesia at h/s. . senna at h.s  - consider to change Ducusol enema daily to mineral oil enema daily at h/s  - c/w maalox PRN, simethicone  - Dced lactulose TID PRN as it can cause bloating  - GI recs appreciated: Miralax BID, Avoid lactulose - gas producing; Encourage patient to pass flatus; Consider non gas producing foods; Avoid opioids for pain management; mobilize    Debility  Thoracic epidural abscess s/p laminectomy and fusion surgery on 3/18  -  brace  - pain control per primary. avoid opioids for constipation  - fall, spine precautions    Spinal epidural abscess   - S/p thoracic laminectomy and fusion 3/18.   - IV Rocephin 2g HS via PICC line thru 5/2/25  - IV Daptomycin 650mg daily via PICC line thru 5/2/2025  - Pala  brace   - Surgical culture with staph epi possible contaminant but given clinical presentation on admission, IV Dapto added.   - CPK 4/20 WNL    GOVIND, resolved  LE edema  Hypoalbuminemia  Proteinuria  Anasarca  - c/w Bumex 1 mg BID (4/4 missed the past few doses due to hold parameters.  reordered with hold for SBP <100 instructions).   - Optimize protein intake.   - mobilize, encourage out of bed to chair often.   - Monitor labs closely , stable renal function w good UOP   - Serum workup negative but will need follow up of  Serum  TRUDY positive for weak IgG Lambda band.  - urine protein 200 mg only ( not nephrotic)   - daily standing weights    Transaminitis, resolved  - encouraged PO  - Limit Tylenol use  - avoid hepatotoxins  - monitor on routine labs    CAD  HTN  HLD  - Metoprolol 25mg daily   - Crestor 5mg HS    Anemia  MARLENA  - s/p 2 units of PRBCs given intra-op  - Monitor Hgb (3/31 Hgb 8.3)  - Transfuse to keep hb >7    Hypothyroidism  - Synthroid 150mcg daily  - TSH mildly elevated    sacral Decub stage 3  - wound care cx noted  - Continue turning patient Q 2 hours   - Lac hydrin BID BLE  - Aquaphor BID to generalized dry skin     BPH, chronic  Urinary retention likely augmented by degree of anasarca  - Flomax 8mg daily  - Monitor U/O  - Treat constipation  - encourage PO hydration    Acute DVT:   - US doppler + L soleal and gastroc DVT   - s/p IVC filter 3/17 with interventional cards.   - Could not tolerate VQ scan for r/o PE 2/2 pain  - Restarted Eliquis 5mg BID per neurosurgery    DVT-P: Eliquis

## 2025-04-20 NOTE — PROGRESS NOTE ADULT - SUBJECTIVE AND OBJECTIVE BOX
Patient seen and examined at bedside. no complaints.      ALLERGIES:  No Known Allergies    MEDICATIONS  (STANDING):  ammonium lactate 12% Lotion 1 Application(s) Topical two times a day  apixaban 5 milliGRAM(s) Oral every 12 hours  AQUAPHOR (petrolatum Ointment) 1 Application(s) Topical three times a day  bethanechol 10 milliGRAM(s) Oral two times a day  buMETAnide 1 milliGRAM(s) Oral two times a day  cefTRIAXone   IVPB 2000 milliGRAM(s) IV Intermittent every 24 hours  chlorhexidine 4% Liquid 1 Application(s) Topical <User Schedule>  collagenase Ointment 1 Application(s) Topical daily  DAPTOmycin IVPB 650 milliGRAM(s) IV Intermittent every 24 hours  docusate sodium Enema 283 milliGRAM(s) Rectal <User Schedule>  gabapentin   Oral   gabapentin 100 milliGRAM(s) Oral three times a day  levothyroxine 150 MICROGram(s) Oral daily  magnesium hydroxide Suspension 30 milliLiter(s) Oral at bedtime  metoprolol succinate ER 25 milliGRAM(s) Oral daily  pantoprazole    Tablet 40 milliGRAM(s) Oral before breakfast  polyethylene glycol 3350 17 Gram(s) Oral two times a day  rosuvastatin 5 milliGRAM(s) Oral at bedtime  senna 2 Tablet(s) Oral at bedtime  tamsulosin 0.8 milliGRAM(s) Oral at bedtime    MEDICATIONS  (PRN):  aluminum hydroxide/magnesium hydroxide/simethicone Suspension 30 milliLiter(s) Oral every 4 hours PRN Dyspepsia  cyclobenzaprine 5 milliGRAM(s) Oral three times a day PRN Muscle Spasm  melatonin 3 milliGRAM(s) Oral at bedtime PRN Insomnia  oxyCODONE    IR 15 milliGRAM(s) Oral every 6 hours PRN Moderate Pain (4 - 6)  simethicone 80 milliGRAM(s) Chew three times a day PRN Gas  sodium chloride 0.9% lock flush 10 milliLiter(s) IV Push every 1 hour PRN Pre/post blood products, medications, blood draw, and to maintain line patency    Vital Signs Last 24 Hrs  T(F): 98.1 (20 Apr 2025 08:28), Max: 98.1 (20 Apr 2025 08:28)  HR: 75 (20 Apr 2025 08:28) (69 - 86)  BP: 118/70 (20 Apr 2025 08:28) (111/69 - 123/73)  RR: 18 (20 Apr 2025 08:28) (16 - 18)  SpO2: 97% (20 Apr 2025 08:28) (93% - 97%)  I&O's Summary    19 Apr 2025 07:01  -  20 Apr 2025 07:00  --------------------------------------------------------  IN: 0 mL / OUT: 1200 mL / NET: -1200 mL        PHYSICAL EXAM:    Gen: nad, resting in bed  Neuro: aaox3  Heent: no jvd, no oral exudates  Pulm: cta b/l, no w/r/r  CV: +s1s2, no m/r/g  Ab: soft, nt/nd, normoactive bs x 4  Extrem: no edema, pulses intact and equal  Skin: no rashes  Psych: normal    LABS:      04-19    141  |  101  |  30  ----------------------------<  106  3.7   |  33  |  1.00    Ca    9.1      19 Apr 2025 07:05  Mg     2.0     04-19                    TSH 6.857   TSH with FT4 reflex --  Total T3 --                  Urinalysis Basic - ( 19 Apr 2025 07:05 )    Color: x / Appearance: x / SG: x / pH: x  Gluc: 106 mg/dL / Ketone: x  / Bili: x / Urobili: x   Blood: x / Protein: x / Nitrite: x   Leuk Esterase: x / RBC: x / WBC x   Sq Epi: x / Non Sq Epi: x / Bacteria: x        COVID-19 PCR: NotDetec (03-25-25 @ 21:35)      RADIOLOGY & ADDITIONAL TESTS:    Care Discussed with Consultants/Other Providers:

## 2025-04-21 ENCOUNTER — TRANSCRIPTION ENCOUNTER (OUTPATIENT)
Age: 75
End: 2025-04-21

## 2025-04-21 LAB
ALBUMIN SERPL ELPH-MCNC: 3.3 G/DL — SIGNIFICANT CHANGE UP (ref 3.3–5)
ALP SERPL-CCNC: 86 U/L — SIGNIFICANT CHANGE UP (ref 40–120)
ALT FLD-CCNC: 32 U/L — SIGNIFICANT CHANGE UP (ref 10–45)
ANION GAP SERPL CALC-SCNC: 8 MMOL/L — SIGNIFICANT CHANGE UP (ref 5–17)
AST SERPL-CCNC: 24 U/L — SIGNIFICANT CHANGE UP (ref 10–40)
BASOPHILS # BLD AUTO: 0.1 K/UL — SIGNIFICANT CHANGE UP (ref 0–0.2)
BASOPHILS NFR BLD AUTO: 1.1 % — SIGNIFICANT CHANGE UP (ref 0–2)
BILIRUB SERPL-MCNC: 0.6 MG/DL — SIGNIFICANT CHANGE UP (ref 0.2–1.2)
BUN SERPL-MCNC: 33 MG/DL — HIGH (ref 7–23)
CALCIUM SERPL-MCNC: 9.5 MG/DL — SIGNIFICANT CHANGE UP (ref 8.4–10.5)
CHLORIDE SERPL-SCNC: 101 MMOL/L — SIGNIFICANT CHANGE UP (ref 96–108)
CO2 SERPL-SCNC: 31 MMOL/L — SIGNIFICANT CHANGE UP (ref 22–31)
CREAT SERPL-MCNC: 1.1 MG/DL — SIGNIFICANT CHANGE UP (ref 0.5–1.3)
EGFR: 71 ML/MIN/1.73M2 — SIGNIFICANT CHANGE UP
EGFR: 71 ML/MIN/1.73M2 — SIGNIFICANT CHANGE UP
EOSINOPHIL # BLD AUTO: 0.82 K/UL — HIGH (ref 0–0.5)
EOSINOPHIL NFR BLD AUTO: 9 % — HIGH (ref 0–6)
GLUCOSE SERPL-MCNC: 110 MG/DL — HIGH (ref 70–99)
HCT VFR BLD CALC: 35.5 % — LOW (ref 39–50)
HGB BLD-MCNC: 11.1 G/DL — LOW (ref 13–17)
IMM GRANULOCYTES NFR BLD AUTO: 0.2 % — SIGNIFICANT CHANGE UP (ref 0–0.9)
LYMPHOCYTES # BLD AUTO: 1.18 K/UL — SIGNIFICANT CHANGE UP (ref 1–3.3)
LYMPHOCYTES # BLD AUTO: 12.9 % — LOW (ref 13–44)
MCHC RBC-ENTMCNC: 27.8 PG — SIGNIFICANT CHANGE UP (ref 27–34)
MCHC RBC-ENTMCNC: 31.3 G/DL — LOW (ref 32–36)
MCV RBC AUTO: 89 FL — SIGNIFICANT CHANGE UP (ref 80–100)
MONOCYTES # BLD AUTO: 0.82 K/UL — SIGNIFICANT CHANGE UP (ref 0–0.9)
MONOCYTES NFR BLD AUTO: 9 % — SIGNIFICANT CHANGE UP (ref 2–14)
NEUTROPHILS # BLD AUTO: 6.19 K/UL — SIGNIFICANT CHANGE UP (ref 1.8–7.4)
NEUTROPHILS NFR BLD AUTO: 67.8 % — SIGNIFICANT CHANGE UP (ref 43–77)
NRBC BLD AUTO-RTO: 0 /100 WBCS — SIGNIFICANT CHANGE UP (ref 0–0)
PLATELET # BLD AUTO: 233 K/UL — SIGNIFICANT CHANGE UP (ref 150–400)
POTASSIUM SERPL-MCNC: 3.9 MMOL/L — SIGNIFICANT CHANGE UP (ref 3.5–5.3)
POTASSIUM SERPL-SCNC: 3.9 MMOL/L — SIGNIFICANT CHANGE UP (ref 3.5–5.3)
PROT SERPL-MCNC: 7.3 G/DL — SIGNIFICANT CHANGE UP (ref 6–8.3)
RBC # BLD: 3.99 M/UL — LOW (ref 4.2–5.8)
RBC # FLD: 14.6 % — HIGH (ref 10.3–14.5)
SODIUM SERPL-SCNC: 140 MMOL/L — SIGNIFICANT CHANGE UP (ref 135–145)
WBC # BLD: 9.13 K/UL — SIGNIFICANT CHANGE UP (ref 3.8–10.5)
WBC # FLD AUTO: 9.13 K/UL — SIGNIFICANT CHANGE UP (ref 3.8–10.5)

## 2025-04-21 PROCEDURE — 99232 SBSQ HOSP IP/OBS MODERATE 35: CPT

## 2025-04-21 PROCEDURE — 99233 SBSQ HOSP IP/OBS HIGH 50: CPT

## 2025-04-21 RX ORDER — POLYETHYLENE GLYCOL 3350 17 G/17G
17 POWDER, FOR SOLUTION ORAL
Qty: 1020 | Refills: 0
Start: 2025-04-21 | End: 2025-05-20

## 2025-04-21 RX ORDER — WHITE PETROLATUM 1 G/G
1 OINTMENT TOPICAL
Qty: 1 | Refills: 0
Start: 2025-04-21 | End: 2025-05-20

## 2025-04-21 RX ORDER — SIMETHICONE 80 MG
1 TABLET,CHEWABLE ORAL
Qty: 90 | Refills: 0
Start: 2025-04-21 | End: 2025-05-20

## 2025-04-21 RX ORDER — MAGNESIUM HYDROXIDE 400 MG/5ML
30 SUSPENSION ORAL
Qty: 0 | Refills: 0 | DISCHARGE
Start: 2025-04-21

## 2025-04-21 RX ORDER — OXYCODONE HYDROCHLORIDE 30 MG/1
15 TABLET ORAL EVERY 6 HOURS
Refills: 0 | Status: DISCONTINUED | OUTPATIENT
Start: 2025-04-21 | End: 2025-04-28

## 2025-04-21 RX ORDER — BETHANECHOL CHLORIDE 50 MG
1 TABLET ORAL
Qty: 0 | Refills: 0 | DISCHARGE
Start: 2025-04-21

## 2025-04-21 RX ORDER — MELATONIN 5 MG
2 TABLET ORAL
Qty: 60 | Refills: 0
Start: 2025-04-21 | End: 2025-05-20

## 2025-04-21 RX ORDER — ROSUVASTATIN CALCIUM 20 MG/1
1 TABLET, FILM COATED ORAL
Qty: 30 | Refills: 0
Start: 2025-04-21 | End: 2025-05-20

## 2025-04-21 RX ORDER — LEVOTHYROXINE SODIUM 300 MCG
1 TABLET ORAL
Qty: 30 | Refills: 0
Start: 2025-04-21 | End: 2025-05-20

## 2025-04-21 RX ORDER — AMMONIUM LACTATE 12 %
0 LOTION (ML) TOPICAL
Qty: 1 | Refills: 0
Start: 2025-04-21

## 2025-04-21 RX ORDER — DAPTOMYCIN 500 MG/10ML
65 INJECTION, POWDER, LYOPHILIZED, FOR SOLUTION INTRAVENOUS
Qty: 7 | Refills: 0
Start: 2025-04-21 | End: 2025-04-30

## 2025-04-21 RX ORDER — SIMETHICONE 80 MG
1 TABLET,CHEWABLE ORAL
Qty: 0 | Refills: 0 | DISCHARGE
Start: 2025-04-21

## 2025-04-21 RX ORDER — BETHANECHOL CHLORIDE 50 MG
1 TABLET ORAL
Qty: 60 | Refills: 0
Start: 2025-04-21 | End: 2025-05-20

## 2025-04-21 RX ORDER — BUMETANIDE 1 MG/1
1 TABLET ORAL
Qty: 60 | Refills: 0
Start: 2025-04-21 | End: 2025-05-20

## 2025-04-21 RX ORDER — AMMONIUM LACTATE 12 %
1 LOTION (ML) TOPICAL
Qty: 0 | Refills: 0 | DISCHARGE
Start: 2025-04-21

## 2025-04-21 RX ORDER — DOCUSATE SODIUM 100 MG
1 CAPSULE ORAL
Qty: 0 | Refills: 0 | DISCHARGE
Start: 2025-04-21

## 2025-04-21 RX ORDER — MAGNESIUM HYDROXIDE 400 MG/5ML
30 SUSPENSION ORAL
Qty: 900 | Refills: 0
Start: 2025-04-21 | End: 2025-05-20

## 2025-04-21 RX ORDER — WHITE PETROLATUM 1 G/G
1 OINTMENT TOPICAL
Qty: 0 | Refills: 0 | DISCHARGE
Start: 2025-04-21

## 2025-04-21 RX ORDER — DOCUSATE SODIUM 100 MG
1 CAPSULE ORAL
Qty: 30 | Refills: 0
Start: 2025-04-21 | End: 2025-05-20

## 2025-04-21 RX ORDER — TAMSULOSIN HYDROCHLORIDE 0.4 MG/1
2 CAPSULE ORAL
Qty: 60 | Refills: 0
Start: 2025-04-21 | End: 2025-05-20

## 2025-04-21 RX ORDER — CYCLOBENZAPRINE HYDROCHLORIDE 15 MG/1
1 CAPSULE, EXTENDED RELEASE ORAL
Qty: 90 | Refills: 0
Start: 2025-04-21 | End: 2025-05-20

## 2025-04-21 RX ORDER — COLLAGENASE CLOSTRIDIUM HIST. 250 UNIT/G
1 OINTMENT (GRAM) TOPICAL
Qty: 0 | Refills: 0 | DISCHARGE
Start: 2025-04-21

## 2025-04-21 RX ORDER — APIXABAN 2.5 MG/1
1 TABLET, FILM COATED ORAL
Qty: 0 | Refills: 0 | DISCHARGE
Start: 2025-04-21

## 2025-04-21 RX ORDER — COLLAGENASE CLOSTRIDIUM HIST. 250 UNIT/G
1 OINTMENT (GRAM) TOPICAL
Qty: 1 | Refills: 0
Start: 2025-04-21

## 2025-04-21 RX ORDER — METOPROLOL SUCCINATE 50 MG/1
1 TABLET, EXTENDED RELEASE ORAL
Qty: 30 | Refills: 0
Start: 2025-04-21 | End: 2025-05-20

## 2025-04-21 RX ORDER — APIXABAN 2.5 MG/1
1 TABLET, FILM COATED ORAL
Qty: 60 | Refills: 0
Start: 2025-04-21 | End: 2025-05-20

## 2025-04-21 RX ORDER — MAGNESIUM, ALUMINUM HYDROXIDE 200-200 MG
30 TABLET,CHEWABLE ORAL
Qty: 5400 | Refills: 0
Start: 2025-04-21 | End: 2025-05-20

## 2025-04-21 RX ORDER — MAGNESIUM, ALUMINUM HYDROXIDE 200-200 MG
30 TABLET,CHEWABLE ORAL
Qty: 0 | Refills: 0 | DISCHARGE
Start: 2025-04-21

## 2025-04-21 RX ORDER — SENNA 187 MG
2 TABLET ORAL
Qty: 60 | Refills: 0
Start: 2025-04-21 | End: 2025-05-20

## 2025-04-21 RX ORDER — GABAPENTIN 400 MG/1
100 CAPSULE ORAL
Qty: 0 | Refills: 0 | DISCHARGE
Start: 2025-04-21

## 2025-04-21 RX ORDER — ACETAMINOPHEN 500 MG/5ML
2 LIQUID (ML) ORAL
Qty: 240 | Refills: 0
Start: 2025-04-21 | End: 2025-05-20

## 2025-04-21 RX ORDER — GABAPENTIN 400 MG/1
1 CAPSULE ORAL
Qty: 90 | Refills: 0
Start: 2025-04-21 | End: 2025-05-20

## 2025-04-21 RX ORDER — CYCLOBENZAPRINE HYDROCHLORIDE 15 MG/1
1 CAPSULE, EXTENDED RELEASE ORAL
Qty: 0 | Refills: 0 | DISCHARGE
Start: 2025-04-21

## 2025-04-21 RX ORDER — CEFTRIAXONE 500 MG/1
2 INJECTION, POWDER, FOR SOLUTION INTRAMUSCULAR; INTRAVENOUS
Qty: 10 | Refills: 0
Start: 2025-04-21 | End: 2025-04-30

## 2025-04-21 RX ADMIN — Medication 80 MILLIGRAM(S): at 15:59

## 2025-04-21 RX ADMIN — APIXABAN 5 MILLIGRAM(S): 2.5 TABLET, FILM COATED ORAL at 12:52

## 2025-04-21 RX ADMIN — TAMSULOSIN HYDROCHLORIDE 0.8 MILLIGRAM(S): 0.4 CAPSULE ORAL at 21:57

## 2025-04-21 RX ADMIN — CYCLOBENZAPRINE HYDROCHLORIDE 5 MILLIGRAM(S): 15 CAPSULE, EXTENDED RELEASE ORAL at 19:52

## 2025-04-21 RX ADMIN — GABAPENTIN 100 MILLIGRAM(S): 400 CAPSULE ORAL at 14:38

## 2025-04-21 RX ADMIN — Medication 1 APPLICATION(S): at 05:40

## 2025-04-21 RX ADMIN — DAPTOMYCIN 126 MILLIGRAM(S): 500 INJECTION, POWDER, LYOPHILIZED, FOR SOLUTION INTRAVENOUS at 12:40

## 2025-04-21 RX ADMIN — BUMETANIDE 1 MILLIGRAM(S): 1 TABLET ORAL at 05:41

## 2025-04-21 RX ADMIN — APIXABAN 5 MILLIGRAM(S): 2.5 TABLET, FILM COATED ORAL at 23:00

## 2025-04-21 RX ADMIN — GABAPENTIN 100 MILLIGRAM(S): 400 CAPSULE ORAL at 21:57

## 2025-04-21 RX ADMIN — Medication 2 TABLET(S): at 21:57

## 2025-04-21 RX ADMIN — BUMETANIDE 1 MILLIGRAM(S): 1 TABLET ORAL at 14:38

## 2025-04-21 RX ADMIN — ROSUVASTATIN CALCIUM 5 MILLIGRAM(S): 20 TABLET, FILM COATED ORAL at 21:57

## 2025-04-21 RX ADMIN — Medication 1 APPLICATION(S): at 05:39

## 2025-04-21 RX ADMIN — GABAPENTIN 100 MILLIGRAM(S): 400 CAPSULE ORAL at 05:42

## 2025-04-21 RX ADMIN — METOPROLOL SUCCINATE 25 MILLIGRAM(S): 50 TABLET, EXTENDED RELEASE ORAL at 05:41

## 2025-04-21 RX ADMIN — CEFTRIAXONE 100 MILLIGRAM(S): 500 INJECTION, POWDER, FOR SOLUTION INTRAMUSCULAR; INTRAVENOUS at 21:57

## 2025-04-21 RX ADMIN — Medication 10 MILLIGRAM(S): at 05:41

## 2025-04-21 RX ADMIN — Medication 150 MICROGRAM(S): at 05:41

## 2025-04-21 RX ADMIN — Medication 10 MILLIGRAM(S): at 18:08

## 2025-04-21 RX ADMIN — Medication 283 MILLIGRAM(S): at 09:03

## 2025-04-21 RX ADMIN — Medication 1 APPLICATION(S): at 07:17

## 2025-04-21 RX ADMIN — WHITE PETROLATUM 1 APPLICATION(S): 1 OINTMENT TOPICAL at 05:41

## 2025-04-21 RX ADMIN — Medication 40 MILLIGRAM(S): at 05:40

## 2025-04-21 RX ADMIN — Medication 80 MILLIGRAM(S): at 21:57

## 2025-04-21 NOTE — CHART NOTE - NSCHARTNOTEFT_GEN_A_CORE
Nutrition Follow Up Note  Hospital Course   (Per Electronic Medical Record)    Source:  Patient [X]  Medical Record [X]      Diet:   Regular Diet (IDDSI Level 7) w/ Thin Liquids (IDDSI Level 0)  Tolerates Diet Consistency Well  No Chewing/Swallowing Difficulties  No Recent Nausea, Vomiting, Diarrhea or Constipation (as Per Patient)  Consumes % of Meals (as Per Documentation) - States Good PO Intake/Appetite (Per Patient)  on Ensure Plus High Protein 8oz PO BID (Provides 700kcal & 40grams of Protein)  on Lalito 1 Packet BID (Provides 180kcal & 28grams of Protein)  Patient Takes Nutrition Supplement Well    Enteral/Parenteral Nutrition: Not Applicable    Current Weight: 215.1lb on 4/16  Obtain New Weight to Confirm  Obtain Weights Weekly     Pertinent Medications: MEDICATIONS  (STANDING):  ammonium lactate 12% Lotion 1 Application(s) Topical two times a day  apixaban 5 milliGRAM(s) Oral every 12 hours  AQUAPHOR (petrolatum Ointment) 1 Application(s) Topical three times a day  bethanechol 10 milliGRAM(s) Oral two times a day  buMETAnide 1 milliGRAM(s) Oral two times a day  cefTRIAXone   IVPB 2000 milliGRAM(s) IV Intermittent every 24 hours  chlorhexidine 4% Liquid 1 Application(s) Topical <User Schedule>  collagenase Ointment 1 Application(s) Topical daily  DAPTOmycin IVPB 650 milliGRAM(s) IV Intermittent every 24 hours  docusate sodium Enema 283 milliGRAM(s) Rectal <User Schedule>  gabapentin   Oral   gabapentin 100 milliGRAM(s) Oral three times a day  levothyroxine 150 MICROGram(s) Oral daily  magnesium hydroxide Suspension 30 milliLiter(s) Oral at bedtime  metoprolol succinate ER 25 milliGRAM(s) Oral daily  pantoprazole    Tablet 40 milliGRAM(s) Oral before breakfast  polyethylene glycol 3350 17 Gram(s) Oral two times a day  rosuvastatin 5 milliGRAM(s) Oral at bedtime  senna 2 Tablet(s) Oral at bedtime  tamsulosin 0.8 milliGRAM(s) Oral at bedtime    MEDICATIONS  (PRN):  aluminum hydroxide/magnesium hydroxide/simethicone Suspension 30 milliLiter(s) Oral every 4 hours PRN Dyspepsia  cyclobenzaprine 5 milliGRAM(s) Oral three times a day PRN Muscle Spasm  melatonin 3 milliGRAM(s) Oral at bedtime PRN Insomnia  oxyCODONE    IR 15 milliGRAM(s) Oral every 6 hours PRN Moderate Pain (4 - 6)  simethicone 80 milliGRAM(s) Chew three times a day PRN Gas  sodium chloride 0.9% lock flush 10 milliLiter(s) IV Push every 1 hour PRN Pre/post blood products, medications, blood draw, and to maintain line patency    Pertinent Labs:  04-21 Na140 mmol/L Glu 110 mg/dL[H] K+ 3.9 mmol/L Cr  1.10 mg/dL BUN 33 mg/dL[H] 04-21 Alb 3.3 g/dL    Skin: DTI on Left Buttock   Surgical Incision on T-Spine  (as Per Nursing Flow Sheet)     Edema: None Noted Recently (as Per Documentation)     Last Bowel Movement: on 4/20    Estimated Needs:   [X] No Change Since Previous Assessment    Previous Nutrition Diagnosis:   Increased Nutrient Needs - Calories & Protein     Nutrition Diagnosis is [X] Ongoing - Continues on Nutrition Supplement & Patient Takes Nutrition Supplement Well    New Nutrition Diagnosis: [X] Not Applicable    Interventions:   1. Recommend Continue Nutrition Plan of Care     Monitoring & Evaluation:   [X] Weights   [X] PO Intake   [X] Skin Integrity   [X] Follow Up (Per Protocol)  [X] Tolerance to Diet Prescription   [X] Other: Labs     Registered Dietitian/Nutritionist Remains Available.  Jarrod Alva RDN, CDN  Senior Dietitian    Phone# [phone]

## 2025-04-21 NOTE — DISCHARGE NOTE PROVIDER - NSDCHHASSISTILLNESS_GEN_ALL_CORE
Render The Number Of Extractions: No Price (Use Numbers Only, No Special Characters Or $): 0 Detail Level: Detailed Post-Care Instructions: I reviewed with the patient in detail post-care instructions. Patient is to wear sunprotection, and avoid picking at any of the treated lesions. Consent: The patient's consent was obtained including but not limited to risks of crusting, scabbing, blistering, scarring, darker or lighter pigmentary change, recurrence, incomplete removal and infection. Fall risk

## 2025-04-21 NOTE — PROGRESS NOTE ADULT - SUBJECTIVE AND OBJECTIVE BOX
GI Follow up  Patient seen and examined at OOB to chair. Denies abdominal pain, denies N/V/D, Last bm:  , tolerating diet            MEDICATIONS  (STANDING):  ammonium lactate 12% Lotion 1 Application(s) Topical two times a day  apixaban 5 milliGRAM(s) Oral every 12 hours  AQUAPHOR (petrolatum Ointment) 1 Application(s) Topical three times a day  bethanechol 10 milliGRAM(s) Oral two times a day  buMETAnide 1 milliGRAM(s) Oral two times a day  cefTRIAXone   IVPB 2000 milliGRAM(s) IV Intermittent every 24 hours  chlorhexidine 4% Liquid 1 Application(s) Topical <User Schedule>  collagenase Ointment 1 Application(s) Topical daily  DAPTOmycin IVPB 650 milliGRAM(s) IV Intermittent every 24 hours  docusate sodium Enema 283 milliGRAM(s) Rectal <User Schedule>  gabapentin   Oral   gabapentin 100 milliGRAM(s) Oral three times a day  levothyroxine 150 MICROGram(s) Oral daily  magnesium hydroxide Suspension 30 milliLiter(s) Oral at bedtime  metoprolol succinate ER 25 milliGRAM(s) Oral daily  pantoprazole    Tablet 40 milliGRAM(s) Oral before breakfast  polyethylene glycol 3350 17 Gram(s) Oral two times a day  rosuvastatin 5 milliGRAM(s) Oral at bedtime  senna 2 Tablet(s) Oral at bedtime  tamsulosin 0.8 milliGRAM(s) Oral at bedtime    MEDICATIONS  (PRN):  aluminum hydroxide/magnesium hydroxide/simethicone Suspension 30 milliLiter(s) Oral every 4 hours PRN Dyspepsia  cyclobenzaprine 5 milliGRAM(s) Oral three times a day PRN Muscle Spasm  melatonin 3 milliGRAM(s) Oral at bedtime PRN Insomnia  oxyCODONE    IR 15 milliGRAM(s) Oral every 6 hours PRN Moderate Pain (4 - 6)  simethicone 80 milliGRAM(s) Chew three times a day PRN Gas  sodium chloride 0.9% lock flush 10 milliLiter(s) IV Push every 1 hour PRN Pre/post blood products, medications, blood draw, and to maintain line patency      Allergies    No Known Allergies    Intolerances          Vital Signs Last 24 Hrs  T(C): 36.6 (21 Apr 2025 06:48), Max: 36.7 (20 Apr 2025 20:59)  T(F): 97.9 (21 Apr 2025 06:48), Max: 98 (20 Apr 2025 20:59)  HR: 70 (21 Apr 2025 06:48) (70 - 82)  BP: 112/68 (21 Apr 2025 06:48) (112/68 - 120/70)  BP(mean): --  RR: 14 (21 Apr 2025 06:48) (14 - 14)  SpO2: 96% (21 Apr 2025 06:48) (96% - 98%)    Parameters below as of 21 Apr 2025 06:48  Patient On (Oxygen Delivery Method): room air        PHYSICAL EXAM:    Constitutional: Well-developed  ENTT: clear conjunctivae and sclera  Respiratory: clear to auscultation  Cardiovascular: S1 and S2  Gastrointestinal: +Bowel Sounds all quadrants, soft, Non tender, non distended  Extremities: No peripheral edema, neg clubbing, cyanosis  Neurological: A/O x 3  Skin: warm and dry      LABS:                        11.1   9.13  )-----------( 233      ( 21 Apr 2025 07:12 )             35.5     04-21    140  |  101  |  33[H]  ----------------------------<  110[H]  3.9   |  31  |  1.10    Ca    9.5      21 Apr 2025 07:12    TPro  7.3  /  Alb  3.3  /  TBili  0.6  /  DBili  x   /  AST  24  /  ALT  32  /  AlkPhos  86  04-21      Urinalysis Basic - ( 21 Apr 2025 07:12 )    Color: x / Appearance: x / SG: x / pH: x  Gluc: 110 mg/dL / Ketone: x  / Bili: x / Urobili: x   Blood: x / Protein: x / Nitrite: x   Leuk Esterase: x / RBC: x / WBC x   Sq Epi: x / Non Sq Epi: x / Bacteria: x      LIVER FUNCTIONS - ( 21 Apr 2025 07:12 )  Alb: 3.3 g/dL / Pro: 7.3 g/dL / ALK PHOS: 86 U/L / ALT: 32 U/L / AST: 24 U/L / GGT: x             RADIOLOGY & ADDITIONAL TESTS:   GI Follow up  Patient seen and examined at OOB to chair in PT. Denies abdominal pain, denies N/V/D, Last BM semisoft, tolerating diet,            MEDICATIONS  (STANDING):  ammonium lactate 12% Lotion 1 Application(s) Topical two times a day  apixaban 5 milliGRAM(s) Oral every 12 hours  AQUAPHOR (petrolatum Ointment) 1 Application(s) Topical three times a day  bethanechol 10 milliGRAM(s) Oral two times a day  buMETAnide 1 milliGRAM(s) Oral two times a day  cefTRIAXone   IVPB 2000 milliGRAM(s) IV Intermittent every 24 hours  chlorhexidine 4% Liquid 1 Application(s) Topical <User Schedule>  collagenase Ointment 1 Application(s) Topical daily  DAPTOmycin IVPB 650 milliGRAM(s) IV Intermittent every 24 hours  docusate sodium Enema 283 milliGRAM(s) Rectal <User Schedule>  gabapentin   Oral   gabapentin 100 milliGRAM(s) Oral three times a day  levothyroxine 150 MICROGram(s) Oral daily  magnesium hydroxide Suspension 30 milliLiter(s) Oral at bedtime  metoprolol succinate ER 25 milliGRAM(s) Oral daily  pantoprazole    Tablet 40 milliGRAM(s) Oral before breakfast  polyethylene glycol 3350 17 Gram(s) Oral two times a day  rosuvastatin 5 milliGRAM(s) Oral at bedtime  senna 2 Tablet(s) Oral at bedtime  tamsulosin 0.8 milliGRAM(s) Oral at bedtime    MEDICATIONS  (PRN):  aluminum hydroxide/magnesium hydroxide/simethicone Suspension 30 milliLiter(s) Oral every 4 hours PRN Dyspepsia  cyclobenzaprine 5 milliGRAM(s) Oral three times a day PRN Muscle Spasm  melatonin 3 milliGRAM(s) Oral at bedtime PRN Insomnia  oxyCODONE    IR 15 milliGRAM(s) Oral every 6 hours PRN Moderate Pain (4 - 6)  simethicone 80 milliGRAM(s) Chew three times a day PRN Gas  sodium chloride 0.9% lock flush 10 milliLiter(s) IV Push every 1 hour PRN Pre/post blood products, medications, blood draw, and to maintain line patency      Allergies    No Known Allergies    Intolerances          Vital Signs Last 24 Hrs  T(C): 36.6 (21 Apr 2025 06:48), Max: 36.7 (20 Apr 2025 20:59)  T(F): 97.9 (21 Apr 2025 06:48), Max: 98 (20 Apr 2025 20:59)  HR: 70 (21 Apr 2025 06:48) (70 - 82)  BP: 112/68 (21 Apr 2025 06:48) (112/68 - 120/70)  BP(mean): --  RR: 14 (21 Apr 2025 06:48) (14 - 14)  SpO2: 96% (21 Apr 2025 06:48) (96% - 98%)    Parameters below as of 21 Apr 2025 06:48  Patient On (Oxygen Delivery Method): room air        PHYSICAL EXAM:    Constitutional: Well-developed  ENTT: clear conjunctivae and sclera  Respiratory: clear to auscultation  Cardiovascular: S1 and S2  Gastrointestinal: +Bowel Sounds all quadrants, soft, Non tender, non distended  Extremities: No peripheral edema, neg clubbing, cyanosis  Neurological: A/O x 3  Skin: warm and dry      LABS:                        11.1   9.13  )-----------( 233      ( 21 Apr 2025 07:12 )             35.5     04-21    140  |  101  |  33[H]  ----------------------------<  110[H]  3.9   |  31  |  1.10    Ca    9.5      21 Apr 2025 07:12    TPro  7.3  /  Alb  3.3  /  TBili  0.6  /  DBili  x   /  AST  24  /  ALT  32  /  AlkPhos  86  04-21      Urinalysis Basic - ( 21 Apr 2025 07:12 )    Color: x / Appearance: x / SG: x / pH: x  Gluc: 110 mg/dL / Ketone: x  / Bili: x / Urobili: x   Blood: x / Protein: x / Nitrite: x   Leuk Esterase: x / RBC: x / WBC x   Sq Epi: x / Non Sq Epi: x / Bacteria: x      LIVER FUNCTIONS - ( 21 Apr 2025 07:12 )  Alb: 3.3 g/dL / Pro: 7.3 g/dL / ALK PHOS: 86 U/L / ALT: 32 U/L / AST: 24 U/L / GGT: x             RADIOLOGY & ADDITIONAL TESTS:

## 2025-04-21 NOTE — DISCHARGE NOTE PROVIDER - NSDCHHNEEDSERVICE_GEN_ALL_CORE
Central venous access care/Rehabilitation services/Wound care and assessment Central venous access care/Rehabilitation services/Teaching and training/Wound care and assessment Central venous access care/Rehabilitation services/Teaching and training/Wound care and assessment/Other, specify...

## 2025-04-21 NOTE — DISCHARGE NOTE PROVIDER - NSDCHHNEEDSERVICEOTHER_GEN_ALL_CORE_FT
Wound care orders for sacrum injury:  Cleanse and pat dry  >  Cavilon periwound  >  Santyl to area of slough  >  Triad cream to remaining areas  >  Cover with abdominal pad and paper tape   >  change daily

## 2025-04-21 NOTE — PROGRESS NOTE ADULT - ASSESSMENT
74 year old seen OOB to chair in Rehab with complains of abdominal distention and constipation a few days ago despite bowel regimen on [place. Las t BM< shot on 4/18, passing gas and improve distention.    Educated patient on Miralax  mechanism of action, patient refuses it at time worried he will have an accident during therapy   Patient agree he will take Miralax possible after therapy  Patient worries when passing gas he will have an involuntary bowel movement  Please do not add Lactulose to bowel regimen, due to gas producing possibly contributing to increase abdominal distention     	  74 year old seen OOB to chair in Rehab with complains of abdominal distention and constipation a few days ago despite bowel regimen on [place. Las t BM< shot on 4/18, passing gas and improve distention.    Educated patient on Miralax mechanism of action. Patient refused Miralax last night however he verbalized he has been having daily bms every day

## 2025-04-21 NOTE — DISCHARGE NOTE PROVIDER - CARE PROVIDERS DIRECT ADDRESSES
[email or web address]

## 2025-04-21 NOTE — PROGRESS NOTE ADULT - ASSESSMENT
Assessment/Plan:  Mr. Rishabh Shaver is a 74-year-old man with past medical history of benign prostatic hyperplasia, diverticulosis, Graves' disease, hemorrhoids, hyperlipidemia, hypothyroidism, osteoarthritis, spinal stenosis, spinal cord injury (2004), and multiple spinal surgeries (three cervical and one lumbar), who is admitted for Acute Inpatient Rehabilitation with a multidisciplinary rehab program at [facility] with functional impairments in ADLs and mobility secondary to a thoracic epidural abscess s/p posterior thoracic laminectomy and fusion on 3/18/25 with Dr. Keith Cantu.       #Thoracic epidural abscess s/p laminectomy and fusion surgery      * C8 AIS D Incomplete paraplegia      *  brace when OOB      * Flexeril 5mg PO TID PRN for neck soreness      * Staples removed on POD #14 (4/1/25)      * Sacral wound assessment by wound/skin team      * urinary retention, now voiding,  c/w Flomax 0.8mg PO daily      * Right Venodyne only due to left calf DVT (s/p pre-op IVC filter)   - Activity Limitations: Decreased social, vocational and leisure activities, decreased self care and ADLs, decreased mobility, decreased ability to manage household and finances  - Comprehensive Multidisciplinary Rehab Program:      * 3 hours a day, 5 days a week      * PT 2hr/day: Focused on improving strength, endurance, coordination, balance, functional mobility, and transfers      * OT 1hr/day: Focused on improving strength, fine motor skills, coordination, posture and ADLs  - orthotist Arya saw patient 4/17 for AFO for right foot drop and recommends b/l AFOs hinged on R and semi solid on L - order placed    -----------------------------------------------------------------------------------    Concurrent Medical Problems    #GOVIND on CKD (resolved)  #LE edema  #Hypoalbuminemia  #Proteinuria  - IVF as needed with fluid balance  - Significant debilitating anasarca   - Trial of albumin Lasix started 3/8->improvement  - s/p IV Lasix/IV albumin BID (3/9)  - Change to Bumex/Albumin (3/11)-> good effect  - Switched to PO Bumex 1mg BID 3/24  - Trend Albumin (4/17 - 3.1)  - Trend Protein total (4/17 - 6.8); Total protein, Random Urine (3/18- 24)  - Trend CMP (4/17 - Cr 1.09/ BUN 33)    #L gastrocnemius and L soleal DVT  - appears provoked from decrease ambulation from recent spinal abscess  - Could not tolerate VQ scan for r/o PE 2/2 pain  - Eliquis 5mg PO q12h - will be new med on discharge (of note patient completed Eliquis load)   - Therapeutic Lovenox (3/12) in the setting of possible spine intervention  - Last dose of AC night of 3/16. Restarted Eliquis 5mg BID 4/5/25  - S/P IVC filter placement 3/17  - repeat US 4/8 with stable L soleal DVT and no propagation     #Spinal epidural abscess POA and already being treated with IV antibiotics with suspected worsening of disease  - S/p thoracic laminectomy and fusion 3/18. [place]  brace   - Surgical culture with staph epi possible contaminant but given clinical presentation on admission, IV Dapto added  - IV Rocephin 2g daily via PICC line thru 5/2/25  - IV Daptomycin 650mg daily via PICC line thru 5/2/25  - gabapentin 100mg PO TID  - d/c long-acting Oxycontin 4/11  - oxycodone 15mg PO q6h PRN moderate pain  - Flexeril 5mg PO TID PRN for muscle spasm  - staples d/teodoro in rehab    #Abdominal discomfort and bloating  - Abd xray 4/15--as above  - reconsulted GI 4/17 awaiting recs    #Mild Rhabdomyolysis (resolved)  - associated elevated transaminases (resolved)  - suspect from anasarca/edema   - improving with adequate diuresis of edema  - associated elevated transaminase; now resolved (4/17- AST 30/ALT 38)    #Leukocytosis (resolved)  - likely rx to DVT + Discitis  - no other signs of sepsis, afebrile   - trend CBC and monitor fever curve (4/17- WBC 8.22)    #Hypothyroidism  - Synthroid 150mcg PO daily    #CAD  #HTN  #HLD  - metoprolol succinate 25mg PO daily   - rosuvastatin 5mg PO qhs    #Anemia/MARLENA  - trend H/H (4/17- 10.5/33.1)  - transfuse if Hgb <7 PRN     #Sleep:  - melatonin 3mg PO qhs PRN for sleep    #GI/Bowel:  - senna 2 tablets PO qhs  - Miralax 17g PO daily  - daily mini-enema at 6AM  - lactulose 20g PO TID PRN for constipation  - Maalox 30mL PO q4h PRN for dyspepsia  - GI ppx: pantoprazole 40mg PO daily   - added simethicone 80mg PO TID PRN for gas 4/14    #BPH  #Urinary retention likely augmented by degree of anasarca  - S/P Ortega; TOV  ->failed. Ortega re-inserted 3/6->fell out without knowing with associated hematuria->recurrent retention 3/7->Ortega re-inserted (3/7) --> successful TOV 3/25  - Flomax 0.8mg PO qhs  - started bethanechol 10mg PO BID 4/6  - Monitor U/O    #Skin/Pressure Injury:   Skin assessment on admission:   - Generalized dry flaky skin  - 19cm posterior cervical incision with 32 staples and 2 steri strips  - Left buttock DTI pressure injury measuring 14 x 9 cm      * appearing as a dark purple discoloration of intact skin, with a central superficial open area measuring 6 x 2 cm      * at the periphery of the 14 x 9 cm area, the discoloration is somewhat lighter      * dark red non-blanchable erythema      * eschar resolved     **Santyl to slough of sacrum daily**  - Bilateral lower extremity edema  - Healed lumbar surgical incision scar  - Right heel blanchable redness  - Bilateral dry cracked heels and plantar surface  - Lac hydrin BID BLE  - Aquaphor BID to generalized dry skin   - Appreciate wound care consult     #Diet/Dysphagia:  - Dysphagia: SLP consult for swallow function evaluation and treatment  - Current Diet: Regular  - Aspiration Precautions  - Nutrition consult     #Patient Education  - Education provided on the following:      * Admitting diagnosis and functional implications      * Functional goals      * Bladder management      * Bowel management      * Skin care management      * Intensity of service and scheduling of rehab disciplines      * Plan of care and role of interdisciplinary team conference in discharge planning      * Reconciliation of medications from prior institution    #Precautions / PROPHYLAXIS:   - Falls, Spinal  - Ortho: Weight bearing status: FWB;  brace OOB  - DVT ppx: Eliquis 5mg PO BID, TEDs  - Pressure injury/Skin: Turn Q2hrs while in bed, OOB to Chair, PT/OT      ---------------  Outpatient Follow-up:    Yue Wiggins  [facility]  [address] [facility], [address]  [place] [address]  Phone: [phone]  Fax: [phone]  Established Patient  Follow Up Time: 1 week    Keith Cantu [facility]  Neurosurgery  [address]  [place] [address]  Phone: [phone]  Fax: [phone]  Established Patient  Follow Up Time: 2 weeks    Luis A Brennan  [facility] Physician [facility]  CARDIOLOGY [address]  Scheduled Appointment: 04/23/2025    -------------- Assessment/Plan:  Mr. Rishabh Shaver is a 74-year-old man with past medical history of benign prostatic hyperplasia, diverticulosis, Graves' disease, hemorrhoids, hyperlipidemia, hypothyroidism, osteoarthritis, spinal stenosis, spinal cord injury (2004), and multiple spinal surgeries (three cervical and one lumbar), who is admitted for Acute Inpatient Rehabilitation with a multidisciplinary rehab program at [facility] with functional impairments in ADLs and mobility secondary to a thoracic epidural abscess s/p posterior thoracic laminectomy and fusion on 3/18/25 with Dr. Keith Cantu.       #Thoracic epidural abscess s/p laminectomy and fusion surgery      * C8 AIS D Incomplete paraplegia      *  brace when OOB      * Flexeril 5mg PO TID PRN for neck soreness      * Staples removed on POD #14 (4/1/25)      * Sacral wound assessment by wound/skin team      * urinary retention, now voiding,  c/w Flomax 0.8mg PO daily      * Right Venodyne only due to left calf DVT (s/p pre-op IVC filter)   - Activity Limitations: Decreased social, vocational and leisure activities, decreased self care and ADLs, decreased mobility, decreased ability to manage household and finances  - Comprehensive Multidisciplinary Rehab Program:      * 3 hours a day, 5 days a week      * PT 2hr/day: Focused on improving strength, endurance, coordination, balance, functional mobility, and transfers      * OT 1hr/day: Focused on improving strength, fine motor skills, coordination, posture and ADLs  - orthotist Arya saw patient 4/17 for AFO for right foot drop and recommends b/l AFOs hinged on R and semi solid on L - order placed    -----------------------------------------------------------------------------------    Concurrent Medical Problems    #GOVIND on CKD (resolved)  #LE edema  #Hypoalbuminemia  #Proteinuria  - IVF as needed with fluid balance  - Significant debilitating anasarca   - Trial of albumin Lasix started 3/8->improvement  - s/p IV Lasix/IV albumin BID (3/9)  - Change to Bumex/Albumin (3/11)-> good effect  - Switched to PO Bumex 1mg BID 3/24  - Trend Albumin (4/17 - 3.1)  - Trend Protein total (4/17 - 6.8); Total protein, Random Urine (3/18- 24)  - Trend CMP (4/17 - Cr 1.09/ BUN 33)    #L gastrocnemius and L soleal DVT  - appears provoked from decrease ambulation from recent spinal abscess  - Could not tolerate VQ scan for r/o PE 2/2 pain  - Eliquis 5mg PO q12h - will be new med on discharge (of note patient completed Eliquis load)   - Therapeutic Lovenox (3/12) in the setting of possible spine intervention  - Last dose of AC night of 3/16. Restarted Eliquis 5mg BID 4/5/25  - S/P IVC filter placement 3/17  - repeat US 4/8 with stable L soleal DVT and no propagation     #Spinal epidural abscess POA and already being treated with IV antibiotics with suspected worsening of disease  - S/p thoracic laminectomy and fusion 3/18. [place]  brace   - Surgical culture with staph epi possible contaminant but given clinical presentation on admission, IV Dapto added  - IV Rocephin 2g daily via PICC line thru 5/2/25  - IV Daptomycin 650mg daily via PICC line thru 5/2/25  - gabapentin 100mg PO TID  - d/c long-acting Oxycontin 4/11  - oxycodone 15mg PO q6h PRN moderate pain  - Flexeril 5mg PO TID PRN for muscle spasm  - staples d/teodoro in rehab    #Abdominal discomfort and bloating  - Abd xray 4/15--as above  - reconsulted GI 4/17 awaiting recs    #Mild Rhabdomyolysis (resolved)  - associated elevated transaminases (resolved)  - suspect from anasarca/edema   - improving with adequate diuresis of edema  - associated elevated transaminase; now resolved (4/17- AST 30/ALT 38)    #Leukocytosis (resolved)  - likely rx to DVT + Discitis  - no other signs of sepsis, afebrile   - trend CBC and monitor fever curve (4/17- WBC 8.22)    #Hypothyroidism  - Synthroid 150mcg PO daily    #CAD  #HTN  #HLD  - metoprolol succinate 25mg PO daily   - rosuvastatin 5mg PO qhs    #Anemia/MARLENA  - trend H/H (4/17- 10.5/33.1)  - transfuse if Hgb <7 PRN     #Sleep:  - melatonin 3mg PO qhs PRN for sleep    #GI/Bowel:  - senna 2 tablets PO qhs  - Miralax 17g PO daily  - daily mini-enema at 6AM  - lactulose 20g PO TID PRN for constipation  - Maalox 30mL PO q4h PRN for dyspepsia  - GI ppx: pantoprazole 40mg PO daily   - added simethicone 80mg PO TID PRN for gas 4/14    #BPH  #Urinary retention likely augmented by degree of anasarca  - S/P Ortega; TOV  ->failed. Ortega re-inserted 3/6->fell out without knowing with associated hematuria->recurrent retention 3/7->Ortega re-inserted (3/7) --> successful TOV 3/25  - Flomax 0.8mg PO qhs  - started bethanechol 10mg PO BID 4/6  - Monitor U/O    #Skin/Pressure Injury:   Skin assessment on admission:   - Generalized dry flaky skin  - 19cm posterior cervical incision with 32 staples and 2 steri strips  - Left buttock DTI pressure injury measuring 14 x 9 cm      * appearing as a dark purple discoloration of intact skin, with a central superficial open area measuring 6 x 2 cm      * at the periphery of the 14 x 9 cm area, the discoloration is somewhat lighter      * dark red non-blanchable erythema      * eschar resolved     **Santyl to slough of sacrum daily**  - Bilateral lower extremity edema  - Healed lumbar surgical incision scar  - Right heel blanchable redness  - Bilateral dry cracked heels and plantar surface  - Lac hydrin BID BLE  - Aquaphor BID to generalized dry skin   - Appreciate wound care consult     #Diet/Dysphagia:  - Dysphagia: SLP consult for swallow function evaluation and treatment  - Current Diet: Regular  - Aspiration Precautions  - Nutrition consult     #Patient Education  - Education provided on the following:      * Admitting diagnosis and functional implications      * Functional goals      * Bladder management      * Bowel management      * Skin care management      * Intensity of service and scheduling of rehab disciplines      * Plan of care and role of interdisciplinary team conference in discharge planning      * Reconciliation of medications from prior institution    #Precautions / PROPHYLAXIS:   - Falls, Spinal  - Ortho: Weight bearing status: FWB;  brace OOB  - DVT ppx: Eliquis 5mg PO BID, TEDs  - Pressure injury/Skin: Turn Q2hrs while in bed, OOB to Chair, PT/OT      ---------------  Outpatient Follow-up:    Yue Wiggins  [facility]  [address], [address]  [place] [address]  Phone: [phone]  Fax: [phone]  Established Patient  Follow Up Time: 1 week    Keith Cantu [facility]  Neurosurgery  [address]  [place] [address]  Phone: [phone]  Fax: [phone]  Established Patient  Follow Up Time: 2 weeks    Luis A Brennan  [facility]  CARDIOLOGY [address]  Scheduled Appointment: 04/23/2025    --------------  Wheelchair recommendation    Patient seen today for a face to face visit for an ultra-lightweight manual wheelchair in the home. Patient cannot independently walk in his home using any ambulation device.  He requires a manual wheelchair so they can perform their mobility related activities of daily living (MRADL), like getting to the bathroom for hygiene, the kitchen for eating and the bedroom for resting. Patient requires an ultra lightweight manual wheelchair because he cannot walk and has weakness and endurance issues that require him to need an ultra lightweight manual wheelchair with camber and an axle plate that brings the wheels forward to allow him to reach them effectively and without pain.   Assessment/Plan:  Mr. Rishabh Shaver is a 74-year-old man with past medical history of benign prostatic hyperplasia, diverticulosis, Graves' disease, hemorrhoids, hyperlipidemia, hypothyroidism, osteoarthritis, spinal stenosis, spinal cord injury (2004), and multiple spinal surgeries (three cervical and one lumbar), who is admitted for Acute Inpatient Rehabilitation with a multidisciplinary rehab program at [facility] with functional impairments in ADLs and mobility secondary to a thoracic epidural abscess s/p posterior thoracic laminectomy and fusion on 3/18/25 with Dr. Keith Cantu.       #Thoracic epidural abscess s/p laminectomy and fusion surgery      * C8 AIS D Incomplete paraplegia      *  brace when OOB      * Flexeril 5mg PO TID PRN for neck soreness      * Staples removed on POD #14 (4/1/25)      * Sacral wound assessment by wound/skin team      * urinary retention, now voiding,  c/w Flomax 0.8mg PO daily      * Right Venodyne only due to left calf DVT (s/p pre-op IVC filter)   - Activity Limitations: Decreased social, vocational and leisure activities, decreased self care and ADLs, decreased mobility, decreased ability to manage household and finances  - Comprehensive Multidisciplinary Rehab Program:      * 3 hours a day, 5 days a week      * PT 2hr/day: Focused on improving strength, endurance, coordination, balance, functional mobility, and transfers      * OT 1hr/day: Focused on improving strength, fine motor skills, coordination, posture and ADLs  - orthotist Arya saw patient 4/17 for AFO for right foot drop and recommends b/l AFOs hinged on R and semi solid on L - order placed    -----------------------------------------------------------------------------------    Concurrent Medical Problems    #GOVIND on CKD (resolved)  #LE edema  #Hypoalbuminemia  #Proteinuria  - IVF as needed with fluid balance  - Significant debilitating anasarca   - Trial of albumin Lasix started 3/8->improvement  - s/p IV Lasix/IV albumin BID (3/9)  - Change to Bumex/Albumin (3/11)-> good effect  - Switched to PO Bumex 1mg BID 3/24  - Trend Albumin (4/17 - 3.1)  - Trend Protein total (4/17 - 6.8); Total protein, Random Urine (3/18- 24)  - Trend CMP (4/17 - Cr 1.09/ BUN 33)    #L gastrocnemius and L soleal DVT  - appears provoked from decrease ambulation from recent spinal abscess  - Could not tolerate VQ scan for r/o PE 2/2 pain  - Eliquis 5mg PO q12h - will be new med on discharge (of note patient completed Eliquis load)   - Therapeutic Lovenox (3/12) in the setting of possible spine intervention  - Last dose of AC night of 3/16. Restarted Eliquis 5mg BID 4/5/25  - S/P IVC filter placement 3/17  - repeat US 4/8 with stable L soleal DVT and no propagation     #Spinal epidural abscess POA and already being treated with IV antibiotics with suspected worsening of disease  - S/p thoracic laminectomy and fusion 3/18. [place]  brace   - Surgical culture with staph epi possible contaminant but given clinical presentation on admission, IV Dapto added  - IV Rocephin 2g daily via PICC line thru 5/2/25  - IV Daptomycin 650mg daily via PICC line thru 5/2/25  - gabapentin 100mg PO TID  - d/c long-acting Oxycontin 4/11  - oxycodone 15mg PO q6h PRN moderate pain  - Flexeril 5mg PO TID PRN for muscle spasm  - staples d/teodoro in rehab    #Abdominal discomfort and bloating  - Abd xray 4/15--as above  - reconsulted GI 4/17 awaiting recs    #Mild Rhabdomyolysis (resolved)  - associated elevated transaminases (resolved)  - suspect from anasarca/edema   - improving with adequate diuresis of edema  - associated elevated transaminase; now resolved (4/17- AST 30/ALT 38)    #Leukocytosis (resolved)  - likely rx to DVT + Discitis  - no other signs of sepsis, afebrile   - trend CBC and monitor fever curve (4/17- WBC 8.22)    #Hypothyroidism  - Synthroid 150mcg PO daily    #CAD  #HTN  #HLD  - metoprolol succinate 25mg PO daily   - rosuvastatin 5mg PO qhs    #Anemia/MARLENA  - trend H/H (4/17- 10.5/33.1)  - transfuse if Hgb <7 PRN     #Sleep:  - melatonin 3mg PO qhs PRN for sleep    #GI/Bowel:  - senna 2 tablets PO qhs  - Miralax 17g PO daily  - daily mini-enema at 6AM  - lactulose 20g PO TID PRN for constipation  - Maalox 30mL PO q4h PRN for dyspepsia  - GI ppx: pantoprazole 40mg PO daily   - added simethicone 80mg PO TID PRN for gas 4/14    #BPH  #Urinary retention likely augmented by degree of anasarca  - S/P Ortega; TOV  ->failed. Ortega re-inserted 3/6->fell out without knowing with associated hematuria->recurrent retention 3/7->Ortega re-inserted (3/7) --> successful TOV 3/25  - Flomax 0.8mg PO qhs  - started bethanechol 10mg PO BID 4/6  - Monitor U/O    #Skin/Pressure Injury:   Skin assessment on admission:   - Generalized dry flaky skin  - 19cm posterior cervical incision with 32 staples and 2 steri strips  - Left buttock DTI pressure injury measuring 14 x 9 cm      * appearing as a dark purple discoloration of intact skin, with a central superficial open area measuring 6 x 2 cm      * at the periphery of the 14 x 9 cm area, the discoloration is somewhat lighter      * dark red non-blanchable erythema      * eschar resolved     **Santyl to slough of sacrum daily**  - Bilateral lower extremity edema  - Healed lumbar surgical incision scar  - Right heel blanchable redness  - Bilateral dry cracked heels and plantar surface  - Lac hydrin BID BLE  - Aquaphor BID to generalized dry skin   - Appreciate wound care consult     #Diet/Dysphagia:  - Dysphagia: SLP consult for swallow function evaluation and treatment  - Current Diet: Regular  - Aspiration Precautions  - Nutrition consult     #Patient Education  - Education provided on the following:      * Admitting diagnosis and functional implications      * Functional goals      * Bladder management      * Bowel management      * Skin care management      * Intensity of service and scheduling of rehab disciplines      * Plan of care and role of interdisciplinary team conference in discharge planning      * Reconciliation of medications from prior institution    #Precautions / PROPHYLAXIS:   - Falls, Spinal  - Ortho: Weight bearing status: FWB;  brace OOB  - DVT ppx: Eliquis 5mg PO BID, TEDs  - Pressure injury/Skin: Turn Q2hrs while in bed, OOB to Chair, PT/OT      ---------------  Outpatient Follow-up:    Yue Wiggins  [facility]  [address], [address]  [place] [address]  Phone: [phone]  Fax: [phone]  Established Patient  Follow Up Time: 1 week    Keith Cantu [facility]  Neurosurgery  [address]  [place] [address]  Phone: [phone]  Fax: [phone]  Established Patient  Follow Up Time: 2 weeks    Luis A Brennan  [facility] Physician [facility]  CARDIOLOGY [address]  Scheduled Appointment: 04/23/2025    --------------  Wheelchair recommendation    Patient seen today for a face to face visit for an ultra-lightweight manual wheelchair in the home. Patient cannot independently walk in his home using any ambulation device.  He requires a manual wheelchair so they can perform their mobility related activities of daily living (MRADL), like getting to the bathroom for hygiene, the kitchen for eating and the bedroom for resting. Patient requires an ultra lightweight manual wheelchair because he cannot walk and has weakness and endurance issues that require him to need an ultra lightweight manual wheelchair with camber and an axle plate that brings the wheels forward to allow him to reach them effectively and without pain.      --------------  AFO recommendation    Patient presents with bilateral lower extremity weakness and foot drop due to nerve compression. Patient has instability as the patient’s feet scuff the ground during ambulation and is at risk for falls. Regarding the right side, in addition to foot drop and weakness, patient presents with inversion and genu recurvatum. The knee hyperextends. Patient requires a custom articulating AFO to address the foot drop, inversion, and genu recurvatum. Regarding the left side, in addition to foot drop and weakness, patient presents with genu recurvatum. Patient requires a custom semi solid AFO to allow for safe ambulation.    --------------

## 2025-04-21 NOTE — DISCHARGE NOTE PROVIDER - NSDCMRMEDTOKEN_GEN_ALL_CORE_FT
acetaminophen 325 mg oral tablet: 2 tab(s) orally every 6 hours As needed Temp greater or equal to 38C (100.4F), Mild Pain (1 - 3)  AFOs: Bilateral AFOs  Hinged Right  Semi Solid Left  aluminum hydroxide-magnesium hydroxide 200 mg-200 mg/5 mL oral suspension: 30 milliliter(s) orally every 4 hours As needed Dyspepsia  ammonium lactate 12% topical lotion: 1 Apply topically to affected area 2 times a day  apixaban 5 mg oral tablet: 1 tab(s) orally every 12 hours  bethanechol 10 mg oral tablet: 1 tab(s) orally 2 times a day  bumetanide 1 mg oral tablet: 1 tab(s) orally 2 times a day  cefTRIAXone 2 g injection: 2 gram(s) intravenously once a day (at bedtime) Last dose 5/2/2025  cefTRIAXone 2 g/50 mL-iso-osmotic dextrose intravenous solution: 2 gram(s) intravenously every 24 hours IV intermittent via PICC line;  infuse over 30 minutes;  Weekly CBC, CMP, ESR, CRP;  Complete therapy on 5/2/25; Follow-up with Dr. Cortez Russ at [facility], [address] [place] [address], [place] [address]; Phone: [phone]  collagenase 250 units/g topical ointment: 1 Apply topically to affected area once a day  Crestor 5 mg oral tablet: 1 tab(s) orally once a day (at bedtime)  cyclobenzaprine 5 mg oral tablet: 1 tab(s) orally 3 times a day As needed Muscle Spasm  DAPTOmycin 1000 mg/100 mL-NaCl 0.9% intravenous solution: 65 milliliter(s) intravenously every 24 hours IV intermittent via PICC line;  infuse over 30 minutes;  Weekly CBC, CMP, ESR, CRP;  Complete therapy on 5/2/25; Follow-up with Dr. Cortez Russ at [facility], [address] [facility], [address] [place] [address]; Phone: [phone]  DAPTOmycin 500 mg intravenous injection: 650 milligram(s) intravenous every 24 hours last dose 5/2/2025  docusate 283 mg/5 mL rectal enema: 1 each rectal once a day  gabapentin: 100 milligram(s) orally 3 times a day  levothyroxine 150 mcg (0.15 mg) oral tablet: 1 tab(s) orally once a day  magnesium hydroxide 8% oral suspension: 30 milliliter(s) orally once a day (at bedtime)  melatonin 5 mg oral tablet: 2 tab(s) orally once a day (at bedtime)  metoprolol succinate 25 mg oral tablet, extended release: 1 tab(s) orally once a day  oxyCODONE 15 mg oral tablet: 1 tab(s) orally every 4 hours As needed Moderate Pain (4 - 6)  pantoprazole 40 mg oral delayed release tablet: 1 tab(s) orally once a day (before a meal)  petrolatum topical ointment: 1 Apply topically to affected area 3 times a day  polyethylene glycol 3350 oral powder for reconstitution: 17 gram(s) orally 2 times a day  senna leaf extract oral tablet: 2 tab(s) orally once a day (at bedtime)  simethicone 80 mg oral tablet, chewable: 1 tab(s) orally 3 times a day As needed Gas  tamsulosin 0.4 mg oral capsule: 2 cap(s) orally once a day (at bedtime)  Vitamin D3 2000 intl units oral capsule: 1 cap(s) orally once a day   acetaminophen 325 mg oral tablet: 2 tab(s) orally every 6 hours as needed for Temp greater or equal to 38C (100.4F), Mild Pain (1 - 3)  AFOs: Bilateral AFOs  Hinged Right  Semi Solid Left  aluminum hydroxide-magnesium hydroxide 200 mg-200 mg/5 mL oral suspension: 30 milliliter(s) orally every 4 hours as needed for Dyspepsia  ammonium lactate 12% topical lotion: 1 Apply topically to affected area 2 times a day  apixaban 5 mg oral tablet: 1 tab(s) orally every 12 hours  bethanechol 10 mg oral tablet: 1 tab(s) orally 2 times a day  bumetanide 1 mg oral tablet: 1 tab(s) orally 2 times a day  cefTRIAXone 2 g injection: 2 gram(s) intravenously once a day (at bedtime) Last dose 5/2/2025  cefTRIAXone 2 g/50 mL-iso-osmotic dextrose intravenous solution: 2 gram(s) intravenously every 24 hours IV intermittent via PICC line;  infuse over 30 minutes;  Weekly CBC, CMP, ESR, CRP;  Complete therapy on 5/2/25; Follow-up with Dr. Cortez Russ at [facility], [address] [place] [address], [address] [place] [address]; Phone: [phone]  cholecalciferol 50 mcg (2000 intl units) oral capsule: 1 cap(s) orally once a day  collagenase 250 units/g topical ointment: Apply topically to affected area once a day 1 Apply topically to affected area once a day  Crestor 5 mg oral tablet: 1 tab(s) orally once a day (at bedtime)  cyclobenzaprine 5 mg oral tablet: 1 tab(s) orally 3 times a day as needed for Muscle Spasm  DAPTOmycin 1000 mg/100 mL-NaCl 0.9% intravenous solution: 65 milliliter(s) intravenously every 24 hours IV intermittent via PICC line;  infuse over 30 minutes;  Weekly CBC, CMP, ESR, CRP;  Complete therapy on 5/2/25; Follow-up with Dr. Cortez Russ at [facility], [address] [place] [address], [address] [place] [address]; Phone: [phone]  DAPTOmycin 500 mg intravenous injection: 650 milligram(s) intravenous every 24 hours last dose 5/2/2025  docusate 283 mg/5 mL rectal enema: 1 each rectal once a day  gabapentin: 100 milligram(s) orally 3 times a day  gabapentin 100 mg oral capsule: 1 cap(s) orally 3 times a day  levothyroxine 150 mcg (0.15 mg) oral tablet: 1 tab(s) orally once a day  magnesium hydroxide 8% oral suspension: 30 milliliter(s) orally once a day (at bedtime)  melatonin 5 mg oral tablet: 2 tab(s) orally once a day (at bedtime)  metoprolol succinate 25 mg oral tablet, extended release: 1 tab(s) orally once a day  oxyCODONE 15 mg oral tablet: 1 tab(s) orally every 4 hours As needed Moderate Pain (4 - 6)  pantoprazole 40 mg oral delayed release tablet: 1 tab(s) orally once a day (before a meal)  petrolatum topical ointment: Apply topically to affected area 3 times a day 1 Apply topically to affected area 3 times a day  polyethylene glycol 3350 oral powder for reconstitution: 17 gram(s) orally 2 times a day  senna leaf extract oral tablet: 2 tab(s) orally once a day (at bedtime)  simethicone 80 mg oral tablet, chewable: 1 tab(s) orally 3 times a day as needed for Gas  tamsulosin 0.4 mg oral capsule: 2 cap(s) orally once a day (at bedtime)   acetaminophen 325 mg oral tablet: 2 tab(s) orally every 6 hours as needed for Temp greater or equal to 38C (100.4F), Mild Pain (1 - 3)  AFOs: Bilateral AFOs  Hinged Right  Semi Solid Left  ammonium lactate 12% topical lotion: Apply topically to affected area 2 times a day  apixaban 5 mg oral tablet: 1 tab(s) orally every 12 hours  bumetanide 0.5 mg oral tablet: 1 tab(s) orally once a day  cefTRIAXone 2 g injection: 2 gram(s) intravenously once a day (at bedtime) Last dose 5/2/2025  collagenase 250 units/g topical ointment: Apply topically to affected area 2 times a day  DAPTOmycin 500 mg intravenous injection: 650 milligram(s) intravenous every 24 hours last dose 5/2/2025  Docusate Mini 283 mg/5 mL rectal enema: 1 each rectally once a day  gabapentin 100 mg oral capsule: 1 cap(s) orally 3 times a day  magnesium hydroxide 8% oral suspension: 30 milliliter(s) orally once a day (at bedtime)  melatonin 3 mg oral tablet: 1 tab(s) orally once a day (at bedtime) As needed Insomnia  metoprolol succinate 25 mg oral tablet, extended release: 1 tab(s) orally once a day  naloxone 4 mg/0.1 mL nasal spray: 1 spray(s) intranasally once a day as needed for  opioid overdose  oxyCODONE 5 mg oral tablet: 1 tab(s) orally every 6 hours as needed for  severe pain MDD: 4 tabs  petrolatum topical ointment: 1 Apply topically to affected area 3 times a day  polyethylene glycol 3350 oral powder for reconstitution: 17 gram(s) orally 2 times a day  rosuvastatin 5 mg oral tablet: 1 tab(s) orally once a day (at bedtime)  senna leaf extract oral tablet: 2 tab(s) orally once a day (at bedtime)  tamsulosin 0.4 mg oral capsule: 2 cap(s) orally once a day (at bedtime)   acetaminophen 325 mg oral tablet: 2 tab(s) orally every 6 hours as needed for Temp greater or equal to 38C (100.4F), Mild Pain (1 - 3)  AFOs: Bilateral AFOs  Hinged Right  Semi Solid Left  ammonium lactate 12% topical lotion: Apply topically to affected area 2 times a day  apixaban 5 mg oral tablet: 1 tab(s) orally every 12 hours  benzonatate 100 mg oral capsule: 1 cap(s) orally 3 times a day  bumetanide 0.5 mg oral tablet: 1 tab(s) orally once a day  cefTRIAXone 2 g injection: 2 gram(s) intravenously once a day (at bedtime) Last dose 5/2/2025  collagenase 250 units/g topical ointment: Apply topically to affected area 2 times a day  DAPTOmycin 500 mg intravenous injection: 650 milligram(s) intravenous every 24 hours last dose 5/2/2025  Docusate Mini 283 mg/5 mL rectal enema: 1 each rectally once a day  gabapentin 100 mg oral capsule: 1 cap(s) orally 3 times a day  magnesium hydroxide 8% oral suspension: 30 milliliter(s) orally once a day (at bedtime)  melatonin 3 mg oral tablet: 1 tab(s) orally once a day (at bedtime) As needed Insomnia  metoprolol succinate 25 mg oral tablet, extended release: 1 tab(s) orally once a day  naloxone 4 mg/0.1 mL nasal spray: 1 spray(s) intranasally once a day as needed for  opioid overdose  oxyCODONE 5 mg oral tablet: 1 tab(s) orally every 6 hours as needed for  severe pain MDD: 4 tabs  petrolatum topical ointment: 1 Apply topically to affected area 3 times a day  polyethylene glycol 3350 oral powder for reconstitution: 17 gram(s) orally 2 times a day  rosuvastatin 5 mg oral tablet: 1 tab(s) orally once a day (at bedtime)  senna leaf extract oral tablet: 2 tab(s) orally once a day (at bedtime)  tamsulosin 0.4 mg oral capsule: 2 cap(s) orally once a day (at bedtime)

## 2025-04-21 NOTE — PROGRESS NOTE ADULT - ASSESSMENT
74-year-old male patient with h/o BPH, diverticulosis, Graves' disease, hemorrhoids, HLD, hypothyroidism, osteoarthritis, spinal stenosis, spinal cord injury (2004), and multiple spinal surgeries (three cervical and one lumbar), who presented to [facility] on 3/4/25 from [facility] due to lower extremity swelling and abnormal BUN/creatinine levels for further evaluation and management. The patient was recently admitted to [facility] from 02/18/25 through 02/25/25 for sepsis secondary to epidural abscess. He had a peripherally inserted central catheter line in the right arm and was on nightly Ceftriaxone treatment until 04/16/25. At the ED, he was found to have GOVIND, a left soleal vein, and a left gastrocnemius DVT. He was started on heparin gtt. Hospital course complicated by urinary retention requiring Ortega catheter insertion, rhabdomyolysis, elevated LFTs, anasarca, leukocytosis due to thoracic spine discitis OM and early paraspinal abscess, as well as new onset RUE paresthesias and weakness. He is s/p IVC filter placement on 3/17 and was recommended surgical management. He is s/p posterior thoracic laminectomy and fusion on 3/18/25 with Dr. Keith Cantu. Surgical culture with rare staph epi. Recommendations by ID to continue IV Rocephin and Daptomycin through 5/2/25. Patient was evaluated by PM&R and therapy for functional deficits, gait/ADL impairments and acute rehabilitation was recommended. Patient was cleared for discharge to [facility] IRF on 3/25/25. (25 Mar 2025 17:09)    Constipation abdominal  distention and dyspepsia  - on senna, Docusol enema daily since april 2.   - abd xray on 4/2: Moderate to large stool burden. No bowel obstruction.  - Abd xray today 4/15: improvement with residual stool burden  - Miralax BID. Milk of magnesia at h/s. . senna at h.s  - consider to change Ducusol enema daily to mineral oil enema daily at h/s  - c/w maalox PRN, simethicone  - Dced lactulose TID PRN as it can cause bloating  - GI recs appreciated: Miralax BID, Avoid lactulose - gas producing; Encourage patient to pass flatus; Consider non gas producing foods; Avoid opioids for pain management; mobilize    Debility  Thoracic epidural abscess s/p laminectomy and fusion surgery on 3/18  -  brace  - pain control per primary. avoid opioids for constipation  - fall, spine precautions    Spinal epidural abscess   - S/p thoracic laminectomy and fusion 3/18.   - IV Rocephin 2g HS via PICC line thru 5/2/25  - IV Daptomycin 650mg daily via PICC line thru 5/2/2025  - [place]  brace   - Surgical culture with staph epi possible contaminant but given clinical presentation on admission, IV Dapto added.   - CPK 4/20 WNL    GOVIND, resolved  LE edema  Hypoalbuminemia  Proteinuria  Anasarca  - c/w Bumex 1 mg BID (4/4 missed the past few doses due to hold parameters.  reordered with hold for SBP <100 instructions).   - Optimize protein intake.   - mobilize, encourage out of bed to chair often.   - Monitor labs closely , stable renal function w good UOP   - Serum workup negative but will need follow up of  Serum  TRUDY positive for weak IgG Lambda band.  - urine protein 200 mg only ( not nephrotic)   - daily standing weights    Transaminitis, resolved  - encouraged PO  - Limit Tylenol use  - avoid hepatotoxins  - monitor on routine labs    CAD  HTN  HLD  - Metoprolol 25mg daily   - Crestor 5mg HS    Anemia  MARLENA  - s/p 2 units of PRBCs given intra-op  - Monitor Hgb (3/31 Hgb 8.3)  - Transfuse to keep hb >7    Hypothyroidism  - Synthroid 150mcg daily  - TSH mildly elevated    sacral Decub stage 3  - wound care cx noted  - Continue turning patient Q 2 hours   - Lac hydrin BID BLE  - Aquaphor BID to generalized dry skin     BPH, chronic  Urinary retention likely augmented by degree of anasarca  - Flomax 8mg daily  - Monitor U/O  - Treat constipation  - encourage PO hydration    Acute DVT:   - US doppler + L soleal and gastroc DVT   - s/p IVC filter 3/17 with interventional cards.   - Could not tolerate VQ scan for r/o PE 2/2 pain  - Restarted Eliquis 5mg BID per neurosurgery    DVT-P: Eliquis 74M with PMH of BPH, diverticulosis, Graves' disease, hemorrhoids, HLD, hypothyroidism, osteoarthritis, spinal stenosis, spinal cord injury (2004), multiple spinal surgeries, CAD, and MARLENA. Presented 3/4/25 from SNF with LE swelling and GOVIND. Found to have left soleal and gastroc DVTs, spinal epidural abscess ? s/p thoracic laminectomy/fusion on 3/18. PICC in place for IV Rocephin + Daptomycin through 5/2. Admitted to [facility] on 3/25 for debility.    1. Constipation, Abdominal Distention, Dyspepsia    Abd X-ray 4/2: moderate-large stool burden; improved on 4/15    Senna + Docusol enema daily since 4/2    Now on Miralax BID, MOM and senna at HS    2. Debility / Postoperative Status    s/p thoracic epidural abscess ? laminectomy/fusion (3/18)    Fall/spine precautions,  brace    Pain management per primary; avoid opioids    3. Spinal Epidural Abscess – s/p Laminectomy/Fusion (3/18)    IV Rocephin 2g QHS + IV Daptomycin 650mg daily via PICC through 5/2    [place]  brace in use    Surgical cultures: rare staph epi (possible contaminant)    CPK 4/20 WNL    4. GOVIND – Resolved / Anasarca / Hypoalbuminemia / LE Edema    Bumex 1mg BID resumed (held previously for SBP<100)    Monitor renal function and I/Os — currently stable    Serum TRUDY: weak IgG lambda band (needs OP f/u)    Urine protein 200 mg (not nephrotic)    5. Transaminitis – Resolved    Limit Tylenol, avoid hepatotoxins    Encourage PO intake    Monitor LFTs with routine labs    6. CAD / HTN / HLD    Metoprolol 25 mg daily    Crestor 5 mg QHS    7. Anemia / MARLENA    s/p 2 units PRBCs intra-op    Monitor CBC (Hgb 8.3 on 3/31)    Transfuse PRN to maintain Hgb >7    8. Hypothyroidism    Synthroid 150 mcg daily    9. Sacral Decubitus Ulcer – Stage 3    Ongoing wound care    Turn Q2h    Lac-Hydrin BID to BLE, Aquaphor BID to dry skin    10. BPH / Chronic Urinary Retention    Likely worsened by anasarca    On Flomax 8mg daily    Monitor U/O, treat constipation    Encourage PO hydration    11. Acute DVT (L Soleal & Gastroc Veins)    s/p IVC filter 3/17 (couldn’t tolerate VQ scan)    Eliquis 5 mg BID resumed per neurosurgery guidance    12. DVT Prophylaxis    Eliquis BID also serves as DVT-P

## 2025-04-21 NOTE — DISCHARGE NOTE PROVIDER - HOSPITAL COURSE
HPI:  Mr. Rishabh Shaver is a 74-year-old male patient with past medical history of benign prostatic hyperplasia, diverticulosis, Graves' disease, hemorrhoids, hyperlipidemia, hypothyroidism, osteoarthritis, spinal stenosis, spinal cord injury (2004), and multiple spinal surgeries (three cervical and one lumbar), who presented to [facility] on 3/4/25 from [facility] due to lower extremity swelling and abnormal BUN/creatinine levels for further evaluation and management. The patient was recently admitted to [facility] from 02/18/25 through 02/25/25 for sepsis secondary to epidural abscess. He had a peripherally inserted central catheter line in the right arm and was on nightly Ceftriaxone treatment until 04/16/25. At the ED, he was found to have GOVIND, a left soleal vein, and a left gastrocnemius DVT. He was started on heparin gtt. Hospital course complicated by urinary retention requiring Ortega catheter insertion, rhabdomyolysis, elevated LFTs, anasarca, leukocytosis due to thoracic spine discitis OM and early paraspinal abscess, as well as new onset RUE paresthesias and weakness. He is s/p IVC filter placement on 3/17 and was recommended surgical management. He is s/p posterior thoracic laminectomy and fusion on 3/18/25 with Dr. Keith Cantu. Surgical culture with rare staph epi. Recommendations by ID to continue IV Rocephin and Daptomycin through 5/2/25. Patient was evaluated by PM&R and therapy for functional deficits, gait/ADL impairments and acute rehabilitation was recommended. Patient was cleared for discharge to [facility] IRF on 3/25/25. (25 Mar 2025 17:09)      Rehab course significant for removal of staples on 4/1 without incident, urinary retention (but did not fully resolve) with Flomax, and persistent L gastrocnemius/soleal DVT that remained stable without propagation. Course was also significant for abdominal discomfort and bloating for which he was seen by GI and simethicone was started with mild improvement in symptoms. He also experienced constipation for which a daily mini-enema was started with good results.    All other medical co-morbidities were stable. Patient tolerated course of inpatient PT/OT/SLP rehab with significant improvements and met rehab goals prior to discharge. Patient was medically cleared on 4/22/25 for discharge to home. HPI:  Mr. Rishahb Shaver is a 74-year-old male patient with past medical history of benign prostatic hyperplasia, diverticulosis, Graves' disease, hemorrhoids, hyperlipidemia, hypothyroidism, osteoarthritis, spinal stenosis, spinal cord injury (2004), and multiple spinal surgeries (three cervical and one lumbar), who presented to [facility] on 3/4/25 from [facility] due to lower extremity swelling and abnormal BUN/creatinine levels for further evaluation and management. The patient was recently admitted to [facility] from 02/18/25 through 02/25/25 for sepsis secondary to epidural abscess. He had a peripherally inserted central catheter line in the right arm and was on nightly Ceftriaxone treatment until 04/16/25. At the ED, he was found to have GOVIND, a left soleal vein, and a left gastrocnemius DVT. He was started on heparin gtt. Hospital course complicated by urinary retention requiring Ortega catheter insertion, rhabdomyolysis, elevated LFTs, anasarca, leukocytosis due to thoracic spine discitis OM and early paraspinal abscess, as well as new onset RUE paresthesias and weakness. He is s/p IVC filter placement on 3/17 and was recommended surgical management. He is s/p posterior thoracic laminectomy and fusion on 3/18/25 with Dr. Keith Cantu. Surgical culture with rare staph epi. Recommendations by ID to continue IV Rocephin and Daptomycin through 5/2/25. Patient was evaluated by PM&R and therapy for functional deficits, gait/ADL impairments and acute rehabilitation was recommended. Patient was cleared for discharge to [facility] IRF on 3/25/25. (25 Mar 2025 17:09)    Rehab course significant for removal of staples on 4/1 without incident, urinary retention (but did not fully resolve) with Flomax, and persistent L gastrocnemius/soleal DVT that remained stable without propagation. Course was also significant for abdominal discomfort and bloating for which he was seen by GI and simethicone was started with mild improvement in symptoms. He also experienced constipation for which a daily mini-enema was started with good results.    All other medical co-morbidities were stable. Patient tolerated course of inpatient PT/OT/SLP rehab with significant improvements and met rehab goals prior to discharge. Patient was medically cleared on 4/22/25 for discharge to home.    HPI:  Mr. Rishabh Shaver is a 74-year-old male patient with past medical history of benign prostatic hyperplasia, diverticulosis, Graves' disease, hemorrhoids, hyperlipidemia, hypothyroidism, osteoarthritis, spinal stenosis, spinal cord injury (2004), and multiple spinal surgeries (three cervical and one lumbar), who presented to [facility] on 3/4/25 from [facility] due to lower extremity swelling and abnormal BUN/creatinine levels for further evaluation and management. The patient was recently admitted to [facility] from 02/18/25 through 02/25/25 for sepsis secondary to epidural abscess. He had a peripherally inserted central catheter line in the right arm and was on nightly Ceftriaxone treatment until 04/16/25. At the ED, he was found to have GOVIND, a left soleal vein, and a left gastrocnemius DVT. He was started on heparin gtt. Hospital course complicated by urinary retention requiring Ortega catheter insertion, rhabdomyolysis, elevated LFTs, anasarca, leukocytosis due to thoracic spine discitis OM and early paraspinal abscess, as well as new onset RUE paresthesias and weakness. He is s/p IVC filter placement on 3/17 and was recommended surgical management. He is s/p posterior thoracic laminectomy and fusion on 3/18/25 with Dr. Keith Cantu. Surgical culture with rare staph epi. Recommendations by ID to continue IV Rocephin and Daptomycin through 5/2/25. Patient was evaluated by PM&R and therapy for functional deficits, gait/ADL impairments and acute rehabilitation was recommended. Patient was cleared for discharge to [facility] IRF on 3/25/25. (25 Mar 2025 17:09)    Rehab course significant for removal of staples on 4/1 without incident, urinary retention (but did not fully resolve) with Flomax, and persistent L gastrocnemius/soleal DVT that remained stable without propagation. Course was also significant for abdominal discomfort and bloating for which he was seen by GI and simethicone was started with mild improvement in symptoms. He also experienced constipation for which a daily mini-enema was started with good results.    All other medical co-morbidities were stable. Patient tolerated course of inpatient PT/OT rehab with significant improvements and met rehab goals prior to discharge. Patient was medically cleared on 4/22/25 for discharge to home.

## 2025-04-21 NOTE — DISCHARGE NOTE PROVIDER - NSDCCPCAREPLAN_GEN_ALL_CORE_FT
PRINCIPAL DISCHARGE DIAGNOSIS  Diagnosis: Abscess in epidural space of thoracic spine  Assessment and Plan of Treatment: You had an abscess in your spine and underwent surgery to have it drained and relieve the pressure in your spine. Please continue to wear the  brace when you are out of bed. You may continue to use Flexeril 5mg three times per day as needed for neck soreness/muscle spasm pain and gabapentin 100mg three times per day for nerve pain. You may also continue to use oxycodone 15mg every 6 hours as needed for moderate to severe pain. Please keep in mind that opioids such as oxycodone can cause or worsen constipation, so we recommend that you take them with laxatives such as MIralax and senna. Please follow up with your surgeon within 1-2 weeks of discharge from the hospital and please follow up with Dr. Abarca within 6-8 weeks of discharge from the hospital.  You will need to continue your antibiotics through your PICC line through 5/2/25. Please continue to take ceftriaxone 2g every day through the line and daptomycin 650mg every day though the line.      SECONDARY DISCHARGE DIAGNOSES  Diagnosis: DVT, lower extremity  Assessment and Plan of Treatment: You have a deep vein thrombosis (DVT, a type of blood clot) in your left leg, in the veins called the soleal vein and gastrocnemius vein. The clot has not spread or grown since you arrived here. Please ocntinue to take Eliquis 5mg every 12 hours to prevent growth or spread of the blood clot and follow up with your primary care provder for further management.    Diagnosis: Bloating  Assessment and Plan of Treatment: You experienced some abdominal discomfort and bloating while you were here, for which you were seen by the gastroenterology doctors. Please continue to take simethicone 80mg three times per day as needed for gas/gas pain. You may also take Maalox 30mL every 4 hours as needed for indigestion. Please continue your established bowel regimen of a daily mini-enema (docusate sodium 283mg), magnesium hydroxide 30mL every evening, senna 2 tablets at bedtime, and Miralax 17g twice per day. Please follow up with your primary care provider for further care.    Diagnosis: Hypothyroidism  Assessment and Plan of Treatment: You have a history of hypothyroidism. Please continue to take Synthroid 150mcg every day and follow up with your primary care provider for further management.    Diagnosis: Hypertension  Assessment and Plan of Treatment: You have high blood pressure, also known as hypertension. Please continue to take metoprolol succinate 25mg daily to keep your pressure under control. Please follow up with your primary care provider and/or cardiologist for further care.    Diagnosis: HLD (hyperlipidemia)  Assessment and Plan of Treatment: You have high cholesterol, also known as hyperlipidemia. Please continue to take rosuvastatin 5mg every evening and follow up with your primary care provider and/or cardiologist for further management.    Diagnosis: Anemia  Assessment and Plan of Treatment: You have low hemoglobin, or low number of red blood cells. This can cause symptoms such as shortness of breath and fatigue. Please follow up with your primary care provider to continue management of this condition.    Diagnosis: BPH (benign prostatic hyperplasia)  Assessment and Plan of Treatment: You have an enlarged prostate, which, when combined with the spinal injury, contributed to difficulty with urination. Please continue to take Flomax 0.8mg every evening as well as bethanechol 10mg twice per day and follow up with your primary care provider for further care.    Diagnosis: Pressure injury  Assessment and Plan of Treatment: You have a deep tissue injury to your sacrum, for which you have been getting care. You will continue to get wound care for this area at home. Please follow up with your primary care provider for further management.    Diagnosis: Edema leg  Assessment and Plan of Treatment: You have swelling in your legs known as edema. It is associated with low amounts of albumin in your blood (this has improved while you have been here) and protein in your urine. Please continue to take Bumex 1mg twice per day to help you urinate and control the increased fluid in your legs. Please follow up with your primary care provider and/or cardiologist for further care.     PRINCIPAL DISCHARGE DIAGNOSIS  Diagnosis: Abscess in epidural space of thoracic spine  Assessment and Plan of Treatment: You had an abscess in your spine and underwent surgery to have it drained and relieve the pressure in your spine. Please continue to wear the  brace when you are out of bed. You may continue to use Flexeril 5mg three times per day as needed for neck soreness/muscle spasm pain and gabapentin 100mg three times per day for nerve pain. You may also continue to use oxycodone 15mg every 6 hours as needed for moderate to severe pain. Please keep in mind that opioids such as oxycodone can cause or worsen constipation, so we recommend that you take them with laxatives such as MIralax and senna. Please follow up with your surgeon within 1-2 weeks of discharge from the hospital and please follow up with Dr. Abarca within 6-8 weeks of discharge from the hospital.  You will need to continue your antibiotics through your PICC line through 5/2/25. Please continue to take ceftriaxone 2g every day through the line and daptomycin 650mg every day though the line.      SECONDARY DISCHARGE DIAGNOSES  Diagnosis: DVT, lower extremity  Assessment and Plan of Treatment: You have a deep vein thrombosis (DVT, a type of blood clot) in your left leg, in the veins called the soleal vein and gastrocnemius vein. The clot has not spread or grown since you arrived here. Please ocntinue to take Eliquis 5mg every 12 hours to prevent growth or spread of the blood clot and follow up with your primary care provder for further management.    Diagnosis: Bloating  Assessment and Plan of Treatment: You experienced some abdominal discomfort and bloating while you were here, for which you were seen by the gastroenterology doctors. Please continue to take simethicone 80mg three times per day as needed for gas/gas pain. You may also take Maalox 30mL every 4 hours as needed for indigestion. Please continue your established bowel regimen of a daily mini-enema (docusate sodium 283mg), magnesium hydroxide 30mL every evening, senna 2 tablets at bedtime, and Miralax 17g twice per day. Please follow up with your primary care provider for further care.    Diagnosis: Hypothyroidism  Assessment and Plan of Treatment: You have a history of hypothyroidism. Please continue to take Synthroid 150mcg every day and follow up with your primary care provider for further management.    Diagnosis: Hypertension  Assessment and Plan of Treatment: You have high blood pressure, also known as hypertension. Please continue to take metoprolol succinate 25mg daily to keep your pressure under control. Please follow up with your primary care provider and/or cardiologist for further care.    Diagnosis: HLD (hyperlipidemia)  Assessment and Plan of Treatment: You have high cholesterol, also known as hyperlipidemia. Please continue to take rosuvastatin 5mg every evening and follow up with your primary care provider and/or cardiologist for further management.    Diagnosis: Anemia  Assessment and Plan of Treatment: You have low hemoglobin, or low number of red blood cells. This can cause symptoms such as shortness of breath and fatigue. Please follow up with your primary care provider to continue management of this condition.    Diagnosis: BPH (benign prostatic hyperplasia)  Assessment and Plan of Treatment: You have an enlarged prostate, which, when combined with the spinal injury, contributed to difficulty with urination. Please continue to take Flomax 0.8mg every evening as well as bethanechol 10mg twice per day and follow up with your primary care provider for further care.    Diagnosis: Pressure injury  Assessment and Plan of Treatment: You have a deep tissue injury to your sacrum, for which you have been getting care. You will continue to get wound care for this area at home. Please follow up with your primary care provider for further management.    Diagnosis: Edema leg  Assessment and Plan of Treatment: You have swelling in your legs known as edema. It is associated with low amounts of albumin in your blood (this has improved while you have been here) and protein in your urine. Please continue to take Bumex 1mg twice per day to help you urinate and control the increased fluid in your legs. Please follow up with your primary care provider and/or cardiologist for further care.    Diagnosis: Deep tissue injury  Assessment and Plan of Treatment: You have a pressure injury called a deep tissue injury, also known as DTI, in  your sacral/buttock area. You have been getting wound care here for this injury and will continue to receive wound care at home upon discharge. Please follow up with your primary care provider for further management.

## 2025-04-21 NOTE — PROGRESS NOTE ADULT - NS ATTEND AMEND GEN_ALL_CORE FT
Agree with assessment and plan as above. I attest my time as attending was greater than 50% of the total time of 35 min on patient care (ACP <= 25 mins) on this DOS. Time spent includes review of hospital course, labs, vitals, medical records, patient evaluation, contact with family (when present), discussion of plan with the primary team, coordinating services and documenting encounter.     GI consulted for constipation. Improved with bowel regimen and discontinuation of lactulose. GI will sign off. Please reconsult as clinically necessary.

## 2025-04-21 NOTE — DISCHARGE NOTE PROVIDER - PROVIDER TOKENS
PROVIDER:[TOKEN:[[id number]],FOLLOWUP:[1 week]],PROVIDER:[TOKEN:[[id number]],FOLLOWUP:[2 weeks]],PROVIDER:[TOKEN:[[id number]],FOLLOWUP:[2 months]]

## 2025-04-21 NOTE — PROGRESS NOTE ADULT - PROBLEM SELECTOR PLAN 1
- Last abdominal Xray showed mildly increased fecal content in the colon but no sign of bowel obstruction.  - Promote motility by encouraging turn and position while in bed   - Miralax BID  - Avoid lactulose - gas producing  - Encourage patient to pass flatus  - Consider non gas producing foods  - Avoid opioids for pain management.

## 2025-04-21 NOTE — PROGRESS NOTE ADULT - SUBJECTIVE AND OBJECTIVE BOX
|------------------------------------------|                    Primary issue  |------------------------------------------|    necrotizing fascitis     |-------------------------------------------|                       Subjective   |-------------------------------------------|    No events overnight  No new complaints to offer me    |------------------------------------------|                       Objective  |------------------------------------------|    Vital Signs Last 24 Hrs  T(F): 97.9 (21 Apr 2025 06:48), Max: 98 (20 Apr 2025 20:59)  HR: 70 (21 Apr 2025 06:48) (70 - 82)  BP: 112/68 (21 Apr 2025 06:48) (112/68 - 120/70)  RR: 14 (21 Apr 2025 06:48) (14 - 14)  SpO2: 96% (21 Apr 2025 06:48) (96% - 98%)  I&O's Summary    20 Apr 2025 07:01  -  21 Apr 2025 07:00  --------------------------------------------------------  IN: 180 mL / OUT: 750 mL / NET: -570 mL        Examination:   General: NAD, Comfortable  Pulm: CTA BL No Rhonchi Rales or wheezes  Neck: Supple, No JVD  CVS: RRR No rubs murmurs or gallops  Abdomen: Soft, Nontender, Nondistended; No masses or organomegally  Extremities: No calf tenderness, No pitting edema    Labs:                        11.1   9.13  )-----------( 233      ( 21 Apr 2025 07:12 )             35.5       04-21    140  |  101  |  33  ----------------------------<  110  3.9   |  31  |  1.10    Ca    9.5      21 Apr 2025 07:12  Mg     2.0     04-19    TPro  7.3  /  Alb  3.3  /  TBili  0.6  /  DBili  x   /  AST  24  /  ALT  32  /  AlkPhos  86  04-21                   |------------------------------------------|                    Primary issue  |------------------------------------------|    epidural abscess    |-------------------------------------------|                       Subjective   |-------------------------------------------|    No events overnight    |------------------------------------------|                       Objective  |------------------------------------------|    Vital Signs Last 24 Hrs  T(F): 97.9 (21 Apr 2025 06:48), Max: 98 (20 Apr 2025 20:59)  HR: 70 (21 Apr 2025 06:48) (70 - 82)  BP: 112/68 (21 Apr 2025 06:48) (112/68 - 120/70)  RR: 14 (21 Apr 2025 06:48) (14 - 14)  SpO2: 96% (21 Apr 2025 06:48) (96% - 98%)  I&O's Summary    20 Apr 2025 07:01  -  21 Apr 2025 07:00  --------------------------------------------------------  IN: 180 mL / OUT: 750 mL / NET: -570 mL        Examination:   General: NAD, Comfortable  Pulm: CTA BL No Rhonchi Rales or wheezes  Neck: Supple, No JVD  CVS: RRR No rubs murmurs or gallops  Abdomen: Soft, Nontender, Nondistended; No masses or organomegally  Extremities: No calf tenderness, No pitting edema    Labs:                        11.1   9.13  )-----------( 233      ( 21 Apr 2025 07:12 )             35.5       04-21    140  |  101  |  33  ----------------------------<  110  3.9   |  31  |  1.10    Ca    9.5      21 Apr 2025 07:12  Mg     2.0     04-19    TPro  7.3  /  Alb  3.3  /  TBili  0.6  /  DBili  x   /  AST  24  /  ALT  32  /  AlkPhos  86  04-21

## 2025-04-21 NOTE — DISCHARGE NOTE PROVIDER - CARE PROVIDER_API CALL
Yue Wiggins  [facility]  [address], [address]  [place] [address]  Phone: [phone]  Fax: [phone]  Follow Up Time: 1 week    Keith Cantu Chi  Neurosurgery  [address]  [place] [address]  Phone: [phone]  Fax: [phone]  Follow Up Time: 2 weeks    Rishabh Prince  Physical/Rehab Medicine  [address] [place] [address]  Phone: [phone]  Fax: [phone]  Follow Up Time: 2 months

## 2025-04-21 NOTE — PROGRESS NOTE ADULT - SUBJECTIVE AND OBJECTIVE BOX
HPI:  Mr. Rishabh Shaver is a 74-year-old male patient with past medical history of benign prostatic hyperplasia, diverticulosis, Graves' disease, hemorrhoids, hyperlipidemia, hypothyroidism, osteoarthritis, spinal stenosis, spinal cord injury (2004), and multiple spinal surgeries (three cervical and one lumbar), who presented to [facility] on 3/4/25 from [facility] due to lower extremity swelling and abnormal BUN/creatinine levels for further evaluation and management. The patient was recently admitted to [facility] from 02/18/25 through 02/25/25 for sepsis secondary to epidural abscess. He had a peripherally inserted central catheter line in the right arm and was on nightly Ceftriaxone treatment until 04/16/25. At the ED, he was found to have GOVIND, a left soleal vein, and a left gastrocnemius DVT. He was started on heparin gtt. Hospital course complicated by urinary retention requiring Ortega catheter insertion, rhabdomyolysis, elevated LFTs, anasarca, leukocytosis due to thoracic spine discitis OM and early paraspinal abscess, as well as new onset RUE paresthesias and weakness. He is s/p IVC filter placement on 3/17 and was recommended surgical management. He is s/p posterior thoracic laminectomy and fusion on 3/18/25 with Dr. Keith Cantu. Surgical culture with rare staph epi. Recommendations by ID to continue IV Rocephin and Daptomycin through 5/2/25. Patient was evaluated by PM&R and therapy for functional deficits, gait/ADL impairments and acute rehabilitation was recommended. Patient was cleared for discharge to [facility] IRF on 3/25/25.     TDD 4/25/25 home  ___________________________________________________________________________    SUBJECTIVE/ROS  Patient was seen and evaluated at bedside today.  Reported no overnight events and is in no acute distress.  He reports mild improvement in his bloating/gas, but still has abdomen discomfort. GI has not been by to see him again yet.  Currently getting a mini enema; LBM 2 days ago.  Urinating well but did require 1 straight cath this morning for PVR ~400; still with some mild hematuria but no pain or symptoms.  He inquires about Gemtesa, which he had been taking PTA, discussed possible resumption down the line but currently would not be indicated given urinary retention.  Reports he slept well. No pain.  Underwent casting for b/l AFOs yesterday with Arya.   ___________________________________________________________________________    LABS               10.5   8.22  )-----------( 247      ( 17 Apr 2025 07:35 )             33.1     141  |  101  |  30[H]  ----------------------------<  100[H]  3.6   |  33[H]  |  1.09    Ca    9.4      17 Apr 2025 07:35  TPro  6.8  /  Alb  3.1[L]  /  TBili  0.6  /  DBili  x   /  AST  30  /  ALT  38  /  AlkPhos  77  04-17    ___________________________________________________________________________    MEDICATIONS  (STANDING):  ammonium lactate 12% Lotion 1 Application(s) Topical two times a day  apixaban 5 milliGRAM(s) Oral every 12 hours  AQUAPHOR (petrolatum Ointment) 1 Application(s) Topical three times a day  bethanechol 10 milliGRAM(s) Oral two times a day  buMETAnide 1 milliGRAM(s) Oral two times a day  cefTRIAXone   IVPB 2000 milliGRAM(s) IV Intermittent every 24 hours  chlorhexidine 4% Liquid 1 Application(s) Topical <User Schedule>  collagenase Ointment 1 Application(s) Topical daily  DAPTOmycin IVPB 650 milliGRAM(s) IV Intermittent every 24 hours  docusate sodium Enema 283 milliGRAM(s) Rectal <User Schedule>  gabapentin 100 milliGRAM(s) Oral three times a day  gabapentin   Oral   levothyroxine 150 MICROGram(s) Oral daily  magnesium hydroxide Suspension 30 milliLiter(s) Oral at bedtime  metoprolol succinate ER 25 milliGRAM(s) Oral daily  pantoprazole    Tablet 40 milliGRAM(s) Oral before breakfast  polyethylene glycol 3350 17 Gram(s) Oral two times a day  rosuvastatin 5 milliGRAM(s) Oral at bedtime  senna 2 Tablet(s) Oral at bedtime  tamsulosin 0.8 milliGRAM(s) Oral at bedtime    MEDICATIONS  (PRN):  aluminum hydroxide/magnesium hydroxide/simethicone Suspension 30 milliLiter(s) Oral every 4 hours PRN Dyspepsia  cyclobenzaprine 5 milliGRAM(s) Oral three times a day PRN Muscle Spasm  melatonin 3 milliGRAM(s) Oral at bedtime PRN Insomnia  oxyCODONE    IR 15 milliGRAM(s) Oral every 6 hours PRN Moderate Pain (4 - 6)  simethicone 80 milliGRAM(s) Chew three times a day PRN Gas  sodium chloride 0.9% lock flush 10 milliLiter(s) IV Push every 1 hour PRN Pre/post blood products, medications, blood draw, and to maintain line patency    ___________________________________________________________________________    PHYSICAL EXAM:    Gen - NAD, Comfortable  HEENT -  EOMI, MMM, PERRLA, Normal Conjunctivae  Neck - Supple, + limited ROM, +posterior cervical incision  Pulm - CTAB, No wheeze, No rhonchi, No crackles  Cardiovascular - RRR, S1S2, No murmurs  Chest - good chest expansion, good respiratory effort  Abdomen - Firm, NT, abd distention, +BS  Extremities - No C/C, no calf tenderness, +BLE edema, +B/L pedal edema, +RUE PICC line   /GI- + Ortega draining clear urine   Neuro-     Cognitive - awake, alert, oriented to person, place, date, year, and situation.  Able  to follow command     Motor -          RUE: Deltoid 5/5, bicep 5/5, tricep 5/5, wrist ext 5/5,  4-/5, unable to fully flex 2nd digit          LUE: Deltoid 5/5, bicep 5/5, tricep 5/5, wrist ext 5/5,  4-/5, unable to fully flex 2nd digit          RLE: IP 3/5, Quad 3/5, EHL 5/5, Dorsi/plantarflexion 5/5 (some limitations 2/2 LE edema)          LLE: IP 2/5, Quad 2/5, EHL 5/5, Dorsi/plantarflexion 5/5 (proximal leg significantly limited by pain and      Sensory - Impaired  to LT LLE from knee to ankle     Tone - Normal  Psychiatric - Mood stable, Affect WNL  Skin:    - Generalized dry flakey skin  - 19cm posterior cervical incision with 32 staples and 2 steri strips  - Left buttock DTI pressure injury measuring 14 x 9 cm      * appearing as a dark purple discoloration of intact skin, with a central superficial open area measuring 6 x 2 cm.      * at the periphery of the 14 x 9 cm area, the discoloration is somewhat lighter;       * dark red non-blanchable erythema.  - Bilateral lower extremity edema.  - Healed lumbar surgical incision scar  - Right heel blanchable redness  - Bilateral dry cracked heels and plantar surface.    ___________________________________________________________________________   HPI:  Mr. Rishabh Shaver is a 74-year-old male patient with past medical history of benign prostatic hyperplasia, diverticulosis, Graves' disease, hemorrhoids, hyperlipidemia, hypothyroidism, osteoarthritis, spinal stenosis, spinal cord injury (2004), and multiple spinal surgeries (three cervical and one lumbar), who presented to [facility] on 3/4/25 from [facility] due to lower extremity swelling and abnormal BUN/creatinine levels for further evaluation and management. The patient was recently admitted to [facility] from 02/18/25 through 02/25/25 for sepsis secondary to epidural abscess. He had a peripherally inserted central catheter line in the right arm and was on nightly Ceftriaxone treatment until 04/16/25. At the ED, he was found to have GOVIND, a left soleal vein, and a left gastrocnemius DVT. He was started on heparin gtt. Hospital course complicated by urinary retention requiring Ortega catheter insertion, rhabdomyolysis, elevated LFTs, anasarca, leukocytosis due to thoracic spine discitis OM and early paraspinal abscess, as well as new onset RUE paresthesias and weakness. He is s/p IVC filter placement on 3/17 and was recommended surgical management. He is s/p posterior thoracic laminectomy and fusion on 3/18/25 with Dr. Keith Cantu. Surgical culture with rare staph epi. Recommendations by ID to continue IV Rocephin and Daptomycin through 5/2/25. Patient was evaluated by PM&R and therapy for functional deficits, gait/ADL impairments and acute rehabilitation was recommended. Patient was cleared for discharge to [facility] IRF on 3/25/25.     TDD 4/25/25 home  ___________________________________________________________________________    SUBJECTIVE/ROS  Patient was seen and evaluated at bedside today.  Reported no overnight events and is in no acute distress.  He reports continued improvement in his bloating/gas.  Urinating well with no required straight cath this morning.  Eager to participate on the recommended rehabilitation program.  Denies any CP, SOB, KESSLER, palpitations, fever, chills, body aches, cough, congestion, or any other symptoms at this time.   ___________________________________________________________________________    LAB                        11.1   9.13  )-----------( 233      ( 21 Apr 2025 07:12 )             35.5       04-21    140  |  101  |  33[H]  ----------------------------<  110[H]  3.9   |  31  |  1.10    Ca    9.5      21 Apr 2025 07:12    TPro  7.3  /  Alb  3.3  /  TBili  0.6  /  DBili  x   /  AST  24  /  ALT  32  /  AlkPhos  86  04-21    LIVER FUNCTIONS - ( 21 Apr 2025 07:12 )  Alb: 3.3 g/dL / Pro: 7.3 g/dL / ALK PHOS: 86 U/L / ALT: 32 U/L / AST: 24 U/L / GGT: x           ___________________________________________________________________________    MEDICATIONS  (STANDING):  ammonium lactate 12% Lotion 1 Application(s) Topical two times a day  apixaban 5 milliGRAM(s) Oral every 12 hours  AQUAPHOR (petrolatum Ointment) 1 Application(s) Topical three times a day  bethanechol 10 milliGRAM(s) Oral two times a day  buMETAnide 1 milliGRAM(s) Oral two times a day  cefTRIAXone   IVPB 2000 milliGRAM(s) IV Intermittent every 24 hours  chlorhexidine 4% Liquid 1 Application(s) Topical <User Schedule>  collagenase Ointment 1 Application(s) Topical daily  DAPTOmycin IVPB 650 milliGRAM(s) IV Intermittent every 24 hours  docusate sodium Enema 283 milliGRAM(s) Rectal <User Schedule>  gabapentin 100 milliGRAM(s) Oral three times a day  gabapentin   Oral   levothyroxine 150 MICROGram(s) Oral daily  magnesium hydroxide Suspension 30 milliLiter(s) Oral at bedtime  metoprolol succinate ER 25 milliGRAM(s) Oral daily  pantoprazole    Tablet 40 milliGRAM(s) Oral before breakfast  polyethylene glycol 3350 17 Gram(s) Oral two times a day  rosuvastatin 5 milliGRAM(s) Oral at bedtime  senna 2 Tablet(s) Oral at bedtime  tamsulosin 0.8 milliGRAM(s) Oral at bedtime    MEDICATIONS  (PRN):  aluminum hydroxide/magnesium hydroxide/simethicone Suspension 30 milliLiter(s) Oral every 4 hours PRN Dyspepsia  cyclobenzaprine 5 milliGRAM(s) Oral three times a day PRN Muscle Spasm  melatonin 3 milliGRAM(s) Oral at bedtime PRN Insomnia  oxyCODONE    IR 15 milliGRAM(s) Oral every 6 hours PRN Moderate Pain (4 - 6)  simethicone 80 milliGRAM(s) Chew three times a day PRN Gas  sodium chloride 0.9% lock flush 10 milliLiter(s) IV Push every 1 hour PRN Pre/post blood products, medications, blood draw, and to maintain line patency    ___________________________________________________________________________    PHYSICAL EXAM:    Gen - NAD, Comfortable  HEENT -  EOMI, MMM, PERRLA, Normal Conjunctivae  Neck - Supple, + limited ROM, +posterior cervical incision  Pulm - CTAB, No wheeze, No rhonchi, No crackles  Cardiovascular - RRR, S1S2, No murmurs  Chest - good chest expansion, good respiratory effort  Abdomen - Firm, NT, abd distention, +BS  Extremities - No C/C, no calf tenderness, +BLE edema, +B/L pedal edema, +RUE PICC line   /GI- + Ortega draining clear urine   Neuro-     Cognitive - awake, alert, oriented to person, place, date, year, and situation.  Able  to follow command     Motor -          RUE: Deltoid 5/5, bicep 5/5, tricep 5/5, wrist ext 5/5,  4-/5, unable to fully flex 2nd digit          LUE: Deltoid 5/5, bicep 5/5, tricep 5/5, wrist ext 5/5,  4-/5, unable to fully flex 2nd digit          RLE: IP 3/5, Quad 3/5, EHL 5/5, Dorsi/plantarflexion 5/5 (some limitations 2/2 LE edema)          LLE: IP 2/5, Quad 2/5, EHL 5/5, Dorsi/plantarflexion 5/5 (proximal leg significantly limited by pain and      Sensory - Impaired  to LT LLE from knee to ankle     Tone - Normal  Psychiatric - Mood stable, Affect WNL    ___________________________________________________________________________

## 2025-04-21 NOTE — DISCHARGE NOTE PROVIDER - NSDCFUSCHEDAPPT_GEN_ALL_CORE_FT
Luis A Brennan  [facility]  CARDIOLOGY [address]  Scheduled Appointment: 05/07/2025

## 2025-04-22 PROCEDURE — 99233 SBSQ HOSP IP/OBS HIGH 50: CPT

## 2025-04-22 PROCEDURE — 99232 SBSQ HOSP IP/OBS MODERATE 35: CPT

## 2025-04-22 RX ADMIN — DAPTOMYCIN 126 MILLIGRAM(S): 500 INJECTION, POWDER, LYOPHILIZED, FOR SOLUTION INTRAVENOUS at 12:33

## 2025-04-22 RX ADMIN — BUMETANIDE 1 MILLIGRAM(S): 1 TABLET ORAL at 06:21

## 2025-04-22 RX ADMIN — APIXABAN 5 MILLIGRAM(S): 2.5 TABLET, FILM COATED ORAL at 11:29

## 2025-04-22 RX ADMIN — WHITE PETROLATUM 1 APPLICATION(S): 1 OINTMENT TOPICAL at 17:07

## 2025-04-22 RX ADMIN — BUMETANIDE 1 MILLIGRAM(S): 1 TABLET ORAL at 15:38

## 2025-04-22 RX ADMIN — Medication 10 MILLIGRAM(S): at 17:07

## 2025-04-22 RX ADMIN — Medication 1 APPLICATION(S): at 06:23

## 2025-04-22 RX ADMIN — GABAPENTIN 100 MILLIGRAM(S): 400 CAPSULE ORAL at 22:05

## 2025-04-22 RX ADMIN — TAMSULOSIN HYDROCHLORIDE 0.8 MILLIGRAM(S): 0.4 CAPSULE ORAL at 22:05

## 2025-04-22 RX ADMIN — GABAPENTIN 100 MILLIGRAM(S): 400 CAPSULE ORAL at 06:22

## 2025-04-22 RX ADMIN — Medication 283 MILLIGRAM(S): at 08:05

## 2025-04-22 RX ADMIN — WHITE PETROLATUM 1 APPLICATION(S): 1 OINTMENT TOPICAL at 22:06

## 2025-04-22 RX ADMIN — CEFTRIAXONE 100 MILLIGRAM(S): 500 INJECTION, POWDER, FOR SOLUTION INTRAMUSCULAR; INTRAVENOUS at 21:59

## 2025-04-22 RX ADMIN — Medication 2 TABLET(S): at 22:05

## 2025-04-22 RX ADMIN — ROSUVASTATIN CALCIUM 5 MILLIGRAM(S): 20 TABLET, FILM COATED ORAL at 22:05

## 2025-04-22 RX ADMIN — Medication 80 MILLIGRAM(S): at 11:30

## 2025-04-22 RX ADMIN — WHITE PETROLATUM 1 APPLICATION(S): 1 OINTMENT TOPICAL at 06:27

## 2025-04-22 RX ADMIN — Medication 1 APPLICATION(S): at 12:33

## 2025-04-22 RX ADMIN — Medication 1 APPLICATION(S): at 06:27

## 2025-04-22 RX ADMIN — Medication 40 MILLIGRAM(S): at 06:21

## 2025-04-22 RX ADMIN — CYCLOBENZAPRINE HYDROCHLORIDE 5 MILLIGRAM(S): 15 CAPSULE, EXTENDED RELEASE ORAL at 22:14

## 2025-04-22 RX ADMIN — GABAPENTIN 100 MILLIGRAM(S): 400 CAPSULE ORAL at 15:45

## 2025-04-22 RX ADMIN — Medication 10 MILLIGRAM(S): at 06:22

## 2025-04-22 RX ADMIN — Medication 1 APPLICATION(S): at 17:07

## 2025-04-22 RX ADMIN — Medication 150 MICROGRAM(S): at 06:21

## 2025-04-22 NOTE — PROGRESS NOTE ADULT - ASSESSMENT
Assessment/Plan:  Mr. Rishabh Shaver is a 74-year-old man with past medical history of benign prostatic hyperplasia, diverticulosis, Graves' disease, hemorrhoids, hyperlipidemia, hypothyroidism, osteoarthritis, spinal stenosis, spinal cord injury (2004), and multiple spinal surgeries (three cervical and one lumbar), who is admitted for Acute Inpatient Rehabilitation with a multidisciplinary rehab program at [facility] with functional impairments in ADLs and mobility secondary to a thoracic epidural abscess s/p posterior thoracic laminectomy and fusion on 3/18/25 with Dr. Keith Cantu.       #Thoracic epidural abscess s/p laminectomy and fusion surgery      * C8 AIS D Incomplete paraplegia      *  brace when OOB      * Flexeril 5mg PO TID PRN for neck soreness      * Staples removed on POD #14 (4/1/25)      * Sacral wound assessment by wound/skin team      * urinary retention, now voiding,  c/w Flomax 0.8mg PO daily      * Right Venodyne only due to left calf DVT (s/p pre-op IVC filter)   - Activity Limitations: Decreased social, vocational and leisure activities, decreased self care and ADLs, decreased mobility, decreased ability to manage household and finances  - Comprehensive Multidisciplinary Rehab Program:      * 3 hours a day, 5 days a week      * PT 2hr/day: Focused on improving strength, endurance, coordination, balance, functional mobility, and transfers      * OT 1hr/day: Focused on improving strength, fine motor skills, coordination, posture and ADLs  - orthotist Arya saw patient 4/17 for AFO for right foot drop and recommends b/l AFOs hinged on R and semi solid on L - order placed    -----------------------------------------------------------------------------------    Concurrent Medical Problems    #GOVIND on CKD (resolved)  #LE edema  #Hypoalbuminemia  #Proteinuria  - IVF as needed with fluid balance  - Significant debilitating anasarca   - Trial of albumin Lasix started 3/8->improvement  - s/p IV Lasix/IV albumin BID (3/9)  - Change to Bumex/Albumin (3/11)-> good effect  - Switched to PO Bumex 1mg BID 3/24  - Trend Albumin (4/17 - 3.1)  - Trend Protein total (4/17 - 6.8); Total protein, Random Urine (3/18- 24)  - Trend CMP (4/17 - Cr 1.09/ BUN 33)    #L gastrocnemius and L soleal DVT  - appears provoked from decrease ambulation from recent spinal abscess  - Could not tolerate VQ scan for r/o PE 2/2 pain  - Eliquis 5mg PO q12h - will be new med on discharge (of note patient completed Eliquis load)   - Therapeutic Lovenox (3/12) in the setting of possible spine intervention  - Last dose of AC night of 3/16. Restarted Eliquis 5mg BID 4/5/25  - S/P IVC filter placement 3/17  - repeat US 4/8 with stable L soleal DVT and no propagation     #Spinal epidural abscess POA and already being treated with IV antibiotics with suspected worsening of disease  - S/p thoracic laminectomy and fusion 3/18. [place]  brace   - Surgical culture with staph epi possible contaminant but given clinical presentation on admission, IV Dapto added  - IV Rocephin 2g daily via PICC line thru 5/2/25  - IV Daptomycin 650mg daily via PICC line thru 5/2/25  - gabapentin 100mg PO TID  - d/c long-acting Oxycontin 4/11  - oxycodone 15mg PO q6h PRN moderate pain  - Flexeril 5mg PO TID PRN for muscle spasm  - staples d/teodoro in rehab    #Abdominal discomfort and bloating  - Abd xray 4/15--as above  - reconsulted GI 4/17 awaiting recs    #Mild Rhabdomyolysis (resolved)  - associated elevated transaminases (resolved)  - suspect from anasarca/edema   - improving with adequate diuresis of edema  - associated elevated transaminase; now resolved (4/17- AST 30/ALT 38)    #Leukocytosis (resolved)  - likely rx to DVT + Discitis  - no other signs of sepsis, afebrile   - trend CBC and monitor fever curve (4/17- WBC 8.22)    #Hypothyroidism  - Synthroid 150mcg PO daily    #CAD  #HTN  #HLD  - metoprolol succinate 25mg PO daily   - rosuvastatin 5mg PO qhs    #Anemia/MARLENA  - trend H/H (4/17- 10.5/33.1)  - transfuse if Hgb <7 PRN     #Sleep:  - melatonin 3mg PO qhs PRN for sleep    #GI/Bowel:  - senna 2 tablets PO qhs  - Miralax 17g PO daily  - daily mini-enema at 6AM  - lactulose 20g PO TID PRN for constipation  - Maalox 30mL PO q4h PRN for dyspepsia  - GI ppx: pantoprazole 40mg PO daily   - added simethicone 80mg PO TID PRN for gas 4/14    #BPH  #Urinary retention likely augmented by degree of anasarca  - S/P Ortega; TOV  ->failed. Ortega re-inserted 3/6->fell out without knowing with associated hematuria->recurrent retention 3/7->Ortega re-inserted (3/7) --> successful TOV 3/25  - Flomax 0.8mg PO qhs  - started bethanechol 10mg PO BID 4/6  - Monitor U/O    #Skin/Pressure Injury:   Skin assessment on admission:   - Generalized dry flaky skin  - 19cm posterior cervical incision with 32 staples and 2 steri strips  - Left buttock DTI pressure injury measuring 14 x 9 cm      * appearing as a dark purple discoloration of intact skin, with a central superficial open area measuring 6 x 2 cm      * at the periphery of the 14 x 9 cm area, the discoloration is somewhat lighter      * dark red non-blanchable erythema      * eschar resolved     **Santyl to slough of sacrum daily**  - Bilateral lower extremity edema  - Healed lumbar surgical incision scar  - Right heel blanchable redness  - Bilateral dry cracked heels and plantar surface  - Lac hydrin BID BLE  - Aquaphor BID to generalized dry skin   - Appreciate wound care consult     #Diet/Dysphagia:  - Dysphagia: SLP consult for swallow function evaluation and treatment  - Current Diet: Regular  - Aspiration Precautions  - Nutrition consult     #Patient Education  - Education provided on the following:      * Admitting diagnosis and functional implications      * Functional goals      * Bladder management      * Bowel management      * Skin care management      * Intensity of service and scheduling of rehab disciplines      * Plan of care and role of interdisciplinary team conference in discharge planning      * Reconciliation of medications from prior institution    #Precautions / PROPHYLAXIS:   - Falls, Spinal  - Ortho: Weight bearing status: FWB;  brace OOB  - DVT ppx: Eliquis 5mg PO BID, TEDs  - Pressure injury/Skin: Turn Q2hrs while in bed, OOB to Chair, PT/OT      ---------------  Outpatient Follow-up:    Yue Wiggins  [facility]  [address], [address]  [place] [address]  Phone: [phone]  Fax: [phone]  Established Patient  Follow Up Time: 1 week    Keith Cantu [facility]  Neurosurgery  [address]  [place] [address]  Phone: [phone]  Fax: [phone]  Established Patient  Follow Up Time: 2 weeks    Luis A Brennan  [facility] Physician [facility]  CARDIOLOGY [address]  Scheduled Appointment: 04/23/2025    --------------  Wheelchair recommendation    Patient seen today for a face to face visit for an ultra-lightweight manual wheelchair in the home. Patient cannot independently walk in his home using any ambulation device.  He requires a manual wheelchair so they can perform their mobility related activities of daily living (MRADL), like getting to the bathroom for hygiene, the kitchen for eating and the bedroom for resting. Patient requires an ultra lightweight manual wheelchair because he cannot walk and has weakness and endurance issues that require him to need an ultra lightweight manual wheelchair with camber and an axle plate that brings the wheels forward to allow him to reach them effectively and without pain.      --------------  AFO recommendation    Patient presents with bilateral lower extremity weakness and foot drop due to nerve compression. Patient has instability as the patient’s feet scuff the ground during ambulation and is at risk for falls. Regarding the right side, in addition to foot drop and weakness, patient presents with inversion and genu recurvatum. The knee hyperextends. Patient requires a custom articulating AFO to address the foot drop, inversion, and genu recurvatum. Regarding the left side, in addition to foot drop and weakness, patient presents with genu recurvatum. Patient requires a custom semi solid AFO to allow for safe ambulation.    --------------

## 2025-04-22 NOTE — PROGRESS NOTE ADULT - SUBJECTIVE AND OBJECTIVE BOX
HPI:  Mr. Rishabh Shaver is a 74-year-old male patient with past medical history of benign prostatic hyperplasia, diverticulosis, Graves' disease, hemorrhoids, hyperlipidemia, hypothyroidism, osteoarthritis, spinal stenosis, spinal cord injury (2004), and multiple spinal surgeries (three cervical and one lumbar), who presented to [facility] on 3/4/25 from [facility] due to lower extremity swelling and abnormal BUN/creatinine levels for further evaluation and management. The patient was recently admitted to [facility] from 02/18/25 through 02/25/25 for sepsis secondary to epidural abscess. He had a peripherally inserted central catheter line in the right arm and was on nightly Ceftriaxone treatment until 04/16/25. At the ED, he was found to have GOVIND, a left soleal vein, and a left gastrocnemius DVT. He was started on heparin gtt. Hospital course complicated by urinary retention requiring Ortega catheter insertion, rhabdomyolysis, elevated LFTs, anasarca, leukocytosis due to thoracic spine discitis OM and early paraspinal abscess, as well as new onset RUE paresthesias and weakness. He is s/p IVC filter placement on 3/17 and was recommended surgical management. He is s/p posterior thoracic laminectomy and fusion on 3/18/25 with Dr. Keith Cantu. Surgical culture with rare staph epi. Recommendations by ID to continue IV Rocephin and Daptomycin through 5/2/25. Patient was evaluated by PM&R and therapy for functional deficits, gait/ADL impairments and acute rehabilitation was recommended. Patient was cleared for discharge to [facility] on 3/25/25.     TDD 4/25/25 home  ___________________________________________________________________________    SUBJECTIVE/ROS  Patient was seen and evaluated at bedside today.  Reported no overnight events and is in no acute distress.  Sacrum revised today. Remains urinating well with no required straight cath this morning.  Eager to participate on the recommended rehabilitation program.  Denies any CP, SOB, KESSLER, palpitations, fever, chills, body aches, cough, congestion, or any other symptoms at this time.   ___________________________________________________________________________    LAB                        11.1   9.13  )-----------( 233      ( 21 Apr 2025 07:12 )             35.5       04-21    140  |  101  |  33[H]  ----------------------------<  110[H]  3.9   |  31  |  1.10    Ca    9.5      21 Apr 2025 07:12    TPro  7.3  /  Alb  3.3  /  TBili  0.6  /  DBili  x   /  AST  24  /  ALT  32  /  AlkPhos  86  04-21    LIVER FUNCTIONS - ( 21 Apr 2025 07:12 )  Alb: 3.3 g/dL / Pro: 7.3 g/dL / ALK PHOS: 86 U/L / ALT: 32 U/L / AST: 24 U/L / GGT: x           ___________________________________________________________________________    MEDICATIONS  (STANDING):  ammonium lactate 12% Lotion 1 Application(s) Topical two times a day  apixaban 5 milliGRAM(s) Oral every 12 hours  AQUAPHOR (petrolatum Ointment) 1 Application(s) Topical three times a day  bethanechol 10 milliGRAM(s) Oral two times a day  buMETAnide 1 milliGRAM(s) Oral two times a day  cefTRIAXone   IVPB 2000 milliGRAM(s) IV Intermittent every 24 hours  chlorhexidine 4% Liquid 1 Application(s) Topical <User Schedule>  collagenase Ointment 1 Application(s) Topical daily  DAPTOmycin IVPB 650 milliGRAM(s) IV Intermittent every 24 hours  docusate sodium Enema 283 milliGRAM(s) Rectal <User Schedule>  gabapentin 100 milliGRAM(s) Oral three times a day  gabapentin   Oral   levothyroxine 150 MICROGram(s) Oral daily  magnesium hydroxide Suspension 30 milliLiter(s) Oral at bedtime  metoprolol succinate ER 25 milliGRAM(s) Oral daily  pantoprazole    Tablet 40 milliGRAM(s) Oral before breakfast  polyethylene glycol 3350 17 Gram(s) Oral two times a day  rosuvastatin 5 milliGRAM(s) Oral at bedtime  senna 2 Tablet(s) Oral at bedtime  tamsulosin 0.8 milliGRAM(s) Oral at bedtime    MEDICATIONS  (PRN):  aluminum hydroxide/magnesium hydroxide/simethicone Suspension 30 milliLiter(s) Oral every 4 hours PRN Dyspepsia  cyclobenzaprine 5 milliGRAM(s) Oral three times a day PRN Muscle Spasm  melatonin 3 milliGRAM(s) Oral at bedtime PRN Insomnia  oxyCODONE    IR 15 milliGRAM(s) Oral every 6 hours PRN Moderate Pain (4 - 6)  simethicone 80 milliGRAM(s) Chew three times a day PRN Gas  sodium chloride 0.9% lock flush 10 milliLiter(s) IV Push every 1 hour PRN Pre/post blood products, medications, blood draw, and to maintain line patency    ___________________________________________________________________________    PHYSICAL EXAM:    Gen - NAD, Comfortable  HEENT -  EOMI, MMM, PERRLA, Normal Conjunctivae  Neck - Supple, + limited ROM, +posterior cervical incision  Pulm - CTAB, No wheeze, No rhonchi, No crackles  Cardiovascular - RRR, S1S2, No murmurs  Chest - good chest expansion, good respiratory effort  Abdomen - Firm, NT, abd distention, +BS  Extremities - No C/C, no calf tenderness, +BLE edema, +B/L pedal edema, +RUE PICC line   /GI- + Ortega draining clear urine   Neuro-     Cognitive - awake, alert, oriented to person, place, date, year, and situation.  Able  to follow command     Motor -          RUE: Deltoid 5/5, bicep 5/5, tricep 5/5, wrist ext 5/5,  4-/5, unable to fully flex 2nd digit          LUE: Deltoid 5/5, bicep 5/5, tricep 5/5, wrist ext 5/5,  4-/5, unable to fully flex 2nd digit          RLE: IP 3/5, Quad 3/5, EHL 5/5, Dorsi/plantarflexion 5/5 (some limitations 2/2 LE edema)          LLE: IP 2/5, Quad 2/5, EHL 5/5, Dorsi/plantarflexion 5/5 (proximal leg significantly limited by pain and      Sensory - Impaired  to LT LLE from knee to ankle     Tone - Normal  Psychiatric - Mood stable, Affect WNL    ___________________________________________________________________________

## 2025-04-22 NOTE — PROGRESS NOTE ADULT - SUBJECTIVE AND OBJECTIVE BOX
|------------------------------------------|                    Primary issue  |------------------------------------------|    epidural abscess    |-------------------------------------------|                       Subjective   |-------------------------------------------|    No events overnight    |------------------------------------------|                       Objective  |------------------------------------------|    Vital Signs Last 24 Hrs  T(F): 97.9 (21 Apr 2025 06:48), Max: 98 (20 Apr 2025 20:59)  HR: 70 (21 Apr 2025 06:48) (70 - 82)  BP: 112/68 (21 Apr 2025 06:48) (112/68 - 120/70)  RR: 14 (21 Apr 2025 06:48) (14 - 14)  SpO2: 96% (21 Apr 2025 06:48) (96% - 98%)  I&O's Summary    20 Apr 2025 07:01  -  21 Apr 2025 07:00  --------------------------------------------------------  IN: 180 mL / OUT: 750 mL / NET: -570 mL        Examination:   General: NAD, Comfortable  Pulm: CTA BL No Rhonchi Rales or wheezes  Neck: Supple, No JVD  CVS: RRR No rubs murmurs or gallops  Abdomen: Soft, Nontender, Nondistended; No masses or organomegally  Extremities: No calf tenderness, No pitting edema    Labs:                        11.1   9.13  )-----------( 233      ( 21 Apr 2025 07:12 )             35.5       04-21    140  |  101  |  33  ----------------------------<  110  3.9   |  31  |  1.10    Ca    9.5      21 Apr 2025 07:12  Mg     2.0     04-19    TPro  7.3  /  Alb  3.3  /  TBili  0.6  /  DBili  x   /  AST  24  /  ALT  32  /  AlkPhos  86  04-21

## 2025-04-22 NOTE — PROGRESS NOTE ADULT - ASSESSMENT
74M with PMH of BPH, diverticulosis, Graves' disease, hemorrhoids, HLD, hypothyroidism, osteoarthritis, spinal stenosis, spinal cord injury (2004), multiple spinal surgeries, CAD, and MARLENA. Presented 3/4/25 from SNF with LE swelling and GOVIND. Found to have left soleal and gastroc DVTs, spinal epidural abscess ? s/p thoracic laminectomy/fusion on 3/18. PICC in place for IV Rocephin + Daptomycin through 5/2. Admitted to [facility] on 3/25 for debility.    1. Constipation    Senna + Docusol enema daily since 4/2    bowel regiment and low fodmap diet    2. Debility / Postoperative Status    s/p thoracic epidural abscess ? laminectomy/fusion (3/18)    Fall/spine precautions,  brace    Pain management per primary; avoid opioids    3. Spinal Epidural Abscess – s/p Laminectomy/Fusion (3/18)    IV Rocephin 2g QHS + IV Daptomycin 650mg daily via PICC through 5/2    Aspen  brace in use    Surgical cultures: rare staph epi (possible contaminant)    CPK 4/20 WNL    4. GOVIND – Resolved / Anasarca / Hypoalbuminemia / LE Edema    Bumex 1mg BID resumed (held previously for SBP<100)    Monitor renal function and I/Os — currently stable    Serum TRUDY: weak IgG lambda band (needs OP f/u)    Urine protein 200 mg (not nephrotic). weigh daily      5. Transaminitis – Resolved    Limit Tylenol, avoid hepatotoxins    Encourage PO intake    Monitor LFTs with routine labs    6. CAD / HTN / HLD    Metoprolol 25 mg daily    Crestor 5 mg QHS    7. Anemia / MARLENA    s/p 2 units PRBCs intra-op    Monitor CBC (Hgb 8.3 on 3/31)    Transfuse PRN to maintain Hgb >7    8. Hypothyroidism    Synthroid 150 mcg daily    9. Sacral Decubitus Ulcer – Stage 3    Ongoing wound care    Turn Q2h    Lac-Hydrin BID to BLE, Aquaphor BID to dry skin    10. BPH / Chronic Urinary Retention    On Flomax 8mg daily    Monitor U/O,    11. Acute DVT (L Soleal & Gastroc Veins)    s/p IVC filter 3/17 (couldn’t tolerate VQ scan)    Eliquis 5 mg BID resumed per neurosurgery guidance    12. DVT Prophylaxis    Eliquis BID also serves as DVT-P

## 2025-04-23 PROCEDURE — 99233 SBSQ HOSP IP/OBS HIGH 50: CPT

## 2025-04-23 RX ADMIN — METOPROLOL SUCCINATE 25 MILLIGRAM(S): 50 TABLET, EXTENDED RELEASE ORAL at 06:12

## 2025-04-23 RX ADMIN — Medication 40 MILLIGRAM(S): at 06:13

## 2025-04-23 RX ADMIN — APIXABAN 5 MILLIGRAM(S): 2.5 TABLET, FILM COATED ORAL at 01:08

## 2025-04-23 RX ADMIN — Medication 10 MILLIGRAM(S): at 06:12

## 2025-04-23 RX ADMIN — APIXABAN 5 MILLIGRAM(S): 2.5 TABLET, FILM COATED ORAL at 23:32

## 2025-04-23 RX ADMIN — BUMETANIDE 1 MILLIGRAM(S): 1 TABLET ORAL at 15:30

## 2025-04-23 RX ADMIN — Medication 1 APPLICATION(S): at 06:19

## 2025-04-23 RX ADMIN — ROSUVASTATIN CALCIUM 5 MILLIGRAM(S): 20 TABLET, FILM COATED ORAL at 21:50

## 2025-04-23 RX ADMIN — WHITE PETROLATUM 1 APPLICATION(S): 1 OINTMENT TOPICAL at 15:31

## 2025-04-23 RX ADMIN — GABAPENTIN 100 MILLIGRAM(S): 400 CAPSULE ORAL at 15:29

## 2025-04-23 RX ADMIN — Medication 1 APPLICATION(S): at 08:01

## 2025-04-23 RX ADMIN — WHITE PETROLATUM 1 APPLICATION(S): 1 OINTMENT TOPICAL at 06:18

## 2025-04-23 RX ADMIN — TAMSULOSIN HYDROCHLORIDE 0.8 MILLIGRAM(S): 0.4 CAPSULE ORAL at 21:50

## 2025-04-23 RX ADMIN — Medication 150 MICROGRAM(S): at 06:12

## 2025-04-23 RX ADMIN — CEFTRIAXONE 100 MILLIGRAM(S): 500 INJECTION, POWDER, FOR SOLUTION INTRAMUSCULAR; INTRAVENOUS at 21:50

## 2025-04-23 RX ADMIN — DAPTOMYCIN 126 MILLIGRAM(S): 500 INJECTION, POWDER, LYOPHILIZED, FOR SOLUTION INTRAVENOUS at 12:55

## 2025-04-23 RX ADMIN — BUMETANIDE 1 MILLIGRAM(S): 1 TABLET ORAL at 06:12

## 2025-04-23 RX ADMIN — Medication 10 MILLIGRAM(S): at 17:24

## 2025-04-23 RX ADMIN — GABAPENTIN 100 MILLIGRAM(S): 400 CAPSULE ORAL at 06:13

## 2025-04-23 RX ADMIN — Medication 1 APPLICATION(S): at 17:26

## 2025-04-23 RX ADMIN — CYCLOBENZAPRINE HYDROCHLORIDE 5 MILLIGRAM(S): 15 CAPSULE, EXTENDED RELEASE ORAL at 23:32

## 2025-04-23 RX ADMIN — APIXABAN 5 MILLIGRAM(S): 2.5 TABLET, FILM COATED ORAL at 12:30

## 2025-04-23 RX ADMIN — Medication 283 MILLIGRAM(S): at 08:30

## 2025-04-23 RX ADMIN — GABAPENTIN 100 MILLIGRAM(S): 400 CAPSULE ORAL at 21:50

## 2025-04-23 NOTE — PROGRESS NOTE ADULT - ASSESSMENT
74M with PMH of BPH, diverticulosis, Graves' disease, hemorrhoids, HLD, hypothyroidism, osteoarthritis, spinal stenosis, spinal cord injury (2004), multiple spinal surgeries, CAD, and MARLENA. Presented 3/4/25 from SNF with LE swelling and GOVIND. Found to have left soleal and gastroc DVTs, spinal epidural abscess ? s/p thoracic laminectomy/fusion on 3/18. PICC in place for IV Rocephin + Daptomycin through 5/2. Admitted to [facility] on 3/25 for debility.    1. Constipation    Senna + Docusol enema daily since 4/2    bowel regiment and low fodmap diet    2. Debility / Postoperative Status    s/p thoracic epidural abscess ? laminectomy/fusion (3/18)    Fall/spine precautions,  brace    Pain management per primary; avoid opioids    3. Spinal Epidural Abscess – s/p Laminectomy/Fusion (3/18)    IV Rocephin 2g QHS + IV Daptomycin 650mg daily via PICC through 5/2    Aspen  brace in use    Surgical cultures: rare staph epi (possible contaminant)    CPK 4/20 WNL    4. GOVIND – Resolved / Anasarca / Hypoalbuminemia / LE Edema    Bumex 1mg BID resumed (held previously for SBP<100)    Monitor renal function and I/Os — currently stable    Serum TRUDY: weak IgG lambda band (needs OP f/u)    Urine protein 200 mg (not nephrotic). weigh daily    5. Transaminitis – Resolved    Limit Tylenol, avoid hepatotoxins    Encourage PO intake    Monitor LFTs with routine labs    6. CAD / HTN / HLD    Metoprolol 25 mg daily    Crestor 5 mg QHS    7. Anemia / MARLENA    s/p 2 units PRBCs intra-op    Monitor CBC (Hgb 8.3 on 3/31); H/H stable currently     Transfuse PRN to maintain Hgb >7    8. Hypothyroidism    Synthroid 150 mcg daily    9. Sacral Decubitus Ulcer – Stage 3    Ongoing wound care    Turn Q2h    Lac-Hydrin BID to BLE, Aquaphor BID to dry skin    10. BPH / Chronic Urinary Retention    On Flomax 8mg daily    Monitor U/O,    11. Acute DVT (L Soleal & Gastroc Veins)    s/p IVC filter 3/17 (couldn’t tolerate VQ scan)    Eliquis 5 mg BID resumed per neurosurgery guidance    12. DVT Prophylaxis    Eliquis BID also serves as DVT-P

## 2025-04-23 NOTE — CONSULT NOTE ADULT - ASSESSMENT
Stage 3 pressure wound left medial buttock with periwound redness  -agree with using santyl, suggest zinc oxide to periwound and large foam dressing  -limit time in wheelchair (off load)  -nutrition support

## 2025-04-23 NOTE — PROGRESS NOTE ADULT - ASSESSMENT
Assessment/Plan:  Mr. Rishabh Shaver is a 74-year-old man with past medical history of benign prostatic hyperplasia, diverticulosis, Graves' disease, hemorrhoids, hyperlipidemia, hypothyroidism, osteoarthritis, spinal stenosis, spinal cord injury (2004), and multiple spinal surgeries (three cervical and one lumbar), who is admitted for Acute Inpatient Rehabilitation with a multidisciplinary rehab program at [facility] with functional impairments in ADLs and mobility secondary to a thoracic epidural abscess s/p posterior thoracic laminectomy and fusion on 3/18/25 with Dr. Keith Cantu.       #Thoracic epidural abscess s/p laminectomy and fusion surgery      * C8 AIS D Incomplete paraplegia      *  brace when OOB      * Flexeril 5mg PO TID PRN for neck soreness      * Staples removed on POD #14 (4/1/25)      * Sacral wound assessment by wound/skin team      * urinary retention, now voiding,  c/w Flomax 0.8mg PO daily      * Right Venodyne only due to left calf DVT (s/p pre-op IVC filter)   - Activity Limitations: Decreased social, vocational and leisure activities, decreased self care and ADLs, decreased mobility, decreased ability to manage household and finances  - Comprehensive Multidisciplinary Rehab Program:      * 3 hours a day, 5 days a week      * PT 2hr/day: Focused on improving strength, endurance, coordination, balance, functional mobility, and transfers      * OT 1hr/day: Focused on improving strength, fine motor skills, coordination, posture and ADLs  - orthotist Arya saw patient 4/17 for AFO for right foot drop and recommends b/l AFOs hinged on R and semi solid on L - order placed    -----------------------------------------------------------------------------------    Concurrent Medical Problems    #GOVIND on CKD (resolved)  #LE edema  #Hypoalbuminemia  #Proteinuria  - IVF as needed with fluid balance  - Significant debilitating anasarca   - Trial of albumin Lasix started 3/8->improvement  - s/p IV Lasix/IV albumin BID (3/9)  - Change to Bumex/Albumin (3/11)-> good effect  - Switched to PO Bumex 1mg BID 3/24  - Trend Albumin (4/17 - 3.1)  - Trend Protein total (4/17 - 6.8); Total protein, Random Urine (3/18- 24)  - Trend CMP (4/17 - Cr 1.09/ BUN 33)    #L gastrocnemius and L soleal DVT  - appears provoked from decrease ambulation from recent spinal abscess  - Could not tolerate VQ scan for r/o PE 2/2 pain  - Eliquis 5mg PO q12h - will be new med on discharge (of note patient completed Eliquis load)   - Therapeutic Lovenox (3/12) in the setting of possible spine intervention  - Last dose of AC night of 3/16. Restarted Eliquis 5mg BID 4/5/25  - S/P IVC filter placement 3/17  - repeat US 4/8 with stable L soleal DVT and no propagation     #Spinal epidural abscess POA and already being treated with IV antibiotics with suspected worsening of disease  - S/p thoracic laminectomy and fusion 3/18. [place]  brace   - Surgical culture with staph epi possible contaminant but given clinical presentation on admission, IV Dapto added  - IV Rocephin 2g daily via PICC line thru 5/2/25  - IV Daptomycin 650mg daily via PICC line thru 5/2/25  - gabapentin 100mg PO TID  - d/c long-acting Oxycontin 4/11  - oxycodone 15mg PO q6h PRN moderate pain  - Flexeril 5mg PO TID PRN for muscle spasm  - staples d/teodoro in rehab    #Abdominal discomfort and bloating  - Abd xray 4/15--as above  - reconsulted GI 4/17 awaiting recs    #Mild Rhabdomyolysis (resolved)  - associated elevated transaminases (resolved)  - suspect from anasarca/edema   - improving with adequate diuresis of edema  - associated elevated transaminase; now resolved (4/17- AST 30/ALT 38)    #Leukocytosis (resolved)  - likely rx to DVT + Discitis  - no other signs of sepsis, afebrile   - trend CBC and monitor fever curve (4/17- WBC 8.22)    #Hypothyroidism  - Synthroid 150mcg PO daily    #CAD  #HTN  #HLD  - metoprolol succinate 25mg PO daily   - rosuvastatin 5mg PO qhs    #Anemia/MARLENA  - trend H/H (4/17- 10.5/33.1)  - transfuse if Hgb <7 PRN     #Sleep:  - melatonin 3mg PO qhs PRN for sleep    #GI/Bowel:  - senna 2 tablets PO qhs  - Miralax 17g PO daily  - daily mini-enema at 6AM  - lactulose 20g PO TID PRN for constipation  - Maalox 30mL PO q4h PRN for dyspepsia  - GI ppx: pantoprazole 40mg PO daily   - added simethicone 80mg PO TID PRN for gas 4/14    #BPH  #Urinary retention likely augmented by degree of anasarca  - S/P Ortega; TOV  ->failed. Ortega re-inserted 3/6->fell out without knowing with associated hematuria->recurrent retention 3/7->Ortega re-inserted (3/7) --> successful TOV 3/25  - Flomax 0.8mg PO qhs  - started bethanechol 10mg PO BID 4/6  - Monitor U/O    #Skin/Pressure Injury:   Skin assessment on admission:   - Generalized dry flaky skin  - 19cm posterior cervical incision with 32 staples and 2 steri strips  - Left buttock DTI pressure injury measuring 14 x 9 cm      * appearing as a dark purple discoloration of intact skin, with a central superficial open area measuring 6 x 2 cm      * at the periphery of the 14 x 9 cm area, the discoloration is somewhat lighter      * dark red non-blanchable erythema      * eschar resolved     **Santyl to slough of sacrum daily**  - Bilateral lower extremity edema  - Healed lumbar surgical incision scar  - Right heel blanchable redness  - Bilateral dry cracked heels and plantar surface  - Lac hydrin BID BLE  - Aquaphor BID to generalized dry skin   - Appreciate wound care consult     #Diet/Dysphagia:  - Dysphagia: SLP consult for swallow function evaluation and treatment  - Current Diet: Regular  - Aspiration Precautions  - Nutrition consult     #Patient Education  - Education provided on the following:      * Admitting diagnosis and functional implications      * Functional goals      * Bladder management      * Bowel management      * Skin care management      * Intensity of service and scheduling of rehab disciplines      * Plan of care and role of interdisciplinary team conference in discharge planning      * Reconciliation of medications from prior institution    #Precautions / PROPHYLAXIS:   - Falls, Spinal  - Ortho: Weight bearing status: FWB;  brace OOB  - DVT ppx: Eliquis 5mg PO BID, TEDs  - Pressure injury/Skin: Turn Q2hrs while in bed, OOB to Chair, PT/OT      ---------------  Outpatient Follow-up:    Yue Wgigins  [facility]  [address], [address]  [place] [address]  Phone: [phone]  Fax: [phone]  Established Patient  Follow Up Time: 1 week    Keith Cantu [facility]  Neurosurgery  [address], [address]  [place] [address]  Phone: [phone]  Fax: [phone]  Established Patient  Follow Up Time: 2 weeks    Luis A Brennan  [facility] Physician [facility]  CARDIOLOGY [address]  Scheduled Appointment: 04/23/2025    --------------  Wheelchair recommendation    Patient seen today for a face to face visit for an ultra-lightweight manual wheelchair in the home. Patient cannot independently walk in his home using any ambulation device.  He requires a manual wheelchair so they can perform their mobility related activities of daily living (MRADL), like getting to the bathroom for hygiene, the kitchen for eating and the bedroom for resting. Patient requires an ultra lightweight manual wheelchair because he cannot walk and has weakness and endurance issues that require him to need an ultra lightweight manual wheelchair with camber and an axle plate that brings the wheels forward to allow him to reach them effectively and without pain.      --------------  AFO recommendation    Patient presents with bilateral lower extremity weakness and foot drop due to nerve compression. Patient has instability as the patient’s feet scuff the ground during ambulation and is at risk for falls. Regarding the right side, in addition to foot drop and weakness, patient presents with inversion and genu recurvatum. The knee hyperextends. Patient requires a custom articulating AFO to address the foot drop, inversion, and genu recurvatum. Regarding the left side, in addition to foot drop and weakness, patient presents with genu recurvatum. Patient requires a custom semi solid AFO to allow for safe ambulation.    --------------   Assessment/Plan:  Mr. Rishabh Shaver is a 74-year-old man with past medical history of benign prostatic hyperplasia, diverticulosis, Graves' disease, hemorrhoids, hyperlipidemia, hypothyroidism, osteoarthritis, spinal stenosis, spinal cord injury (2004), and multiple spinal surgeries (three cervical and one lumbar), who is admitted for Acute Inpatient Rehabilitation with a multidisciplinary rehab program at [facility] with functional impairments in ADLs and mobility secondary to a thoracic epidural abscess s/p posterior thoracic laminectomy and fusion on 3/18/25 with Dr. Keith Cantu.       #Thoracic epidural abscess s/p laminectomy and fusion surgery      * C8 AIS D Incomplete paraplegia      *  brace when OOB      * Flexeril 5mg PO TID PRN for neck soreness      * Staples removed on POD #14 (4/1/25)      * Sacral wound assessment by wound/skin team      * urinary retention, now voiding,  c/w Flomax 0.8mg PO daily      * Right Venodyne only due to left calf DVT (s/p pre-op IVC filter)   - Activity Limitations: Decreased social, vocational and leisure activities, decreased self care and ADLs, decreased mobility, decreased ability to manage household and finances  - Comprehensive Multidisciplinary Rehab Program:      * 3 hours a day, 5 days a week      * PT 2hr/day: Focused on improving strength, endurance, coordination, balance, functional mobility, and transfers      * OT 1hr/day: Focused on improving strength, fine motor skills, coordination, posture and ADLs  - orthotist Arya saw patient 4/17 for AFO for right foot drop and recommends b/l AFOs hinged on R and semi solid on L - order placed    -----------------------------------------------------------------------------------    Concurrent Medical Problems    #GOVIND on CKD (resolved)  #LE edema  #Hypoalbuminemia  #Proteinuria  - IVF as needed with fluid balance  - Significant debilitating anasarca   - Trial of albumin Lasix started 3/8->improvement  - s/p IV Lasix/IV albumin BID (3/9)  - Change to Bumex/Albumin (3/11)-> good effect  - Switched to PO Bumex 1mg BID 3/24  - Trend Albumin (4/17 - 3.1)  - Trend Protein total (4/17 - 6.8); Total protein, Random Urine (3/18- 24)  - Trend CMP (4/17 - Cr 1.09/ BUN 33)    #L gastrocnemius and L soleal DVT  - appears provoked from decrease ambulation from recent spinal abscess  - Could not tolerate VQ scan for r/o PE 2/2 pain  - Eliquis 5mg PO q12h - will be new med on discharge (of note patient completed Eliquis load)   - Therapeutic Lovenox (3/12) in the setting of possible spine intervention  - Last dose of AC night of 3/16. Restarted Eliquis 5mg BID 4/5/25  - S/P IVC filter placement 3/17  - repeat US 4/8 with stable L soleal DVT and no propagation     #Spinal epidural abscess POA and already being treated with IV antibiotics with suspected worsening of disease  - S/p thoracic laminectomy and fusion 3/18. [place]  brace   - Surgical culture with staph epi possible contaminant but given clinical presentation on admission, IV Dapto added  - IV Rocephin 2g daily via PICC line thru 5/2/25  - IV Daptomycin 650mg daily via PICC line thru 5/2/25  - gabapentin 100mg PO TID  - d/c long-acting Oxycontin 4/11  - oxycodone 15mg PO q6h PRN moderate pain  - Flexeril 5mg PO TID PRN for muscle spasm  - staples d/teodoro in rehab    #Abdominal discomfort and bloating  - Abd xray 4/15--as above  - reconsulted GI 4/17 awaiting recs    #Mild Rhabdomyolysis (resolved)  - associated elevated transaminases (resolved)  - suspect from anasarca/edema   - improving with adequate diuresis of edema  - associated elevated transaminase; now resolved (4/17- AST 30/ALT 38)    #Leukocytosis (resolved)  - likely rx to DVT + Discitis  - no other signs of sepsis, afebrile   - trend CBC and monitor fever curve (4/17- WBC 8.22)    #Hypothyroidism  - Synthroid 150mcg PO daily    #CAD  #HTN  #HLD  - metoprolol succinate 25mg PO daily   - rosuvastatin 5mg PO qhs    #Anemia/MARLENA  - trend H/H (4/17- 10.5/33.1)  - transfuse if Hgb <7 PRN     #Sleep:  - melatonin 3mg PO qhs PRN for sleep    #GI/Bowel:  - senna 2 tablets PO qhs  - Miralax 17g PO daily  - daily mini-enema at 6AM  - lactulose 20g PO TID PRN for constipation  - Maalox 30mL PO q4h PRN for dyspepsia  - GI ppx: pantoprazole 40mg PO daily   - added simethicone 80mg PO TID PRN for gas 4/14    #BPH  #Urinary retention likely augmented by degree of anasarca  - S/P Ortega; TOV  ->failed. Ortega re-inserted 3/6->fell out without knowing with associated hematuria->recurrent retention 3/7->Ortega re-inserted (3/7) --> successful TOV 3/25  - Flomax 0.8mg PO qhs  - started bethanechol 10mg PO BID 4/6  - Monitor U/O    #Skin/Pressure Injury:   Skin assessment on admission:   - Generalized dry flaky skin  - 19cm posterior cervical incision with 32 staples and 2 steri strips  - Left buttock DTI pressure injury measuring 14 x 9 cm      * appearing as a dark purple discoloration of intact skin, with a central superficial open area measuring 6 x 2 cm      * at the periphery of the 14 x 9 cm area, the discoloration is somewhat lighter      * dark red non-blanchable erythema      * eschar resolved     **Santyl to slough of sacrum daily**  - Bilateral lower extremity edema  - Healed lumbar surgical incision scar  - Right heel blanchable redness  - Bilateral dry cracked heels and plantar surface  - Lac hydrin BID BLE  - Aquaphor BID to generalized dry skin   - Appreciate wound care consult     #Diet/Dysphagia:  - Dysphagia: SLP consult for swallow function evaluation and treatment  - Current Diet: Regular  - Aspiration Precautions  - Nutrition consult     #Patient Education  - Education provided on the following:      * Admitting diagnosis and functional implications      * Functional goals      * Bladder management      * Bowel management      * Skin care management      * Intensity of service and scheduling of rehab disciplines      * Plan of care and role of interdisciplinary team conference in discharge planning      * Reconciliation of medications from prior institution    #Precautions / PROPHYLAXIS:   - Falls, Spinal  - Ortho: Weight bearing status: FWB;  brace OOB  - DVT ppx: Eliquis 5mg PO BID, TEDs  - Pressure injury/Skin: Turn Q2hrs while in bed, OOB to Chair, PT/OT      ---------------  Outpatient Follow-up:    Yue Wiggins  [facility]  [address], [address]  [place] [address]  Phone: [phone]  Fax: [phone]  Established Patient  Follow Up Time: 1 week    Keith Cantu [facility]  Neurosurgery  [address], [address]  [place] [address]  Phone: [phone]  Fax: [phone]  Established Patient  Follow Up Time: 2 weeks    Luis A Brennan  [facility] Physician [facility]  CARDIOLOGY [address]  Scheduled Appointment: 04/23/2025    --------------  Wheelchair recommendation    Patient seen today for a face to face visit for an ultra-lightweight manual wheelchair in the home. Patient cannot independently walk in his home using any ambulation device.  He requires a manual wheelchair so they can perform their mobility related activities of daily living (MRADL), like getting to the bathroom for hygiene, the kitchen for eating and the bedroom for resting. Patient requires an ultra lightweight manual wheelchair because he cannot walk and has weakness and endurance issues that require him to need an ultra lightweight manual wheelchair with camber and an axle plate that brings the wheels forward to allow him to reach them effectively and without pain.      --------------  AFO recommendation    Patient presents with bilateral lower extremity weakness and foot drop due to nerve compression. Patient has instability as the patient’s feet scuff the ground during ambulation and is at risk for falls. Regarding the right side, in addition to foot drop and weakness, patient presents with inversion and genu recurvatum. The knee hyperextends. Patient requires a custom articulating AFO to address the foot drop, inversion, and genu recurvatum. Regarding the left side, in addition to foot drop and weakness, patient presents with genu recurvatum. Patient requires a custom semi solid AFO to allow for safe ambulation.    --------------  Electric Hospital Bed     Mr. Rishabh Shaver will need an electric hospital bed for elevation and transferring purposes. Due to his diagnosis of ah thoracic epidural abscess, he underwent laminectomy and fusion surgery and remains with paraplegia with additional need to have his head elevated 30 degrees to help assist in proper breathing and frequent body positioning to reduce the risk of skin breakdown in ways not feasible with an ordinary bed.    --------------

## 2025-04-23 NOTE — PROGRESS NOTE ADULT - SUBJECTIVE AND OBJECTIVE BOX
CC: Patient is a 75y old  Male who presents with a chief complaint of Thoracic epidural abscess s/p laminectomy and fusion surgery (23 Apr 2025 08:37)    Interval History:  Patient seen and examined at bedside.  No overnight events  No complaints this morning    ALLERGIES:  No Known Allergies    MEDICATIONS  (STANDING):  ammonium lactate 12% Lotion 1 Application(s) Topical two times a day  apixaban 5 milliGRAM(s) Oral every 12 hours  AQUAPHOR (petrolatum Ointment) 1 Application(s) Topical three times a day  bethanechol 10 milliGRAM(s) Oral two times a day  buMETAnide 1 milliGRAM(s) Oral two times a day  cefTRIAXone   IVPB 2000 milliGRAM(s) IV Intermittent every 24 hours  chlorhexidine 4% Liquid 1 Application(s) Topical <User Schedule>  collagenase Ointment 1 Application(s) Topical daily  DAPTOmycin IVPB 650 milliGRAM(s) IV Intermittent every 24 hours  docusate sodium Enema 283 milliGRAM(s) Rectal <User Schedule>  gabapentin   Oral   gabapentin 100 milliGRAM(s) Oral three times a day  levothyroxine 150 MICROGram(s) Oral daily  magnesium hydroxide Suspension 30 milliLiter(s) Oral at bedtime  metoprolol succinate ER 25 milliGRAM(s) Oral daily  pantoprazole    Tablet 40 milliGRAM(s) Oral before breakfast  polyethylene glycol 3350 17 Gram(s) Oral two times a day  rosuvastatin 5 milliGRAM(s) Oral at bedtime  senna 2 Tablet(s) Oral at bedtime  tamsulosin 0.8 milliGRAM(s) Oral at bedtime    MEDICATIONS  (PRN):  aluminum hydroxide/magnesium hydroxide/simethicone Suspension 30 milliLiter(s) Oral every 4 hours PRN Dyspepsia  cyclobenzaprine 5 milliGRAM(s) Oral three times a day PRN Muscle Spasm  melatonin 3 milliGRAM(s) Oral at bedtime PRN Insomnia  oxyCODONE    IR 15 milliGRAM(s) Oral every 6 hours PRN Moderate Pain (4 - 6)  simethicone 80 milliGRAM(s) Chew three times a day PRN Gas  sodium chloride 0.9% lock flush 10 milliLiter(s) IV Push every 1 hour PRN Pre/post blood products, medications, blood draw, and to maintain line patency    Vital Signs Last 24 Hrs  T(F): 97.9 (23 Apr 2025 07:59), Max: 98.4 (22 Apr 2025 19:41)  HR: 84 (23 Apr 2025 07:59) (84 - 91)  BP: 119/53 (23 Apr 2025 07:59) (115/62 - 122/76)  RR: 16 (23 Apr 2025 07:59) (16 - 17)  SpO2: 93% (23 Apr 2025 07:59) (93% - 95%)  I&O's Summary      PHYSICAL EXAM:  GENERAL: NAD  NERVOUS SYSTEM:  CN II - XII intact; Sensation intact; follows commands  CHEST/LUNG: Clear to percussion bilaterally; No rales, rhonchi, wheezing, or rubs; normal respiratory effort, no intercostal retractions  HEART: Regular rate and rhythm; No murmurs, rubs, or gallops; No pitting edema  ABDOMEN: Soft, Nontender, Nondistended; Bowel sounds present; No HSM or masses  MUSCULOSKELETAL/EXTREMITIES:  2+ Peripheral Pulses, No clubbing or digital cyanosis; FROM of extremeties (pain, crepitation or contracture)  PSYCH: Appropriate affect, Alert & Oriented x 3, Good Memory; Good insight    LABS:                        11.1   9.13  )-----------( 233      ( 21 Apr 2025 07:12 )             35.5       04-21    140  |  101  |  33  ----------------------------<  110  3.9   |  31  |  1.10    Ca    9.5      21 Apr 2025 07:12    TPro  7.3  /  Alb  3.3  /  TBili  0.6  /  DBili  x   /  AST  24  /  ALT  32  /  AlkPhos  86  04-21      Urinalysis Basic - ( 21 Apr 2025 07:12 )    Color: x / Appearance: x / SG: x / pH: x  Gluc: 110 mg/dL / Ketone: x  / Bili: x / Urobili: x   Blood: x / Protein: x / Nitrite: x   Leuk Esterase: x / RBC: x / WBC x   Sq Epi: x / Non Sq Epi: x / Bacteria: x        COVID-19 PCR: NotDetec (03-25-25 @ 21:35)      Care Discussed with Consultants/Other Providers: Yes

## 2025-04-23 NOTE — PROGRESS NOTE ADULT - SUBJECTIVE AND OBJECTIVE BOX
HPI:  Mr. Rishabh Shaver is a 74-year-old male patient with past medical history of benign prostatic hyperplasia, diverticulosis, Graves' disease, hemorrhoids, hyperlipidemia, hypothyroidism, osteoarthritis, spinal stenosis, spinal cord injury (2004), and multiple spinal surgeries (three cervical and one lumbar), who presented to [facility] on 3/4/25 from [facility] due to lower extremity swelling and abnormal BUN/creatinine levels for further evaluation and management. The patient was recently admitted to [facility] from 02/18/25 through 02/25/25 for sepsis secondary to epidural abscess. He had a peripherally inserted central catheter line in the right arm and was on nightly Ceftriaxone treatment until 04/16/25. At the ED, he was found to have GOVIND, a left soleal vein, and a left gastrocnemius DVT. He was started on heparin gtt. Hospital course complicated by urinary retention requiring Ortega catheter insertion, rhabdomyolysis, elevated LFTs, anasarca, leukocytosis due to thoracic spine discitis OM and early paraspinal abscess, as well as new onset RUE paresthesias and weakness. He is s/p IVC filter placement on 3/17 and was recommended surgical management. He is s/p posterior thoracic laminectomy and fusion on 3/18/25 with Dr. Keith Cantu. Surgical culture with rare staph epi. Recommendations by ID to continue IV Rocephin and Daptomycin through 5/2/25. Patient was evaluated by PM&R and therapy for functional deficits, gait/ADL impairments and acute rehabilitation was recommended. Patient was cleared for discharge to [facility] IRF on 3/25/25.     TDD 4/25/25 home  ___________________________________________________________________________    SUBJECTIVE/ROS  Patient was seen and evaluated at bedside today.  Reported no overnight events and is in no acute distress.  Sacrum revised to yesterday and healing, but with area of opening. Will consult Plastic Surgery for recommendations.  Remains urinating well with no required straight cath this morning.  Eager to participate on the recommended rehabilitation program.  Denies any CP, SOB, KESSLER, palpitations, fever, chills, body aches, cough, congestion, or any other symptoms at this time.   ___________________________________________________________________________    LAB                        11.1   9.13  )-----------( 233      ( 21 Apr 2025 07:12 )             35.5       04-21    140  |  101  |  33[H]  ----------------------------<  110[H]  3.9   |  31  |  1.10    Ca    9.5      21 Apr 2025 07:12    TPro  7.3  /  Alb  3.3  /  TBili  0.6  /  DBili  x   /  AST  24  /  ALT  32  /  AlkPhos  86  04-21    LIVER FUNCTIONS - ( 21 Apr 2025 07:12 )  Alb: 3.3 g/dL / Pro: 7.3 g/dL / ALK PHOS: 86 U/L / ALT: 32 U/L / AST: 24 U/L / GGT: x           ___________________________________________________________________________    MEDICATIONS  (STANDING):  ammonium lactate 12% Lotion 1 Application(s) Topical two times a day  apixaban 5 milliGRAM(s) Oral every 12 hours  AQUAPHOR (petrolatum Ointment) 1 Application(s) Topical three times a day  bethanechol 10 milliGRAM(s) Oral two times a day  buMETAnide 1 milliGRAM(s) Oral two times a day  cefTRIAXone   IVPB 2000 milliGRAM(s) IV Intermittent every 24 hours  chlorhexidine 4% Liquid 1 Application(s) Topical <User Schedule>  collagenase Ointment 1 Application(s) Topical daily  DAPTOmycin IVPB 650 milliGRAM(s) IV Intermittent every 24 hours  docusate sodium Enema 283 milliGRAM(s) Rectal <User Schedule>  gabapentin 100 milliGRAM(s) Oral three times a day  gabapentin   Oral   levothyroxine 150 MICROGram(s) Oral daily  magnesium hydroxide Suspension 30 milliLiter(s) Oral at bedtime  metoprolol succinate ER 25 milliGRAM(s) Oral daily  pantoprazole    Tablet 40 milliGRAM(s) Oral before breakfast  polyethylene glycol 3350 17 Gram(s) Oral two times a day  rosuvastatin 5 milliGRAM(s) Oral at bedtime  senna 2 Tablet(s) Oral at bedtime  tamsulosin 0.8 milliGRAM(s) Oral at bedtime    MEDICATIONS  (PRN):  aluminum hydroxide/magnesium hydroxide/simethicone Suspension 30 milliLiter(s) Oral every 4 hours PRN Dyspepsia  cyclobenzaprine 5 milliGRAM(s) Oral three times a day PRN Muscle Spasm  melatonin 3 milliGRAM(s) Oral at bedtime PRN Insomnia  oxyCODONE    IR 15 milliGRAM(s) Oral every 6 hours PRN Moderate Pain (4 - 6)  simethicone 80 milliGRAM(s) Chew three times a day PRN Gas  sodium chloride 0.9% lock flush 10 milliLiter(s) IV Push every 1 hour PRN Pre/post blood products, medications, blood draw, and to maintain line patency    ___________________________________________________________________________    PHYSICAL EXAM:    Gen - NAD, Comfortable  HEENT -  EOMI, MMM, PERRLA, Normal Conjunctivae  Neck - Supple, + limited ROM, +posterior cervical incision  Pulm - CTAB, No wheeze, No rhonchi, No crackles  Cardiovascular - RRR, S1S2, No murmurs  Chest - good chest expansion, good respiratory effort  Abdomen - Firm, NT, abd distention, +BS  Extremities - No C/C, no calf tenderness, +BLE edema, +B/L pedal edema, +RUE PICC line   /GI- + Ortega draining clear urine   Neuro-     Cognitive - awake, alert, oriented to person, place, date, year, and situation.  Able  to follow command     Motor -          RUE: Deltoid 5/5, bicep 5/5, tricep 5/5, wrist ext 5/5,  4-/5, unable to fully flex 2nd digit          LUE: Deltoid 5/5, bicep 5/5, tricep 5/5, wrist ext 5/5,  4-/5, unable to fully flex 2nd digit          RLE: IP 3/5, Quad 3/5, EHL 5/5, Dorsi/plantarflexion 5/5 (some limitations 2/2 LE edema)          LLE: IP 2/5, Quad 2/5, EHL 5/5, Dorsi/plantarflexion 5/5 (proximal leg significantly limited by pain and      Sensory - Impaired  to LT LLE from knee to ankle     Tone - Normal  Psychiatric - Mood stable, Affect WNL    ___________________________________________________________________________   HPI:  Mr. Rishabh Shaver is a 74-year-old male patient with past medical history of benign prostatic hyperplasia, diverticulosis, Graves' disease, hemorrhoids, hyperlipidemia, hypothyroidism, osteoarthritis, spinal stenosis, spinal cord injury (2004), and multiple spinal surgeries (three cervical and one lumbar), who presented to [facility] on 3/4/25 from [facility] due to lower extremity swelling and abnormal BUN/creatinine levels for further evaluation and management. The patient was recently admitted to [facility] from 02/18/25 through 02/25/25 for sepsis secondary to epidural abscess. He had a peripherally inserted central catheter line in the right arm and was on nightly Ceftriaxone treatment until 04/16/25. At the ED, he was found to have GOVIND, a left soleal vein, and a left gastrocnemius DVT. He was started on heparin gtt. Hospital course complicated by urinary retention requiring Ortega catheter insertion, rhabdomyolysis, elevated LFTs, anasarca, leukocytosis due to thoracic spine discitis OM and early paraspinal abscess, as well as new onset RUE paresthesias and weakness. He is s/p IVC filter placement on 3/17 and was recommended surgical management. He is s/p posterior thoracic laminectomy and fusion on 3/18/25 with Dr. Keith Cantu. Surgical culture with rare staph epi. Recommendations by ID to continue IV Rocephin and Daptomycin through 5/2/25. Patient was evaluated by PM&R and therapy for functional deficits, gait/ADL impairments and acute rehabilitation was recommended. Patient was cleared for discharge to [facility] IRF on 3/25/25.     TDD 4/25/25 home  ___________________________________________________________________________    SUBJECTIVE/ROS  Patient was seen and evaluated at bedside today.  Reported no overnight events and is in no acute distress.  Sacrum revised to yesterday and healing, but with area of opening. Will consult Plastic Surgery for recommendations.  Remains urinating well with no required straight cath this morning.  Eager to participate on the recommended rehabilitation program.  Denies any CP, SOB, KESSLER, palpitations, fever, chills, body aches, cough, congestion, or any other symptoms at this time.   ___________________________________________________________________________    LAB                        11.1   9.13  )-----------( 233      ( 21 Apr 2025 07:12 )             35.5     04-21    140  |  101  |  33[H]  ----------------------------<  110[H]  3.9   |  31  |  1.10    Ca    9.5      21 Apr 2025 07:12    TPro  7.3  /  Alb  3.3  /  TBili  0.6  /  DBili  x   /  AST  24  /  ALT  32  /  AlkPhos  86  04-21    LIVER FUNCTIONS - ( 21 Apr 2025 07:12 )  Alb: 3.3 g/dL / Pro: 7.3 g/dL / ALK PHOS: 86 U/L / ALT: 32 U/L / AST: 24 U/L / GGT: x           ___________________________________________________________________________    MEDICATIONS  (STANDING):  ammonium lactate 12% Lotion 1 Application(s) Topical two times a day  apixaban 5 milliGRAM(s) Oral every 12 hours  AQUAPHOR (petrolatum Ointment) 1 Application(s) Topical three times a day  bethanechol 10 milliGRAM(s) Oral two times a day  buMETAnide 1 milliGRAM(s) Oral two times a day  cefTRIAXone   IVPB 2000 milliGRAM(s) IV Intermittent every 24 hours  chlorhexidine 4% Liquid 1 Application(s) Topical <User Schedule>  collagenase Ointment 1 Application(s) Topical daily  DAPTOmycin IVPB 650 milliGRAM(s) IV Intermittent every 24 hours  docusate sodium Enema 283 milliGRAM(s) Rectal <User Schedule>  gabapentin 100 milliGRAM(s) Oral three times a day  gabapentin   Oral   levothyroxine 150 MICROGram(s) Oral daily  magnesium hydroxide Suspension 30 milliLiter(s) Oral at bedtime  metoprolol succinate ER 25 milliGRAM(s) Oral daily  pantoprazole    Tablet 40 milliGRAM(s) Oral before breakfast  polyethylene glycol 3350 17 Gram(s) Oral two times a day  rosuvastatin 5 milliGRAM(s) Oral at bedtime  senna 2 Tablet(s) Oral at bedtime  tamsulosin 0.8 milliGRAM(s) Oral at bedtime    MEDICATIONS  (PRN):  aluminum hydroxide/magnesium hydroxide/simethicone Suspension 30 milliLiter(s) Oral every 4 hours PRN Dyspepsia  cyclobenzaprine 5 milliGRAM(s) Oral three times a day PRN Muscle Spasm  melatonin 3 milliGRAM(s) Oral at bedtime PRN Insomnia  oxyCODONE    IR 15 milliGRAM(s) Oral every 6 hours PRN Moderate Pain (4 - 6)  simethicone 80 milliGRAM(s) Chew three times a day PRN Gas  sodium chloride 0.9% lock flush 10 milliLiter(s) IV Push every 1 hour PRN Pre/post blood products, medications, blood draw, and to maintain line patency    ___________________________________________________________________________    PHYSICAL EXAM:    Gen - NAD, Comfortable  HEENT -  EOMI, MMM, PERRLA, Normal Conjunctivae  Neck - Supple, + limited ROM, +posterior cervical incision  Pulm - CTAB, No wheeze, No rhonchi, No crackles  Cardiovascular - RRR, S1S2, No murmurs  Chest - good chest expansion, good respiratory effort  Abdomen - Firm, NT, abd distention, +BS  Extremities - No C/C, no calf tenderness, +BLE edema, +B/L pedal edema, +RUE PICC line   /GI- + Ortega draining clear urine   Neuro-     Cognitive - awake, alert, oriented to person, place, date, year, and situation.  Able  to follow command     Motor -          RUE: Deltoid 5/5, bicep 5/5, tricep 5/5, wrist ext 5/5,  4-/5, unable to fully flex 2nd digit          LUE: Deltoid 5/5, bicep 5/5, tricep 5/5, wrist ext 5/5,  4-/5, unable to fully flex 2nd digit          RLE: IP 3/5, Quad 3/5, EHL 5/5, Dorsi/plantarflexion 5/5 (some limitations 2/2 LE edema)          LLE: IP 2/5, Quad 2/5, EHL 5/5, Dorsi/plantarflexion 5/5 (proximal leg significantly limited by pain and      Sensory - Impaired  to LT LLE from knee to ankle     Tone - Normal  Psychiatric - Mood stable, Affect WNL    ___________________________________________________________________________

## 2025-04-23 NOTE — PROGRESS NOTE ADULT - TIME BILLING
Time spent includes direct patient care (interview and examination of patient), discussion with other providers, support staff and/or patient's family members, review of medical records, ordering diagnostic tests and analyzing results, and documentation.
- Ordering, reviewing, and interpreting labs, testing, and imaging.  - Independently obtaining a review of systems and performing a physical exam  - Reviewing prior hospitalization and where necessary, outpatient records.  - Counselling and educating patient and family regarding interpretation of aforementioned items and plan of care.
Time spent includes direct patient care  (interview and examination of patient), discussion with other providers, support staff and/or patient's family members, review of medical records, ordering diagnostic tests and analyzing results, and documentation.
This includes reviewing results/imaging and discussions with specialists, nursing, case management/social work. Further tests, medications, and procedures have been ordered as indicated. Results and the plan of care were communicated to the patient and/or their family member. Supporting documentation was completed and added to the patient's chart.
- Ordering, reviewing, and interpreting labs, testing, and imaging.  - Independently obtaining a review of systems and performing a physical exam  - Reviewing prior hospitalization and where necessary, outpatient records.  - Counselling and educating patient and family regarding interpretation of aforementioned items and plan of care.
Time spent includes direct patient care  (interview and examination of patient), discussion with other providers, support staff and/or patient's family members, review of medical records, ordering diagnostic tests and analyzing results, and documentation.
This includes reviewing results/imaging and discussions with specialists, nursing, case management/social work. Further tests, medications, and procedures have been ordered as indicated. Results and the plan of care were communicated to the patient and/or their family member. Supporting documentation was completed and added to the patient's chart.

## 2025-04-23 NOTE — CONSULT NOTE ADULT - SUBJECTIVE AND OBJECTIVE BOX
CC:  Patient is a 75y old  Male who presents with a chief complaint of Thoracic epidural abscess s/p laminectomy and fusion surgery.  Pt is able to stand with assistance      HPI:  Mr. Claudia Pimentel is a 74-year-old male patient with past medical history of benign prostatic hyperplasia, diverticulosis, Graves' disease, hemorrhoids, hyperlipidemia, hypothyroidism, osteoarthritis, spinal stenosis, spinal cord injury (), and multiple spinal surgeries (three cervical and one lumbar), who presented to [facility] on 3/4/25 from [facility] due to lower extremity swelling and abnormal BUN/creatinine levels for further evaluation and management. The patient was recently admitted to [facility] from 25 through 25 for sepsis secondary to epidural abscess. He had a peripherally inserted central catheter line in the right arm and was on nightly Ceftriaxone treatment until 25. At the ED, he was found to have GOVIND, a left soleal vein, and a left gastrocnemius DVT. He was started on heparin gtt. Hospital course complicated by urinary retention requiring Ortega catheter insertion, rhabdomyolysis, elevated LFTs, anasarca, leukocytosis due to thoracic spine discitis OM and early paraspinal abscess, as well as new onset RUE paresthesias and weakness. He is s/p IVC filter placement on 3/17 and was recommended surgical management. He is s/p posterior thoracic laminectomy and fusion on 3/18/25 with Dr. Keith Cantu. Surgical culture with rare staph epi. Recommendations by ID to continue IV Rocephin and Daptomycin through 25. Patient was evaluated by PM&R and therapy for functional deficits, gait/ADL impairments and acute rehabilitation was recommended. Patient was cleared for discharge to [facility] IRF on 3/25/25. (25 Mar 2025 17:09)      PAST MEDICAL & SURGICAL HISTORY:  Hyperlipidemia, unspecified hyperlipidemia type      Diverticulosis of large intestine without hemorrhage      History of colon polyps      Hemorrhoids, unspecified hemorrhoid type      Benign prostatic hyperplasia without lower urinary tract symptoms, unspecified morphology      Osteoarthritis of knee, unilateral  left      Spinal cord injury at C5-C7 level without injury of spinal bone, subsequent encounter      Spastic      Weakness  to right side      Myelopathy      Spinal stenosis, unspecified spinal region      Hypothyroidism, unspecified type      Graves disease      Alcoholic  recovering alcoholic x 40years      History of cervical discectomy      S/P laminectomy  Cervical      H/O lumbar discectomy  with laminectomy      H/O arthroscopy of knee  Left x 2. Unsure of years      H/O colonoscopy with polypectomy            Allergies    No Known Allergies    Intolerances        SOCIAL HISTORY          Smoking: Yes [ ]  No [ ]   ______pk yrs          ETOH  Yes [ ]  No [ ]  Social [ ]          DRUGS:  Yes [ ]  No [ ]  if so what______________    FAMILY HISTORY:  Family history of colon cancer in mother (Mother)    Family history of liver disease (Father)  father    Family history of cancer (Sibling)  SIster - bile duct        MEDICATIONS  (STANDING):  ammonium lactate 12% Lotion 1 Application(s) Topical two times a day  apixaban 5 milliGRAM(s) Oral every 12 hours  AQUAPHOR (petrolatum Ointment) 1 Application(s) Topical three times a day  bethanechol 10 milliGRAM(s) Oral two times a day  buMETAnide 1 milliGRAM(s) Oral two times a day  cefTRIAXone   IVPB 2000 milliGRAM(s) IV Intermittent every 24 hours  chlorhexidine 4% Liquid 1 Application(s) Topical <User Schedule>  collagenase Ointment 1 Application(s) Topical daily  DAPTOmycin IVPB 650 milliGRAM(s) IV Intermittent every 24 hours  docusate sodium Enema 283 milliGRAM(s) Rectal <User Schedule>  gabapentin   Oral   gabapentin 100 milliGRAM(s) Oral three times a day  levothyroxine 150 MICROGram(s) Oral daily  magnesium hydroxide Suspension 30 milliLiter(s) Oral at bedtime  metoprolol succinate ER 25 milliGRAM(s) Oral daily  pantoprazole    Tablet 40 milliGRAM(s) Oral before breakfast  polyethylene glycol 3350 17 Gram(s) Oral two times a day  rosuvastatin 5 milliGRAM(s) Oral at bedtime  senna 2 Tablet(s) Oral at bedtime  tamsulosin 0.8 milliGRAM(s) Oral at bedtime    MEDICATIONS  (PRN):  aluminum hydroxide/magnesium hydroxide/simethicone Suspension 30 milliLiter(s) Oral every 4 hours PRN Dyspepsia  cyclobenzaprine 5 milliGRAM(s) Oral three times a day PRN Muscle Spasm  melatonin 3 milliGRAM(s) Oral at bedtime PRN Insomnia  oxyCODONE    IR 15 milliGRAM(s) Oral every 6 hours PRN Moderate Pain (4 - 6)  simethicone 80 milliGRAM(s) Chew three times a day PRN Gas  sodium chloride 0.9% lock flush 10 milliLiter(s) IV Push every 1 hour PRN Pre/post blood products, medications, blood draw, and to maintain line patency         Review of systems:  General:  no fever or chills  Pulmonary:  no SOB  Cardiac:  denies chest pain, palpitations  GI:  no nausea, vomitting, or abddominal pain  :  no increase frequency, or burning  Heme:  no easy bruising with minor trauma  Musculoskeletal:  No history of back pain or trauma  Skin:  no history of rashes, injury, trauma  Neuro:   weakness, improved since surgery per patient         Vital Signs Last 24 Hrs  T(C): 36.6 (2025 07:59), Max: 36.6 (2025 07:59)  T(F): 97.9 (2025 07:59), Max: 97.9 (2025 07:59)  HR: 84 (2025 07:59) (84 - 91)  BP: 119/53 (2025 07:59) (119/53 - 122/76)  BP(mean): --  RR: 16 (2025 07:59) (16 - 16)  SpO2: 93% (2025 07:59) (93% - 95%)    Parameters below as of 2025 07:59  Patient On (Oxygen Delivery Method): room air        Physical Exam:    General:   obese, no distress  Extremities: wnl   Skin:   6.5 cm x 2.5 cm mostly left medial buttock level of coccyx. mostly full thickness skin loss with central fatty slough, no suggestion of deeper tracking, periwound very red   NERVOUS SYSTEM:  CN II - XII intact; Sensation intact; follows commands  CHEST/LUNG: Clear to percussion bilaterally; No rales, rhonchi, wheezing, or rubs; normal respiratory effort, no intercostal retractions  HEART: Regular rate and rhythm; No murmurs, rubs, or gallops; No pitting edema  ABDOMEN: Soft, Nontender, Nondistended; Bowel sounds present; No HSM or masses  MUSCULOSKELETAL/EXTREMITIES:  2+ Peripheral Pulses, No clubbing or digital cyanosis; FROM of extremeties (pain, crepitation or contracture)  PSYCH: Appropriate affect, Alert & Oriented x 3, Good Memory; Good insig  LABS:                [facility]: [id number] EXAM:  MR SPINE CERVICAL WAW IC   ORDERED BY: NELSON GORDON     PROCEDURE DATE:  2025          INTERPRETATION:  EXAMINATION: MR CERVICAL SPINE WITHOUT AND WITH IV   CONTRAST    CLINICAL INDICATION: UE peripheral weakness/LE   weakness-numbness/incontinence  TECHNIQUE: Multiplanar MRI images of the cervical spine were obtained   before and after IV injection of gadolinium, 10 cc administered.  COMPARISON: Cervical MRI 2025. Thoracic MRI from today dictated   separately. Cervical spine CT 2025.    FINDINGS:    There is worsened discitis/osteomyelitis at T1-T2, please see thoracic   MRI dictated separately for additional findings.    The cervical spine shows postsurgical changes from C3-C6, with   susceptibility artifact that degrades this examination. The hardware   would be better characterized with thin section CT. There is solid   ankylosis across the disc spaces and the facets. There is chronic   myelomalacia at C3-4, status post decompression. There is associated   focal atrophy, with thinning of the cord. There are postsurgical changes   in the soft tissues dorsally.    C1-C2: Mild hypertrophic changes and ligamentous thickening.  C2-C3: Broad-based disc osteophyte complex eccentric to the right and   uncovertebral and facet osteophyte. Mild narrowing of the central canal.   Moderate right and mild-to-moderate left foraminal stenoses. This is   stable.  C3-C4: Bilateral laminectomies with excellent decompression of the   central canal. Mild-to-moderate narrowing of the left neural foramen.   This is stable.  C4-C5: Bilateral laminectomies with excellent decompression of the   central canal. Mild bilateral foraminal narrowing. This is stable.  C5-C6: Bilateral laminectomies with excellent decompression of the   central canal.  C6-C7: Concentric broad-based disc osteophyte complex and uncovertebral   and facet osteophyte. Thickening of the ligamentum flavum. Moderate   central canal stenosis. Moderate to severe bilateral foraminal narrowing.  This is stable.  C7-T1: Broad-based disc osteophyte complex eccentric to the left and   uncovertebral and facet osteophyte. Left lateral endplate osteophyte.   Moderate central canal stenosis. Mild-to-moderate right and severe left   foraminal stenoses. This is stable.    The central canal and neural foramina in the cervical spine are otherwise   negative. Vertebral body height, alignment, marrow, posterior fossa, cord   are otherwise negative. No prevertebral soft tissue swelling. There is   mild volume loss and fatty replacement of the paraspinal musculature.    IMPRESSION:    1.  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  2.  Chronic cord myelomalacia at C3-4, status post decompression.  3.  Stable severe left C7-T1 foraminal stenosis, with moderate narrowing   of the central canal.  4.  Stable moderate to severe bilateral C6-7 foraminal stenoses.        Patient Name: CLAUDIA PIMENTEL  MRN: [id number], : 54    --- End of Report ---            MARTHA ABRAMS MD; Attending Radiologist  This document has been electronically signed. Mar  8 2025  5:45PM  ADIOLOGY & ADDITIONAL STUDIES:    Risks, benefits, and alternatives to treatment discussed. All questions answered with understanding.    Procedure Performed:  (  )Yes     ( x )No  Name of Procedure:      [  ]Debridement     [  ]I&D    [  ]Laceration Repair     [  ]Other:  (  )partial thickness     (  )full thickness     (  )subcutaneous     (  )muscle/tendon     (  )bone  (  )sharp     (  )surgical

## 2025-04-24 ENCOUNTER — TRANSCRIPTION ENCOUNTER (OUTPATIENT)
Age: 75
End: 2025-04-24

## 2025-04-24 LAB
ALBUMIN SERPL ELPH-MCNC: 3.2 G/DL — LOW (ref 3.3–5)
ALP SERPL-CCNC: 87 U/L — SIGNIFICANT CHANGE UP (ref 40–120)
ALT FLD-CCNC: 53 U/L — HIGH (ref 10–45)
ANION GAP SERPL CALC-SCNC: 8 MMOL/L — SIGNIFICANT CHANGE UP (ref 5–17)
AST SERPL-CCNC: 35 U/L — SIGNIFICANT CHANGE UP (ref 10–40)
BASOPHILS # BLD AUTO: 0.1 K/UL — SIGNIFICANT CHANGE UP (ref 0–0.2)
BASOPHILS NFR BLD AUTO: 1.2 % — SIGNIFICANT CHANGE UP (ref 0–2)
BILIRUB SERPL-MCNC: 0.5 MG/DL — SIGNIFICANT CHANGE UP (ref 0.2–1.2)
BUN SERPL-MCNC: 34 MG/DL — HIGH (ref 7–23)
CALCIUM SERPL-MCNC: 9.4 MG/DL — SIGNIFICANT CHANGE UP (ref 8.4–10.5)
CHLORIDE SERPL-SCNC: 100 MMOL/L — SIGNIFICANT CHANGE UP (ref 96–108)
CO2 SERPL-SCNC: 30 MMOL/L — SIGNIFICANT CHANGE UP (ref 22–31)
CREAT SERPL-MCNC: 1.22 MG/DL — SIGNIFICANT CHANGE UP (ref 0.5–1.3)
EGFR: 63 ML/MIN/1.73M2 — SIGNIFICANT CHANGE UP
EGFR: 63 ML/MIN/1.73M2 — SIGNIFICANT CHANGE UP
EOSINOPHIL # BLD AUTO: 0.76 K/UL — HIGH (ref 0–0.5)
EOSINOPHIL NFR BLD AUTO: 9.2 % — HIGH (ref 0–6)
GLUCOSE SERPL-MCNC: 114 MG/DL — HIGH (ref 70–99)
HCT VFR BLD CALC: 34.4 % — LOW (ref 39–50)
HGB BLD-MCNC: 10.9 G/DL — LOW (ref 13–17)
IMM GRANULOCYTES NFR BLD AUTO: 0.4 % — SIGNIFICANT CHANGE UP (ref 0–0.9)
LYMPHOCYTES # BLD AUTO: 1.25 K/UL — SIGNIFICANT CHANGE UP (ref 1–3.3)
LYMPHOCYTES # BLD AUTO: 15.2 % — SIGNIFICANT CHANGE UP (ref 13–44)
MCHC RBC-ENTMCNC: 28.3 PG — SIGNIFICANT CHANGE UP (ref 27–34)
MCHC RBC-ENTMCNC: 31.7 G/DL — LOW (ref 32–36)
MCV RBC AUTO: 89.4 FL — SIGNIFICANT CHANGE UP (ref 80–100)
MONOCYTES # BLD AUTO: 0.73 K/UL — SIGNIFICANT CHANGE UP (ref 0–0.9)
MONOCYTES NFR BLD AUTO: 8.9 % — SIGNIFICANT CHANGE UP (ref 2–14)
NEUTROPHILS # BLD AUTO: 5.35 K/UL — SIGNIFICANT CHANGE UP (ref 1.8–7.4)
NEUTROPHILS NFR BLD AUTO: 65.1 % — SIGNIFICANT CHANGE UP (ref 43–77)
NRBC BLD AUTO-RTO: 0 /100 WBCS — SIGNIFICANT CHANGE UP (ref 0–0)
PLATELET # BLD AUTO: 227 K/UL — SIGNIFICANT CHANGE UP (ref 150–400)
POTASSIUM SERPL-MCNC: 3.4 MMOL/L — LOW (ref 3.5–5.3)
POTASSIUM SERPL-SCNC: 3.4 MMOL/L — LOW (ref 3.5–5.3)
PROT SERPL-MCNC: 6.6 G/DL — SIGNIFICANT CHANGE UP (ref 6–8.3)
RBC # BLD: 3.85 M/UL — LOW (ref 4.2–5.8)
RBC # FLD: 14.6 % — HIGH (ref 10.3–14.5)
SODIUM SERPL-SCNC: 138 MMOL/L — SIGNIFICANT CHANGE UP (ref 135–145)
WBC # BLD: 8.22 K/UL — SIGNIFICANT CHANGE UP (ref 3.8–10.5)
WBC # FLD AUTO: 8.22 K/UL — SIGNIFICANT CHANGE UP (ref 3.8–10.5)

## 2025-04-24 PROCEDURE — 99233 SBSQ HOSP IP/OBS HIGH 50: CPT

## 2025-04-24 RX ADMIN — BUMETANIDE 1 MILLIGRAM(S): 1 TABLET ORAL at 05:39

## 2025-04-24 RX ADMIN — TAMSULOSIN HYDROCHLORIDE 0.8 MILLIGRAM(S): 0.4 CAPSULE ORAL at 22:06

## 2025-04-24 RX ADMIN — Medication 1 APPLICATION(S): at 05:40

## 2025-04-24 RX ADMIN — Medication 1 APPLICATION(S): at 05:44

## 2025-04-24 RX ADMIN — APIXABAN 5 MILLIGRAM(S): 2.5 TABLET, FILM COATED ORAL at 12:06

## 2025-04-24 RX ADMIN — Medication 10 MILLIGRAM(S): at 05:39

## 2025-04-24 RX ADMIN — CEFTRIAXONE 100 MILLIGRAM(S): 500 INJECTION, POWDER, FOR SOLUTION INTRAMUSCULAR; INTRAVENOUS at 22:06

## 2025-04-24 RX ADMIN — GABAPENTIN 100 MILLIGRAM(S): 400 CAPSULE ORAL at 05:39

## 2025-04-24 RX ADMIN — CYCLOBENZAPRINE HYDROCHLORIDE 5 MILLIGRAM(S): 15 CAPSULE, EXTENDED RELEASE ORAL at 14:43

## 2025-04-24 RX ADMIN — BUMETANIDE 1 MILLIGRAM(S): 1 TABLET ORAL at 14:43

## 2025-04-24 RX ADMIN — Medication 150 MICROGRAM(S): at 05:40

## 2025-04-24 RX ADMIN — ROSUVASTATIN CALCIUM 5 MILLIGRAM(S): 20 TABLET, FILM COATED ORAL at 22:06

## 2025-04-24 RX ADMIN — APIXABAN 5 MILLIGRAM(S): 2.5 TABLET, FILM COATED ORAL at 23:46

## 2025-04-24 RX ADMIN — WHITE PETROLATUM 1 APPLICATION(S): 1 OINTMENT TOPICAL at 18:16

## 2025-04-24 RX ADMIN — Medication 40 MILLIGRAM(S): at 05:40

## 2025-04-24 RX ADMIN — Medication 10 MILLIGRAM(S): at 17:23

## 2025-04-24 RX ADMIN — WHITE PETROLATUM 1 APPLICATION(S): 1 OINTMENT TOPICAL at 05:40

## 2025-04-24 RX ADMIN — DAPTOMYCIN 126 MILLIGRAM(S): 500 INJECTION, POWDER, LYOPHILIZED, FOR SOLUTION INTRAVENOUS at 14:50

## 2025-04-24 RX ADMIN — GABAPENTIN 100 MILLIGRAM(S): 400 CAPSULE ORAL at 22:06

## 2025-04-24 RX ADMIN — CYCLOBENZAPRINE HYDROCHLORIDE 5 MILLIGRAM(S): 15 CAPSULE, EXTENDED RELEASE ORAL at 22:06

## 2025-04-24 RX ADMIN — Medication 1 APPLICATION(S): at 17:24

## 2025-04-24 RX ADMIN — Medication 283 MILLIGRAM(S): at 08:49

## 2025-04-24 RX ADMIN — GABAPENTIN 100 MILLIGRAM(S): 400 CAPSULE ORAL at 14:42

## 2025-04-24 NOTE — DISCHARGE NOTE NURSING/CASE MANAGEMENT/SOCIAL WORK - PATIENT PORTAL LINK FT
You can access the FollowMyHealth Patient Portal offered by [facility] by registering at the following website: [email or web address]. By joining [employer]’s FollowMyHealth portal, you will also be able to view your health information using other applications (apps) compatible with our system.

## 2025-04-24 NOTE — PROGRESS NOTE ADULT - SUBJECTIVE AND OBJECTIVE BOX
HPI:  Mr. Rishabh Shaver is a 74-year-old male patient with past medical history of benign prostatic hyperplasia, diverticulosis, Graves' disease, hemorrhoids, hyperlipidemia, hypothyroidism, osteoarthritis, spinal stenosis, spinal cord injury (2004), and multiple spinal surgeries (three cervical and one lumbar), who presented to [facility] on 3/4/25 from [facility] due to lower extremity swelling and abnormal BUN/creatinine levels for further evaluation and management. The patient was recently admitted to [facility] from 02/18/25 through 02/25/25 for sepsis secondary to epidural abscess. He had a peripherally inserted central catheter line in the right arm and was on nightly Ceftriaxone treatment until 04/16/25. At the ED, he was found to have GOVIND, a left soleal vein, and a left gastrocnemius DVT. He was started on heparin gtt. Hospital course complicated by urinary retention requiring Ortega catheter insertion, rhabdomyolysis, elevated LFTs, anasarca, leukocytosis due to thoracic spine discitis OM and early paraspinal abscess, as well as new onset RUE paresthesias and weakness. He is s/p IVC filter placement on 3/17 and was recommended surgical management. He is s/p posterior thoracic laminectomy and fusion on 3/18/25 with Dr. Keith Cantu. Surgical culture with rare staph epi. Recommendations by ID to continue IV Rocephin and Daptomycin through 5/2/25. Patient was evaluated by PM&R and therapy for functional deficits, gait/ADL impairments and acute rehabilitation was recommended. Patient was cleared for discharge to [facility] IRF on 3/25/25.     TDD 4/30/25 home  ___________________________________________________________________________    SUBJECTIVE/ROS  Patient was seen and evaluated at bedside today.  Reported no overnight events and is in no acute distress.  Sacrum revised and healing. Plastic Surgery recommendations appreciated.  Voiding independently.  Case was discussed at Interdisciplinary Team meeting today.  Changes to the tentative discharge date as outlined above.  Patient is eager to continue participation on the recommended rehabilitation program.  Denies any CP, SOB, KESSLER, palpitations, fever, chills, body aches, cough, congestion, or any other symptoms at this time.   ___________________________________________________________________________    LAB                        10.9   8.22  )-----------( 227      ( 24 Apr 2025 05:54 )             34.4     04-24    138  |  100  |  34[H]  ----------------------------<  114[H]  3.4[L]   |  30  |  1.22    Ca    9.4      24 Apr 2025 05:54    TPro  6.6  /  Alb  3.2[L]  /  TBili  0.5  /  DBili  x   /  AST  35  /  ALT  53[H]  /  AlkPhos  87  04-24    LIVER FUNCTIONS - ( 24 Apr 2025 05:54 )  Alb: 3.2 g/dL / Pro: 6.6 g/dL / ALK PHOS: 87 U/L / ALT: 53 U/L / AST: 35 U/L / GGT: x           ___________________________________________________________________________    MEDICATIONS  (STANDING):  ammonium lactate 12% Lotion 1 Application(s) Topical two times a day  apixaban 5 milliGRAM(s) Oral every 12 hours  AQUAPHOR (petrolatum Ointment) 1 Application(s) Topical three times a day  bethanechol 10 milliGRAM(s) Oral two times a day  buMETAnide 1 milliGRAM(s) Oral two times a day  cefTRIAXone   IVPB 2000 milliGRAM(s) IV Intermittent every 24 hours  chlorhexidine 4% Liquid 1 Application(s) Topical <User Schedule>  collagenase Ointment 1 Application(s) Topical daily  DAPTOmycin IVPB 650 milliGRAM(s) IV Intermittent every 24 hours  docusate sodium Enema 283 milliGRAM(s) Rectal <User Schedule>  gabapentin   Oral   gabapentin 100 milliGRAM(s) Oral three times a day  levothyroxine 150 MICROGram(s) Oral daily  magnesium hydroxide Suspension 30 milliLiter(s) Oral at bedtime  metoprolol succinate ER 25 milliGRAM(s) Oral daily  pantoprazole    Tablet 40 milliGRAM(s) Oral before breakfast  polyethylene glycol 3350 17 Gram(s) Oral two times a day  rosuvastatin 5 milliGRAM(s) Oral at bedtime  senna 2 Tablet(s) Oral at bedtime  tamsulosin 0.8 milliGRAM(s) Oral at bedtime    MEDICATIONS  (PRN):  aluminum hydroxide/magnesium hydroxide/simethicone Suspension 30 milliLiter(s) Oral every 4 hours PRN Dyspepsia  cyclobenzaprine 5 milliGRAM(s) Oral three times a day PRN Muscle Spasm  melatonin 3 milliGRAM(s) Oral at bedtime PRN Insomnia  oxyCODONE    IR 15 milliGRAM(s) Oral every 6 hours PRN Moderate Pain (4 - 6)  simethicone 80 milliGRAM(s) Chew three times a day PRN Gas  sodium chloride 0.9% lock flush 10 milliLiter(s) IV Push every 1 hour PRN Pre/post blood products, medications, blood draw, and to maintain line patency    ___________________________________________________________________________    PHYSICAL EXAM:    Gen - NAD, Comfortable  HEENT -  EOMI, MMM, PERRLA, Normal Conjunctivae  Neck - Supple, + limited ROM, +posterior cervical incision  Pulm - CTAB, No wheeze, No rhonchi, No crackles  Cardiovascular - RRR, S1S2, No murmurs  Chest - good chest expansion, good respiratory effort  Abdomen - Firm, NT, abd distention, +BS  Extremities - No C/C, no calf tenderness, +BLE edema, +B/L pedal edema, +RUE PICC line   /GI- + Ortega draining clear urine   Neuro-     Cognitive - awake, alert, oriented to person, place, date, year, and situation.  Able  to follow command     Motor -          RUE: Deltoid 5/5, bicep 5/5, tricep 5/5, wrist ext 5/5,  4-/5, unable to fully flex 2nd digit          LUE: Deltoid 5/5, bicep 5/5, tricep 5/5, wrist ext 5/5,  4-/5, unable to fully flex 2nd digit          RLE: IP 3/5, Quad 3/5, EHL 5/5, Dorsi/plantarflexion 5/5 (some limitations 2/2 LE edema)          LLE: IP 2/5, Quad 2/5, EHL 5/5, Dorsi/plantarflexion 5/5 (proximal leg significantly limited by pain and      Sensory - Impaired  to LT LLE from knee to ankle     Tone - Normal  Psychiatric - Mood stable, Affect WNL    ___________________________________________________________________________

## 2025-04-24 NOTE — DISCHARGE NOTE NURSING/CASE MANAGEMENT/SOCIAL WORK - NSDCVIVACCINE_GEN_ALL_CORE_FT
influenza, injectable, quadrivalent, preservative free; 18-Feb-2017 13:03; Sophia Livingston (ESTEFANIA); [employer]; [id number]; IntraMuscular; Deltoid Left.; 0.5 milliLiter(s); VIS (VIS Published: 07-Aug-2015, VIS Presented: 18-Feb-2017);

## 2025-04-24 NOTE — PROGRESS NOTE ADULT - ASSESSMENT
Assessment/Plan:  Mr. Rishabh Shaver is a 74-year-old man with past medical history of benign prostatic hyperplasia, diverticulosis, Graves' disease, hemorrhoids, hyperlipidemia, hypothyroidism, osteoarthritis, spinal stenosis, spinal cord injury (2004), and multiple spinal surgeries (three cervical and one lumbar), who is admitted for Acute Inpatient Rehabilitation with a multidisciplinary rehab program at [facility] with functional impairments in ADLs and mobility secondary to a thoracic epidural abscess s/p posterior thoracic laminectomy and fusion on 3/18/25 with Dr. Keith Cantu.       #Thoracic epidural abscess s/p laminectomy and fusion surgery      * C8 AIS D Incomplete paraplegia      *  brace when OOB      * Flexeril 5mg PO TID PRN for neck soreness      * Staples removed on POD #14 (4/1/25)      * Sacral wound assessment by wound/skin team      * urinary retention, now voiding,  c/w Flomax 0.8mg PO daily      * Right Venodyne only due to left calf DVT (s/p pre-op IVC filter)   - Activity Limitations: Decreased social, vocational and leisure activities, decreased self care and ADLs, decreased mobility, decreased ability to manage household and finances  - Comprehensive Multidisciplinary Rehab Program:      * 3 hours a day, 5 days a week      * PT 2hr/day: Focused on improving strength, endurance, coordination, balance, functional mobility, and transfers      * OT 1hr/day: Focused on improving strength, fine motor skills, coordination, posture and ADLs  - orthotist Arya saw patient 4/17 for AFO for right foot drop and recommends b/l AFOs hinged on R and semi solid on L - order placed    -----------------------------------------------------------------------------------    Concurrent Medical Problems    #GOVIND on CKD (resolved)  #LE edema  #Hypoalbuminemia  #Proteinuria  - IVF as needed with fluid balance  - Significant debilitating anasarca   - Trial of albumin Lasix started 3/8->improvement  - s/p IV Lasix/IV albumin BID (3/9)  - Change to Bumex/Albumin (3/11)-> good effect  - Switched to PO Bumex 1mg BID 3/24  - Trend Albumin (4/17 - 3.1)  - Trend Protein total (4/17 - 6.8); Total protein, Random Urine (3/18- 24)  - Trend CMP (4/17 - Cr 1.09/ BUN 33)    #L gastrocnemius and L soleal DVT  - appears provoked from decrease ambulation from recent spinal abscess  - Could not tolerate VQ scan for r/o PE 2/2 pain  - Eliquis 5mg PO q12h - will be new med on discharge (of note patient completed Eliquis load)   - Therapeutic Lovenox (3/12) in the setting of possible spine intervention  - Last dose of AC night of 3/16. Restarted Eliquis 5mg BID 4/5/25  - S/P IVC filter placement 3/17  - repeat US 4/8 with stable L soleal DVT and no propagation     #Spinal epidural abscess POA and already being treated with IV antibiotics with suspected worsening of disease  - S/p thoracic laminectomy and fusion 3/18. Misenheimer  brace   - Surgical culture with staph epi possible contaminant but given clinical presentation on admission, IV Dapto added  - IV Rocephin 2g daily via PICC line thru 5/2/25  - IV Daptomycin 650mg daily via PICC line thru 5/2/25  - gabapentin 100mg PO TID  - d/c long-acting Oxycontin 4/11  - oxycodone 15mg PO q6h PRN moderate pain  - Flexeril 5mg PO TID PRN for muscle spasm  - staples d/teodoro in rehab    #Abdominal discomfort and bloating  - Abd xray 4/15--as above  - reconsulted GI 4/17 awaiting recs    #Mild Rhabdomyolysis (resolved)  - associated elevated transaminases (resolved)  - suspect from anasarca/edema   - improving with adequate diuresis of edema  - associated elevated transaminase; now resolved (4/17- AST 30/ALT 38)    #Leukocytosis (resolved)  - likely rx to DVT + Discitis  - no other signs of sepsis, afebrile   - trend CBC and monitor fever curve (4/17- WBC 8.22)    #Hypothyroidism  - Synthroid 150mcg PO daily    #CAD  #HTN  #HLD  - metoprolol succinate 25mg PO daily   - rosuvastatin 5mg PO qhs    #Anemia/MARLENA  - trend H/H (4/17- 10.5/33.1)  - transfuse if Hgb <7 PRN     #Sleep:  - melatonin 3mg PO qhs PRN for sleep    #GI/Bowel:  - senna 2 tablets PO qhs  - Miralax 17g PO daily  - daily mini-enema at 6AM  - lactulose 20g PO TID PRN for constipation  - Maalox 30mL PO q4h PRN for dyspepsia  - GI ppx: pantoprazole 40mg PO daily   - added simethicone 80mg PO TID PRN for gas 4/14    #BPH  #Urinary retention likely augmented by degree of anasarca  - S/P Ortega; TOV  ->failed. Ortega re-inserted 3/6->fell out without knowing with associated hematuria->recurrent retention 3/7->Ortega re-inserted (3/7) --> successful TOV 3/25  - Flomax 0.8mg PO qhs  - started bethanechol 10mg PO BID 4/6  - Monitor U/O    #Skin/Pressure Injury:   Skin assessment on admission:   - Generalized dry flaky skin  - 19cm posterior cervical incision with 32 staples and 2 steri strips  - Left buttock DTI pressure injury measuring 14 x 9 cm      * appearing as a dark purple discoloration of intact skin, with a central superficial open area measuring 6 x 2 cm      * at the periphery of the 14 x 9 cm area, the discoloration is somewhat lighter      * dark red non-blanchable erythema      * eschar resolved     **Santyl to slough of sacrum daily**  - Bilateral lower extremity edema  - Healed lumbar surgical incision scar  - Right heel blanchable redness  - Bilateral dry cracked heels and plantar surface  - Lac hydrin BID BLE  - Aquaphor BID to generalized dry skin   - Appreciate wound care consult     #Diet/Dysphagia:  - Dysphagia: SLP consult for swallow function evaluation and treatment  - Current Diet: Regular  - Aspiration Precautions  - Nutrition consult     #Patient Education  - Education provided on the following:      * Admitting diagnosis and functional implications      * Functional goals      * Bladder management      * Bowel management      * Skin care management      * Intensity of service and scheduling of rehab disciplines      * Plan of care and role of interdisciplinary team conference in discharge planning      * Reconciliation of medications from prior institution    #Precautions / PROPHYLAXIS:   - Falls, Spinal  - Ortho: Weight bearing status: FWB;  brace OOB  - DVT ppx: Eliquis 5mg PO BID, TEDs  - Pressure injury/Skin: Turn Q2hrs while in bed, OOB to Chair, PT/OT      ---------------  Outpatient Follow-up:    Yue Wiggins  [facility]  [address], [address]  [place] [address]  Phone: [phone]  Fax: [phone]  Established Patient  Follow Up Time: 1 week    Keith Cantu [facility]  Neurosurgery  [address]  [place] [address]  Phone: [phone]  Fax: [phone]  Established Patient  Follow Up Time: 2 weeks    Luis A Brennan  [facility] Physician [facility]  CARDIOLOGY [address]  Scheduled Appointment: 04/23/2025    --------------  Wheelchair recommendation    Patient seen today for a face to face visit for an ultra-lightweight manual wheelchair in the home. Patient cannot independently walk in his home using any ambulation device.  He requires a manual wheelchair so they can perform their mobility related activities of daily living (MRADL), like getting to the bathroom for hygiene, the kitchen for eating and the bedroom for resting. Patient requires an ultra lightweight manual wheelchair because he cannot walk and has weakness and endurance issues that require him to need an ultra lightweight manual wheelchair with camber and an axle plate that brings the wheels forward to allow him to reach them effectively and without pain.      --------------  AFO recommendation    Patient presents with bilateral lower extremity weakness and foot drop due to nerve compression. Patient has instability as the patient’s feet scuff the ground during ambulation and is at risk for falls. Regarding the right side, in addition to foot drop and weakness, patient presents with inversion and genu recurvatum. The knee hyperextends. Patient requires a custom articulating AFO to address the foot drop, inversion, and genu recurvatum. Regarding the left side, in addition to foot drop and weakness, patient presents with genu recurvatum. Patient requires a custom semi solid AFO to allow for safe ambulation.    --------------  Electric Hospital Bed     Mr. Rishabh Shaver will need an electric hospital bed for elevation and transferring purposes. Due to his diagnosis of ah thoracic epidural abscess, he underwent laminectomy and fusion surgery and remains with paraplegia with additional need to have his head elevated 30 degrees to help assist in proper breathing and frequent body positioning to reduce the risk of skin breakdown in ways not feasible with an ordinary bed.    --------------

## 2025-04-24 NOTE — DISCHARGE NOTE NURSING/CASE MANAGEMENT/SOCIAL WORK - NSDCPEFALRISK_GEN_ALL_CORE
For information on Fall & Injury Prevention, visit: [email or web address] OR  [email or web address] OR  https://www.cdc.gov/steadi/patient.html

## 2025-04-24 NOTE — DISCHARGE NOTE NURSING/CASE MANAGEMENT/SOCIAL WORK - FINANCIAL ASSISTANCE
[facility] provides services at a reduced cost to those who are determined to be eligible through [facility]’s financial assistance program. Information regarding [facility]’s financial assistance program can be found by going to [email or web address] or by calling [phone].

## 2025-04-25 PROCEDURE — 99232 SBSQ HOSP IP/OBS MODERATE 35: CPT

## 2025-04-25 PROCEDURE — 99233 SBSQ HOSP IP/OBS HIGH 50: CPT

## 2025-04-25 RX ORDER — METOPROLOL SUCCINATE 50 MG/1
25 TABLET, EXTENDED RELEASE ORAL DAILY
Refills: 0 | Status: DISCONTINUED | OUTPATIENT
Start: 2025-04-25 | End: 2025-04-30

## 2025-04-25 RX ADMIN — Medication 1 APPLICATION(S): at 05:56

## 2025-04-25 RX ADMIN — Medication 150 MICROGRAM(S): at 05:55

## 2025-04-25 RX ADMIN — CYCLOBENZAPRINE HYDROCHLORIDE 5 MILLIGRAM(S): 15 CAPSULE, EXTENDED RELEASE ORAL at 22:10

## 2025-04-25 RX ADMIN — Medication 40 MILLIGRAM(S): at 05:55

## 2025-04-25 RX ADMIN — APIXABAN 5 MILLIGRAM(S): 2.5 TABLET, FILM COATED ORAL at 23:50

## 2025-04-25 RX ADMIN — APIXABAN 5 MILLIGRAM(S): 2.5 TABLET, FILM COATED ORAL at 13:14

## 2025-04-25 RX ADMIN — Medication 1 APPLICATION(S): at 05:54

## 2025-04-25 RX ADMIN — BUMETANIDE 1 MILLIGRAM(S): 1 TABLET ORAL at 13:36

## 2025-04-25 RX ADMIN — WHITE PETROLATUM 1 APPLICATION(S): 1 OINTMENT TOPICAL at 05:56

## 2025-04-25 RX ADMIN — ROSUVASTATIN CALCIUM 5 MILLIGRAM(S): 20 TABLET, FILM COATED ORAL at 22:09

## 2025-04-25 RX ADMIN — Medication 10 MILLIGRAM(S): at 18:03

## 2025-04-25 RX ADMIN — CEFTRIAXONE 100 MILLIGRAM(S): 500 INJECTION, POWDER, FOR SOLUTION INTRAMUSCULAR; INTRAVENOUS at 22:09

## 2025-04-25 RX ADMIN — GABAPENTIN 100 MILLIGRAM(S): 400 CAPSULE ORAL at 13:34

## 2025-04-25 RX ADMIN — METOPROLOL SUCCINATE 25 MILLIGRAM(S): 50 TABLET, EXTENDED RELEASE ORAL at 09:18

## 2025-04-25 RX ADMIN — Medication 283 MILLIGRAM(S): at 08:53

## 2025-04-25 RX ADMIN — GABAPENTIN 100 MILLIGRAM(S): 400 CAPSULE ORAL at 05:55

## 2025-04-25 RX ADMIN — TAMSULOSIN HYDROCHLORIDE 0.8 MILLIGRAM(S): 0.4 CAPSULE ORAL at 22:10

## 2025-04-25 RX ADMIN — BUMETANIDE 1 MILLIGRAM(S): 1 TABLET ORAL at 05:55

## 2025-04-25 RX ADMIN — GABAPENTIN 100 MILLIGRAM(S): 400 CAPSULE ORAL at 22:09

## 2025-04-25 RX ADMIN — DAPTOMYCIN 126 MILLIGRAM(S): 500 INJECTION, POWDER, LYOPHILIZED, FOR SOLUTION INTRAVENOUS at 13:31

## 2025-04-25 RX ADMIN — Medication 10 MILLIGRAM(S): at 05:55

## 2025-04-25 NOTE — PROGRESS NOTE ADULT - SUBJECTIVE AND OBJECTIVE BOX
HPI:  Mr. Rishabh Shaver is a 74-year-old male patient with past medical history of benign prostatic hyperplasia, diverticulosis, Graves' disease, hemorrhoids, hyperlipidemia, hypothyroidism, osteoarthritis, spinal stenosis, spinal cord injury (2004), and multiple spinal surgeries (three cervical and one lumbar), who presented to [facility] on 3/4/25 from [facility] due to lower extremity swelling and abnormal BUN/creatinine levels for further evaluation and management. The patient was recently admitted to [facility] from 02/18/25 through 02/25/25 for sepsis secondary to epidural abscess. He had a peripherally inserted central catheter line in the right arm and was on nightly Ceftriaxone treatment until 04/16/25. At the ED, he was found to have GOVIND, a left soleal vein, and a left gastrocnemius DVT. He was started on heparin gtt. Hospital course complicated by urinary retention requiring Ortega catheter insertion, rhabdomyolysis, elevated LFTs, anasarca, leukocytosis due to thoracic spine discitis OM and early paraspinal abscess, as well as new onset RUE paresthesias and weakness. He is s/p IVC filter placement on 3/17 and was recommended surgical management. He is s/p posterior thoracic laminectomy and fusion on 3/18/25 with Dr. Keith Cantu. Surgical culture with rare staph epi. Recommendations by ID to continue IV Rocephin and Daptomycin through 5/2/25. Patient was evaluated by PM&R and therapy for functional deficits, gait/ADL impairments and acute rehabilitation was recommended. Patient was cleared for discharge to [facility] IRF on 3/25/25.     TDD 4/30/25 home  ___________________________________________________________________________    SUBJECTIVE/ROS  Patient was seen and evaluated at bedside today.  Reported no overnight events and is in no acute distress.  Reviewed weekend coverage with patient.  Patient expressed understanding and felt comfortable.  Case was discussed at Interdisciplinary Team meeting yesterday.  Changes to the tentative discharge date as outlined above.  Patient is eager to continue participation on the recommended rehabilitation program.  Denies any CP, SOB, KESSLER, palpitations, fever, chills, body aches, cough, congestion, or any other symptoms at this time.   ___________________________________________________________________________    LAB                        10.9   8.22  )-----------( 227      ( 24 Apr 2025 05:54 )             34.4     04-24    138  |  100  |  34[H]  ----------------------------<  114[H]  3.4[L]   |  30  |  1.22    Ca    9.4      24 Apr 2025 05:54    TPro  6.6  /  Alb  3.2[L]  /  TBili  0.5  /  DBili  x   /  AST  35  /  ALT  53[H]  /  AlkPhos  87  04-24    LIVER FUNCTIONS - ( 24 Apr 2025 05:54 )  Alb: 3.2 g/dL / Pro: 6.6 g/dL / ALK PHOS: 87 U/L / ALT: 53 U/L / AST: 35 U/L / GGT: x           ___________________________________________________________________________    MEDICATIONS  (STANDING):  ammonium lactate 12% Lotion 1 Application(s) Topical two times a day  apixaban 5 milliGRAM(s) Oral every 12 hours  AQUAPHOR (petrolatum Ointment) 1 Application(s) Topical three times a day  bethanechol 10 milliGRAM(s) Oral two times a day  buMETAnide 1 milliGRAM(s) Oral two times a day  cefTRIAXone   IVPB 2000 milliGRAM(s) IV Intermittent every 24 hours  chlorhexidine 4% Liquid 1 Application(s) Topical <User Schedule>  collagenase Ointment 1 Application(s) Topical daily  DAPTOmycin IVPB 650 milliGRAM(s) IV Intermittent every 24 hours  docusate sodium Enema 283 milliGRAM(s) Rectal <User Schedule>  gabapentin   Oral   gabapentin 100 milliGRAM(s) Oral three times a day  levothyroxine 150 MICROGram(s) Oral daily  magnesium hydroxide Suspension 30 milliLiter(s) Oral at bedtime  metoprolol succinate ER 25 milliGRAM(s) Oral daily  pantoprazole    Tablet 40 milliGRAM(s) Oral before breakfast  polyethylene glycol 3350 17 Gram(s) Oral two times a day  rosuvastatin 5 milliGRAM(s) Oral at bedtime  senna 2 Tablet(s) Oral at bedtime  tamsulosin 0.8 milliGRAM(s) Oral at bedtime    MEDICATIONS  (PRN):  aluminum hydroxide/magnesium hydroxide/simethicone Suspension 30 milliLiter(s) Oral every 4 hours PRN Dyspepsia  cyclobenzaprine 5 milliGRAM(s) Oral three times a day PRN Muscle Spasm  melatonin 3 milliGRAM(s) Oral at bedtime PRN Insomnia  oxyCODONE    IR 15 milliGRAM(s) Oral every 6 hours PRN Moderate Pain (4 - 6)  simethicone 80 milliGRAM(s) Chew three times a day PRN Gas  sodium chloride 0.9% lock flush 10 milliLiter(s) IV Push every 1 hour PRN Pre/post blood products, medications, blood draw, and to maintain line patency    ___________________________________________________________________________    PHYSICAL EXAM:    Gen - NAD, Comfortable  HEENT -  EOMI, MMM, PERRLA, Normal Conjunctivae  Neck - Supple, + limited ROM, +posterior cervical incision  Pulm - CTAB, No wheeze, No rhonchi, No crackles  Cardiovascular - RRR, S1S2, No murmurs  Chest - good chest expansion, good respiratory effort  Abdomen - Firm, NT, abd distention, +BS  Extremities - No C/C, no calf tenderness, +BLE edema, +B/L pedal edema, +RUE PICC line   /GI- + Ortega draining clear urine   Neuro-     Cognitive - awake, alert, oriented to person, place, date, year, and situation.  Able  to follow command     Motor -          RUE: Deltoid 5/5, bicep 5/5, tricep 5/5, wrist ext 5/5,  4-/5, unable to fully flex 2nd digit          LUE: Deltoid 5/5, bicep 5/5, tricep 5/5, wrist ext 5/5,  4-/5, unable to fully flex 2nd digit          RLE: IP 3/5, Quad 3/5, EHL 5/5, Dorsi/plantarflexion 5/5 (some limitations 2/2 LE edema)          LLE: IP 2/5, Quad 2/5, EHL 5/5, Dorsi/plantarflexion 5/5 (proximal leg significantly limited by pain and      Sensory - Impaired  to LT LLE from knee to ankle     Tone - Normal  Psychiatric - Mood stable, Affect WNL    ___________________________________________________________________________

## 2025-04-25 NOTE — PROGRESS NOTE ADULT - ASSESSMENT
74M with PMH of BPH, diverticulosis, Graves' disease, hemorrhoids, HLD, hypothyroidism, osteoarthritis, spinal stenosis, spinal cord injury (2004), multiple spinal surgeries, CAD, and MARLENA. Presented 3/4/25 from SNF with LE swelling and GOVIND. Found to have left soleal and gastroc DVTs, spinal epidural abscess ? s/p thoracic laminectomy/fusion on 3/18. PICC in place for IV Rocephin + Daptomycin through 5/2. Admitted to [facility] on 3/25 for debility.    1. Constipation    Senna + Docusol enema daily since 4/2    bowel regiment     2. Debility / Postoperative Status    s/p thoracic epidural abscess ? laminectomy/fusion (3/18)    Fall/spine precautions,  brace    Pain management per primary; avoid opioids    3. Spinal Epidural Abscess – s/p Laminectomy/Fusion (3/18)    IV Rocephin 2g QHS + IV Daptomycin 650mg daily via PICC through 5/2    Aspen  brace in use    Surgical cultures: rare staph epi (possible contaminant)    CPK 4/20 WNL    4. GOVIND – Resolved / Anasarca / Hypoalbuminemia / LE Edema    continue with Bumex 1mg BID    Monitor renal function and I/Os — currently stable    Serum TRUDY: weak IgG lambda band (needs OP f/u)    Urine protein 200 mg (not nephrotic). weigh daily    5. Transaminitis – Resolved    Limit Tylenol, avoid hepatotoxins    Encourage PO intake    Monitor LFTs with routine labs    6. CAD / HTN / HLD    Metoprolol 25 mg daily    Crestor 5 mg QHS    7. Anemia / MARLENA    s/p 2 units PRBCs intra-op    Monitor CBC (Hgb 8.3 on 3/31); H/H stable currently     Transfuse PRN to maintain Hgb >7    8. Hypothyroidism    Synthroid 150 mcg daily      9. BPH / Chronic Urinary Retention    On Flomax 8mg daily    Monitor U/O,    10. Acute DVT (L Soleal & Gastroc Veins)    s/p IVC filter 3/17 (couldn’t tolerate VQ scan)    Eliquis 5 mg BID resumed per neurosurgery guidance    11. DVT Prophylaxis    Eliquis BID also serves as DVT-P

## 2025-04-25 NOTE — PROGRESS NOTE ADULT - SUBJECTIVE AND OBJECTIVE BOX
Patient is a 75y old  Male who presents with a chief complaint of Thoracic epidural abscess s/p laminectomy and fusion surgery (25 Apr 2025 09:56)      Subjective and overnight events:  Patient seen and examined at bedside. pt reports feeling well. some persistent left thigh paraesthesia. no new complaints. no acute event overnight    ALLERGIES:  No Known Allergies    MEDICATIONS  (STANDING):  ammonium lactate 12% Lotion 1 Application(s) Topical two times a day  apixaban 5 milliGRAM(s) Oral every 12 hours  AQUAPHOR (petrolatum Ointment) 1 Application(s) Topical three times a day  bethanechol 10 milliGRAM(s) Oral two times a day  buMETAnide 1 milliGRAM(s) Oral two times a day  cefTRIAXone   IVPB 2000 milliGRAM(s) IV Intermittent every 24 hours  chlorhexidine 4% Liquid 1 Application(s) Topical <User Schedule>  collagenase Ointment 1 Application(s) Topical daily  DAPTOmycin IVPB 650 milliGRAM(s) IV Intermittent every 24 hours  docusate sodium Enema 283 milliGRAM(s) Rectal <User Schedule>  gabapentin   Oral   gabapentin 100 milliGRAM(s) Oral three times a day  levothyroxine 150 MICROGram(s) Oral daily  magnesium hydroxide Suspension 30 milliLiter(s) Oral at bedtime  metoprolol succinate ER 25 milliGRAM(s) Oral daily  pantoprazole    Tablet 40 milliGRAM(s) Oral before breakfast  polyethylene glycol 3350 17 Gram(s) Oral two times a day  rosuvastatin 5 milliGRAM(s) Oral at bedtime  senna 2 Tablet(s) Oral at bedtime  tamsulosin 0.8 milliGRAM(s) Oral at bedtime    MEDICATIONS  (PRN):  aluminum hydroxide/magnesium hydroxide/simethicone Suspension 30 milliLiter(s) Oral every 4 hours PRN Dyspepsia  cyclobenzaprine 5 milliGRAM(s) Oral three times a day PRN Muscle Spasm  melatonin 3 milliGRAM(s) Oral at bedtime PRN Insomnia  oxyCODONE    IR 15 milliGRAM(s) Oral every 6 hours PRN Moderate Pain (4 - 6)  simethicone 80 milliGRAM(s) Chew three times a day PRN Gas  sodium chloride 0.9% lock flush 10 milliLiter(s) IV Push every 1 hour PRN Pre/post blood products, medications, blood draw, and to maintain line patency    Vital Signs Last 24 Hrs  T(F): 97.8 (25 Apr 2025 09:14), Max: 97.8 (24 Apr 2025 19:43)  HR: 84 (25 Apr 2025 09:14) (79 - 86)  BP: 103/65 (25 Apr 2025 09:14) (103/65 - 110/65)  RR: 16 (25 Apr 2025 09:14) (16 - 16)  SpO2: 96% (25 Apr 2025 09:14) (96% - 96%)  I&O's Summary    PHYSICAL EXAM:  General: NAD, A/O x 3  ENT: MMM  Neck: Supple, No JVD  Lungs: Clear to auscultation bilaterally  Cardio: RRR, S1/S2, No murmurs  Abdomen: Soft, Nontender, Nondistended; Bowel sounds present  Extremities: No calf tenderness, No pitting edema    LABS:                        10.9   8.22  )-----------( 227      ( 24 Apr 2025 05:54 )             34.4     04-24    138  |  100  |  34  ----------------------------<  114  3.4   |  30  |  1.22    Ca    9.4      24 Apr 2025 05:54    TPro  6.6  /  Alb  3.2  /  TBili  0.5  /  DBili  x   /  AST  35  /  ALT  53  /  AlkPhos  87  04-24            Urinalysis Basic - ( 24 Apr 2025 05:54 )    Color: x / Appearance: x / SG: x / pH: x  Gluc: 114 mg/dL / Ketone: x  / Bili: x / Urobili: x   Blood: x / Protein: x / Nitrite: x   Leuk Esterase: x / RBC: x / WBC x   Sq Epi: x / Non Sq Epi: x / Bacteria: x            RADIOLOGY & ADDITIONAL TESTS:    Care Discussed with Consultants/Other Providers:

## 2025-04-25 NOTE — PROGRESS NOTE ADULT - ASSESSMENT
Assessment/Plan:  Mr. Rishabh Shaver is a 74-year-old man with past medical history of benign prostatic hyperplasia, diverticulosis, Graves' disease, hemorrhoids, hyperlipidemia, hypothyroidism, osteoarthritis, spinal stenosis, spinal cord injury (2004), and multiple spinal surgeries (three cervical and one lumbar), who is admitted for Acute Inpatient Rehabilitation with a multidisciplinary rehab program at [facility] with functional impairments in ADLs and mobility secondary to a thoracic epidural abscess s/p posterior thoracic laminectomy and fusion on 3/18/25 with Dr. Keith Cantu.       #Thoracic epidural abscess s/p laminectomy and fusion surgery      * C8 AIS D Incomplete paraplegia      *  brace when OOB      * Flexeril 5mg PO TID PRN for neck soreness      * Staples removed on POD #14 (4/1/25)      * Sacral wound assessment by wound/skin team      * urinary retention, now voiding,  c/w Flomax 0.8mg PO daily      * Right Venodyne only due to left calf DVT (s/p pre-op IVC filter)   - Activity Limitations: Decreased social, vocational and leisure activities, decreased self care and ADLs, decreased mobility, decreased ability to manage household and finances  - Comprehensive Multidisciplinary Rehab Program:      * 3 hours a day, 5 days a week      * PT 2hr/day: Focused on improving strength, endurance, coordination, balance, functional mobility, and transfers      * OT 1hr/day: Focused on improving strength, fine motor skills, coordination, posture and ADLs  - orthotist Arya saw patient 4/17 for AFO for right foot drop and recommends b/l AFOs hinged on R and semi solid on L - order placed    -----------------------------------------------------------------------------------    Concurrent Medical Problems    #GOVIND on CKD (resolved)  #LE edema  #Hypoalbuminemia  #Proteinuria  - IVF as needed with fluid balance  - Significant debilitating anasarca   - Trial of albumin Lasix started 3/8->improvement  - s/p IV Lasix/IV albumin BID (3/9)  - Change to Bumex/Albumin (3/11)-> good effect  - Switched to PO Bumex 1mg BID 3/24  - Trend Albumin (4/17 - 3.1)  - Trend Protein total (4/17 - 6.8); Total protein, Random Urine (3/18- 24)  - Trend CMP (4/17 - Cr 1.09/ BUN 33)    #L gastrocnemius and L soleal DVT  - appears provoked from decrease ambulation from recent spinal abscess  - Could not tolerate VQ scan for r/o PE 2/2 pain  - Eliquis 5mg PO q12h - will be new med on discharge (of note patient completed Eliquis load)   - Therapeutic Lovenox (3/12) in the setting of possible spine intervention  - Last dose of AC night of 3/16. Restarted Eliquis 5mg BID 4/5/25  - S/P IVC filter placement 3/17  - repeat US 4/8 with stable L soleal DVT and no propagation     #Spinal epidural abscess POA and already being treated with IV antibiotics with suspected worsening of disease  - S/p thoracic laminectomy and fusion 3/18. [place]  brace   - Surgical culture with staph epi possible contaminant but given clinical presentation on admission, IV Dapto added  - IV Rocephin 2g daily via PICC line thru 5/2/25  - IV Daptomycin 650mg daily via PICC line thru 5/2/25  - gabapentin 100mg PO TID  - d/c long-acting Oxycontin 4/11  - oxycodone 15mg PO q6h PRN moderate pain  - Flexeril 5mg PO TID PRN for muscle spasm  - staples d/teodoro in rehab    #Abdominal discomfort and bloating  - Abd xray 4/15--as above  - reconsulted GI 4/17 awaiting recs    #Mild Rhabdomyolysis (resolved)  - associated elevated transaminases (resolved)  - suspect from anasarca/edema   - improving with adequate diuresis of edema  - associated elevated transaminase; now resolved (4/17- AST 30/ALT 38)    #Leukocytosis (resolved)  - likely rx to DVT + Discitis  - no other signs of sepsis, afebrile   - trend CBC and monitor fever curve (4/17- WBC 8.22)    #Hypothyroidism  - Synthroid 150mcg PO daily    #CAD  #HTN  #HLD  - metoprolol succinate 25mg PO daily   - rosuvastatin 5mg PO qhs    #Anemia/MARLENA  - trend H/H (4/17- 10.5/33.1)  - transfuse if Hgb <7 PRN     #Sleep:  - melatonin 3mg PO qhs PRN for sleep    #GI/Bowel:  - senna 2 tablets PO qhs  - Miralax 17g PO daily  - daily mini-enema at 6AM  - lactulose 20g PO TID PRN for constipation  - Maalox 30mL PO q4h PRN for dyspepsia  - GI ppx: pantoprazole 40mg PO daily   - added simethicone 80mg PO TID PRN for gas 4/14    #BPH  #Urinary retention likely augmented by degree of anasarca  - S/P Ortega; TOV  ->failed. Ortega re-inserted 3/6->fell out without knowing with associated hematuria->recurrent retention 3/7->Ortega re-inserted (3/7) --> successful TOV 3/25  - Flomax 0.8mg PO qhs  - started bethanechol 10mg PO BID 4/6  - Monitor U/O    #Skin/Pressure Injury:   Skin assessment on admission:   - Generalized dry flaky skin  - 19cm posterior cervical incision with 32 staples and 2 steri strips  - Left buttock DTI pressure injury measuring 14 x 9 cm      * appearing as a dark purple discoloration of intact skin, with a central superficial open area measuring 6 x 2 cm      * at the periphery of the 14 x 9 cm area, the discoloration is somewhat lighter      * dark red non-blanchable erythema      * eschar resolved     **Santyl to slough of sacrum daily**  - Bilateral lower extremity edema  - Healed lumbar surgical incision scar  - Right heel blanchable redness  - Bilateral dry cracked heels and plantar surface  - Lac hydrin BID BLE  - Aquaphor BID to generalized dry skin   - Appreciate wound care consult     #Diet/Dysphagia:  - Dysphagia: SLP consult for swallow function evaluation and treatment  - Current Diet: Regular  - Aspiration Precautions  - Nutrition consult     #Patient Education  - Education provided on the following:      * Admitting diagnosis and functional implications      * Functional goals      * Bladder management      * Bowel management      * Skin care management      * Intensity of service and scheduling of rehab disciplines      * Plan of care and role of interdisciplinary team conference in discharge planning      * Reconciliation of medications from prior institution    #Precautions / PROPHYLAXIS:   - Falls, Spinal  - Ortho: Weight bearing status: FWB;  brace OOB  - DVT ppx: Eliquis 5mg PO BID, TEDs  - Pressure injury/Skin: Turn Q2hrs while in bed, OOB to Chair, PT/OT      ---------------  Outpatient Follow-up:    Yue Wiggins  [facility]  [address], [address]  [place] [address]  Phone: [phone]  Fax: [phone]  Established Patient  Follow Up Time: 1 week    Keith Cantu [facility]  Neurosurgery  [address], [address]  [place] [address]  Phone: [phone]  Fax: [phone]  Established Patient  Follow Up Time: 2 weeks    Luis A Brennan  [facility] Physician [facility]  CARDIOLOGY [address]  Scheduled Appointment: 04/23/2025    --------------  Wheelchair recommendation    Patient seen today for a face to face visit for an ultra-lightweight manual wheelchair in the home. Patient cannot independently walk in his home using any ambulation device.  He requires a manual wheelchair so they can perform their mobility related activities of daily living (MRADL), like getting to the bathroom for hygiene, the kitchen for eating and the bedroom for resting. Patient requires an ultra lightweight manual wheelchair because he cannot walk and has weakness and endurance issues that require him to need an ultra lightweight manual wheelchair with camber and an axle plate that brings the wheels forward to allow him to reach them effectively and without pain.      --------------  AFO recommendation    Patient presents with bilateral lower extremity weakness and foot drop due to nerve compression. Patient has instability as the patient’s feet scuff the ground during ambulation and is at risk for falls. Regarding the right side, in addition to foot drop and weakness, patient presents with inversion and genu recurvatum. The knee hyperextends. Patient requires a custom articulating AFO to address the foot drop, inversion, and genu recurvatum. Regarding the left side, in addition to foot drop and weakness, patient presents with genu recurvatum. Patient requires a custom semi solid AFO to allow for safe ambulation.    --------------  Electric Hospital Bed     Mr. Rishabh Shaver will need an electric hospital bed for elevation and transferring purposes. Due to his diagnosis of ah thoracic epidural abscess, he underwent laminectomy and fusion surgery and remains with paraplegia with additional need to have his head elevated 30 degrees to help assist in proper breathing and frequent body positioning to reduce the risk of skin breakdown in ways not feasible with an ordinary bed.    --------------

## 2025-04-26 LAB
ANION GAP SERPL CALC-SCNC: 6 MMOL/L — SIGNIFICANT CHANGE UP (ref 5–17)
BUN SERPL-MCNC: 32 MG/DL — HIGH (ref 7–23)
CALCIUM SERPL-MCNC: 9.2 MG/DL — SIGNIFICANT CHANGE UP (ref 8.4–10.5)
CHLORIDE SERPL-SCNC: 102 MMOL/L — SIGNIFICANT CHANGE UP (ref 96–108)
CO2 SERPL-SCNC: 30 MMOL/L — SIGNIFICANT CHANGE UP (ref 22–31)
CREAT SERPL-MCNC: 1.17 MG/DL — SIGNIFICANT CHANGE UP (ref 0.5–1.3)
EGFR: 66 ML/MIN/1.73M2 — SIGNIFICANT CHANGE UP
EGFR: 66 ML/MIN/1.73M2 — SIGNIFICANT CHANGE UP
GLUCOSE SERPL-MCNC: 102 MG/DL — HIGH (ref 70–99)
MAGNESIUM SERPL-MCNC: 1.8 MG/DL — SIGNIFICANT CHANGE UP (ref 1.6–2.6)
POTASSIUM SERPL-MCNC: 3.3 MMOL/L — LOW (ref 3.5–5.3)
POTASSIUM SERPL-SCNC: 3.3 MMOL/L — LOW (ref 3.5–5.3)
SODIUM SERPL-SCNC: 138 MMOL/L — SIGNIFICANT CHANGE UP (ref 135–145)

## 2025-04-26 PROCEDURE — 99232 SBSQ HOSP IP/OBS MODERATE 35: CPT

## 2025-04-26 RX ORDER — BUMETANIDE 1 MG/1
1 TABLET ORAL DAILY
Refills: 0 | Status: DISCONTINUED | OUTPATIENT
Start: 2025-04-26 | End: 2025-04-27

## 2025-04-26 RX ADMIN — APIXABAN 5 MILLIGRAM(S): 2.5 TABLET, FILM COATED ORAL at 11:48

## 2025-04-26 RX ADMIN — ROSUVASTATIN CALCIUM 5 MILLIGRAM(S): 20 TABLET, FILM COATED ORAL at 22:11

## 2025-04-26 RX ADMIN — GABAPENTIN 100 MILLIGRAM(S): 400 CAPSULE ORAL at 05:34

## 2025-04-26 RX ADMIN — Medication 2 TABLET(S): at 22:11

## 2025-04-26 RX ADMIN — Medication 1 APPLICATION(S): at 17:21

## 2025-04-26 RX ADMIN — GABAPENTIN 100 MILLIGRAM(S): 400 CAPSULE ORAL at 14:19

## 2025-04-26 RX ADMIN — DAPTOMYCIN 126 MILLIGRAM(S): 500 INJECTION, POWDER, LYOPHILIZED, FOR SOLUTION INTRAVENOUS at 11:50

## 2025-04-26 RX ADMIN — TAMSULOSIN HYDROCHLORIDE 0.8 MILLIGRAM(S): 0.4 CAPSULE ORAL at 22:11

## 2025-04-26 RX ADMIN — Medication 40 MILLIGRAM(S): at 05:34

## 2025-04-26 RX ADMIN — GABAPENTIN 100 MILLIGRAM(S): 400 CAPSULE ORAL at 22:15

## 2025-04-26 RX ADMIN — Medication 10 MILLIGRAM(S): at 05:34

## 2025-04-26 RX ADMIN — Medication 150 MICROGRAM(S): at 05:35

## 2025-04-26 RX ADMIN — Medication 283 MILLIGRAM(S): at 09:19

## 2025-04-26 RX ADMIN — CEFTRIAXONE 100 MILLIGRAM(S): 500 INJECTION, POWDER, FOR SOLUTION INTRAMUSCULAR; INTRAVENOUS at 22:15

## 2025-04-26 RX ADMIN — Medication 40 MILLIEQUIVALENT(S): at 11:48

## 2025-04-26 RX ADMIN — METOPROLOL SUCCINATE 25 MILLIGRAM(S): 50 TABLET, EXTENDED RELEASE ORAL at 05:34

## 2025-04-26 RX ADMIN — POLYETHYLENE GLYCOL 3350 17 GRAM(S): 17 POWDER, FOR SOLUTION ORAL at 17:20

## 2025-04-26 RX ADMIN — CYCLOBENZAPRINE HYDROCHLORIDE 5 MILLIGRAM(S): 15 CAPSULE, EXTENDED RELEASE ORAL at 22:10

## 2025-04-26 RX ADMIN — WHITE PETROLATUM 1 APPLICATION(S): 1 OINTMENT TOPICAL at 14:19

## 2025-04-26 RX ADMIN — WHITE PETROLATUM 1 APPLICATION(S): 1 OINTMENT TOPICAL at 05:35

## 2025-04-26 RX ADMIN — Medication 10 MILLIGRAM(S): at 17:19

## 2025-04-26 RX ADMIN — MAGNESIUM HYDROXIDE 30 MILLILITER(S): 400 SUSPENSION ORAL at 22:15

## 2025-04-26 RX ADMIN — BUMETANIDE 1 MILLIGRAM(S): 1 TABLET ORAL at 05:34

## 2025-04-26 RX ADMIN — Medication 1 APPLICATION(S): at 05:38

## 2025-04-26 RX ADMIN — Medication 1 APPLICATION(S): at 05:35

## 2025-04-26 NOTE — PROGRESS NOTE PEDS - SUBJECTIVE AND OBJECTIVE BOX
HPI:  Mr. Rishabh Shaver is a 74-year-old male patient with past medical history of benign prostatic hyperplasia, diverticulosis, Graves' disease, hemorrhoids, hyperlipidemia, hypothyroidism, osteoarthritis, spinal stenosis, spinal cord injury (2004), and multiple spinal surgeries (three cervical and one lumbar), who presented to [facility] on 3/4/25 from [facility] due to lower extremity swelling and abnormal BUN/creatinine levels for further evaluation and management. The patient was recently admitted to [facility] from 02/18/25 through 02/25/25 for sepsis secondary to epidural abscess. He had a peripherally inserted central catheter line in the right arm and was on nightly Ceftriaxone treatment until 04/16/25. At the ED, he was found to have GOVIND, a left soleal vein, and a left gastrocnemius DVT. He was started on heparin gtt. Hospital course complicated by urinary retention requiring Ortega catheter insertion, rhabdomyolysis, elevated LFTs, anasarca, leukocytosis due to thoracic spine discitis OM and early paraspinal abscess, as well as new onset RUE paresthesias and weakness. He is s/p IVC filter placement on 3/17 and was recommended surgical management. He is s/p posterior thoracic laminectomy and fusion on 3/18/25 with Dr. Keith Cantu. Surgical culture with rare staph epi. Recommendations by ID to continue IV Rocephin and Daptomycin through 5/2/25. Patient was evaluated by PM&R and therapy for functional deficits, gait/ADL impairments and acute rehabilitation was recommended. Patient was cleared for discharge to [facility] IRF on 3/25/25.     TDD 4/30/25 home  ___________________________________________________________________________    SUBJECTIVE/ROS  Patient was seen and evaluated at bedside today.  Reported no overnight events and is in no acute distress.  Reviewed weekend coverage with patient.  Patient expressed understanding and felt comfortable.  Case was discussed at Interdisciplinary Team meeting yesterday.  Changes to the tentative discharge date as outlined above.  Patient is eager to continue participation on the recommended rehabilitation program.  Denies any CP, SOB, KESSLER, palpitations, fever, chills, body aches, cough, congestion, or any other symptoms at this time.   pt is doing well in terms of bladder control low PVR after round we decided to DC PVR scanning  ___________________________________________________________________________    LAB                        10.9   8.22  )-----------( 227      ( 24 Apr 2025 05:54 )             34.4     04-24    138  |  100  |  34[H]  ----------------------------<  114[H]  3.4[L]   |  30  |  1.22    Ca    9.4      24 Apr 2025 05:54    TPro  6.6  /  Alb  3.2[L]  /  TBili  0.5  /  DBili  x   /  AST  35  /  ALT  53[H]  /  AlkPhos  87  04-24    LIVER FUNCTIONS - ( 24 Apr 2025 05:54 )  Alb: 3.2 g/dL / Pro: 6.6 g/dL / ALK PHOS: 87 U/L / ALT: 53 U/L / AST: 35 U/L / GGT: x           ___________________________________________________________________________    MEDICATIONS  (STANDING):  ammonium lactate 12% Lotion 1 Application(s) Topical two times a day  apixaban 5 milliGRAM(s) Oral every 12 hours  AQUAPHOR (petrolatum Ointment) 1 Application(s) Topical three times a day  bethanechol 10 milliGRAM(s) Oral two times a day  buMETAnide 1 milliGRAM(s) Oral two times a day  cefTRIAXone   IVPB 2000 milliGRAM(s) IV Intermittent every 24 hours  chlorhexidine 4% Liquid 1 Application(s) Topical <User Schedule>  collagenase Ointment 1 Application(s) Topical daily  DAPTOmycin IVPB 650 milliGRAM(s) IV Intermittent every 24 hours  docusate sodium Enema 283 milliGRAM(s) Rectal <User Schedule>  gabapentin   Oral   gabapentin 100 milliGRAM(s) Oral three times a day  levothyroxine 150 MICROGram(s) Oral daily  magnesium hydroxide Suspension 30 milliLiter(s) Oral at bedtime  metoprolol succinate ER 25 milliGRAM(s) Oral daily  pantoprazole    Tablet 40 milliGRAM(s) Oral before breakfast  polyethylene glycol 3350 17 Gram(s) Oral two times a day  rosuvastatin 5 milliGRAM(s) Oral at bedtime  senna 2 Tablet(s) Oral at bedtime  tamsulosin 0.8 milliGRAM(s) Oral at bedtime    MEDICATIONS  (PRN):  aluminum hydroxide/magnesium hydroxide/simethicone Suspension 30 milliLiter(s) Oral every 4 hours PRN Dyspepsia  cyclobenzaprine 5 milliGRAM(s) Oral three times a day PRN Muscle Spasm  melatonin 3 milliGRAM(s) Oral at bedtime PRN Insomnia  oxyCODONE    IR 15 milliGRAM(s) Oral every 6 hours PRN Moderate Pain (4 - 6)  simethicone 80 milliGRAM(s) Chew three times a day PRN Gas  sodium chloride 0.9% lock flush 10 milliLiter(s) IV Push every 1 hour PRN Pre/post blood products, medications, blood draw, and to maintain line patency    ___________________________________________________________________________    PHYSICAL EXAM:  Vital Signs Last 24 Hrs  T(C): 36.7 (26 Apr 2025 08:09), Max: 36.8 (25 Apr 2025 19:53)  T(F): 98.1 (26 Apr 2025 08:09), Max: 98.2 (25 Apr 2025 19:53)  HR: 73 (26 Apr 2025 08:09) (73 - 80)  BP: 97/63 (26 Apr 2025 08:09) (97/63 - 110/70)  BP(mean): --  RR: 16 (26 Apr 2025 08:09) (16 - 16)  SpO2: 95% (26 Apr 2025 08:09) (94% - 95%)    Parameters below as of 26 Apr 2025 08:09  Patient On (Oxygen Delivery Method): room air      Gen - NAD, Comfortable  HEENT -  EOMI, MMM, PERRLA, Normal Conjunctivae  Neck - Supple, + limited ROM, +posterior cervical incision  Pulm - CTAB, No wheeze, No rhonchi, No crackles  Cardiovascular - RRR, S1S2, No murmurs  Chest - good chest expansion, good respiratory effort  Abdomen - Firm, NT, abd distention, +BS  Extremities - No C/C, no calf tenderness, +BLE edema, +B/L pedal edema, +RUE PICC line   /GI- + Ortega draining clear urine   Neuro-     Cognitive - awake, alert, oriented to person, place, date, year, and situation.  Able  to follow command     Motor -          RUE: Deltoid 5/5, bicep 5/5, tricep 5/5, wrist ext 5/5,  4-/5, unable to fully flex 2nd digit          LUE: Deltoid 5/5, bicep 5/5, tricep 5/5, wrist ext 5/5,  4-/5, unable to fully flex 2nd digit          RLE: IP 3/5, Quad 3/5, EHL 5/5, Dorsi/plantarflexion 5/5 (some limitations 2/2 LE edema)          LLE: IP 2/5, Quad 2/5, EHL 5/5, Dorsi/plantarflexion 5/5 (proximal leg significantly limited by pain and      Sensory - Impaired  to LT LLE from knee to ankle     Tone - Normal  Psychiatric - Mood stable, Affect WNL    ___________________________________________________________________________

## 2025-04-26 NOTE — PROGRESS NOTE PEDS - ASSESSMENT
Assessment/Plan:  Mr. Rishabh Shaver is a 74-year-old man with past medical history of benign prostatic hyperplasia, diverticulosis, Graves' disease, hemorrhoids, hyperlipidemia, hypothyroidism, osteoarthritis, spinal stenosis, spinal cord injury (2004), and multiple spinal surgeries (three cervical and one lumbar), who is admitted for Acute Inpatient Rehabilitation with a multidisciplinary rehab program at [facility] with functional impairments in ADLs and mobility secondary to a thoracic epidural abscess s/p posterior thoracic laminectomy and fusion on 3/18/25 with Dr. Keith Cantu.     PVR improving will DC    #Thoracic epidural abscess s/p laminectomy and fusion surgery      * C8 AIS D Incomplete paraplegia      *  brace when OOB      * Flexeril 5mg PO TID PRN for neck soreness      * Staples removed on POD #14 (4/1/25)      * Sacral wound assessment by wound/skin team      * urinary retention, now voiding,  c/w Flomax 0.8mg PO daily      * Right Venodyne only due to left calf DVT (s/p pre-op IVC filter)   - Activity Limitations: Decreased social, vocational and leisure activities, decreased self care and ADLs, decreased mobility, decreased ability to manage household and finances  - Comprehensive Multidisciplinary Rehab Program:      * 3 hours a day, 5 days a week      * PT 2hr/day: Focused on improving strength, endurance, coordination, balance, functional mobility, and transfers      * OT 1hr/day: Focused on improving strength, fine motor skills, coordination, posture and ADLs  - orthotist Arya saw patient 4/17 for AFO for right foot drop and recommends b/l AFOs hinged on R and semi solid on L - order placed    -----------------------------------------------------------------------------------    Concurrent Medical Problems    #GOVIND on CKD (resolved)  #LE edema  #Hypoalbuminemia  #Proteinuria  - IVF as needed with fluid balance  - Significant debilitating anasarca   - Trial of albumin Lasix started 3/8->improvement  - s/p IV Lasix/IV albumin BID (3/9)  - Change to Bumex/Albumin (3/11)-> good effect  - Switched to PO Bumex 1mg BID 3/24  - Trend Albumin (4/17 - 3.1)  - Trend Protein total (4/17 - 6.8); Total protein, Random Urine (3/18- 24)  - Trend CMP (4/17 - Cr 1.09/ BUN 33)    #L gastrocnemius and L soleal DVT  - appears provoked from decrease ambulation from recent spinal abscess  - Could not tolerate VQ scan for r/o PE 2/2 pain  - Eliquis 5mg PO q12h - will be new med on discharge (of note patient completed Eliquis load)   - Therapeutic Lovenox (3/12) in the setting of possible spine intervention  - Last dose of AC night of 3/16. Restarted Eliquis 5mg BID 4/5/25  - S/P IVC filter placement 3/17  - repeat US 4/8 with stable L soleal DVT and no propagation     #Spinal epidural abscess POA and already being treated with IV antibiotics with suspected worsening of disease  - S/p thoracic laminectomy and fusion 3/18. [place]  brace   - Surgical culture with staph epi possible contaminant but given clinical presentation on admission, IV Dapto added  - IV Rocephin 2g daily via PICC line thru 5/2/25  - IV Daptomycin 650mg daily via PICC line thru 5/2/25  - gabapentin 100mg PO TID  - d/c long-acting Oxycontin 4/11  - oxycodone 15mg PO q6h PRN moderate pain  - Flexeril 5mg PO TID PRN for muscle spasm  - staples d/teodoro in rehab    #Abdominal discomfort and bloating  - Abd xray 4/15--as above  - reconsulted GI 4/17 awaiting recs    #Mild Rhabdomyolysis (resolved)  - associated elevated transaminases (resolved)  - suspect from anasarca/edema   - improving with adequate diuresis of edema  - associated elevated transaminase; now resolved (4/17- AST 30/ALT 38)    #Leukocytosis (resolved)  - likely rx to DVT + Discitis  - no other signs of sepsis, afebrile   - trend CBC and monitor fever curve (4/17- WBC 8.22)    #Hypothyroidism  - Synthroid 150mcg PO daily    #CAD  #HTN  #HLD  - metoprolol succinate 25mg PO daily   - rosuvastatin 5mg PO qhs    #Anemia/MARLENA  - trend H/H (4/17- 10.5/33.1)  - transfuse if Hgb <7 PRN     #Sleep:  - melatonin 3mg PO qhs PRN for sleep    #GI/Bowel:  - senna 2 tablets PO qhs  - Miralax 17g PO daily  - daily mini-enema at 6AM  - lactulose 20g PO TID PRN for constipation  - Maalox 30mL PO q4h PRN for dyspepsia  - GI ppx: pantoprazole 40mg PO daily   - added simethicone 80mg PO TID PRN for gas 4/14    #BPH  #Urinary retention likely augmented by degree of anasarca  - S/P Ortega; TOV  ->failed. Ortega re-inserted 3/6->fell out without knowing with associated hematuria->recurrent retention 3/7->Ortega re-inserted (3/7) --> successful TOV 3/25  - Flomax 0.8mg PO qhs  - started bethanechol 10mg PO BID 4/6  - Monitor U/O    #Skin/Pressure Injury:   Skin assessment on admission:   - Generalized dry flaky skin  - 19cm posterior cervical incision with 32 staples and 2 steri strips  - Left buttock DTI pressure injury measuring 14 x 9 cm      * appearing as a dark purple discoloration of intact skin, with a central superficial open area measuring 6 x 2 cm      * at the periphery of the 14 x 9 cm area, the discoloration is somewhat lighter      * dark red non-blanchable erythema      * eschar resolved     **Santyl to slough of sacrum daily**  - Bilateral lower extremity edema  - Healed lumbar surgical incision scar  - Right heel blanchable redness  - Bilateral dry cracked heels and plantar surface  - Lac hydrin BID BLE  - Aquaphor BID to generalized dry skin   - Appreciate wound care consult     #Diet/Dysphagia:  - Dysphagia: SLP consult for swallow function evaluation and treatment  - Current Diet: Regular  - Aspiration Precautions  - Nutrition consult     #Patient Education  - Education provided on the following:      * Admitting diagnosis and functional implications      * Functional goals      * Bladder management      * Bowel management      * Skin care management      * Intensity of service and scheduling of rehab disciplines      * Plan of care and role of interdisciplinary team conference in discharge planning      * Reconciliation of medications from prior institution    #Precautions / PROPHYLAXIS:   - Falls, Spinal  - Ortho: Weight bearing status: FWB;  brace OOB  - DVT ppx: Eliquis 5mg PO BID, TEDs  - Pressure injury/Skin: Turn Q2hrs while in bed, OOB to Chair, PT/OT      ---------------  Outpatient Follow-up:    Yue Wiggins  [facility]  [address], [address]  [place] [address]  Phone: [phone]  Fax: [phone]  Established Patient  Follow Up Time: 1 week    Keith Cantu [facility]  Neurosurgery  [address], [address]  [place] [address]  Phone: [phone]  Fax: [phone]  Established Patient  Follow Up Time: 2 weeks    Luis A Brennan  [facility]  CARDIOLOGY [address]  Scheduled Appointment: 04/23/2025    --------------  Wheelchair recommendation    Patient seen today for a face to face visit for an ultra-lightweight manual wheelchair in the home. Patient cannot independently walk in his home using any ambulation device.  He requires a manual wheelchair so they can perform their mobility related activities of daily living (MRADL), like getting to the bathroom for hygiene, the kitchen for eating and the bedroom for resting. Patient requires an ultra lightweight manual wheelchair because he cannot walk and has weakness and endurance issues that require him to need an ultra lightweight manual wheelchair with camber and an axle plate that brings the wheels forward to allow him to reach them effectively and without pain.      --------------  AFO recommendation    Patient presents with bilateral lower extremity weakness and foot drop due to nerve compression. Patient has instability as the patient’s feet scuff the ground during ambulation and is at risk for falls. Regarding the right side, in addition to foot drop and weakness, patient presents with inversion and genu recurvatum. The knee hyperextends. Patient requires a custom articulating AFO to address the foot drop, inversion, and genu recurvatum. Regarding the left side, in addition to foot drop and weakness, patient presents with genu recurvatum. Patient requires a custom semi solid AFO to allow for safe ambulation.    --------------  Electric Hospital Bed     Mr. Rishabh Shaver will need an electric hospital bed for elevation and transferring purposes. Due to his diagnosis of ah thoracic epidural abscess, he underwent laminectomy and fusion surgery and remains with paraplegia with additional need to have his head elevated 30 degrees to help assist in proper breathing and frequent body positioning to reduce the risk of skin breakdown in ways not feasible with an ordinary bed.    --------------

## 2025-04-26 NOTE — PROGRESS NOTE ADULT - ASSESSMENT
74M with PMH of BPH, diverticulosis, Graves' disease, hemorrhoids, HLD, hypothyroidism, osteoarthritis, spinal stenosis, spinal cord injury (2004), multiple spinal surgeries, CAD, and MARLENA. Presented 3/4/25 from SNF with LE swelling and GOVIND. Found to have left soleal and gastroc DVTs, spinal epidural abscess ? s/p thoracic laminectomy/fusion on 3/18. PICC in place for IV Rocephin + Daptomycin through 5/2. Admitted to [facility] on 3/25 for debility.    1. Constipation - improved    Senna + Docusol enema daily since 4/2    bowel regiment     2. Debility / Postoperative Status    s/p thoracic epidural abscess ? laminectomy/fusion (3/18)    Fall/spine precautions,  brace    Pain management per primary; avoid opioids    3. Spinal Epidural Abscess – s/p Laminectomy/Fusion (3/18)    IV Rocephin 2g QHS + IV Daptomycin 650mg daily via PICC through 5/2    [place]  brace in use    Surgical cultures: rare staph epi (possible contaminant)    CPK 4/20 WNL    4. GOVIND – Resolved   Anasarca / Hypoalbuminemia / LE Edema - improved    continue with Bumex 1mg BID - lower dose to 1mg daily - 4/26     Monitor renal function and I/Os — currently stable    Serum TRUDY: weak IgG lambda band (needs OP f/u)    echo 3/15: EF 65%, mild TR    5. Transaminitis – Resolved    Limit Tylenol, avoid hepatotoxins    Encourage PO intake    Monitor LFTs with routine labs    6. CAD / HTN / HLD    Metoprolol 25 mg daily    Crestor 5 mg QHS    7. Anemia / MARLENA    s/p 2 units PRBCs intra-op    Monitor CBC (Hgb 8.3 on 3/31); H/H stable currently     Transfuse PRN to maintain Hgb >7    8. Hypothyroidism    Synthroid 150 mcg daily      9. BPH / Chronic Urinary Retention    On Flomax 0.8mg daily    Monitor U/O,    10. Acute DVT (L Soleal & Gastroc Veins)    s/p IVC filter 3/17 (couldn’t tolerate VQ scan)    Eliquis 5 mg BID resumed per neurosurgery guidance    11. DVT Prophylaxis    Eliquis BID also serves as DVT-P   74M with PMH of BPH, diverticulosis, Graves' disease, hemorrhoids, HLD, hypothyroidism, osteoarthritis, spinal stenosis, spinal cord injury (2004), multiple spinal surgeries, CAD, and MARLENA. Presented 3/4/25 from SNF with LE swelling and GOVIND. Found to have left soleal and gastroc DVTs, spinal epidural abscess ? s/p thoracic laminectomy/fusion on 3/18. PICC in place for IV Rocephin + Daptomycin through 5/2. Admitted to [facility] on 3/25 for debility.    1. Constipation - improved    Senna + Docusol enema daily since 4/2    bowel regiment     2. Debility / Postoperative Status    s/p thoracic epidural abscess ? laminectomy/fusion (3/18)    Fall/spine precautions,  brace    Pain management per primary; avoid opioids    3. Spinal Epidural Abscess – s/p Laminectomy/Fusion (3/18)    IV Rocephin 2g QHS + IV Daptomycin 650mg daily via PICC through 5/2    [place]  brace in use    Surgical cultures: rare staph epi (possible contaminant)    CPK 4/20 WNL    4. GOVIND – Resolved   Anasarca / Hypoalbuminemia / LE Edema - improved    LE edema resolved. Bumex reduced to 1mg daily on 4/26. will continue taper to 0.5mg daily starting tomorrow     Monitor renal function and I/Os — currently stable    Serum TRUDY: weak IgG lambda band (needs OP f/u)    echo 3/15: EF 65%, mild TR    5. Transaminitis – Resolved    Limit Tylenol, avoid hepatotoxins    Encourage PO intake    Monitor LFTs with routine labs    6. CAD / HTN / HLD    Metoprolol 25 mg daily    Crestor 5 mg QHS    7. Anemia / MARLENA    s/p 2 units PRBCs intra-op    Monitor CBC (Hgb 8.3 on 3/31); H/H stable currently     Transfuse PRN to maintain Hgb >7    8. Hypothyroidism    Synthroid 150 mcg daily      9. BPH / Chronic Urinary Retention    On Flomax 0.8mg daily    Monitor U/O,    10. Acute DVT (L Soleal & Gastroc Veins)    s/p IVC filter 3/17 (couldn’t tolerate VQ scan)    Eliquis 5 mg BID resumed per neurosurgery guidance    11. DVT Prophylaxis    Eliquis BID also serves as DVT-P

## 2025-04-26 NOTE — PROGRESS NOTE ADULT - SUBJECTIVE AND OBJECTIVE BOX
Patient is a 75y old  Male who presents with a chief complaint of Thoracic epidural abscess s/p laminectomy and fusion surgery (25 Apr 2025 10:55)      Subjective and overnight events:  Patient seen and examined at bedside. no new complaints. no acute event overnight.    ALLERGIES:  No Known Allergies    MEDICATIONS  (STANDING):  ammonium lactate 12% Lotion 1 Application(s) Topical two times a day  apixaban 5 milliGRAM(s) Oral every 12 hours  AQUAPHOR (petrolatum Ointment) 1 Application(s) Topical three times a day  bethanechol 10 milliGRAM(s) Oral two times a day  buMETAnide 1 milliGRAM(s) Oral two times a day  cefTRIAXone   IVPB 2000 milliGRAM(s) IV Intermittent every 24 hours  chlorhexidine 4% Liquid 1 Application(s) Topical <User Schedule>  collagenase Ointment 1 Application(s) Topical daily  DAPTOmycin IVPB 650 milliGRAM(s) IV Intermittent every 24 hours  docusate sodium Enema 283 milliGRAM(s) Rectal <User Schedule>  gabapentin   Oral   gabapentin 100 milliGRAM(s) Oral three times a day  levothyroxine 150 MICROGram(s) Oral daily  magnesium hydroxide Suspension 30 milliLiter(s) Oral at bedtime  metoprolol succinate ER 25 milliGRAM(s) Oral daily  pantoprazole    Tablet 40 milliGRAM(s) Oral before breakfast  polyethylene glycol 3350 17 Gram(s) Oral two times a day  rosuvastatin 5 milliGRAM(s) Oral at bedtime  senna 2 Tablet(s) Oral at bedtime  tamsulosin 0.8 milliGRAM(s) Oral at bedtime    MEDICATIONS  (PRN):  aluminum hydroxide/magnesium hydroxide/simethicone Suspension 30 milliLiter(s) Oral every 4 hours PRN Dyspepsia  cyclobenzaprine 5 milliGRAM(s) Oral three times a day PRN Muscle Spasm  melatonin 3 milliGRAM(s) Oral at bedtime PRN Insomnia  oxyCODONE    IR 15 milliGRAM(s) Oral every 6 hours PRN Moderate Pain (4 - 6)  simethicone 80 milliGRAM(s) Chew three times a day PRN Gas  sodium chloride 0.9% lock flush 10 milliLiter(s) IV Push every 1 hour PRN Pre/post blood products, medications, blood draw, and to maintain line patency    Vital Signs Last 24 Hrs  T(F): 98.1 (26 Apr 2025 08:09), Max: 98.2 (25 Apr 2025 19:53)  HR: 73 (26 Apr 2025 08:09) (73 - 80)  BP: 97/63 (26 Apr 2025 08:09) (97/63 - 110/70)  RR: 16 (26 Apr 2025 08:09) (16 - 16)  SpO2: 95% (26 Apr 2025 08:09) (94% - 95%)  I&O's Summary    25 Apr 2025 07:01  -  26 Apr 2025 07:00  --------------------------------------------------------  IN: 0 mL / OUT: 600 mL / NET: -600 mL      PHYSICAL EXAM:  General: NAD, A/O x 3  ENT: MMM  Neck: Supple, No JVD  Lungs: Clear to auscultation bilaterally  Cardio: RRR, S1/S2, No murmurs  Abdomen: Soft, Nontender, Nondistended; Bowel sounds present  Extremities: No calf tenderness, No pitting edema    LABS:                        10.9   8.22  )-----------( 227      ( 24 Apr 2025 05:54 )             34.4     04-26    138  |  102  |  32  ----------------------------<  102  3.3   |  30  |  1.17    Ca    9.2      26 Apr 2025 05:50  Mg     1.8     04-26    TPro  6.6  /  Alb  3.2  /  TBili  0.5  /  DBili  x   /  AST  35  /  ALT  53  /  AlkPhos  87  04-24      Urinalysis Basic - ( 26 Apr 2025 05:50 )    Color: x / Appearance: x / SG: x / pH: x  Gluc: 102 mg/dL / Ketone: x  / Bili: x / Urobili: x   Blood: x / Protein: x / Nitrite: x   Leuk Esterase: x / RBC: x / WBC x   Sq Epi: x / Non Sq Epi: x / Bacteria: x            RADIOLOGY & ADDITIONAL TESTS:    Care Discussed with Consultants/Other Providers:

## 2025-04-27 PROCEDURE — 99232 SBSQ HOSP IP/OBS MODERATE 35: CPT

## 2025-04-27 RX ORDER — BUMETANIDE 1 MG/1
0.5 TABLET ORAL DAILY
Refills: 0 | Status: DISCONTINUED | OUTPATIENT
Start: 2025-04-28 | End: 2025-04-30

## 2025-04-27 RX ADMIN — Medication 1 APPLICATION(S): at 06:18

## 2025-04-27 RX ADMIN — Medication 10 MILLIGRAM(S): at 18:16

## 2025-04-27 RX ADMIN — GABAPENTIN 100 MILLIGRAM(S): 400 CAPSULE ORAL at 05:40

## 2025-04-27 RX ADMIN — Medication 1 LOZENGE: at 05:41

## 2025-04-27 RX ADMIN — GABAPENTIN 100 MILLIGRAM(S): 400 CAPSULE ORAL at 14:12

## 2025-04-27 RX ADMIN — CEFTRIAXONE 100 MILLIGRAM(S): 500 INJECTION, POWDER, FOR SOLUTION INTRAMUSCULAR; INTRAVENOUS at 22:03

## 2025-04-27 RX ADMIN — TAMSULOSIN HYDROCHLORIDE 0.8 MILLIGRAM(S): 0.4 CAPSULE ORAL at 22:12

## 2025-04-27 RX ADMIN — Medication 40 MILLIGRAM(S): at 05:41

## 2025-04-27 RX ADMIN — POLYETHYLENE GLYCOL 3350 17 GRAM(S): 17 POWDER, FOR SOLUTION ORAL at 18:17

## 2025-04-27 RX ADMIN — ROSUVASTATIN CALCIUM 5 MILLIGRAM(S): 20 TABLET, FILM COATED ORAL at 22:13

## 2025-04-27 RX ADMIN — METOPROLOL SUCCINATE 25 MILLIGRAM(S): 50 TABLET, EXTENDED RELEASE ORAL at 05:40

## 2025-04-27 RX ADMIN — DAPTOMYCIN 126 MILLIGRAM(S): 500 INJECTION, POWDER, LYOPHILIZED, FOR SOLUTION INTRAVENOUS at 13:04

## 2025-04-27 RX ADMIN — CYCLOBENZAPRINE HYDROCHLORIDE 5 MILLIGRAM(S): 15 CAPSULE, EXTENDED RELEASE ORAL at 22:13

## 2025-04-27 RX ADMIN — BUMETANIDE 1 MILLIGRAM(S): 1 TABLET ORAL at 05:40

## 2025-04-27 RX ADMIN — Medication 10 MILLIGRAM(S): at 05:40

## 2025-04-27 RX ADMIN — GABAPENTIN 100 MILLIGRAM(S): 400 CAPSULE ORAL at 22:13

## 2025-04-27 RX ADMIN — Medication 150 MICROGRAM(S): at 05:41

## 2025-04-27 RX ADMIN — APIXABAN 5 MILLIGRAM(S): 2.5 TABLET, FILM COATED ORAL at 23:02

## 2025-04-27 RX ADMIN — OXYCODONE HYDROCHLORIDE 15 MILLIGRAM(S): 30 TABLET ORAL at 14:11

## 2025-04-27 RX ADMIN — APIXABAN 5 MILLIGRAM(S): 2.5 TABLET, FILM COATED ORAL at 12:54

## 2025-04-27 RX ADMIN — OXYCODONE HYDROCHLORIDE 15 MILLIGRAM(S): 30 TABLET ORAL at 15:11

## 2025-04-27 RX ADMIN — Medication 1 LOZENGE: at 12:55

## 2025-04-27 RX ADMIN — Medication 2 TABLET(S): at 22:13

## 2025-04-27 NOTE — PROGRESS NOTE ADULT - ASSESSMENT
Assessment/Plan:  Mr. Rishabh Shaver is a 74-year-old man with past medical history of benign prostatic hyperplasia, diverticulosis, Graves' disease, hemorrhoids, hyperlipidemia, hypothyroidism, osteoarthritis, spinal stenosis, spinal cord injury (2004), and multiple spinal surgeries (three cervical and one lumbar), who is admitted for Acute Inpatient Rehabilitation with a multidisciplinary rehab program at [facility] with functional impairments in ADLs and mobility secondary to a thoracic epidural abscess s/p posterior thoracic laminectomy and fusion on 3/18/25 with Dr. Keith Cantu.     no new management    #Thoracic epidural abscess s/p laminectomy and fusion surgery      * C8 AIS D Incomplete paraplegia      *  brace when OOB      * Flexeril 5mg PO TID PRN for neck soreness      * Staples removed on POD #14 (4/1/25)      * Sacral wound assessment by wound/skin team      * urinary retention, now voiding,  c/w Flomax 0.8mg PO daily      * Right Venodyne only due to left calf DVT (s/p pre-op IVC filter)   - Activity Limitations: Decreased social, vocational and leisure activities, decreased self care and ADLs, decreased mobility, decreased ability to manage household and finances  - Comprehensive Multidisciplinary Rehab Program:      * 3 hours a day, 5 days a week      * PT 2hr/day: Focused on improving strength, endurance, coordination, balance, functional mobility, and transfers      * OT 1hr/day: Focused on improving strength, fine motor skills, coordination, posture and ADLs  - orthotist Arya saw patient 4/17 for AFO for right foot drop and recommends b/l AFOs hinged on R and semi solid on L - order placed    -----------------------------------------------------------------------------------    Concurrent Medical Problems    #GOVIND on CKD (resolved)  #LE edema  #Hypoalbuminemia  #Proteinuria  - IVF as needed with fluid balance  - Significant debilitating anasarca   - Trial of albumin Lasix started 3/8->improvement  - s/p IV Lasix/IV albumin BID (3/9)  - Change to Bumex/Albumin (3/11)-> good effect  - Switched to PO Bumex 1mg BID 3/24  - Trend Albumin (4/17 - 3.1)  - Trend Protein total (4/17 - 6.8); Total protein, Random Urine (3/18- 24)  - Trend CMP (4/17 - Cr 1.09/ BUN 33)    #L gastrocnemius and L soleal DVT  - appears provoked from decrease ambulation from recent spinal abscess  - Could not tolerate VQ scan for r/o PE 2/2 pain  - Eliquis 5mg PO q12h - will be new med on discharge (of note patient completed Eliquis load)   - Therapeutic Lovenox (3/12) in the setting of possible spine intervention  - Last dose of AC night of 3/16. Restarted Eliquis 5mg BID 4/5/25  - S/P IVC filter placement 3/17  - repeat US 4/8 with stable L soleal DVT and no propagation     #Spinal epidural abscess POA and already being treated with IV antibiotics with suspected worsening of disease  - S/p thoracic laminectomy and fusion 3/18. Munday  brace   - Surgical culture with staph epi possible contaminant but given clinical presentation on admission, IV Dapto added  - IV Rocephin 2g daily via PICC line thru 5/2/25  - IV Daptomycin 650mg daily via PICC line thru 5/2/25  - gabapentin 100mg PO TID  - d/c long-acting Oxycontin 4/11  - oxycodone 15mg PO q6h PRN moderate pain  - Flexeril 5mg PO TID PRN for muscle spasm  - staples d/teodoro in rehab    #Abdominal discomfort and bloating  - Abd xray 4/15--as above  - reconsulted GI 4/17 awaiting recs    #Mild Rhabdomyolysis (resolved)  - associated elevated transaminases (resolved)  - suspect from anasarca/edema   - improving with adequate diuresis of edema  - associated elevated transaminase; now resolved (4/17- AST 30/ALT 38)    #Leukocytosis (resolved)  - likely rx to DVT + Discitis  - no other signs of sepsis, afebrile   - trend CBC and monitor fever curve (4/17- WBC 8.22)    #Hypothyroidism  - Synthroid 150mcg PO daily    #CAD  #HTN  #HLD  - metoprolol succinate 25mg PO daily   - rosuvastatin 5mg PO qhs    #Anemia/MARLENA  - trend H/H (4/17- 10.5/33.1)  - transfuse if Hgb <7 PRN     #Sleep:  - melatonin 3mg PO qhs PRN for sleep    #GI/Bowel:  - senna 2 tablets PO qhs  - Miralax 17g PO daily  - daily mini-enema at 6AM  - lactulose 20g PO TID PRN for constipation  - Maalox 30mL PO q4h PRN for dyspepsia  - GI ppx: pantoprazole 40mg PO daily   - added simethicone 80mg PO TID PRN for gas 4/14    #BPH  #Urinary retention likely augmented by degree of anasarca  - S/P Ortega; TOV  ->failed. Ortega re-inserted 3/6->fell out without knowing with associated hematuria->recurrent retention 3/7->Ortega re-inserted (3/7) --> successful TOV 3/25  - Flomax 0.8mg PO qhs  - started bethanechol 10mg PO BID 4/6  - Monitor U/O    #Skin/Pressure Injury:   Skin assessment on admission:   - Generalized dry flaky skin  - 19cm posterior cervical incision with 32 staples and 2 steri strips  - Left buttock DTI pressure injury measuring 14 x 9 cm      * appearing as a dark purple discoloration of intact skin, with a central superficial open area measuring 6 x 2 cm      * at the periphery of the 14 x 9 cm area, the discoloration is somewhat lighter      * dark red non-blanchable erythema      * eschar resolved     **Santyl to slough of sacrum daily**  - Bilateral lower extremity edema  - Healed lumbar surgical incision scar  - Right heel blanchable redness  - Bilateral dry cracked heels and plantar surface  - Lac hydrin BID BLE  - Aquaphor BID to generalized dry skin   - Appreciate wound care consult     #Diet/Dysphagia:  - Dysphagia: SLP consult for swallow function evaluation and treatment  - Current Diet: Regular  - Aspiration Precautions  - Nutrition consult     #Patient Education  - Education provided on the following:      * Admitting diagnosis and functional implications      * Functional goals      * Bladder management      * Bowel management      * Skin care management      * Intensity of service and scheduling of rehab disciplines      * Plan of care and role of interdisciplinary team conference in discharge planning      * Reconciliation of medications from prior institution    #Precautions / PROPHYLAXIS:   - Falls, Spinal  - Ortho: Weight bearing status: FWB;  brace OOB  - DVT ppx: Eliquis 5mg PO BID, TEDs  - Pressure injury/Skin: Turn Q2hrs while in bed, OOB to Chair, PT/OT      ---------------  Outpatient Follow-up:    Yue Wiggins  [facility]  [address], [address]  [place] [address]  Phone: [phone]  Fax: [phone]  Established Patient  Follow Up Time: 1 week    Keith Cantu [facility]  Neurosurgery  [address], [address]  [place] [address]  Phone: [phone]  Fax: [phone]  Established Patient  Follow Up Time: 2 weeks    Luis A Brennan  [facility] Physician [facility]  CARDIOLOGY [address]  Scheduled Appointment: 04/23/2025    --------------  Wheelchair recommendation    Patient seen today for a face to face visit for an ultra-lightweight manual wheelchair in the home. Patient cannot independently walk in his home using any ambulation device.  He requires a manual wheelchair so they can perform their mobility related activities of daily living (MRADL), like getting to the bathroom for hygiene, the kitchen for eating and the bedroom for resting. Patient requires an ultra lightweight manual wheelchair because he cannot walk and has weakness and endurance issues that require him to need an ultra lightweight manual wheelchair with camber and an axle plate that brings the wheels forward to allow him to reach them effectively and without pain.      --------------  AFO recommendation    Patient presents with bilateral lower extremity weakness and foot drop due to nerve compression. Patient has instability as the patient’s feet scuff the ground during ambulation and is at risk for falls. Regarding the right side, in addition to foot drop and weakness, patient presents with inversion and genu recurvatum. The knee hyperextends. Patient requires a custom articulating AFO to address the foot drop, inversion, and genu recurvatum. Regarding the left side, in addition to foot drop and weakness, patient presents with genu recurvatum. Patient requires a custom semi solid AFO to allow for safe ambulation.    --------------  Electric Hospital Bed     Mr. Rishabh Shaver will need an electric hospital bed for elevation and transferring purposes. Due to his diagnosis of ah thoracic epidural abscess, he underwent laminectomy and fusion surgery and remains with paraplegia with additional need to have his head elevated 30 degrees to help assist in proper breathing and frequent body positioning to reduce the risk of skin breakdown in ways not feasible with an ordinary bed.    --------------

## 2025-04-27 NOTE — PROGRESS NOTE ADULT - SUBJECTIVE AND OBJECTIVE BOX
HPI:  Mr. Rishabh Shaver is a 74-year-old male patient with past medical history of benign prostatic hyperplasia, diverticulosis, Graves' disease, hemorrhoids, hyperlipidemia, hypothyroidism, osteoarthritis, spinal stenosis, spinal cord injury (2004), and multiple spinal surgeries (three cervical and one lumbar), who presented to [facility] on 3/4/25 from [facility] due to lower extremity swelling and abnormal BUN/creatinine levels for further evaluation and management. The patient was recently admitted to [facility] from 02/18/25 through 02/25/25 for sepsis secondary to epidural abscess. He had a peripherally inserted central catheter line in the right arm and was on nightly Ceftriaxone treatment until 04/16/25. At the ED, he was found to have GOVIND, a left soleal vein, and a left gastrocnemius DVT. He was started on heparin gtt. Hospital course complicated by urinary retention requiring Ortega catheter insertion, rhabdomyolysis, elevated LFTs, anasarca, leukocytosis due to thoracic spine discitis OM and early paraspinal abscess, as well as new onset RUE paresthesias and weakness. He is s/p IVC filter placement on 3/17 and was recommended surgical management. He is s/p posterior thoracic laminectomy and fusion on 3/18/25 with Dr. Keith Cantu. Surgical culture with rare staph epi. Recommendations by ID to continue IV Rocephin and Daptomycin through 5/2/25. Patient was evaluated by PM&R and therapy for functional deficits, gait/ADL impairments and acute rehabilitation was recommended. Patient was cleared for discharge to [facility] on 3/25/25.     TDD 4/30/25 home  ___________________________________________________________________________    SUBJECTIVE/ROS  Patient was seen and evaluated at bedside today.  Reported no overnight events and is in no acute distress.  no acute events over night    LAB                        10.9   8.22  )-----------( 227      ( 24 Apr 2025 05:54 )             34.4     04-24    138  |  100  |  34[H]  ----------------------------<  114[H]  3.4[L]   |  30  |  1.22    Ca    9.4      24 Apr 2025 05:54    TPro  6.6  /  Alb  3.2[L]  /  TBili  0.5  /  DBili  x   /  AST  35  /  ALT  53[H]  /  AlkPhos  87  04-24    LIVER FUNCTIONS - ( 24 Apr 2025 05:54 )  Alb: 3.2 g/dL / Pro: 6.6 g/dL / ALK PHOS: 87 U/L / ALT: 53 U/L / AST: 35 U/L / GGT: x           ___________________________________________________________________________    MEDICATIONS  (STANDING):  ammonium lactate 12% Lotion 1 Application(s) Topical two times a day  apixaban 5 milliGRAM(s) Oral every 12 hours  AQUAPHOR (petrolatum Ointment) 1 Application(s) Topical three times a day  bethanechol 10 milliGRAM(s) Oral two times a day  buMETAnide 1 milliGRAM(s) Oral two times a day  cefTRIAXone   IVPB 2000 milliGRAM(s) IV Intermittent every 24 hours  chlorhexidine 4% Liquid 1 Application(s) Topical <User Schedule>  collagenase Ointment 1 Application(s) Topical daily  DAPTOmycin IVPB 650 milliGRAM(s) IV Intermittent every 24 hours  docusate sodium Enema 283 milliGRAM(s) Rectal <User Schedule>  gabapentin   Oral   gabapentin 100 milliGRAM(s) Oral three times a day  levothyroxine 150 MICROGram(s) Oral daily  magnesium hydroxide Suspension 30 milliLiter(s) Oral at bedtime  metoprolol succinate ER 25 milliGRAM(s) Oral daily  pantoprazole    Tablet 40 milliGRAM(s) Oral before breakfast  polyethylene glycol 3350 17 Gram(s) Oral two times a day  rosuvastatin 5 milliGRAM(s) Oral at bedtime  senna 2 Tablet(s) Oral at bedtime  tamsulosin 0.8 milliGRAM(s) Oral at bedtime    MEDICATIONS  (PRN):  aluminum hydroxide/magnesium hydroxide/simethicone Suspension 30 milliLiter(s) Oral every 4 hours PRN Dyspepsia  cyclobenzaprine 5 milliGRAM(s) Oral three times a day PRN Muscle Spasm  melatonin 3 milliGRAM(s) Oral at bedtime PRN Insomnia  oxyCODONE    IR 15 milliGRAM(s) Oral every 6 hours PRN Moderate Pain (4 - 6)  simethicone 80 milliGRAM(s) Chew three times a day PRN Gas  sodium chloride 0.9% lock flush 10 milliLiter(s) IV Push every 1 hour PRN Pre/post blood products, medications, blood draw, and to maintain line patency    ___________________________________________________________________________    PHYSICAL EXAM:  Vital Signs Last 24 Hrs  T(C): 36.6 (27 Apr 2025 08:55), Max: 37 (26 Apr 2025 20:09)  T(F): 97.9 (27 Apr 2025 08:55), Max: 98.6 (26 Apr 2025 20:09)  HR: 80 (27 Apr 2025 08:55) (70 - 80)  BP: 107/62 (27 Apr 2025 08:55) (107/62 - 134/70)  BP(mean): --  RR: 16 (27 Apr 2025 08:55) (16 - 17)  SpO2: 95% (27 Apr 2025 08:55) (95% - 98%)    Parameters below as of 27 Apr 2025 08:55  Patient On (Oxygen Delivery Method): room air      Patient On (Oxygen Delivery Method): room air      Gen - NAD, Comfortable  HEENT -  EOMI, MMM, PERRLA, Normal Conjunctivae  Neck - Supple, + limited ROM, +posterior cervical incision  Pulm - CTAB, No wheeze, No rhonchi, No crackles  Cardiovascular - RRR, S1S2, No murmurs  Chest - good chest expansion, good respiratory effort  Abdomen - Firm, NT, abd distention, +BS  Extremities - No C/C, no calf tenderness, +BLE edema, +B/L pedal edema, +RUE PICC line   /GI- + Ortega draining clear urine   Neuro-     Cognitive - awake, alert, oriented to person, place, date, year, and situation.  Able  to follow command     Motor -          RUE: Deltoid 5/5, bicep 5/5, tricep 5/5, wrist ext 5/5,  4-/5, unable to fully flex 2nd digit          LUE: Deltoid 5/5, bicep 5/5, tricep 5/5, wrist ext 5/5,  4-/5, unable to fully flex 2nd digit          RLE: IP 3/5, Quad 3/5, EHL 5/5, Dorsi/plantarflexion 5/5 (some limitations 2/2 LE edema)          LLE: IP 2/5, Quad 2/5, EHL 5/5, Dorsi/plantarflexion 5/5 (proximal leg significantly limited by pain and      Sensory - Impaired  to LT LLE from knee to ankle     Tone - Normal  Psychiatric - Mood stable, Affect WNL    ___________________________________________________________________________

## 2025-04-28 LAB
ALBUMIN SERPL ELPH-MCNC: 3 G/DL — LOW (ref 3.3–5)
ALP SERPL-CCNC: 84 U/L — SIGNIFICANT CHANGE UP (ref 40–120)
ALT FLD-CCNC: 32 U/L — SIGNIFICANT CHANGE UP (ref 10–45)
ANION GAP SERPL CALC-SCNC: 6 MMOL/L — SIGNIFICANT CHANGE UP (ref 5–17)
AST SERPL-CCNC: 18 U/L — SIGNIFICANT CHANGE UP (ref 10–40)
BASOPHILS # BLD AUTO: 0.08 K/UL — SIGNIFICANT CHANGE UP (ref 0–0.2)
BASOPHILS NFR BLD AUTO: 1.1 % — SIGNIFICANT CHANGE UP (ref 0–2)
BILIRUB SERPL-MCNC: 0.5 MG/DL — SIGNIFICANT CHANGE UP (ref 0.2–1.2)
BUN SERPL-MCNC: 32 MG/DL — HIGH (ref 7–23)
CALCIUM SERPL-MCNC: 9.2 MG/DL — SIGNIFICANT CHANGE UP (ref 8.4–10.5)
CHLORIDE SERPL-SCNC: 100 MMOL/L — SIGNIFICANT CHANGE UP (ref 96–108)
CO2 SERPL-SCNC: 30 MMOL/L — SIGNIFICANT CHANGE UP (ref 22–31)
CREAT SERPL-MCNC: 1.06 MG/DL — SIGNIFICANT CHANGE UP (ref 0.5–1.3)
EGFR: 74 ML/MIN/1.73M2 — SIGNIFICANT CHANGE UP
EGFR: 74 ML/MIN/1.73M2 — SIGNIFICANT CHANGE UP
EOSINOPHIL # BLD AUTO: 0.85 K/UL — HIGH (ref 0–0.5)
EOSINOPHIL NFR BLD AUTO: 11.7 % — HIGH (ref 0–6)
GLUCOSE SERPL-MCNC: 102 MG/DL — HIGH (ref 70–99)
HCT VFR BLD CALC: 36.2 % — LOW (ref 39–50)
HGB BLD-MCNC: 11.8 G/DL — LOW (ref 13–17)
IMM GRANULOCYTES NFR BLD AUTO: 0.1 % — SIGNIFICANT CHANGE UP (ref 0–0.9)
LYMPHOCYTES # BLD AUTO: 1.24 K/UL — SIGNIFICANT CHANGE UP (ref 1–3.3)
LYMPHOCYTES # BLD AUTO: 17.1 % — SIGNIFICANT CHANGE UP (ref 13–44)
MCHC RBC-ENTMCNC: 28.2 PG — SIGNIFICANT CHANGE UP (ref 27–34)
MCHC RBC-ENTMCNC: 32.6 G/DL — SIGNIFICANT CHANGE UP (ref 32–36)
MCV RBC AUTO: 86.6 FL — SIGNIFICANT CHANGE UP (ref 80–100)
MONOCYTES # BLD AUTO: 0.72 K/UL — SIGNIFICANT CHANGE UP (ref 0–0.9)
MONOCYTES NFR BLD AUTO: 9.9 % — SIGNIFICANT CHANGE UP (ref 2–14)
NEUTROPHILS # BLD AUTO: 4.36 K/UL — SIGNIFICANT CHANGE UP (ref 1.8–7.4)
NEUTROPHILS NFR BLD AUTO: 60.1 % — SIGNIFICANT CHANGE UP (ref 43–77)
NRBC BLD AUTO-RTO: 0 /100 WBCS — SIGNIFICANT CHANGE UP (ref 0–0)
PLATELET # BLD AUTO: 203 K/UL — SIGNIFICANT CHANGE UP (ref 150–400)
POTASSIUM SERPL-MCNC: 3.9 MMOL/L — SIGNIFICANT CHANGE UP (ref 3.5–5.3)
POTASSIUM SERPL-SCNC: 3.9 MMOL/L — SIGNIFICANT CHANGE UP (ref 3.5–5.3)
PROT SERPL-MCNC: 6.3 G/DL — SIGNIFICANT CHANGE UP (ref 6–8.3)
RBC # BLD: 4.18 M/UL — LOW (ref 4.2–5.8)
RBC # FLD: 14.4 % — SIGNIFICANT CHANGE UP (ref 10.3–14.5)
SODIUM SERPL-SCNC: 136 MMOL/L — SIGNIFICANT CHANGE UP (ref 135–145)
WBC # BLD: 7.26 K/UL — SIGNIFICANT CHANGE UP (ref 3.8–10.5)
WBC # FLD AUTO: 7.26 K/UL — SIGNIFICANT CHANGE UP (ref 3.8–10.5)

## 2025-04-28 PROCEDURE — 99233 SBSQ HOSP IP/OBS HIGH 50: CPT

## 2025-04-28 PROCEDURE — 99232 SBSQ HOSP IP/OBS MODERATE 35: CPT

## 2025-04-28 RX ORDER — COLLAGENASE CLOSTRIDIUM HIST. 250 UNIT/G
1 OINTMENT (GRAM) TOPICAL
Qty: 1 | Refills: 0
Start: 2025-04-28 | End: 2025-05-27

## 2025-04-28 RX ORDER — METOPROLOL SUCCINATE 50 MG/1
1 TABLET, EXTENDED RELEASE ORAL
Qty: 30 | Refills: 0
Start: 2025-04-28 | End: 2025-05-27

## 2025-04-28 RX ORDER — OXYCODONE HYDROCHLORIDE 30 MG/1
1 TABLET ORAL
Qty: 28 | Refills: 0
Start: 2025-04-28 | End: 2025-05-04

## 2025-04-28 RX ORDER — ROSUVASTATIN CALCIUM 20 MG/1
1 TABLET, FILM COATED ORAL
Qty: 30 | Refills: 0
Start: 2025-04-28 | End: 2025-05-27

## 2025-04-28 RX ORDER — NALOXONE HYDROCHLORIDE 0.4 MG/ML
1 INJECTION, SOLUTION INTRAMUSCULAR; INTRAVENOUS; SUBCUTANEOUS
Qty: 1 | Refills: 0
Start: 2025-04-28 | End: 2025-04-29

## 2025-04-28 RX ORDER — DOCUSATE SODIUM 100 MG
1 CAPSULE ORAL
Qty: 30 | Refills: 0
Start: 2025-04-28 | End: 2025-05-27

## 2025-04-28 RX ORDER — MELATONIN 5 MG
1 TABLET ORAL
Qty: 0 | Refills: 0 | DISCHARGE
Start: 2025-04-28

## 2025-04-28 RX ORDER — POLYETHYLENE GLYCOL 3350 17 G/17G
17 POWDER, FOR SOLUTION ORAL
Qty: 0 | Refills: 0 | DISCHARGE
Start: 2025-04-28

## 2025-04-28 RX ORDER — SENNA 187 MG
2 TABLET ORAL
Qty: 60 | Refills: 0
Start: 2025-04-28 | End: 2025-05-27

## 2025-04-28 RX ORDER — AMMONIUM LACTATE 12 %
1 LOTION (ML) TOPICAL
Qty: 1 | Refills: 0
Start: 2025-04-28 | End: 2025-05-27

## 2025-04-28 RX ORDER — GABAPENTIN 400 MG/1
1 CAPSULE ORAL
Qty: 90 | Refills: 0
Start: 2025-04-28 | End: 2025-05-27

## 2025-04-28 RX ORDER — TAMSULOSIN HYDROCHLORIDE 0.4 MG/1
2 CAPSULE ORAL
Qty: 60 | Refills: 0
Start: 2025-04-28 | End: 2025-05-27

## 2025-04-28 RX ORDER — BUMETANIDE 1 MG/1
1 TABLET ORAL
Qty: 30 | Refills: 0
Start: 2025-04-28 | End: 2025-05-27

## 2025-04-28 RX ORDER — WHITE PETROLATUM 1 G/G
1 OINTMENT TOPICAL
Qty: 0 | Refills: 0 | DISCHARGE
Start: 2025-04-28

## 2025-04-28 RX ORDER — APIXABAN 2.5 MG/1
1 TABLET, FILM COATED ORAL
Qty: 60 | Refills: 0
Start: 2025-04-28 | End: 2025-05-27

## 2025-04-28 RX ORDER — OXYCODONE HYDROCHLORIDE 30 MG/1
5 TABLET ORAL EVERY 6 HOURS
Refills: 0 | Status: DISCONTINUED | OUTPATIENT
Start: 2025-04-28 | End: 2025-04-30

## 2025-04-28 RX ADMIN — APIXABAN 5 MILLIGRAM(S): 2.5 TABLET, FILM COATED ORAL at 12:52

## 2025-04-28 RX ADMIN — METOPROLOL SUCCINATE 25 MILLIGRAM(S): 50 TABLET, EXTENDED RELEASE ORAL at 05:30

## 2025-04-28 RX ADMIN — Medication 283 MILLIGRAM(S): at 08:24

## 2025-04-28 RX ADMIN — APIXABAN 5 MILLIGRAM(S): 2.5 TABLET, FILM COATED ORAL at 23:04

## 2025-04-28 RX ADMIN — Medication 1 APPLICATION(S): at 05:23

## 2025-04-28 RX ADMIN — GABAPENTIN 100 MILLIGRAM(S): 400 CAPSULE ORAL at 05:30

## 2025-04-28 RX ADMIN — Medication 1 APPLICATION(S): at 05:28

## 2025-04-28 RX ADMIN — CYCLOBENZAPRINE HYDROCHLORIDE 5 MILLIGRAM(S): 15 CAPSULE, EXTENDED RELEASE ORAL at 13:19

## 2025-04-28 RX ADMIN — GABAPENTIN 100 MILLIGRAM(S): 400 CAPSULE ORAL at 13:20

## 2025-04-28 RX ADMIN — BUMETANIDE 0.5 MILLIGRAM(S): 1 TABLET ORAL at 05:30

## 2025-04-28 RX ADMIN — Medication 1 LOZENGE: at 16:28

## 2025-04-28 RX ADMIN — Medication 10 MILLIGRAM(S): at 05:30

## 2025-04-28 RX ADMIN — Medication 40 MILLIGRAM(S): at 05:29

## 2025-04-28 RX ADMIN — Medication 10 MILLIGRAM(S): at 18:08

## 2025-04-28 RX ADMIN — DAPTOMYCIN 126 MILLIGRAM(S): 500 INJECTION, POWDER, LYOPHILIZED, FOR SOLUTION INTRAVENOUS at 12:52

## 2025-04-28 RX ADMIN — ROSUVASTATIN CALCIUM 5 MILLIGRAM(S): 20 TABLET, FILM COATED ORAL at 22:08

## 2025-04-28 RX ADMIN — CEFTRIAXONE 100 MILLIGRAM(S): 500 INJECTION, POWDER, FOR SOLUTION INTRAMUSCULAR; INTRAVENOUS at 22:08

## 2025-04-28 RX ADMIN — CYCLOBENZAPRINE HYDROCHLORIDE 5 MILLIGRAM(S): 15 CAPSULE, EXTENDED RELEASE ORAL at 22:09

## 2025-04-28 RX ADMIN — GABAPENTIN 100 MILLIGRAM(S): 400 CAPSULE ORAL at 22:08

## 2025-04-28 RX ADMIN — Medication 1 LOZENGE: at 22:14

## 2025-04-28 RX ADMIN — Medication 2 TABLET(S): at 22:08

## 2025-04-28 RX ADMIN — TAMSULOSIN HYDROCHLORIDE 0.8 MILLIGRAM(S): 0.4 CAPSULE ORAL at 22:08

## 2025-04-28 RX ADMIN — Medication 1 APPLICATION(S): at 05:27

## 2025-04-28 RX ADMIN — Medication 150 MICROGRAM(S): at 05:30

## 2025-04-28 NOTE — CHART NOTE - NSCHARTNOTEFT_GEN_A_CORE
Nutrition Follow Up Note  Hospital Course   (Per Electronic Medical Record)    Source:  Patient [X]  Medical Record [X]      Diet:   Regular Diet (IDDSI Level 7) w/ Thin Liquids (IDDSI Level 0)  Tolerates Diet Consistency Well  No Chewing/Swallowing Difficulties  No Recent Nausea, Vomiting, Diarrhea or Constipation (as Per Patient)  Consumes % of Meals (as Per Documentation) - States Good PO Intake/Appetite (Per Patient)   on Ensure Plus High Protein 8oz PO BID (Provides 700kcal & 40grams of Protein)  on Lalito 1 Packet Daily (Provides 90kcal & 14grams of Protein)   Patient Takes Nutrition Supplement Well  Recommend Decrease Supplement to Ensure Plus High Protein 8oz PO Daily (Per Patient Request)    Enteral/Parenteral Nutrition: Not Applicable    Current Weight: 206.5lb on 4/28  Obtain New Weight to Confirm  Obtain Weights Weekly     Pertinent Medications: MEDICATIONS  (STANDING):  ammonium lactate 12% Lotion 1 Application(s) Topical two times a day  apixaban 5 milliGRAM(s) Oral every 12 hours  AQUAPHOR (petrolatum Ointment) 1 Application(s) Topical three times a day  bethanechol 10 milliGRAM(s) Oral two times a day  buMETAnide 0.5 milliGRAM(s) Oral daily  cefTRIAXone   IVPB 2000 milliGRAM(s) IV Intermittent every 24 hours  chlorhexidine 4% Liquid 1 Application(s) Topical <User Schedule>  collagenase Ointment 1 Application(s) Topical daily  DAPTOmycin IVPB 650 milliGRAM(s) IV Intermittent every 24 hours  docusate sodium Enema 283 milliGRAM(s) Rectal <User Schedule>  gabapentin   Oral   gabapentin 100 milliGRAM(s) Oral three times a day  levothyroxine 150 MICROGram(s) Oral daily  magnesium hydroxide Suspension 30 milliLiter(s) Oral at bedtime  metoprolol succinate ER 25 milliGRAM(s) Oral daily  pantoprazole    Tablet 40 milliGRAM(s) Oral before breakfast  polyethylene glycol 3350 17 Gram(s) Oral two times a day  rosuvastatin 5 milliGRAM(s) Oral at bedtime  senna 2 Tablet(s) Oral at bedtime  tamsulosin 0.8 milliGRAM(s) Oral at bedtime    MEDICATIONS  (PRN):  aluminum hydroxide/magnesium hydroxide/simethicone Suspension 30 milliLiter(s) Oral every 4 hours PRN Dyspepsia  benzocaine/menthol Lozenge 1 Lozenge Oral three times a day PRN Sore Throat  cyclobenzaprine 5 milliGRAM(s) Oral three times a day PRN Muscle Spasm  melatonin 3 milliGRAM(s) Oral at bedtime PRN Insomnia  oxyCODONE    IR 15 milliGRAM(s) Oral every 6 hours PRN Moderate Pain (4 - 6)  simethicone 80 milliGRAM(s) Chew three times a day PRN Gas  sodium chloride 0.9% lock flush 10 milliLiter(s) IV Push every 1 hour PRN Pre/post blood products, medications, blood draw, and to maintain line patency    Pertinent Labs:  04-28 Na136 mmol/L Glu 102 mg/dL[H] K+ 3.9 mmol/L Cr  1.06 mg/dL BUN 32 mg/dL[H] 04-28 Alb 3.0 g/dL[L]    Skin: DTI on Left Buttock     Edema: None Noted Recently (as Per Documentation)     Last Bowel Movement: on 4/27    Estimated Needs:   [X] No Change Since Previous Assessment    Previous Nutrition Diagnosis:   Increased Nutrient Needs - Calories & Protein     Nutrition Diagnosis is [X] Ongoing -   Continues on Nutrition Supplement - Adjust Supplement (Per Patient Request)     New Nutrition Diagnosis: [X] Not Applicable    Interventions:   1. Recommend Change Ensure Plus High Protein 8oz PO to Daily  2. Recommend Continue Nutrition Plan of Care     Monitoring & Evaluation:   [X] Weights (as Needed/Available)  [X] PO Intake   [X] Skin Integrity   [X] Follow Up (Per Protocol)  [X] Tolerance to Diet Prescription   [X] Other: Labs     Registered Dietitian/Nutritionist Remains Available.  Jarrod Alva, GLYNN, CDN  Senior Dietitian    Phone# [phone]

## 2025-04-28 NOTE — PROGRESS NOTE ADULT - SUBJECTIVE AND OBJECTIVE BOX
Patient is a 75y old  Male who presents with a chief complaint of Thoracic epidural abscess s/p laminectomy and fusion surgery     BM today  Denies chest pain, SOB      Patient seen and examined at bedside.    ALLERGIES:  No Known Allergies    MEDICATIONS  (STANDING):  ammonium lactate 12% Lotion 1 Application(s) Topical two times a day  apixaban 5 milliGRAM(s) Oral every 12 hours  AQUAPHOR (petrolatum Ointment) 1 Application(s) Topical three times a day  bethanechol 10 milliGRAM(s) Oral two times a day  buMETAnide 0.5 milliGRAM(s) Oral daily  cefTRIAXone   IVPB 2000 milliGRAM(s) IV Intermittent every 24 hours  chlorhexidine 4% Liquid 1 Application(s) Topical <User Schedule>  collagenase Ointment 1 Application(s) Topical daily  DAPTOmycin IVPB 650 milliGRAM(s) IV Intermittent every 24 hours  docusate sodium Enema 283 milliGRAM(s) Rectal <User Schedule>  gabapentin 100 milliGRAM(s) Oral three times a day  gabapentin   Oral   levothyroxine 150 MICROGram(s) Oral daily  magnesium hydroxide Suspension 30 milliLiter(s) Oral at bedtime  metoprolol succinate ER 25 milliGRAM(s) Oral daily  pantoprazole    Tablet 40 milliGRAM(s) Oral before breakfast  polyethylene glycol 3350 17 Gram(s) Oral two times a day  rosuvastatin 5 milliGRAM(s) Oral at bedtime  senna 2 Tablet(s) Oral at bedtime  tamsulosin 0.8 milliGRAM(s) Oral at bedtime    MEDICATIONS  (PRN):  aluminum hydroxide/magnesium hydroxide/simethicone Suspension 30 milliLiter(s) Oral every 4 hours PRN Dyspepsia  benzocaine/menthol Lozenge 1 Lozenge Oral three times a day PRN Sore Throat  cyclobenzaprine 5 milliGRAM(s) Oral three times a day PRN Muscle Spasm  melatonin 3 milliGRAM(s) Oral at bedtime PRN Insomnia  oxyCODONE    IR 5 milliGRAM(s) Oral every 6 hours PRN Severe Pain (7 - 10)  simethicone 80 milliGRAM(s) Chew three times a day PRN Gas  sodium chloride 0.9% lock flush 10 milliLiter(s) IV Push every 1 hour PRN Pre/post blood products, medications, blood draw, and to maintain line patency    Vital Signs Last 24 Hrs  T(F): 98.3 (28 Apr 2025 08:42), Max: 98.3 (28 Apr 2025 08:42)  HR: 80 (28 Apr 2025 08:42) (68 - 80)  BP: 100/65 (28 Apr 2025 08:42) (100/65 - 112/68)  RR: 16 (28 Apr 2025 08:42) (16 - 16)  SpO2: 94% (28 Apr 2025 08:42) (94% - 97%)  I&O's Summary      PHYSICAL EXAM:  General: NAD, A/O x 3  ENT: MMM, no scleral icterus  Neck: Supple, No JVD, no thyroidomegaly  Lungs: Clear to auscultation bilaterally, no wheezes, no rales, no rhonchi, good inspiratory effort  Cardio: RRR, S1/S2, No murmurs  Abdomen: Soft, Nontender, Nondistended; Bowel sounds present  Extremities: No calf tenderness, No pitting edema, no skin changes    LABS:                        11.8   7.26  )-----------( 203      ( 28 Apr 2025 06:54 )             36.2       04-28    136  |  100  |  32  ----------------------------<  102  3.9   |  30  |  1.06    Ca    9.2      28 Apr 2025 06:54  Mg     1.8     04-26    TPro  6.3  /  Alb  3.0  /  TBili  0.5  /  DBili  x   /  AST  18  /  ALT  32  /  AlkPhos  84  04-28     Urinalysis Basic - ( 28 Apr 2025 06:54 )    Color: x / Appearance: x / SG: x / pH: x  Gluc: 102 mg/dL / Ketone: x  / Bili: x / Urobili: x   Blood: x / Protein: x / Nitrite: x   Leuk Esterase: x / RBC: x / WBC x   Sq Epi: x / Non Sq Epi: x / Bacteria: x    COVID-19 PCR: [place] (03-25-25 @ 21:35)

## 2025-04-28 NOTE — PROGRESS NOTE ADULT - ASSESSMENT
74M with PMH of BPH, diverticulosis, Graves' disease, hemorrhoids, HLD, hypothyroidism, osteoarthritis, spinal stenosis, spinal cord injury (2004), multiple spinal surgeries, CAD, and MARLENA. Presented 3/4/25 from SNF with LE swelling and GOVIND. Found to have left soleal and gastroc DVTs, spinal epidural abscess ? s/p thoracic laminectomy/fusion on 3/18. PICC in place for IV Rocephin + Daptomycin through 5/2. Admitted to [facility] on 3/25 for debility.    #Constipation - improved  - Monitor BM    #Debility / Postoperative Status    s/p thoracic epidural abscess ? laminectomy/fusion (3/18)    Fall/spine precautions,  brace    Pain management per primary; avoid opioids    #Spinal Epidural Abscess – s/p Laminectomy/Fusion (3/18)    IV Rocephin 2g QHS + IV Daptomycin 650mg daily via PICC through 5/2    [place]  brace in use    Surgical cultures: rare staph epi (possible contaminant)    CPK 4/20 WNL    #GOVIND – Resolved   Anasarca / Hypoalbuminemia / LE Edema - improved    LE edema resolved. Bumex reduced to 1mg daily on 4/26. will continue taper to 0.5mg daily starting tomorrow     Monitor renal function and I/Os — currently stable    Serum TRUDY: weak IgG lambda band (needs OP f/u)    echo 3/15: EF 65%, mild TR    #Transaminitis – Resolved    Limit Tylenol, avoid hepatotoxins    Encourage PO intake    Monitor LFTs with routine labs    #CAD / HTN / HLD    Metoprolol 25 mg daily    Crestor 5 mg QHS    #Anemia / MARLENA    s/p 2 units PRBCs intra-op    Monitor CBC (Hgb 8.3 on 3/31); H/H stable currently     Transfuse PRN to maintain Hgb >7    #Hypothyroidism    Synthroid 150 mcg daily      #BPH / Chronic Urinary Retention    On Flomax 0.8mg daily    Monitor U/O,    #Acute DVT (L Soleal & Gastroc Veins)    s/p IVC filter 3/17 (couldn’t tolerate VQ scan)    Eliquis 5 mg BID resumed per neurosurgery guidance    #DVT Prophylaxis: Eliquis BID     74M with PMH of BPH, diverticulosis, Graves' disease, hemorrhoids, HLD, hypothyroidism, osteoarthritis, spinal stenosis, spinal cord injury (2004), multiple spinal surgeries, CAD, and MARLENA. Presented 3/4/25 from SNF with LE swelling and GOVIND. Found to have left soleal and gastroc DVTs, spinal epidural abscess ? s/p thoracic laminectomy/fusion on 3/18. PICC in place for IV Rocephin + Daptomycin through 5/2. Admitted to [place] [facility] on 3/25 for debility.    #Constipation - improved  - Monitor BM    #Spinal Epidural Abscess – s/p Laminectomy/Fusion (3/18)    IV Rocephin 2g QHS + IV Daptomycin 650mg daily via PICC through 5/2    Aspen  brace in use    Surgical cultures: rare staph epi (possible contaminant)    CPK 4/20 WNL    #GOVIND – Resolved   Anasarca / Hypoalbuminemia / LE Edema - improved    LE edema resolved. Bumex reduced to 0.5mg daily     Monitor renal function and I/Os — currently stable    Serum TRUDY: weak IgG lambda band (needs OP f/u)    echo 3/15: EF 65%, mild TR    #Transaminitis – Resolved    Limit Tylenol, avoid hepatotoxins    Encourage PO intake    Monitor LFTs with routine labs    #CAD / HTN / HLD    Metoprolol 25 mg daily    Crestor 5 mg QHS    #Anemia / MARLENA    s/p 2 units PRBCs intra-op    Monitor CBC; H/H stable currently     Transfuse PRN to maintain Hgb >7    #Hypothyroidism    Synthroid 150 mcg daily      #BPH / Chronic Urinary Retention    On Flomax 0.8mg daily    #Acute DVT (L Soleal & Gastroc Veins)    s/p IVC filter 3/17 (couldn’t tolerate VQ scan)    Eliquis 5 mg BID resumed per neurosurgery guidance    #DVT Prophylaxis: Eliquis BID

## 2025-04-28 NOTE — PROGRESS NOTE ADULT - ASSESSMENT
Assessment/Plan:  Mr. Rishabh Shaver is a 74-year-old man with past medical history of benign prostatic hyperplasia, diverticulosis, Graves' disease, hemorrhoids, hyperlipidemia, hypothyroidism, osteoarthritis, spinal stenosis, spinal cord injury (2004), and multiple spinal surgeries (three cervical and one lumbar), who is admitted for Acute Inpatient Rehabilitation with a multidisciplinary rehab program at [facility] with functional impairments in ADLs and mobility secondary to a thoracic epidural abscess s/p posterior thoracic laminectomy and fusion on 3/18/25 with Dr. Keith Cantu.       #Thoracic epidural abscess s/p laminectomy and fusion surgery      * C8 AIS D Incomplete paraplegia      *  brace when OOB      * Flexeril 5mg PO TID PRN for neck soreness      * Staples removed on POD #14 (4/1/25)      * Sacral wound assessment by wound/skin team      * urinary retention, now voiding,  c/w Flomax 0.8mg PO daily      * Right Venodyne only due to left calf DVT (s/p pre-op IVC filter)   - Activity Limitations: Decreased social, vocational and leisure activities, decreased self care and ADLs, decreased mobility, decreased ability to manage household and finances  - Comprehensive Multidisciplinary Rehab Program:      * 3 hours a day, 5 days a week      * PT 2hr/day: Focused on improving strength, endurance, coordination, balance, functional mobility, and transfers      * OT 1hr/day: Focused on improving strength, fine motor skills, coordination, posture and ADLs  - orthotist Arya saw patient 4/17 for AFO for right foot drop and recommends b/l AFOs hinged on R and semi solid on L - order placed    -----------------------------------------------------------------------------------    Concurrent Medical Problems    #GOVIND on CKD (resolved)  #LE edema  #Hypoalbuminemia  #Proteinuria  - IVF as needed with fluid balance  - Significant debilitating anasarca   - Trial of albumin Lasix started 3/8->improvement  - s/p IV Lasix/IV albumin BID (3/9)  - Change to Bumex/Albumin (3/11)-> good effect  - Switched to PO Bumex 1mg BID 3/24  - Trend Albumin (4/17 - 3.1)  - Trend Protein total (4/17 - 6.8); Total protein, Random Urine (3/18- 24)  - Trend CMP (4/17 - Cr 1.09/ BUN 33)    #L gastrocnemius and L soleal DVT  - appears provoked from decrease ambulation from recent spinal abscess  - Could not tolerate VQ scan for r/o PE 2/2 pain  - Eliquis 5mg PO q12h - will be new med on discharge (of note patient completed Eliquis load)   - Therapeutic Lovenox (3/12) in the setting of possible spine intervention  - Last dose of AC night of 3/16. Restarted Eliquis 5mg BID 4/5/25  - S/P IVC filter placement 3/17  - repeat US 4/8 with stable L soleal DVT and no propagation     #Spinal epidural abscess POA and already being treated with IV antibiotics with suspected worsening of disease  - S/p thoracic laminectomy and fusion 3/18. [place]  brace   - Surgical culture with staph epi possible contaminant but given clinical presentation on admission, IV Dapto added  - IV Rocephin 2g daily via PICC line thru 5/2/25  - IV Daptomycin 650mg daily via PICC line thru 5/2/25  - gabapentin 100mg PO TID  - d/c long-acting Oxycontin 4/11  - oxycodone 15mg PO q6h PRN moderate pain  - Flexeril 5mg PO TID PRN for muscle spasm  - staples d/teodoro in rehab    #Abdominal discomfort and bloating  - Abd xray 4/15--as above  - reconsulted GI 4/17 awaiting recs    #Mild Rhabdomyolysis (resolved)  - associated elevated transaminases (resolved)  - suspect from anasarca/edema   - improving with adequate diuresis of edema  - associated elevated transaminase; now resolved (4/17- AST 30/ALT 38)    #Leukocytosis (resolved)  - likely rx to DVT + Discitis  - no other signs of sepsis, afebrile   - trend CBC and monitor fever curve (4/17- WBC 8.22)    #Hypothyroidism  - Synthroid 150mcg PO daily    #CAD  #HTN  #HLD  - metoprolol succinate 25mg PO daily   - rosuvastatin 5mg PO qhs    #Anemia/MARLENA  - trend H/H (4/17- 10.5/33.1)  - transfuse if Hgb <7 PRN     #Sleep:  - melatonin 3mg PO qhs PRN for sleep    #GI/Bowel:  - senna 2 tablets PO qhs  - Miralax 17g PO daily  - daily mini-enema at 6AM  - lactulose 20g PO TID PRN for constipation  - Maalox 30mL PO q4h PRN for dyspepsia  - GI ppx: pantoprazole 40mg PO daily   - added simethicone 80mg PO TID PRN for gas 4/14    #BPH  #Urinary retention likely augmented by degree of anasarca  - S/P Ortega; TOV  ->failed. Ortega re-inserted 3/6->fell out without knowing with associated hematuria->recurrent retention 3/7->Ortega re-inserted (3/7) --> successful TOV 3/25  - Flomax 0.8mg PO qhs  - started bethanechol 10mg PO BID 4/6  - Monitor U/O    #Skin/Pressure Injury:   Skin assessment on admission:   - Generalized dry flaky skin  - 19cm posterior cervical incision with 32 staples and 2 steri strips  - Left buttock DTI pressure injury measuring 14 x 9 cm      * appearing as a dark purple discoloration of intact skin, with a central superficial open area measuring 6 x 2 cm      * at the periphery of the 14 x 9 cm area, the discoloration is somewhat lighter      * dark red non-blanchable erythema      * eschar resolved     **Santyl to slough of sacrum daily**  - Bilateral lower extremity edema  - Healed lumbar surgical incision scar  - Right heel blanchable redness  - Bilateral dry cracked heels and plantar surface  - Lac hydrin BID BLE  - Aquaphor BID to generalized dry skin   - Appreciate wound care consult     #Diet/Dysphagia:  - Dysphagia: SLP consult for swallow function evaluation and treatment  - Current Diet: Regular  - Aspiration Precautions  - Nutrition consult     #Patient Education  - Education provided on the following:      * Admitting diagnosis and functional implications      * Functional goals      * Bladder management      * Bowel management      * Skin care management      * Intensity of service and scheduling of rehab disciplines      * Plan of care and role of interdisciplinary team conference in discharge planning      * Reconciliation of medications from prior institution    #Precautions / PROPHYLAXIS:   - Falls, Spinal  - Ortho: Weight bearing status: FWB;  brace OOB  - DVT ppx: Eliquis 5mg PO BID, TEDs  - Pressure injury/Skin: Turn Q2hrs while in bed, OOB to Chair, PT/OT      ---------------  Outpatient Follow-up:    Yue Wiggins  [facility]  [address], [address]  [place] [address]  Phone: [phone]  Fax: [phone]  Established Patient  Follow Up Time: 1 week    Keith Cantu [facility]  Neurosurgery  [address], [address]  [place] [address]  Phone: [phone]  Fax: [phone]  Established Patient  Follow Up Time: 2 weeks    Luis A Brennan  [facility] Physician [facility]  CARDIOLOGY [address]  Scheduled Appointment: 04/23/2025    --------------  Wheelchair recommendation    Patient seen today for a face to face visit for an ultra-lightweight manual wheelchair in the home. Patient cannot independently walk in his home using any ambulation device.  He requires a manual wheelchair so they can perform their mobility related activities of daily living (MRADL), like getting to the bathroom for hygiene, the kitchen for eating and the bedroom for resting. Patient requires an ultra lightweight manual wheelchair because he cannot walk and has weakness and endurance issues that require him to need an ultra lightweight manual wheelchair with camber and an axle plate that brings the wheels forward to allow him to reach them effectively and without pain.      --------------  AFO recommendation    Patient presents with bilateral lower extremity weakness and foot drop due to nerve compression. Patient has instability as the patient’s feet scuff the ground during ambulation and is at risk for falls. Regarding the right side, in addition to foot drop and weakness, patient presents with inversion and genu recurvatum. The knee hyperextends. Patient requires a custom articulating AFO to address the foot drop, inversion, and genu recurvatum. Regarding the left side, in addition to foot drop and weakness, patient presents with genu recurvatum. Patient requires a custom semi solid AFO to allow for safe ambulation.    --------------  Electric Hospital Bed     Mr. Rishabh Shaver will need an electric hospital bed for elevation and transferring purposes. Due to his diagnosis of ah thoracic epidural abscess, he underwent laminectomy and fusion surgery and remains with paraplegia with additional need to have his head elevated 30 degrees to help assist in proper breathing and frequent body positioning to reduce the risk of skin breakdown in ways not feasible with an ordinary bed.    --------------

## 2025-04-28 NOTE — PROGRESS NOTE ADULT - SUBJECTIVE AND OBJECTIVE BOX
HPI:  Mr. Rishabh Shaver is a 74-year-old male patient with past medical history of benign prostatic hyperplasia, diverticulosis, Graves' disease, hemorrhoids, hyperlipidemia, hypothyroidism, osteoarthritis, spinal stenosis, spinal cord injury (2004), and multiple spinal surgeries (three cervical and one lumbar), who presented to [facility] on 3/4/25 from [facility] due to lower extremity swelling and abnormal BUN/creatinine levels for further evaluation and management. The patient was recently admitted to [facility] from 02/18/25 through 02/25/25 for sepsis secondary to epidural abscess. He had a peripherally inserted central catheter line in the right arm and was on nightly Ceftriaxone treatment until 04/16/25. At the ED, he was found to have GOVIND, a left soleal vein, and a left gastrocnemius DVT. He was started on heparin gtt. Hospital course complicated by urinary retention requiring Ortega catheter insertion, rhabdomyolysis, elevated LFTs, anasarca, leukocytosis due to thoracic spine discitis OM and early paraspinal abscess, as well as new onset RUE paresthesias and weakness. He is s/p IVC filter placement on 3/17 and was recommended surgical management. He is s/p posterior thoracic laminectomy and fusion on 3/18/25 with Dr. Keith Cantu. Surgical culture with rare staph epi. Recommendations by ID to continue IV Rocephin and Daptomycin through 5/2/25. Patient was evaluated by PM&R and therapy for functional deficits, gait/ADL impairments and acute rehabilitation was recommended. Patient was cleared for discharge to [facility] on 3/25/25.     TDD 4/30/25 home  ___________________________________________________________________________    SUBJECTIVE/ROS  Patient was seen and evaluated at bedside today.  Reported no overnight events and is in no acute distress.  Patient is eager to continue participation on the recommended rehabilitation program.  Denies any CP, SOB, KESSLER, palpitations, fever, chills, body aches, cough, congestion, or any other symptoms at this time.   ___________________________________________________________________________    Vital Signs Last 24 Hrs  T(C): 36.8 (28 Apr 2025 08:42), Max: 36.8 (28 Apr 2025 08:42)  T(F): 98.3 (28 Apr 2025 08:42), Max: 98.3 (28 Apr 2025 08:42)  HR: 80 (28 Apr 2025 08:42) (68 - 80)  BP: 100/65 (28 Apr 2025 08:42) (100/65 - 112/68)  RR: 16 (28 Apr 2025 08:42) (16 - 16)  SpO2: 94% (28 Apr 2025 08:42) (94% - 97%)    Parameters below as of 28 Apr 2025 08:42  Patient On (Oxygen Delivery Method): room air    ___________________________________________________________________________    LAB                        11.8   7.26  )-----------( 203      ( 28 Apr 2025 06:54 )             36.2     04-28    136  |  100  |  32[H]  ----------------------------<  102[H]  3.9   |  30  |  1.06    Ca    9.2      28 Apr 2025 06:54    TPro  6.3  /  Alb  3.0[L]  /  TBili  0.5  /  DBili  x   /  AST  18  /  ALT  32  /  AlkPhos  84  04-28    LIVER FUNCTIONS - ( 28 Apr 2025 06:54 )  Alb: 3.0 g/dL / Pro: 6.3 g/dL / ALK PHOS: 84 U/L / ALT: 32 U/L / AST: 18 U/L / GGT: x           ___________________________________________________________________________    MEDICATIONS  (STANDING):  ammonium lactate 12% Lotion 1 Application(s) Topical two times a day  apixaban 5 milliGRAM(s) Oral every 12 hours  AQUAPHOR (petrolatum Ointment) 1 Application(s) Topical three times a day  bethanechol 10 milliGRAM(s) Oral two times a day  buMETAnide 1 milliGRAM(s) Oral two times a day  cefTRIAXone   IVPB 2000 milliGRAM(s) IV Intermittent every 24 hours  chlorhexidine 4% Liquid 1 Application(s) Topical <User Schedule>  collagenase Ointment 1 Application(s) Topical daily  DAPTOmycin IVPB 650 milliGRAM(s) IV Intermittent every 24 hours  docusate sodium Enema 283 milliGRAM(s) Rectal <User Schedule>  gabapentin   Oral   gabapentin 100 milliGRAM(s) Oral three times a day  levothyroxine 150 MICROGram(s) Oral daily  magnesium hydroxide Suspension 30 milliLiter(s) Oral at bedtime  metoprolol succinate ER 25 milliGRAM(s) Oral daily  pantoprazole    Tablet 40 milliGRAM(s) Oral before breakfast  polyethylene glycol 3350 17 Gram(s) Oral two times a day  rosuvastatin 5 milliGRAM(s) Oral at bedtime  senna 2 Tablet(s) Oral at bedtime  tamsulosin 0.8 milliGRAM(s) Oral at bedtime    MEDICATIONS  (PRN):  aluminum hydroxide/magnesium hydroxide/simethicone Suspension 30 milliLiter(s) Oral every 4 hours PRN Dyspepsia  cyclobenzaprine 5 milliGRAM(s) Oral three times a day PRN Muscle Spasm  melatonin 3 milliGRAM(s) Oral at bedtime PRN Insomnia  oxyCODONE    IR 15 milliGRAM(s) Oral every 6 hours PRN Moderate Pain (4 - 6)  simethicone 80 milliGRAM(s) Chew three times a day PRN Gas  sodium chloride 0.9% lock flush 10 milliLiter(s) IV Push every 1 hour PRN Pre/post blood products, medications, blood draw, and to maintain line patency    ___________________________________________________________________________    PHYSICAL EXAM:    Gen - NAD, Comfortable  HEENT -  EOMI, MMM, PERRLA, Normal Conjunctivae  Neck - Supple, + limited ROM, +posterior cervical incision  Pulm - CTAB, No wheeze, No rhonchi, No crackles  Cardiovascular - RRR, S1S2, No murmurs  Chest - good chest expansion, good respiratory effort  Abdomen - Firm, NT, abd distention, +BS  Extremities - No C/C, no calf tenderness, +BLE edema, +B/L pedal edema, +RUE PICC line   /GI- + Ortega draining clear urine   Neuro-     Cognitive - awake, alert, oriented to person, place, date, year, and situation.  Able  to follow command     Motor -          RUE: Deltoid 5/5, bicep 5/5, tricep 5/5, wrist ext 5/5,  4-/5, unable to fully flex 2nd digit          LUE: Deltoid 5/5, bicep 5/5, tricep 5/5, wrist ext 5/5,  4-/5, unable to fully flex 2nd digit          RLE: IP 3/5, Quad 3/5, EHL 5/5, Dorsi/plantarflexion 5/5 (some limitations 2/2 LE edema)          LLE: IP 2/5, Quad 2/5, EHL 5/5, Dorsi/plantarflexion 5/5 (proximal leg significantly limited by pain and      Sensory - Impaired  to LT LLE from knee to ankle     Tone - Normal  Psychiatric - Mood stable, Affect WNL    ___________________________________________________________________________

## 2025-04-29 PROCEDURE — 99232 SBSQ HOSP IP/OBS MODERATE 35: CPT

## 2025-04-29 PROCEDURE — 99233 SBSQ HOSP IP/OBS HIGH 50: CPT

## 2025-04-29 RX ORDER — BENZONATATE 100 MG
100 CAPSULE ORAL THREE TIMES A DAY
Refills: 0 | Status: DISCONTINUED | OUTPATIENT
Start: 2025-04-29 | End: 2025-04-30

## 2025-04-29 RX ADMIN — Medication 10 MILLIGRAM(S): at 18:35

## 2025-04-29 RX ADMIN — GABAPENTIN 100 MILLIGRAM(S): 400 CAPSULE ORAL at 22:02

## 2025-04-29 RX ADMIN — CEFTRIAXONE 100 MILLIGRAM(S): 500 INJECTION, POWDER, FOR SOLUTION INTRAMUSCULAR; INTRAVENOUS at 21:42

## 2025-04-29 RX ADMIN — Medication 10 MILLIGRAM(S): at 05:37

## 2025-04-29 RX ADMIN — METOPROLOL SUCCINATE 25 MILLIGRAM(S): 50 TABLET, EXTENDED RELEASE ORAL at 05:36

## 2025-04-29 RX ADMIN — Medication 1 APPLICATION(S): at 18:35

## 2025-04-29 RX ADMIN — Medication 2 TABLET(S): at 22:02

## 2025-04-29 RX ADMIN — GABAPENTIN 100 MILLIGRAM(S): 400 CAPSULE ORAL at 05:37

## 2025-04-29 RX ADMIN — GABAPENTIN 100 MILLIGRAM(S): 400 CAPSULE ORAL at 14:59

## 2025-04-29 RX ADMIN — TAMSULOSIN HYDROCHLORIDE 0.8 MILLIGRAM(S): 0.4 CAPSULE ORAL at 22:02

## 2025-04-29 RX ADMIN — Medication 283 MILLIGRAM(S): at 08:25

## 2025-04-29 RX ADMIN — ROSUVASTATIN CALCIUM 5 MILLIGRAM(S): 20 TABLET, FILM COATED ORAL at 22:02

## 2025-04-29 RX ADMIN — Medication 1 LOZENGE: at 09:32

## 2025-04-29 RX ADMIN — BUMETANIDE 0.5 MILLIGRAM(S): 1 TABLET ORAL at 05:37

## 2025-04-29 RX ADMIN — CYCLOBENZAPRINE HYDROCHLORIDE 5 MILLIGRAM(S): 15 CAPSULE, EXTENDED RELEASE ORAL at 23:01

## 2025-04-29 RX ADMIN — Medication 1 APPLICATION(S): at 05:37

## 2025-04-29 RX ADMIN — Medication 150 MICROGRAM(S): at 05:37

## 2025-04-29 RX ADMIN — DAPTOMYCIN 126 MILLIGRAM(S): 500 INJECTION, POWDER, LYOPHILIZED, FOR SOLUTION INTRAVENOUS at 12:21

## 2025-04-29 RX ADMIN — APIXABAN 5 MILLIGRAM(S): 2.5 TABLET, FILM COATED ORAL at 23:01

## 2025-04-29 RX ADMIN — Medication 1 APPLICATION(S): at 05:42

## 2025-04-29 RX ADMIN — APIXABAN 5 MILLIGRAM(S): 2.5 TABLET, FILM COATED ORAL at 11:31

## 2025-04-29 RX ADMIN — Medication 40 MILLIGRAM(S): at 05:37

## 2025-04-29 RX ADMIN — Medication 1 APPLICATION(S): at 09:04

## 2025-04-29 RX ADMIN — Medication 100 MILLIGRAM(S): at 22:02

## 2025-04-29 RX ADMIN — WHITE PETROLATUM 1 APPLICATION(S): 1 OINTMENT TOPICAL at 15:02

## 2025-04-29 NOTE — PROGRESS NOTE ADULT - SUBJECTIVE AND OBJECTIVE BOX
Patient is a 75y old  Male who presents with a chief complaint of Thoracic epidural abscess s/p laminectomy and fusion surgery    BM yesterday      Patient seen and examined at bedside.    ALLERGIES:  No Known Allergies    MEDICATIONS  (STANDING):  ammonium lactate 12% Lotion 1 Application(s) Topical two times a day  apixaban 5 milliGRAM(s) Oral every 12 hours  AQUAPHOR (petrolatum Ointment) 1 Application(s) Topical three times a day  bethanechol 10 milliGRAM(s) Oral two times a day  buMETAnide 0.5 milliGRAM(s) Oral daily  cefTRIAXone   IVPB 2000 milliGRAM(s) IV Intermittent every 24 hours  chlorhexidine 4% Liquid 1 Application(s) Topical <User Schedule>  collagenase Ointment 1 Application(s) Topical daily  DAPTOmycin IVPB 650 milliGRAM(s) IV Intermittent every 24 hours  docusate sodium Enema 283 milliGRAM(s) Rectal <User Schedule>  gabapentin   Oral   gabapentin 100 milliGRAM(s) Oral three times a day  levothyroxine 150 MICROGram(s) Oral daily  magnesium hydroxide Suspension 30 milliLiter(s) Oral at bedtime  metoprolol succinate ER 25 milliGRAM(s) Oral daily  pantoprazole    Tablet 40 milliGRAM(s) Oral before breakfast  polyethylene glycol 3350 17 Gram(s) Oral two times a day  rosuvastatin 5 milliGRAM(s) Oral at bedtime  senna 2 Tablet(s) Oral at bedtime  tamsulosin 0.8 milliGRAM(s) Oral at bedtime    MEDICATIONS  (PRN):  aluminum hydroxide/magnesium hydroxide/simethicone Suspension 30 milliLiter(s) Oral every 4 hours PRN Dyspepsia  benzocaine/menthol Lozenge 1 Lozenge Oral three times a day PRN Sore Throat  cyclobenzaprine 5 milliGRAM(s) Oral three times a day PRN Muscle Spasm  melatonin 3 milliGRAM(s) Oral at bedtime PRN Insomnia  oxyCODONE    IR 5 milliGRAM(s) Oral every 6 hours PRN Severe Pain (7 - 10)  simethicone 80 milliGRAM(s) Chew three times a day PRN Gas  sodium chloride 0.9% lock flush 10 milliLiter(s) IV Push every 1 hour PRN Pre/post blood products, medications, blood draw, and to maintain line patency    Vital Signs Last 24 Hrs  T(F): 98.1 (29 Apr 2025 09:12), Max: 98.1 (29 Apr 2025 09:12)  HR: 77 (29 Apr 2025 09:12) (77 - 85)  BP: 104/63 (29 Apr 2025 09:12) (104/63 - 118/69)  RR: 16 (29 Apr 2025 09:12) (16 - 16)  SpO2: 94% (29 Apr 2025 09:12) (94% - 96%)  I&O's Summary      PHYSICAL EXAM:  General: NAD, A/O   ENT: MMM, no scleral icterus  Neck: Supple, No JVD, no thyroidomegaly  Lungs: Clear to auscultation bilaterally, no wheezes, no rales, no rhonchi, good inspiratory effort  Cardio: RRR, S1/S2, No murmurs  Abdomen: Soft, Nontender, Nondistended; Bowel sounds present  Extremities: No calf tenderness, No pitting edema, no skin changes    LABS:                        11.8   7.26  )-----------( 203      ( 28 Apr 2025 06:54 )             36.2       04-28    136  |  100  |  32  ----------------------------<  102  3.9   |  30  |  1.06    Ca    9.2      28 Apr 2025 06:54    TPro  6.3  /  Alb  3.0  /  TBili  0.5  /  DBili  x   /  AST  18  /  ALT  32  /  AlkPhos  84  04-28     Urinalysis Basic - ( 28 Apr 2025 06:54 )    Color: x / Appearance: x / SG: x / pH: x  Gluc: 102 mg/dL / Ketone: x  / Bili: x / Urobili: x   Blood: x / Protein: x / Nitrite: x   Leuk Esterase: x / RBC: x / WBC x   Sq Epi: x / Non Sq Epi: x / Bacteria: x    COVID-19 PCR: [place] (03-25-25 @ 21:35)

## 2025-04-29 NOTE — PROGRESS NOTE ADULT - ASSESSMENT
74M with PMH of BPH, diverticulosis, Graves' disease, hemorrhoids, HLD, hypothyroidism, osteoarthritis, spinal stenosis, spinal cord injury (2004), multiple spinal surgeries, CAD, and MARLENA. Presented 3/4/25 from SNF with LE swelling and GOVIND. Found to have left soleal and gastroc DVTs, spinal epidural abscess ? s/p thoracic laminectomy/fusion on 3/18. PICC in place for IV Rocephin + Daptomycin through 5/2. Admitted to [place] [facility] on 3/25 for debility.    #Constipation - improved  - Monitor BM    #Spinal Epidural Abscess – s/p Laminectomy/Fusion (3/18)    IV Rocephin 2g QHS + IV Daptomycin 650mg daily via PICC through 5/2    Aspen  brace in use    Surgical cultures: rare staph epi (possible contaminant)    CPK 4/20 WNL    #GOVIND – Resolved   Anasarca / Hypoalbuminemia / LE Edema - improved    LE edema resolved. Bumex reduced to 0.5mg daily     Monitor renal function and I/Os — currently stable    Serum TRUDY: weak IgG lambda band (needs OP f/u)    echo 3/15: EF 65%, mild TR    #Transaminitis – Resolved    Limit Tylenol, avoid hepatotoxins    Encourage PO intake    Monitor LFTs with routine labs    #CAD / HTN / HLD    Metoprolol 25 mg daily    Crestor 5 mg QHS    #Anemia / MARLENA    s/p 2 units PRBCs intra-op    Monitor CBC; H/H stable currently     Transfuse PRN to maintain Hgb >7    #Hypothyroidism    Synthroid 150 mcg daily      #BPH / Chronic Urinary Retention    On Flomax 0.8mg daily    #Acute DVT (L Soleal & Gastroc Veins)    s/p IVC filter 3/17 (couldn’t tolerate VQ scan)    Eliquis 5 mg BID resumed per neurosurgery guidance    #DVT Prophylaxis: Eliquis BID

## 2025-04-29 NOTE — PROGRESS NOTE ADULT - SUBJECTIVE AND OBJECTIVE BOX
HPI:  Mr. Rishabh Shaver is a 74-year-old male patient with past medical history of benign prostatic hyperplasia, diverticulosis, Graves' disease, hemorrhoids, hyperlipidemia, hypothyroidism, osteoarthritis, spinal stenosis, spinal cord injury (2004), and multiple spinal surgeries (three cervical and one lumbar), who presented to [facility] on 3/4/25 from [facility] due to lower extremity swelling and abnormal BUN/creatinine levels for further evaluation and management. The patient was recently admitted to [facility] from 02/18/25 through 02/25/25 for sepsis secondary to epidural abscess. He had a peripherally inserted central catheter line in the right arm and was on nightly Ceftriaxone treatment until 04/16/25. At the ED, he was found to have GOVIND, a left soleal vein, and a left gastrocnemius DVT. He was started on heparin gtt. Hospital course complicated by urinary retention requiring Ortega catheter insertion, rhabdomyolysis, elevated LFTs, anasarca, leukocytosis due to thoracic spine discitis OM and early paraspinal abscess, as well as new onset RUE paresthesias and weakness. He is s/p IVC filter placement on 3/17 and was recommended surgical management. He is s/p posterior thoracic laminectomy and fusion on 3/18/25 with Dr. Keith Cantu. Surgical culture with rare staph epi. Recommendations by ID to continue IV Rocephin and Daptomycin through 5/2/25. Patient was evaluated by PM&R and therapy for functional deficits, gait/ADL impairments and acute rehabilitation was recommended. Patient was cleared for discharge to [facility] IRF on 3/25/25. (25 Mar 2025 17:09)    TDD 4/30/25 to home  ___________________________________________________________________________    SUBJECTIVE/ROS  Patient was seen and evaluated in PT today.  Reported no overnight events and is in no acute distress.  He is pleased with his progress in therapy and very happy with his physical therapist. He discusses how he had just done stairs well.  No pain or bowel/bladder concerns. No other symptoms at this time.  ___________________________________________________________________________    LABS             11.8   7.26  )-----------( 203      ( 28 Apr 2025 06:54 )             36.2     136  |  100  |  32[H]  ----------------------------<  102[H]  3.9   |  30  |  1.06    Ca    9.2      28 Apr 2025 06:54  TPro  6.3  /  Alb  3.0[L]  /  TBili  0.5  /  DBili  x   /  AST  18  /  ALT  32  /  AlkPhos  84  04-28  ___________________________________________________________________________    MEDICATIONS  (STANDING):  ammonium lactate 12% Lotion 1 Application(s) Topical two times a day  apixaban 5 milliGRAM(s) Oral every 12 hours  AQUAPHOR (petrolatum Ointment) 1 Application(s) Topical three times a day  bethanechol 10 milliGRAM(s) Oral two times a day  buMETAnide 0.5 milliGRAM(s) Oral daily  cefTRIAXone   IVPB 2000 milliGRAM(s) IV Intermittent every 24 hours  chlorhexidine 4% Liquid 1 Application(s) Topical <User Schedule>  collagenase Ointment 1 Application(s) Topical daily  DAPTOmycin IVPB 650 milliGRAM(s) IV Intermittent every 24 hours  docusate sodium Enema 283 milliGRAM(s) Rectal <User Schedule>  gabapentin   Oral   gabapentin 100 milliGRAM(s) Oral three times a day  levothyroxine 150 MICROGram(s) Oral daily  magnesium hydroxide Suspension 30 milliLiter(s) Oral at bedtime  metoprolol succinate ER 25 milliGRAM(s) Oral daily  pantoprazole    Tablet 40 milliGRAM(s) Oral before breakfast  polyethylene glycol 3350 17 Gram(s) Oral two times a day  rosuvastatin 5 milliGRAM(s) Oral at bedtime  senna 2 Tablet(s) Oral at bedtime  tamsulosin 0.8 milliGRAM(s) Oral at bedtime    MEDICATIONS  (PRN):  aluminum hydroxide/magnesium hydroxide/simethicone Suspension 30 milliLiter(s) Oral every 4 hours PRN Dyspepsia  benzocaine/menthol Lozenge 1 Lozenge Oral three times a day PRN Sore Throat  cyclobenzaprine 5 milliGRAM(s) Oral three times a day PRN Muscle Spasm  melatonin 3 milliGRAM(s) Oral at bedtime PRN Insomnia  oxyCODONE    IR 5 milliGRAM(s) Oral every 6 hours PRN Severe Pain (7 - 10)  simethicone 80 milliGRAM(s) Chew three times a day PRN Gas  sodium chloride 0.9% lock flush 10 milliLiter(s) IV Push every 1 hour PRN Pre/post blood products, medications, blood draw, and to maintain line patency    ___________________________________________________________________________    PHYSICAL EXAM:    VITALS  T(C): 36.7 (04-29-25 @ 09:12), Max: 36.7 (04-29-25 @ 09:12)  HR: 77 (04-29-25 @ 09:12) (77 - 85)  BP: 104/63 (04-29-25 @ 09:12) (104/63 - 118/69)  RR: 16 (04-29-25 @ 09:12) (16 - 16)  SpO2: 94% (04-29-25 @ 09:12) (94% - 96%)    Constitutional - NAD, Comfortable  Chest - good chest expansion, good respiratory effort   Neurologic Exam:                           Orientation: Awake, A&O to self, place, date, year, situation     Speech - Fluent, Comprehensible, No dysarthria, No aphasia      Motor -  seen walking in PT with RW and CG only, still hyperextending right knee, now with improved heel strike on right foot as compared to last week, improved cheryle  Psychiatric - Mood stable, Affect WNL  ___________________________________________________________________________ HPI:  Mr. Rishabh Shaver is a 74-year-old male patient with past medical history of benign prostatic hyperplasia, diverticulosis, Graves' disease, hemorrhoids, hyperlipidemia, hypothyroidism, osteoarthritis, spinal stenosis, spinal cord injury (2004), and multiple spinal surgeries (three cervical and one lumbar), who presented to [facility] on 3/4/25 from [facility] due to lower extremity swelling and abnormal BUN/creatinine levels for further evaluation and management. The patient was recently admitted to [facility] from 02/18/25 through 02/25/25 for sepsis secondary to epidural abscess. He had a peripherally inserted central catheter line in the right arm and was on nightly Ceftriaxone treatment until 04/16/25. At the ED, he was found to have GOVIND, a left soleal vein, and a left gastrocnemius DVT. He was started on heparin gtt. Hospital course complicated by urinary retention requiring Ortega catheter insertion, rhabdomyolysis, elevated LFTs, anasarca, leukocytosis due to thoracic spine discitis OM and early paraspinal abscess, as well as new onset RUE paresthesias and weakness. He is s/p IVC filter placement on 3/17 and was recommended surgical management. He is s/p posterior thoracic laminectomy and fusion on 3/18/25 with Dr. Keith Cantu. Surgical culture with rare staph epi. Recommendations by ID to continue IV Rocephin and Daptomycin through 5/2/25. Patient was evaluated by PM&R and therapy for functional deficits, gait/ADL impairments and acute rehabilitation was recommended. Patient was cleared for discharge to [facility] IRF on 3/25/25. (25 Mar 2025 17:09)    TDD 4/30/25 to home  ___________________________________________________________________________    SUBJECTIVE/ROS  Patient was seen and evaluated in PT today.  Reported no overnight events and is in no acute distress.  He is pleased with his progress in therapy and very happy with his physical therapist.   He discusses how he had just done stairs well.  No pain or bowel/bladder concerns. No other symptoms at this time.  ___________________________________________________________________________    LABS             11.8   7.26  )-----------( 203      ( 28 Apr 2025 06:54 )             36.2     136  |  100  |  32[H]  ----------------------------<  102[H]  3.9   |  30  |  1.06    Ca    9.2      28 Apr 2025 06:54  TPro  6.3  /  Alb  3.0[L]  /  TBili  0.5  /  DBili  x   /  AST  18  /  ALT  32  /  AlkPhos  84  04-28  ___________________________________________________________________________    MEDICATIONS  (STANDING):  ammonium lactate 12% Lotion 1 Application(s) Topical two times a day  apixaban 5 milliGRAM(s) Oral every 12 hours  AQUAPHOR (petrolatum Ointment) 1 Application(s) Topical three times a day  bethanechol 10 milliGRAM(s) Oral two times a day  buMETAnide 0.5 milliGRAM(s) Oral daily  cefTRIAXone   IVPB 2000 milliGRAM(s) IV Intermittent every 24 hours  chlorhexidine 4% Liquid 1 Application(s) Topical <User Schedule>  collagenase Ointment 1 Application(s) Topical daily  DAPTOmycin IVPB 650 milliGRAM(s) IV Intermittent every 24 hours  docusate sodium Enema 283 milliGRAM(s) Rectal <User Schedule>  gabapentin   Oral   gabapentin 100 milliGRAM(s) Oral three times a day  levothyroxine 150 MICROGram(s) Oral daily  magnesium hydroxide Suspension 30 milliLiter(s) Oral at bedtime  metoprolol succinate ER 25 milliGRAM(s) Oral daily  pantoprazole    Tablet 40 milliGRAM(s) Oral before breakfast  polyethylene glycol 3350 17 Gram(s) Oral two times a day  rosuvastatin 5 milliGRAM(s) Oral at bedtime  senna 2 Tablet(s) Oral at bedtime  tamsulosin 0.8 milliGRAM(s) Oral at bedtime    MEDICATIONS  (PRN):  aluminum hydroxide/magnesium hydroxide/simethicone Suspension 30 milliLiter(s) Oral every 4 hours PRN Dyspepsia  benzocaine/menthol Lozenge 1 Lozenge Oral three times a day PRN Sore Throat  cyclobenzaprine 5 milliGRAM(s) Oral three times a day PRN Muscle Spasm  melatonin 3 milliGRAM(s) Oral at bedtime PRN Insomnia  oxyCODONE    IR 5 milliGRAM(s) Oral every 6 hours PRN Severe Pain (7 - 10)  simethicone 80 milliGRAM(s) Chew three times a day PRN Gas  sodium chloride 0.9% lock flush 10 milliLiter(s) IV Push every 1 hour PRN Pre/post blood products, medications, blood draw, and to maintain line patency    ___________________________________________________________________________    PHYSICAL EXAM:    VITALS  T(C): 36.7 (04-29-25 @ 09:12), Max: 36.7 (04-29-25 @ 09:12)  HR: 77 (04-29-25 @ 09:12) (77 - 85)  BP: 104/63 (04-29-25 @ 09:12) (104/63 - 118/69)  RR: 16 (04-29-25 @ 09:12) (16 - 16)  SpO2: 94% (04-29-25 @ 09:12) (94% - 96%)    Constitutional - NAD, Comfortable  Chest - good chest expansion, good respiratory effort   Neurologic Exam:                           Orientation: Awake, A&O to self, place, date, year, situation     Speech - Fluent, Comprehensible, No dysarthria, No aphasia      Motor -  seen walking in PT with RW and CG only, still hyperextending right knee, now with improved heel strike on right foot as compared to last week, improved cheryle  Psychiatric - Mood stable, Affect WNL  ___________________________________________________________________________

## 2025-04-29 NOTE — PROGRESS NOTE ADULT - ASSESSMENT
Assessment/Plan:  Mr. Rishabh Shaver is a 74-year-old man with past medical history of benign prostatic hyperplasia, diverticulosis, Graves' disease, hemorrhoids, hyperlipidemia, hypothyroidism, osteoarthritis, spinal stenosis, spinal cord injury (2004), and multiple spinal surgeries (three cervical and one lumbar), who is admitted for Acute Inpatient Rehabilitation with a multidisciplinary rehab program at [facility] with functional impairments in ADLs and mobility secondary to a thoracic epidural abscess s/p posterior thoracic laminectomy and fusion on 3/18/25 with Dr. Keith Cantu.       #Thoracic epidural abscess s/p laminectomy and fusion surgery      * C8 AIS D Incomplete paraplegia      *  brace when OOB      * Flexeril 5mg PO TID PRN for neck soreness      * Staples removed on POD #14 (4/1/25)      * Sacral wound assessment by wound/skin team      * urinary retention, now voiding,  c/w Flomax 0.8mg PO daily      * Right Venodyne only due to left calf DVT (s/p pre-op IVC filter)   - Activity Limitations: Decreased social, vocational and leisure activities, decreased self care and ADLs, decreased mobility, decreased ability to manage household and finances  - Comprehensive Multidisciplinary Rehab Program:      * 3 hours a day, 5 days a week      * PT 2hr/day: Focused on improving strength, endurance, coordination, balance, functional mobility, and transfers      * OT 1hr/day: Focused on improving strength, fine motor skills, coordination, posture and ADLs  - orthotist Arya saw patient 4/17 for AFO for right foot drop and recommends b/l AFOs hinged on R and semi solid on L - order placed    -----------------------------------------------------------------------------------    Concurrent Medical Problems    #GOVIND on CKD (resolved)  #LE edema  #Hypoalbuminemia  #Proteinuria  - IVF as needed with fluid balance  - significant debilitating anasarca   - Trial of albumin Lasix started 3/8->improvement  - s/p IV Lasix/IV albumin BID (3/9)  - changed to Bumex/Albumin (3/11)-> good effect  - switched to PO Bumex 1mg PO BID 3/24  - decreased Bumex to 0.5mg PO daily 4/27  - Trend Albumin (4/28 - 3.0)  - Trend Protein total (4/28 - 6.3); Total protein, Random Urine (3/18- 24)  - Trend CMP (4/28 - Cr 1.06/ BUN 32)    #L gastrocnemius and L soleal DVT  - appears provoked from decrease ambulation from recent spinal abscess  - could not tolerate VQ scan for r/o PE 2/2 pain  - Eliquis 5mg PO q12h - will be new med on discharge (of note patient completed Eliquis load)   - therapeutic Lovenox (3/12) in the setting of possible spine intervention  - last dose of AC night of 3/16. Restarted Eliquis 5mg BID 4/5/25  - S/P IVC filter placement 3/17  - repeat US 4/8 with stable L soleal DVT and no propagation     #Spinal epidural abscess POA and already being treated with IV antibiotics with suspected worsening of disease  - s/p thoracic laminectomy and fusion 3/18. [place]  brace   - surgical culture with staph epi possible contaminant but given clinical presentation on admission, IV Dapto added  - IV Rocephin 2g daily via PICC line thru 5/2/25  - IV Daptomycin 650mg daily via PICC line thru 5/2/25  - gabapentin 100mg PO TID  - d/c long-acting Oxycontin 4/11  - oxycodone 5mg PO q6h PRN moderate pain  - Flexeril 5mg PO TID PRN for muscle spasm  - staples d/teodoro in rehab    #Abdominal discomfort and bloating (improved)  - Abd xray 4/15--as above  - reconsulted GI 4/17 - recs appreciated    #Mild Rhabdomyolysis (resolved)  - associated elevated transaminases (resolved)  - suspect from anasarca/edema   - improving with adequate diuresis of edema  - associated elevated transaminase; now resolved (4/28- AST 18/ALT 32)    #Leukocytosis (resolved)  - likely rx to DVT + Discitis  - no other signs of sepsis, afebrile   - trend CBC and monitor fever curve (4/28- WBC 7.26)    #Hypothyroidism  - Synthroid 150mcg PO daily    #CAD  #HTN  #HLD  - metoprolol succinate 25mg PO daily   - rosuvastatin 5mg PO qhs    #Anemia/MARLENA  - trend H/H (4/28- 11.8/36.3)  - transfuse if Hgb <7 PRN     #Sleep:  - melatonin 3mg PO qhs PRN for sleep    #GI/Bowel:  - senna 2 tablets PO qhs  - Miralax 17g PO BID  - daily mini-enema at 6AM  - magnesium hydroxide 30mL PO qhs  - Maalox 30mL PO q4h PRN for dyspepsia  - GI ppx: pantoprazole 40mg PO daily   - added simethicone 80mg PO TID PRN for gas 4/14    #BPH  #Urinary retention likely augmented by degree of anasarca  - s/p Ortega; TOV  ->failed. Ortega re-inserted 3/6->fell out without knowing with associated hematuria->recurrent retention 3/7->Ortega re-inserted (3/7) --> successful TOV 3/25  - Flomax 0.8mg PO qhs  - started bethanechol 10mg PO BID 4/6  - monitor U/O    #Skin/Pressure Injury:   Skin assessment on admission:   - generalized dry flaky skin  - 19cm posterior cervical incision with 32 staples and 2 steri strips  - Left buttock DTI pressure injury measuring 14 x 9 cm      * appearing as a dark purple discoloration of intact skin, with a central superficial open area measuring 6 x 2 cm      * at the periphery of the 14 x 9 cm area, the discoloration is somewhat lighter      * dark red non-blanchable erythema      * eschar resolved     **Santyl to slough of sacrum daily**  - Bilateral lower extremity edema  - Healed lumbar surgical incision scar  - Right heel blanchable redness  - Bilateral dry cracked heels and plantar surface  - Lac hydrin BID BLE  - Aquaphor BID to generalized dry skin   - appreciate wound care consult   - plastics consulted; recs appreciated 4/23    #Diet/Dysphagia:  - Dysphagia: SLP consult for swallow function evaluation and treatment  - Current Diet: Regular  - Nutrition consult     #Patient Education  - Education provided on the following:      * Admitting diagnosis and functional implications      * Functional goals      * Bladder management      * Bowel management      * Skin care management      * Intensity of service and scheduling of rehab disciplines      * Plan of care and role of interdisciplinary team conference in discharge planning      * Reconciliation of medications from prior institution    #Precautions / PROPHYLAXIS:   - Falls, Spinal  - Ortho: Weight bearing status: FWB;  brace OOB  - DVT ppx: Eliquis 5mg PO BID, TEDs  - Pressure injury/Skin: Turn Q2hrs while in bed, OOB to Chair, PT/OT      ---------------  Outpatient Follow-up:    Yue Wiggins  [facility]  [address], [address]  [place] [address]  Phone: [phone]  Fax: [phone]  Established Patient  Follow Up Time: 1 week    Keith Cantu [facility]  Neurosurgery  [address] [place] [address], [address]  [place] [address]  Phone: [phone]  Fax: [phone]  Established Patient  Follow Up Time: 2 weeks    Luis A Brennan  [facility] Physician [facility]  CARDIOLOGY [address]  Scheduled Appointment: 04/23/2025    --------------  Wheelchair recommendation    Patient seen today for a face to face visit for an ultra-lightweight manual wheelchair in the home. Patient cannot independently walk in his home using any ambulation device.  He requires a manual wheelchair so they can perform their mobility related activities of daily living (MRADL), like getting to the bathroom for hygiene, the kitchen for eating and the bedroom for resting. Patient requires an ultra lightweight manual wheelchair because he cannot walk and has weakness and endurance issues that require him to need an ultra lightweight manual wheelchair with camber and an axle plate that brings the wheels forward to allow him to reach them effectively and without pain.      --------------  AFO recommendation    Patient presents with bilateral lower extremity weakness and foot drop due to nerve compression. Patient has instability as the patient’s feet scuff the ground during ambulation and is at risk for falls. Regarding the right side, in addition to foot drop and weakness, patient presents with inversion and genu recurvatum. The knee hyperextends. Patient requires a custom articulating AFO to address the foot drop, inversion, and genu recurvatum. Regarding the left side, in addition to foot drop and weakness, patient presents with genu recurvatum. Patient requires a custom semi solid AFO to allow for safe ambulation.    --------------  Electric Hospital Bed     Mr. Rishabh Shaver will need an electric hospital bed for elevation and transferring purposes. Due to his diagnosis of ah thoracic epidural abscess, he underwent laminectomy and fusion surgery and remains with paraplegia with additional need to have his head elevated 30 degrees to help assist in proper breathing and frequent body positioning to reduce the risk of skin breakdown in ways not feasible with an ordinary bed.    --------------   Assessment/Plan:  Mr. Rishabh Shaver is a 74-year-old man with past medical history of benign prostatic hyperplasia, diverticulosis, Graves' disease, hemorrhoids, hyperlipidemia, hypothyroidism, osteoarthritis, spinal stenosis, spinal cord injury (2004), and multiple spinal surgeries (three cervical and one lumbar), who is admitted for Acute Inpatient Rehabilitation with a multidisciplinary rehab program at [facility] with functional impairments in ADLs and mobility secondary to a thoracic epidural abscess s/p posterior thoracic laminectomy and fusion on 3/18/25 with Dr. Keith Cantu.       #Thoracic epidural abscess s/p laminectomy and fusion surgery      * C8 AIS D Incomplete paraplegia      *  brace when OOB      * Flexeril 5mg PO TID PRN for neck soreness      * Staples removed on POD #14 (4/1/25)      * Sacral wound assessment by wound/skin team      * urinary retention, now voiding,  c/w Flomax 0.8mg PO daily      * Right Venodyne only due to left calf DVT (s/p pre-op IVC filter)   - Activity Limitations: Decreased social, vocational and leisure activities, decreased self care and ADLs, decreased mobility, decreased ability to manage household and finances  - Comprehensive Multidisciplinary Rehab Program:      * 3 hours a day, 5 days a week      * PT 2hr/day: Focused on improving strength, endurance, coordination, balance, functional mobility, and transfers      * OT 1hr/day: Focused on improving strength, fine motor skills, coordination, posture and ADLs  - orthotist Arya saw patient 4/17 for AFO for right foot drop and recommends b/l AFOs hinged on R and semi solid on L - order placed    -----------------------------------------------------------------------------------    Concurrent Medical Problems    #GOVIND on CKD (resolved)  #LE edema  #Hypoalbuminemia  #Proteinuria  - IVF as needed with fluid balance  - significant debilitating anasarca   - Trial of albumin Lasix started 3/8->improvement  - s/p IV Lasix/IV albumin BID (3/9)  - changed to Bumex/Albumin (3/11)-> good effect  - switched to PO Bumex 1mg PO BID 3/24  - decreased Bumex to 0.5mg PO daily 4/27  - Trend Albumin (4/28 - 3.0)  - Trend Protein total (4/28 - 6.3); Total protein, Random Urine (3/18- 24)  - Trend CMP (4/28 - Cr 1.06/ BUN 32)    #Left gastrocnemius and L soleal DVT  - appears provoked from decrease ambulation from recent spinal abscess  - could not tolerate VQ scan for r/o PE 2/2 pain  - Eliquis 5mg PO q12h - will be new med on discharge (of note patient completed Eliquis load)   - therapeutic Lovenox (3/12) in the setting of possible spine intervention  - last dose of AC night of 3/16. Restarted Eliquis 5mg BID 4/5/25  - S/P IVC filter placement 3/17  - repeat US 4/8 with stable L soleal DVT and no propagation     #Spinal epidural abscess POA and already being treated with IV antibiotics with suspected worsening of disease  - s/p thoracic laminectomy and fusion 3/18. [place]  brace   - surgical culture with staph epi possible contaminant but given clinical presentation on admission, IV Dapto added  - IV Rocephin 2g daily via PICC line thru 5/2/25  - IV Daptomycin 650mg daily via PICC line thru 5/2/25  - gabapentin 100mg PO TID  - d/c long-acting Oxycontin 4/11  - oxycodone 5mg PO q6h PRN moderate pain  - Flexeril 5mg PO TID PRN for muscle spasm  - staples d/teodoro in rehab    #Abdominal discomfort and bloating (improved)  - Abd xray 4/15--as above  - reconsulted GI 4/17 - recs appreciated    #Mild Rhabdomyolysis (resolved)  - associated elevated transaminases (resolved)  - suspect from anasarca/edema   - improving with adequate diuresis of edema  - associated elevated transaminase; now resolved (4/28- AST 18/ALT 32)    #Leukocytosis (resolved)  - likely rx to DVT + Discitis  - no other signs of sepsis, afebrile   - trend CBC and monitor fever curve (4/28- WBC 7.26)    #Hypothyroidism  - Synthroid 150mcg PO daily    #CAD  #HTN  #HLD  - metoprolol succinate 25mg PO daily   - rosuvastatin 5mg PO qhs    #Anemia/MARLENA  - trend H/H (4/28- 11.8/36.3)  - transfuse if Hgb <7 PRN     #Sleep:  - melatonin 3mg PO qhs PRN for sleep    #GI/Bowel:  - senna 2 tablets PO qhs  - Miralax 17g PO BID  - daily mini-enema at 6AM  - magnesium hydroxide 30mL PO qhs  - Maalox 30mL PO q4h PRN for dyspepsia  - GI ppx: pantoprazole 40mg PO daily   - added simethicone 80mg PO TID PRN for gas 4/14    #BPH  #Urinary retention likely augmented by degree of anasarca  - s/p Ortega; TOV  ->failed. Ortega re-inserted 3/6->fell out without knowing with associated hematuria->recurrent retention 3/7->Ortega re-inserted (3/7) --> successful TOV 3/25  - Flomax 0.8mg PO qhs  - started bethanechol 10mg PO BID 4/6  - monitor U/O    #Skin/Pressure Injury:   Skin assessment on admission:   - generalized dry flaky skin  - 19cm posterior cervical incision with 32 staples and 2 steri strips  - Left buttock DTI pressure injury measuring 14 x 9 cm      * appearing as a dark purple discoloration of intact skin, with a central superficial open area measuring 6 x 2 cm      * at the periphery of the 14 x 9 cm area, the discoloration is somewhat lighter      * dark red non-blanchable erythema      * eschar resolved     **Santyl to slough of sacrum daily**  - Bilateral lower extremity edema  - Healed lumbar surgical incision scar  - Right heel blanchable redness  - Bilateral dry cracked heels and plantar surface  - Lac hydrin BID BLE  - Aquaphor BID to generalized dry skin   - appreciate wound care consult   - plastics consulted; recs appreciated 4/23    #Diet/Dysphagia:  - Dysphagia: SLP consult for swallow function evaluation and treatment  - Current Diet: Regular  - Nutrition consult     #Patient Education  - Education provided on the following:      * Admitting diagnosis and functional implications      * Functional goals      * Bladder management      * Bowel management      * Skin care management      * Intensity of service and scheduling of rehab disciplines      * Plan of care and role of interdisciplinary team conference in discharge planning      * Reconciliation of medications from prior institution    #Precautions / PROPHYLAXIS:   - Falls, Spinal  - Ortho: Weight bearing status: FWB;  brace OOB  - DVT ppx: Eliquis 5mg PO BID, TEDs  - Pressure injury/Skin: Turn Q2hrs while in bed, OOB to Chair, PT/OT      ---------------  Outpatient Follow-up:    Yue Wiggins  [facility]  [address], [address]  [place] [address]  Phone: [phone]  Fax: [phone]  Established Patient  Follow Up Time: 1 week    Keith Cantu [facility]  Neurosurgery  [address] [place] [address], [address]  [place] [address]  Phone: [phone]  Fax: [phone]  Established Patient  Follow Up Time: 2 weeks    Luis A Brennan  [facility] Physician [facility]  CARDIOLOGY [address]  Scheduled Appointment: 04/23/2025    --------------  Wheelchair recommendation    Patient seen today for a face to face visit for an ultra-lightweight manual wheelchair in the home. Patient cannot independently walk in his home using any ambulation device.  He requires a manual wheelchair so they can perform their mobility related activities of daily living (MRADL), like getting to the bathroom for hygiene, the kitchen for eating and the bedroom for resting. Patient requires an ultra lightweight manual wheelchair because he cannot walk and has weakness and endurance issues that require him to need an ultra lightweight manual wheelchair with camber and an axle plate that brings the wheels forward to allow him to reach them effectively and without pain.      --------------  AFO recommendation    Patient presents with bilateral lower extremity weakness and foot drop due to nerve compression. Patient has instability as the patient’s feet scuff the ground during ambulation and is at risk for falls. Regarding the right side, in addition to foot drop and weakness, patient presents with inversion and genu recurvatum. The knee hyperextends. Patient requires a custom articulating AFO to address the foot drop, inversion, and genu recurvatum. Regarding the left side, in addition to foot drop and weakness, patient presents with genu recurvatum. Patient requires a custom semi solid AFO to allow for safe ambulation.    --------------  Electric Hospital Bed     Mr. Rishabh Shaver will need an electric hospital bed for elevation and transferring purposes. Due to his diagnosis of ah thoracic epidural abscess, he underwent laminectomy and fusion surgery and remains with paraplegia with additional need to have his head elevated 30 degrees to help assist in proper breathing and frequent body positioning to reduce the risk of skin breakdown in ways not feasible with an ordinary bed.    --------------

## 2025-04-29 NOTE — PROGRESS NOTE ADULT - ATTENDING COMMENTS
I independently performed the documented the history, exam, and medical decision making. I have made amendments to the documentation where necessary. I have personally seen and examined the patient. Medical records were reviewed and I have made amendments to the documentation where necessary and adjusted the history, physical examination, and plan as documented by the Resident Physician.
I independently performed the documented the history, exam, and medical decision making. I have made amendments to the documentation where necessary. I have personally seen and examined the patient. Medical records were reviewed and I have made amendments to the documentation where necessary and adjusted the history, physical examination, and plan as documented by the Resident Physician.
Seen and examined  Reports good night rest   Improvement of LE motor function     Reports return of urge for urine void in last 24-48 hrs, urine void noted during review  But PVR still significant yesterday requiring bladder cath    Labs unremarkable     Having BM but volume is limited  Abd still tympanic, but soft     Continue therapy   Await GI consult today   C/w antibiotics
I independently performed the documented the history, exam, and medical decision making. I have made amendments to the documentation where necessary. I have personally seen and examined the patient. Medical records were reviewed and I have made amendments to the documentation where necessary and adjusted the history, physical examination, and plan as documented by the Resident Physician.
Seen and examined  Continues to complain of dyspepsia and frequent flatus  No nausea or vomiting     C spine surgical wound, healed     Making progress in therapy     Continue therapy   Revised bowel regimen   F/u GI   Continue antibiotics   Continue hand exercises
Seen and examined  Note revised    Patient reports feeling of excessive gas in stomach and abdominal bloating  Tolerating oral diet   Having low vol stool with flatus  Tolerating antibiotics  Labs unremarkable    Continue therapy  Xray abd  Simethicone  Targeted exercise activity for weak muscle groups--left hand flexors, intrinsics  Continue Abx and brace when OOB       Spent 52 mins, patient review, discussion of labs ,treatment plan and care co ordination
I independently performed the documented the history, exam, and medical decision making. I have made amendments to the documentation where necessary. I have personally seen and examined the patient. Medical records were reviewed and I have made amendments to the documentation where necessary and adjusted the history, physical examination, and plan as documented by the Resident Physician.
I independently performed the documented the history, exam, and medical decision making. I have made amendments to the documentation where necessary. I have personally seen and examined the patient. Medical records were reviewed and I have made amendments to the documentation where necessary and adjusted the history, physical examination, and plan as documented by the Resident Physician.

## 2025-04-29 NOTE — CHART NOTE - NSCHARTNOTEFT_GEN_A_CORE
Primary RN states pt has a cough and is requesting medication.    Examined pt in bed, states he developed a dry infrequent cough. Pt denies chest pain, shortness of breath, dyspnea, fever, chills, rhinorrhea, sore throat, itchy eyes, and all other complaints. Pt looks in no acute distress, respirations even and unlabored. lung sounds are clear and equal bilat. skin is warm and dry. Pt is not tachycardic.     Ordered tessalon and will sign out to day team.

## 2025-04-30 VITALS
SYSTOLIC BLOOD PRESSURE: 103 MMHG | HEART RATE: 78 BPM | OXYGEN SATURATION: 97 % | RESPIRATION RATE: 16 BRPM | TEMPERATURE: 98 F | DIASTOLIC BLOOD PRESSURE: 59 MMHG

## 2025-04-30 PROCEDURE — 99232 SBSQ HOSP IP/OBS MODERATE 35: CPT

## 2025-04-30 PROCEDURE — 99239 HOSP IP/OBS DSCHRG MGMT >30: CPT

## 2025-04-30 RX ORDER — BENZONATATE 100 MG
1 CAPSULE ORAL
Qty: 9 | Refills: 0
Start: 2025-04-30 | End: 2025-05-02

## 2025-04-30 RX ADMIN — Medication 283 MILLIGRAM(S): at 08:27

## 2025-04-30 RX ADMIN — Medication 1 APPLICATION(S): at 06:13

## 2025-04-30 RX ADMIN — Medication 150 MICROGRAM(S): at 06:13

## 2025-04-30 RX ADMIN — Medication 10 MILLIGRAM(S): at 06:13

## 2025-04-30 RX ADMIN — METOPROLOL SUCCINATE 25 MILLIGRAM(S): 50 TABLET, EXTENDED RELEASE ORAL at 06:12

## 2025-04-30 RX ADMIN — Medication 40 MILLIGRAM(S): at 06:13

## 2025-04-30 RX ADMIN — Medication 1 APPLICATION(S): at 06:19

## 2025-04-30 RX ADMIN — GABAPENTIN 100 MILLIGRAM(S): 400 CAPSULE ORAL at 06:12

## 2025-04-30 RX ADMIN — Medication 100 MILLIGRAM(S): at 06:12

## 2025-04-30 NOTE — PROGRESS NOTE ADULT - PROVIDER SPECIALTY LIST ADULT
Hospitalist
Physiatry
Hospitalist
Physiatry
Rehab Medicine
Hospitalist
Physiatry
Hospitalist
Physiatry
Rehab Medicine
Hospitalist
Hospitalist
Physiatry
Physiatry
Rehab Medicine
Gastroenterology
Hospitalist

## 2025-04-30 NOTE — PROGRESS NOTE ADULT - SUBJECTIVE AND OBJECTIVE BOX
Patient is a 75y old  Male who presents with a chief complaint of Thoracic epidural abscess s/p laminectomy and fusion surgery      Patient seen and examined at bedside.    ALLERGIES:  No Known Allergies    MEDICATIONS  (STANDING):  ammonium lactate 12% Lotion 1 Application(s) Topical two times a day  apixaban 5 milliGRAM(s) Oral every 12 hours  AQUAPHOR (petrolatum Ointment) 1 Application(s) Topical three times a day  benzonatate 100 milliGRAM(s) Oral three times a day  bethanechol 10 milliGRAM(s) Oral two times a day  buMETAnide 0.5 milliGRAM(s) Oral daily  cefTRIAXone   IVPB 2000 milliGRAM(s) IV Intermittent every 24 hours  chlorhexidine 4% Liquid 1 Application(s) Topical <User Schedule>  collagenase Ointment 1 Application(s) Topical daily  DAPTOmycin IVPB 650 milliGRAM(s) IV Intermittent every 24 hours  docusate sodium Enema 283 milliGRAM(s) Rectal <User Schedule>  gabapentin   Oral   gabapentin 100 milliGRAM(s) Oral three times a day  levothyroxine 150 MICROGram(s) Oral daily  magnesium hydroxide Suspension 30 milliLiter(s) Oral at bedtime  metoprolol succinate ER 25 milliGRAM(s) Oral daily  pantoprazole    Tablet 40 milliGRAM(s) Oral before breakfast  polyethylene glycol 3350 17 Gram(s) Oral two times a day  rosuvastatin 5 milliGRAM(s) Oral at bedtime  senna 2 Tablet(s) Oral at bedtime  tamsulosin 0.8 milliGRAM(s) Oral at bedtime    MEDICATIONS  (PRN):  aluminum hydroxide/magnesium hydroxide/simethicone Suspension 30 milliLiter(s) Oral every 4 hours PRN Dyspepsia  benzocaine/menthol Lozenge 1 Lozenge Oral three times a day PRN Sore Throat  cyclobenzaprine 5 milliGRAM(s) Oral three times a day PRN Muscle Spasm  melatonin 3 milliGRAM(s) Oral at bedtime PRN Insomnia  oxyCODONE    IR 5 milliGRAM(s) Oral every 6 hours PRN Severe Pain (7 - 10)  simethicone 80 milliGRAM(s) Chew three times a day PRN Gas  sodium chloride 0.9% lock flush 10 milliLiter(s) IV Push every 1 hour PRN Pre/post blood products, medications, blood draw, and to maintain line patency    Vital Signs Last 24 Hrs  T(F): 97.7 (30 Apr 2025 07:48), Max: 98.2 (29 Apr 2025 20:18)  HR: 78 (30 Apr 2025 07:48) (76 - 88)  BP: 103/59 (30 Apr 2025 07:48) (103/59 - 118/67)  RR: 16 (30 Apr 2025 07:48) (16 - 16)  SpO2: 97% (30 Apr 2025 07:48) (95% - 97%)  I&O's Summary      PHYSICAL EXAM:  General: NAD, A/O   ENT: MMM, no scleral icterus  Neck: Supple, No JVD, no thyroidomegaly  Lungs: Clear to auscultation bilaterally, no wheezes, no rales, no rhonchi, good inspiratory effort  Cardio: RRR, S1/S2, No murmurs  Abdomen: Soft, Nontender, Nondistended; Bowel sounds present  Extremities: No calf tenderness, No pitting edema, no skin changes    LABS:                        11.8   7.26  )-----------( 203      ( 28 Apr 2025 06:54 )             36.2       04-28    136  |  100  |  32  ----------------------------<  102  3.9   |  30  |  1.06    Ca    9.2      28 Apr 2025 06:54    TPro  6.3  /  Alb  3.0  /  TBili  0.5  /  DBili  x   /  AST  18  /  ALT  32  /  AlkPhos  84  04-28     Urinalysis Basic - ( 28 Apr 2025 06:54 )    Color: x / Appearance: x / SG: x / pH: x  Gluc: 102 mg/dL / Ketone: x  / Bili: x / Urobili: x   Blood: x / Protein: x / Nitrite: x   Leuk Esterase: x / RBC: x / WBC x   Sq Epi: x / Non Sq Epi: x / Bacteria: x    COVID-19 PCR: [place] (03-25-25 @ 21:35)

## 2025-04-30 NOTE — PROGRESS NOTE ADULT - ASSESSMENT
Assessment/Plan:  Mr. Rishabh Shaver is a 74-year-old man with past medical history of benign prostatic hyperplasia, diverticulosis, Graves' disease, hemorrhoids, hyperlipidemia, hypothyroidism, osteoarthritis, spinal stenosis, spinal cord injury (2004), and multiple spinal surgeries (three cervical and one lumbar), who is admitted for Acute Inpatient Rehabilitation with a multidisciplinary rehab program at [facility] with functional impairments in ADLs and mobility secondary to a thoracic epidural abscess s/p posterior thoracic laminectomy and fusion on 3/18/25 with Dr. Keith Cantu.       #Thoracic epidural abscess s/p laminectomy and fusion surgery      * C8 AIS D Incomplete paraplegia      *  brace when OOB      * Flexeril 5mg PO TID PRN for neck soreness      * Staples removed on POD #14 (4/1/25)      * Sacral wound assessment by wound/skin team      * urinary retention, now voiding,  c/w Flomax 0.8mg PO daily      * Right Venodyne only due to left calf DVT (s/p pre-op IVC filter)   - Activity Limitations: Decreased social, vocational and leisure activities, decreased self care and ADLs, decreased mobility, decreased ability to manage household and finances  - orthotist Arya saw patient 4/17 for AFO for right foot drop and recommends b/l AFOs hinged on R and semi solid on L - order placed  - Continue Rehab Program as outpatient  - Discharge home today    -----------------------------------------------------------------------------------    Concurrent Medical Problems    #GOVIND on CKD (resolved)  #LE edema  #Hypoalbuminemia  #Proteinuria  - IVF as needed with fluid balance  - significant debilitating anasarca   - Trial of albumin Lasix started 3/8->improvement  - s/p IV Lasix/IV albumin BID (3/9)  - changed to Bumex/Albumin (3/11)-> good effect  - switched to PO Bumex 1mg PO BID 3/24  - decreased Bumex to 0.5mg PO daily 4/27  - Trend Albumin (4/28 - 3.0)  - Trend Protein total (4/28 - 6.3); Total protein, Random Urine (3/18- 24)  - Trend CMP (4/28 - Cr 1.06/ BUN 32)    #Left gastrocnemius and L soleal DVT  - appears provoked from decrease ambulation from recent spinal abscess  - could not tolerate VQ scan for r/o PE 2/2 pain  - Eliquis 5mg PO q12h - will be new med on discharge (of note patient completed Eliquis load)   - therapeutic Lovenox (3/12) in the setting of possible spine intervention  - last dose of AC night of 3/16. Restarted Eliquis 5mg BID 4/5/25  - S/P IVC filter placement 3/17  - repeat US 4/8 with stable L soleal DVT and no propagation     #Spinal epidural abscess POA and already being treated with IV antibiotics with suspected worsening of disease  - s/p thoracic laminectomy and fusion 3/18. [place]  brace   - surgical culture with staph epi possible contaminant but given clinical presentation on admission, IV Dapto added  - IV Rocephin 2g daily via PICC line thru 5/2/25  - IV Daptomycin 650mg daily via PICC line thru 5/2/25  - gabapentin 100mg PO TID  - d/c long-acting Oxycontin 4/11  - oxycodone 5mg PO q6h PRN moderate pain  - Flexeril 5mg PO TID PRN for muscle spasm  - staples d/teodoro in rehab    #Abdominal discomfort and bloating (improved)  - Abd xray 4/15--as above  - reconsulted GI 4/17 - recs appreciated    #Mild Rhabdomyolysis (resolved)  - associated elevated transaminases (resolved)  - suspect from anasarca/edema   - improving with adequate diuresis of edema  - associated elevated transaminase; now resolved (4/28- AST 18/ALT 32)    #Leukocytosis (resolved)  - likely rx to DVT + Discitis  - no other signs of sepsis, afebrile   - trend CBC and monitor fever curve (4/28- WBC 7.26)    #Hypothyroidism  - Synthroid 150mcg PO daily    #CAD  #HTN  #HLD  - metoprolol succinate 25mg PO daily   - rosuvastatin 5mg PO qhs    #Anemia/MARLENA  - trend H/H (4/28- 11.8/36.3)  - transfuse if Hgb <7 PRN     #Sleep:  - melatonin 3mg PO qhs PRN for sleep    #GI/Bowel:  - senna 2 tablets PO qhs  - Miralax 17g PO BID  - daily mini-enema at 6AM  - magnesium hydroxide 30mL PO qhs  - Maalox 30mL PO q4h PRN for dyspepsia  - GI ppx: pantoprazole 40mg PO daily   - added simethicone 80mg PO TID PRN for gas 4/14    #BPH  #Urinary retention likely augmented by degree of anasarca  - s/p Ortega; TOV  ->failed. Ortega re-inserted 3/6->fell out without knowing with associated hematuria->recurrent retention 3/7->Ortega re-inserted (3/7) --> successful TOV 3/25  - Flomax 0.8mg PO qhs  - started bethanechol 10mg PO BID 4/6  - monitor U/O    #Skin/Pressure Injury:   Skin assessment on admission:   - generalized dry flaky skin  - 19cm posterior cervical incision with 32 staples and 2 steri strips  - Left buttock DTI pressure injury measuring 14 x 9 cm      * appearing as a dark purple discoloration of intact skin, with a central superficial open area measuring 6 x 2 cm      * at the periphery of the 14 x 9 cm area, the discoloration is somewhat lighter      * dark red non-blanchable erythema      * eschar resolved     **Santyl to slough of sacrum daily**  - Bilateral lower extremity edema  - Healed lumbar surgical incision scar  - Right heel blanchable redness  - Bilateral dry cracked heels and plantar surface  - Lac hydrin BID BLE  - Aquaphor BID to generalized dry skin   - appreciate wound care consult   - plastics consulted; recs appreciated 4/23    #Diet/Dysphagia:  - Dysphagia: SLP consult for swallow function evaluation and treatment  - Current Diet: Regular  - Nutrition consult     #Patient Education  - Education provided on the following:      * Admitting diagnosis and functional implications      * Functional goals      * Bladder management      * Bowel management      * Skin care management      * Intensity of service and scheduling of rehab disciplines      * Plan of care and role of interdisciplinary team conference in discharge planning      * Reconciliation of medications from prior institution    #Precautions / PROPHYLAXIS:   - Falls, Spinal  - Ortho: Weight bearing status: FWB;  brace OOB  - DVT ppx: Eliquis 5mg PO BID, TEDs  - Pressure injury/Skin: Turn Q2hrs while in bed, OOB to Chair, PT/OT      ---------------  Outpatient Follow-up:    Yue Wiggins  [facility]  [address], [address]  [place] [address]  Phone: [phone]  Fax: [phone]  Established Patient  Follow Up Time: 1 week    Keith Cantu [facility]  Neurosurgery  [address] [place] [address], [address]  [place] [address]  Phone: [phone]  Fax: [phone]  Established Patient  Follow Up Time: 2 weeks    Luis A Brennan Physician [facility]  CARDIOLOGY [address] [place] [address]  Scheduled Appointment: 04/23/2025    --------------  Wheelchair recommendation    Patient seen today for a face to face visit for an ultra-lightweight manual wheelchair in the home. Patient cannot independently walk in his home using any ambulation device.  He requires a manual wheelchair so they can perform their mobility related activities of daily living (MRADL), like getting to the bathroom for hygiene, the kitchen for eating and the bedroom for resting. Patient requires an ultra lightweight manual wheelchair because he cannot walk and has weakness and endurance issues that require him to need an ultra lightweight manual wheelchair with camber and an axle plate that brings the wheels forward to allow him to reach them effectively and without pain.      --------------  AFO recommendation    Patient presents with bilateral lower extremity weakness and foot drop due to nerve compression. Patient has instability as the patient’s feet scuff the ground during ambulation and is at risk for falls. Regarding the right side, in addition to foot drop and weakness, patient presents with inversion and genu recurvatum. The knee hyperextends. Patient requires a custom articulating AFO to address the foot drop, inversion, and genu recurvatum. Regarding the left side, in addition to foot drop and weakness, patient presents with genu recurvatum. Patient requires a custom semi solid AFO to allow for safe ambulation.    --------------  Electric Hospital Bed     Mr. Rishabh Shaver will need an electric hospital bed for elevation and transferring purposes. Due to his diagnosis of ah thoracic epidural abscess, he underwent laminectomy and fusion surgery and remains with paraplegia with additional need to have his head elevated 30 degrees to help assist in proper breathing and frequent body positioning to reduce the risk of skin breakdown in ways not feasible with an ordinary bed.    --------------   Assessment/Plan:  Mr. Rishabh Shaver is a 74-year-old man with past medical history of benign prostatic hyperplasia, diverticulosis, Graves' disease, hemorrhoids, hyperlipidemia, hypothyroidism, osteoarthritis, spinal stenosis, spinal cord injury (2004), and multiple spinal surgeries (three cervical and one lumbar), who is admitted for Acute Inpatient Rehabilitation with a multidisciplinary rehab program at [facility] with functional impairments in ADLs and mobility secondary to a thoracic epidural abscess s/p posterior thoracic laminectomy and fusion on 3/18/25 with Dr. Keith Cantu.       #Thoracic epidural abscess s/p laminectomy and fusion surgery      * C8 AIS D Incomplete paraplegia      *  brace when OOB      * Flexeril 5mg PO TID PRN for neck soreness      * Staples removed on POD #14 (4/1/25)      * Sacral wound assessment by wound/skin team      * urinary retention, now voiding,  c/w Flomax 0.8mg PO daily      * Right Venodyne only due to left calf DVT (s/p pre-op IVC filter)   - Activity Limitations: Decreased social, vocational and leisure activities, decreased self care and ADLs, decreased mobility, decreased ability to manage household and finances  - orthotist Arya saw patient 4/17 for AFO for right foot drop and recommends b/l AFOs hinged on R and semi solid on L - order placed  - Continue Rehab Program as outpatient  - Discharge home today    -----------------------------------------------------------------------------------    Concurrent Medical Problems    #GOVIND on CKD (resolved)  #LE edema  #Hypoalbuminemia  #Proteinuria  - IVF as needed with fluid balance  - significant debilitating anasarca   - Trial of albumin Lasix started 3/8->improvement  - s/p IV Lasix/IV albumin BID (3/9)  - changed to Bumex/Albumin (3/11)-> good effect  - switched to PO Bumex 1mg PO BID 3/24  - decreased Bumex to 0.5mg PO daily 4/27  - Trend Albumin (4/28 - 3.0)  - Trend Protein total (4/28 - 6.3); Total protein, Random Urine (3/18- 24)  - Trend CMP (4/28 - Cr 1.06/ BUN 32)    #Left gastrocnemius and L soleal DVT  - appears provoked from decrease ambulation from recent spinal abscess  - could not tolerate VQ scan for r/o PE 2/2 pain  - Eliquis 5mg PO q12h - will be new med on discharge (of note patient completed Eliquis load)   - therapeutic Lovenox (3/12) in the setting of possible spine intervention  - last dose of AC night of 3/16. Restarted Eliquis 5mg BID 4/5/25  - S/P IVC filter placement 3/17  - repeat US 4/8 with stable L soleal DVT and no propagation     #Spinal epidural abscess POA and already being treated with IV antibiotics with suspected worsening of disease  - s/p thoracic laminectomy and fusion 3/18. [place]  brace   - surgical culture with staph epi possible contaminant but given clinical presentation on admission, IV Dapto added  - IV Rocephin 2g daily via PICC line thru 5/2/25  - IV Daptomycin 650mg daily via PICC line thru 5/2/25  - gabapentin 100mg PO TID  - d/c long-acting Oxycontin 4/11  - oxycodone 5mg PO q6h PRN moderate pain  - Flexeril 5mg PO TID PRN for muscle spasm  - staples d/teodoro in rehab    #Abdominal discomfort and bloating (improved)  - Abd xray 4/15--as above  - reconsulted GI 4/17 - recs appreciated    #Mild Rhabdomyolysis (resolved)  - associated elevated transaminases (resolved)  - suspect from anasarca/edema   - improving with adequate diuresis of edema  - associated elevated transaminase; now resolved (4/28- AST 18/ALT 32)    #Leukocytosis (resolved)  - likely rx to DVT + Discitis  - no other signs of sepsis, afebrile   - trend CBC and monitor fever curve (4/28- WBC 7.26)    #Hypothyroidism  - Synthroid 150mcg PO daily    #CAD  #HTN  #HLD  - metoprolol succinate 25mg PO daily   - rosuvastatin 5mg PO qhs    #Anemia/MARLENA  - trend H/H (4/28- 11.8/36.3)  - transfuse if Hgb <7 PRN     #Sleep:  - melatonin 3mg PO qhs PRN for sleep    #GI/Bowel:  - senna 2 tablets PO qhs  - Miralax 17g PO BID  - daily mini-enema at 6AM  - magnesium hydroxide 30mL PO qhs  - Maalox 30mL PO q4h PRN for dyspepsia  - GI ppx: pantoprazole 40mg PO daily   - added simethicone 80mg PO TID PRN for gas 4/14    #BPH  #Urinary retention likely augmented by degree of anasarca  - s/p Ortega; TOV  ->failed. Ortega re-inserted 3/6->fell out without knowing with associated hematuria->recurrent retention 3/7->Ortega re-inserted (3/7) --> successful TOV 3/25  - Flomax 0.8mg PO qhs  - started bethanechol 10mg PO BID 4/6  - monitor U/O    #Skin/Pressure Injury:   Skin assessment on admission:   - generalized dry flaky skin  - 19cm posterior cervical incision with 32 staples and 2 steri strips  - Left buttock DTI pressure injury measuring 14 x 9 cm      * appearing as a dark purple discoloration of intact skin, with a central superficial open area measuring 6 x 2 cm      * at the periphery of the 14 x 9 cm area, the discoloration is somewhat lighter      * dark red non-blanchable erythema      * eschar resolved     **Santyl to slough of sacrum daily**  - Bilateral lower extremity edema  - Healed lumbar surgical incision scar  - Right heel blanchable redness  - Bilateral dry cracked heels and plantar surface  - Lac hydrin BID BLE  - Aquaphor BID to generalized dry skin   - appreciate wound care consult   - plastics consulted; recs appreciated 4/23    #Diet/Dysphagia:  - Dysphagia: SLP consult for swallow function evaluation and treatment  - Current Diet: Regular  - Nutrition consult     #Patient Education  - Education provided on the following:      * Admitting diagnosis and functional implications      * Functional goals      * Bladder management      * Bowel management      * Skin care management      * Intensity of service and scheduling of rehab disciplines      * Plan of care and role of interdisciplinary team conference in discharge planning      * Reconciliation of medications from prior institution    #Precautions / PROPHYLAXIS:   - Falls, Spinal  - Ortho: Weight bearing status: FWB;  brace OOB  - DVT ppx: Eliquis 5mg PO BID, TEDs  - Pressure injury/Skin: Turn Q2hrs while in bed, OOB to Chair, PT/OT      ---------------  Outpatient Follow-up:    Yue Wiggins  [facility]  [address], [address]  [place] [address]  Phone: [phone]  Fax: [phone]  Established Patient  Follow Up Time: 1 week    Keith Cantu [facility]  Neurosurgery  [address] [place] [address], [address]  [place] [address]  Phone: [phone]  Fax: [phone]  Established Patient  Follow Up Time: 2 weeks    Luis A Brennan Physician [facility]  CARDIOLOGY [address] [place] [address]  Scheduled Appointment: 04/23/2025    --------------  Wheelchair recommendation    Patient seen today for a face to face visit for an ultra-lightweight manual wheelchair in the home. Patient cannot independently walk in his home using any ambulation device.  He requires a manual wheelchair so they can perform their mobility related activities of daily living (MRADL), like getting to the bathroom for hygiene, the kitchen for eating and the bedroom for resting. Patient requires an ultra lightweight manual wheelchair because he cannot walk and has weakness and endurance issues that require him to need an ultra lightweight manual wheelchair with camber and an axle plate that brings the wheels forward to allow him to reach them effectively and without pain.      --------------  AFO recommendation    Patient presents with bilateral lower extremity weakness and foot drop due to nerve compression. Patient has instability as the patient’s feet scuff the ground during ambulation and is at risk for falls. Regarding the right side, in addition to foot drop and weakness, patient presents with inversion and genu recurvatum. The knee hyperextends. Patient requires a custom articulating AFO to address the foot drop, inversion, and genu recurvatum. Regarding the left side, in addition to foot drop and weakness, patient presents with genu recurvatum. Patient requires a custom semi solid AFO to allow for safe ambulation. Anticipated use of orthosis will be for longer than six months, and without their use, there is a significantly increased risk for falls.    --------------  Electric Hospital Bed     Mr. Rishabh Shaver will need an electric hospital bed for elevation and transferring purposes. Due to his diagnosis of ah thoracic epidural abscess, he underwent laminectomy and fusion surgery and remains with paraplegia with additional need to have his head elevated 30 degrees to help assist in proper breathing and frequent body positioning to reduce the risk of skin breakdown in ways not feasible with an ordinary bed.    --------------

## 2025-04-30 NOTE — PROGRESS NOTE ADULT - SUBJECTIVE AND OBJECTIVE BOX
HPI:  Mr. Rishabh Shaver is a 74-year-old male patient with past medical history of benign prostatic hyperplasia, diverticulosis, Graves' disease, hemorrhoids, hyperlipidemia, hypothyroidism, osteoarthritis, spinal stenosis, spinal cord injury (2004), and multiple spinal surgeries (three cervical and one lumbar), who presented to [facility] on 3/4/25 from [facility] due to lower extremity swelling and abnormal BUN/creatinine levels for further evaluation and management. The patient was recently admitted to [facility] from 02/18/25 through 02/25/25 for sepsis secondary to epidural abscess. He had a peripherally inserted central catheter line in the right arm and was on nightly Ceftriaxone treatment until 04/16/25. At the ED, he was found to have GOVIND, a left soleal vein, and a left gastrocnemius DVT. He was started on heparin gtt. Hospital course complicated by urinary retention requiring Ortega catheter insertion, rhabdomyolysis, elevated LFTs, anasarca, leukocytosis due to thoracic spine discitis OM and early paraspinal abscess, as well as new onset RUE paresthesias and weakness. He is s/p IVC filter placement on 3/17 and was recommended surgical management. He is s/p posterior thoracic laminectomy and fusion on 3/18/25 with Dr. Keith Cantu. Surgical culture with rare staph epi. Recommendations by ID to continue IV Rocephin and Daptomycin through 5/2/25. Patient was evaluated by PM&R and therapy for functional deficits, gait/ADL impairments and acute rehabilitation was recommended. Patient was cleared for discharge to [facility] on 3/25/25. (25 Mar 2025 17:09)    ___________________________________________________________________________    SUBJECTIVE/ROS  Patient was seen and evaluated in PT today.  Reported no overnight events and is in no acute distress.  He is pleased with his progress in therapy and very happy with his physical therapist.   Scheduled for discharge home today.  No pain or bowel/bladder concerns. No other symptoms at this time.  ___________________________________________________________________________    Vital Signs Last 24 Hrs  T(C): 36.5 (30 Apr 2025 07:48), Max: 36.8 (29 Apr 2025 20:18)  T(F): 97.7 (30 Apr 2025 07:48), Max: 98.2 (29 Apr 2025 20:18)  HR: 78 (30 Apr 2025 07:48) (76 - 88)  BP: 103/59 (30 Apr 2025 07:48) (103/59 - 118/67)  RR: 16 (30 Apr 2025 07:48) (16 - 16)  SpO2: 97% (30 Apr 2025 07:48) (94% - 97%)    Parameters below as of 30 Apr 2025 07:48  Patient On (Oxygen Delivery Method): room air    ___________________________________________________________________________    LABS             11.8   7.26  )-----------( 203      ( 28 Apr 2025 06:54 )             36.2     136  |  100  |  32[H]  ----------------------------<  102[H]  3.9   |  30  |  1.06    Ca    9.2      28 Apr 2025 06:54  TPro  6.3  /  Alb  3.0[L]  /  TBili  0.5  /  DBili  x   /  AST  18  /  ALT  32  /  AlkPhos  84  04-28  ___________________________________________________________________________    MEDICATIONS  (STANDING):  ammonium lactate 12% Lotion 1 Application(s) Topical two times a day  apixaban 5 milliGRAM(s) Oral every 12 hours  AQUAPHOR (petrolatum Ointment) 1 Application(s) Topical three times a day  benzonatate 100 milliGRAM(s) Oral three times a day  bethanechol 10 milliGRAM(s) Oral two times a day  buMETAnide 0.5 milliGRAM(s) Oral daily  cefTRIAXone   IVPB 2000 milliGRAM(s) IV Intermittent every 24 hours  chlorhexidine 4% Liquid 1 Application(s) Topical <User Schedule>  collagenase Ointment 1 Application(s) Topical daily  DAPTOmycin IVPB 650 milliGRAM(s) IV Intermittent every 24 hours  docusate sodium Enema 283 milliGRAM(s) Rectal <User Schedule>  gabapentin   Oral   gabapentin 100 milliGRAM(s) Oral three times a day  levothyroxine 150 MICROGram(s) Oral daily  magnesium hydroxide Suspension 30 milliLiter(s) Oral at bedtime  metoprolol succinate ER 25 milliGRAM(s) Oral daily  pantoprazole    Tablet 40 milliGRAM(s) Oral before breakfast  polyethylene glycol 3350 17 Gram(s) Oral two times a day  rosuvastatin 5 milliGRAM(s) Oral at bedtime  senna 2 Tablet(s) Oral at bedtime  tamsulosin 0.8 milliGRAM(s) Oral at bedtime    MEDICATIONS  (PRN):  aluminum hydroxide/magnesium hydroxide/simethicone Suspension 30 milliLiter(s) Oral every 4 hours PRN Dyspepsia  benzocaine/menthol Lozenge 1 Lozenge Oral three times a day PRN Sore Throat  cyclobenzaprine 5 milliGRAM(s) Oral three times a day PRN Muscle Spasm  melatonin 3 milliGRAM(s) Oral at bedtime PRN Insomnia  oxyCODONE    IR 5 milliGRAM(s) Oral every 6 hours PRN Severe Pain (7 - 10)  simethicone 80 milliGRAM(s) Chew three times a day PRN Gas  sodium chloride 0.9% lock flush 10 milliLiter(s) IV Push every 1 hour PRN Pre/post blood products, medications, blood draw, and to maintain line patency    ___________________________________________________________________________    PHYSICAL EXAM:    Gen - NAD, Comfortable  HEENT -  EOMI, MMM, PERRLA, Normal Conjunctivae  Neck - Supple, + limited ROM, +posterior cervical incision  Pulm - CTAB, No wheeze, No rhonchi, No crackles  Cardiovascular - RRR, S1S2, No murmurs  Chest - good chest expansion, good respiratory effort  Abdomen - Firm, NT, abd distention, +BS  Extremities - No C/C, no calf tenderness, +BLE edema, +B/L pedal edema, +RUE PICC line   /GI- + Ortega draining clear urine   Neuro - stable  Psychiatric - Mood stable, Affect WNL    ___________________________________________________________________________

## 2025-04-30 NOTE — PROGRESS NOTE ADULT - ASSESSMENT
74M with PMH of BPH, diverticulosis, Graves' disease, hemorrhoids, HLD, hypothyroidism, osteoarthritis, spinal stenosis, spinal cord injury (2004), multiple spinal surgeries, CAD, and MARLENA. Presented 3/4/25 from SNF with LE swelling and GOVIND. Found to have left soleal and gastroc DVTs, spinal epidural abscess ? s/p thoracic laminectomy/fusion on 3/18. PICC in place for IV Rocephin + Daptomycin through 5/2. Admitted to [facility] on 3/25 for debility.    #Constipation - improved  - Monitor BM    #Spinal Epidural Abscess – s/p Laminectomy/Fusion (3/18)    IV Rocephin 2g QHS + IV Daptomycin 650mg daily via PICC through 5/2    Aspen  brace in use    Surgical cultures: rare staph epi (possible contaminant)    CPK 4/20 WNL    #GOVIND – Resolved   Anasarca / Hypoalbuminemia / LE Edema - improved    LE edema resolved. Bumex reduced to 0.5mg daily     Monitor renal function and I/Os — currently stable    Serum TRUDY: weak IgG lambda band (needs OP f/u)    echo 3/15: EF 65%, mild TR    #Transaminitis – Resolved    Limit Tylenol, avoid hepatotoxins    Encourage PO intake    Monitor LFTs with routine labs    #CAD / HTN / HLD    Metoprolol 25 mg daily    Crestor 5 mg QHS    #Anemia / MARLENA    s/p 2 units PRBCs intra-op    Monitor CBC; H/H stable currently     Transfuse PRN to maintain Hgb >7    #Hypothyroidism    Synthroid 150 mcg daily    #BPH / Chronic Urinary Retention    On Flomax 0.8mg daily    #Acute DVT (L Soleal & Gastroc Veins)    s/p IVC filter 3/17 (couldn’t tolerate VQ scan)    Eliquis 5 mg BID resumed per neurosurgery guidance    #DVT Prophylaxis: Eliquis BID

## 2025-05-12 NOTE — CONSULT NOTE ADULT - NS ATTEND AMEND GEN_ALL_CORE FT
99
Patient discussed with ANTON Gandara and examined at bedside.     He endorses fever on Saturday evening to 102.    The following day, he noticed new L hand weakness.      ON exam:   GEN: NAD  cV: rRR, s1, S2  PULM: CTAB  NEURO:   Awake, alert, oriented to month, date, year, normal naming, repetition, fluency  Pupils 3-2mm, symmetric, full Vf's, normal EOm, no facial weakness, no dysarthria, tongue midline, palate symmetric.   MOTOR: normal bulk and tone.  R arm is 4+/5 deltoids, biceps, triceps, 4/5 .  L arm is 4+/5 deltoids, biceps, triceps, and 3/5 .  Hip flexors and knee extensors 5/5.   SENSORY: diminished light touch sensation bilateral thighs.      MRI C/T/L spine imagines reviewed: discitis and phlegmon.      AP: 74 year old man with remote spinal cord injury, s/p ACDF and cervical laminectomy, Graves, HLD, diverticulosis, presenting with fever, and L arm weakness, found to be bacteremic with G+_ cocci.  On exam, has L  weakness, with good proximal strength, bilateral LE sensory loss, and baseline R arm weakness.  MRI showing cervical multilevel discitis.    Most concerning for cervical myelopathy in setting of infection with seeding of prior surgical instrumentation.  Will need to also rule out a stroke, given the bacteremia, may have had septic emboli.   MRI brain w/wo contrast.   Continue antibiotics per ID.  Recommendations appreciated  Neurosurgery recommendations appreciated.  No role for neurosurgical intervention at this point.   PT/OT  Neurology to follow.  Please page or call with any acute neuro changes, or other issues we can help address.

## 2025-05-27 ENCOUNTER — APPOINTMENT (OUTPATIENT)
Dept: CARDIOLOGY | Facility: CLINIC | Age: 75
End: 2025-05-27

## 2025-05-27 ENCOUNTER — NON-APPOINTMENT (OUTPATIENT)
Age: 75
End: 2025-05-27

## 2025-05-29 ENCOUNTER — APPOINTMENT (OUTPATIENT)
Dept: NEUROSURGERY | Facility: CLINIC | Age: 75
End: 2025-05-29
Payer: MEDICARE

## 2025-05-29 VITALS
HEART RATE: 62 BPM | OXYGEN SATURATION: 97 % | SYSTOLIC BLOOD PRESSURE: 114 MMHG | HEIGHT: 68 IN | BODY MASS INDEX: 33.34 KG/M2 | WEIGHT: 220 LBS | DIASTOLIC BLOOD PRESSURE: 73 MMHG

## 2025-05-29 DIAGNOSIS — Z98.1 ARTHRODESIS STATUS: ICD-10-CM

## 2025-05-29 DIAGNOSIS — M86.9 OSTEOMYELITIS, UNSPECIFIED: ICD-10-CM

## 2025-05-29 DIAGNOSIS — R29.898 OTHER SYMPTOMS AND SIGNS INVOLVING THE MUSCULOSKELETAL SYSTEM: ICD-10-CM

## 2025-05-29 PROCEDURE — 99024 POSTOP FOLLOW-UP VISIT: CPT

## 2025-06-02 PROCEDURE — 85025 COMPLETE CBC W/AUTO DIFF WBC: CPT

## 2025-06-02 PROCEDURE — 97110 THERAPEUTIC EXERCISES: CPT | Mod: GO

## 2025-06-02 PROCEDURE — 82550 ASSAY OF CK (CPK): CPT

## 2025-06-02 PROCEDURE — 97140 MANUAL THERAPY 1/> REGIONS: CPT | Mod: GP

## 2025-06-02 PROCEDURE — 97116 GAIT TRAINING THERAPY: CPT | Mod: GP

## 2025-06-02 PROCEDURE — 87637 SARSCOV2&INF A&B&RSV AMP PRB: CPT

## 2025-06-02 PROCEDURE — 97161 PT EVAL LOW COMPLEX 20 MIN: CPT | Mod: GP

## 2025-06-02 PROCEDURE — 74018 RADEX ABDOMEN 1 VIEW: CPT

## 2025-06-02 PROCEDURE — 84443 ASSAY THYROID STIM HORMONE: CPT

## 2025-06-02 PROCEDURE — 93970 EXTREMITY STUDY: CPT

## 2025-06-02 PROCEDURE — 97542 WHEELCHAIR MNGMENT TRAINING: CPT | Mod: GO

## 2025-06-02 PROCEDURE — 74019 RADEX ABDOMEN 2 VIEWS: CPT

## 2025-06-02 PROCEDURE — 97112 NEUROMUSCULAR REEDUCATION: CPT | Mod: GO

## 2025-06-02 PROCEDURE — 97530 THERAPEUTIC ACTIVITIES: CPT | Mod: GP

## 2025-06-02 PROCEDURE — 87635 SARS-COV-2 COVID-19 AMP PRB: CPT

## 2025-06-02 PROCEDURE — 71045 X-RAY EXAM CHEST 1 VIEW: CPT

## 2025-06-02 PROCEDURE — 36415 COLL VENOUS BLD VENIPUNCTURE: CPT

## 2025-06-02 PROCEDURE — 80048 BASIC METABOLIC PNL TOTAL CA: CPT

## 2025-06-02 PROCEDURE — 97535 SELF CARE MNGMENT TRAINING: CPT | Mod: GO

## 2025-06-02 PROCEDURE — 83735 ASSAY OF MAGNESIUM: CPT

## 2025-06-02 PROCEDURE — 80053 COMPREHEN METABOLIC PANEL: CPT

## 2025-06-03 ENCOUNTER — APPOINTMENT (OUTPATIENT)
Dept: CARDIOLOGY | Facility: CLINIC | Age: 75
End: 2025-06-03
Payer: MEDICARE

## 2025-06-03 ENCOUNTER — NON-APPOINTMENT (OUTPATIENT)
Age: 75
End: 2025-06-03

## 2025-06-03 VITALS
HEIGHT: 68 IN | WEIGHT: 220 LBS | SYSTOLIC BLOOD PRESSURE: 125 MMHG | HEART RATE: 66 BPM | OXYGEN SATURATION: 96 % | DIASTOLIC BLOOD PRESSURE: 79 MMHG | BODY MASS INDEX: 33.34 KG/M2

## 2025-06-03 DIAGNOSIS — I87.2 VENOUS INSUFFICIENCY (CHRONIC) (PERIPHERAL): ICD-10-CM

## 2025-06-03 PROCEDURE — 93000 ELECTROCARDIOGRAM COMPLETE: CPT

## 2025-06-03 PROCEDURE — 99215 OFFICE O/P EST HI 40 MIN: CPT

## 2025-06-03 RX ORDER — METOPROLOL SUCCINATE 25 MG/1
25 TABLET, EXTENDED RELEASE ORAL
Refills: 0 | Status: ACTIVE | COMMUNITY

## 2025-06-03 RX ORDER — GABAPENTIN 100 MG/1
100 CAPSULE ORAL
Refills: 0 | Status: ACTIVE | COMMUNITY

## 2025-06-03 RX ORDER — ROSUVASTATIN CALCIUM 5 MG/1
5 TABLET, FILM COATED ORAL
Refills: 0 | Status: ACTIVE | COMMUNITY

## 2025-06-03 RX ORDER — APIXABAN 5 MG/1
5 TABLET, FILM COATED ORAL
Refills: 0 | Status: ACTIVE | COMMUNITY

## 2025-06-12 ENCOUNTER — OUTPATIENT (OUTPATIENT)
Dept: OUTPATIENT SERVICES | Facility: HOSPITAL | Age: 75
LOS: 1 days | End: 2025-06-12
Payer: MEDICARE

## 2025-06-12 ENCOUNTER — APPOINTMENT (OUTPATIENT)
Dept: ULTRASOUND IMAGING | Facility: CLINIC | Age: 75
End: 2025-06-12
Payer: MEDICARE

## 2025-06-12 DIAGNOSIS — Z98.890 OTHER SPECIFIED POSTPROCEDURAL STATES: Chronic | ICD-10-CM

## 2025-06-12 DIAGNOSIS — I87.2 VENOUS INSUFFICIENCY (CHRONIC) (PERIPHERAL): ICD-10-CM

## 2025-06-12 PROCEDURE — 93970 EXTREMITY STUDY: CPT

## 2025-06-12 PROCEDURE — 93970 EXTREMITY STUDY: CPT | Mod: 26

## 2025-06-16 ENCOUNTER — APPOINTMENT (OUTPATIENT)
Dept: PHYSICAL MEDICINE AND REHAB | Facility: CLINIC | Age: 75
End: 2025-06-16

## 2025-06-18 ENCOUNTER — APPOINTMENT (OUTPATIENT)
Dept: CARDIOLOGY | Facility: CLINIC | Age: 75
End: 2025-06-18

## 2025-06-26 ENCOUNTER — APPOINTMENT (OUTPATIENT)
Dept: NEUROSURGERY | Facility: CLINIC | Age: 75
End: 2025-06-26
Payer: MEDICARE

## 2025-06-26 VITALS — OXYGEN SATURATION: 98 % | HEART RATE: 52 BPM | DIASTOLIC BLOOD PRESSURE: 84 MMHG | SYSTOLIC BLOOD PRESSURE: 144 MMHG

## 2025-06-26 PROCEDURE — 99214 OFFICE O/P EST MOD 30 MIN: CPT

## 2025-07-01 ENCOUNTER — APPOINTMENT (OUTPATIENT)
Dept: VASCULAR SURGERY | Facility: CLINIC | Age: 75
End: 2025-07-01
Payer: MEDICARE

## 2025-07-01 VITALS
HEART RATE: 54 BPM | HEIGHT: 68 IN | SYSTOLIC BLOOD PRESSURE: 137 MMHG | WEIGHT: 220 LBS | BODY MASS INDEX: 33.34 KG/M2 | DIASTOLIC BLOOD PRESSURE: 83 MMHG | OXYGEN SATURATION: 95 %

## 2025-07-01 PROCEDURE — 99204 OFFICE O/P NEW MOD 45 MIN: CPT

## 2025-07-01 PROCEDURE — 93971 EXTREMITY STUDY: CPT

## 2025-07-15 ENCOUNTER — APPOINTMENT (OUTPATIENT)
Dept: VASCULAR SURGERY | Facility: CLINIC | Age: 75
End: 2025-07-15
Payer: MEDICARE

## 2025-07-15 PROCEDURE — 93979 VASCULAR STUDY: CPT

## 2025-07-15 PROCEDURE — 99214 OFFICE O/P EST MOD 30 MIN: CPT

## 2025-07-18 ENCOUNTER — APPOINTMENT (OUTPATIENT)
Dept: VASCULAR SURGERY | Facility: CLINIC | Age: 75
End: 2025-07-18

## 2025-07-18 PROCEDURE — 37193Z: CUSTOM

## 2025-07-25 ENCOUNTER — APPOINTMENT (OUTPATIENT)
Dept: VASCULAR SURGERY | Facility: CLINIC | Age: 75
End: 2025-07-25
Payer: MEDICARE

## 2025-07-25 DIAGNOSIS — I87.2 VENOUS INSUFFICIENCY (CHRONIC) (PERIPHERAL): ICD-10-CM

## 2025-07-25 DIAGNOSIS — I82.409 ACUTE EMBOLISM AND THROMBOSIS OF UNSPECIFIED DEEP VEINS OF UNSPECIFIED LOWER EXTREMITY: ICD-10-CM

## 2025-07-25 PROCEDURE — 99214 OFFICE O/P EST MOD 30 MIN: CPT

## 2025-09-19 ENCOUNTER — APPOINTMENT (OUTPATIENT)
Dept: VASCULAR SURGERY | Facility: CLINIC | Age: 75
End: 2025-09-19
Payer: MEDICARE

## 2025-09-19 ENCOUNTER — APPOINTMENT (OUTPATIENT)
Dept: VASCULAR SURGERY | Facility: CLINIC | Age: 75
End: 2025-09-19

## 2025-09-19 VITALS
DIASTOLIC BLOOD PRESSURE: 85 MMHG | HEART RATE: 54 BPM | SYSTOLIC BLOOD PRESSURE: 137 MMHG | HEIGHT: 68 IN | OXYGEN SATURATION: 95 % | WEIGHT: 232 LBS | BODY MASS INDEX: 35.16 KG/M2

## 2025-09-19 DIAGNOSIS — I82.409 ACUTE EMBOLISM AND THROMBOSIS OF UNSPECIFIED DEEP VEINS OF UNSPECIFIED LOWER EXTREMITY: ICD-10-CM

## 2025-09-19 PROCEDURE — 99214 OFFICE O/P EST MOD 30 MIN: CPT

## 2025-09-25 PROBLEM — G57.22 FEMORAL NEUROPATHY OF LEFT LOWER EXTREMITY: Status: ACTIVE | Noted: 2025-09-25
